# Patient Record
Sex: MALE | Race: BLACK OR AFRICAN AMERICAN | NOT HISPANIC OR LATINO | Employment: OTHER | URBAN - METROPOLITAN AREA
[De-identification: names, ages, dates, MRNs, and addresses within clinical notes are randomized per-mention and may not be internally consistent; named-entity substitution may affect disease eponyms.]

---

## 2017-01-28 ENCOUNTER — LAB VISIT (OUTPATIENT)
Dept: LAB | Facility: HOSPITAL | Age: 67
End: 2017-01-28
Attending: PEDIATRICS
Payer: COMMERCIAL

## 2017-01-28 DIAGNOSIS — E78.5 HYPERLIPIDEMIA: ICD-10-CM

## 2017-01-28 DIAGNOSIS — I10 ESSENTIAL HYPERTENSION: ICD-10-CM

## 2017-01-28 LAB
ALT SERPL W/O P-5'-P-CCNC: 23 U/L
ANION GAP SERPL CALC-SCNC: 8 MMOL/L
AST SERPL-CCNC: 27 U/L
BUN SERPL-MCNC: 18 MG/DL
CALCIUM SERPL-MCNC: 9.1 MG/DL
CHLORIDE SERPL-SCNC: 110 MMOL/L
CHOLEST/HDLC SERPL: 3.9 {RATIO}
CO2 SERPL-SCNC: 24 MMOL/L
CREAT SERPL-MCNC: 1.5 MG/DL
EST. GFR  (AFRICAN AMERICAN): 56.1 ML/MIN/1.73 M^2
EST. GFR  (NON AFRICAN AMERICAN): 48.5 ML/MIN/1.73 M^2
GLUCOSE SERPL-MCNC: 110 MG/DL
HDL/CHOLESTEROL RATIO: 25.6 %
HDLC SERPL-MCNC: 129 MG/DL
HDLC SERPL-MCNC: 33 MG/DL
LDLC SERPL CALC-MCNC: 81.2 MG/DL
NONHDLC SERPL-MCNC: 96 MG/DL
POTASSIUM SERPL-SCNC: 4.6 MMOL/L
SODIUM SERPL-SCNC: 142 MMOL/L
TRIGL SERPL-MCNC: 74 MG/DL

## 2017-01-28 PROCEDURE — 84460 ALANINE AMINO (ALT) (SGPT): CPT

## 2017-01-28 PROCEDURE — 80061 LIPID PANEL: CPT

## 2017-01-28 PROCEDURE — 80048 BASIC METABOLIC PNL TOTAL CA: CPT

## 2017-01-28 PROCEDURE — 84450 TRANSFERASE (AST) (SGOT): CPT

## 2017-01-28 PROCEDURE — 36415 COLL VENOUS BLD VENIPUNCTURE: CPT | Mod: PO

## 2017-02-06 RX ORDER — ROSUVASTATIN CALCIUM 40 MG/1
40 TABLET, FILM COATED ORAL DAILY
Qty: 30 TABLET | Refills: 6 | Status: SHIPPED | OUTPATIENT
Start: 2017-02-06 | End: 2017-02-09

## 2017-02-09 ENCOUNTER — OFFICE VISIT (OUTPATIENT)
Dept: INTERNAL MEDICINE | Facility: CLINIC | Age: 67
End: 2017-02-09
Payer: COMMERCIAL

## 2017-02-09 VITALS
HEART RATE: 61 BPM | HEIGHT: 68 IN | SYSTOLIC BLOOD PRESSURE: 135 MMHG | OXYGEN SATURATION: 97 % | TEMPERATURE: 98 F | WEIGHT: 248.88 LBS | BODY MASS INDEX: 37.72 KG/M2 | DIASTOLIC BLOOD PRESSURE: 82 MMHG

## 2017-02-09 DIAGNOSIS — M1A.0710 IDIOPATHIC CHRONIC GOUT OF RIGHT FOOT WITHOUT TOPHUS: ICD-10-CM

## 2017-02-09 DIAGNOSIS — Z00.00 WELL ADULT EXAM: Primary | ICD-10-CM

## 2017-02-09 DIAGNOSIS — I10 ESSENTIAL HYPERTENSION: ICD-10-CM

## 2017-02-09 DIAGNOSIS — M54.40 CHRONIC MIDLINE LOW BACK PAIN WITH SCIATICA, SCIATICA LATERALITY UNSPECIFIED: ICD-10-CM

## 2017-02-09 DIAGNOSIS — E66.9 NON MORBID OBESITY, UNSPECIFIED OBESITY TYPE: ICD-10-CM

## 2017-02-09 DIAGNOSIS — G89.29 CHRONIC MIDLINE LOW BACK PAIN WITH SCIATICA, SCIATICA LATERALITY UNSPECIFIED: ICD-10-CM

## 2017-02-09 DIAGNOSIS — Z95.1 S/P CABG (CORONARY ARTERY BYPASS GRAFT): ICD-10-CM

## 2017-02-09 DIAGNOSIS — C61 PROSTATE CANCER: ICD-10-CM

## 2017-02-09 DIAGNOSIS — N18.30 CHRONIC KIDNEY DISEASE, STAGE III (MODERATE): ICD-10-CM

## 2017-02-09 DIAGNOSIS — E78.5 HYPERLIPIDEMIA, UNSPECIFIED HYPERLIPIDEMIA TYPE: ICD-10-CM

## 2017-02-09 DIAGNOSIS — I25.810 CORONARY ARTERY DISEASE INVOLVING CORONARY BYPASS GRAFT OF NATIVE HEART WITHOUT ANGINA PECTORIS: ICD-10-CM

## 2017-02-09 PROCEDURE — 99999 PR PBB SHADOW E&M-EST. PATIENT-LVL III: CPT | Mod: PBBFAC,,, | Performed by: PEDIATRICS

## 2017-02-09 PROCEDURE — 3079F DIAST BP 80-89 MM HG: CPT | Mod: S$GLB,,, | Performed by: PEDIATRICS

## 2017-02-09 PROCEDURE — 99396 PREV VISIT EST AGE 40-64: CPT | Mod: S$GLB,,, | Performed by: PEDIATRICS

## 2017-02-09 PROCEDURE — 3075F SYST BP GE 130 - 139MM HG: CPT | Mod: S$GLB,,, | Performed by: PEDIATRICS

## 2017-02-09 RX ORDER — ROSUVASTATIN CALCIUM 40 MG/1
40 TABLET, COATED ORAL NIGHTLY
Qty: 30 TABLET | Refills: 11 | Status: SHIPPED | OUTPATIENT
Start: 2017-02-09 | End: 2017-05-04 | Stop reason: SDUPTHER

## 2017-02-09 NOTE — PROGRESS NOTES
Subjective:       Patient ID: Damon Yoo is a 66 y.o. male.    Chief Complaint: No chief complaint on file.    HPI  Review of Systems    Objective:      Physical Exam    Assessment:       1. Well adult exam    2. Essential hypertension    3. Hyperlipidemia, unspecified hyperlipidemia type    4. Coronary artery disease involving coronary bypass graft of native heart without angina pectoris    5. Prostate cancer    6. Idiopathic chronic gout of right foot without tophus    7. S/P CABG (coronary artery bypass graft)    8. Chronic kidney disease, stage III (moderate)    9. Non morbid obesity, unspecified obesity type        Plan:       Well adult exam    Essential hypertension  -     Hypertension Digital Medicine (HDMP)  Enrollment Order  -     Assign [facility] Onboarding Questionnaire Series  -     Basic metabolic panel; Future; Expected date: 2/9/17    Hyperlipidemia, unspecified hyperlipidemia type  -     ALT (SGPT); Future; Expected date: 2/9/17  -     AST (SGOT); Future; Expected date: 2/9/17  -     Lipid panel; Future; Expected date: 2/9/17    Coronary artery disease involving coronary bypass graft of native heart without angina pectoris    Prostate cancer    Idiopathic chronic gout of right foot without tophus    S/P CABG (coronary artery bypass graft)    Chronic kidney disease, stage III (moderate)    Non morbid obesity, unspecified obesity type

## 2017-02-09 NOTE — PROGRESS NOTES
Subjective:        Patient ID: Damon Yoo is a 66 y.o. male.     Chief Complaint: Annual     HPI Comments: Not following D&E as well except to watch gout, weight is up. Subspecialty notes reviewed with patient     HTN: B/P good, no HTNive symptoms.   LIPIDS:somewhat following D&E, tolerating and compliant with med(s).  CAD: no CP, SOB, DIAZ, saw Dr Faye  Gout: quiet, but is seeing rheum  Prostate cancer: seeing urology  Chronic back pain is good.   CKD: cre is stable  LABS REVIEWED AND DISCUSSED WITH PATIENT     Review of Systems   Constitutional: Negative for fever and unexpected weight change.   HENT: Negative for congestion and rhinorrhea.   Eyes: Negative for discharge and redness.   Respiratory: Negative for cough and wheezing.   Cardiovascular: Negative for chest pain, palpitations and leg swelling.   Gastrointestinal: Negative for vomiting, diarrhea and constipation.   Endocrine: Negative for cold intolerance, heat intolerance, polydipsia, polyphagia and polyuria.   Genitourinary: Negative for decreased urine volume and difficulty urinating.   Musculoskeletal: Positive for back pain and arthralgias. Negative for joint swelling.   Skin: Negative for rash and wound.   Neurological: Negative for syncope and headaches.   Psychiatric/Behavioral: Negative for behavioral problems and sleep disturbance.       Objective:      Physical Exam   Constitutional: He is oriented to person, place, and time. He appears well-developed and well-nourished. No distress.    Neck: Trachea normal and normal range of motion. Neck supple. No JVD present. No thyromegaly present.   Cardiovascular: Normal rate, regular rhythm, S1 normal, S2 normal, normal heart sounds and normal pulses. Exam reveals no gallop and no friction rub.   No murmur heard.  Pulmonary/Chest: Effort normal and breath sounds normal. He has no wheezes. He has no rales.   Abdominal: Soft. Normal appearance and bowel sounds are normal. He exhibits no mass. There  is no hepatosplenomegaly. There is no tenderness. There is no rebound and no guarding.   Musculoskeletal: Normal range of motion. He exhibits no edema.   Lymphadenopathy:   He has no cervical adenopathy.   Neurological: He is alert and oriented to person, place, and time. He has normal strength. Coordination and gait normal.   Skin: Skin is warm and intact. No rash noted.   Psychiatric: He has a normal mood and affect. His speech is normal and behavior is normal. Judgment and thought content normal.       Assessment:     Well exam in a patient with:  1. Hyperlipidemia    2. Essential hypertension    3.      Obesity  4.      CKD stage 3   5.      Prostate cancer  6.     Gout  7.     Chronic back pain  8.     CAD s/p CABG   Plan:    D&E, weight loss, mediterranean diet. Meds reviewed, keep subspecialty care. F/U yearly with labs as he is seeing Dr Faye. PCV 23 at 66 yo, next month.

## 2017-02-09 NOTE — MR AVS SNAPSHOT
[facility]  [address] [place] [address]  Phone: [phone]  Fax: [phone]                  Damon Yoo   2017 7:20 AM   Office Visit    Description:  Male : 1950   Provider:  ELLEN Mason Jr., MD   Department:  [facility]           Reason for Visit     Annual Exam           Diagnoses this Visit        Comments    Well adult exam    -  Primary     Essential hypertension         Hyperlipidemia, unspecified hyperlipidemia type         Coronary artery disease involving coronary bypass graft of native heart without angina pectoris         Prostate cancer         Idiopathic chronic gout of right foot without tophus         S/P CABG (coronary artery bypass graft)         Chronic kidney disease, stage III (moderate)         Non morbid obesity, unspecified obesity type         Chronic midline low back pain with sciatica, sciatica laterality unspecified                To Do List           Future Appointments        Provider Department Dept Phone    2017 11:40 AM [facility] LABORATORY [facility] [phone]    3/3/2017 9:00 AM FIELDS, VISUAL-ONE [facility] - Ophthalmology [phone]    3/3/2017 9:30 AM Devin Wayne MD [facility] Ophthalmology [phone]    3/6/2017 7:30 AM Alethea Beth PA-C [facility] - Rheumatology [phone]    3/13/2017 8:30 AM INTERNAL MEDICINE NURSE, [facility] Internal Medicine [phone]      Goals (5 Years of Data)     None      Follow-Up and Disposition     Return in about 1 year (around 2018).    Follow-up and Disposition History       These Medications        Disp Refills Start End    rosuvastatin (CRESTOR) 40 MG Tab 30 tablet 11 2017    Take 1 tablet (40 mg total) by mouth every evening. Generic is fine. - Oral    Pharmacy: [facility]/pharmacy #[id number] - [place] Ph #: [phone]         [facility]     [facility] Nurse [facility] Line -   Assistance  Registered nurses in the [facility] Center provide clinical advisement, health education, appointment booking, and other advisory services.  Call for this free service at [phone].             Medications           Message regarding Medications     Verify the changes and/or additions to your medication regime listed below are the same as discussed with your clinician today.  If any of these changes or additions are incorrect, please notify your healthcare provider.        START taking these NEW medications        Refills    rosuvastatin (CRESTOR) 40 MG Tab 11    Sig: Take 1 tablet (40 mg total) by mouth every evening. Generic is fine.    Class: Normal    Route: Oral      STOP taking these medications     promethazine-dextromethorphan (PROMETHAZINE-DM) 6.25-15 mg/5 mL Syrp Take 5 mLs by mouth nightly as needed. May cause drowsiness    albuterol 90 mcg/actuation inhaler Inhale 1-2 puffs into the lungs every 6 (six) hours as needed for Wheezing (cough).    clonazePAM (KLONOPIN) 1 MG tablet Take 1 tablet (1 mg total) by mouth every evening.    hydrocodone-acetaminophen (NORCO)  mg per tablet Take 1 tablet by mouth. q 4-6 hrs prn For pain    ondansetron (ZOFRAN) 4 MG tablet Take 1 tablet (4 mg total) by mouth every 8 (eight) hours as needed for Nausea.           Verify that the below list of medications is an accurate representation of the medications you are currently taking.  If none reported, the list may be blank. If incorrect, please contact your healthcare provider. Carry this list with you in case of emergency.           Current Medications     allopurinol (ZYLOPRIM) 300 MG tablet 1 & 1/2 TABLET BY MOUTH EVERY DAY    amlodipine (NORVASC) 2.5 MG tablet TAKE ONE TABLET BY MOUTH EVERY DAY    aspirin 81 MG chewable tablet Take 1 tablet by mouth Daily.    cetirizine (ZYRTEC) 10 MG tablet Take 1 tablet (10 mg total) by mouth once daily.    colchicine (COLCRYS) 0.6 mg tablet Take with first  "symptom of gout, repeat after 1 hour and 6 hours after: then twice a day x 2 days.    fenofibric acid (FIBRICOR) 135 mg CpDR TAKE ONE TABLET BY MOUTH EVERY DAY    fish oil-fat acid comb.8-hb137 (OMEGA 3-6-9) 1,200 mg Cap Take by mouth.    latanoprost 0.005 % ophthalmic solution ONE DROP IN EACH EYE EVERY EVENING    lisinopril (PRINIVIL,ZESTRIL) 40 MG tablet Take 1 tablet (40 mg total) by mouth once daily.    metoprolol succinate (TOPROL-XL) 100 MG 24 hr tablet Take 1 tablet (100 mg total) by mouth once daily.    rosuvastatin (CRESTOR) 40 MG Tab Take 1 tablet (40 mg total) by mouth every evening. Generic is fine.           Clinical Reference Information           Your Vitals Were     BP Pulse Temp Height    135/82 (BP Location: Left arm, Patient Position: Sitting, BP Method: Manual) 61 97.9 °F (36.6 °C) (Tympanic) 5' 8" (1.727 m)    Weight SpO2 BMI    112.9 kg (248 lb 14.4 oz) 97% 37.85 kg/m2      Blood Pressure          Most Recent Value    BP  135/82      Allergies as of 2/9/2017     No Known Drug Allergies      Immunizations Administered on Date of Encounter - 2/9/2017     None      Orders Placed During Today's Visit      Normal Orders This Visit    [facility]     Hypertension Digital Medicine (HDMP)  Enrollment Order     Future Labs/Procedures Expected by Expires    ALT (SGPT)  2/9/2017 4/10/2018    AST (SGOT)  2/9/2017 4/10/2018    Basic metabolic panel  2/9/2017 4/10/2018    Lipid panel  2/9/2017 2/9/2018      Hypertension Digital Medicine Program Information              As discussed, you could benefit from enrolling in the Hypertension Digital Medicine Program. The goal of the program is to help you effectively manage your high blood pressure through an appropriate balance of medication and lifestyle changes, all from the comfort of your own home. Effectively managed blood pressure reduces your risk of having a heart attack or a stroke in the future, so you can enjoy a long " "and healthy life. We want to make blood pressure control your goal.        What is the Hypertension Digital Medicine Program?  Finding the right balance in managing high blood pressure is different for every person, and we will tailor our treatment approach based on your individual needs using the most current evidence-based guidelines.  As a participant, you will be able to send home blood pressure readings, on your schedule, directly into your medical record at [facility]. I, along with a team of pharmacists, will monitor this data, help you adjust your medication(s) and/or make lifestyle recommendations to better manage your hypertension.       What are the requirements of the program?   Participating in the program is as easy as 1 - 2 - 3.   1. Smartphone - You must have your own smartphone to participate (either an iPhone or an Android phone such as 5to1, code-laboration, HTC, LG, [employer], or Sedicii).    2. [facility] account - [facility] offers a great way to connect through the online patient portal, [facility], which is free and provides you access to your [facility] medical record.  3. Digital blood pressure cuff - Using this blood pressure cuff that hooks up to your smartphone, you will be able to send in your home blood pressure readings to the Hypertension Digital Medicine Team. We have made arrangements to offer blood pressure cuffs at a discount for our programs participants.           What can I do to get started?   Complete the Hypertension Digital Medicine Patient Consent questionnaire already available in your [facility] account. To access and complete this questionnaire, either  - Use the [facility] website on a computer and select My Medical Record, then Questionnaires.  - Use the Toura uriah on your smartphone and select "Questionnaires".    Once you have given consent, additional onboarding questionnaires will be assigned to you to complete prior to starting the program. You must return to the " ""Questionnaires" page of your [facility] account to start the additional questionnaires.     How do I purchase a digital blood pressure cuff and obtain a discount?  [facility] has negotiated a discounted price from industry leading healthcare technology companies. Patients using an Apple iPhone can purchase an codetagealth Ease Blood Pressure cuff for $33 (originally $39.99). While supplies last, Android users can purchase the [email or web address] Blood Pressure Monitor for $45 (originally $129.95).  Purchase locations will be sent once all onboarding questionnaires have been completed (see above).    If you have any questions regarding this program or would like more information, please visit our Hypertension Digital Medicine website ([email or web address]) or call Digital Medicine Patient Support at [phone].          Language Assistance Services     ATTENTION: Language assistance services are available, free of charge. Please call [phone].      ATENCIÓN: Si habla español, tiene a stallworth disposición servicios gratuitos de asistencia lingüística. Llame al [phone].     CHÚ Ý: N?u b?n nói Ti?ng Vi?t, có các d?ch v? h? tr? ngôn ng? mi?n phí dành cho b?n. G?i s? [phone].         [facility] complies with applicable Federal civil rights laws and does not discriminate on the basis of race, color, national origin, age, disability, or sex.        "

## 2017-02-10 ENCOUNTER — TELEPHONE (OUTPATIENT)
Dept: RHEUMATOLOGY | Facility: CLINIC | Age: 67
End: 2017-02-10

## 2017-02-10 DIAGNOSIS — Z51.81 MEDICATION MONITORING ENCOUNTER: ICD-10-CM

## 2017-02-10 DIAGNOSIS — M1A.0710 IDIOPATHIC CHRONIC GOUT OF RIGHT FOOT WITHOUT TOPHUS: Primary | ICD-10-CM

## 2017-02-10 NOTE — TELEPHONE ENCOUNTER
----- Message from Azra Desai sent at 2/10/2017  9:54 AM CST -----  Contact: pt  Pt would like to reschedule his lab work from  at the [facility] to 3/3 at the [facility] but the orders  on .  Please extend and reschedule as requested and call pt to confirm at [phone]

## 2017-03-03 ENCOUNTER — OFFICE VISIT (OUTPATIENT)
Dept: OPHTHALMOLOGY | Facility: CLINIC | Age: 67
End: 2017-03-03
Payer: MEDICARE

## 2017-03-03 ENCOUNTER — LAB VISIT (OUTPATIENT)
Dept: LAB | Facility: HOSPITAL | Age: 67
End: 2017-03-03
Attending: INTERNAL MEDICINE
Payer: MEDICARE

## 2017-03-03 DIAGNOSIS — H40.1132 PRIMARY OPEN ANGLE GLAUCOMA OF BOTH EYES, MODERATE STAGE: Primary | ICD-10-CM

## 2017-03-03 DIAGNOSIS — Z51.81 MEDICATION MONITORING ENCOUNTER: ICD-10-CM

## 2017-03-03 DIAGNOSIS — H25.13 NUCLEAR SCLEROSIS OF BOTH EYES: ICD-10-CM

## 2017-03-03 DIAGNOSIS — M1A.0710 IDIOPATHIC CHRONIC GOUT OF RIGHT FOOT WITHOUT TOPHUS: ICD-10-CM

## 2017-03-03 LAB
ANION GAP SERPL CALC-SCNC: 6 MMOL/L
BUN SERPL-MCNC: 20 MG/DL
CALCIUM SERPL-MCNC: 9 MG/DL
CHLORIDE SERPL-SCNC: 111 MMOL/L
CO2 SERPL-SCNC: 26 MMOL/L
CREAT SERPL-MCNC: 1.5 MG/DL
EST. GFR  (AFRICAN AMERICAN): 56 ML/MIN/1.73 M^2
EST. GFR  (NON AFRICAN AMERICAN): 49 ML/MIN/1.73 M^2
GLUCOSE SERPL-MCNC: 107 MG/DL
POTASSIUM SERPL-SCNC: 4.2 MMOL/L
SODIUM SERPL-SCNC: 143 MMOL/L
URATE SERPL-MCNC: 5.5 MG/DL

## 2017-03-03 PROCEDURE — 92250 FUNDUS PHOTOGRAPHY W/I&R: CPT | Mod: PBBFAC,PO | Performed by: OPHTHALMOLOGY

## 2017-03-03 PROCEDURE — 84550 ASSAY OF BLOOD/URIC ACID: CPT | Mod: PO

## 2017-03-03 PROCEDURE — 92083 EXTENDED VISUAL FIELD XM: CPT | Mod: PBBFAC,PO | Performed by: OPHTHALMOLOGY

## 2017-03-03 PROCEDURE — 92014 COMPRE OPH EXAM EST PT 1/>: CPT | Mod: S$PBB,,, | Performed by: OPHTHALMOLOGY

## 2017-03-03 PROCEDURE — 36415 COLL VENOUS BLD VENIPUNCTURE: CPT | Mod: PO

## 2017-03-03 PROCEDURE — 99999 PR PBB SHADOW E&M-EST. PATIENT-LVL II: CPT | Mod: PBBFAC,,, | Performed by: OPHTHALMOLOGY

## 2017-03-03 PROCEDURE — 80048 BASIC METABOLIC PNL TOTAL CA: CPT | Mod: PO

## 2017-03-03 PROCEDURE — 99212 OFFICE O/P EST SF 10 MIN: CPT | Mod: PBBFAC,PO | Performed by: OPHTHALMOLOGY

## 2017-03-03 NOTE — PROGRESS NOTES
SUBJECTIVE:   Damon Yoo is a 66 y.o. male   Corrected distance visual acuity was 20/30 in the right eye and 20/25 in the left eye.   Chief Complaint   Patient presents with    Glaucoma     here for 3 month DFE HVF SDP        HPI:  HPI     Glaucoma    Additional comments: here for 3 month DFE HVF SDP       Last edited by Arielle Heard MA on 3/3/2017 10:03 AM. (History)        Assessment /Plan :  1. Primary open angle glaucoma of both eyes, moderate stage Doing well, IOP within acceptable range relative to target IOP and no evidence of progression. Continue current treatment. Reviewed importance of continued compliance with treatment and follow up.     2. Nuclear sclerosis of both eyes monitor for now     Return to clinic in 3-4 months  or as needed.  With IOP Check and GOCT

## 2017-03-03 NOTE — MR AVS SNAPSHOT
[facility] Ophthalmology  [address] [place] [address]  Phone: [phone]  Fax: [phone]                  Damon Yoo   3/3/2017 9:30 AM   Office Visit    Description:  Male : 1950   Provider:  Devin Wayne MD   [facility]           Reason for Visit     Glaucoma           Diagnoses this Visit        Comments    Primary open angle glaucoma of both eyes, moderate stage    -  Primary     Nuclear sclerosis of both eyes                To Do List           Future Appointments        Provider Department Dept Phone    3/10/2017 9:30 AM Alethea Beth PA-C [facility]- Rheumatology [phone]    3/14/2017 8:00 AM INTERNAL MEDICINE NURSE, [facility] Internal Medicine [phone]    [facility] [phone]    2017 10:00 AM Ronald Avelar IV, MD [facility] Urology [phone]    2018 7:30 AM LABORATORY, [facility] [phone]      Goals (5 Years of Data)     None      Follow-Up and Disposition     Return in about 3 months (around 6/3/2017).      [facility]     [facility] Nurse Care Line - 24/7 Assistance  Registered nurses in the [facility] provide clinical advisement, health education, appointment booking, and other advisory services.  Call for this free service at [phone].             Medications           Message regarding Medications     Verify the changes and/or additions to your medication regime listed below are the same as discussed with your clinician today.  If any of these changes or additions are incorrect, please notify your healthcare provider.             Verify that the below list of medications is an accurate representation of the medications you are currently taking.  If none reported, the list may be blank. If incorrect, please contact your healthcare provider. Carry this list with you in case of emergency.            Current Medications     allopurinol (ZYLOPRIM) 300 MG tablet 1 & 1/2 TABLET BY MOUTH EVERY DAY    amlodipine (NORVASC) 2.5 MG tablet TAKE ONE TABLET BY MOUTH EVERY DAY    aspirin 81 MG chewable tablet Take 1 tablet by mouth Daily.    cetirizine (ZYRTEC) 10 MG tablet Take 1 tablet (10 mg total) by mouth once daily.    colchicine (COLCRYS) 0.6 mg tablet Take with first symptom of gout, repeat after 1 hour and 6 hours after: then twice a day x 2 days.    fenofibric acid (FIBRICOR) 135 mg CpDR TAKE ONE TABLET BY MOUTH EVERY DAY    fish oil-fat acid comb.8-hb137 (OMEGA 3-6-9) 1,200 mg Cap Take by mouth.    latanoprost 0.005 % ophthalmic solution ONE DROP IN EACH EYE EVERY EVENING    lisinopril (PRINIVIL,ZESTRIL) 40 MG tablet Take 1 tablet (40 mg total) by mouth once daily.    metoprolol succinate (TOPROL-XL) 100 MG 24 hr tablet Take 1 tablet (100 mg total) by mouth once daily.    rosuvastatin (CRESTOR) 40 MG Tab Take 1 tablet (40 mg total) by mouth every evening. Generic is fine.           Clinical Reference Information           Allergies as of 3/3/2017     No Known Drug Allergies      Immunizations Administered on Date of Encounter - 3/3/2017     None      Orders Placed During Today's Visit      Normal Orders This Visit    Color Fundus Photography - OU - Both Eyes     Lu Visual Field - OU - Extended - Both Eyes       Language Assistance Services     ATTENTION: Language assistance services are available, free of charge. Please call [phone].      ATENCIÓN: Si orestes butt, tiene a stallworth disposición servicios gratuitos de asistencia lingüística. Llame al [phone].     [facility] Ý: N?u b?n nói Ti?ng Vi?t, có các d?ch v? h? tr? ngôn ng? mi?n phí dành cho b?n. G?i s? [phone].         [facility] complies with applicable Federal civil rights laws and does not discriminate on the basis of race, color, national origin, age, disability, or sex.

## 2017-03-04 ENCOUNTER — OFFICE VISIT (OUTPATIENT)
Dept: URGENT CARE | Facility: CLINIC | Age: 67
End: 2017-03-04
Payer: COMMERCIAL

## 2017-03-04 VITALS
HEIGHT: 66 IN | BODY MASS INDEX: 39.97 KG/M2 | DIASTOLIC BLOOD PRESSURE: 69 MMHG | SYSTOLIC BLOOD PRESSURE: 124 MMHG | WEIGHT: 248.69 LBS | TEMPERATURE: 98 F

## 2017-03-04 DIAGNOSIS — M25.561 ACUTE PAIN OF RIGHT KNEE: Primary | ICD-10-CM

## 2017-03-04 PROCEDURE — 99213 OFFICE O/P EST LOW 20 MIN: CPT | Mod: S$GLB,,, | Performed by: INTERNAL MEDICINE

## 2017-03-04 PROCEDURE — 3074F SYST BP LT 130 MM HG: CPT | Mod: S$GLB,,, | Performed by: INTERNAL MEDICINE

## 2017-03-04 PROCEDURE — 3078F DIAST BP <80 MM HG: CPT | Mod: S$GLB,,, | Performed by: INTERNAL MEDICINE

## 2017-03-04 PROCEDURE — 99999 PR PBB SHADOW E&M-EST. PATIENT-LVL III: CPT | Mod: PBBFAC,,, | Performed by: INTERNAL MEDICINE

## 2017-03-04 PROCEDURE — 1160F RVW MEDS BY RX/DR IN RCRD: CPT | Mod: S$GLB,,, | Performed by: INTERNAL MEDICINE

## 2017-03-04 RX ORDER — MELOXICAM 15 MG/1
15 TABLET ORAL DAILY
Qty: 30 TABLET | Refills: 0 | Status: SHIPPED | OUTPATIENT
Start: 2017-03-04 | End: 2018-01-12

## 2017-03-04 NOTE — PROGRESS NOTES
"Subjective:       Patient ID: Damon Yoo is a 66 y.o. male.    Chief Complaint: Leg Pain (rt calf)    HPI  patient is a 66-year-old male presenting today with right knee discomfort.  He states it started yesterday.  He states he was at his eye doctor's office setting the waiting room and when he stood up to go back he had pain in the back and right side of his knee.  He relates no trauma or injury.  He states is not painful when he is just sitting when he gets up and starts to walk it starts to hurt.  After he is on his feet for little while it improves.  He is taking Tylenol for but nothing else.    Review of Systems    Objective:   /69  Temp 97.8 °F (36.6 °C)  Ht 5' 6" (1.676 m)  Wt 112.8 kg (248 lb 10.9 oz)  BMI 40.14 kg/m2     Physical Exam   Constitutional: He appears well-developed and well-nourished.   HENT:   Head: Normocephalic and atraumatic.   Eyes: Pupils are equal, round, and reactive to light.   Neck: Neck supple. No thyromegaly present.   Cardiovascular: Normal rate, regular rhythm and normal heart sounds.  Exam reveals no gallop and no friction rub.    No murmur heard.  Pulmonary/Chest: Breath sounds normal. He has no wheezes. He has no rales.   Abdominal: Soft. Bowel sounds are normal. He exhibits no distension. There is no tenderness.   Musculoskeletal:   Examination right knee reveals mild crepitus.  Negative anterior-posterior drawer.  Mild tenderness in the right lateral knee.  It is not joint line tenderness.   Vitals reviewed.          Assessment:       1. Acute pain of right knee        Plan:   No problem-specific Assessment & Plan notes found for this encounter.    Damon was seen today for leg pain.    Diagnoses and all orders for this visit:    Acute pain of right knee  Comments:  Benign strain of the knee.  Meloxicam 15mg once daily.  Ice the knee and rest over the weekend.  Orders:  -     meloxicam (MOBIC) 15 MG tablet; Take 1 tablet (15 mg total) by mouth once " daily.        Return if symptoms worsen or fail to improve.

## 2017-03-08 ENCOUNTER — TELEPHONE (OUTPATIENT)
Dept: RHEUMATOLOGY | Facility: CLINIC | Age: 67
End: 2017-03-08

## 2017-03-08 NOTE — TELEPHONE ENCOUNTER
Left a message for call back to reschedule his 03/10/2017 appointment with Alethea Beth PA-C to 03/17/2017.

## 2017-03-10 ENCOUNTER — TELEPHONE (OUTPATIENT)
Dept: RHEUMATOLOGY | Facility: CLINIC | Age: 67
End: 2017-03-10

## 2017-03-14 ENCOUNTER — CLINICAL SUPPORT (OUTPATIENT)
Dept: INTERNAL MEDICINE | Facility: CLINIC | Age: 67
End: 2017-03-14
Payer: MEDICARE

## 2017-03-14 PROCEDURE — 90732 PPSV23 VACC 2 YRS+ SUBQ/IM: CPT | Mod: PBBFAC,PO

## 2017-03-14 NOTE — PROGRESS NOTES
PCV 23 immunization given. Patient tolerated well. Advised to wait 15min in lobby to check reaction. He verbalized understanding.

## 2017-03-17 ENCOUNTER — LAB VISIT (OUTPATIENT)
Dept: LAB | Facility: HOSPITAL | Age: 67
End: 2017-03-17
Attending: INTERNAL MEDICINE
Payer: MEDICARE

## 2017-03-17 ENCOUNTER — OFFICE VISIT (OUTPATIENT)
Dept: RHEUMATOLOGY | Facility: CLINIC | Age: 67
End: 2017-03-17
Payer: MEDICARE

## 2017-03-17 VITALS
HEIGHT: 66 IN | DIASTOLIC BLOOD PRESSURE: 82 MMHG | HEART RATE: 60 BPM | BODY MASS INDEX: 40.39 KG/M2 | SYSTOLIC BLOOD PRESSURE: 162 MMHG | WEIGHT: 251.31 LBS

## 2017-03-17 DIAGNOSIS — M1A.3710 CHRONIC GOUT OF RIGHT FOOT DUE TO RENAL IMPAIRMENT WITHOUT TOPHUS: Primary | ICD-10-CM

## 2017-03-17 DIAGNOSIS — M1A.0710 IDIOPATHIC CHRONIC GOUT OF RIGHT FOOT WITHOUT TOPHUS: ICD-10-CM

## 2017-03-17 DIAGNOSIS — Z51.81 MEDICATION MONITORING ENCOUNTER: ICD-10-CM

## 2017-03-17 DIAGNOSIS — E66.01 OBESITY, CLASS III, BMI 40-49.9 (MORBID OBESITY): ICD-10-CM

## 2017-03-17 DIAGNOSIS — M17.0 PRIMARY OSTEOARTHRITIS OF BOTH KNEES: ICD-10-CM

## 2017-03-17 DIAGNOSIS — N18.30 CHRONIC KIDNEY DISEASE, STAGE III (MODERATE): ICD-10-CM

## 2017-03-17 LAB
ANION GAP SERPL CALC-SCNC: 10 MMOL/L
BUN SERPL-MCNC: 20 MG/DL
CALCIUM SERPL-MCNC: 8.9 MG/DL
CHLORIDE SERPL-SCNC: 111 MMOL/L
CO2 SERPL-SCNC: 24 MMOL/L
CREAT SERPL-MCNC: 1.4 MG/DL
EST. GFR  (AFRICAN AMERICAN): >60 ML/MIN/1.73 M^2
EST. GFR  (NON AFRICAN AMERICAN): 52.4 ML/MIN/1.73 M^2
GLUCOSE SERPL-MCNC: 109 MG/DL
POTASSIUM SERPL-SCNC: 4.8 MMOL/L
SODIUM SERPL-SCNC: 145 MMOL/L
URATE SERPL-MCNC: 5.6 MG/DL

## 2017-03-17 PROCEDURE — 99213 OFFICE O/P EST LOW 20 MIN: CPT | Mod: PBBFAC,PO | Performed by: PHYSICIAN ASSISTANT

## 2017-03-17 PROCEDURE — 99999 PR PBB SHADOW E&M-EST. PATIENT-LVL III: CPT | Mod: PBBFAC,,, | Performed by: PHYSICIAN ASSISTANT

## 2017-03-17 PROCEDURE — 36415 COLL VENOUS BLD VENIPUNCTURE: CPT | Mod: PO

## 2017-03-17 PROCEDURE — 84550 ASSAY OF BLOOD/URIC ACID: CPT | Mod: PO

## 2017-03-17 PROCEDURE — 99214 OFFICE O/P EST MOD 30 MIN: CPT | Mod: S$PBB,,, | Performed by: PHYSICIAN ASSISTANT

## 2017-03-17 PROCEDURE — 80048 BASIC METABOLIC PNL TOTAL CA: CPT | Mod: PO

## 2017-03-17 NOTE — MR AVS SNAPSHOT
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    -  Primary     Chronic kidney disease, stage III (moderate)         Primary osteoarthritis of both knees         Medication monitoring encounter         Obesity, Class III, BMI 40-49.9 (morbid obesity)                To Do List           Future Appointments        Provider Department Dept Phone    2017 10:40 AM LABORATORY, [address] [facility] [phone]    2017 10:00 AM MD LEIGHANN Kaba IVWilliam  Urology [phone]    2017 8:30 AM Devin Wayne MD [facility] Ophthalmology [phone]    2018 7:30 AM LABORATORY, [place] [facility] [phone]    2018 7:00 AM ELLEN Mason Jr., MD [facility] Internal Medicine [phone]      Goals (5 Years of Data)     None      Follow-Up and Disposition     Follow-up and Disposition History      [facility]     [facility] -  Assistance  Registered nurses in the [facility] provide clinical advisement, health education, appointment booking, and other advisory services.  Call for this free service at [phone].             Medications           Message regarding Medications     Verify the changes and/or additions to your medication regime listed below are the same as discussed with your clinician today.  If any of these changes or additions are incorrect, please notify your healthcare provider.             Verify that the below list of medications is an accurate representation of the medications you are  "currently taking.  If none reported, the list may be blank. If incorrect, please contact your healthcare provider. Carry this list with you in case of emergency.           Current Medications     allopurinol (ZYLOPRIM) 300 MG tablet 1 & 1/2 TABLET BY MOUTH EVERY DAY    amlodipine (NORVASC) 2.5 MG tablet TAKE ONE TABLET BY MOUTH EVERY DAY    aspirin 81 MG chewable tablet Take 1 tablet by mouth Daily.    cetirizine (ZYRTEC) 10 MG tablet Take 1 tablet (10 mg total) by mouth once daily.    colchicine (COLCRYS) 0.6 mg tablet Take with first symptom of gout, repeat after 1 hour and 6 hours after: then twice a day x 2 days.    fenofibric acid (FIBRICOR) 135 mg CpDR TAKE ONE TABLET BY MOUTH EVERY DAY    fish oil-fat acid comb.8-hb137 (OMEGA 3-6-9) 1,200 mg Cap Take by mouth.    latanoprost 0.005 % ophthalmic solution ONE DROP IN EACH EYE EVERY EVENING    lisinopril (PRINIVIL,ZESTRIL) 40 MG tablet Take 1 tablet (40 mg total) by mouth once daily.    meloxicam (MOBIC) 15 MG tablet Take 1 tablet (15 mg total) by mouth once daily.    rosuvastatin (CRESTOR) 40 MG Tab Take 1 tablet (40 mg total) by mouth every evening. Generic is fine.    metoprolol succinate (TOPROL-XL) 100 MG 24 hr tablet Take 1 tablet (100 mg total) by mouth once daily.           Clinical Reference Information           Your Vitals Were     BP Pulse Height Weight BMI    162/82 60 5' 6" (1.676 m) 114 kg (251 lb 5.2 oz) 40.56 kg/m2      Blood Pressure          Most Recent Value    BP  (!)  162/82      Allergies as of 3/17/2017     No Known Drug Allergies      Immunizations Administered on Date of Encounter - 3/17/2017     None      Language Assistance Services     ATTENTION: Language assistance services are available, free of charge. Please call [phone].      ATENCIÓN: Si orestes butt, tiene a stallworth disposición servicios gratuitos de asistencia lingüística. Llame al [phone].     CHÚ Ý: N?u b?n nói Ti?ng Vi?t, có các d?ch v? h? tr? ngôn ng? mi?n phí " dành cho b?n. G?i s? [phone].         [facility] complies with applicable Federal civil rights laws and does not discriminate on the basis of race, color, national origin, age, disability, or sex.

## 2017-03-17 NOTE — PROGRESS NOTES
Subjective:       Patient ID: Damon Yoo is a 67 y.o. male.    Chief Complaint: Gout and Pain      HPI Comments: Damon is back today for rheum follow up. He has chronic gout on allopurinol 450 mg daily and colcrys prn. He denies any gout attacks since last visit. No redness, swelling or warmth to any joints. OA in both knees. Last month had some right knee pain with weight bearing. Went to urgent care. Given 10 days of mobic. He took 3 days and his knee felt better. He stopped.   Feb 2015 We aspirated the right non-inflammatory fluid and no crystals, x-rays showed tricompartmental djd both knees left worse than right. We did euflexxa to both knees march 2015. This helped some but not enough to repeat. Intermittent knee pain. Today left knee pain reported 5/10.  He has mild CKD so avoiding daily nsaids.                           He reports no joint swelling. Pertinent negatives include no dysuria, fatigue, fever, trouble swallowing or myalgias.     His past medical history is significant for osteoarthritis.   Osteoarthritis   Associated symptoms include arthralgias. Pertinent negatives include no abdominal pain, chest pain, chills, congestion, coughing, fatigue, fever, joint swelling, myalgias, nausea, neck pain, numbness, rash, vomiting or weakness.   Knee Pain   Associated symptoms include arthralgias. Pertinent negatives include no abdominal pain, chest pain, chills, congestion, coughing, fatigue, fever, joint swelling, myalgias, nausea, neck pain, numbness, rash, vomiting or weakness.       Review of Systems   Constitutional: Negative.  Negative for chills, fatigue and fever.   HENT: Negative.  Negative for congestion, mouth sores and trouble swallowing.    Eyes: Negative.  Negative for photophobia, redness and visual disturbance.   Respiratory: Negative.  Negative for cough, shortness of breath and wheezing.    Cardiovascular: Negative.  Negative for chest pain and leg swelling.   Gastrointestinal:  "Negative.  Negative for abdominal distention, abdominal pain, diarrhea, nausea and vomiting.   Genitourinary: Negative.  Negative for dysuria, flank pain, frequency and urgency.   Musculoskeletal: Positive for arthralgias. Negative for back pain, gait problem, joint swelling, myalgias and neck pain.        Both knees     Skin: Negative.  Negative for rash.   Neurological: Negative.  Negative for dizziness, weakness and numbness.         Objective:   BP (!) 162/82  Pulse 60  Ht 5' 6" (1.676 m)  Wt 114 kg (251 lb 5.2 oz)  BMI 40.56 kg/m2     Physical Exam   Constitutional: He is oriented to person, place, and time and well-developed, well-nourished, and in no distress. No distress.   HENT:   Head: Normocephalic and atraumatic.   Right Ear: External ear normal.   Left Ear: External ear normal.   Mouth/Throat: No oropharyngeal exudate.   Eyes: Conjunctivae and EOM are normal. Pupils are equal, round, and reactive to light. No scleral icterus.   Neck: Neck supple. No thyromegaly present.   Cardiovascular: Normal rate, regular rhythm and normal heart sounds.    No murmur heard.  Pulmonary/Chest: Effort normal and breath sounds normal. No respiratory distress.   Abdominal: Soft. Bowel sounds are normal.       Right Side Rheumatological Exam     Examination finds the shoulder, elbow, wrist, knee, 1st PIP, 1st MCP, 2nd PIP, 2nd MCP, 3rd PIP, 3rd MCP, 4th PIP, 4th MCP, 5th PIP and 5th MCP normal.    Knee Exam     Range of Motion   The patient has normal right knee ROM.  Extension: normal   Flexion: normal   Patellofemoral Crepitus: positive  Effusion: positive  Warmth: negative    Left Side Rheumatological Exam     Examination finds the shoulder, elbow, wrist, knee, 1st PIP, 1st MCP, 2nd PIP, 2nd MCP, 3rd PIP, 3rd MCP, 4th PIP, 4th MCP, 5th PIP and 5th MCP normal.    Knee Exam     Range of Motion   The patient has normal left knee ROM.  Extension: normal   Flexion: normal     Patellofemoral Crepitus: positive  Effusion: " negative  Warmth: negative      Lymphadenopathy:     He has no cervical adenopathy.   Neurological: He is alert and oriented to person, place, and time. No cranial nerve deficit. He exhibits normal muscle tone. Gait normal. Coordination normal.   Skin: Skin is warm and dry.     Musculoskeletal: Normal range of motion. He exhibits no edema.           Recent Results (from the past 1008 hour(s))   Basic metabolic panel    Collection Time: 03/03/17  7:59 AM   Result Value Ref Range    Sodium 143 136 - 145 mmol/L    Potassium 4.2 3.5 - 5.1 mmol/L    Chloride 111 (H) 95 - 110 mmol/L    CO2 26 23 - 29 mmol/L    Glucose 107 70 - 110 mg/dL    BUN, Bld 20 8 - 23 mg/dL    Creatinine 1.5 (H) 0.5 - 1.4 mg/dL    Calcium 9.0 8.7 - 10.5 mg/dL    Anion Gap 6 (L) 8 - 16 mmol/L    eGFR if African American 56 (A) >60 mL/min/1.73 m^2    eGFR if non African American 49 (A) >60 mL/min/1.73 m^2   Uric acid    Collection Time: 03/03/17  7:59 AM   Result Value Ref Range    Uric Acid 5.5 3.4 - 7.0 mg/dL   Basic metabolic panel    Collection Time: 03/17/17  9:13 AM   Result Value Ref Range    Sodium 145 136 - 145 mmol/L    Potassium 4.8 3.5 - 5.1 mmol/L    Chloride 111 (H) 95 - 110 mmol/L    CO2 24 23 - 29 mmol/L    Glucose 109 70 - 110 mg/dL    BUN, Bld 20 8 - 23 mg/dL    Creatinine 1.4 0.5 - 1.4 mg/dL    Calcium 8.9 8.7 - 10.5 mg/dL    Anion Gap 10 8 - 16 mmol/L    eGFR if African American >60.0 >60 mL/min/1.73 m^2    eGFR if non African American 52.4 (A) >60 mL/min/1.73 m^2   Uric acid    Collection Time: 03/17/17  9:13 AM   Result Value Ref Range    Uric Acid 5.6 3.4 - 7.0 mg/dL            Assessment:       1. 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 Chronic kidney disease, stage III (moderate)    3. Primary osteoarthritis of both knees    4. Medication monitoring encounter    5. Obesity, Class III, BMI 40-49.9 (morbid obesity)          1. Gout stable on allopurinol 450 mg daily-  uric acid 5.6 at goal   2.  OA both knees slightly better post euflexxa- pain remitted with sparing use of nsaids  3. Med monitoring        Plan:         keep allopurinol at 450 mg daily,     Use colcrys only prn for acute attacks,     Add tumeric 500 mg bid for knee osteoarthritis      rtc 6 mon with labs-- cmp, and uric acid,     call with any questions, changes or concerns

## 2017-04-06 ENCOUNTER — PATIENT MESSAGE (OUTPATIENT)
Dept: RESEARCH | Facility: HOSPITAL | Age: 67
End: 2017-04-06

## 2017-04-21 ENCOUNTER — CLINICAL SUPPORT (OUTPATIENT)
Dept: CARDIOLOGY | Facility: CLINIC | Age: 67
End: 2017-04-21
Payer: MEDICARE

## 2017-04-21 ENCOUNTER — OFFICE VISIT (OUTPATIENT)
Dept: CARDIOLOGY | Facility: CLINIC | Age: 67
End: 2017-04-21
Payer: COMMERCIAL

## 2017-04-21 VITALS
HEIGHT: 66 IN | BODY MASS INDEX: 39.86 KG/M2 | HEART RATE: 62 BPM | SYSTOLIC BLOOD PRESSURE: 130 MMHG | WEIGHT: 248 LBS | DIASTOLIC BLOOD PRESSURE: 74 MMHG

## 2017-04-21 DIAGNOSIS — N18.30 CHRONIC KIDNEY DISEASE, STAGE III (MODERATE): ICD-10-CM

## 2017-04-21 DIAGNOSIS — E78.5 HYPERLIPIDEMIA, UNSPECIFIED HYPERLIPIDEMIA TYPE: ICD-10-CM

## 2017-04-21 DIAGNOSIS — R94.31 ABNORMAL EKG: ICD-10-CM

## 2017-04-21 DIAGNOSIS — I10 ESSENTIAL HYPERTENSION: ICD-10-CM

## 2017-04-21 DIAGNOSIS — I25.10 CORONARY ARTERY DISEASE, ANGINA PRESENCE UNSPECIFIED, UNSPECIFIED VESSEL OR LESION TYPE, UNSPECIFIED WHETHER NATIVE OR TRANSPLANTED HEART: Primary | ICD-10-CM

## 2017-04-21 DIAGNOSIS — Z95.1 S/P CABG (CORONARY ARTERY BYPASS GRAFT): ICD-10-CM

## 2017-04-21 DIAGNOSIS — I25.10 CORONARY ARTERY DISEASE, ANGINA PRESENCE UNSPECIFIED, UNSPECIFIED VESSEL OR LESION TYPE, UNSPECIFIED WHETHER NATIVE OR TRANSPLANTED HEART: ICD-10-CM

## 2017-04-21 DIAGNOSIS — I42.2 HYPERTROPHIC CARDIOMYOPATHY: ICD-10-CM

## 2017-04-21 DIAGNOSIS — M1A.3710 CHRONIC GOUT OF RIGHT FOOT DUE TO RENAL IMPAIRMENT WITHOUT TOPHUS: ICD-10-CM

## 2017-04-21 DIAGNOSIS — E66.01 OBESITY, CLASS III, BMI 40-49.9 (MORBID OBESITY): ICD-10-CM

## 2017-04-21 PROCEDURE — 99999 PR PBB SHADOW E&M-EST. PATIENT-LVL III: CPT | Mod: PBBFAC,,, | Performed by: INTERNAL MEDICINE

## 2017-04-21 PROCEDURE — 93005 ELECTROCARDIOGRAM TRACING: CPT | Mod: PBBFAC,PO | Performed by: INTERNAL MEDICINE

## 2017-04-21 PROCEDURE — 3075F SYST BP GE 130 - 139MM HG: CPT | Mod: S$GLB,,, | Performed by: INTERNAL MEDICINE

## 2017-04-21 PROCEDURE — 93010 ELECTROCARDIOGRAM REPORT: CPT | Mod: S$PBB,,, | Performed by: INTERNAL MEDICINE

## 2017-04-21 PROCEDURE — 1160F RVW MEDS BY RX/DR IN RCRD: CPT | Mod: S$GLB,,, | Performed by: INTERNAL MEDICINE

## 2017-04-21 PROCEDURE — 3078F DIAST BP <80 MM HG: CPT | Mod: S$GLB,,, | Performed by: INTERNAL MEDICINE

## 2017-04-21 PROCEDURE — 99213 OFFICE O/P EST LOW 20 MIN: CPT | Mod: S$GLB,,, | Performed by: INTERNAL MEDICINE

## 2017-04-21 NOTE — MR AVS SNAPSHOT
[facility]  [address] [place] [address]  Phone: [phone]  Fax: [phone]                  Damon Yoo   2017 9:45 AM   Office Visit    Description:  Male : 1950   Provider:  Mihaela Faye MD   Department:  [facility]           Reason for Visit     Coronary Artery Disease     Hypertension     Hyperlipidemia           Diagnoses this Visit        Comments    Coronary artery disease, angina presence unspecified, unspecified vessel or lesion type, unspecified whether native or transplanted heart    -  Primary     Obesity, Class III, BMI 40-49.9 (morbid obesity)         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         Hypertrophic cardiomyopathy         S/P CABG (coronary artery bypass graft)         Essential hypertension         Abnormal EKG         Hyperlipidemia, unspecified hyperlipidemia type         Chronic kidney disease, stage III (moderate)                To Do List           Future Appointments        Provider Department Dept Phone    2017 10:40 AM LABORATORY, [address] [facility] [phone]    2017 10:00 AM Ronald Avelar IV, MD [facility] Urology [phone]    2017 8:30 AM Devin Wayne MD [facility] Ophthalmology [phone]    2018 7:30 AM LAB[facility]ATORY, [facility] [facility] [phone]    2018 7:00 AM ELLEN Mason Jr., MD [facility] Internal Medicine [phone]      Goals (5 Years of Data)     None      Follow-Up and Disposition     Return in about 1 year (around 2018).      [facility]     [facility] Care [facility] -  Assistance  Unless otherwise directed by your provider, please contact [facility], our nurse care line that is available for  assistance.     Registered nurses in the [facility] provide: appointment scheduling, clinical advisement, health education, and other advisory services.  Call:  "[phone] (toll free)               Medications           Message regarding Medications     Verify the changes and/or additions to your medication regime listed below are the same as discussed with your clinician today.  If any of these changes or additions are incorrect, please notify your healthcare provider.             Verify that the below list of medications is an accurate representation of the medications you are currently taking.  If none reported, the list may be blank. If incorrect, please contact your healthcare provider. Carry this list with you in case of emergency.           Current Medications     allopurinol (ZYLOPRIM) 300 MG tablet 1 & 1/2 TABLET BY MOUTH EVERY DAY    amlodipine (NORVASC) 2.5 MG tablet TAKE ONE TABLET BY MOUTH EVERY DAY    aspirin 81 MG chewable tablet Take 1 tablet by mouth Daily.    colchicine (COLCRYS) 0.6 mg tablet Take with first symptom of gout, repeat after 1 hour and 6 hours after: then twice a day x 2 days.    fenofibric acid (FIBRICOR) 135 mg CpDR TAKE ONE TABLET BY MOUTH EVERY DAY    fish oil-fat acid comb.8-hb137 (OMEGA 3-6-9) 1,200 mg Cap Take by mouth.    latanoprost 0.005 % ophthalmic solution ONE DROP IN EACH EYE EVERY EVENING    lisinopril (PRINIVIL,ZESTRIL) 40 MG tablet Take 1 tablet (40 mg total) by mouth once daily.    meloxicam (MOBIC) 15 MG tablet Take 1 tablet (15 mg total) by mouth once daily.    metoprolol succinate (TOPROL-XL) 100 MG 24 hr tablet Take 1 tablet (100 mg total) by mouth once daily.    rosuvastatin (CRESTOR) 40 MG Tab Take 1 tablet (40 mg total) by mouth every evening. Generic is fine.    cetirizine (ZYRTEC) 10 MG tablet Take 1 tablet (10 mg total) by mouth once daily.           Clinical Reference Information           Your Vitals Were     BP Pulse Height Weight BMI    130/74 62 5' 6" (1.676 m) 112.5 kg (248 lb 0.3 oz) 40.03 kg/m2      Blood Pressure          Most Recent Value    Right Arm BP - Sitting  130/74    Left Arm BP - Sitting  " 128/76    BP  130/74      Allergies as of 4/21/2017     No Known Drug Allergies      Immunizations Administered on Date of Encounter - 4/21/2017     None      Orders Placed During Today's Visit     Future Labs/Procedures Expected by Expires    SCHEDULED EKG 12-LEAD (to Muse)  As directed 4/18/2018      Smoking Cessation     If you would like to quit smoking:   You may be eligible for free services if you are a [place] resident and started smoking cigarettes before September 1, 1988.  Call the Smoking Cessation [employer] ([facility]) toll free at [phone] or [phone].   Call 1-800-QUIT-NOW if you do not meet the above criteria.   Contact us via email: [email or web address]   View our website for more information: [email or web address]        Language Assistance Services     ATTENTION: Language assistance services are available, free of charge. Please call [phone].      ATENCIÓN: Si habla español, tiene a stallworth disposición servicios gratuitos de asistencia lingüística. Llame al [phone].     CHÚ Ý: N?u b?n nói Ti?ng Vi?t, có các d?ch v? h? tr? ngôn ng? mi?n phí dành cho b?n. G?i s? [phone].         [facility] complies with applicable Federal civil rights laws and does not discriminate on the basis of race, color, national origin, age, disability, or sex.

## 2017-04-21 NOTE — PROGRESS NOTES
Subjective:   Patient ID:  Damon Yoo is a 67 y.o. male who presents for follow up of Coronary Artery Disease; Hypertension; and Hyperlipidemia      HPI  A 66 yo male with h/o cad s/p cabg htn cardiomyopathy ckd stage 3  hlp gout is here frof /u cad. He is doing well clinically has no chest pain or shortness of breath eh is walking a little for exercise. No real regular exercise he is sitting  On the sofa. Has not been compliant with diet.  Past Medical History:   Diagnosis Date    Abnormal EKG 10/25/2013    CAD (coronary artery disease)     Cancer     Prostate [id number] prostate    Glaucoma     Gout attack     Hyperlipidemia     Hypertension     Hypertrophic cardiomyopathy 10/25/2013    S/P CABG (coronary artery bypass graft) 10/25/2013       Past Surgical History:   Procedure Laterality Date    CARDIAC SURGERY      CABG x1 2003    CORONARY ARTERY BYPASS GRAFT  05/2003    x1    PROSTATE SURGERY      RALP 2013       Social History   Substance Use Topics    Smoking status: Former Smoker     Packs/day: 0.25     Years: 15.00     Quit date: 4/21/1988    Smokeless tobacco: Never Used    Alcohol use No       Family History   Problem Relation Age of Onset    Strabismus Neg Hx     Retinal detachment Neg Hx     Macular degeneration Neg Hx     Glaucoma Neg Hx     Blindness Neg Hx     Amblyopia Neg Hx        Current Outpatient Prescriptions   Medication Sig    allopurinol (ZYLOPRIM) 300 MG tablet 1 & 1/2 TABLET BY MOUTH EVERY DAY    amlodipine (NORVASC) 2.5 MG tablet TAKE ONE TABLET BY MOUTH EVERY DAY    aspirin 81 MG chewable tablet Take 1 tablet by mouth Daily.    colchicine (COLCRYS) 0.6 mg tablet Take with first symptom of gout, repeat after 1 hour and 6 hours after: then twice a day x 2 days.    fenofibric acid (FIBRICOR) 135 mg CpDR TAKE ONE TABLET BY MOUTH EVERY DAY    fish oil-fat acid comb.8-hb137 (OMEGA 3-6-9) 1,200 mg Cap Take by mouth.    latanoprost 0.005 % ophthalmic solution ONE  DROP IN EACH EYE EVERY EVENING    lisinopril (PRINIVIL,ZESTRIL) 40 MG tablet Take 1 tablet (40 mg total) by mouth once daily.    meloxicam (MOBIC) 15 MG tablet Take 1 tablet (15 mg total) by mouth once daily.    metoprolol succinate (TOPROL-XL) 100 MG 24 hr tablet Take 1 tablet (100 mg total) by mouth once daily.    rosuvastatin (CRESTOR) 40 MG Tab Take 1 tablet (40 mg total) by mouth every evening. Generic is fine.    cetirizine (ZYRTEC) 10 MG tablet Take 1 tablet (10 mg total) by mouth once daily.     No current facility-administered medications for this visit.      Current Outpatient Prescriptions on File Prior to Visit   Medication Sig    allopurinol (ZYLOPRIM) 300 MG tablet 1 & 1/2 TABLET BY MOUTH EVERY DAY    amlodipine (NORVASC) 2.5 MG tablet TAKE ONE TABLET BY MOUTH EVERY DAY    aspirin 81 MG chewable tablet Take 1 tablet by mouth Daily.    colchicine (COLCRYS) 0.6 mg tablet Take with first symptom of gout, repeat after 1 hour and 6 hours after: then twice a day x 2 days.    fenofibric acid (FIBRICOR) 135 mg CpDR TAKE ONE TABLET BY MOUTH EVERY DAY    fish oil-fat acid comb.8-hb137 (OMEGA 3-6-9) 1,200 mg Cap Take by mouth.    latanoprost 0.005 % ophthalmic solution ONE DROP IN EACH EYE EVERY EVENING    lisinopril (PRINIVIL,ZESTRIL) 40 MG tablet Take 1 tablet (40 mg total) by mouth once daily.    meloxicam (MOBIC) 15 MG tablet Take 1 tablet (15 mg total) by mouth once daily.    metoprolol succinate (TOPROL-XL) 100 MG 24 hr tablet Take 1 tablet (100 mg total) by mouth once daily.    rosuvastatin (CRESTOR) 40 MG Tab Take 1 tablet (40 mg total) by mouth every evening. Generic is fine.    cetirizine (ZYRTEC) 10 MG tablet Take 1 tablet (10 mg total) by mouth once daily.     No current facility-administered medications on file prior to visit.      Review of patient's allergies indicates:   Allergen Reactions    No known drug allergies        ROS    Constitution: Negative for weakness,  "malaise/fatigue and weight gain.    HENT: Negative for headaches and nosebleeds.    Eyes: Negative for blurred vision.    Cardiovascular: Negative for chest pain, leg swelling and palpitations. Has no angina  Respiratory: Negative for chest tightness and shortness of breath.    Hematologic/Lymphatic: Does not bruise/bleed easily.    Musculoskeletal: Negative for muscle weakness.has back pain and leg pain as well as knee pain    Gastrointestinal: Negative for abdominal pain and melena.    Genitourinary: Negative for dysuria and hematuria.    Neurological: Negative for dizziness, focal weakness, light-headedness and loss of balance.    Psychiatric/Behavioral: Negative for memory loss. The patient does not have insomnia.    Allergic/Immunologic: Negative for hives.  Objective:   Physical Exam  Vitals:    04/21/17 1054   BP: 130/74   Pulse: 62   Weight: 112.5 kg (248 lb 0.3 oz)   Height: 5' 6" (1.676 m)   Constitutional: He is oriented to person, place, and time. He appears well-developed and well-nourished. No distress.   HENT:   Head: Normocephalic and atraumatic.   Eyes: EOM are normal. Pupils are equal, round, and reactive to light. Right eye exhibits no discharge. Left eye exhibits no discharge.   Neck: Neck supple. No JVD present. No thyromegaly present.   Cardiovascular: Normal rate, regular rhythm, normal heart sounds and intact distal pulses. Exam reveals no gallop and no friction rub.   No murmur heard.  Pulmonary/Chest: Effort normal and breath sounds normal. No respiratory distress. He has no wheezes. He has no rales. He exhibits no tenderness.   Scar cabg well healed.   Abdominal: Soft. Bowel sounds are normal. He exhibits no distension. There is no tenderness.   Obese abdomen   Musculoskeletal: Normal range of motion. He exhibits no edema.   Neurological: He is alert and oriented to person, place, and time. No cranial nerve deficit.   Skin: Skin is warm and dry. No rash noted. He is not diaphoretic. No " erythema.   Psychiatric: He has a normal mood and affect. His behavior is normal.   Lab Results   Component Value Date    CHOL 129 01/28/2017    CHOL 147 11/14/2016    CHOL 129 05/02/2016     Lab Results   Component Value Date    HDL 33 (L) 01/28/2017    HDL 28 (L) 11/14/2016    HDL 30 (L) 05/02/2016     Lab Results   Component Value Date    LDLCALC 81.2 01/28/2017    LDLCALC 92.6 11/14/2016    LDLCALC 80.0 05/02/2016     Lab Results   Component Value Date    TRIG 74 01/28/2017    TRIG 132 11/14/2016    TRIG 95 05/02/2016     Lab Results   Component Value Date    CHOLHDL 25.6 01/28/2017    CHOLHDL 19.0 (L) 11/14/2016    CHOLHDL 23.3 05/02/2016       Chemistry        Component Value Date/Time     03/17/2017 0913    K 4.8 03/17/2017 0913     (H) 03/17/2017 0913    CO2 24 03/17/2017 0913    BUN 20 03/17/2017 0913    CREATININE 1.4 03/17/2017 0913     03/17/2017 0913        Component Value Date/Time    CALCIUM 8.9 03/17/2017 0913    ALKPHOS 64 11/14/2016 1030    AST 27 01/28/2017 0827    ALT 23 01/28/2017 0827    BILITOT 0.8 11/14/2016 1030          Lab Results   Component Value Date    TSH 1.367 08/16/2014     Lab Results   Component Value Date    INR 1.0 06/26/2010     Lab Results   Component Value Date    WBC 7.88 02/24/2016    HGB 12.9 (L) 02/24/2016    HCT 39.5 (L) 02/24/2016    MCV 92 02/24/2016     02/24/2016     BMP  Sodium   Date Value Ref Range Status   03/17/2017 145 136 - 145 mmol/L Final     Potassium   Date Value Ref Range Status   03/17/2017 4.8 3.5 - 5.1 mmol/L Final     Chloride   Date Value Ref Range Status   03/17/2017 111 (H) 95 - 110 mmol/L Final     CO2   Date Value Ref Range Status   03/17/2017 24 23 - 29 mmol/L Final     BUN, Bld   Date Value Ref Range Status   03/17/2017 20 8 - 23 mg/dL Final     Creatinine   Date Value Ref Range Status   03/17/2017 1.4 0.5 - 1.4 mg/dL Final     Calcium   Date Value Ref Range Status   03/17/2017 8.9 8.7 - 10.5 mg/dL Final     Anion Gap    Date Value Ref Range Status   03/17/2017 10 8 - 16 mmol/L Final     eGFR if    Date Value Ref Range Status   03/17/2017 >60.0 >60 mL/min/1.73 m^2 Final     eGFR if non    Date Value Ref Range Status   03/17/2017 52.4 (A) >60 mL/min/1.73 m^2 Final     Comment:     Calculation used to obtain the estimated glomerular filtration  rate (eGFR) is the CKD-EPI equation. Since race is unknown   in our information system, the eGFR values for   -American and Non--American patients are given   for each creatinine result.       CrCl cannot be calculated (Patient has no serum creatinine result on file.).    Assessment:     1. Coronary artery disease, angina presence unspecified, unspecified vessel or lesion type, unspecified whether native or transplanted heart    2. Obesity, Class III, BMI 40-49.9 (morbid obesity)    3. 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 Hypertrophic cardiomyopathy    5. S/P CABG (coronary artery bypass graft)    6. Essential hypertension    7. Abnormal EKG    8. Hyperlipidemia, unspecified hyperlipidemia type    9. Chronic kidney disease, stage III (moderate)      He was counseled about diet exercise weight loss and compliance.  ekg unchanged.  Plan:     Continue current therapy  Cardiac low salt diet.  Risk factor modification and excercise program.  F/u yearly

## 2017-05-01 ENCOUNTER — TELEPHONE (OUTPATIENT)
Dept: INTERNAL MEDICINE | Facility: CLINIC | Age: 67
End: 2017-05-01

## 2017-05-01 DIAGNOSIS — E78.5 HYPERLIPIDEMIA, UNSPECIFIED HYPERLIPIDEMIA TYPE: Primary | ICD-10-CM

## 2017-05-01 RX ORDER — FENOFIBRIC ACID 135 MG/1
CAPSULE, DELAYED RELEASE ORAL
Qty: 90 CAPSULE | Refills: 3 | Status: SHIPPED | OUTPATIENT
Start: 2017-05-01 | End: 2018-04-29 | Stop reason: SDUPTHER

## 2017-05-01 NOTE — TELEPHONE ENCOUNTER
Pt came into office with concerned about Rx fenofibric acid because he was prescribed it by NP Nic Acuña and he was not aware who she was so after researching she works in cardiology w/  and [facility] states she will send the script in under  so I notified the patient the matter has been taken care of. He verbalized understanding.

## 2017-05-01 NOTE — TELEPHONE ENCOUNTER
Patient called and stated pharmacy ([facility] , [place]) has been calling to get refill authorization for fenofibric.

## 2017-05-04 DIAGNOSIS — E78.5 HYPERLIPIDEMIA, UNSPECIFIED HYPERLIPIDEMIA TYPE: ICD-10-CM

## 2017-05-04 DIAGNOSIS — I25.810 CORONARY ARTERY DISEASE INVOLVING CORONARY BYPASS GRAFT OF NATIVE HEART WITHOUT ANGINA PECTORIS: ICD-10-CM

## 2017-05-04 RX ORDER — ROSUVASTATIN CALCIUM 40 MG/1
40 TABLET, COATED ORAL NIGHTLY
Qty: 90 TABLET | Refills: 3 | Status: SHIPPED | OUTPATIENT
Start: 2017-05-04 | End: 2018-07-27 | Stop reason: SDUPTHER

## 2017-05-23 ENCOUNTER — LAB VISIT (OUTPATIENT)
Dept: LAB | Facility: HOSPITAL | Age: 67
End: 2017-05-23
Attending: UROLOGY
Payer: MEDICARE

## 2017-05-23 DIAGNOSIS — C61 PROSTATE CANCER: ICD-10-CM

## 2017-05-23 LAB — COMPLEXED PSA SERPL-MCNC: <0.01 NG/ML

## 2017-05-23 PROCEDURE — 36415 COLL VENOUS BLD VENIPUNCTURE: CPT

## 2017-05-23 PROCEDURE — 84153 ASSAY OF PSA TOTAL: CPT

## 2017-05-31 ENCOUNTER — OFFICE VISIT (OUTPATIENT)
Dept: UROLOGY | Facility: CLINIC | Age: 67
End: 2017-05-31
Payer: MEDICARE

## 2017-05-31 VITALS
DIASTOLIC BLOOD PRESSURE: 88 MMHG | HEART RATE: 60 BPM | BODY MASS INDEX: 39.46 KG/M2 | WEIGHT: 244.5 LBS | SYSTOLIC BLOOD PRESSURE: 153 MMHG

## 2017-05-31 DIAGNOSIS — C61 PROSTATE CANCER: Primary | ICD-10-CM

## 2017-05-31 PROCEDURE — 99214 OFFICE O/P EST MOD 30 MIN: CPT | Mod: S$PBB,,, | Performed by: UROLOGY

## 2017-05-31 PROCEDURE — 99212 OFFICE O/P EST SF 10 MIN: CPT | Mod: PBBFAC | Performed by: UROLOGY

## 2017-05-31 PROCEDURE — 99999 PR PBB SHADOW E&M-EST. PATIENT-LVL II: CPT | Mod: PBBFAC,,, | Performed by: UROLOGY

## 2017-05-31 NOTE — PROGRESS NOTES
Chief Complaint: Prostate Cancer    HPI:   5/31/17: Still doing well, PSA undetectable.  Continent.  Not worried about ED.  Reviewed history in detail.  5/30/16: No problems.   11/2/15: Doing well.  4/20/15: No changes since last visit.  10/13/14: Again doing well, cialis not working  3/17/14: Doing well.    12/16/13: Doing well. Cialis effective.  No incontinence.  No constipation.  9/9/13: Pt had RALP 4/4/13.  [id number] prostate cancer.  No incontinence unless cough or sneeze after letting himself fill up.  Cialis controlling ED well. No complaints.  PSA undetectable.    Allergies:  No known drug allergies    Medications:  has a current medication list which includes the following prescription(s): allopurinol, amlodipine, aspirin, cetirizine, colchicine, fenofibric acid, fish oil-fat acid comb.8-hb137, latanoprost, lisinopril, meloxicam, rosuvastatin, and metoprolol succinate.    Review of Systems:  General: No fever, chills, fatigability, or weight loss.  Skin: No rashes, itching, or changes in color or texture of skin.  Chest: Denies DIAZ, cyanosis, wheezing, cough, and sputum production.  Abdomen: Appetite fine. No weight loss. Denies diarrhea, abdominal pain, hematemesis, or blood in stool.  Musculoskeletal: No joint stiffness or swelling. Denies back pain.  : As above.  All other review of systems negative.    PMH:   has a past medical history of Abnormal EKG (10/25/2013); CAD (coronary artery disease); Cancer; Glaucoma; Gout attack; Hyperlipidemia; Hypertension; Hypertrophic cardiomyopathy (10/25/2013); and S/P CABG (coronary artery bypass graft) (10/25/2013).    PSH:   has a past surgical history that includes ostate surgery; Cardiac surgery; and Coronary artery bypass graft (05/2003).    FamHx: family history is not on file.    SocHx:  reports that he quit smoking about 29 years ago. He has a 3.75 pack-year smoking history. He has never used smokeless tobacco. He reports that he does not drink alcohol. His  drug history is not on file.      Physical Exam:  Vitals:    05/31/17 1022   BP: (!) 153/88   Pulse: 60     General: A&Ox3, no apparent distress, no deformities  Neck: No masses, normal thyroid  Lungs: normal inspiration, no use of accessory muscles  Heart: normal pulse, no arrhythmias  Abdomen: Soft, NT, ND  Skin: The skin is warm and dry. No jaundice.  Ext: No c/c/e.  :   5/16: MONA normal    Labs/Studies:   PSA    10/15, 5/16, 5/17: undetectable    Impression/Plan:   1. PSA/RTC 12 mo

## 2017-06-12 ENCOUNTER — OFFICE VISIT (OUTPATIENT)
Dept: OPHTHALMOLOGY | Facility: CLINIC | Age: 67
End: 2017-06-12
Payer: MEDICARE

## 2017-06-12 DIAGNOSIS — H40.1132 PRIMARY OPEN ANGLE GLAUCOMA OF BOTH EYES, MODERATE STAGE: Primary | ICD-10-CM

## 2017-06-12 DIAGNOSIS — H25.13 NUCLEAR SCLEROSIS OF BOTH EYES: ICD-10-CM

## 2017-06-12 DIAGNOSIS — H52.7 REFRACTIVE ERROR: ICD-10-CM

## 2017-06-12 PROCEDURE — 92133 CPTRZD OPH DX IMG PST SGM ON: CPT | Mod: PBBFAC,PO | Performed by: OPHTHALMOLOGY

## 2017-06-12 PROCEDURE — 92015 DETERMINE REFRACTIVE STATE: CPT | Mod: ,,, | Performed by: OPHTHALMOLOGY

## 2017-06-12 PROCEDURE — 92012 INTRM OPH EXAM EST PATIENT: CPT | Mod: S$PBB,,, | Performed by: OPHTHALMOLOGY

## 2017-06-12 PROCEDURE — 99999 PR PBB SHADOW E&M-EST. PATIENT-LVL II: CPT | Mod: PBBFAC,,, | Performed by: OPHTHALMOLOGY

## 2017-06-12 PROCEDURE — 99212 OFFICE O/P EST SF 10 MIN: CPT | Mod: PBBFAC,PO | Performed by: OPHTHALMOLOGY

## 2017-06-12 RX ORDER — TIMOLOL MALEATE 5 MG/ML
1 SOLUTION/ DROPS OPHTHALMIC EVERY MORNING
Qty: 10 ML | Refills: 12 | Status: SHIPPED | OUTPATIENT
Start: 2017-06-12 | End: 2017-06-12 | Stop reason: SDUPTHER

## 2017-06-12 RX ORDER — TIMOLOL MALEATE 5 MG/ML
1 SOLUTION/ DROPS OPHTHALMIC EVERY MORNING
Qty: 10 ML | Refills: 12 | OUTPATIENT
Start: 2017-06-12 | End: 2017-06-12 | Stop reason: SDUPTHER

## 2017-06-12 RX ORDER — TIMOLOL MALEATE 5 MG/ML
1 SOLUTION/ DROPS OPHTHALMIC EVERY MORNING
Qty: 10 ML | Refills: 12 | Status: SHIPPED | OUTPATIENT
Start: 2017-06-12 | End: 2017-10-23 | Stop reason: SDUPTHER

## 2017-07-24 ENCOUNTER — OFFICE VISIT (OUTPATIENT)
Dept: OPHTHALMOLOGY | Facility: CLINIC | Age: 67
End: 2017-07-24
Payer: MEDICARE

## 2017-07-24 DIAGNOSIS — H40.1132 PRIMARY OPEN ANGLE GLAUCOMA OF BOTH EYES, MODERATE STAGE: Primary | ICD-10-CM

## 2017-07-24 DIAGNOSIS — H25.13 NUCLEAR SCLEROSIS OF BOTH EYES: ICD-10-CM

## 2017-07-24 PROCEDURE — 99212 OFFICE O/P EST SF 10 MIN: CPT | Mod: PBBFAC,PO | Performed by: OPHTHALMOLOGY

## 2017-07-24 PROCEDURE — 92012 INTRM OPH EXAM EST PATIENT: CPT | Mod: S$PBB,,, | Performed by: OPHTHALMOLOGY

## 2017-07-24 PROCEDURE — 99999 PR PBB SHADOW E&M-EST. PATIENT-LVL II: CPT | Mod: PBBFAC,,, | Performed by: OPHTHALMOLOGY

## 2017-07-24 NOTE — PROGRESS NOTES
SUBJECTIVE:   Damon Yoo is a 67 y.o. male   Uncorrected distance visual acuity was 20/60 -1 in the right eye and 20/60 in the left eye.   Chief Complaint   Patient presents with    Glaucoma     here for 6 week iop check timolol trial        HPI:  HPI     Glaucoma    Additional comments: here for 6 week iop check timolol trial           Comments   1. Mod COAG- No FHx (init 32/27 on 3/10 with C/D .65/.60) Goal <18, new   goal = 15 6/2017  SLT OD 12/11/14 (20-16)  SLT OS 2/25/15 (20-16)  2. Mild NSC  3. LLL Cyst Excision on 6/13/12    Latanoprost QHS OU  Timolol qam OU       Last edited by Arielle Heard MA on 7/24/2017  7:40 AM. (History)        Assessment /Plan :  1. Primary open angle glaucoma of both eyes, moderate stage Doing well, IOP within acceptable range relative to target IOP and no evidence of progression. Continue current treatment. Reviewed importance of continued compliance with treatment and follow up.     2. Nuclear sclerosis of both eyes - not visually significant. Observe.       Return to clinic in 3 months  or as needed.  With IOP Check

## 2017-08-16 ENCOUNTER — OFFICE VISIT (OUTPATIENT)
Dept: PODIATRY | Facility: CLINIC | Age: 67
End: 2017-08-16
Payer: MEDICARE

## 2017-08-16 ENCOUNTER — HOSPITAL ENCOUNTER (OUTPATIENT)
Dept: RADIOLOGY | Facility: HOSPITAL | Age: 67
Discharge: HOME | End: 2017-08-16
Attending: PODIATRIST
Payer: MEDICARE

## 2017-08-16 VITALS
DIASTOLIC BLOOD PRESSURE: 75 MMHG | HEART RATE: 63 BPM | WEIGHT: 241.19 LBS | BODY MASS INDEX: 38.76 KG/M2 | SYSTOLIC BLOOD PRESSURE: 130 MMHG | HEIGHT: 66 IN

## 2017-08-16 DIAGNOSIS — M79.671 RIGHT FOOT PAIN: ICD-10-CM

## 2017-08-16 DIAGNOSIS — M77.51 RETROCALCANEAL BURSITIS (BACK OF HEEL), RIGHT: ICD-10-CM

## 2017-08-16 DIAGNOSIS — M79.671 RIGHT FOOT PAIN: Primary | ICD-10-CM

## 2017-08-16 DIAGNOSIS — M76.61 ACHILLES TENDINITIS OF RIGHT LOWER EXTREMITY: Primary | ICD-10-CM

## 2017-08-16 PROCEDURE — 3075F SYST BP GE 130 - 139MM HG: CPT | Mod: ,,, | Performed by: PODIATRIST

## 2017-08-16 PROCEDURE — 73630 X-RAY EXAM OF FOOT: CPT | Mod: 26,RT,, | Performed by: RADIOLOGY

## 2017-08-16 PROCEDURE — 3078F DIAST BP <80 MM HG: CPT | Mod: ,,, | Performed by: PODIATRIST

## 2017-08-16 PROCEDURE — 73630 X-RAY EXAM OF FOOT: CPT | Mod: TC,PO,RT

## 2017-08-16 PROCEDURE — 99213 OFFICE O/P EST LOW 20 MIN: CPT | Mod: PBBFAC,25,PO | Performed by: PODIATRIST

## 2017-08-16 PROCEDURE — 99203 OFFICE O/P NEW LOW 30 MIN: CPT | Mod: S$PBB,,, | Performed by: PODIATRIST

## 2017-08-16 PROCEDURE — 99999 PR PBB SHADOW E&M-EST. PATIENT-LVL III: CPT | Mod: PBBFAC,,, | Performed by: PODIATRIST

## 2017-08-16 RX ORDER — METOPROLOL SUCCINATE 100 MG/1
TABLET, EXTENDED RELEASE ORAL
Refills: 3 | COMMUNITY
Start: 2017-07-26 | End: 2019-11-26 | Stop reason: SDUPTHER

## 2017-08-16 RX ORDER — METHYLPREDNISOLONE 4 MG/1
TABLET ORAL
Qty: 1 PACKAGE | Refills: 0 | Status: SHIPPED | OUTPATIENT
Start: 2017-08-16 | End: 2017-09-06

## 2017-08-16 NOTE — PROGRESS NOTES
Podiatry Initial Consultation    CHIEF COMPLAINT   Posterior heel pain, RIGHT foot    HPI  Damon Yoo is a 67 y.o. male w/ PMH of CAD, hx of prostate cancer, hx of Gout, who presents today complaining of painful achilles /  posterior aspect of the right heel.  Pt states he has had this problem for months.   Pt describes it as a dull, achy, sometimes sharp pain that is worse when he first gets out of bed in the morning then seems to   lessen as the day progresses and returns with more activity.  Pt denies any redness, bruising or acute injury.  He rates pain 7/10. Modifying Factors (Aggravating): weight bearing; direct pressure   Modifying Factors (Alleviating): no treatment     Patient denies other pedal complaints at this time.      PMH  Past Medical History:   Diagnosis Date    Abnormal EKG 10/25/2013    CAD (coronary artery disease)     Cancer     Prostate [id number] prostate    Glaucoma     Gout attack     Hyperlipidemia     Hypertension     Hypertrophic cardiomyopathy 10/25/2013    S/P CABG (coronary artery bypass graft) 10/25/2013       PROBLEM LIST  Patient Active Problem List    Diagnosis Date Noted    Refractive error 06/12/2017    Primary open angle glaucoma of both eyes, moderate stage 10/10/2016    Chronic kidney disease, stage III (moderate) 09/19/2016    Medication monitoring encounter 02/29/2016    Moderate stage chronic open angle glaucoma 02/24/2016    Nuclear sclerosis of both eyes 08/03/2015    Knee pain, bilateral 02/09/2015    Obesity, Class III, BMI 40-49.9 (morbid obesity) 02/03/2015    Osteoarthritis of both knees 01/19/2015    Hip pain 08/04/2014    Lower back pain 08/04/2014    Lumbar radicular pain 08/04/2014    Sciatica of right side 08/04/2014    Prostate cancer 01/13/2014    Gout 01/13/2014    Prim open angle glaucoma 11/19/2013    S/P CABG (coronary artery bypass graft) 10/25/2013    Hypertrophic cardiomyopathy 10/25/2013    Abnormal EKG 10/25/2013     Senile nuclear sclerosis - Both Eyes 07/29/2013    Hyperlipidemia     Hypertension     CAD (coronary artery disease)        MEDS  Current Outpatient Prescriptions on File Prior to Visit   Medication Sig Dispense Refill    allopurinol (ZYLOPRIM) 300 MG tablet 1 & 1/2 TABLET BY MOUTH EVERY  tablet 3    amlodipine (NORVASC) 2.5 MG tablet TAKE ONE TABLET BY MOUTH EVERY DAY 30 tablet 11    aspirin 81 MG chewable tablet Take 1 tablet by mouth Daily.      cetirizine (ZYRTEC) 10 MG tablet Take 1 tablet (10 mg total) by mouth once daily. 14 tablet 0    colchicine (COLCRYS) 0.6 mg tablet Take with first symptom of gout, repeat after 1 hour and 6 hours after: then twice a day x 2 days. 30 tablet 11    fenofibric acid (FIBRICOR) 135 mg CpDR TAKE ONE TABLET BY MOUTH EVERY DAY 90 capsule 3    fish oil-fat acid comb.8-hb137 (OMEGA 3-6-9) 1,200 mg Cap Take by mouth.      latanoprost 0.005 % ophthalmic solution ONE DROP IN EACH EYE EVERY EVENING 2.5 mL 12    lisinopril (PRINIVIL,ZESTRIL) 40 MG tablet Take 1 tablet (40 mg total) by mouth once daily. 30 tablet 6    meloxicam (MOBIC) 15 MG tablet Take 1 tablet (15 mg total) by mouth once daily. 30 tablet 0    rosuvastatin (CRESTOR) 40 MG Tab Take 1 tablet (40 mg total) by mouth every evening. Generic is fine. 90 tablet 3    timolol maleate 0.5% (TIMOPTIC) 0.5 % Drop Place 1 drop into both eyes every morning. 10 mL 12    [DISCONTINUED] metoprolol succinate (TOPROL-XL) 100 MG 24 hr tablet Take 1 tablet (100 mg total) by mouth once daily. 90 tablet 3     No current facility-administered medications on file prior to visit.        [facility]     Past Surgical History:   Procedure Laterality Date    CARDIAC SURGERY      CABG x1 2003    CORONARY ARTERY BYPASS GRAFT  05/2003    x1    PROSTATE SURGERY      RALP 2013        ALL  Review of patient's allergies indicates:   Allergen Reactions    No known drug allergies        SOC     Social History   Substance Use Topics     "Smoking status: Former Smoker     Packs/day: 0.25     Years: 15.00     Quit date: 4/21/1988    Smokeless tobacco: Never Used    Alcohol use No         Family HX    Family History   Problem Relation Age of Onset    Strabismus Neg Hx     Retinal detachment Neg Hx     Macular degeneration Neg Hx     Glaucoma Neg Hx     Blindness Neg Hx     Amblyopia Neg Hx             REVIEW OF SYSTEMS  General: Denies any fever or chills  Chest: Denies shortness of breath, wheezing, coughing, or sputum production  Heart: Denies chest pain, cold extremities, orthopenia, or reduced exercise tolerance  As noted above and per history of current illness above, otherwise negative in the remainder of the 14 systems.     PHYSICAL EXAM  Vitals:    08/16/17 1429   BP: 130/75   Pulse: 63   Weight: 109.4 kg (241 lb 2.9 oz)   Height: 5' 6" (1.676 m)   PainSc:   6   PainLoc: Foot       General: This patient is well-developed, well-nourished and appears stated age, well-oriented to person, place and time, and cooperative and pleasant on today's visit      LOWER EXTREMITY  Vascular exam:   · Dorsalis pedis and posterior tibial pulses palpable 2/4 bilaterally.   · Capillary refill time immediate to the toes.   · Feet are warm to the touch. Skin temperature warm to warm from proximally to distally   · There are no varicosities, telangiectasias noted to bilateral foot and ankle regions.   · There are no ecchymoses noted to bilateral foot and ankle regions.   · There is no gross lower extremity edema.    Dermatologic exam:   · Skin moist with healthy texture and turgor.  · There are no open ulcerations, lacerations, or fissures to bilateral foot and ankle regions. There are no signs of infection as there is no erythema, no proximal-extending lymphangiitis, no fluctuance, or crepitus noted on palpation to bilateral foot and ankle regions.   · There is no interdigital maceration.   · There are diffuse hyperkeratotic lesions noted to feet. Nails are " well-trimmed.    Neurologic exam:  · Epicritic sensation is intact as the patient is able to sense light touch to bilateral foot and ankle regions.   · Achilles and patellar deep tendon reflexes intact  · Babinski reflex absent    Musculoskeletal/Orthopedic exam:   · RIG?HT TTP posterior aspect heel at insertion of achilles and TTP of achilles tendon proper.  Mild prominence noted at insertion, neg defect palpated  · Muscle strength AT/EHL/EDL/PT: 5/5; Achilles/Gastroc/Soleus: 5/5; PB/PL: 5/5 Muscle tone is normal.  · Ankle joint ROM  B/L supple DF/PF, non-crepitus  · STJ ROM supple inv/ev, non crepitus b/l.  · On stance/Gait: able to stand, heel to toe gait. Able to weightbear, ambulate without assistance  · There is tenderness along course of inflammed achilles      IMAGING   Reviewed by me and I agree with radiologist findings, 3 views of foot/ankle, reveal: posterior calcaneal enthesophyte    plantar calcaneal enthesophyte     ASSESSMENT  Achilles tendinitis of right lower extremity    Retrocalcaneal bursitis (back of heel), right    Other orders  -     methylPREDNISolone (MEDROL DOSEPACK) 4 mg tablet; use as directed  Dispense: 1 Package; Refill: 0        PLAN    1. Patient was educated about clinical and imaging findings, and verbalizes understanding of above.  2. Treatment plan:   -heel lift x 2 applied to shoe; motion control tennis shoes  - rest and icex 3 times daily/20 minutes, eccentric exercises/at home rehab instructions  A prescription for Medrol dosepak was provided with instructions. Discontinue if stomach irritation  -Will order MRI or ultrasound for pathology of thickening of paratenon if no resolution of pain; physical therapy: cold therapy, ultrasound, stretching, extracorporal shockwave therapy  -if conservative treatment fails, will consider excision of thickened or inflamed paratenon along with release of any associated adhesions and/or retrocalcaneal spur removal if applicable  3. RTC  for  follow up/evaluation as scheduled, or sooner if any issues, questions or concerns.    Future Appointments  Date Time Provider Department Center   10/23/2017 7:45 AM Devin Wayne MD [facility]   11/17/2017 9:00 AM IB LABORATORY [facility]   11/17/2017 9:30 AM [facility]   2/6/2018 7:30 AM LABORATORY, [facility]   2/12/2018 7:00 AM ELLEN Mason Jr., MD [facility]   5/23/2018 10:10 AM IB LABORATORY [facility]   6/6/2018 8:00 AM Ronald Avelar IV, MD ON UROLOGY [place]         Report Electronically Signed By:  Kathia Hatfield DPM   Podiatric Medicine & Surgery  [facility]  8/16/2017

## 2017-09-07 DIAGNOSIS — H40.1132 PRIMARY OPEN ANGLE GLAUCOMA OF BOTH EYES, MODERATE STAGE: ICD-10-CM

## 2017-09-07 RX ORDER — LATANOPROST 50 UG/ML
SOLUTION/ DROPS OPHTHALMIC
Qty: 30 ML | Refills: 10 | Status: SHIPPED | OUTPATIENT
Start: 2017-09-07 | End: 2017-10-23 | Stop reason: SDUPTHER

## 2017-09-26 ENCOUNTER — TELEPHONE (OUTPATIENT)
Dept: INTERNAL MEDICINE | Facility: CLINIC | Age: 67
End: 2017-09-26

## 2017-09-26 NOTE — TELEPHONE ENCOUNTER
----- Message from Patricia Saini sent at 9/26/2017  9:31 AM CDT -----  Contact: pt's wife  She's calling to see if pt can get a ct scan order sent to [facility] in [place], [phone], please advise [phone]

## 2017-09-26 NOTE — TELEPHONE ENCOUNTER
Returned call to pt's wife. 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 [facility]. Ph; [phone]. She said to be sure to state that the order is requested by [employer] [id number] (the name of his union).

## 2017-09-26 NOTE — TELEPHONE ENCOUNTER
Returned call to pt's wife regarding request for CT Scan. No answer. Left message for return call.//rlt

## 2017-09-26 NOTE — TELEPHONE ENCOUNTER
----- Message from Franky Hanley sent at 9/26/2017  1:11 PM CDT -----  Contact: pt's spouse  She's calling in regards to a missed call, please advise, [phone] (cell)

## 2017-09-27 ENCOUNTER — TELEPHONE (OUTPATIENT)
Dept: INTERNAL MEDICINE | Facility: CLINIC | Age: 67
End: 2017-09-27

## 2017-09-27 NOTE — TELEPHONE ENCOUNTER
----- Message from Aisha Mullins sent at 9/27/2017  8:11 AM CDT -----  Contact: Nery Yoo/wife [phone]  Fax# [phone] for Dr Memo in [place], [place] and there phone# [phone]. Thank you

## 2017-09-27 NOTE — TELEPHONE ENCOUNTER
Sw pt wife Nery stating they had received letter recommending CT d/t pt job, wife denies pt experiencing acute symptoms. Advised her pt would have to see Dr. Mason or another provider for evaluation, and request for CT addressed at that appt and provider to eval if medically necessary. Pt wife voiced understanding, and to call back to sched appt.

## 2017-10-23 ENCOUNTER — OFFICE VISIT (OUTPATIENT)
Dept: OPHTHALMOLOGY | Facility: CLINIC | Age: 67
End: 2017-10-23
Payer: MEDICARE

## 2017-10-23 DIAGNOSIS — H25.13 NUCLEAR SCLEROSIS, BILATERAL: ICD-10-CM

## 2017-10-23 DIAGNOSIS — H40.1132 PRIMARY OPEN ANGLE GLAUCOMA OF BOTH EYES, MODERATE STAGE: Primary | ICD-10-CM

## 2017-10-23 PROCEDURE — 92012 INTRM OPH EXAM EST PATIENT: CPT | Mod: S$PBB,,, | Performed by: OPHTHALMOLOGY

## 2017-10-23 PROCEDURE — 99999 PR PBB SHADOW E&M-EST. PATIENT-LVL II: CPT | Mod: PBBFAC,,, | Performed by: OPHTHALMOLOGY

## 2017-10-23 PROCEDURE — 99212 OFFICE O/P EST SF 10 MIN: CPT | Mod: PBBFAC,PO | Performed by: OPHTHALMOLOGY

## 2017-10-23 RX ORDER — LATANOPROST 50 UG/ML
1 SOLUTION/ DROPS OPHTHALMIC NIGHTLY
Qty: 2.5 ML | Refills: 12 | Status: SHIPPED | OUTPATIENT
Start: 2017-10-23 | End: 2018-10-05 | Stop reason: SDUPTHER

## 2017-10-23 RX ORDER — TIMOLOL MALEATE 5 MG/ML
1 SOLUTION/ DROPS OPHTHALMIC EVERY MORNING
Qty: 10 ML | Refills: 12 | Status: SHIPPED | OUTPATIENT
Start: 2017-10-23 | End: 2018-07-21 | Stop reason: SDUPTHER

## 2017-10-23 NOTE — PROGRESS NOTES
SUBJECTIVE:   Damon Yoo is a 67 y.o. male   Corrected distance visual acuity was 20/25 in the right eye and 20/30 in the left eye.   Chief Complaint   Patient presents with    Glaucoma     here for 3 month iop check no changes        HPI:  HPI     Glaucoma    Additional comments: here for 3 month iop check no changes           Comments   1. Mod COAG- No FHx (init 32/27 on 3/10 with C/D .65/.60) Goal <18, new   goal = 15 6/2017  SLT OD 12/11/14 (20-16)  SLT OS 2/25/15 (20-16)  2. Mild NSC  3. LLL Cyst Excision on 6/13/12    Latanoprost QHS OU  Timolol qam OU       Last edited by Arielle Heard MA on 10/23/2017  7:52 AM. (History)        Assessment /Plan :  1. Primary open angle glaucoma of both eyes, moderate stage Doing well, IOP within acceptable range relative to target IOP and no evidence of progression. Continue current treatment. Reviewed importance of continued compliance with treatment and follow up.     2. Nuclear sclerosis, bilateral  - not visually significant. Observe.       Return to clinic in 3 months  or as needed.  With IOP Check and GOCT

## 2017-10-26 ENCOUNTER — TELEPHONE (OUTPATIENT)
Dept: RHEUMATOLOGY | Facility: CLINIC | Age: 67
End: 2017-10-26

## 2017-10-26 NOTE — TELEPHONE ENCOUNTER
Spoke with Mr. Yoo and rescheduled his 11/17/2017 appointment with Alethea Beth PA-C for 11/03/2017 at the [facility] location due to Alethea being out on that day. Reminder letter mailed.

## 2017-11-03 ENCOUNTER — OFFICE VISIT (OUTPATIENT)
Dept: RHEUMATOLOGY | Facility: CLINIC | Age: 67
End: 2017-11-03
Payer: MEDICARE

## 2017-11-03 ENCOUNTER — LAB VISIT (OUTPATIENT)
Dept: LAB | Facility: HOSPITAL | Age: 67
End: 2017-11-03
Attending: PEDIATRICS
Payer: MEDICARE

## 2017-11-03 VITALS
HEIGHT: 66 IN | HEART RATE: 58 BPM | SYSTOLIC BLOOD PRESSURE: 140 MMHG | DIASTOLIC BLOOD PRESSURE: 74 MMHG | WEIGHT: 245.38 LBS | BODY MASS INDEX: 39.43 KG/M2

## 2017-11-03 DIAGNOSIS — M1A.39X0 CHRONIC GOUT DUE TO RENAL IMPAIRMENT OF MULTIPLE SITES WITHOUT TOPHUS: Primary | ICD-10-CM

## 2017-11-03 DIAGNOSIS — Z51.81 MEDICATION MONITORING ENCOUNTER: ICD-10-CM

## 2017-11-03 DIAGNOSIS — M17.0 PRIMARY OSTEOARTHRITIS OF BOTH KNEES: ICD-10-CM

## 2017-11-03 DIAGNOSIS — M1A.0710 IDIOPATHIC CHRONIC GOUT OF RIGHT FOOT WITHOUT TOPHUS: ICD-10-CM

## 2017-11-03 DIAGNOSIS — N18.30 CHRONIC KIDNEY DISEASE, STAGE III (MODERATE): ICD-10-CM

## 2017-11-03 LAB
ANION GAP SERPL CALC-SCNC: 8 MMOL/L
BUN SERPL-MCNC: 23 MG/DL
CALCIUM SERPL-MCNC: 9.6 MG/DL
CHLORIDE SERPL-SCNC: 110 MMOL/L
CO2 SERPL-SCNC: 26 MMOL/L
CREAT SERPL-MCNC: 1.6 MG/DL
EST. GFR  (AFRICAN AMERICAN): 51.5 ML/MIN/1.73 M^2
EST. GFR  (NON AFRICAN AMERICAN): 44.5 ML/MIN/1.73 M^2
GLUCOSE SERPL-MCNC: 124 MG/DL
POTASSIUM SERPL-SCNC: 4.6 MMOL/L
SODIUM SERPL-SCNC: 144 MMOL/L
URATE SERPL-MCNC: 5 MG/DL

## 2017-11-03 PROCEDURE — 84550 ASSAY OF BLOOD/URIC ACID: CPT | Mod: PO

## 2017-11-03 PROCEDURE — 99213 OFFICE O/P EST LOW 20 MIN: CPT | Mod: PBBFAC,PO | Performed by: PHYSICIAN ASSISTANT

## 2017-11-03 PROCEDURE — 36415 COLL VENOUS BLD VENIPUNCTURE: CPT | Mod: PO

## 2017-11-03 PROCEDURE — 80048 BASIC METABOLIC PNL TOTAL CA: CPT | Mod: PO

## 2017-11-03 PROCEDURE — 99999 PR PBB SHADOW E&M-EST. PATIENT-LVL III: CPT | Mod: PBBFAC,,, | Performed by: PHYSICIAN ASSISTANT

## 2017-11-03 PROCEDURE — 99214 OFFICE O/P EST MOD 30 MIN: CPT | Mod: S$PBB,,, | Performed by: PHYSICIAN ASSISTANT

## 2017-11-03 NOTE — PROGRESS NOTES
Subjective:       Patient ID: Damon Yoo is a 67 y.o. male.    Chief Complaint: Gout and Pain      Damon is back today for rheum follow up. He has chronic gout on allopurinol 450 mg daily and colcrys prn. He denies any gout attacks since last visit. No redness, swelling or warmth to any joints. Not taking colcrys. Has OA in both knees left knee > Right. Pain from time to time. using mobic only prn for knee pain. Using tylenol also prn.  x-rays showed tricompartmental djd both knees left worse than right. We did euflexxa to both knees march 2015. This helped some but not enough to repeat.  Today left knee pain reported 7/10.  He has mild CKD so avoiding daily nsaids.               He reports no joint swelling. Pertinent negatives include no dysuria, fatigue, fever, trouble swallowing or myalgias.     His past medical history is significant for osteoarthritis.   Osteoarthritis   Associated symptoms include arthralgias. Pertinent negatives include no abdominal pain, chest pain, chills, congestion, coughing, fatigue, fever, joint swelling, myalgias, nausea, neck pain, numbness, rash, vomiting or weakness.   Knee Pain   Associated symptoms include arthralgias. Pertinent negatives include no abdominal pain, chest pain, chills, congestion, coughing, fatigue, fever, joint swelling, myalgias, nausea, neck pain, numbness, rash, vomiting or weakness.       Review of Systems   Constitutional: Negative.  Negative for chills, fatigue and fever.   HENT: Negative.  Negative for congestion, mouth sores and trouble swallowing.    Eyes: Negative.  Negative for photophobia, redness and visual disturbance.   Respiratory: Negative.  Negative for cough, shortness of breath and wheezing.    Cardiovascular: Negative.  Negative for chest pain and leg swelling.   Gastrointestinal: Negative.  Negative for abdominal distention, abdominal pain, diarrhea, nausea and vomiting.   Genitourinary: Negative.  Negative for dysuria, flank pain,  "frequency and urgency.   Musculoskeletal: Positive for arthralgias. Negative for back pain, gait problem, joint swelling, myalgias and neck pain.        Both knees     Skin: Negative.  Negative for rash.   Neurological: Negative.  Negative for dizziness, weakness and numbness.         Objective:   BP (!) 140/74   Pulse (!) 58   Ht 5' 6" (1.676 m)   Wt 111.3 kg (245 lb 6 oz)   BMI 39.60 kg/m²      Physical Exam   Constitutional: He is oriented to person, place, and time and well-developed, well-nourished, and in no distress. No distress.   HENT:   Head: Normocephalic and atraumatic.   Right Ear: External ear normal.   Left Ear: External ear normal.   Mouth/Throat: No oropharyngeal exudate.   Eyes: Conjunctivae and EOM are normal. Pupils are equal, round, and reactive to light. No scleral icterus.   Neck: Neck supple. No thyromegaly present.   Cardiovascular: Normal rate, regular rhythm and normal heart sounds.    No murmur heard.  Pulmonary/Chest: Effort normal and breath sounds normal. No respiratory distress.   Abdominal: Soft. Bowel sounds are normal.       Right Side Rheumatological Exam     Examination finds the shoulder, elbow, wrist, knee, 1st PIP, 1st MCP, 2nd PIP, 2nd MCP, 3rd PIP, 3rd MCP, 4th PIP, 4th MCP, 5th PIP and 5th MCP normal.    Knee Exam     Range of Motion   The patient has normal right knee ROM.  Extension: normal   Flexion: normal   Patellofemoral Crepitus: positive  Effusion: positive  Warmth: negative    Left Side Rheumatological Exam     Examination finds the shoulder, elbow, wrist, knee, 1st PIP, 1st MCP, 2nd PIP, 2nd MCP, 3rd PIP, 3rd MCP, 4th PIP, 4th MCP, 5th PIP and 5th MCP normal.    Knee Exam     Range of Motion   The patient has normal left knee ROM.  Extension: normal   Flexion: normal     Patellofemoral Crepitus: positive  Effusion: negative  Warmth: negative      Lymphadenopathy:     He has no cervical adenopathy.   Neurological: He is alert and oriented to person, place, and " time. No cranial nerve deficit. He exhibits normal muscle tone. Gait normal. Coordination normal.   Skin: Skin is warm and dry.     Musculoskeletal: Normal range of motion. He exhibits no edema.           Recent Results (from the past 1008 hour(s))   Basic metabolic panel    Collection Time: 11/03/17  9:28 AM   Result Value Ref Range    Sodium 144 136 - 145 mmol/L    Potassium 4.6 3.5 - 5.1 mmol/L    Chloride 110 95 - 110 mmol/L    CO2 26 23 - 29 mmol/L    Glucose 124 (H) 70 - 110 mg/dL    BUN, Bld 23 8 - 23 mg/dL    Creatinine 1.6 (H) 0.5 - 1.4 mg/dL    Calcium 9.6 8.7 - 10.5 mg/dL    Anion Gap 8 8 - 16 mmol/L    eGFR if African American 51.5 (A) >60 mL/min/1.73 m^2    eGFR if non African American 44.5 (A) >60 mL/min/1.73 m^2   Uric acid    Collection Time: 11/03/17  9:28 AM   Result Value Ref Range    Uric Acid 5.0 3.4 - 7.0 mg/dL            Assessment:       1. Chronic gout due to renal impairment of multiple sites without tophus    2. Chronic kidney disease, stage III (moderate)    3. Medication monitoring encounter    4. Primary osteoarthritis of both knees          1. Gout stable on allopurinol 450 mg daily-  uric acid 5.0 at goal     2. OA both knees slightly better post euflexxa- pain remitted with sparing use of nsaids- will need left TKA eventually but he is not ready now     3. Med monitoring        Plan:         keep allopurinol at 450 mg daily,     Use colcrys only prn for acute attacks,     Add tumeric 500 mg bid for knee osteoarthritis     Refer to ortho when he is ready for left knee tka    rtc 1 year with labs- cmp and uric acid    call with any questions, changes or concerns

## 2017-11-10 ENCOUNTER — INITIAL CONSULT (OUTPATIENT)
Dept: GASTROENTEROLOGY | Facility: CLINIC | Age: 67
End: 2017-11-10
Payer: MEDICARE

## 2017-11-10 VITALS
SYSTOLIC BLOOD PRESSURE: 140 MMHG | DIASTOLIC BLOOD PRESSURE: 80 MMHG | WEIGHT: 249.13 LBS | HEART RATE: 62 BPM | BODY MASS INDEX: 40.04 KG/M2 | HEIGHT: 66 IN

## 2017-11-10 DIAGNOSIS — I10 ESSENTIAL HYPERTENSION: ICD-10-CM

## 2017-11-10 DIAGNOSIS — I25.810 CORONARY ARTERY DISEASE INVOLVING CORONARY BYPASS GRAFT OF NATIVE HEART WITHOUT ANGINA PECTORIS: ICD-10-CM

## 2017-11-10 DIAGNOSIS — K63.5 POLYP OF COLON, UNSPECIFIED PART OF COLON, UNSPECIFIED TYPE: Primary | ICD-10-CM

## 2017-11-10 PROCEDURE — 99999 PR PBB SHADOW E&M-EST. PATIENT-LVL III: CPT | Mod: PBBFAC,,, | Performed by: NURSE PRACTITIONER

## 2017-11-10 PROCEDURE — 99213 OFFICE O/P EST LOW 20 MIN: CPT | Mod: PBBFAC,PO | Performed by: NURSE PRACTITIONER

## 2017-11-10 PROCEDURE — 99203 OFFICE O/P NEW LOW 30 MIN: CPT | Mod: S$PBB,,, | Performed by: NURSE PRACTITIONER

## 2017-11-10 NOTE — PROGRESS NOTES
Clinic Consult:  [facility] Consultation Note    Reason for Consult:  The primary encounter diagnosis was Polyp of colon, unspecified part of colon, unspecified type. Diagnoses of Coronary artery disease involving coronary bypass graft of native heart without angina pectoris and Essential hypertension were also pertinent to this visit.    PCP: TIEN Mason Jr       HPI:  This is a 67 y.o. male here to discuss need for colonoscopy  He states that he has overall been feeling well without any complaints.  Denies any abdominal pain.  No nausea or vomiting.  No change in bowel pattern.  No melena or hematochezia. No weight loss.  Last colonoscopy 2012 with polyps.  PMH includes HTN and CAD which is well controlled.      Review of Systems   Constitutional: Negative for chills, fever, malaise/fatigue and weight loss.   Respiratory: Negative for cough.    Cardiovascular: Negative for chest pain.   Gastrointestinal:        Per HPI   Musculoskeletal: Negative for myalgias.   Skin: Negative for itching and rash.   Neurological: Negative for headaches.   Psychiatric/Behavioral: The patient is not nervous/anxious.        Medical History:   Past Medical History:   Diagnosis Date    Abnormal EKG 10/25/2013    CAD (coronary artery disease)     Cancer     Prostate [id number] prostate    Glaucoma     Gout attack     Hyperlipidemia     Hypertension     Hypertrophic cardiomyopathy 10/25/2013    S/P CABG (coronary artery bypass graft) 10/25/2013       Surgical History:  Past Surgical History:   Procedure Laterality Date    CARDIAC SURGERY      CABG x1 2003    CORONARY ARTERY BYPASS GRAFT  05/2003    x1    PROSTATE SURGERY      RALP 2013       Family History:   Family History   Problem Relation Age of Onset    Strabismus Neg Hx     Retinal detachment Neg Hx     Macular degeneration Neg Hx     Glaucoma Neg Hx     Blindness Neg Hx     Amblyopia Neg Hx        Social History:   Social History   Substance Use  "Topics    Smoking status: Former Smoker     Packs/day: 0.25     Years: 15.00     Quit date: 4/21/1988    Smokeless tobacco: Never Used    Alcohol use No       Allergies: Reviewed    Home Medications:   Current Outpatient Prescriptions on File Prior to Visit   Medication Sig Dispense Refill    allopurinol (ZYLOPRIM) 300 MG tablet 1 & 1/2 TABLET BY MOUTH EVERY  tablet 3    amlodipine (NORVASC) 2.5 MG tablet TAKE ONE TABLET BY MOUTH EVERY DAY 30 tablet 11    aspirin 81 MG chewable tablet Take 1 tablet by mouth Daily.      cetirizine (ZYRTEC) 10 MG tablet Take 1 tablet (10 mg total) by mouth once daily. 14 tablet 0    colchicine (COLCRYS) 0.6 mg tablet Take with first symptom of gout, repeat after 1 hour and 6 hours after: then twice a day x 2 days. 30 tablet 11    fenofibric acid (FIBRICOR) 135 mg CpDR TAKE ONE TABLET BY MOUTH EVERY DAY 90 capsule 3    fish oil-fat acid [id number].[id number] (OMEGA 3-6-9) 1,200 mg Cap Take by mouth.      latanoprost 0.005 % ophthalmic solution Place 1 drop into both eyes every evening. 2.5 mL 12    lisinopril (PRINIVIL,ZESTRIL) 40 MG tablet Take 1 tablet (40 mg total) by mouth once daily. 30 tablet 6    meloxicam (MOBIC) 15 MG tablet Take 1 tablet (15 mg total) by mouth once daily. 30 tablet 0    metoprolol succinate (TOPROL-XL) 100 MG 24 hr tablet TAKE 1 TABLET (100 MG TOTAL) BY MOUTH ONCE DAILY.  3    rosuvastatin (CRESTOR) 40 MG Tab Take 1 tablet (40 mg total) by mouth every evening. Generic is fine. 90 tablet 3    timolol maleate 0.5% (TIMOPTIC) 0.5 % Drop Place 1 drop into both eyes every morning. 10 mL 12     No current facility-administered medications on file prior to visit.        Physical Exam:  Vital Signs:  BP (!) 140/80   Pulse 62   Ht 5' 6" (1.676 m)   Wt 113 kg (249 lb 1.9 oz)   BMI 40.21 kg/m²   Body mass index is 40.21 kg/m².  Physical Exam   Constitutional: He is oriented to person, place, and time. He appears well-developed and well-nourished. "   HENT:   Head: Normocephalic.   Eyes: No scleral icterus.   Neck: Normal range of motion.   Cardiovascular: Normal rate and regular rhythm.    Pulmonary/Chest: Effort normal and breath sounds normal.   Abdominal: Soft. Bowel sounds are normal. He exhibits no distension. There is no tenderness.   Musculoskeletal: Normal range of motion.   Neurological: He is alert and oriented to person, place, and time.   Skin: Skin is warm and dry.   Psychiatric: He has a normal mood and affect.   Vitals reviewed.      Labs: Pertinent labs reviewed.      Assessment:  1. Polyp of colon, unspecified part of colon, unspecified type    2. Coronary artery disease involving coronary bypass graft of native heart without angina pectoris    3. Essential hypertension         Recommendations:  - Plan for colonoscopy with Miralax prep  - Continue follow up with PCP and cardiology as planned   Polyp of colon, unspecified part of colon, unspecified type  -     Case request GI: COLONOSCOPY    Coronary artery disease involving coronary bypass graft of native heart without angina pectoris    Essential hypertension        Follow up to be determined by results of procedure.        Thank you so much for allowing me to participate in the care of SIOMARA Granados

## 2017-12-04 ENCOUNTER — HOSPITAL ENCOUNTER (OUTPATIENT)
Facility: HOSPITAL | Age: 67
Discharge: HOME | End: 2017-12-04
Attending: INTERNAL MEDICINE | Admitting: INTERNAL MEDICINE
Payer: MEDICARE

## 2017-12-04 ENCOUNTER — ANESTHESIA (OUTPATIENT)
Dept: ENDOSCOPY | Facility: HOSPITAL | Age: 67
End: 2017-12-04
Payer: MEDICARE

## 2017-12-04 ENCOUNTER — SURGERY (OUTPATIENT)
Age: 67
End: 2017-12-04

## 2017-12-04 ENCOUNTER — ANESTHESIA EVENT (OUTPATIENT)
Dept: ENDOSCOPY | Facility: HOSPITAL | Age: 67
End: 2017-12-04
Payer: MEDICARE

## 2017-12-04 VITALS
RESPIRATION RATE: 19 BRPM | WEIGHT: 241 LBS | DIASTOLIC BLOOD PRESSURE: 79 MMHG | HEIGHT: 66 IN | SYSTOLIC BLOOD PRESSURE: 123 MMHG | TEMPERATURE: 98 F | HEART RATE: 62 BPM | OXYGEN SATURATION: 100 % | RESPIRATION RATE: 17 BRPM | BODY MASS INDEX: 38.73 KG/M2

## 2017-12-04 DIAGNOSIS — Z12.11 COLON CANCER SCREENING: Primary | ICD-10-CM

## 2017-12-04 PROCEDURE — 88305 TISSUE EXAM BY PATHOLOGIST: CPT | Mod: 26,,, | Performed by: PATHOLOGY

## 2017-12-04 PROCEDURE — 25000003 PHARM REV CODE 250: Performed by: INTERNAL MEDICINE

## 2017-12-04 PROCEDURE — 88305 TISSUE EXAM BY PATHOLOGIST: CPT | Performed by: PATHOLOGY

## 2017-12-04 PROCEDURE — 27201089 HC SNARE, DISP (ANY): Performed by: INTERNAL MEDICINE

## 2017-12-04 PROCEDURE — 45385 COLONOSCOPY W/LESION REMOVAL: CPT | Mod: PT,,, | Performed by: INTERNAL MEDICINE

## 2017-12-04 PROCEDURE — 25000003 PHARM REV CODE 250: Performed by: NURSE ANESTHETIST, CERTIFIED REGISTERED

## 2017-12-04 PROCEDURE — 45385 COLONOSCOPY W/LESION REMOVAL: CPT | Performed by: INTERNAL MEDICINE

## 2017-12-04 PROCEDURE — 63600175 PHARM REV CODE 636 W HCPCS: Performed by: NURSE ANESTHETIST, CERTIFIED REGISTERED

## 2017-12-04 PROCEDURE — 37000009 HC ANESTHESIA EA ADD 15 MINS: Performed by: INTERNAL MEDICINE

## 2017-12-04 PROCEDURE — 37000008 HC ANESTHESIA 1ST 15 MINUTES: Performed by: INTERNAL MEDICINE

## 2017-12-04 RX ORDER — PROPOFOL 10 MG/ML
INJECTION, EMULSION INTRAVENOUS
Status: DISCONTINUED | OUTPATIENT
Start: 2017-12-04 | End: 2017-12-04

## 2017-12-04 RX ORDER — LIDOCAINE HCL/PF 100 MG/5ML
SYRINGE (ML) INTRAVENOUS
Status: DISCONTINUED | OUTPATIENT
Start: 2017-12-04 | End: 2017-12-04

## 2017-12-04 RX ORDER — SODIUM CHLORIDE, SODIUM LACTATE, POTASSIUM CHLORIDE, CALCIUM CHLORIDE 600; 310; 30; 20 MG/100ML; MG/100ML; MG/100ML; MG/100ML
INJECTION, SOLUTION INTRAVENOUS CONTINUOUS
Status: DISCONTINUED | OUTPATIENT
Start: 2017-12-04 | End: 2017-12-04 | Stop reason: HOSPADM

## 2017-12-04 RX ORDER — SODIUM CHLORIDE, SODIUM LACTATE, POTASSIUM CHLORIDE, CALCIUM CHLORIDE 600; 310; 30; 20 MG/100ML; MG/100ML; MG/100ML; MG/100ML
INJECTION, SOLUTION INTRAVENOUS CONTINUOUS PRN
Status: DISCONTINUED | OUTPATIENT
Start: 2017-12-04 | End: 2017-12-04

## 2017-12-04 RX ADMIN — SODIUM CHLORIDE, SODIUM LACTATE, POTASSIUM CHLORIDE, AND CALCIUM CHLORIDE: .6; .31; .03; .02 INJECTION, SOLUTION INTRAVENOUS at 09:12

## 2017-12-04 RX ADMIN — PROPOFOL 30 MG: 10 INJECTION, EMULSION INTRAVENOUS at 10:12

## 2017-12-04 RX ADMIN — SODIUM CHLORIDE, SODIUM LACTATE, POTASSIUM CHLORIDE, AND CALCIUM CHLORIDE: 600; 310; 30; 20 INJECTION, SOLUTION INTRAVENOUS at 09:12

## 2017-12-04 RX ADMIN — PROPOFOL 130 MG: 10 INJECTION, EMULSION INTRAVENOUS at 09:12

## 2017-12-04 RX ADMIN — LIDOCAINE HYDROCHLORIDE 100 MG: 20 INJECTION, SOLUTION INTRAVENOUS at 09:12

## 2017-12-04 NOTE — PLAN OF CARE
Dr Ledesma came to bedside and discussed findings. NO N/V,  no abdominal pain, no GI bleeding, and vitals stable.  Pt discharged from unit.

## 2017-12-04 NOTE — ANESTHESIA POSTPROCEDURE EVALUATION
"Anesthesia Post Evaluation    Patient: Damon Yoo    Procedure(s) Performed: Procedure(s) (LRB):  COLONOSCOPY (N/A)    Final Anesthesia Type: MAC  Patient location during evaluation: PACU  Patient participation: Yes- Able to Participate  Level of consciousness: awake and alert and oriented  Post-procedure vital signs: reviewed and stable  Pain management: adequate  Airway patency: patent  PONV status at discharge: No PONV  Anesthetic complications: no      Cardiovascular status: blood pressure returned to baseline  Respiratory status: unassisted, room air and spontaneous ventilation  Hydration status: euvolemic  Follow-up not needed.        Visit Vitals  BP (!) 96/59 (BP Location: Left arm, Patient Position: Lying)   Pulse (!) 59   Temp 36.9 °C (98.4 °F) (Oral)   Resp 16   Ht 5' 6" (1.676 m)   Wt 109.3 kg (241 lb)   SpO2 95%   BMI 38.90 kg/m²       Pain/Alina Score: Pain Assessment Performed: Yes (12/4/2017 10:25 AM)  Presence of Pain: denies (12/4/2017 10:25 AM)  Alina Score: 9 (12/4/2017 10:25 AM)      "

## 2017-12-04 NOTE — TRANSFER OF CARE
"Anesthesia Transfer of Care Note    Patient: Damon Yoo    Procedure(s) Performed: Procedure(s) (LRB):  COLONOSCOPY (N/A)    Patient location: PACU    Anesthesia Type: MAC    Transport from OR: Transported from OR on room air with adequate spontaneous ventilation    Post pain: adequate analgesia    Post assessment: no apparent anesthetic complications    Post vital signs: stable    Level of consciousness: sedated    Nausea/Vomiting: no nausea/vomiting    Complications: none    Transfer of care protocol was followed      Last vitals:   Visit Vitals  BP (!) 96/59 (BP Location: Left arm, Patient Position: Lying)   Pulse (!) 59   Temp 36.9 °C (98.4 °F) (Oral)   Resp 16   Ht 5' 6" (1.676 m)   Wt 109.3 kg (241 lb)   SpO2 95%   BMI 38.90 kg/m²     "

## 2017-12-04 NOTE — ANESTHESIA PREPROCEDURE EVALUATION
12/04/2017  Damon Yoo is a 67 y.o., male.    Anesthesia Evaluation    I have reviewed the Patient Summary Reports.    I have reviewed the Nursing Notes.   I have reviewed the Medications.     Review of Systems  Anesthesia Hx:  No problems with previous Anesthesia    Social:  Former Smoker, No Alcohol Use    Hematology/Oncology:  Hematology Normal      Oncology Comments: Prostate cancer with surgery over 5 years.    EENT/Dental:   chronic allergic rhinitis   Cardiovascular:   Hypertension, well controlled CAD   hyperlipidemia ECG has been reviewed. Hyperlipidemia  Hypertension  CAD (coronary artery disease)  S/P CABG (coronary artery bypass x2 vessel   Normal sinus rhythm  Possible Left atrial enlargement  Right bundle branch block  Left anterior fascicular block  LVH with repolarization abnormality  Abnormal ECG  Hypertrophic cardiomyopathy   Pulmonary:  Pulmonary Normal    Renal/:   Chronic Renal Disease    Hepatic/GI:   Bowel Prep. 0430 last drink of fluid.   Musculoskeletal:   Arthritis     Neurological:   Neuromuscular Disease,    Endocrine:  Endocrine Normal    Dermatological:  Skin Normal    Psych:  Psychiatric Normal           Physical Exam  General:  Well nourished, Obesity    Airway/Jaw/Neck:  Airway Findings: Mallampati: IV     Dental:  Dental Findings: Upper Dentures             Anesthesia Plan  Type of Anesthesia, risks & benefits discussed:  Anesthesia Type:  MAC  Patient's Preference:   Intra-op Monitoring Plan:   Intra-op Monitoring Plan Comments:   Post Op Pain Control Plan:   Post Op Pain Control Plan Comments:   Induction:   IV  Beta Blocker:  Patient is not currently on a Beta-Blocker (No further documentation required).       Informed Consent: Patient understands risks and agrees with Anesthesia plan.  Questions answered. Anesthesia consent signed with patient.  ASA Score: 2      Day of Surgery Review of History & Physical: I have interviewed and examined the patient. I have reviewed the patient's H&P dated: 12/04/17. There are no significant changes.  H&P update referred to the provider.         Ready For Surgery From Anesthesia Perspective.

## 2017-12-04 NOTE — ANESTHESIA RELEASE NOTE
"Anesthesia Release from PACU Note    Patient: Damon Yoo    Procedure(s) Performed: Procedure(s) (LRB):  COLONOSCOPY (N/A)    Anesthesia type: MAC    Post pain: Adequate analgesia    Post assessment: no apparent anesthetic complications, tolerated procedure well and no evidence of recall    Last Vitals:   Visit Vitals  BP (!) 96/59 (BP Location: Left arm, Patient Position: Lying)   Pulse (!) 59   Temp 36.9 °C (98.4 °F) (Oral)   Resp 16   Ht 5' 6" (1.676 m)   Wt 109.3 kg (241 lb)   SpO2 95%   BMI 38.90 kg/m²       Post vital signs: stable    Level of consciousness: awake and alert     Nausea/Vomiting: no nausea/no vomiting    Complications: none    Airway Patency: patent    Respiratory: unassisted, spontaneous ventilation, room air    Cardiovascular: stable    Hydration: euvolemic  "

## 2017-12-04 NOTE — DISCHARGE INSTRUCTIONS
Hemorrhoids    Hemorrhoids are swollen and inflamed veins inside the rectum and near the anus. The rectum is the last several inches of the colon. The anus is the passage between the rectum and the outside of the body.  Causes  The veins can become swollen due to increased pressure in them. This is most often caused by:  · Chronic constipation or diarrhea  · Straining when having a bowel movement  · Sitting too long on the toilet  · A low-fiber diet  · Pregnancy  Symptoms  · Bleeding from the rectum (this may be noticeable after bowel movements)  · Lump near the anus  · Itching around the anus  · Pain around the anus  There are different types of hemorrhoids. Depending on the type you have and the severity, you may be able to treat yourself at home. In some cases, a procedure may be the best treatment option. Your healthcare provider can tell you more about this, if needed.  Home care  General care  · To get relief from pain or itching, try:  ¨ Topical products. Your healthcare provider may prescribe or recommend creams, ointments, or pads that can be applied to the hemorrhoid. Use these exactly as directed.  ¨ Medicines. Your healthcare provider may recommend stool softeners, suppositories, or laxatives to help manage constipation. Use these exactly as directed.  ¨ Sitz baths. A sitz bath involves sitting in a few inches of warm bath water. Be careful not to make the water so hot that you burn yourself--test it before sitting in it. Soak for about 10 to 15 minutes a few times a day. This may help relieve pain.  Tips to help prevent hemorrhoids  · Eat more fiber. Fiber adds bulk to stool and absorbs water as it moves through your colon. This makes stool softer and easier to pass.  ¨ Increase the fiber in your diet with more fiber-rich foods. These include fresh fruit, vegetables, and whole grains.  ¨ Take a fiber supplement or bulking agent, if advised to by your provider. These include products such as psyllium  or methylcellulose.  · Drink plenty of water, if directed to by your provider. This can help keep stool soft.  · Be more active. Frequent exercise aids digestion and helps prevent constipation. It may also help make bowel movements more regular.  · Dont strain during bowel movements. This can make hemorrhoids more likely. Also, dont sit on the toilet for long periods of time.  Follow-up care  Follow up with your healthcare provider, or as advised. If a culture or imaging tests were done, you will be notified of the results when they are ready. This may take a few days or longer.  When to seek medical advice  Call your healthcare provider right away if any of these occur:  · Increased bleeding from the rectum  · Increased pain around the rectum or anus  · Weakness or dizziness  Call 911  Call 911 or return to the emergency department right away if any of these occur:  · Trouble breathing or swallowing  · Fainting or loss of consciousness  · Unusually fast heart rate  · Vomiting blood  · Large amounts of blood in stool  Date Last Reviewed: 6/22/2015 © 2000-2017 [employer]. [address] [place] [address]. All rights reserved. This information is not intended as a substitute for professional medical care. Always follow your healthcare professional's instructions.        Understanding Colon and Rectal Polyps    The colon (also called the large intestine) is a muscular tube that forms the last part of the digestive tract. It absorbs water and stores food waste. The colon is about 4 to 6 feet long. The rectum is the last 6 inches of the colon. The colon and rectum have a smooth lining composed of millions of cells. Changes in these cells can lead to growths in the colon that can become cancerous and should be removed. Multiple tests are available to screen for colon cancer, but the colonoscopy is the most recommended test. During colonoscopy, these polyps can be removed. How often you need this  test depends on many things including your condition, your family history, symptoms, and what the findings were at the previous colonoscopy.   When the colon lining changes  Changes that happen in the cells that line the colon or rectum can lead to growths called polyps. Over a period of years, polyps can turn cancerous. Removing polyps early may prevent cancer from ever forming.  Polyps  Polyps are fleshy clumps of tissue that form on the lining of the colon or rectum. Small polyps are usually benign (not cancerous). However, over time, cells in a polyp can change and become cancerous. Certain types of polyps known as adenomatous polyps are premalignant. The risk for invasive cancer increases with the size of the polyp and certain cell and gene features. This means that they can become cancerous if they're not removed. Hyperplastic polyps are benign. They can grow quite large and not turn cancerous.   Cancer  Almost all colorectal cancers start when polyp cells begin growing abnormally. As a cancerous tumor grows, it may involve more and more of the colon or rectum. In time, cancer can also grow beyond the colon or rectum and spread to nearby organs or to glands called lymph nodes. The cells can also travel to other parts of the body. This is known as metastasis. The earlier a cancerous tumor is removed, the better the chance of preventing its spread.    Date Last Reviewed: 8/1/2016  © 2272-7032 [employer]. [address] [place] [address], [place] [address]. All rights reserved. This information is not intended as a substitute for professional medical care. Always follow your healthcare professional's instructions.

## 2017-12-04 NOTE — H&P
Short Stay Endoscopy History and Physical    PCP - E Holland Mason Jr, MD    Procedure - Colonoscopy    H/O colon polyp(s), denies any acute issues. Needs surveillance colonoscopy.    ROS:  Constitutional: No fevers, chills, No weight loss  ENT: No allergies  CV: No chest pain  Pulm: No cough, No shortness of breath  Ophtho: No vision changes  GI: see HPI  Derm: No rash  Heme: No lymphadenopathy, No bruising  MSK: No arthritis  : No dysuria, No hematuria  Endo: No hot or cold intolerance  Neuro: No syncope, No seizure  Psych: No anxiety, No depression    Medical History:  has a past medical history of Abnormal EKG (10/25/2013); Arthritis; CAD (coronary artery disease); Cancer; Glaucoma; Gout attack; Hyperlipidemia; Hypertension; Hypertrophic cardiomyopathy (10/25/2013); and S/P CABG (coronary artery bypass graft) (10/25/2013).    Surgical History:  has a past surgical history that includes Prostate surgery; Cardiac surgery; and Coronary artery bypass graft (05/2003).    Family History: family history includes Hypertension in his father.. Otherwise no colon cancer, inflammatory bowel disease, or GI malignancies.    Social History:  reports that he quit smoking about 29 years ago. He has a 3.75 pack-year smoking history. He has never used smokeless tobacco. He reports that he does not drink alcohol.    Review of patient's allergies indicates:   Allergen Reactions    No known drug allergies        Medications:   Prescriptions Prior to Admission   Medication Sig Dispense Refill Last Dose    allopurinol (ZYLOPRIM) 300 MG tablet 1 & 1/2 TABLET BY MOUTH EVERY  tablet 3 12/3/2017 at Unknown time    amlodipine (NORVASC) 2.5 MG tablet TAKE ONE TABLET BY MOUTH EVERY DAY 30 tablet 11 12/3/2017 at Unknown time    aspirin 81 MG chewable tablet Take 1 tablet by mouth Daily.   12/3/2017 at Unknown time    colchicine (COLCRYS) 0.6 mg tablet Take with first symptom of gout, repeat after 1 hour and 6 hours after: then  twice a day x 2 days. 30 tablet 11 12/3/2017 at Unknown time    fenofibric acid (FIBRICOR) 135 mg CpDR TAKE ONE TABLET BY MOUTH EVERY DAY 90 capsule 3 12/3/2017 at Unknown time    fish oil-fat acid comb.[id number] (OMEGA 3-6-9) 1,200 mg Cap Take by mouth.   12/3/2017 at Unknown time    latanoprost 0.005 % ophthalmic solution Place 1 drop into both eyes every evening. 2.5 mL 12 12/3/2017 at Unknown time    lisinopril (PRINIVIL,ZESTRIL) 40 MG tablet Take 1 tablet (40 mg total) by mouth once daily. 30 tablet 6 12/3/2017 at Unknown time    metoprolol succinate (TOPROL-XL) 100 MG 24 hr tablet TAKE 1 TABLET (100 MG TOTAL) BY MOUTH ONCE DAILY.  3 12/3/2017 at Unknown time    rosuvastatin (CRESTOR) 40 MG Tab Take 1 tablet (40 mg total) by mouth every evening. Generic is fine. 90 tablet 3 12/3/2017 at Unknown time    timolol maleate 0.5% (TIMOPTIC) 0.5 % Drop Place 1 drop into both eyes every morning. 10 mL 12 12/3/2017 at Unknown time    cetirizine (ZYRTEC) 10 MG tablet Take 1 tablet (10 mg total) by mouth once daily. 14 tablet 0 More than a month at Unknown time    meloxicam (MOBIC) 15 MG tablet Take 1 tablet (15 mg total) by mouth once daily. 30 tablet 0 More than a month at Unknown time       Objective Findings:    Vital Signs:   Vitals:    12/04/17 0910   BP: (!) 146/86   Pulse: (!) 55   Resp: 20   Temp: 98.4 °F (36.9 °C)         Physical Exam:  General Appearance: Well appearing in no acute distress  Eyes:    No scleral icterus  ENT: Neck supple, Lips, mucosa, and tongue normal; teeth and gums normal  Lungs: CTA bilaterally in anterior and posterior fields, no wheezes, no crackles.  Heart:  Regular rate, S1, S2 normal, no murmurs heard.  Abdomen: Soft, non tender, non distended with normal bowel sounds. No hepatosplenomegaly, ascites, or mass.  Extremities: No clubbing, cyanosis or edema  Skin: No rash    Labs:  Lab Results   Component Value Date    WBC 7.88 02/24/2016    HGB 12.9 (L) 02/24/2016    HCT 39.5 (L)  02/24/2016     02/24/2016    CHOL 129 01/28/2017    TRIG 74 01/28/2017    HDL 33 (L) 01/28/2017    ALT 23 01/28/2017    AST 27 01/28/2017     11/03/2017    K 4.6 11/03/2017     11/03/2017    CREATININE 1.6 (H) 11/03/2017    BUN 23 11/03/2017    CO2 26 11/03/2017    TSH 1.367 08/16/2014    PSA <0.010 08/24/2013    INR 1.0 06/26/2010    GLUF 96 06/09/2012       I have explained the risks and benefits of endoscopy procedures to the patient including but not limited to bleeding, perforation, infection, and death.    Proceed with colonoscopy for h/o colon polyp(s).

## 2017-12-12 RX ORDER — METOPROLOL SUCCINATE 100 MG/1
100 TABLET, EXTENDED RELEASE ORAL DAILY
Qty: 90 TABLET | Refills: 2 | Status: SHIPPED | OUTPATIENT
Start: 2017-12-12 | End: 2018-10-17 | Stop reason: SDUPTHER

## 2017-12-14 ENCOUNTER — TELEPHONE (OUTPATIENT)
Dept: GASTROENTEROLOGY | Facility: CLINIC | Age: 67
End: 2017-12-14

## 2018-01-03 DIAGNOSIS — I10 ESSENTIAL HYPERTENSION: ICD-10-CM

## 2018-01-03 RX ORDER — LISINOPRIL 40 MG/1
40 TABLET ORAL DAILY
Qty: 90 TABLET | Refills: 1 | Status: SHIPPED | OUTPATIENT
Start: 2018-01-03 | End: 2018-08-30 | Stop reason: SDUPTHER

## 2018-01-03 NOTE — TELEPHONE ENCOUNTER
----- Message from Rocky Greene sent at 1/3/2018  8:25 AM CST -----  Contact: Wife [phone]  .1. What is the name of the medication you are requesting? lisinopril  2. What is the dose? 40mg  3. How do you take the medication? Orally, topically, etc? Orally  4. How often do you take this medication? Daily  5. Do you need a 30 day or 90 day supply? 90  6. How many refills are you requesting? 1  7. What is your preferred pharmacy and location of the pharmacy? .      [facility]/pharmacy #[id number] - [place], [place] - [id number] [facility]  [place] [address]  Phone: [phone] Fax: [phone]      8. Who can we contact with further questions? [phone]

## 2018-01-12 ENCOUNTER — OFFICE VISIT (OUTPATIENT)
Dept: INTERNAL MEDICINE | Facility: CLINIC | Age: 68
End: 2018-01-12
Payer: MEDICARE

## 2018-01-12 VITALS
BODY MASS INDEX: 39.85 KG/M2 | TEMPERATURE: 97 F | HEIGHT: 66 IN | WEIGHT: 247.94 LBS | DIASTOLIC BLOOD PRESSURE: 68 MMHG | SYSTOLIC BLOOD PRESSURE: 128 MMHG

## 2018-01-12 DIAGNOSIS — M25.512 ACUTE PAIN OF LEFT SHOULDER: ICD-10-CM

## 2018-01-12 DIAGNOSIS — M54.2 MUSCULOSKELETAL NECK PAIN: Primary | ICD-10-CM

## 2018-01-12 PROCEDURE — 99214 OFFICE O/P EST MOD 30 MIN: CPT | Mod: S$PBB,,, | Performed by: NURSE PRACTITIONER

## 2018-01-12 PROCEDURE — 99999 PR PBB SHADOW E&M-EST. PATIENT-LVL IV: CPT | Mod: PBBFAC,,, | Performed by: NURSE PRACTITIONER

## 2018-01-12 PROCEDURE — 99214 OFFICE O/P EST MOD 30 MIN: CPT | Mod: PBBFAC,PO | Performed by: NURSE PRACTITIONER

## 2018-01-12 RX ORDER — METHOCARBAMOL 500 MG/1
500 TABLET, FILM COATED ORAL 2 TIMES DAILY PRN
Qty: 20 TABLET | Refills: 0 | Status: SHIPPED | OUTPATIENT
Start: 2018-01-12 | End: 2018-01-13 | Stop reason: SDUPTHER

## 2018-01-12 RX ORDER — MELOXICAM 7.5 MG/1
7.5 TABLET ORAL DAILY
Qty: 10 TABLET | Refills: 0 | Status: SHIPPED | OUTPATIENT
Start: 2018-01-12 | End: 2018-01-13

## 2018-01-12 NOTE — PROGRESS NOTES
Subjective:       Patient ID: Damon Yoo is a 67 y.o. male.    Chief Complaint: No chief complaint on file.    Patient presents with left shoulder/neck pain that started on yesterday.  Reports waking up with the pain on yesterday morning.  Reports taking 2 Tylenols on yesterday but no relief.  Describes as aching/soreness.  No injury/trauma.  Rates 8/10.        Review of Systems   Constitutional: Negative for chills, fatigue and fever.   Respiratory: Negative for cough and shortness of breath.    Cardiovascular: Negative for chest pain.   Musculoskeletal: Positive for myalgias, neck pain and neck stiffness.   Skin: Negative for color change and wound.   Neurological: Negative for numbness.   Psychiatric/Behavioral: Negative for agitation and confusion.       Objective:      Physical Exam   Constitutional: He is oriented to person, place, and time. Vital signs are normal. He appears well-developed and well-nourished.   HENT:   Head: Normocephalic and atraumatic.   Neck: Normal range of motion. Muscular tenderness present.   Cardiovascular: Normal rate.    Pulmonary/Chest: Effort normal.   Musculoskeletal: Normal range of motion. He exhibits tenderness.        Left shoulder: He exhibits tenderness. He exhibits normal range of motion.        Arms:  Pain with rotating neck to the right.  Tenderness on palpation.  No pain with ROM of left shoulder.    Neurological: He is alert and oriented to person, place, and time.   Skin: Skin is warm.   Psychiatric: He has a normal mood and affect. His behavior is normal.       Assessment:       1. Musculoskeletal neck pain    2. Acute pain of left shoulder        Plan:         Musculoskeletal neck pain  -     methocarbamol (ROBAXIN) 500 MG Tab; Take 1 tablet (500 mg total) by mouth 2 (two) times daily as needed. Causes drowsiness  Dispense: 20 tablet; Refill: 0  -     meloxicam (MOBIC) 7.5 MG tablet; Take 1 tablet (7.5 mg total) by mouth once daily. Take with food  Dispense: 10  tablet; Refill: 0    Acute pain of left shoulder    Instructed to take medications as ordered.  Instructed to continue to use warm compresses to area over 20 min.  Instructed to follow up with no improvement of pain in 7-10 days or if symptoms worsen.  ER if needed.

## 2018-01-12 NOTE — PROGRESS NOTES
Patient, Damon Yoo (MRN #[id number]), presented with a recorded BMI of 40.01 kg/m^2 consistent with the definition of morbid obesity (ICD-10 E66.01). The patient's morbid obesity was monitored, evaluated, addressed and/or treated. This addendum to the medical record is made on 01/12/2018.

## 2018-01-12 NOTE — PATIENT INSTRUCTIONS
Neck Pain    There are several possible causes of neck pain when there is no injury:  · You can get a minor ligament sprain or muscle strain from a sudden minor neck movement. Sleeping with your neck in an awkward position can also cause this.  · Some people respond to emotional stress by tensing the muscles of their neck, shoulders, and upper back. Chronic spasm in these muscles can cause neck pain and sometimes headaches.  · Gradual wear and tear of the joints in the spine can cause degenerative arthritis. This can be a source of occasional or chronic neck pain.  · The spinal disks may bulge and put pressure on a nearby spinal nerve. This can happen as a natural result of aging or repeated small injuries to the neck. The spinal disks are the cushions between each spinal bone. This causes tingling, pain, or numbness that spreads from the neck to the shoulder, arm, or hand on one side.  Acute neck pain usually gets better in 1 to 2 weeks. Neck pain related to disk disease, arthritis in the spinal joints, or spinal stenosis can become chronic and last for months or years. Spinal stenosis is narrowing of the spinal canal.  X-rays are usually not ordered for the initial evaluation of neck pain. However, X-rays may be done if you had a forceful physical injury, such as a car accident or fall. If pain continues and doesnt respond to medical treatment, X-rays and other tests may be done at a later time.  Home care  · Rest and relax the muscles. Use a comfortable pillow that supports the head. It should also help keep the spine in a neutral position. The position of the head should not be tilted forward or backward. A rolled up towel may help for a custom fit.  · Some people find relief with heat. Heat can be applied with either a warm shower or bath or a moist towel heated in the microwave and massage. Others prefer cold packs. You can make an ice pack by filling a plastic bag that seals at the top with ice cubes or  crushed ice and then wrapping it with a thin towel. Try both and use the method that feels best for 15 to 20 minutes, several times a day.  · Whether using ice or heat, be careful that you do not injure your skin. Never put ice directly on the skin. Always wrap the ice in a towel or other type of cloth.This is very important, especially in people with poor skin sensations.   · Try to reduce your stress level. Emotional stress can lead to neck muscle tension and get in the way of or delay the healing process.  · You may use over-the-counter pain medicine to control pain, unless another medicine was prescribed. If you have chronic liver or kidney disease or ever had a stomach ulcer or GI bleeding, talk with your healthcare provider before using these medicines.  Follow-up care  Follow up with your healthcare provider if your symptoms do not show signs of improvement after one week. Physical therapy or further tests may be needed.  If X-rays, CT scans, or MRI scans were taken, you will be told of any new findings that may affect your care.  Call 911  Call 911 if you have:  · Sudden weakness or numbness in one or both arms  · Neck swelling, difficulty or painful swallowing  · Difficulty breathing  · Chest pain  When to seek medical advice  Call your healthcare provider right away if any of these occur:  · Pain becomes worse or spreads into one or both arm  · Increasing headache  · Fever of 100.4°F (38°C) or above lasting for 24 to 48 hours  Date Last Reviewed: 7/1/2016  © 2612-5110 [employer]. [address] [place] [address], [place] [address]. All rights reserved. This information is not intended as a substitute for professional medical care. Always follow your healthcare professional's instructions.

## 2018-01-13 ENCOUNTER — OFFICE VISIT (OUTPATIENT)
Dept: URGENT CARE | Facility: CLINIC | Age: 68
End: 2018-01-13
Payer: MEDICARE

## 2018-01-13 ENCOUNTER — HOSPITAL ENCOUNTER (OUTPATIENT)
Dept: RADIOLOGY | Facility: HOSPITAL | Age: 68
Discharge: HOME | End: 2018-01-13
Attending: FAMILY MEDICINE
Payer: MEDICARE

## 2018-01-13 VITALS
BODY MASS INDEX: 39.22 KG/M2 | TEMPERATURE: 96 F | SYSTOLIC BLOOD PRESSURE: 140 MMHG | HEART RATE: 82 BPM | HEIGHT: 66 IN | DIASTOLIC BLOOD PRESSURE: 70 MMHG | OXYGEN SATURATION: 97 % | WEIGHT: 244.06 LBS

## 2018-01-13 DIAGNOSIS — M1A.39X0 CHRONIC GOUT DUE TO RENAL IMPAIRMENT OF MULTIPLE SITES WITHOUT TOPHUS: ICD-10-CM

## 2018-01-13 DIAGNOSIS — N18.30 STAGE 3 CHRONIC KIDNEY DISEASE: ICD-10-CM

## 2018-01-13 DIAGNOSIS — R51.9 ACUTE INTRACTABLE HEADACHE, UNSPECIFIED HEADACHE TYPE: ICD-10-CM

## 2018-01-13 DIAGNOSIS — I10 HYPERTENSION, UNSPECIFIED TYPE: ICD-10-CM

## 2018-01-13 DIAGNOSIS — R51.9 ACUTE INTRACTABLE HEADACHE, UNSPECIFIED HEADACHE TYPE: Primary | ICD-10-CM

## 2018-01-13 DIAGNOSIS — M54.2 MUSCULOSKELETAL NECK PAIN: ICD-10-CM

## 2018-01-13 PROCEDURE — 72040 X-RAY EXAM NECK SPINE 2-3 VW: CPT | Mod: 26,,, | Performed by: RADIOLOGY

## 2018-01-13 PROCEDURE — 99214 OFFICE O/P EST MOD 30 MIN: CPT | Mod: PBBFAC,25,PO | Performed by: FAMILY MEDICINE

## 2018-01-13 PROCEDURE — 99999 PR PBB SHADOW E&M-EST. PATIENT-LVL IV: CPT | Mod: PBBFAC,,, | Performed by: FAMILY MEDICINE

## 2018-01-13 PROCEDURE — 99214 OFFICE O/P EST MOD 30 MIN: CPT | Mod: S$PBB,,, | Performed by: FAMILY MEDICINE

## 2018-01-13 PROCEDURE — 72040 X-RAY EXAM NECK SPINE 2-3 VW: CPT | Mod: TC,FY,PO

## 2018-01-13 RX ORDER — METHOCARBAMOL 500 MG/1
500 TABLET, FILM COATED ORAL 4 TIMES DAILY
Qty: 20 TABLET | Refills: 0
Start: 2018-01-13 | End: 2018-01-23

## 2018-01-13 RX ORDER — BUTALBITAL, ASPIRIN, CAFFEINE AND CODEINE PHOSPHATE 50; 325; 40; 30 MG/1; MG/1; MG/1; MG/1
1 CAPSULE ORAL EVERY 4 HOURS PRN
Qty: 30 CAPSULE | Refills: 0 | Status: SHIPPED | OUTPATIENT
Start: 2018-01-13 | End: 2018-01-13 | Stop reason: SDUPTHER

## 2018-01-13 RX ORDER — BUTALBITAL, ASPIRIN, CAFFEINE AND CODEINE PHOSPHATE 50; 325; 40; 30 MG/1; MG/1; MG/1; MG/1
1 CAPSULE ORAL EVERY 4 HOURS PRN
Qty: 30 CAPSULE | Refills: 0 | Status: SHIPPED | OUTPATIENT
Start: 2018-01-13 | End: 2018-01-23

## 2018-01-13 NOTE — PROGRESS NOTES
Subjective:      Patient ID: Damon Yoo is a 67 y.o. male.    Chief Complaint: Headache    Disclaimer:  This note is prepared using voice recognition software and as such is likely to have errors and has not been proof read. Please contact me for questions.     Damon Yoo is a 67 y.o. male who presents today for urgent visit. The patient's PCP is TIEN Mason Jr, MD. Pt was just seen yesterday at  for neck pain for 2 days. Now with headache for last 3 days. Over occiput and going down both shoulders. Given robaxin and mobic, took both last night. No better.  She reports that it is similar pain to yesterday but just thought it would go away already.  Blood pressure medicines are taken at night.  On repeat it is normalized.  Does have a history of gout arthritis and a history of prostate cancer no evidence of x-rays of neck.  No trauma.  Worse with lying down.  Has chronic kidney disease.  Was given the meloxicam however I don't think this is a good choice at this time due to the kidney issues in the past.  No numbness or tingling going down the arms.  No new rashes.      Headache    This is a new problem. The current episode started in the past 7 days. The problem occurs constantly. The problem has been gradually worsening. The pain is located in the occipital region. The pain radiates to the left neck and right neck. The pain quality is not similar to prior headaches. The quality of the pain is described as pulsating and throbbing. The pain is at a severity of 8/10. The pain is severe. Associated symptoms include insomnia and neck pain. Pertinent negatives include no abdominal pain, blurred vision, coughing, dizziness, drainage, ear pain, eye pain, fever, muscle aches, nausea, numbness, photophobia, sinus pressure, sore throat, visual change or weakness. Exacerbated by: laying down.  He has tried darkened room and NSAIDs (muscle relaxants. ) for the symptoms. The treatment provided no relief. His past  "medical history is significant for hypertension and obesity. There is no history of migraine headaches.       Lab Results   Component Value Date    WBC 7.88 02/24/2016    HGB 12.9 (L) 02/24/2016    HCT 39.5 (L) 02/24/2016     02/24/2016    CHOL 129 01/28/2017    TRIG 74 01/28/2017    HDL 33 (L) 01/28/2017    ALT 23 01/28/2017    AST 27 01/28/2017     11/03/2017    K 4.6 11/03/2017     11/03/2017    CREATININE 1.6 (H) 11/03/2017    BUN 23 11/03/2017    CO2 26 11/03/2017    TSH 1.367 08/16/2014    PSA <0.010 08/24/2013    INR 1.0 06/26/2010    GLUF 96 06/09/2012       Review of Systems   Constitutional: Negative for activity change, appetite change, chills, fatigue and fever.   HENT: Negative for congestion, ear pain, sinus pressure, sore throat and trouble swallowing.    Eyes: Negative for blurred vision, photophobia, pain and visual disturbance.   Respiratory: Negative for cough and shortness of breath.    Cardiovascular: Negative for chest pain and leg swelling.   Gastrointestinal: Negative for abdominal pain, constipation, diarrhea and nausea.   Genitourinary: Negative for difficulty urinating, dysuria and flank pain.   Musculoskeletal: Positive for myalgias, neck pain and neck stiffness. Negative for arthralgias and joint swelling.   Neurological: Positive for headaches. Negative for dizziness, weakness and numbness.   Psychiatric/Behavioral: Positive for sleep disturbance. The patient has insomnia. The patient is not nervous/anxious.      Objective:     Vitals:    01/13/18 0844 01/13/18 0901   BP: (!) 158/82 (!) 140/70   BP Location: Left arm    Patient Position: Sitting    Pulse: 82    Temp: 96 °F (35.6 °C)    TempSrc: Tympanic    SpO2: 97%    Weight: 110.7 kg (244 lb 0.8 oz)    Height: 5' 6" (1.676 m)      Physical Exam   Constitutional: He is oriented to person, place, and time. He appears well-developed and well-nourished.   HENT:   Head: Normocephalic and atraumatic.   Right Ear: Tympanic " membrane normal.   Left Ear: Tympanic membrane normal.   Mouth/Throat: Oropharynx is clear and moist.   Eyes: Conjunctivae and EOM are normal.   Neck: Neck supple. Muscular tenderness present. No spinous process tenderness present. Carotid bruit is not present. No neck rigidity. Decreased range of motion present. No edema and no erythema present. No thyroid mass and no thyromegaly present.   Cardiovascular: Normal rate and regular rhythm.    Pulmonary/Chest: Effort normal and breath sounds normal.   Musculoskeletal:        Cervical back: He exhibits tenderness, pain and spasm. He exhibits normal range of motion and no bony tenderness.   Neg spurling's test. Normal range of motion of shoulder and upper arms bilaterally.    Neurological: He is alert and oriented to person, place, and time. He has normal strength. He displays no atrophy. No cranial nerve deficit or sensory deficit.   Psychiatric: He has a normal mood and affect. His behavior is normal.   Nursing note and vitals reviewed.    Assessment:     1. Acute intractable headache, unspecified headache type    2. Hypertension, unspecified type    3. Stage 3 chronic kidney disease    4. Chronic gout due to renal impairment of multiple sites without tophus    5. Musculoskeletal neck pain      Plan:   Damon was seen today for headache.    Diagnoses and all orders for this visit:    Acute intractable headache, unspecified headache type  Comments:  not improved. xray today, increase robaxin to 500mg 4 times daily, add codeine fiornial. stop mobic. f/u with pcp in 3 days.   Orders:  -     X-Ray Cervical Spine AP And Lateral; Future    Hypertension, unspecified type-uncontrolled initially but better after repeat still we'll recommend continue medications and start meloxicam  Comments:  stop mobic.   Orders:  -     X-Ray Cervical Spine AP And Lateral; Future    Stage 3 chronic kidney disease-stopping for now due to higher blood pressures and chronic kidney disease.   Will do codeine Fiorinal for now.  Follow with PCP if not improved in 3 days.  Comments:  stop mobic  Orders:  -     X-Ray Cervical Spine AP And Lateral; Future    Chronic gout due to renal impairment of multiple sites without tophus-obtain x-rays.  -     X-Ray Cervical Spine AP And Lateral; Future    Musculoskeletal neck pain-not improved minimal dosing yesterday increase to 4 times daily.  Obtain x-rays today.  -     methocarbamol (ROBAXIN) 500 MG Tab; Take 1 tablet (500 mg total) by mouth 4 (four) times daily. Causes drowsiness    Other orders  -     Discontinue: codeine-butalbital-ASA-caffeine 21--40 mg Cap; Take 1 capsule by mouth every 4 (four) hours as needed.  -     Discontinue: codeine-butalbital-ASA-caffeine 66--40 mg Cap; Take 1 capsule by mouth every 4 (four) hours as needed.  -     codeine-butalbital-ASA-caffeine 29--40 mg Cap; Take 1 capsule by mouth every 4 (four) hours as needed.            Follow-up if symptoms worsen or fail to improve.

## 2018-01-15 ENCOUNTER — TELEPHONE (OUTPATIENT)
Dept: INTERNAL MEDICINE | Facility: CLINIC | Age: 68
End: 2018-01-15

## 2018-01-15 DIAGNOSIS — M50.00 CERVICAL DISC DISEASE WITH MYELOPATHY: Primary | ICD-10-CM

## 2018-01-15 NOTE — TELEPHONE ENCOUNTER
----- Message from Nora Gerard MD sent at 1/15/2018 12:55 PM CST -----  I saw this patient over the weekend in Urgent care. He had seen UC on Friday and Sat for same complaint. Looks like some significant cervical disc issues. I treated him with pain meds for the headaches and stopped the mobic. I think he would benefit from see interventionalist, but wanted you to refer him since you are the primary. I've released the results to him from the portal. Thanks. Jessica

## 2018-01-15 NOTE — PROGRESS NOTES
I saw this patient over the weekend in Urgent care. He had seen UC on Friday and Sat for same complaint. Looks like some significant cervical disc issues. I treated him with pain meds for the headaches and stopped the mobic. I think he would benefit from see interventionalist, but wanted you to refer him since you are the primary. I've released the results to him from the portal. Thanks. Jessica

## 2018-01-17 NOTE — TELEPHONE ENCOUNTER
Called radiologist to schedule apt. No one was there to schedule the apt. Left message verbalizing pt.'s name/MRI number/ and orders. Tech verbalized understanding and said that he would have someone to schedule apt. And give pt. A call to advise of apt. On 1/18/2018.

## 2018-01-17 NOTE — ADDENDUM NOTE
Addended by: TIEN PATIÑO on: 1/17/2018 02:14 PM     Modules accepted: Orders     [id number] Comprehensive

## 2018-01-22 ENCOUNTER — OFFICE VISIT (OUTPATIENT)
Dept: OPHTHALMOLOGY | Facility: CLINIC | Age: 68
End: 2018-01-22
Payer: MEDICARE

## 2018-01-22 DIAGNOSIS — H40.1132 PRIMARY OPEN ANGLE GLAUCOMA OF BOTH EYES, MODERATE STAGE: Primary | ICD-10-CM

## 2018-01-22 DIAGNOSIS — H25.13 NUCLEAR SENILE CATARACT OF BOTH EYES: ICD-10-CM

## 2018-01-22 PROCEDURE — 99212 OFFICE O/P EST SF 10 MIN: CPT | Mod: PBBFAC,PO,25 | Performed by: OPHTHALMOLOGY

## 2018-01-22 PROCEDURE — 99999 PR PBB SHADOW E&M-EST. PATIENT-LVL II: CPT | Mod: PBBFAC,,, | Performed by: OPHTHALMOLOGY

## 2018-01-22 PROCEDURE — 92012 INTRM OPH EXAM EST PATIENT: CPT | Mod: S$PBB,,, | Performed by: OPHTHALMOLOGY

## 2018-01-22 PROCEDURE — 92133 CPTRZD OPH DX IMG PST SGM ON: CPT | Mod: PBBFAC,PO | Performed by: OPHTHALMOLOGY

## 2018-01-22 NOTE — PROGRESS NOTES
SUBJECTIVE:   Damon Yoo is a 67 y.o. male   Corrected distance visual acuity was 20/25 +1 in the right eye and 20/30 -2 in the left eye.   Chief Complaint   Patient presents with    Glaucoma     3 mth iop ck and goct        HPI:  HPI     Glaucoma    Additional comments: 3 mth iop ck and goct           Comments   1. Mod COAG- No FHx (init 32/27 on 3/10 with C/D .65/.60) Goal <18, new   goal = 15 6/2017  SLT OD 12/11/14 (20-16)  SLT OS 2/25/15 (20-16)  2. Mild NSC  3. LLL Cyst Excision on 6/13/12    Latanoprost QHS OU  Timolol qam OU       Last edited by Keesha Brewster MA on 1/22/2018  7:52 AM. (History)        Assessment /Plan :  1. Primary open angle glaucoma of both eyes, moderate stage   Doing well, IOP within acceptable range relative to target IOP and no evidence of progression. Continue current treatment. Reviewed importance of continued compliance with treatment and follow up.     2. Nuclear senile cataract of both eyes      Return to clinic in 3 months  or as needed.  With IOP Check, 24-2 HVF, Dilation and SDP's

## 2018-01-29 ENCOUNTER — PATIENT OUTREACH (OUTPATIENT)
Dept: ADMINISTRATIVE | Facility: HOSPITAL | Age: 68
End: 2018-01-29

## 2018-02-06 ENCOUNTER — LAB VISIT (OUTPATIENT)
Dept: LAB | Facility: HOSPITAL | Age: 68
End: 2018-02-06
Attending: PEDIATRICS
Payer: MEDICARE

## 2018-02-06 DIAGNOSIS — I10 ESSENTIAL HYPERTENSION: ICD-10-CM

## 2018-02-06 DIAGNOSIS — E78.5 HYPERLIPIDEMIA, UNSPECIFIED HYPERLIPIDEMIA TYPE: ICD-10-CM

## 2018-02-06 LAB
ALT SERPL W/O P-5'-P-CCNC: 20 U/L
ANION GAP SERPL CALC-SCNC: 8 MMOL/L
AST SERPL-CCNC: 24 U/L
BUN SERPL-MCNC: 22 MG/DL
CALCIUM SERPL-MCNC: 9.5 MG/DL
CHLORIDE SERPL-SCNC: 109 MMOL/L
CHOLEST SERPL-MCNC: 143 MG/DL
CHOLEST/HDLC SERPL: 4.2 {RATIO}
CO2 SERPL-SCNC: 26 MMOL/L
CREAT SERPL-MCNC: 1.4 MG/DL
EST. GFR  (AFRICAN AMERICAN): >60 ML/MIN/1.73 M^2
EST. GFR  (NON AFRICAN AMERICAN): 52.4 ML/MIN/1.73 M^2
GLUCOSE SERPL-MCNC: 114 MG/DL
HDLC SERPL-MCNC: 34 MG/DL
HDLC SERPL: 23.8 %
LDLC SERPL CALC-MCNC: 86.8 MG/DL
NONHDLC SERPL-MCNC: 109 MG/DL
POTASSIUM SERPL-SCNC: 4.5 MMOL/L
SODIUM SERPL-SCNC: 143 MMOL/L
TRIGL SERPL-MCNC: 111 MG/DL

## 2018-02-06 PROCEDURE — 84460 ALANINE AMINO (ALT) (SGPT): CPT | Mod: PO

## 2018-02-06 PROCEDURE — 36415 COLL VENOUS BLD VENIPUNCTURE: CPT | Mod: PO

## 2018-02-06 PROCEDURE — 80048 BASIC METABOLIC PNL TOTAL CA: CPT | Mod: PO

## 2018-02-06 PROCEDURE — 84450 TRANSFERASE (AST) (SGOT): CPT | Mod: PO

## 2018-02-06 PROCEDURE — 80061 LIPID PANEL: CPT

## 2018-02-12 ENCOUNTER — PATIENT MESSAGE (OUTPATIENT)
Dept: RHEUMATOLOGY | Facility: CLINIC | Age: 68
End: 2018-02-12

## 2018-02-12 ENCOUNTER — OFFICE VISIT (OUTPATIENT)
Dept: INTERNAL MEDICINE | Facility: CLINIC | Age: 68
End: 2018-02-12
Payer: MEDICARE

## 2018-02-12 VITALS
TEMPERATURE: 98 F | DIASTOLIC BLOOD PRESSURE: 78 MMHG | SYSTOLIC BLOOD PRESSURE: 134 MMHG | BODY MASS INDEX: 39.72 KG/M2 | WEIGHT: 247.13 LBS | HEIGHT: 66 IN

## 2018-02-12 DIAGNOSIS — E66.01 OBESITY, CLASS III, BMI 40-49.9 (MORBID OBESITY): ICD-10-CM

## 2018-02-12 DIAGNOSIS — M17.0 PRIMARY OSTEOARTHRITIS OF BOTH KNEES: ICD-10-CM

## 2018-02-12 DIAGNOSIS — C61 PROSTATE CANCER: ICD-10-CM

## 2018-02-12 DIAGNOSIS — M54.40 CHRONIC MIDLINE LOW BACK PAIN WITH SCIATICA, SCIATICA LATERALITY UNSPECIFIED: ICD-10-CM

## 2018-02-12 DIAGNOSIS — I10 ESSENTIAL HYPERTENSION: ICD-10-CM

## 2018-02-12 DIAGNOSIS — G89.29 CHRONIC MIDLINE LOW BACK PAIN WITH SCIATICA, SCIATICA LATERALITY UNSPECIFIED: ICD-10-CM

## 2018-02-12 DIAGNOSIS — M1A.39X0 CHRONIC GOUT DUE TO RENAL IMPAIRMENT OF MULTIPLE SITES WITHOUT TOPHUS: ICD-10-CM

## 2018-02-12 DIAGNOSIS — N18.30 CHRONIC KIDNEY DISEASE, STAGE III (MODERATE): ICD-10-CM

## 2018-02-12 DIAGNOSIS — Z00.00 WELL ADULT EXAM: ICD-10-CM

## 2018-02-12 DIAGNOSIS — I42.2 HYPERTROPHIC CARDIOMYOPATHY: ICD-10-CM

## 2018-02-12 DIAGNOSIS — Z95.1 S/P CABG (CORONARY ARTERY BYPASS GRAFT): ICD-10-CM

## 2018-02-12 DIAGNOSIS — Z13.6 SCREENING FOR AAA (AORTIC ABDOMINAL ANEURYSM): Primary | ICD-10-CM

## 2018-02-12 DIAGNOSIS — E78.5 HYPERLIPIDEMIA, UNSPECIFIED HYPERLIPIDEMIA TYPE: ICD-10-CM

## 2018-02-12 DIAGNOSIS — I25.810 CORONARY ARTERY DISEASE INVOLVING CORONARY BYPASS GRAFT OF NATIVE HEART WITHOUT ANGINA PECTORIS: ICD-10-CM

## 2018-02-12 PROCEDURE — 99397 PER PM REEVAL EST PAT 65+ YR: CPT | Mod: S$PBB,,, | Performed by: PEDIATRICS

## 2018-02-12 PROCEDURE — 99999 PR PBB SHADOW E&M-EST. PATIENT-LVL III: CPT | Mod: PBBFAC,,, | Performed by: PEDIATRICS

## 2018-02-12 PROCEDURE — 99213 OFFICE O/P EST LOW 20 MIN: CPT | Mod: PBBFAC,PO | Performed by: PEDIATRICS

## 2018-02-12 NOTE — PROGRESS NOTES
Subjective:       Patient ID: Damon Yoo is a 67 y.o. male.    Chief Complaint: Follow-up    HTN: B/P good, no HTNive symptoms.   LIPIDS:somewhat following D&E, tolerating and compliant with med(s).  CAD: no CP, SOB, DIAZ, saw Dr Faye  Gout: quiet, but is seeing rheum  Prostate cancer: seeing urology  Chronic back pain is good, neck pain resolved. Knees are ok..  CKD: cre is stable  LABS REVIEWED AND DISCUSSED WITH PATIENT      Review of Systems   Constitutional: Negative for fever and unexpected weight change.   HENT: Negative for congestion and rhinorrhea.    Eyes: Negative for discharge and redness.   Respiratory: Negative for cough and wheezing.    Cardiovascular: Negative for chest pain, palpitations and leg swelling.   Gastrointestinal: Negative for constipation, diarrhea and vomiting.   Endocrine: Negative for cold intolerance, heat intolerance, polydipsia, polyphagia and polyuria.   Genitourinary: Positive for frequency (nocturia 6). Negative for decreased urine volume, difficulty urinating and hematuria.   Musculoskeletal: Positive for arthralgias, back pain and gait problem. Negative for joint swelling.        Stable knee and back pain.   Skin: Negative for rash and wound.   Neurological: Negative for syncope and headaches.   Psychiatric/Behavioral: Negative for behavioral problems, dysphoric mood, self-injury, sleep disturbance and suicidal ideas. The patient is not nervous/anxious.        Objective:      Physical Exam   Constitutional: He appears well-developed and well-nourished. No distress.   HENT:   Head: Normocephalic and atraumatic.   Right Ear: Tympanic membrane normal.   Left Ear: Tympanic membrane normal.   Mouth/Throat: Uvula is midline and mucous membranes are normal. Normal dentition.   Eyes: Conjunctivae, EOM and lids are normal. Pupils are equal, round, and reactive to light.   Neck: Trachea normal and normal range of motion. Neck supple. No JVD present. No thyromegaly present.    Cardiovascular: Normal rate, regular rhythm, S1 normal, S2 normal, normal heart sounds and normal pulses.  Exam reveals no gallop and no friction rub.    No murmur heard.  Pulmonary/Chest: Effort normal and breath sounds normal. He has no wheezes. He has no rales.   Abdominal: Soft. Normal appearance and bowel sounds are normal. He exhibits no mass. There is no hepatosplenomegaly. There is no tenderness. There is no rebound and no guarding.   Musculoskeletal: He exhibits no edema.   Mild arthritic changes.   Lymphadenopathy:     He has no cervical adenopathy.   Neurological: He is alert. He has normal strength. Coordination and gait normal.   Skin: Skin is warm and intact. No rash noted.   Psychiatric: He has a normal mood and affect. His speech is normal and behavior is normal.       Assessment:       1. Screening for AAA (aortic abdominal aneurysm)    2. Well adult exam    3. Essential hypertension    4. Hyperlipidemia, unspecified hyperlipidemia type    5. Chronic kidney disease, stage III (moderate)    6. Hypertrophic cardiomyopathy    7. S/P CABG (coronary artery bypass graft)    8. Coronary artery disease involving coronary bypass graft of native heart without angina pectoris    9. Prostate cancer    10. 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 Obesity, Class III, BMI 40-49.9 (morbid obesity)    12. Primary osteoarthritis of both knees    13. Chronic midline low back pain with sciatica, sciatica laterality unspecified        Plan:       Screening for AAA (aortic abdominal aneurysm)    Well adult exam    Essential hypertension    Hyperlipidemia, unspecified hyperlipidemia type  -     ALT (SGPT); Future; Expected date: 02/12/2018  -     AST (SGOT); Future; Expected date: 02/12/2018  -     Lipid panel; Future; Expected date: 02/12/2018    Chronic kidney disease, stage III (moderate)  -     Basic metabolic panel; Future; Expected date: 02/12/2018    Hypertrophic  cardiomyopathy    S/P CABG (coronary artery bypass graft)    Coronary artery disease involving coronary bypass graft of native heart without angina pectoris    Prostate cancer    Chronic gout due to renal impairment of multiple sites without tophus    Obesity, Class III, BMI 40-49.9 (morbid obesity)    Primary osteoarthritis of both knees    Chronic midline low back pain with sciatica, sciatica laterality unspecified    Other orders  -     Cancel: US Abdominal Aorta; Future; Expected date: 01/29/2018    The AAA US is not needed as he had CT ABD that visualized aorta 2013. D&E, weight loss is key. He is stable. Meds reviewed, keep subspecialty care.  issues UTD. F/U yearly with labs.

## 2018-03-12 RX ORDER — ALLOPURINOL 300 MG/1
TABLET ORAL
Qty: 135 TABLET | Refills: 1 | Status: SHIPPED | OUTPATIENT
Start: 2018-03-12 | End: 2018-10-04 | Stop reason: SDUPTHER

## 2018-04-24 DIAGNOSIS — I25.10 CORONARY ARTERY DISEASE, ANGINA PRESENCE UNSPECIFIED, UNSPECIFIED VESSEL OR LESION TYPE, UNSPECIFIED WHETHER NATIVE OR TRANSPLANTED HEART: Primary | ICD-10-CM

## 2018-04-25 ENCOUNTER — OFFICE VISIT (OUTPATIENT)
Dept: CARDIOLOGY | Facility: CLINIC | Age: 68
End: 2018-04-25
Payer: MEDICARE

## 2018-04-25 ENCOUNTER — OFFICE VISIT (OUTPATIENT)
Dept: OPHTHALMOLOGY | Facility: CLINIC | Age: 68
End: 2018-04-25
Payer: MEDICARE

## 2018-04-25 ENCOUNTER — CLINICAL SUPPORT (OUTPATIENT)
Dept: CARDIOLOGY | Facility: CLINIC | Age: 68
End: 2018-04-25
Payer: MEDICARE

## 2018-04-25 VITALS
HEIGHT: 66 IN | HEART RATE: 54 BPM | WEIGHT: 254 LBS | SYSTOLIC BLOOD PRESSURE: 140 MMHG | DIASTOLIC BLOOD PRESSURE: 70 MMHG | BODY MASS INDEX: 40.82 KG/M2

## 2018-04-25 DIAGNOSIS — I25.810 CORONARY ARTERY DISEASE INVOLVING CORONARY BYPASS GRAFT OF NATIVE HEART WITHOUT ANGINA PECTORIS: Primary | ICD-10-CM

## 2018-04-25 DIAGNOSIS — I25.10 CORONARY ARTERY DISEASE, ANGINA PRESENCE UNSPECIFIED, UNSPECIFIED VESSEL OR LESION TYPE, UNSPECIFIED WHETHER NATIVE OR TRANSPLANTED HEART: ICD-10-CM

## 2018-04-25 DIAGNOSIS — H25.13 NUCLEAR SENILE CATARACT OF BOTH EYES: ICD-10-CM

## 2018-04-25 DIAGNOSIS — Z95.1 S/P CABG (CORONARY ARTERY BYPASS GRAFT): ICD-10-CM

## 2018-04-25 DIAGNOSIS — I10 ESSENTIAL HYPERTENSION: ICD-10-CM

## 2018-04-25 DIAGNOSIS — H40.1132 PRIMARY OPEN ANGLE GLAUCOMA OF BOTH EYES, MODERATE STAGE: Primary | ICD-10-CM

## 2018-04-25 DIAGNOSIS — R94.31 ABNORMAL EKG: ICD-10-CM

## 2018-04-25 DIAGNOSIS — E66.01 OBESITY, CLASS III, BMI 40-49.9 (MORBID OBESITY): ICD-10-CM

## 2018-04-25 DIAGNOSIS — M1A.39X0 CHRONIC GOUT DUE TO RENAL IMPAIRMENT OF MULTIPLE SITES WITHOUT TOPHUS: ICD-10-CM

## 2018-04-25 DIAGNOSIS — E78.5 HYPERLIPIDEMIA, UNSPECIFIED HYPERLIPIDEMIA TYPE: ICD-10-CM

## 2018-04-25 DIAGNOSIS — I42.2 HYPERTROPHIC CARDIOMYOPATHY: ICD-10-CM

## 2018-04-25 DIAGNOSIS — N18.30 CHRONIC KIDNEY DISEASE, STAGE III (MODERATE): ICD-10-CM

## 2018-04-25 PROCEDURE — 99999 PR PBB SHADOW E&M-EST. PATIENT-LVL III: CPT | Mod: PBBFAC,,, | Performed by: INTERNAL MEDICINE

## 2018-04-25 PROCEDURE — 92083 EXTENDED VISUAL FIELD XM: CPT | Mod: PBBFAC,PO | Performed by: OPHTHALMOLOGY

## 2018-04-25 PROCEDURE — 92250 FUNDUS PHOTOGRAPHY W/I&R: CPT | Mod: PBBFAC,PO | Performed by: OPHTHALMOLOGY

## 2018-04-25 PROCEDURE — 93005 ELECTROCARDIOGRAM TRACING: CPT | Mod: PBBFAC,PO | Performed by: INTERNAL MEDICINE

## 2018-04-25 PROCEDURE — 99212 OFFICE O/P EST SF 10 MIN: CPT | Mod: PBBFAC,27,PO,25 | Performed by: OPHTHALMOLOGY

## 2018-04-25 PROCEDURE — 99214 OFFICE O/P EST MOD 30 MIN: CPT | Mod: S$PBB,,, | Performed by: INTERNAL MEDICINE

## 2018-04-25 PROCEDURE — 93010 ELECTROCARDIOGRAM REPORT: CPT | Mod: S$PBB,,, | Performed by: INTERNAL MEDICINE

## 2018-04-25 PROCEDURE — 92014 COMPRE OPH EXAM EST PT 1/>: CPT | Mod: S$PBB,,, | Performed by: OPHTHALMOLOGY

## 2018-04-25 PROCEDURE — 99213 OFFICE O/P EST LOW 20 MIN: CPT | Mod: PBBFAC,PO | Performed by: INTERNAL MEDICINE

## 2018-04-25 PROCEDURE — 99999 PR PBB SHADOW E&M-EST. PATIENT-LVL II: CPT | Mod: PBBFAC,,, | Performed by: OPHTHALMOLOGY

## 2018-04-25 NOTE — PROGRESS NOTES
Subjective:   Patient ID:  Damon Yoo is a 68 y.o. male who presents for follow up of Coronary Artery Disease (Patient presents to clinic today for 1 year follow up. ); Hypertension; and Hyperlipidemia      HPI  A 69 yo male with cad ckd s/p cabg is here for f/u he ahs not been exercising not compliant with diet. Not smoking no headache blurred vision tia claudication palpitation. He has no chf symptoms. He does not have daytime sleepiness still works part time delivering auto parts. Has no angina. His bp 140/70  He has not been complaint with salt intake. Takes meds regularily  Past Medical History:   Diagnosis Date    Abnormal EKG 10/25/2013    Arthritis     CAD (coronary artery disease)     Cancer     Prostate [id number] prostate    Glaucoma     Gout attack     Hyperlipidemia     Hypertension     Hypertrophic cardiomyopathy 10/25/2013    S/P CABG (coronary artery bypass graft) 10/25/2013       Past Surgical History:   Procedure Laterality Date    COLONOSCOPY N/A 12/4/2017    Procedure: COLONOSCOPY;  Surgeon: Dina Ledesma MD;  Location: [facility];  Service: Endoscopy;  Laterality: N/A;    CORONARY ARTERY BYPASS GRAFT  05/2003    x1    PROSTATE SURGERY      RALP 2013       Social History   Substance Use Topics    Smoking status: Former Smoker     Packs/day: 0.25     Years: 15.00     Quit date: 4/21/1988    Smokeless tobacco: Never Used    Alcohol use No       Family History   Problem Relation Age of Onset    Hypertension Father     Strabismus Neg Hx     Retinal detachment Neg Hx     Macular degeneration Neg Hx     Glaucoma Neg Hx     Blindness Neg Hx     Amblyopia Neg Hx        Current Outpatient Prescriptions   Medication Sig    allopurinol (ZYLOPRIM) 300 MG tablet TAKE 1 AND 1/2 TABLETS BY MOUTH EVERY DAY    amlodipine (NORVASC) 2.5 MG tablet TAKE ONE TABLET BY MOUTH EVERY DAY    aspirin 81 MG chewable tablet Take 1 tablet by mouth Daily.    fenofibric acid (FIBRICOR) 135 mg CpDR TAKE  ONE TABLET BY MOUTH EVERY DAY    fish oil-fat acid comb.8-hb137 (OMEGA 3-6-9) 1,200 mg Cap Take by mouth.    latanoprost 0.005 % ophthalmic solution Place 1 drop into both eyes every evening.    lisinopril (PRINIVIL,ZESTRIL) 40 MG tablet Take 1 tablet (40 mg total) by mouth once daily.    metoprolol succinate (TOPROL-XL) 100 MG 24 hr tablet TAKE 1 TABLET (100 MG TOTAL) BY MOUTH ONCE DAILY.    rosuvastatin (CRESTOR) 40 MG Tab Take 1 tablet (40 mg total) by mouth every evening. Generic is fine.    timolol maleate 0.5% (TIMOPTIC) 0.5 % Drop Place 1 drop into both eyes every morning.    colchicine (COLCRYS) 0.6 mg tablet Take with first symptom of gout, repeat after 1 hour and 6 hours after: then twice a day x 2 days.     No current facility-administered medications for this visit.      Current Outpatient Prescriptions on File Prior to Visit   Medication Sig    allopurinol (ZYLOPRIM) 300 MG tablet TAKE 1 AND 1/2 TABLETS BY MOUTH EVERY DAY    amlodipine (NORVASC) 2.5 MG tablet TAKE ONE TABLET BY MOUTH EVERY DAY    aspirin 81 MG chewable tablet Take 1 tablet by mouth Daily.    fenofibric acid (FIBRICOR) 135 mg CpDR TAKE ONE TABLET BY MOUTH EVERY DAY    fish oil-fat acid comb.8-hb137 (OMEGA 3-6-9) 1,200 mg Cap Take by mouth.    latanoprost 0.005 % ophthalmic solution Place 1 drop into both eyes every evening.    lisinopril (PRINIVIL,ZESTRIL) 40 MG tablet Take 1 tablet (40 mg total) by mouth once daily.    metoprolol succinate (TOPROL-XL) 100 MG 24 hr tablet TAKE 1 TABLET (100 MG TOTAL) BY MOUTH ONCE DAILY.    rosuvastatin (CRESTOR) 40 MG Tab Take 1 tablet (40 mg total) by mouth every evening. Generic is fine.    timolol maleate 0.5% (TIMOPTIC) 0.5 % Drop Place 1 drop into both eyes every morning.    colchicine (COLCRYS) 0.6 mg tablet Take with first symptom of gout, repeat after 1 hour and 6 hours after: then twice a day x 2 days.     No current facility-administered medications on file prior to visit.       Review of patient's allergies indicates:   Allergen Reactions    No known drug allergies        Review of Systems   Constitution: Negative for weakness and malaise/fatigue.   Eyes: Negative for blurred vision.   Cardiovascular: Negative for chest pain, claudication, cyanosis, dyspnea on exertion, irregular heartbeat, leg swelling, near-syncope, orthopnea, palpitations and paroxysmal nocturnal dyspnea.   Respiratory: Negative for cough, hemoptysis and shortness of breath.    Hematologic/Lymphatic: Negative for bleeding problem. Does not bruise/bleed easily.   Skin: Negative for dry skin and itching.   Musculoskeletal: Negative for falls, muscle weakness and myalgias.   Gastrointestinal: Negative for abdominal pain, diarrhea, heartburn, hematemesis, hematochezia and melena.   Genitourinary: Negative for flank pain and hematuria.   Neurological: Negative for dizziness, focal weakness, headaches, light-headedness, numbness, paresthesias and seizures.   Psychiatric/Behavioral: Negative for altered mental status and memory loss. The patient is not nervous/anxious.    Allergic/Immunologic: Negative for hives.       Objective:   Physical Exam   Constitutional: He is oriented to person, place, and time. He appears well-developed and well-nourished. No distress.   HENT:   Head: Normocephalic and atraumatic.   Eyes: EOM are normal. Pupils are equal, round, and reactive to light. Right eye exhibits no discharge. Left eye exhibits no discharge.   Neck: Neck supple. No JVD present. No thyromegaly present.   Cardiovascular: Normal rate, regular rhythm and intact distal pulses.  Exam reveals no gallop and no friction rub.    Murmur heard.   Harsh midsystolic murmur is present with a grade of 1/6  at the upper right sternal border radiating to the neck  Pulmonary/Chest: Effort normal and breath sounds normal. No respiratory distress. He has no wheezes. He has no rales. He exhibits no tenderness.   Scar cabg well healed.   "  Abdominal: Soft. Bowel sounds are normal. He exhibits no distension. There is no tenderness.   Obese    Musculoskeletal: Normal range of motion. He exhibits no edema.   Neurological: He is alert and oriented to person, place, and time. No cranial nerve deficit.   Skin: Skin is warm and dry. No rash noted. He is not diaphoretic. No erythema.   Psychiatric: He has a normal mood and affect. His behavior is normal.   Nursing note and vitals reviewed.    Vitals:    04/25/18 0812   BP: (!) 140/70   Pulse: (!) 54   Weight: 115.2 kg (254 lb)   Height: 5' 6" (1.676 m)     Lab Results   Component Value Date    CHOL 143 02/06/2018    CHOL 129 01/28/2017    CHOL 147 11/14/2016     Lab Results   Component Value Date    HDL 34 (L) 02/06/2018    HDL 33 (L) 01/28/2017    HDL 28 (L) 11/14/2016     Lab Results   Component Value Date    LDLCALC 86.8 02/06/2018    LDLCALC 81.2 01/28/2017    LDLCALC 92.6 11/14/2016     Lab Results   Component Value Date    TRIG 111 02/06/2018    TRIG 74 01/28/2017    TRIG 132 11/14/2016     Lab Results   Component Value Date    CHOLHDL 23.8 02/06/2018    CHOLHDL 25.6 01/28/2017    CHOLHDL 19.0 (L) 11/14/2016       Chemistry        Component Value Date/Time     02/06/2018 0829    K 4.5 02/06/2018 0829     02/06/2018 0829    CO2 26 02/06/2018 0829    BUN 22 02/06/2018 0829    CREATININE 1.4 02/06/2018 0829     (H) 02/06/2018 0829        Component Value Date/Time    CALCIUM 9.5 02/06/2018 0829    ALKPHOS 64 11/14/2016 1030    AST 24 02/06/2018 0829    ALT 20 02/06/2018 0829    BILITOT 0.8 11/14/2016 1030    ESTGFRAFRICA >60.0 02/06/2018 0829    EGFRNONAA 52.4 (A) 02/06/2018 0829          Lab Results   Component Value Date    TSH 1.367 08/16/2014     Lab Results   Component Value Date    INR 1.0 06/26/2010     Lab Results   Component Value Date    WBC 7.88 02/24/2016    HGB 12.9 (L) 02/24/2016    HCT 39.5 (L) 02/24/2016    MCV 92 02/24/2016     02/24/2016     BMP  Sodium   Date " Value Ref Range Status   02/06/2018 143 136 - 145 mmol/L Final     Potassium   Date Value Ref Range Status   02/06/2018 4.5 3.5 - 5.1 mmol/L Final     Chloride   Date Value Ref Range Status   02/06/2018 109 95 - 110 mmol/L Final     CO2   Date Value Ref Range Status   02/06/2018 26 23 - 29 mmol/L Final     BUN, Bld   Date Value Ref Range Status   02/06/2018 22 8 - 23 mg/dL Final     Creatinine   Date Value Ref Range Status   02/06/2018 1.4 0.5 - 1.4 mg/dL Final     Calcium   Date Value Ref Range Status   02/06/2018 9.5 8.7 - 10.5 mg/dL Final     Anion Gap   Date Value Ref Range Status   02/06/2018 8 8 - 16 mmol/L Final     eGFR if    Date Value Ref Range Status   02/06/2018 >60.0 >60 mL/min/1.73 m^2 Final     eGFR if non    Date Value Ref Range Status   02/06/2018 52.4 (A) >60 mL/min/1.73 m^2 Final     Comment:     Calculation used to obtain the estimated glomerular filtration  rate (eGFR) is the CKD-EPI equation.        CrCl cannot be calculated (Patient's most recent lab result is older than the maximum 7 days allowed.).    Assessment:     1. Coronary artery disease involving coronary bypass graft of native heart without angina pectoris    2. Hyperlipidemia, unspecified hyperlipidemia type    3. Essential hypertension    4. S/P CABG (coronary artery bypass graft)    5. Hypertrophic cardiomyopathy    6. Abnormal EKG    7. 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 Obesity, Class III, BMI 40-49.9 (morbid obesity)    9. Chronic kidney disease, stage III (moderate)      Asymptomatic clinically has no angina the issues is diet non compliance and lack of exercise. He was counseled.   Plan:   Continue current therapy  Cardiac low salt diet.  Risk factor modification and excercise program.  6 months with lipid cmp   F/u yearly earlier if any problem

## 2018-04-25 NOTE — PROGRESS NOTES
SUBJECTIVE:   Damon Yoo is a 68 y.o. male   Corrected distance visual acuity was 20/20 in the right eye and 20/20 in the left eye. Comments: Through [employer].   Chief Complaint   Patient presents with    Glaucoma        HPI:  HPI     Pt here for 4m HVF SDP chk. No pain or discomfort. VA stable. 100%   compliant with gtts.     1. Mod COAG- No FHx (init 32/27 on 3/10 with C/D .65/.60) Goal <18, new   goal = 15 6/2017  SLT OD 12/11/14 (20-16)  SLT OS 2/25/15 (20-16)  2. Mild NSC  3. LLL Cyst Excision on 6/13/12    Latanoprost QHS OU  Timolol qam OU    Last edited by Martinez Chatman, Patient Care Assistant on 4/25/2018 12:59   PM. (History)        Assessment /Plan :  1. Primary open angle glaucoma of both eyes, moderate stage Doing well, IOP within acceptable range relative to target IOP and no evidence of progression. Continue current treatment. Reviewed importance of continued compliance with treatment and follow up.       2. Nuclear senile cataract of both eyes Nuclear sclerotic cataract - not visually significant. Observe.             Return to clinic in 3-4 months  or as needed.  With IOP Check and GOCT

## 2018-04-29 DIAGNOSIS — E78.5 HYPERLIPIDEMIA, UNSPECIFIED HYPERLIPIDEMIA TYPE: ICD-10-CM

## 2018-04-29 RX ORDER — FENOFIBRIC ACID 135 MG/1
CAPSULE, DELAYED RELEASE ORAL
Qty: 90 CAPSULE | Refills: 2 | Status: SHIPPED | OUTPATIENT
Start: 2018-04-29 | End: 2019-04-26

## 2018-05-24 ENCOUNTER — LAB VISIT (OUTPATIENT)
Dept: LAB | Facility: HOSPITAL | Age: 68
End: 2018-05-24
Attending: UROLOGY
Payer: MEDICARE

## 2018-05-24 DIAGNOSIS — C61 PROSTATE CANCER: ICD-10-CM

## 2018-05-24 LAB — COMPLEXED PSA SERPL-MCNC: <0.01 NG/ML

## 2018-05-24 PROCEDURE — 36415 COLL VENOUS BLD VENIPUNCTURE: CPT | Mod: PO

## 2018-05-24 PROCEDURE — 84153 ASSAY OF PSA TOTAL: CPT

## 2018-06-06 ENCOUNTER — OFFICE VISIT (OUTPATIENT)
Dept: UROLOGY | Facility: CLINIC | Age: 68
End: 2018-06-06
Payer: MEDICARE

## 2018-06-06 VITALS
WEIGHT: 249.13 LBS | BODY MASS INDEX: 40.04 KG/M2 | HEART RATE: 58 BPM | DIASTOLIC BLOOD PRESSURE: 82 MMHG | HEIGHT: 66 IN | SYSTOLIC BLOOD PRESSURE: 160 MMHG

## 2018-06-06 DIAGNOSIS — C61 PROSTATE CANCER: Primary | ICD-10-CM

## 2018-06-06 DIAGNOSIS — N52.9 ERECTILE DYSFUNCTION, UNSPECIFIED ERECTILE DYSFUNCTION TYPE: ICD-10-CM

## 2018-06-06 LAB
BILIRUB SERPL-MCNC: NORMAL MG/DL
BLOOD URINE, POC: NORMAL
COLOR, POC UA: YELLOW
GLUCOSE UR QL STRIP: NORMAL
KETONES UR QL STRIP: NORMAL
LEUKOCYTE ESTERASE URINE, POC: NORMAL
NITRITE, POC UA: NORMAL
PH, POC UA: 5
PROTEIN, POC: NORMAL
SPECIFIC GRAVITY, POC UA: 1.01
UROBILINOGEN, POC UA: NORMAL

## 2018-06-06 PROCEDURE — 99999 PR PBB SHADOW E&M-EST. PATIENT-LVL II: CPT | Mod: PBBFAC,,, | Performed by: UROLOGY

## 2018-06-06 PROCEDURE — 99214 OFFICE O/P EST MOD 30 MIN: CPT | Mod: S$PBB,,, | Performed by: UROLOGY

## 2018-06-06 PROCEDURE — 99212 OFFICE O/P EST SF 10 MIN: CPT | Mod: PBBFAC | Performed by: UROLOGY

## 2018-06-06 PROCEDURE — 81002 URINALYSIS NONAUTO W/O SCOPE: CPT | Mod: PBBFAC | Performed by: UROLOGY

## 2018-06-06 NOTE — PROGRESS NOTES
Chief Complaint: Prostate Cancer    HPI:   6/6/18: No problems, low PSA, dry.  Stable.  Reviewed history in detail.  ED persists.  5/31/17: Still doing well, PSA undetectable.  Continent.  Not worried about ED.  Reviewed history in detail.  5/30/16: No problems.   11/2/15: Doing well.  4/20/15: No changes since last visit.  10/13/14: Again doing well, cialis not working  3/17/14: Doing well.    12/16/13: Doing well. Cialis effective.  No incontinence.  No constipation.  9/9/13: Pt had RALP 4/4/13.  [id number] prostate cancer.  No incontinence unless cough or sneeze after letting himself fill up.  Cialis controlling ED well. No complaints.  PSA undetectable.    Allergies:  No known drug allergies    Medications:  has a current medication list which includes the following prescription(s): allopurinol, amlodipine, aspirin, colchicine, fenofibric acid, fish oil-fat acid comb.8-hb137, latanoprost, lisinopril, metoprolol succinate, rosuvastatin, and timolol maleate 0.5%.    Review of Systems:  General: No fever, chills, fatigability, or weight loss.  Skin: No rashes, itching, or changes in color or texture of skin.  Chest: Denies DIAZ, cyanosis, wheezing, cough, and sputum production.  Abdomen: Appetite fine. No weight loss. Denies diarrhea, abdominal pain, hematemesis, or blood in stool.  Musculoskeletal: No joint stiffness or swelling. Denies back pain.  : As above.  All other review of systems negative.    PMH:   has a past medical history of Abnormal EKG (10/25/2013); Arthritis; CAD (coronary artery disease); Cancer; Glaucoma; Gout attack; Hyperlipidemia; Hypertension; Hypertrophic cardiomyopathy (10/25/2013); and S/P CABG (coronary artery bypass graft) (10/25/2013).    PSH:   has a past surgical history that includes Prostate surgery; Coronary artery bypass graft (05/2003); and Colonoscopy (N/A, 12/4/2017).    FamHx: family history includes Hypertension in his father.    SocHx:  reports that he quit smoking about 30  years ago. He has a 3.75 pack-year smoking history. He has never used smokeless tobacco. He reports that he does not drink alcohol. His drug history is not on file.      Physical Exam:  Vitals:    06/06/18 0745   BP: (!) 160/82   Pulse: (!) 58     General: A&Ox3, no apparent distress, no deformities  Neck: No masses, normal thyroid  Lungs: normal inspiration, no use of accessory muscles  Heart: normal pulse, no arrhythmias  Abdomen: Soft, NT, ND  Skin: The skin is warm and dry. No jaundice.  Ext: No c/c/e.  :   5/16: MONA normal    Labs/Studies:   PSA    10/15, 5/16, 5/17, 5/18: undetectable    Impression/Plan:   1. PSA/RTC 12 mo

## 2018-07-21 DIAGNOSIS — H40.1132 PRIMARY OPEN ANGLE GLAUCOMA OF BOTH EYES, MODERATE STAGE: ICD-10-CM

## 2018-07-21 RX ORDER — TIMOLOL MALEATE 5 MG/ML
SOLUTION/ DROPS OPHTHALMIC
Qty: 10 ML | Refills: 12 | Status: SHIPPED | OUTPATIENT
Start: 2018-07-21 | End: 2019-09-19 | Stop reason: SDUPTHER

## 2018-07-25 ENCOUNTER — LAB VISIT (OUTPATIENT)
Dept: LAB | Facility: HOSPITAL | Age: 68
End: 2018-07-25
Attending: INTERNAL MEDICINE
Payer: MEDICARE

## 2018-07-25 DIAGNOSIS — I25.810 CORONARY ARTERY DISEASE INVOLVING CORONARY BYPASS GRAFT OF NATIVE HEART WITHOUT ANGINA PECTORIS: ICD-10-CM

## 2018-07-25 LAB
ALBUMIN SERPL BCP-MCNC: 3.5 G/DL
ALP SERPL-CCNC: 63 U/L
ALT SERPL W/O P-5'-P-CCNC: 24 U/L
ANION GAP SERPL CALC-SCNC: 7 MMOL/L
AST SERPL-CCNC: 28 U/L
BILIRUB SERPL-MCNC: 0.5 MG/DL
BUN SERPL-MCNC: 22 MG/DL
CALCIUM SERPL-MCNC: 9.2 MG/DL
CHLORIDE SERPL-SCNC: 112 MMOL/L
CHOLEST SERPL-MCNC: 126 MG/DL
CHOLEST/HDLC SERPL: 4.1 {RATIO}
CO2 SERPL-SCNC: 25 MMOL/L
CREAT SERPL-MCNC: 1.5 MG/DL
EST. GFR  (AFRICAN AMERICAN): 55.3 ML/MIN/1.73 M^2
EST. GFR  (NON AFRICAN AMERICAN): 47.8 ML/MIN/1.73 M^2
GLUCOSE SERPL-MCNC: 114 MG/DL
HDLC SERPL-MCNC: 31 MG/DL
HDLC SERPL: 24.6 %
LDLC SERPL CALC-MCNC: 73.8 MG/DL
NONHDLC SERPL-MCNC: 95 MG/DL
POTASSIUM SERPL-SCNC: 4.5 MMOL/L
PROT SERPL-MCNC: 6.9 G/DL
SODIUM SERPL-SCNC: 144 MMOL/L
TRIGL SERPL-MCNC: 106 MG/DL

## 2018-07-25 PROCEDURE — 80053 COMPREHEN METABOLIC PANEL: CPT | Mod: PO

## 2018-07-25 PROCEDURE — 36415 COLL VENOUS BLD VENIPUNCTURE: CPT | Mod: PO

## 2018-07-25 PROCEDURE — 80061 LIPID PANEL: CPT

## 2018-07-26 ENCOUNTER — TELEPHONE (OUTPATIENT)
Dept: CARDIOLOGY | Facility: CLINIC | Age: 68
End: 2018-07-26

## 2018-07-26 DIAGNOSIS — E78.2 MIXED HYPERLIPIDEMIA: Primary | ICD-10-CM

## 2018-07-26 NOTE — TELEPHONE ENCOUNTER
Notes recorded by Mihaela Faye MD on 7/25/2018 at 1:03 PM CDT  Lipids ok  Returned phone call to wife and results received with 1 year follow up and repeat labs before visit  Continue current medications

## 2018-07-26 NOTE — TELEPHONE ENCOUNTER
----- Message from Tammie Lamb sent at 7/26/2018  8:12 AM CDT -----  Contact: Nery Gabriel is requesting the patient test results. Please adv/call [phone].//cw

## 2018-07-27 DIAGNOSIS — I25.810 CORONARY ARTERY DISEASE INVOLVING CORONARY BYPASS GRAFT OF NATIVE HEART WITHOUT ANGINA PECTORIS: ICD-10-CM

## 2018-07-27 DIAGNOSIS — E78.5 HYPERLIPIDEMIA, UNSPECIFIED HYPERLIPIDEMIA TYPE: ICD-10-CM

## 2018-07-27 RX ORDER — ROSUVASTATIN CALCIUM 40 MG/1
40 TABLET, COATED ORAL NIGHTLY
Qty: 90 TABLET | Refills: 3 | Status: SHIPPED | OUTPATIENT
Start: 2018-07-27 | End: 2019-09-19 | Stop reason: SDUPTHER

## 2018-07-30 ENCOUNTER — OFFICE VISIT (OUTPATIENT)
Dept: OPHTHALMOLOGY | Facility: CLINIC | Age: 68
End: 2018-07-30
Payer: MEDICARE

## 2018-07-30 DIAGNOSIS — H25.13 NUCLEAR SENILE CATARACT OF BOTH EYES: ICD-10-CM

## 2018-07-30 DIAGNOSIS — H40.1132 PRIMARY OPEN ANGLE GLAUCOMA OF BOTH EYES, MODERATE STAGE: Primary | ICD-10-CM

## 2018-07-30 PROCEDURE — 92133 CPTRZD OPH DX IMG PST SGM ON: CPT | Mod: PBBFAC,PO | Performed by: OPHTHALMOLOGY

## 2018-07-30 PROCEDURE — 99999 PR PBB SHADOW E&M-EST. PATIENT-LVL II: CPT | Mod: PBBFAC,,, | Performed by: OPHTHALMOLOGY

## 2018-07-30 PROCEDURE — 92012 INTRM OPH EXAM EST PATIENT: CPT | Mod: S$PBB,,, | Performed by: OPHTHALMOLOGY

## 2018-07-30 PROCEDURE — 99212 OFFICE O/P EST SF 10 MIN: CPT | Mod: PBBFAC,PO | Performed by: OPHTHALMOLOGY

## 2018-07-30 NOTE — PROGRESS NOTES
SUBJECTIVE:   Damon Yoo is a 68 y.o. male   Corrected distance visual acuity was 20/25 +1 in the right eye and 20/25 +2 in the left eye. Comments: Through [employer].   Chief Complaint   Patient presents with    Glaucoma     3m IOP GOCT chk        HPI:  HPI     Glaucoma    Additional comments: 3m IOP GOCT chk           Comments   Pt here for 3m IOP GOCT chk. No pain or discomfort. VA Stable. 100%   compliant with gtts.     1. Mod COAG- No FHx (init 32/27 on 3/10 with C/D .65/.60) Goal <18, new   goal = 15 6/2017  SLT OD 12/11/14 (20-16)  SLT OS 2/25/15 (20-16)  2. Mild NSC  3. LLL Cyst Excision on 6/13/12    Latanoprost QHS OU  Timolol qam OU       Last edited by Martinez Chatman, Patient Care Assistant on 7/30/2018  7:32   AM. (History)        Assessment /Plan :  1. Primary open angle glaucoma of both eyes, moderate stage   Doing well, IOP within acceptable range relative to target IOP and no evidence of progression. Continue current treatment. Reviewed importance of continued compliance with treatment and follow up.  Return to clinic in 3 months  or as needed for IOP check           2. Nuclear senile cataract of both eyes

## 2018-08-30 DIAGNOSIS — I10 ESSENTIAL HYPERTENSION: ICD-10-CM

## 2018-08-30 RX ORDER — LISINOPRIL 40 MG/1
40 TABLET ORAL DAILY
Qty: 90 TABLET | Refills: 1 | Status: SHIPPED | OUTPATIENT
Start: 2018-08-30 | End: 2019-02-23 | Stop reason: SDUPTHER

## 2018-10-04 RX ORDER — ALLOPURINOL 300 MG/1
TABLET ORAL
Qty: 135 TABLET | Refills: 1 | Status: SHIPPED | OUTPATIENT
Start: 2018-10-04 | End: 2019-04-13 | Stop reason: SDUPTHER

## 2018-10-05 DIAGNOSIS — H40.1132 PRIMARY OPEN ANGLE GLAUCOMA OF BOTH EYES, MODERATE STAGE: ICD-10-CM

## 2018-10-05 RX ORDER — LATANOPROST 50 UG/ML
SOLUTION/ DROPS OPHTHALMIC
Qty: 2.5 ML | Refills: 12 | Status: SHIPPED | OUTPATIENT
Start: 2018-10-05 | End: 2018-10-06 | Stop reason: SDUPTHER

## 2018-10-06 DIAGNOSIS — H40.1132 PRIMARY OPEN ANGLE GLAUCOMA OF BOTH EYES, MODERATE STAGE: ICD-10-CM

## 2018-10-08 RX ORDER — LATANOPROST 50 UG/ML
SOLUTION/ DROPS OPHTHALMIC
Qty: 2.5 ML | Refills: 12 | Status: SHIPPED | OUTPATIENT
Start: 2018-10-08 | End: 2019-08-16 | Stop reason: SDUPTHER

## 2018-10-17 RX ORDER — METOPROLOL SUCCINATE 100 MG/1
100 TABLET, EXTENDED RELEASE ORAL DAILY
Qty: 90 TABLET | Refills: 3 | Status: SHIPPED | OUTPATIENT
Start: 2018-10-17 | End: 2019-01-15

## 2018-11-05 ENCOUNTER — OFFICE VISIT (OUTPATIENT)
Dept: OPHTHALMOLOGY | Facility: CLINIC | Age: 68
End: 2018-11-05
Payer: MEDICARE

## 2018-11-05 DIAGNOSIS — H40.1132 PRIMARY OPEN ANGLE GLAUCOMA OF BOTH EYES, MODERATE STAGE: Primary | ICD-10-CM

## 2018-11-05 DIAGNOSIS — H25.13 NUCLEAR SENILE CATARACT OF BOTH EYES: ICD-10-CM

## 2018-11-05 PROCEDURE — 92012 INTRM OPH EXAM EST PATIENT: CPT | Mod: S$PBB,,, | Performed by: OPHTHALMOLOGY

## 2018-11-05 PROCEDURE — 99999 PR PBB SHADOW E&M-EST. PATIENT-LVL II: CPT | Mod: PBBFAC,,, | Performed by: OPHTHALMOLOGY

## 2018-11-05 PROCEDURE — 99212 OFFICE O/P EST SF 10 MIN: CPT | Mod: PBBFAC,PO | Performed by: OPHTHALMOLOGY

## 2018-11-05 NOTE — PROGRESS NOTES
SUBJECTIVE:   Damon Yoo is a 68 y.o. male   Uncorrected distance visual acuity was 20/40 -1 in the right eye and 20/40 -2 in the left eye.   Chief Complaint   Patient presents with    Glaucoma     here for 3 month iop check states no complaints doing well         HPI:  HPI     Glaucoma      Additional comments: here for 3 month iop check states no complaints   doing well               Comments     1. Mod COAG- No FHx (init 32/27 on 3/10 with C/D .65/.60) Goal <18, new   goal = 15 6/2017  SLT OD 12/11/14 (20-16)  SLT OS 2/25/15 (20-16)  2. Mild NSC  3. LLL Cyst Excision on 6/13/12    Latanoprost QHS OU  Timolol qam OU          Last edited by Arielle Heard MA on 11/5/2018  7:36 AM. (History)        Assessment /Plan :  1. Primary open angle glaucoma of both eyes, moderate stage Doing well, IOP within acceptable range relative to target IOP and no evidence of progression. Continue current treatment. Reviewed importance of continued compliance with treatment and follow up.     2. Nuclear senile cataract of both eyes Nuclear sclerotic cataract - not visually significant. Observe.           Return to clinic in 3 months  or as needed.  With Dilation, HVF 24-2 and SDP

## 2018-11-16 ENCOUNTER — LAB VISIT (OUTPATIENT)
Dept: LAB | Facility: HOSPITAL | Age: 68
End: 2018-11-16
Attending: PHYSICIAN ASSISTANT
Payer: MEDICARE

## 2018-11-16 ENCOUNTER — OFFICE VISIT (OUTPATIENT)
Dept: RHEUMATOLOGY | Facility: CLINIC | Age: 68
End: 2018-11-16
Payer: MEDICARE

## 2018-11-16 VITALS
BODY MASS INDEX: 40.57 KG/M2 | WEIGHT: 252.44 LBS | HEART RATE: 82 BPM | HEIGHT: 66 IN | SYSTOLIC BLOOD PRESSURE: 146 MMHG | DIASTOLIC BLOOD PRESSURE: 81 MMHG

## 2018-11-16 DIAGNOSIS — M1A.0710 IDIOPATHIC CHRONIC GOUT OF RIGHT FOOT WITHOUT TOPHUS: ICD-10-CM

## 2018-11-16 DIAGNOSIS — Z51.81 MEDICATION MONITORING ENCOUNTER: ICD-10-CM

## 2018-11-16 DIAGNOSIS — M17.12 PRIMARY OSTEOARTHRITIS OF LEFT KNEE: ICD-10-CM

## 2018-11-16 DIAGNOSIS — N18.30 CHRONIC KIDNEY DISEASE, STAGE III (MODERATE): ICD-10-CM

## 2018-11-16 DIAGNOSIS — M1A.39X0 CHRONIC GOUT DUE TO RENAL IMPAIRMENT OF MULTIPLE SITES WITHOUT TOPHUS: Primary | ICD-10-CM

## 2018-11-16 LAB
ANION GAP SERPL CALC-SCNC: 10 MMOL/L
BUN SERPL-MCNC: 22 MG/DL
CALCIUM SERPL-MCNC: 9.7 MG/DL
CHLORIDE SERPL-SCNC: 111 MMOL/L
CO2 SERPL-SCNC: 25 MMOL/L
CREAT SERPL-MCNC: 1.6 MG/DL
EST. GFR  (AFRICAN AMERICAN): 51.1 ML/MIN/1.73 M^2
EST. GFR  (NON AFRICAN AMERICAN): 44.2 ML/MIN/1.73 M^2
GLUCOSE SERPL-MCNC: 114 MG/DL
POTASSIUM SERPL-SCNC: 4.5 MMOL/L
SODIUM SERPL-SCNC: 146 MMOL/L
URATE SERPL-MCNC: 7.1 MG/DL

## 2018-11-16 PROCEDURE — 84550 ASSAY OF BLOOD/URIC ACID: CPT | Mod: PO

## 2018-11-16 PROCEDURE — 99999 PR PBB SHADOW E&M-EST. PATIENT-LVL IV: CPT | Mod: PBBFAC,,, | Performed by: PHYSICIAN ASSISTANT

## 2018-11-16 PROCEDURE — 99214 OFFICE O/P EST MOD 30 MIN: CPT | Mod: PBBFAC,PO | Performed by: PHYSICIAN ASSISTANT

## 2018-11-16 PROCEDURE — 80048 BASIC METABOLIC PNL TOTAL CA: CPT | Mod: PO

## 2018-11-16 PROCEDURE — 36415 COLL VENOUS BLD VENIPUNCTURE: CPT | Mod: PO

## 2018-11-16 PROCEDURE — 99213 OFFICE O/P EST LOW 20 MIN: CPT | Mod: S$PBB,,, | Performed by: PHYSICIAN ASSISTANT

## 2018-11-16 NOTE — PATIENT INSTRUCTIONS
otc oral pain relief  Tylenol arthritis strength 650 mg tablets, Take 2 tablets max dose is twice a day    Can try turmeric (curcumin longa root) 500 mg tablets,   Start 500 mg tab twice a day and can work up to max does of 1000 mg twice a day if needed    Try over the counter topical   Aspercreme with lidocaine  acu plus topical   [email or web address]

## 2018-11-16 NOTE — PROGRESS NOTES
Subjective:       Patient ID: Damon Yoo is a 68 y.o. male.    Chief Complaint: Gout and Osteoarthritis (left knee)      Damon is back today for rheum follow up.     He has chronic gout on allopurinol 450 mg daily and colcrys prn. He denies any gout attacks since last visit. No redness, swelling or warmth to any joints. Not taking colcrys.     Chronic OA in both knees left knee > Right. Pain from time to time. Off nsaids due to ckd. Using tylenol also prn.  x-rays showed tricompartmental djd both knees left worse than right. We did euflexxa to both knees march 2015. This helped some but not enough to repeat.  He does not want surgery. Today left knee pain reported 6/10.  He has mild CKD so avoiding daily nsaids.               He reports no joint swelling. Pertinent negatives include no dysuria, fatigue, fever, trouble swallowing or myalgias.     His past medical history is significant for osteoarthritis.   Osteoarthritis   Associated symptoms include arthralgias. Pertinent negatives include no abdominal pain, chest pain, chills, congestion, coughing, fatigue, fever, joint swelling, myalgias, nausea, neck pain, numbness, rash, vomiting or weakness.   Knee Pain   Associated symptoms include arthralgias. Pertinent negatives include no abdominal pain, chest pain, chills, congestion, coughing, fatigue, fever, joint swelling, myalgias, nausea, neck pain, numbness, rash, vomiting or weakness.       Review of Systems   Constitutional: Negative.  Negative for chills, fatigue and fever.   HENT: Negative.  Negative for congestion, mouth sores and trouble swallowing.    Eyes: Negative.  Negative for photophobia, redness and visual disturbance.   Respiratory: Negative.  Negative for cough, shortness of breath and wheezing.    Cardiovascular: Negative.  Negative for chest pain and leg swelling.   Gastrointestinal: Negative.  Negative for abdominal distention, abdominal pain, diarrhea, nausea and vomiting.   Genitourinary:  "Negative.  Negative for dysuria, flank pain, frequency and urgency.   Musculoskeletal: Positive for arthralgias. Negative for back pain, gait problem, joint swelling, myalgias and neck pain.        Both knees     Skin: Negative.  Negative for rash.   Neurological: Negative.  Negative for dizziness, weakness and numbness.         Objective:   BP (!) 146/81   Pulse 82   Ht 5' 6" (1.676 m)   Wt 114.5 kg (252 lb 6.8 oz)   BMI 40.74 kg/m²      Physical Exam   Constitutional: He is oriented to person, place, and time and well-developed, well-nourished, and in no distress. No distress.   HENT:   Head: Normocephalic and atraumatic.   Right Ear: External ear normal.   Left Ear: External ear normal.   Mouth/Throat: No oropharyngeal exudate.   Eyes: Conjunctivae and EOM are normal. Pupils are equal, round, and reactive to light. No scleral icterus.   Neck: Neck supple. No thyromegaly present.   Cardiovascular: Normal rate, regular rhythm and normal heart sounds.    No murmur heard.  Pulmonary/Chest: Effort normal and breath sounds normal. No respiratory distress.   Abdominal: Soft. Bowel sounds are normal.       Right Side Rheumatological Exam     Examination finds the shoulder, elbow, wrist, knee, 1st PIP, 1st MCP, 2nd PIP, 2nd MCP, 3rd PIP, 3rd MCP, 4th PIP, 4th MCP, 5th PIP and 5th MCP normal.    Knee Exam     Range of Motion   The patient has normal right knee ROM.  Extension: normal   Flexion: normal   Patellofemoral Crepitus: positive  Effusion: positive  Warmth: negative    Left Side Rheumatological Exam     Examination finds the shoulder, elbow, wrist, knee, 1st PIP, 1st MCP, 2nd PIP, 2nd MCP, 3rd PIP, 3rd MCP, 4th PIP, 4th MCP, 5th PIP and 5th MCP normal.    Knee Exam     Range of Motion   The patient has normal left knee ROM.  Extension: normal   Flexion: normal     Patellofemoral Crepitus: positive  Effusion: negative  Warmth: negative      Lymphadenopathy:     He has no cervical adenopathy.   Neurological: He " is alert and oriented to person, place, and time. No cranial nerve deficit. He exhibits normal muscle tone. Gait normal. Coordination normal.   Skin: Skin is warm and dry.     Musculoskeletal: Normal range of motion. He exhibits no edema.           Recent Results (from the past 1008 hour(s))   Basic metabolic panel    Collection Time: 11/16/18  9:23 AM   Result Value Ref Range    Sodium 146 (H) 136 - 145 mmol/L    Potassium 4.5 3.5 - 5.1 mmol/L    Chloride 111 (H) 95 - 110 mmol/L    CO2 25 23 - 29 mmol/L    Glucose 114 (H) 70 - 110 mg/dL    BUN, Bld 22 8 - 23 mg/dL    Creatinine 1.6 (H) 0.5 - 1.4 mg/dL    Calcium 9.7 8.7 - 10.5 mg/dL    Anion Gap 10 8 - 16 mmol/L    eGFR if African American 51.1 (A) >60 mL/min/1.73 m^2    eGFR if non  44.2 (A) >60 mL/min/1.73 m^2   Uric acid    Collection Time: 11/16/18  9:23 AM   Result Value Ref Range    Uric Acid 7.1 (H) 3.4 - 7.0 mg/dL            Assessment:       1. Chronic gout due to renal impairment of multiple sites without tophus    2. Chronic kidney disease, stage III (moderate)    3. Primary osteoarthritis of left knee          1. Gout stable on allopurinol 450 mg daily-  uric acid 5.0 at goal     2. OA both knees slightly better post euflexxa- pain remitted with sparing use of nsaids- will need left TKA eventually but he is not ready now     3. Med monitoring        Plan:         keep allopurinol at 450 mg daily,     Use colcrys only prn for acute attacks,     Patient counseled on weight loss  Recommended dietary changes--Mediterranean diet would be a good one to follow  Encouraged exercise    otc oral pain relief  Tylenol arthritis strength 650 mg tablets, Take 2 tablets max dose is twice a day  Can try turmeric (curcumin longa root) 500 mg tablets,   Start 500 mg tab twice a day and can work up to max does of 1000 mg twice a day if needed      Try over the counter topical   Aspercreme with lidocaine  acu plus topical ([email or web address]://[email or web address].[email or web address]/)    Pt  declining other visco supplement  Declining surgery   Will c/w symptomatic treatment       rtc 1 year with labs- cmp and uric acid    call with any questions, changes or concerns

## 2018-11-24 ENCOUNTER — OFFICE VISIT (OUTPATIENT)
Dept: URGENT CARE | Facility: CLINIC | Age: 68
End: 2018-11-24
Payer: MEDICARE

## 2018-11-24 VITALS
DIASTOLIC BLOOD PRESSURE: 80 MMHG | RESPIRATION RATE: 16 BRPM | HEIGHT: 66 IN | TEMPERATURE: 99 F | BODY MASS INDEX: 39.36 KG/M2 | OXYGEN SATURATION: 96 % | HEART RATE: 69 BPM | SYSTOLIC BLOOD PRESSURE: 146 MMHG | WEIGHT: 244.94 LBS

## 2018-11-24 DIAGNOSIS — K52.9 GASTROENTERITIS: Primary | ICD-10-CM

## 2018-11-24 PROCEDURE — 99213 OFFICE O/P EST LOW 20 MIN: CPT | Mod: S$PBB,,, | Performed by: INTERNAL MEDICINE

## 2018-11-24 PROCEDURE — 99214 OFFICE O/P EST MOD 30 MIN: CPT | Mod: PBBFAC,PO | Performed by: INTERNAL MEDICINE

## 2018-11-24 PROCEDURE — 99999 PR PBB SHADOW E&M-EST. PATIENT-LVL IV: CPT | Mod: PBBFAC,,, | Performed by: INTERNAL MEDICINE

## 2018-11-24 RX ORDER — PROMETHAZINE HYDROCHLORIDE 12.5 MG/1
12.5 TABLET ORAL 2 TIMES DAILY PRN
Qty: 10 TABLET | Refills: 0 | Status: SHIPPED | OUTPATIENT
Start: 2018-11-24 | End: 2019-01-23 | Stop reason: ALTCHOICE

## 2018-11-24 NOTE — PROGRESS NOTES
Subjective:       Patient ID: Damon Yoo is a 68 y.o. male.    Chief Complaint: Emesis and Diarrhea    Emesis    This is a new problem. The current episode started yesterday. The problem has been gradually improving. Associated symptoms include diarrhea. Pertinent negatives include no abdominal pain, arthralgias, chest pain, chills, coughing, dizziness, fever, headaches or myalgias.   Diarrhea    This is a new problem. The current episode started yesterday. The problem has been gradually improving. Associated symptoms include vomiting. Pertinent negatives include no abdominal pain, arthralgias, chills, coughing, fever, headaches or myalgias.     Past Medical History:   Diagnosis Date    Abnormal EKG 10/25/2013    Arthritis     CAD (coronary artery disease)     Cancer     Prostate [id number] prostate    Glaucoma     Gout attack     Hyperlipidemia     Hypertension     Hypertrophic cardiomyopathy 10/25/2013    S/P CABG (coronary artery bypass graft) 10/25/2013     Past Surgical History:   Procedure Laterality Date    COLONOSCOPY N/A 12/4/2017    Procedure: COLONOSCOPY;  Surgeon: Dina Ledesma MD;  Location: [facility];  Service: Endoscopy;  Laterality: N/A;    COLONOSCOPY N/A 12/4/2017    Performed by Dina Ledesma MD at [facility]    CORONARY ARTERY BYPASS GRAFT  05/2003    x1    PROSTATE SURGERY      RALP 2013     Family History   Problem Relation Age of Onset    Hypertension Father     Strabismus Neg Hx     Retinal detachment Neg Hx     Macular degeneration Neg Hx     Glaucoma Neg Hx     Blindness Neg Hx     Amblyopia Neg Hx      Social History     Socioeconomic History    Marital status:      Spouse name: Not on file    Number of children: Not on file    Years of education: Not on file    Highest education level: Not on file   Social Needs    Financial resource strain: Not on file    Food insecurity - worry: Not on file    Food insecurity - inability: Not on file    Transportation  needs - medical: Not on file    Transportation needs - non-medical: Not on file   Occupational History    Not on file   Tobacco Use    Smoking status: Former Smoker     Packs/day: 0.25     Years: 15.00     Pack years: 3.75     Last attempt to quit: 1988     Years since quittin.6    Smokeless tobacco: Never Used   Substance and Sexual Activity    Alcohol use: No    Drug use: Not on file    Sexual activity: Not on file   Other Topics Concern    Not on file   Social History Narrative    Not on file     Review of Systems   Constitutional: Negative for activity change, appetite change, chills, diaphoresis, fatigue, fever and unexpected weight change.   HENT: Negative for drooling, ear discharge, ear pain, facial swelling, hearing loss, mouth sores, nosebleeds, postnasal drip, rhinorrhea, sinus pressure, sneezing, sore throat, tinnitus, trouble swallowing and voice change.    Eyes: Negative for photophobia, redness and visual disturbance.   Respiratory: Negative for apnea, cough, choking, chest tightness, shortness of breath and wheezing.    Cardiovascular: Negative for chest pain, palpitations and leg swelling.   Gastrointestinal: Positive for diarrhea, nausea and vomiting. Negative for abdominal distention, abdominal pain, anal bleeding, blood in stool, constipation and rectal pain.   Endocrine: Negative for cold intolerance, heat intolerance, polydipsia, polyphagia and polyuria.   Genitourinary: Negative for difficulty urinating, dysuria, enuresis, flank pain, frequency, genital sores, hematuria and urgency.   Musculoskeletal: Negative for arthralgias, back pain, gait problem, joint swelling, myalgias, neck pain and neck stiffness.   Skin: Negative for color change, pallor, rash and wound.   Allergic/Immunologic: Negative for food allergies and immunocompromised state.   Neurological: Negative for dizziness, tremors, seizures, syncope, facial asymmetry, speech difficulty, weakness, light-headedness,  numbness and headaches.   Hematological: Negative for adenopathy. Does not bruise/bleed easily.   Psychiatric/Behavioral: Negative for agitation, behavioral problems, confusion, decreased concentration, dysphoric mood, hallucinations, self-injury, sleep disturbance and suicidal ideas. The patient is not nervous/anxious and is not hyperactive.        Objective:      Physical Exam   Constitutional: He is oriented to person, place, and time. He appears well-developed and well-nourished. No distress.   HENT:   Head: Normocephalic and atraumatic.   Mouth/Throat: No oropharyngeal exudate.   Eyes: Pupils are equal, round, and reactive to light. No scleral icterus.   Neck: Normal range of motion. Neck supple. No JVD present. Carotid bruit is not present. No tracheal deviation present. No thyromegaly present.   Cardiovascular: Normal rate, regular rhythm, normal heart sounds and intact distal pulses.   Pulmonary/Chest: Effort normal and breath sounds normal. No respiratory distress. He has no wheezes. He has no rales. He exhibits no tenderness.   Abdominal: Soft. Bowel sounds are normal. He exhibits no distension. There is no tenderness. There is no rebound and no guarding.   Musculoskeletal: Normal range of motion. He exhibits no edema or tenderness.   Lymphadenopathy:     He has no cervical adenopathy.   Neurological: He is alert and oriented to person, place, and time.   Skin: Skin is warm and dry. No rash noted. He is not diaphoretic. No erythema. No pallor.   Psychiatric: He has a normal mood and affect. His behavior is normal. Judgment and thought content normal.   Nursing note and vitals reviewed.      CMP  Sodium   Date Value Ref Range Status   11/16/2018 146 (H) 136 - 145 mmol/L Final     Potassium   Date Value Ref Range Status   11/16/2018 4.5 3.5 - 5.1 mmol/L Final     Chloride   Date Value Ref Range Status   11/16/2018 111 (H) 95 - 110 mmol/L Final     CO2   Date Value Ref Range Status   11/16/2018 25 23 - 29  mmol/L Final     Glucose   Date Value Ref Range Status   11/16/2018 114 (H) 70 - 110 mg/dL Final     BUN, Bld   Date Value Ref Range Status   11/16/2018 22 8 - 23 mg/dL Final     Creatinine   Date Value Ref Range Status   11/16/2018 1.6 (H) 0.5 - 1.4 mg/dL Final     Calcium   Date Value Ref Range Status   11/16/2018 9.7 8.7 - 10.5 mg/dL Final     Total Protein   Date Value Ref Range Status   07/25/2018 6.9 6.0 - 8.4 g/dL Final     Albumin   Date Value Ref Range Status   07/25/2018 3.5 3.5 - 5.2 g/dL Final     Total Bilirubin   Date Value Ref Range Status   07/25/2018 0.5 0.1 - 1.0 mg/dL Final     Comment:     For infants and newborns, interpretation of results should be based  on gestational age, weight and in agreement with clinical  observations.  Premature Infant recommended reference ranges:  Up to 24 hours.............<8.0 mg/dL  Up to 48 hours............<12.0 mg/dL  3-5 days..................<15.0 mg/dL  6-29 days.................<15.0 mg/dL       Alkaline Phosphatase   Date Value Ref Range Status   07/25/2018 63 55 - 135 U/L Final     AST   Date Value Ref Range Status   07/25/2018 28 10 - 40 U/L Final     ALT   Date Value Ref Range Status   07/25/2018 24 10 - 44 U/L Final     Anion Gap   Date Value Ref Range Status   11/16/2018 10 8 - 16 mmol/L Final     eGFR if    Date Value Ref Range Status   11/16/2018 51.1 (A) >60 mL/min/1.73 m^2 Final     eGFR if non    Date Value Ref Range Status   11/16/2018 44.2 (A) >60 mL/min/1.73 m^2 Final     Comment:     Calculation used to obtain the estimated glomerular filtration  rate (eGFR) is the CKD-EPI equation.        Lab Results   Component Value Date    WBC 7.88 02/24/2016    HGB 12.9 (L) 02/24/2016    HCT 39.5 (L) 02/24/2016    MCV 92 02/24/2016     02/24/2016     Lab Results   Component Value Date    CHOL 126 07/25/2018     Lab Results   Component Value Date    HDL 31 (L) 07/25/2018     Lab Results   Component Value Date     LDLCALC 73.8 07/25/2018     Lab Results   Component Value Date    TRIG 106 07/25/2018     Lab Results   Component Value Date    CHOLHDL 24.6 07/25/2018     Lab Results   Component Value Date    TSH 1.367 08/16/2014     No results found for: LABA1C, HGBA1C  Assessment:       1. Gastroenteritis        Plan:   Gastroenteritis  -     promethazine (PHENERGAN) 12.5 MG Tab; Take 1 tablet (12.5 mg total) by mouth 2 (two) times daily as needed.  Dispense: 10 tablet; Refill: 0        - imodium prn,            Fluids---------advance diet    call if persists.

## 2018-12-16 ENCOUNTER — OFFICE VISIT (OUTPATIENT)
Dept: URGENT CARE | Facility: CLINIC | Age: 68
End: 2018-12-16
Payer: MEDICARE

## 2018-12-16 VITALS
TEMPERATURE: 101 F | DIASTOLIC BLOOD PRESSURE: 72 MMHG | WEIGHT: 248.56 LBS | OXYGEN SATURATION: 96 % | SYSTOLIC BLOOD PRESSURE: 130 MMHG | BODY MASS INDEX: 39.94 KG/M2 | HEART RATE: 79 BPM | HEIGHT: 66 IN | RESPIRATION RATE: 16 BRPM

## 2018-12-16 DIAGNOSIS — J06.9 VIRAL URI: Primary | ICD-10-CM

## 2018-12-16 DIAGNOSIS — R52 GENERALIZED BODY ACHES: ICD-10-CM

## 2018-12-16 DIAGNOSIS — R06.2 WHEEZING: ICD-10-CM

## 2018-12-16 DIAGNOSIS — R50.9 FEVER AND CHILLS: ICD-10-CM

## 2018-12-16 PROBLEM — Z12.11 COLON CANCER SCREENING: Status: RESOLVED | Noted: 2017-12-04 | Resolved: 2018-12-16

## 2018-12-16 LAB
CTP QC/QA: YES
POC MOLECULAR INFLUENZA A AGN: NEGATIVE
POC MOLECULAR INFLUENZA B AGN: NEGATIVE

## 2018-12-16 PROCEDURE — 99999 PR PBB SHADOW E&M-EST. PATIENT-LVL IV: CPT | Mod: PBBFAC,,, | Performed by: REGISTERED NURSE

## 2018-12-16 PROCEDURE — 87502 INFLUENZA DNA AMP PROBE: CPT | Mod: PBBFAC,PO | Performed by: REGISTERED NURSE

## 2018-12-16 PROCEDURE — 96372 THER/PROPH/DIAG INJ SC/IM: CPT | Mod: PBBFAC,PO

## 2018-12-16 PROCEDURE — 99214 OFFICE O/P EST MOD 30 MIN: CPT | Mod: PBBFAC,PO | Performed by: REGISTERED NURSE

## 2018-12-16 PROCEDURE — 99213 OFFICE O/P EST LOW 20 MIN: CPT | Mod: 25,S$PBB,, | Performed by: REGISTERED NURSE

## 2018-12-16 RX ORDER — BETAMETHASONE SODIUM PHOSPHATE AND BETAMETHASONE ACETATE 3; 3 MG/ML; MG/ML
12 INJECTION, SUSPENSION INTRA-ARTICULAR; INTRALESIONAL; INTRAMUSCULAR; SOFT TISSUE
Status: COMPLETED | OUTPATIENT
Start: 2018-12-16 | End: 2018-12-16

## 2018-12-16 RX ORDER — FLUNISOLIDE 0.25 MG/ML
2 SOLUTION NASAL 2 TIMES DAILY
Qty: 25 ML | Refills: 0 | Status: SHIPPED | OUTPATIENT
Start: 2018-12-16 | End: 2022-03-15

## 2018-12-16 RX ORDER — PROMETHAZINE HYDROCHLORIDE AND DEXTROMETHORPHAN HYDROBROMIDE 6.25; 15 MG/5ML; MG/5ML
5 SYRUP ORAL
Qty: 100 ML | Refills: 0 | Status: SHIPPED | OUTPATIENT
Start: 2018-12-16 | End: 2019-02-13

## 2018-12-16 RX ADMIN — BETAMETHASONE ACETATE AND BETAMETHASONE SODIUM PHOSPHATE 12 MG: 3; 3 INJECTION, SUSPENSION INTRA-ARTICULAR; INTRALESIONAL; INTRAMUSCULAR; SOFT TISSUE at 10:12

## 2018-12-16 NOTE — PROGRESS NOTES
Subjective:       Patient ID: Damon Yoo is a 68 y.o. male.    Chief Complaint: Fever; Wheezing; Generalized Body Aches; Cough; and Nasal Congestion      HPI    Damon Yoo is here today with c/o acute onset of symptoms since yesterday.  Reports sweats, no chills, itchy eyes, sneezing, RN (clear drainage) and NC.  Reports wheezing at times, not sleeping well.  Taking Tylenol.  Did not receive flu shot.      Review of Systems   Constitutional: Positive for diaphoresis. Negative for chills.   HENT: Positive for congestion, postnasal drip, rhinorrhea and sneezing. Negative for ear pain, sinus pain and sore throat.    Eyes: Positive for discharge and itching. Negative for photophobia, pain, redness and visual disturbance.   Respiratory: Positive for cough and wheezing. Negative for chest tightness and shortness of breath.    Gastrointestinal: Negative for abdominal pain, nausea and vomiting.   Musculoskeletal: Positive for myalgias.   Allergic/Immunologic: Positive for environmental allergies.   Neurological: Negative.    Hematological: Negative for adenopathy.       Review of patient's allergies indicates:   Allergen Reactions    No known drug allergies        Patient Active Problem List   Diagnosis    Hyperlipidemia    Hypertension    CAD (coronary artery disease)    Senile nuclear sclerosis - Both Eyes    S/P CABG (coronary artery bypass graft)    Hypertrophic cardiomyopathy    Abnormal EKG    Primary open-angle glaucoma(365.11)    Prostate cancer    Chronic gout due to renal impairment of multiple sites without tophus    Hip pain    Lower back pain    Lumbar radicular pain    Sciatica of right side    Primary osteoarthritis of left knee    Obesity, Class III, BMI 40-49.9 (morbid obesity)    Knee pain, bilateral    Nuclear sclerosis of both eyes    Moderate stage chronic open angle glaucoma    Medication monitoring encounter    Chronic kidney disease, stage III (moderate)    Primary  "open angle glaucoma of both eyes, moderate stage    Refractive error    Nuclear sclerosis, bilateral    Colon cancer screening       Current Outpatient Medications on File Prior to Visit   Medication Sig Dispense Refill    allopurinol (ZYLOPRIM) 300 MG tablet TAKE 1 AND 1/2 TABLETS BY MOUTH EVERY  tablet 1    amlodipine (NORVASC) 2.5 MG tablet TAKE ONE TABLET BY MOUTH EVERY DAY 30 tablet 11    aspirin 81 MG chewable tablet Take 1 tablet by mouth Daily.      colchicine (COLCRYS) 0.6 mg tablet Take with first symptom of gout, repeat after 1 hour and 6 hours after: then twice a day x 2 days. 30 tablet 11    fenofibric acid (FIBRICOR) 135 mg CpDR TAKE ONE TABLET BY MOUTH EVERY DAY 90 capsule 2    fish oil-fat acid [id number].[id number] (OMEGA 3-6-9) 1,200 mg Cap Take by mouth.      latanoprost 0.005 % ophthalmic solution INSTILL 1 DROP INTO BOTH EYES EVERY EVENING 2.5 mL 12    lisinopril (PRINIVIL,ZESTRIL) 40 MG tablet TAKE 1 TABLET (40 MG TOTAL) BY MOUTH ONCE DAILY. 90 tablet 1    metoprolol succinate (TOPROL-XL) 100 MG 24 hr tablet TAKE 1 TABLET (100 MG TOTAL) BY MOUTH ONCE DAILY.  3    metoprolol succinate (TOPROL-XL) 100 MG 24 hr tablet TAKE 1 TABLET (100 MG TOTAL) BY MOUTH ONCE DAILY. 90 tablet 3    promethazine (PHENERGAN) 12.5 MG Tab Take 1 tablet (12.5 mg total) by mouth 2 (two) times daily as needed. 10 tablet 0    rosuvastatin (CRESTOR) 40 MG Tab TAKE 1 TABLET (40 MG TOTAL) BY MOUTH EVERY EVENING. GENERIC IS FINE. 90 tablet 3    timolol maleate 0.5% (TIMOPTIC) 0.5 % Drop INSTILL 1 DROP INTO EACH EYE EVERY MORNING 10 mL 12     No current facility-administered medications on file prior to visit.        Past medical, surgical, family and social histories have been reviewed today.        Objective:     Vitals:    12/16/18 0933   BP: 130/72   Pulse: 79   Resp: 16   Temp: (!) 101 °F (38.3 °C)   TempSrc: Tympanic   SpO2: 96%   Weight: 112.8 kg (248 lb 9.1 oz)   Height: 5' 6" (1.676 m)   PainSc:   7 "   PainLoc: Generalized         Physical Exam   Constitutional: He is oriented to person, place, and time. He appears well-developed and well-nourished.   HENT:   Head: Normocephalic and atraumatic.   Right Ear: Tympanic membrane normal.   Left Ear: Tympanic membrane normal.   Nose: Mucosal edema and rhinorrhea (boggy, clear RN) present. Right sinus exhibits no maxillary sinus tenderness and no frontal sinus tenderness. Left sinus exhibits no maxillary sinus tenderness and no frontal sinus tenderness.   Mouth/Throat: Mucous membranes are normal. No oropharyngeal exudate, posterior oropharyngeal edema or posterior oropharyngeal erythema.   Eyes: Pupils are equal, round, and reactive to light. Right eye exhibits discharge (both eyes with clear watery d/c, no eye redness or shiners noted). Left eye exhibits discharge.   Neck: Normal range of motion. Neck supple.   Cardiovascular: Normal rate, regular rhythm and normal heart sounds.   Pulmonary/Chest: Effort normal. He has wheezes in the right upper field and the left upper field. He has no rhonchi. He has no rales.   Lymphadenopathy:     He has no cervical adenopathy.   Neurological: He is alert and oriented to person, place, and time.   Vitals reviewed.      Component      Latest Ref Rng & Units 12/16/2018   POC Molecular Influenza A Ag      Negative, Not Reported Negative   POC Molecular Influenza B Ag      Negative, Not Reported Negative    Acceptable       Yes       Diagnosis       1. Viral URI    2. Fever and chills    3. Generalized body aches    4. Wheezing          Assessment/ Plan     Viral URI  · Acute onset of symptoms yesterday, likely viral but has underlying allergic rhintis.  · Injection today for symptomatic relief.  · I am avoiding oral anti-histamines due to pt h/o glaucoma.  · Medication discussed, take as directed.  -     betamethasone acetate-betamethasone sodium phosphate injection 12 mg  -     promethazine-dextromethorphan  (PROMETHAZINE-DM) 6.25-15 mg/5 mL Syrp; Take 5 mLs by mouth every 4 to 6 hours as needed.  Dispense: 100 mL; Refill: 0  -     flunisolide 25 mcg, 0.025%, (NASALIDE) 25 mcg (0.025 %) Spry; 2 sprays by Nasal route 2 (two) times daily.  Dispense: 25 mL; Refill: 0    Fever and chills  -     POCT Influenza A/B Molecular  -     betamethasone acetate-betamethasone sodium phosphate injection 12 mg    Generalized body aches  -     POCT Influenza A/B Molecular  -     betamethasone acetate-betamethasone sodium phosphate injection 12 mg    Wheezing  · Albuterol inhaler as ordered ---- states has one at home from previous order.        Follow-up in clinic with PCP if condition worsens or fails to improve.        ANGELITA Hoffman  [facility]

## 2019-01-22 ENCOUNTER — LAB VISIT (OUTPATIENT)
Dept: LAB | Facility: HOSPITAL | Age: 69
End: 2019-01-22
Attending: PEDIATRICS
Payer: MEDICARE

## 2019-01-22 DIAGNOSIS — N18.30 CHRONIC KIDNEY DISEASE, STAGE III (MODERATE): ICD-10-CM

## 2019-01-22 DIAGNOSIS — E78.5 HYPERLIPIDEMIA, UNSPECIFIED HYPERLIPIDEMIA TYPE: ICD-10-CM

## 2019-01-22 LAB
ALT SERPL W/O P-5'-P-CCNC: 20 U/L
ANION GAP SERPL CALC-SCNC: 7 MMOL/L
AST SERPL-CCNC: 24 U/L
BUN SERPL-MCNC: 27 MG/DL
CALCIUM SERPL-MCNC: 9.7 MG/DL
CHLORIDE SERPL-SCNC: 112 MMOL/L
CHOLEST SERPL-MCNC: 125 MG/DL
CHOLEST/HDLC SERPL: 3.8 {RATIO}
CO2 SERPL-SCNC: 27 MMOL/L
CREAT SERPL-MCNC: 1.6 MG/DL
EST. GFR  (AFRICAN AMERICAN): 51.1 ML/MIN/1.73 M^2
EST. GFR  (NON AFRICAN AMERICAN): 44.2 ML/MIN/1.73 M^2
GLUCOSE SERPL-MCNC: 109 MG/DL
HDLC SERPL-MCNC: 33 MG/DL
HDLC SERPL: 26.4 %
LDLC SERPL CALC-MCNC: 76.2 MG/DL
NONHDLC SERPL-MCNC: 92 MG/DL
POTASSIUM SERPL-SCNC: 4.9 MMOL/L
SODIUM SERPL-SCNC: 146 MMOL/L
TRIGL SERPL-MCNC: 79 MG/DL

## 2019-01-22 PROCEDURE — 80061 LIPID PANEL: CPT

## 2019-01-22 PROCEDURE — 80048 BASIC METABOLIC PNL TOTAL CA: CPT | Mod: PO,ER

## 2019-01-22 PROCEDURE — 84450 TRANSFERASE (AST) (SGOT): CPT | Mod: PO,ER

## 2019-01-22 PROCEDURE — 84460 ALANINE AMINO (ALT) (SGPT): CPT | Mod: PO,ER

## 2019-01-22 PROCEDURE — 36415 COLL VENOUS BLD VENIPUNCTURE: CPT | Mod: PO,ER

## 2019-01-23 ENCOUNTER — OFFICE VISIT (OUTPATIENT)
Dept: INTERNAL MEDICINE | Facility: CLINIC | Age: 69
End: 2019-01-23
Payer: MEDICARE

## 2019-01-23 VITALS
HEIGHT: 66 IN | OXYGEN SATURATION: 97 % | TEMPERATURE: 96 F | WEIGHT: 252.63 LBS | DIASTOLIC BLOOD PRESSURE: 68 MMHG | SYSTOLIC BLOOD PRESSURE: 136 MMHG | HEART RATE: 61 BPM | BODY MASS INDEX: 40.6 KG/M2

## 2019-01-23 DIAGNOSIS — Z00.00 WELL ADULT EXAM: Primary | ICD-10-CM

## 2019-01-23 DIAGNOSIS — I42.2 HYPERTROPHIC CARDIOMYOPATHY: ICD-10-CM

## 2019-01-23 DIAGNOSIS — C61 PROSTATE CANCER: ICD-10-CM

## 2019-01-23 DIAGNOSIS — N18.30 CHRONIC KIDNEY DISEASE, STAGE III (MODERATE): ICD-10-CM

## 2019-01-23 DIAGNOSIS — Z95.1 S/P CABG (CORONARY ARTERY BYPASS GRAFT): ICD-10-CM

## 2019-01-23 DIAGNOSIS — E78.5 HYPERLIPIDEMIA, UNSPECIFIED HYPERLIPIDEMIA TYPE: ICD-10-CM

## 2019-01-23 DIAGNOSIS — I10 ESSENTIAL HYPERTENSION: ICD-10-CM

## 2019-01-23 DIAGNOSIS — E66.01 OBESITY, CLASS III, BMI 40-49.9 (MORBID OBESITY): ICD-10-CM

## 2019-01-23 DIAGNOSIS — I25.810 CORONARY ARTERY DISEASE INVOLVING CORONARY BYPASS GRAFT OF NATIVE HEART WITHOUT ANGINA PECTORIS: ICD-10-CM

## 2019-01-23 DIAGNOSIS — B35.4 TINEA CORPORIS: ICD-10-CM

## 2019-01-23 DIAGNOSIS — M1A.39X0 CHRONIC GOUT DUE TO RENAL IMPAIRMENT OF MULTIPLE SITES WITHOUT TOPHUS: ICD-10-CM

## 2019-01-23 PROCEDURE — 99397 PR PREVENTIVE VISIT,EST,65 & OVER: ICD-10-PCS | Mod: S$PBB,,, | Performed by: PEDIATRICS

## 2019-01-23 PROCEDURE — 99213 OFFICE O/P EST LOW 20 MIN: CPT | Mod: PBBFAC,PN | Performed by: PEDIATRICS

## 2019-01-23 PROCEDURE — 99397 PER PM REEVAL EST PAT 65+ YR: CPT | Mod: S$PBB,,, | Performed by: PEDIATRICS

## 2019-01-23 PROCEDURE — G0009 ADMIN PNEUMOCOCCAL VACCINE: HCPCS | Mod: PBBFAC,PN

## 2019-01-23 PROCEDURE — 99999 PR PBB SHADOW E&M-EST. PATIENT-LVL III: ICD-10-PCS | Mod: PBBFAC,,, | Performed by: PEDIATRICS

## 2019-01-23 PROCEDURE — 99999 PR PBB SHADOW E&M-EST. PATIENT-LVL III: CPT | Mod: PBBFAC,,, | Performed by: PEDIATRICS

## 2019-01-23 RX ORDER — NYSTATIN AND TRIAMCINOLONE ACETONIDE 100000; 1 [USP'U]/G; MG/G
CREAM TOPICAL 2 TIMES DAILY
Qty: 60 G | Refills: 1 | Status: SHIPPED | OUTPATIENT
Start: 2019-01-23 | End: 2019-02-13

## 2019-01-23 NOTE — PROGRESS NOTES
Subjective:       Patient ID: Damon Yoo is a 68 y.o. male.     Chief Complaint: Follow-up     HTN: B/P good, no HTNive symptoms.   LIPIDS:somewhat following D&E, tolerating and compliant with med(s).  CAD/hypertrophic cardiomyopathy: no CP, SOB, DIAZ, saw Dr Faye  Gout: quiet, but is seeing rheum  Prostate cancer: seeing urology  Chronic back pain is good, neck pain resolved. Knees are ok..  CKD: cre is stable  LABS REVIEWED AND DISCUSSED WITH PATIENT        Review of Systems   Constitutional: Negative for fever and unexpected weight change.   HENT: Negative for congestion and rhinorrhea.    Eyes: Negative for discharge and redness.   Respiratory: Negative for cough and wheezing.    Cardiovascular: Negative for chest pain, palpitations and leg swelling.   Gastrointestinal: Negative for constipation, diarrhea and vomiting. Occasionally gets mild brief generalized abd pain. Sharp brief. Has daily BM  Endocrine: Negative for cold intolerance, heat intolerance, polydipsia, polyphagia and polyuria.   Genitourinary: Positive for frequency (nocturia ). Negative for decreased urine volume, difficulty urinating and hematuria.   Musculoskeletal: Positive for arthralgias, back pain and gait problem. Negative for joint swelling.        Stable knee and back pain.   Skin: Negative for rash and wound, except itching in groin.   Neurological: Negative for syncope and headaches.   Psychiatric/Behavioral: Negative for behavioral problems, dysphoric mood, self-injury, sleep disturbance and suicidal ideas. The patient is not nervous/anxious.        Objective:   Physical Exam   Constitutional: He appears well-developed and well-nourished. No distress.   Neck: Trachea normal and normal range of motion. Neck supple. No JVD present. No thyromegaly present.   Cardiovascular: Normal rate, regular rhythm, S1 normal, S2 normal, normal heart sounds and normal pulses.  Exam reveals no gallop and no friction rub.    No murmur  heard.  Pulmonary/Chest: Effort normal and breath sounds normal. He has no wheezes. He has no rales.   Abdominal: Soft. Normal appearance and bowel sounds are normal. He exhibits no mass. There is no hepatosplenomegaly. There is no tenderness. There is no rebound and no guarding.   Musculoskeletal: He exhibits no edema.   Mild arthritic changes.   Lymphadenopathy:     He has no cervical adenopathy.   Neurological: He is alert. He has normal strength. Coordination and gait normal.   Skin: Skin is warm and intact. No rash noted, except tinea in groin.   Psychiatric: He has a normal mood and affect. His speech is normal and behavior is normal.       Assessment:       1.  tinea in groin   2. Well adult exam    3. Essential hypertension    4. Hyperlipidemia, unspecified hyperlipidemia type    5. Chronic kidney disease, stage III (moderate)    6. Hypertrophic cardiomyopathy    7. S/P CABG (coronary artery bypass graft)    8. Coronary artery disease involving coronary bypass graft of native heart without angina pectoris    9. Prostate cancer    10. 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 Obesity, Class III, BMI 40-49.9 (morbid obesity)                Plan:    Await echocardiology. He declines flu vaccine, shingrix when available. PCV 13 now. I do not see a cause for his rare abd pain. Try fiber. He is stable. D&E, weight loss.Meds reviewed, keep subspecialty care.  issues UTD. F/U yearly with labs. He declines dig htn clinic.

## 2019-02-01 ENCOUNTER — APPOINTMENT (OUTPATIENT)
Dept: OPHTHALMOLOGY | Facility: CLINIC | Age: 69
End: 2019-02-01
Payer: MEDICARE

## 2019-02-01 ENCOUNTER — OFFICE VISIT (OUTPATIENT)
Dept: OPHTHALMOLOGY | Facility: CLINIC | Age: 69
End: 2019-02-01
Payer: MEDICARE

## 2019-02-01 DIAGNOSIS — H40.1132 PRIMARY OPEN ANGLE GLAUCOMA OF BOTH EYES, MODERATE STAGE: Primary | ICD-10-CM

## 2019-02-01 DIAGNOSIS — H25.13 NUCLEAR SCLEROSIS, BILATERAL: ICD-10-CM

## 2019-02-01 PROCEDURE — 99999 PR PBB SHADOW E&M-EST. PATIENT-LVL II: CPT | Mod: PBBFAC,,, | Performed by: OPHTHALMOLOGY

## 2019-02-01 PROCEDURE — 99999 PR PBB SHADOW E&M-EST. PATIENT-LVL II: ICD-10-PCS | Mod: PBBFAC,,, | Performed by: OPHTHALMOLOGY

## 2019-02-01 PROCEDURE — 92250 COLOR FUNDUS PHOTOGRAPHY - OU - BOTH EYES: ICD-10-PCS | Mod: 26,S$PBB,, | Performed by: OPHTHALMOLOGY

## 2019-02-01 PROCEDURE — 92014 COMPRE OPH EXAM EST PT 1/>: CPT | Mod: S$PBB,,, | Performed by: OPHTHALMOLOGY

## 2019-02-01 PROCEDURE — 92250 FUNDUS PHOTOGRAPHY W/I&R: CPT | Mod: PBBFAC,PN | Performed by: OPHTHALMOLOGY

## 2019-02-01 PROCEDURE — 92014 PR EYE EXAM, EST PATIENT,COMPREHESV: ICD-10-PCS | Mod: S$PBB,,, | Performed by: OPHTHALMOLOGY

## 2019-02-01 PROCEDURE — 92083 EXTENDED VISUAL FIELD XM: CPT | Mod: PBBFAC,PN | Performed by: OPHTHALMOLOGY

## 2019-02-01 PROCEDURE — 92083 HUMPHREY VISUAL FIELD - OU - BOTH EYES: ICD-10-PCS | Mod: 26,S$PBB,, | Performed by: OPHTHALMOLOGY

## 2019-02-01 PROCEDURE — 99212 OFFICE O/P EST SF 10 MIN: CPT | Mod: PBBFAC,PN,25 | Performed by: OPHTHALMOLOGY

## 2019-02-01 NOTE — PROGRESS NOTES
SUBJECTIVE:   Damon Yoo is a 68 y.o. male   Uncorrected distance visual acuity was 20/20 in the right eye and 20/20 -2 in the left eye.   Chief Complaint   Patient presents with    Glaucoma     3 Months Dilation, HVF 24-2, SDP        HPI:  HPI     Glaucoma      Additional comments: 3 Months Dilation, HVF 24-2, SDP              Comments     Patient States Vision Stable & No Discomfort 100% Drops Compliance     1. Mod COAG- No FHx (init 32/27 on 3/10 with C/D .65/.60) Goal <18, new   goal = 15 6/2017  SLT OD 12/11/14 (20-16)  SLT OS 2/25/15 (20-16)  2. Mild NSC  3. LLL Cyst Excision on 6/13/12    Latanoprost QHS OU  Timolol qam OU          Last edited by Serina Dumont on 2/1/2019 10:00 AM. (History)        Assessment /Plan :  1. Primary open angle glaucoma of both eyes, moderate stage Doing well, IOP within acceptable range relative to target IOP and no evidence of progression. Continue current treatment. Reviewed importance of continued compliance with treatment and follow up.     2. Nuclear sclerosis, bilateral monitor for now     Return to clinic in 3 months  or as needed.  With IOP Check and GOCT

## 2019-02-12 ENCOUNTER — TELEPHONE (OUTPATIENT)
Dept: INTERNAL MEDICINE | Facility: CLINIC | Age: 69
End: 2019-02-12

## 2019-02-12 NOTE — TELEPHONE ENCOUNTER
----- Message from Viktoria Mcgrath sent at 2/12/2019  1:56 PM CST -----  Contact: Patients wife, Nery  Type:  Patient Returning Call    Who Called:Morgan wife  Who Left Message for Patient:nurse  Does the patient know what this is regarding?: stomach issues  Would the patient rather a call back or a response via [employer]? call  Best Call Back Number:[phone]  Additional Information:

## 2019-02-12 NOTE — TELEPHONE ENCOUNTER
Returned call to pt's wife. She states that pt is having stomach issues, such as frequent bowel movements and stomach pains. I advised her that pt will need to come in to be seen. Appt scheduled 028/13/2019 @ 11:00am. Pt's wife verbalized understanding of appt date and time.

## 2019-02-12 NOTE — TELEPHONE ENCOUNTER
----- Message from Quynh Espino sent at 2/12/2019  7:06 AM CST -----  Pt needs to speak to the nurse regarding pain in his stomach/please call [phone]/ma

## 2019-02-12 NOTE — TELEPHONE ENCOUNTER
Returned call to pt regarding stomach issues. Pt did not answer. Left message advising pt to return call to clinic.

## 2019-02-13 ENCOUNTER — OFFICE VISIT (OUTPATIENT)
Dept: INTERNAL MEDICINE | Facility: CLINIC | Age: 69
End: 2019-02-13
Payer: MEDICARE

## 2019-02-13 ENCOUNTER — LAB VISIT (OUTPATIENT)
Dept: LAB | Facility: HOSPITAL | Age: 69
End: 2019-02-13
Attending: PEDIATRICS
Payer: MEDICARE

## 2019-02-13 VITALS
DIASTOLIC BLOOD PRESSURE: 76 MMHG | BODY MASS INDEX: 40 KG/M2 | OXYGEN SATURATION: 96 % | TEMPERATURE: 98 F | WEIGHT: 248.88 LBS | RESPIRATION RATE: 16 BRPM | SYSTOLIC BLOOD PRESSURE: 134 MMHG | HEIGHT: 66 IN | HEART RATE: 60 BPM

## 2019-02-13 DIAGNOSIS — R10.84 GENERALIZED ABDOMINAL PAIN: ICD-10-CM

## 2019-02-13 DIAGNOSIS — M50.00 CERVICAL DISC DISEASE WITH MYELOPATHY: ICD-10-CM

## 2019-02-13 DIAGNOSIS — R10.33 PERIUMBILICAL ABDOMINAL PAIN: Primary | ICD-10-CM

## 2019-02-13 DIAGNOSIS — R10.33 PERIUMBILICAL ABDOMINAL PAIN: ICD-10-CM

## 2019-02-13 LAB
CREAT SERPL-MCNC: 1.4 MG/DL
EST. GFR  (AFRICAN AMERICAN): >60 ML/MIN/1.73 M^2
EST. GFR  (NON AFRICAN AMERICAN): 52 ML/MIN/1.73 M^2
IGA SERPL-MCNC: 255 MG/DL

## 2019-02-13 PROCEDURE — 82565 ASSAY OF CREATININE: CPT

## 2019-02-13 PROCEDURE — 99214 PR OFFICE/OUTPT VISIT, EST, LEVL IV, 30-39 MIN: ICD-10-PCS | Mod: S$PBB,,, | Performed by: PEDIATRICS

## 2019-02-13 PROCEDURE — 99999 PR PBB SHADOW E&M-EST. PATIENT-LVL III: CPT | Mod: PBBFAC,,, | Performed by: PEDIATRICS

## 2019-02-13 PROCEDURE — 82784 ASSAY IGA/IGD/IGG/IGM EACH: CPT

## 2019-02-13 PROCEDURE — 36415 COLL VENOUS BLD VENIPUNCTURE: CPT

## 2019-02-13 PROCEDURE — 99214 OFFICE O/P EST MOD 30 MIN: CPT | Mod: S$PBB,,, | Performed by: PEDIATRICS

## 2019-02-13 PROCEDURE — 83516 IMMUNOASSAY NONANTIBODY: CPT

## 2019-02-13 PROCEDURE — 99213 OFFICE O/P EST LOW 20 MIN: CPT | Mod: PBBFAC,PN | Performed by: PEDIATRICS

## 2019-02-13 PROCEDURE — 99999 PR PBB SHADOW E&M-EST. PATIENT-LVL III: ICD-10-PCS | Mod: PBBFAC,,, | Performed by: PEDIATRICS

## 2019-02-13 NOTE — PROGRESS NOTES
Subjective:       Patient ID: Damon Yoo is a 68 y.o. male.    Chief Complaint: Abdominal Pain (2-3 wks)    At last visit he reported brief abd pain. Exam was benign. Move pain is periumbilical happen 3-4 times per week, cycling through the day in waves. He states one daily [place] scale 5-6 stool, without melena, blood, mucous. No N/V, weight loss, fever. Sometimes starts in AM after rising, sometimes after meals, sometimes driving. No fever. Colonoscopy 2017.      Review of Systems   Constitutional: Negative for fever and unexpected weight change.   HENT: Negative for congestion and rhinorrhea.    Eyes: Negative for discharge and redness.   Respiratory: Negative for cough and wheezing.    Gastrointestinal: Positive for abdominal pain. Negative for abdominal distention, anal bleeding, blood in stool, constipation, diarrhea, nausea, rectal pain and vomiting.   Endocrine: Negative for polydipsia, polyphagia and polyuria.   Genitourinary: Negative for decreased urine volume and difficulty urinating.   Musculoskeletal: Negative for arthralgias and joint swelling.   Skin: Negative for rash and wound.   Neurological: Negative for syncope and headaches.   Psychiatric/Behavioral: Negative for behavioral problems and sleep disturbance.       Objective:      Physical Exam   Constitutional: He is oriented to person, place, and time. He appears well-developed and well-nourished. No distress.   HENT:   Right Ear: External ear normal.   Left Ear: External ear normal.   Nose: Nose normal.   Mouth/Throat: Oropharynx is clear and moist. No oropharyngeal exudate.   Eyes: Conjunctivae and EOM are normal. Pupils are equal, round, and reactive to light.   Neck: Normal range of motion. No JVD present. No thyromegaly present.   Cardiovascular: Normal rate, regular rhythm and normal heart sounds.   No murmur heard.  Pulmonary/Chest: Effort normal and breath sounds normal. No respiratory distress. He has no wheezes. He has no rales.    Abdominal: Soft. He exhibits no distension and no mass. There is no tenderness. There is no rebound and no guarding. A hernia (minimal weakness for ventral hernia but nontender and no protrusion.) is present.   Musculoskeletal: He exhibits no edema.   Lymphadenopathy:     He has no cervical adenopathy.   Neurological: He is alert and oriented to person, place, and time. No cranial nerve deficit. Coordination normal.   Skin: Capillary refill takes less than 2 seconds. No rash noted.   Psychiatric: He has a normal mood and affect. His behavior is normal. Judgment and thought content normal.       Assessment:       1. Periumbilical abdominal pain    2. Generalized abdominal pain        Plan:       Periumbilical abdominal pain  -     Tissue transglutaminase, IgA; Future; Expected date: 02/13/2019  -     IgA; Future; Expected date: 02/13/2019  -     H. pylori antigen, stool; Future; Expected date: 02/13/2019  -     Stool Exam-Ova,Cysts,Parasites; Future; Expected date: 02/13/2019  -     Stool culture; Future; Expected date: 02/13/2019  -     Giardia / Cryptosporidum, EIA; Future; Expected date: 02/13/2019  -     ranitidine (ZANTAC) 150 MG tablet; Take 1 tablet (150 mg total) by mouth 2 (two) times daily.  Dispense: 60 tablet; Refill: 11    Generalized abdominal pain  -     CT Abdomen Pelvis W Wo Contrast; Future; Expected date: 02/13/2019    Symptoms are not classic for GERD. Try zantac but get studies. If no case found, send for EGD. F/U in 3 weeks.

## 2019-02-14 ENCOUNTER — HOSPITAL ENCOUNTER (OUTPATIENT)
Dept: RADIOLOGY | Facility: HOSPITAL | Age: 69
Discharge: HOME | End: 2019-02-14
Attending: PEDIATRICS
Payer: MEDICARE

## 2019-02-14 ENCOUNTER — TELEPHONE (OUTPATIENT)
Dept: INTERNAL MEDICINE | Facility: CLINIC | Age: 69
End: 2019-02-14

## 2019-02-14 DIAGNOSIS — R10.84 GENERALIZED ABDOMINAL PAIN: ICD-10-CM

## 2019-02-14 PROCEDURE — 74178 CT ABD&PLV WO CNTR FLWD CNTR: CPT | Mod: 26,,, | Performed by: RADIOLOGY

## 2019-02-14 PROCEDURE — 74178 CT ABDOMEN PELVIS W WO CONTRAST: ICD-10-PCS | Mod: 26,,, | Performed by: RADIOLOGY

## 2019-02-14 PROCEDURE — 74178 CT ABD&PLV WO CNTR FLWD CNTR: CPT | Mod: TC,PO

## 2019-02-14 PROCEDURE — 25500020 PHARM REV CODE 255: Mod: PO | Performed by: PEDIATRICS

## 2019-02-14 RX ADMIN — IOHEXOL 100 ML: 350 INJECTION, SOLUTION INTRAVENOUS at 09:02

## 2019-02-14 RX ADMIN — IOHEXOL 30 ML: 350 INJECTION, SOLUTION INTRAVENOUS at 09:02

## 2019-02-14 NOTE — TELEPHONE ENCOUNTER
ROXANNE Faria with CT in [facility]. He states pt has appendicitis and to inform Dr. Mason and wants to if pt should go to ER. I advised him that I will give Dr. Mason the message. He verbalized understanding. Can give Dr. Wiseman a call if needs to @ [phone] .

## 2019-02-14 NOTE — TELEPHONE ENCOUNTER
Called pt regarding CT results. Pt did not answer. Left message advising pt to return call to clinic.

## 2019-02-14 NOTE — TELEPHONE ENCOUNTER
Called pt regarding CT scan results. Pt did not answer. Left message advising pt to return call to clinic to schedule appt to see general surgery due to appendicitis findings on CT scan.

## 2019-02-15 ENCOUNTER — TELEPHONE (OUTPATIENT)
Dept: INTERNAL MEDICINE | Facility: CLINIC | Age: 69
End: 2019-02-15

## 2019-02-15 LAB — TTG IGA SER-ACNC: 10 UNITS

## 2019-02-15 NOTE — TELEPHONE ENCOUNTER
ROXANNE pt and advised him that Dr. Mason would like him to schedule an appt with general surgery due to appendicitis from CT scan. He verbalized understanding and states he is in the hospital with his wife right now and does not know when he will be able to schedule an appt but will return call to clinic once he knows a date he can come in to schedule. I advised him to give us a call as soon as he can. He verbalized understanding.

## 2019-02-20 ENCOUNTER — TELEPHONE (OUTPATIENT)
Dept: INTERNAL MEDICINE | Facility: CLINIC | Age: 69
End: 2019-02-20

## 2019-02-20 DIAGNOSIS — K36 CHRONIC APPENDICITIS: Primary | ICD-10-CM

## 2019-02-20 NOTE — TELEPHONE ENCOUNTER
(See CT abd result note 02/14/19). S/w pt's wife Юлия and sched pt for Gen Surg appt w/ Dr. Garsia for 02/26/19, wife confirmed appt date/time and provider/location.

## 2019-02-20 NOTE — TELEPHONE ENCOUNTER
----- Message from Nhi Sherwood sent at 2/20/2019  8:22 AM CST -----  Contact: pt  He's calling in regards to appointment with another provider     Pt request appointment be on 2/26 am         pls call pt back at [phone] (home) or [phone](wife)   Leave message if pt doesn't answer

## 2019-02-23 DIAGNOSIS — I10 ESSENTIAL HYPERTENSION: ICD-10-CM

## 2019-02-23 RX ORDER — LISINOPRIL 40 MG/1
40 TABLET ORAL DAILY
Qty: 90 TABLET | Refills: 3 | Status: SHIPPED | OUTPATIENT
Start: 2019-02-23 | End: 2020-07-16 | Stop reason: SDUPTHER

## 2019-02-26 ENCOUNTER — OFFICE VISIT (OUTPATIENT)
Dept: SURGERY | Facility: CLINIC | Age: 69
End: 2019-02-26
Payer: MEDICARE

## 2019-02-26 VITALS — WEIGHT: 252.19 LBS | HEIGHT: 66 IN | BODY MASS INDEX: 40.53 KG/M2

## 2019-02-26 DIAGNOSIS — K36 CHRONIC APPENDICITIS: Primary | ICD-10-CM

## 2019-02-26 PROCEDURE — 99212 OFFICE O/P EST SF 10 MIN: CPT | Mod: PBBFAC,PN | Performed by: SURGERY

## 2019-02-26 PROCEDURE — 99999 PR PBB SHADOW E&M-EST. PATIENT-LVL II: CPT | Mod: PBBFAC,,, | Performed by: SURGERY

## 2019-02-26 PROCEDURE — 99203 OFFICE O/P NEW LOW 30 MIN: CPT | Mod: S$PBB,,, | Performed by: SURGERY

## 2019-02-26 PROCEDURE — 99999 PR PBB SHADOW E&M-EST. PATIENT-LVL II: ICD-10-PCS | Mod: PBBFAC,,, | Performed by: SURGERY

## 2019-02-26 PROCEDURE — 99203 PR OFFICE/OUTPT VISIT, NEW, LEVL III, 30-44 MIN: ICD-10-PCS | Mod: S$PBB,,, | Performed by: SURGERY

## 2019-02-26 NOTE — LETTER
February 26, 2019      [place]  [id number] [facility]  [place] [address]           [facility]  [address] [facility] [address]  Phone: [phone]  Fax: [phone]          Patient: Damon Yoo   MR Number: [id number]   YOB: 1950   Date of Visit: 2/26/2019       Dear Dr. ELLEN Mason Jr.:    Thank you for referring Damon Yoo to me for evaluation. Attached you will find relevant portions of my assessment and plan of care.    If you have questions, please do not hesitate to call me. I look forward to following Damon Yoo along with you.    Sincerely,    Jas Garsia MD    Enclosure  CC:  No Recipients    If you would like to receive this communication electronically, please contact [email or web address] or [phone] to request more information on [employer] Link access.    For providers and/or their staff who would like to refer a patient to [facility], please contact us through our one-stop-shop provider referral line, [facility], at [phone].    If you feel you have received this communication in error or would no longer like to receive these types of communications, please e-mail [email or web address]

## 2019-02-26 NOTE — PROGRESS NOTES
Damon is 68-year-old  male patient who is being seen at the request of his primary care physician, Dr. sainz for the evaluation of abdominal pain. He recently underwent abdominal CT scan which showed some abnormal findings suggestive of an acute appendicitis.  The patient has been referred for surgical evaluation.  Patient states that he had felt abdominal pain several weeks ago and which led to the recent CT scan.  But now patient is asymptomatic and has no more abdominal pain that concerns him.  The physical exam is essentially benign with no evidence of any acute process.  The CT scan images were carefully reviewed which shows acute appendicitis, or at least recent evidence of an acute appendicitis.  There is no abscesses.  It is unlikely that the patient will require surgical intervention at this time.  My recommendation is patient to continue with antibiotic treatment that was initially placed and finishing it.  Patient is expected to follow up with me if the abdominal pain recurs.  Total time approximately half an hour was spent with this patient, and more than 50% of that was spent for treatment planning and counseling.    Jas Garsia

## 2019-04-16 RX ORDER — ALLOPURINOL 300 MG/1
TABLET ORAL
Qty: 135 TABLET | Refills: 1 | Status: SHIPPED | OUTPATIENT
Start: 2019-04-16 | End: 2020-04-17

## 2019-04-17 ENCOUNTER — LAB VISIT (OUTPATIENT)
Dept: LAB | Facility: HOSPITAL | Age: 69
End: 2019-04-17
Attending: INTERNAL MEDICINE
Payer: MEDICARE

## 2019-04-17 DIAGNOSIS — E78.2 MIXED HYPERLIPIDEMIA: ICD-10-CM

## 2019-04-17 LAB
ALBUMIN SERPL BCP-MCNC: 3.4 G/DL (ref 3.5–5.2)
ALP SERPL-CCNC: 79 U/L (ref 55–135)
ALT SERPL W/O P-5'-P-CCNC: 22 U/L (ref 10–44)
ANION GAP SERPL CALC-SCNC: 10 MMOL/L (ref 8–16)
AST SERPL-CCNC: 23 U/L (ref 10–40)
BILIRUB SERPL-MCNC: 0.6 MG/DL (ref 0.1–1)
BUN SERPL-MCNC: 15 MG/DL (ref 8–23)
CALCIUM SERPL-MCNC: 9 MG/DL (ref 8.7–10.5)
CHLORIDE SERPL-SCNC: 110 MMOL/L (ref 95–110)
CHOLEST SERPL-MCNC: 110 MG/DL (ref 120–199)
CHOLEST/HDLC SERPL: 3.9 {RATIO} (ref 2–5)
CO2 SERPL-SCNC: 23 MMOL/L (ref 23–29)
CREAT SERPL-MCNC: 1.1 MG/DL (ref 0.5–1.4)
EST. GFR  (AFRICAN AMERICAN): >60 ML/MIN/1.73 M^2
EST. GFR  (NON AFRICAN AMERICAN): >60 ML/MIN/1.73 M^2
GLUCOSE SERPL-MCNC: 109 MG/DL (ref 70–110)
HDLC SERPL-MCNC: 28 MG/DL (ref 40–75)
HDLC SERPL: 25.5 % (ref 20–50)
LDLC SERPL CALC-MCNC: 63.4 MG/DL (ref 63–159)
NONHDLC SERPL-MCNC: 82 MG/DL
POTASSIUM SERPL-SCNC: 4.1 MMOL/L (ref 3.5–5.1)
PROT SERPL-MCNC: 7.2 G/DL (ref 6–8.4)
SODIUM SERPL-SCNC: 143 MMOL/L (ref 136–145)
TRIGL SERPL-MCNC: 93 MG/DL (ref 30–150)

## 2019-04-17 PROCEDURE — 80053 COMPREHEN METABOLIC PANEL: CPT | Mod: PO

## 2019-04-17 PROCEDURE — 36415 COLL VENOUS BLD VENIPUNCTURE: CPT | Mod: PO

## 2019-04-17 PROCEDURE — 80061 LIPID PANEL: CPT

## 2019-04-23 DIAGNOSIS — I25.10 CORONARY ARTERY DISEASE, ANGINA PRESENCE UNSPECIFIED, UNSPECIFIED VESSEL OR LESION TYPE, UNSPECIFIED WHETHER NATIVE OR TRANSPLANTED HEART: Primary | ICD-10-CM

## 2019-04-26 ENCOUNTER — OFFICE VISIT (OUTPATIENT)
Dept: CARDIOLOGY | Facility: CLINIC | Age: 69
End: 2019-04-26
Payer: MEDICARE

## 2019-04-26 ENCOUNTER — CLINICAL SUPPORT (OUTPATIENT)
Dept: CARDIOLOGY | Facility: CLINIC | Age: 69
End: 2019-04-26
Payer: MEDICARE

## 2019-04-26 VITALS
HEIGHT: 66 IN | DIASTOLIC BLOOD PRESSURE: 80 MMHG | WEIGHT: 249 LBS | HEART RATE: 70 BPM | BODY MASS INDEX: 40.02 KG/M2 | SYSTOLIC BLOOD PRESSURE: 168 MMHG

## 2019-04-26 DIAGNOSIS — C61 PROSTATE CANCER: ICD-10-CM

## 2019-04-26 DIAGNOSIS — Z95.1 S/P CABG (CORONARY ARTERY BYPASS GRAFT): ICD-10-CM

## 2019-04-26 DIAGNOSIS — E78.5 HYPERLIPIDEMIA, UNSPECIFIED HYPERLIPIDEMIA TYPE: ICD-10-CM

## 2019-04-26 DIAGNOSIS — I25.10 CORONARY ARTERY DISEASE, ANGINA PRESENCE UNSPECIFIED, UNSPECIFIED VESSEL OR LESION TYPE, UNSPECIFIED WHETHER NATIVE OR TRANSPLANTED HEART: ICD-10-CM

## 2019-04-26 DIAGNOSIS — E66.01 OBESITY, CLASS III, BMI 40-49.9 (MORBID OBESITY): ICD-10-CM

## 2019-04-26 DIAGNOSIS — R94.31 ABNORMAL EKG: ICD-10-CM

## 2019-04-26 DIAGNOSIS — I10 ESSENTIAL HYPERTENSION: ICD-10-CM

## 2019-04-26 DIAGNOSIS — I42.2 HYPERTROPHIC CARDIOMYOPATHY: ICD-10-CM

## 2019-04-26 DIAGNOSIS — I25.810 CORONARY ARTERY DISEASE INVOLVING CORONARY BYPASS GRAFT OF NATIVE HEART WITHOUT ANGINA PECTORIS: Primary | ICD-10-CM

## 2019-04-26 DIAGNOSIS — M17.12 PRIMARY OSTEOARTHRITIS OF LEFT KNEE: ICD-10-CM

## 2019-04-26 DIAGNOSIS — N18.30 CHRONIC KIDNEY DISEASE, STAGE III (MODERATE): ICD-10-CM

## 2019-04-26 PROCEDURE — 99999 PR PBB SHADOW E&M-EST. PATIENT-LVL III: ICD-10-PCS | Mod: PBBFAC,,, | Performed by: INTERNAL MEDICINE

## 2019-04-26 PROCEDURE — 99214 PR OFFICE/OUTPT VISIT, EST, LEVL IV, 30-39 MIN: ICD-10-PCS | Mod: S$PBB,,, | Performed by: INTERNAL MEDICINE

## 2019-04-26 PROCEDURE — 93010 ELECTROCARDIOGRAM REPORT: CPT | Mod: S$PBB,,, | Performed by: INTERNAL MEDICINE

## 2019-04-26 PROCEDURE — 99213 OFFICE O/P EST LOW 20 MIN: CPT | Mod: PBBFAC,PN,25 | Performed by: INTERNAL MEDICINE

## 2019-04-26 PROCEDURE — 99999 PR PBB SHADOW E&M-EST. PATIENT-LVL III: CPT | Mod: PBBFAC,,, | Performed by: INTERNAL MEDICINE

## 2019-04-26 PROCEDURE — 93010 EKG 12-LEAD: ICD-10-PCS | Mod: S$PBB,,, | Performed by: INTERNAL MEDICINE

## 2019-04-26 PROCEDURE — 93005 ELECTROCARDIOGRAM TRACING: CPT | Mod: PBBFAC,PN | Performed by: INTERNAL MEDICINE

## 2019-04-26 PROCEDURE — 99214 OFFICE O/P EST MOD 30 MIN: CPT | Mod: S$PBB,,, | Performed by: INTERNAL MEDICINE

## 2019-04-26 RX ORDER — AMLODIPINE BESYLATE 5 MG/1
5 TABLET ORAL DAILY
Qty: 30 TABLET | Refills: 6 | Status: SHIPPED | OUTPATIENT
Start: 2019-04-26 | End: 2020-01-23

## 2019-04-26 NOTE — PROGRESS NOTES
Subjective:   Patient ID:  Damon Yoo is a 69 y.o. male who presents for follow up of Hypertension (annual f/u); Hyperlipidemia; and Coronary Artery Disease      HPI  A 69  yoi male with htn obesity cad s/p cabg hypertrophic cardiomyopathy hlp is here for f/u he is not exercising  Well he takes his meds at nite his bp is not well controlled. He is not compliant with salt. He has knee pain he is not exercising . He has no chest pain or shortness of breath he has  oa and does not want to have knee surgery.his insurance is not wanting to pay for fenofibrate will observe of it for now. Has no chf symptoms. Has sleep apnea clinically not interested in sleep study.   Past Medical History:   Diagnosis Date    Abnormal EKG 10/25/2013    Arthritis     CAD (coronary artery disease)     Cancer     Prostate [id number] prostate    Glaucoma     Gout attack     Hyperlipidemia     Hypertension     Hypertrophic cardiomyopathy 10/25/2013    S/P CABG (coronary artery bypass graft) 10/25/2013       Past Surgical History:   Procedure Laterality Date    COLONOSCOPY N/A 2017    Performed by Dina Ledesma MD at [facility] [place] [facility]    CORONARY ARTERY BYPASS GRAFT  05/2003    x1    PROSTATE SURGERY      RALP        Social History     Tobacco Use    Smoking status: Former Smoker     Packs/day: 0.25     Years: 15.00     Pack years: 3.75     Last attempt to quit: 1988     Years since quittin.0    Smokeless tobacco: Never Used   Substance Use Topics    Alcohol use: No    Drug use: No       Family History   Problem Relation Age of Onset    Hypertension Father     Strabismus Neg Hx     Retinal detachment Neg Hx     Macular degeneration Neg Hx     Glaucoma Neg Hx     Blindness Neg Hx     Amblyopia Neg Hx        Current Outpatient Medications   Medication Sig    allopurinol (ZYLOPRIM) 300 MG tablet TAKE 1 AND 1/2 TABLETS BY MOUTH EVERY DAY    amlodipine (NORVASC) 2.5 MG tablet TAKE ONE TABLET BY MOUTH EVERY DAY     aspirin 81 MG chewable tablet Take 1 tablet by mouth Daily.    colchicine (COLCRYS) 0.6 mg tablet Take with first symptom of gout, repeat after 1 hour and 6 hours after: then twice a day x 2 days.    fish oil-fat acid comb.8-hb137 (OMEGA 3-6-9) 1,200 mg Cap Take 1 capsule by mouth once daily.     flunisolide 25 mcg, 0.025%, (NASALIDE) 25 mcg (0.025 %) Spry USE 2 SPRAYS IN EACH NOSTRIL TWICE DAILY (Patient taking differently: 2 sprays by Nasal route as needed. )    latanoprost 0.005 % ophthalmic solution INSTILL 1 DROP INTO BOTH EYES EVERY EVENING    lisinopril (PRINIVIL,ZESTRIL) 40 MG tablet TAKE 1 TABLET (40 MG TOTAL) BY MOUTH ONCE DAILY.    metoprolol succinate (TOPROL-XL) 100 MG 24 hr tablet TAKE 1 TABLET (100 MG TOTAL) BY MOUTH ONCE DAILY.    ranitidine (ZANTAC) 150 MG tablet Take 1 tablet (150 mg total) by mouth 2 (two) times daily.    rosuvastatin (CRESTOR) 40 MG Tab TAKE 1 TABLET (40 MG TOTAL) BY MOUTH EVERY EVENING. GENERIC IS FINE.    timolol maleate 0.5% (TIMOPTIC) 0.5 % Drop INSTILL 1 DROP INTO EACH EYE EVERY MORNING    fenofibric acid (FIBRICOR) 135 mg CpDR TAKE ONE TABLET BY MOUTH EVERY DAY     No current facility-administered medications for this visit.      Current Outpatient Medications on File Prior to Visit   Medication Sig    allopurinol (ZYLOPRIM) 300 MG tablet TAKE 1 AND 1/2 TABLETS BY MOUTH EVERY DAY    amlodipine (NORVASC) 2.5 MG tablet TAKE ONE TABLET BY MOUTH EVERY DAY    aspirin 81 MG chewable tablet Take 1 tablet by mouth Daily.    colchicine (COLCRYS) 0.6 mg tablet Take with first symptom of gout, repeat after 1 hour and 6 hours after: then twice a day x 2 days.    fish oil-fat acid comb.8-hb137 (OMEGA 3-6-9) 1,200 mg Cap Take 1 capsule by mouth once daily.     flunisolide 25 mcg, 0.025%, (NASALIDE) 25 mcg (0.025 %) Spry USE 2 SPRAYS IN EACH NOSTRIL TWICE DAILY (Patient taking differently: 2 sprays by Nasal route as needed. )    latanoprost 0.005 % ophthalmic solution  INSTILL 1 DROP INTO BOTH EYES EVERY EVENING    lisinopril (PRINIVIL,ZESTRIL) 40 MG tablet TAKE 1 TABLET (40 MG TOTAL) BY MOUTH ONCE DAILY.    metoprolol succinate (TOPROL-XL) 100 MG 24 hr tablet TAKE 1 TABLET (100 MG TOTAL) BY MOUTH ONCE DAILY.    ranitidine (ZANTAC) 150 MG tablet Take 1 tablet (150 mg total) by mouth 2 (two) times daily.    rosuvastatin (CRESTOR) 40 MG Tab TAKE 1 TABLET (40 MG TOTAL) BY MOUTH EVERY EVENING. GENERIC IS FINE.    timolol maleate 0.5% (TIMOPTIC) 0.5 % Drop INSTILL 1 DROP INTO EACH EYE EVERY MORNING    fenofibric acid (FIBRICOR) 135 mg CpDR TAKE ONE TABLET BY MOUTH EVERY DAY     No current facility-administered medications on file prior to visit.      Review of patient's allergies indicates:  No Known Allergies  Review of Systems   Constitution: Positive for weight gain. Negative for malaise/fatigue.   Eyes: Negative for blurred vision.   Cardiovascular: Negative for chest pain, claudication, cyanosis, dyspnea on exertion, irregular heartbeat, leg swelling, near-syncope, orthopnea, palpitations and paroxysmal nocturnal dyspnea.   Respiratory: Positive for snoring. Negative for cough, hemoptysis and shortness of breath.    Hematologic/Lymphatic: Negative for bleeding problem. Does not bruise/bleed easily.   Skin: Negative for dry skin and itching.   Musculoskeletal: Positive for arthritis and joint pain. Negative for falls, muscle weakness and myalgias.   Gastrointestinal: Negative for abdominal pain, diarrhea, heartburn, hematemesis, hematochezia and melena.   Genitourinary: Negative for flank pain and hematuria.   Neurological: Negative for dizziness, focal weakness, headaches, light-headedness, numbness, paresthesias, seizures and weakness.   Psychiatric/Behavioral: Negative for altered mental status and memory loss. The patient is not nervous/anxious.    Allergic/Immunologic: Negative for hives.       Objective:   Physical Exam   Constitutional: He is oriented to person, place,  "and time. He appears well-developed and well-nourished. No distress.   HENT:   Head: Normocephalic and atraumatic.   Eyes: Pupils are equal, round, and reactive to light. EOM are normal. Right eye exhibits no discharge. Left eye exhibits no discharge.   Neck: Neck supple. No JVD present. No thyromegaly present.   Cardiovascular: Normal rate, regular rhythm and intact distal pulses. Exam reveals no gallop and no friction rub.   Murmur heard.   Harsh midsystolic murmur is present with a grade of 2/6 at the upper right sternal border radiating to the neck.  Pulmonary/Chest: Effort normal and breath sounds normal. No respiratory distress. He has no wheezes. He has no rales. He exhibits no tenderness.   Scar cabg well healed.   Abdominal: Soft. Bowel sounds are normal. He exhibits no distension. There is no tenderness.   Musculoskeletal: Normal range of motion. He exhibits no edema.   Neurological: He is alert and oriented to person, place, and time. No cranial nerve deficit.   Skin: Skin is warm and dry. No rash noted. He is not diaphoretic. No erythema.   Psychiatric: He has a normal mood and affect. His behavior is normal.   Nursing note and vitals reviewed.    Vitals:    04/26/19 1103   BP: (!) 168/80   BP Method: Large (Manual)   Pulse: 70   Weight: 112.9 kg (249 lb)   Height: 5' 6" (1.676 m)     Lab Results   Component Value Date    CHOL 110 (L) 04/17/2019    CHOL 125 01/22/2019    CHOL 126 07/25/2018     Lab Results   Component Value Date    HDL 28 (L) 04/17/2019    HDL 33 (L) 01/22/2019    HDL 31 (L) 07/25/2018     Lab Results   Component Value Date    LDLCALC 63.4 04/17/2019    LDLCALC 76.2 01/22/2019    LDLCALC 73.8 07/25/2018     Lab Results   Component Value Date    TRIG 93 04/17/2019    TRIG 79 01/22/2019    TRIG 106 07/25/2018     Lab Results   Component Value Date    CHOLHDL 25.5 04/17/2019    CHOLHDL 26.4 01/22/2019    CHOLHDL 24.6 07/25/2018       Chemistry        Component Value Date/Time     " 04/17/2019 0812    K 4.1 04/17/2019 0812     04/17/2019 0812    CO2 23 04/17/2019 0812    BUN 15 04/17/2019 0812    CREATININE 1.1 04/17/2019 0812     04/17/2019 0812        Component Value Date/Time    CALCIUM 9.0 04/17/2019 0812    ALKPHOS 79 04/17/2019 0812    AST 23 04/17/2019 0812    ALT 22 04/17/2019 0812    BILITOT 0.6 04/17/2019 0812    ESTGFRAFRICA >60.0 04/17/2019 0812    EGFRNONAA >60.0 04/17/2019 0812          Lab Results   Component Value Date    TSH 1.367 08/16/2014     Lab Results   Component Value Date    INR 1.0 06/26/2010     Lab Results   Component Value Date    WBC 7.88 02/24/2016    HGB 12.9 (L) 02/24/2016    HCT 39.5 (L) 02/24/2016    MCV 92 02/24/2016     02/24/2016     BMP  Sodium   Date Value Ref Range Status   04/17/2019 143 136 - 145 mmol/L Final     Potassium   Date Value Ref Range Status   04/17/2019 4.1 3.5 - 5.1 mmol/L Final     Chloride   Date Value Ref Range Status   04/17/2019 110 95 - 110 mmol/L Final     CO2   Date Value Ref Range Status   04/17/2019 23 23 - 29 mmol/L Final     BUN, Bld   Date Value Ref Range Status   04/17/2019 15 8 - 23 mg/dL Final     Creatinine   Date Value Ref Range Status   04/17/2019 1.1 0.5 - 1.4 mg/dL Final     Calcium   Date Value Ref Range Status   04/17/2019 9.0 8.7 - 10.5 mg/dL Final     Anion Gap   Date Value Ref Range Status   04/17/2019 10 8 - 16 mmol/L Final     eGFR if    Date Value Ref Range Status   04/17/2019 >60.0 >60 mL/min/1.73 m^2 Final     eGFR if non    Date Value Ref Range Status   04/17/2019 >60.0 >60 mL/min/1.73 m^2 Final     Comment:     Calculation used to obtain the estimated glomerular filtration  rate (eGFR) is the CKD-EPI equation.        CrCl cannot be calculated (Patient's most recent lab result is older than the maximum 7 days allowed.).    Assessment:     1. Coronary artery disease involving coronary bypass graft of native heart without angina pectoris    2. Hyperlipidemia,  unspecified hyperlipidemia type    3. Essential hypertension    4. S/P CABG (coronary artery bypass graft)    5. Hypertrophic cardiomyopathy    6. Abnormal EKG    7. Prostate cancer    8. Obesity, Class III, BMI 40-49.9 (morbid obesity)    9. Primary osteoarthritis of left knee    10. Chronic kidney disease, stage III (moderate)      Needs better compliance with diet exercise weight loss . Will benefit from increasing amlodipine 5 mg po daily.  I think he needs sleep eval and needs knee surgery he is not interested.   Plan:   Amlodipine 5 mg po daily   Continue current therapy  Cardiac low salt diet.  Risk factor modification and excercise program.  F/u in 1 month for bp

## 2019-05-02 ENCOUNTER — OFFICE VISIT (OUTPATIENT)
Dept: OPHTHALMOLOGY | Facility: CLINIC | Age: 69
End: 2019-05-02
Payer: MEDICARE

## 2019-05-02 DIAGNOSIS — H25.13 NUCLEAR SCLEROSIS, BILATERAL: ICD-10-CM

## 2019-05-02 DIAGNOSIS — H40.1132 PRIMARY OPEN ANGLE GLAUCOMA OF BOTH EYES, MODERATE STAGE: Primary | ICD-10-CM

## 2019-05-02 PROCEDURE — 99999 PR PBB SHADOW E&M-EST. PATIENT-LVL II: ICD-10-PCS | Mod: PBBFAC,,, | Performed by: OPHTHALMOLOGY

## 2019-05-02 PROCEDURE — 92012 INTRM OPH EXAM EST PATIENT: CPT | Mod: S$PBB,,, | Performed by: OPHTHALMOLOGY

## 2019-05-02 PROCEDURE — 92012 PR EYE EXAM, EST PATIENT,INTERMED: ICD-10-PCS | Mod: S$PBB,,, | Performed by: OPHTHALMOLOGY

## 2019-05-02 PROCEDURE — 99999 PR PBB SHADOW E&M-EST. PATIENT-LVL II: CPT | Mod: PBBFAC,,, | Performed by: OPHTHALMOLOGY

## 2019-05-02 PROCEDURE — 92133 POSTERIOR SEGMENT OCT OPTIC NERVE(OCULAR COHERENCE TOMOGRAPHY) - OU - BOTH EYES: ICD-10-PCS | Mod: 26,S$PBB,, | Performed by: OPHTHALMOLOGY

## 2019-05-02 PROCEDURE — 92133 CPTRZD OPH DX IMG PST SGM ON: CPT | Mod: PBBFAC,PN | Performed by: OPHTHALMOLOGY

## 2019-05-02 PROCEDURE — 99212 OFFICE O/P EST SF 10 MIN: CPT | Mod: PBBFAC,25,PN | Performed by: OPHTHALMOLOGY

## 2019-05-02 NOTE — PROGRESS NOTES
SUBJECTIVE:   Damon Yoo is a 69 y.o. male   Uncorrected distance visual acuity was 20/40 in the right eye and 20/40 in the left eye.   Chief Complaint   Patient presents with    Glaucoma     3 Months IOP CHECK & GOCT        HPI:  HPI     Glaucoma      Additional comments: 3 Months IOP CHECK & GOCT              Comments     Patient States Vision Stable & No Discomfort. 100% Drops Compliance     1. Mod COAG- No FHx (init 32/27 on 3/10 with C/D .65/.60) Goal <18, new   goal = 15 6/2017  SLT OD 12/11/14 (20-16)  SLT OS 2/25/15 (20-16)  2. Mild NSC  3. LLL Cyst Excision on 6/13/12    Latanoprost QHS OU  Timolol qam OU          Last edited by Serina Dumont on 5/2/2019  8:28 AM. (History)        Assessment /Plan :  1. Primary open angle glaucoma of both eyes, moderate stage Doing well, IOP within acceptable range relative to target IOP and no evidence of progression. Continue current treatment. Reviewed importance of continued compliance with treatment and follow up.     2. Nuclear sclerosis, bilateral -  Observe.       Return to clinic in 3 months  or as needed.  With IOP Check

## 2019-05-31 ENCOUNTER — OFFICE VISIT (OUTPATIENT)
Dept: CARDIOLOGY | Facility: CLINIC | Age: 69
End: 2019-05-31
Payer: MEDICARE

## 2019-05-31 VITALS
BODY MASS INDEX: 40.22 KG/M2 | SYSTOLIC BLOOD PRESSURE: 144 MMHG | HEIGHT: 66 IN | DIASTOLIC BLOOD PRESSURE: 76 MMHG | HEART RATE: 68 BPM | WEIGHT: 250.25 LBS

## 2019-05-31 DIAGNOSIS — E78.5 HYPERLIPIDEMIA, UNSPECIFIED HYPERLIPIDEMIA TYPE: ICD-10-CM

## 2019-05-31 DIAGNOSIS — I10 ESSENTIAL HYPERTENSION: ICD-10-CM

## 2019-05-31 DIAGNOSIS — N18.30 CHRONIC KIDNEY DISEASE, STAGE III (MODERATE): ICD-10-CM

## 2019-05-31 DIAGNOSIS — Z95.1 S/P CABG (CORONARY ARTERY BYPASS GRAFT): ICD-10-CM

## 2019-05-31 DIAGNOSIS — I42.2 HYPERTROPHIC CARDIOMYOPATHY: ICD-10-CM

## 2019-05-31 DIAGNOSIS — I25.810 CORONARY ARTERY DISEASE INVOLVING CORONARY BYPASS GRAFT OF NATIVE HEART WITHOUT ANGINA PECTORIS: Primary | ICD-10-CM

## 2019-05-31 PROCEDURE — 99999 PR PBB SHADOW E&M-EST. PATIENT-LVL III: ICD-10-PCS | Mod: PBBFAC,,, | Performed by: PHYSICIAN ASSISTANT

## 2019-05-31 PROCEDURE — 99999 PR PBB SHADOW E&M-EST. PATIENT-LVL III: CPT | Mod: PBBFAC,,, | Performed by: PHYSICIAN ASSISTANT

## 2019-05-31 PROCEDURE — 99214 OFFICE O/P EST MOD 30 MIN: CPT | Mod: S$PBB,,, | Performed by: PHYSICIAN ASSISTANT

## 2019-05-31 PROCEDURE — 99213 OFFICE O/P EST LOW 20 MIN: CPT | Mod: PBBFAC,PN | Performed by: PHYSICIAN ASSISTANT

## 2019-05-31 PROCEDURE — 99214 PR OFFICE/OUTPT VISIT, EST, LEVL IV, 30-39 MIN: ICD-10-PCS | Mod: S$PBB,,, | Performed by: PHYSICIAN ASSISTANT

## 2019-05-31 NOTE — PROGRESS NOTES
Subjective:    Patient ID:  Damon Yoo is a 69 y.o. male who presents for follow-up of Coronary Artery Disease; Hypertension; and Hyperlipidemia      HPI   Mr. Yoo is a 69 year old male patient whose current medical conditions include CAD s/p CABG, HCOM, HTN, and hyperlipidemia who presents today for follow-up. Patient previously seen by Dr. Fyae and started on amlodipine for better BP control. He returns today and states he is doing well. His main complaint today is left knee pain. Cardiac wise, seems stable. No chest pain, heaviness, or tightness. No lightheadedness, dizziness, near syncope, or syncope. No PND, orthopnea, or lower extremity edema. No SOB. BP improved. Patient reports compliance with his medications. Trying to be more mindful of his salt intake. Recent labs reviewed. Lipids at goal. CMP stable.       Review of Systems   Constitution: Negative for chills, decreased appetite, fever and malaise/fatigue.   HENT: Negative for congestion, hoarse voice and sore throat.    Eyes: Negative for blurred vision and discharge.   Cardiovascular: Negative for chest pain, claudication, cyanosis, dyspnea on exertion, irregular heartbeat, leg swelling, near-syncope, orthopnea, palpitations and paroxysmal nocturnal dyspnea.   Respiratory: Negative for cough, hemoptysis, shortness of breath, snoring, sputum production and wheezing.    Endocrine: Negative for cold intolerance and heat intolerance.   Hematologic/Lymphatic: Negative for bleeding problem. Does not bruise/bleed easily.   Skin: Negative for rash.   Musculoskeletal: Negative for arthritis, back pain, joint pain, joint swelling, muscle cramps, muscle weakness and myalgias.   Gastrointestinal: Negative for abdominal pain, constipation, diarrhea, heartburn, melena and nausea.   Genitourinary: Negative for hematuria.   Neurological: Negative for dizziness, focal weakness, headaches, light-headedness, loss of balance, numbness, paresthesias, seizures and  "weakness.   Psychiatric/Behavioral: Negative for memory loss. The patient does not have insomnia.    Allergic/Immunologic: Negative for hives.     BP (!) 144/76 (BP Location: Right arm, Patient Position: Sitting, BP Method: Large (Manual))   Pulse 68   Ht 5' 6" (1.676 m)   Wt 113.5 kg (250 lb 3.6 oz)   BMI 40.39 kg/m²     Objective:    Physical Exam   Constitutional: He is oriented to person, place, and time. He appears well-developed and well-nourished. No distress.   HENT:   Head: Normocephalic and atraumatic.   Eyes: Pupils are equal, round, and reactive to light. Right eye exhibits no discharge. Left eye exhibits no discharge.   Neck: Neck supple. No JVD present. No tracheal deviation present. No thyromegaly present.   Cardiovascular: Normal rate, regular rhythm, normal heart sounds and intact distal pulses. PMI is not displaced. Exam reveals no gallop, no S3, no S4 and no friction rub.   No murmur heard.  Pulmonary/Chest: Effort normal and breath sounds normal. No respiratory distress. He has no wheezes. He has no rales.   Abdominal: He exhibits no distension. There is no tenderness. There is no rebound.   Musculoskeletal: He exhibits no edema.   Neurological: He is alert and oriented to person, place, and time.   Skin: Skin is warm and dry. He is not diaphoretic. No erythema.   Psychiatric: He has a normal mood and affect. His behavior is normal.       Chemistry        Component Value Date/Time     04/17/2019 0812    K 4.1 04/17/2019 0812     04/17/2019 0812    CO2 23 04/17/2019 0812    BUN 15 04/17/2019 0812    CREATININE 1.1 04/17/2019 0812     04/17/2019 0812        Component Value Date/Time    CALCIUM 9.0 04/17/2019 0812    ALKPHOS 79 04/17/2019 0812    AST 23 04/17/2019 0812    ALT 22 04/17/2019 0812    BILITOT 0.6 04/17/2019 0812    ESTGFRAFRICA >60.0 04/17/2019 0812    EGFRNONAA >60.0 04/17/2019 0812      ,  Lab Results   Component Value Date    CHOL 110 (L) 04/17/2019    CHOL 125 " 01/22/2019    CHOL 126 07/25/2018     Lab Results   Component Value Date    HDL 28 (L) 04/17/2019    HDL 33 (L) 01/22/2019    HDL 31 (L) 07/25/2018     Lab Results   Component Value Date    LDLCALC 63.4 04/17/2019    LDLCALC 76.2 01/22/2019    LDLCALC 73.8 07/25/2018     Lab Results   Component Value Date    TRIG 93 04/17/2019    TRIG 79 01/22/2019    TRIG 106 07/25/2018     Lab Results   Component Value Date    CHOLHDL 25.5 04/17/2019    CHOLHDL 26.4 01/22/2019    CHOLHDL 24.6 07/25/2018       Assessment:       1. Coronary artery disease involving coronary bypass graft of native heart without angina pectoris    2. Hyperlipidemia, unspecified hyperlipidemia type    3. Hypertrophic cardiomyopathy    4. Essential hypertension    5. S/P CABG (coronary artery bypass graft)    6. Chronic kidney disease, stage III (moderate)      Doing clinically well. No angina/equivalent. BP improved, still not quite at goal. Encouraged salt compliance, increase in activity as tolerated. Needs 2D echo before 6 month follow-up, can do at [facility].   Plan:   -Continue current medical management and risk factor modification  -Cardiac, low salt diet  -Increase activity level, as knee permits  -RTC 6 months with lipid, cmp, 2D echo (can do at [facility])    Chart reviewed. Dr. Faye agrees with plan as outlined above.

## 2019-06-10 ENCOUNTER — OFFICE VISIT (OUTPATIENT)
Dept: UROLOGY | Facility: CLINIC | Age: 69
End: 2019-06-10
Payer: MEDICARE

## 2019-06-10 ENCOUNTER — LAB VISIT (OUTPATIENT)
Dept: LAB | Facility: HOSPITAL | Age: 69
End: 2019-06-10
Attending: UROLOGY
Payer: MEDICARE

## 2019-06-10 ENCOUNTER — TELEPHONE (OUTPATIENT)
Dept: UROLOGY | Facility: CLINIC | Age: 69
End: 2019-06-10

## 2019-06-10 VITALS — BODY MASS INDEX: 40.41 KG/M2 | WEIGHT: 251.44 LBS | HEIGHT: 66 IN

## 2019-06-10 DIAGNOSIS — C61 PROSTATE CANCER: Primary | ICD-10-CM

## 2019-06-10 DIAGNOSIS — C61 PROSTATE CANCER: ICD-10-CM

## 2019-06-10 LAB
BILIRUB SERPL-MCNC: NORMAL MG/DL
BLOOD URINE, POC: NORMAL
COLOR, POC UA: YELLOW
COMPLEXED PSA SERPL-MCNC: <0.01 NG/ML (ref 0–4)
GLUCOSE UR QL STRIP: NORMAL
KETONES UR QL STRIP: NORMAL
LEUKOCYTE ESTERASE URINE, POC: NORMAL
NITRITE, POC UA: NORMAL
PH, POC UA: 5
PROTEIN, POC: NORMAL
SPECIFIC GRAVITY, POC UA: 1.01
UROBILINOGEN, POC UA: NORMAL

## 2019-06-10 PROCEDURE — 99999 PR PBB SHADOW E&M-EST. PATIENT-LVL III: CPT | Mod: PBBFAC,,, | Performed by: UROLOGY

## 2019-06-10 PROCEDURE — 99999 PR PBB SHADOW E&M-EST. PATIENT-LVL III: ICD-10-PCS | Mod: PBBFAC,,, | Performed by: UROLOGY

## 2019-06-10 PROCEDURE — 99213 PR OFFICE/OUTPT VISIT, EST, LEVL III, 20-29 MIN: ICD-10-PCS | Mod: S$PBB,,, | Performed by: UROLOGY

## 2019-06-10 PROCEDURE — 99213 OFFICE O/P EST LOW 20 MIN: CPT | Mod: PBBFAC | Performed by: UROLOGY

## 2019-06-10 PROCEDURE — 99213 OFFICE O/P EST LOW 20 MIN: CPT | Mod: S$PBB,,, | Performed by: UROLOGY

## 2019-06-10 PROCEDURE — 81002 URINALYSIS NONAUTO W/O SCOPE: CPT | Mod: PBBFAC | Performed by: UROLOGY

## 2019-06-10 PROCEDURE — 36415 COLL VENOUS BLD VENIPUNCTURE: CPT

## 2019-06-10 PROCEDURE — 84153 ASSAY OF PSA TOTAL: CPT

## 2019-06-10 NOTE — PROGRESS NOTES
Chief Complaint: Prostate Cancer    HPI:   6/10/19: PSA not done yet had some trouble at the lab with the sticks.  No acute problems.  No LUTS.  6/6/18: No problems, low PSA, dry.  Stable.  Reviewed history in detail.  ED persists.  5/31/17: Still doing well, PSA undetectable.  Continent.  Not worried about ED.  Reviewed history in detail.  5/30/16: No problems.   11/2/15: Doing well.  4/20/15: No changes since last visit.  10/13/14: Again doing well, cialis not working  3/17/14: Doing well.    12/16/13: Doing well. Cialis effective.  No incontinence.  No constipation.  9/9/13: Pt had RALP 4/4/13.  [id number] prostate cancer.  No incontinence unless cough or sneeze after letting himself fill up.  Cialis controlling ED well. No complaints.  PSA undetectable.    Allergies:  Patient has no known allergies.    Medications:  has a current medication list which includes the following prescription(s): allopurinol, amlodipine, aspirin, fish oil-fat acid [id number], flunisolide 25 mcg (0.025%), latanoprost, lisinopril, metoprolol succinate, rosuvastatin, and timolol maleate 0.5%.    Review of Systems:  General: No fever, chills, fatigability, or weight loss.  Skin: No rashes, itching, or changes in color or texture of skin.  Chest: Denies DIAZ, cyanosis, wheezing, cough, and sputum production.  Abdomen: Appetite fine. No weight loss. Denies diarrhea, abdominal pain, hematemesis, or blood in stool.  Musculoskeletal: No joint stiffness or swelling. Denies back pain.  : As above.  All other review of systems negative.    PMH:   has a past medical history of Abnormal EKG (10/25/2013), Arthritis, CAD (coronary artery disease), Cancer, Glaucoma, Gout attack, Hyperlipidemia, Hypertension, Hypertrophic cardiomyopathy (10/25/2013), and S/P CABG (coronary artery bypass graft) (10/25/2013).    PSH:   has a past surgical history that includes Prostate surgery; Coronary artery bypass graft (05/2003); and Colonoscopy (N/A,  12/4/2017).    FamHx: family history includes Hypertension in his father.    SocHx:  reports that he quit smoking about 31 years ago. He has a 3.75 pack-year smoking history. He has never used smokeless tobacco. He reports that he does not drink alcohol or use drugs.      Physical Exam:  There were no vitals filed for this visit.  General: A&Ox3, no apparent distress, no deformities  Neck: No masses, normal thyroid  Lungs: normal inspiration, no use of accessory muscles  Heart: normal pulse, no arrhythmias  Abdomen: Soft, NT, ND  Skin: The skin is warm and dry. No jaundice.  Ext: No c/c/e.  :   5/16: MONA normal    Labs/Studies:   PSA    10/15, 5/16, 5/17, 5/18: undetectable    Impression/Plan:   1. PSA/RTC 12 mo with PCP, RTC as needed.

## 2019-06-10 NOTE — TELEPHONE ENCOUNTER
----- Message from Billy Lucas sent at 6/10/2019 12:32 PM CDT -----  Contact: pt wife  Type:  Test Results    Who Called: pt wife   Name of Test (Lab/Mammo/Etc): labs    Date of Test:  06/10/2019  Ordering Provider: marco   Where the test was performed: [facility]  Would the patient rather a call back or a response via [facility]? Call   Best Call Back Number: [phone]  Additional Information:

## 2019-06-24 ENCOUNTER — TELEPHONE (OUTPATIENT)
Dept: RHEUMATOLOGY | Facility: CLINIC | Age: 69
End: 2019-06-24

## 2019-06-25 ENCOUNTER — TELEPHONE (OUTPATIENT)
Dept: RHEUMATOLOGY | Facility: CLINIC | Age: 69
End: 2019-06-25

## 2019-06-25 ENCOUNTER — OFFICE VISIT (OUTPATIENT)
Dept: RHEUMATOLOGY | Facility: CLINIC | Age: 69
End: 2019-06-25
Payer: MEDICARE

## 2019-06-25 ENCOUNTER — PATIENT MESSAGE (OUTPATIENT)
Dept: RHEUMATOLOGY | Facility: CLINIC | Age: 69
End: 2019-06-25

## 2019-06-25 VITALS
HEART RATE: 60 BPM | SYSTOLIC BLOOD PRESSURE: 134 MMHG | DIASTOLIC BLOOD PRESSURE: 75 MMHG | BODY MASS INDEX: 41.28 KG/M2 | WEIGHT: 255.75 LBS

## 2019-06-25 DIAGNOSIS — M1A.39X0 CHRONIC GOUT DUE TO RENAL IMPAIRMENT OF MULTIPLE SITES WITHOUT TOPHUS: ICD-10-CM

## 2019-06-25 DIAGNOSIS — M17.12 PRIMARY OSTEOARTHRITIS OF LEFT KNEE: Primary | ICD-10-CM

## 2019-06-25 DIAGNOSIS — Z51.81 MEDICATION MONITORING ENCOUNTER: ICD-10-CM

## 2019-06-25 PROCEDURE — 99214 OFFICE O/P EST MOD 30 MIN: CPT | Mod: S$PBB,,, | Performed by: PHYSICIAN ASSISTANT

## 2019-06-25 PROCEDURE — 99999 PR PBB SHADOW E&M-EST. PATIENT-LVL III: ICD-10-PCS | Mod: PBBFAC,,, | Performed by: PHYSICIAN ASSISTANT

## 2019-06-25 PROCEDURE — 99999 PR PBB SHADOW E&M-EST. PATIENT-LVL III: CPT | Mod: PBBFAC,,, | Performed by: PHYSICIAN ASSISTANT

## 2019-06-25 PROCEDURE — 99214 PR OFFICE/OUTPT VISIT, EST, LEVL IV, 30-39 MIN: ICD-10-PCS | Mod: S$PBB,,, | Performed by: PHYSICIAN ASSISTANT

## 2019-06-25 PROCEDURE — 99213 OFFICE O/P EST LOW 20 MIN: CPT | Mod: PBBFAC | Performed by: PHYSICIAN ASSISTANT

## 2019-06-25 NOTE — TELEPHONE ENCOUNTER
----- Message from Anthony Sanchez LPN sent at 6/25/2019  2:55 PM CDT -----  Regarding: FW: possible knee replacement   Hey,     You can place the referrals now and my navigator can keep track of them.     Thanks so much!   ----- Message -----  From: Alethea Beth PA-C  Sent: 6/25/2019   8:39 AM  To: Anthony Sanchez LPN  Subject: possible knee replacement                        anthony    I have a few pts I want to see dr heck  Mr holman is one of them, left knee replacement candidate    Do I wait to placed referral once dr heck starts or do I need to do this now?    Barbie upton messaged me that either you or dali were keeping a list.    Just looking for guidance  Thanks  Alethea ROBIN  Rheumatology

## 2019-06-25 NOTE — PROGRESS NOTES
Subjective:       Patient ID: Damon Yoo is a 69 y.o. male.    Chief Complaint: Disease Management and Osteoarthritis (left knee)      Damon is back today for rheum visit.     Est dx of chronic gout on allopurinol 450 mg daily and colcrys prn. He denies any gout attacks since last visit. No redness, swelling or warmth to any joints. Not taking colcrys. Off nsaids due to ckd. Using tylenol also prn.  x-rays showed tricompartmental djd both knees left worse than right. We did euflexxa to both knees march 2015. This helped some but not enough to repeat. Left knee progressively getting worse. Would like to consult orthopedic surgeon.  Today left knee pain reported 6-7/10.       Osteoarthritis   Associated symptoms include arthralgias. Pertinent negatives include no abdominal pain, chest pain, chills, congestion, coughing, fatigue, fever, joint swelling, myalgias, nausea, neck pain, numbness, rash, vomiting or weakness.           He reports no joint swelling. Pertinent negatives include no dysuria, fatigue, fever, trouble swallowing or myalgias.     His past medical history is significant for osteoarthritis.   Knee Pain   Associated symptoms include arthralgias. Pertinent negatives include no abdominal pain, chest pain, chills, congestion, coughing, fatigue, fever, joint swelling, myalgias, nausea, neck pain, numbness, rash, vomiting or weakness.           He reports no joint swelling. Pertinent negatives include no dysuria, fatigue, fever, trouble swallowing or myalgias.     His past medical history is significant for osteoarthritis.       Review of Systems   Constitutional: Negative.  Negative for chills, fatigue and fever.   HENT: Negative.  Negative for congestion, mouth sores and trouble swallowing.    Eyes: Negative.  Negative for photophobia, redness and visual disturbance.   Respiratory: Negative.  Negative for cough, shortness of breath and wheezing.    Cardiovascular: Negative.  Negative for chest pain  and leg swelling.   Gastrointestinal: Negative.  Negative for abdominal distention, abdominal pain, diarrhea, nausea and vomiting.   Genitourinary: Negative.  Negative for dysuria, flank pain, frequency and urgency.   Musculoskeletal: Positive for arthralgias. Negative for back pain, gait problem, joint swelling, myalgias and neck pain.        Both knees     Skin: Negative.  Negative for rash.   Neurological: Negative.  Negative for dizziness, weakness and numbness.         Objective:   /75   Pulse 60   Wt 116 kg (255 lb 11.7 oz)   BMI 41.28 kg/m²      Physical Exam   Constitutional: He is oriented to person, place, and time and well-developed, well-nourished, and in no distress. No distress.   HENT:   Head: Normocephalic and atraumatic.   Right Ear: External ear normal.   Left Ear: External ear normal.   Mouth/Throat: No oropharyngeal exudate.   Eyes: Conjunctivae and EOM are normal. Pupils are equal, round, and reactive to light. No scleral icterus.   Neck: Neck supple. No thyromegaly present.   Cardiovascular: Normal rate, regular rhythm and normal heart sounds.    No murmur heard.  Pulmonary/Chest: Effort normal and breath sounds normal. No respiratory distress.   Abdominal: Soft. Bowel sounds are normal.       Right Side Rheumatological Exam     Examination finds the shoulder, elbow, wrist, knee, 1st PIP, 1st MCP, 2nd PIP, 2nd MCP, 3rd PIP, 3rd MCP, 4th PIP, 4th MCP, 5th PIP and 5th MCP normal.    Knee Exam     Range of Motion   The patient has normal right knee ROM.  Extension: normal   Flexion: normal   Patellofemoral Crepitus: positive  Effusion: positive  Warmth: negative    Left Side Rheumatological Exam     Examination finds the shoulder, elbow, wrist, knee, 1st PIP, 1st MCP, 2nd PIP, 2nd MCP, 3rd PIP, 3rd MCP, 4th PIP, 4th MCP, 5th PIP and 5th MCP normal.    Knee Exam     Range of Motion   The patient has normal left knee ROM.  Extension: normal   Flexion: normal     Patellofemoral Crepitus:  positive  Effusion: negative  Warmth: negative      Lymphadenopathy:     He has no cervical adenopathy.   Neurological: He is alert and oriented to person, place, and time. No cranial nerve deficit. He exhibits normal muscle tone. Gait normal. Coordination normal.   Skin: Skin is warm and dry.     Musculoskeletal: Normal range of motion. He exhibits no edema.           Recent Results (from the past 1008 hour(s))   Prostate Specific Antigen, Diagnostic    Collection Time: 06/10/19  7:54 AM   Result Value Ref Range    PSA DIAGNOSTIC <0.01 0.00 - 4.00 ng/mL   POCT URINE DIPSTICK WITHOUT MICROSCOPE    Collection Time: 06/10/19  8:17 AM   Result Value Ref Range    Color, UA yellow     Spec Grav UA 1.015     pH, UA 5     WBC, UA neg     Nitrite, UA neg     Protein trace     Glucose, UA neg     Ketones, UA neg     Urobilinogen, UA neg     Bilirubin neg     Blood, UA neg             Assessment:       1. Primary osteoarthritis of left knee    2. Chronic gout due to renal impairment of multiple sites without tophus    3. Medication monitoring encounter          1. Gout stable on allopurinol 450 mg daily-  uric acid 5.0 at goal     2. OA both knees- failed euflexxa; will need left TKA eventually    3. Med monitoring        Plan:         keep allopurinol at 450 mg daily,     Use colcrys only prn for acute attacks,     Refer to ortho for evaluation for possible Left tka     rtc 6 year with labs- cmp and uric acid    call with any questions, changes or concerns

## 2019-06-26 ENCOUNTER — TELEPHONE (OUTPATIENT)
Dept: ORTHOPEDICS | Facility: CLINIC | Age: 69
End: 2019-06-26

## 2019-07-09 ENCOUNTER — OFFICE VISIT (OUTPATIENT)
Dept: INTERNAL MEDICINE | Facility: CLINIC | Age: 69
End: 2019-07-09
Payer: MEDICARE

## 2019-07-09 VITALS
WEIGHT: 252.44 LBS | BODY MASS INDEX: 40.57 KG/M2 | TEMPERATURE: 97 F | SYSTOLIC BLOOD PRESSURE: 138 MMHG | HEART RATE: 60 BPM | DIASTOLIC BLOOD PRESSURE: 68 MMHG | OXYGEN SATURATION: 98 % | HEIGHT: 66 IN

## 2019-07-09 DIAGNOSIS — M54.50 ACUTE RIGHT-SIDED LOW BACK PAIN WITHOUT SCIATICA: Primary | ICD-10-CM

## 2019-07-09 PROCEDURE — 99214 PR OFFICE/OUTPT VISIT, EST, LEVL IV, 30-39 MIN: ICD-10-PCS | Mod: 25,S$PBB,, | Performed by: PHYSICIAN ASSISTANT

## 2019-07-09 PROCEDURE — 99214 OFFICE O/P EST MOD 30 MIN: CPT | Mod: 25,S$PBB,, | Performed by: PHYSICIAN ASSISTANT

## 2019-07-09 PROCEDURE — 99999 PR PBB SHADOW E&M-EST. PATIENT-LVL IV: ICD-10-PCS | Mod: PBBFAC,,, | Performed by: PHYSICIAN ASSISTANT

## 2019-07-09 PROCEDURE — 99999 PR PBB SHADOW E&M-EST. PATIENT-LVL IV: CPT | Mod: PBBFAC,,, | Performed by: PHYSICIAN ASSISTANT

## 2019-07-09 PROCEDURE — 99214 OFFICE O/P EST MOD 30 MIN: CPT | Mod: PBBFAC | Performed by: PHYSICIAN ASSISTANT

## 2019-07-09 PROCEDURE — 96372 THER/PROPH/DIAG INJ SC/IM: CPT | Mod: PBBFAC

## 2019-07-09 RX ORDER — KETOROLAC TROMETHAMINE 30 MG/ML
30 INJECTION, SOLUTION INTRAMUSCULAR; INTRAVENOUS ONCE
Status: COMPLETED | OUTPATIENT
Start: 2019-07-09 | End: 2019-07-09

## 2019-07-09 RX ADMIN — KETOROLAC TROMETHAMINE 30 MG: 30 INJECTION, SOLUTION INTRAMUSCULAR; INTRAVENOUS at 09:07

## 2019-07-09 NOTE — PROGRESS NOTES
Subjective:      Patient ID: Damon Yoo is a 69 y.o. male.    Chief Complaint: Back Pain    Low-back Pain   This is a new problem. The current episode started in the past 7 days. The problem occurs constantly. The problem has been gradually worsening. Pertinent negatives include no abdominal pain, anorexia, arthralgias, change in bowel habit, chest pain, chills, congestion, coughing, diaphoresis, fatigue, fever, headaches, joint swelling, myalgias, nausea, neck pain, numbness, rash, sore throat, swollen glands, urinary symptoms, vertigo, visual change, vomiting or weakness. The symptoms are aggravated by twisting. He has tried acetaminophen for the symptoms. The treatment provided no relief.       Review of Systems   Constitutional: Negative for activity change, appetite change, chills, diaphoresis, fatigue, fever and unexpected weight change.   HENT: Negative.  Negative for congestion, hearing loss, postnasal drip, rhinorrhea, sore throat, trouble swallowing and voice change.    Eyes: Negative.  Negative for visual disturbance.   Respiratory: Negative.  Negative for cough, choking, chest tightness and shortness of breath.    Cardiovascular: Negative for chest pain, palpitations and leg swelling.   Gastrointestinal: Negative for abdominal distention, abdominal pain, anorexia, blood in stool, change in bowel habit, constipation, diarrhea, nausea and vomiting.   Endocrine: Negative for cold intolerance, heat intolerance, polydipsia and polyuria.   Genitourinary: Negative.  Negative for difficulty urinating and frequency.   Musculoskeletal: Positive for back pain. Negative for arthralgias, gait problem, joint swelling, myalgias and neck pain.   Skin: Negative for color change, pallor, rash and wound.   Neurological: Negative for dizziness, vertigo, tremors, weakness, light-headedness, numbness and headaches.   Hematological: Negative for adenopathy.   Psychiatric/Behavioral: Negative for behavioral problems,  "confusion, self-injury, sleep disturbance and suicidal ideas. The patient is not nervous/anxious.      Objective:   /68   Pulse 60   Temp 97.4 °F (36.3 °C) (Tympanic)   Ht 5' 6" (1.676 m)   Wt 114.5 kg (252 lb 6.8 oz)   SpO2 98%   BMI 40.74 kg/m²     Physical Exam   Constitutional: He is oriented to person, place, and time. He appears well-developed and well-nourished. No distress.   Cardiovascular: Normal rate, regular rhythm, normal heart sounds and intact distal pulses. Exam reveals no friction rub.   No murmur heard.  Pulmonary/Chest: Effort normal and breath sounds normal. No stridor. No respiratory distress. He has no wheezes.   Musculoskeletal:        Lumbar back: He exhibits tenderness, pain and spasm. He exhibits normal range of motion, no bony tenderness, no swelling, no edema, no deformity, no laceration and normal pulse.        Back:    Neurological: He is alert and oriented to person, place, and time.   Skin: He is not diaphoretic.   Nursing note and vitals reviewed.      Assessment:     1. Acute right-sided low back pain without sciatica      Plan:   Acute right-sided low back pain without sciatica  -     ketorolac injection 30 mg    -alternate ice and heat  Educational handout on over-the-counter medications and at-home conservative care, pertinent to the patients diagnosis today, was handed to the patient and discussed in detail.  -tylenol arthritis  -if no improvement, will consider steroid taper +/- muscle relaxer    Follow up if symptoms worsen or fail to improve.      "

## 2019-07-09 NOTE — PATIENT INSTRUCTIONS
-tylenol arthritis as instructed on the box for the next 5-7 days  -alternate ice and heat throughout the day    Self-Care for Low Back Pain    Most people have low back pain now and then. In many cases, it isnt serious and self-care can help. Sometimes low back pain can be a sign of a bigger problem. Call your healthcare provider if your pain returns often or gets worse over time. For the long-term care of your back, get regular exercise, lose any excess weight and learn good posture.  Take a short rest  Lying down during the day may be beneficial for short periods of time if severe pain increases with sitting or standing. Long-term bed rest could be detrimental.  Reduce pain and swelling  Cold reduces swelling. Both cold and heat can reduce pain. Protect your skin by placing a towel between your body and the ice or heat source.  · For the first few days, apply an ice pack for 15 to 20 minutes .  · After the first few days, try heat for 15 minutes at a time to ease pain. Never sleep on a heating pad.  · Over-the-counter medicine can help control pain and swelling. Try aspirin or ibuprofen.  Exercise  Exercise can help your back heal. It also helps your back get stronger and more flexible, preventing any reinjury. Ask your healthcare provider about specific exercises for your back.  Use good posture to avoid reinjury  · When moving, bend at the hips and knees. Dont bend at the waist or twist around.  · When lifting, keep the object close to your body. Dont try to lift more than you can handle.  · When sitting, keep your lower back supported. Use a rolled-up towel as needed.  Seek immediate medical care if:  · Youre unable to stand or walk.  · You have a temperature over 100.4°F (38.0°C)  · You have frequent, painful, or bloody urination.  · You have severe abdominal pain.  · You have a sharp, stabbing pain.  · Your pain is constant.  · You have pain or numbness in your leg.  · You feel pain in a new area of  your back.  · You notice that the pain isnt decreasing after more than a week.   Date Last Reviewed: 9/29/2015  © 5342-0556 [employer]. [address] [place] [address]. All rights reserved. This information is not intended as a substitute for professional medical care. Always follow your healthcare professional's instructions.        Relieving Back Pain  Back pain is a common problem. You can strain back muscles by lifting too much weight or just by moving the wrong way. Back strain can be uncomfortable, even painful. And it can take weeks or months to improve. To help yourself feel better and prevent future back strains, try these tips.  Important Note: Do not give aspirin to children or teens without first discussing it with your healthcare provider.      ? Ice    Ice reduces muscle pain and swelling. It helps most during the first 24 to 48 hours after an injury.  · Wrap an ice pack or a bag of frozen peas in a thin towel. (Never place ice directly on your skin.)  · Place the ice where your back hurts the most.  · Dont ice for more than 20 minutes at a time.  · You can use ice several times a day.  ? Medicines  Over-the-counter pain relievers can include acetaminophen and anti-inflammatory medicines, which includes aspirin or ibuprofen. They can help ease discomfort. Some also reduce swelling.  · Tell your healthcare provider about any medicines you are already taking.  · Take medicines only as directed.  ? Heat  After the first 48 hours, heat can relax sore muscles and improve blood flow.  · Try a warm bath or shower. Or use a heating pad set on low. To prevent a burn, keep a cloth between you and the heating pad.  · Dont use a heating pad for more than 15 minutes at a time. Never sleep on a heating pad.  Date Last Reviewed: 9/1/2015  © 9415-5280 [employer]. [address] [place] [address]. All rights reserved. This information is not intended as a substitute  for professional medical care. Always follow your healthcare professional's instructions.        Back Pain (Acute or Chronic)    Back pain is one of the most common problems. The good news is that most people feel better in 1 to 2 weeks, and most of the rest in 1 to 2 months. Most people can remain active.  People experience and describe pain differently; not everyone is the same.  · The pain can be sharp, stabbing, shooting, aching, cramping or burning.  · Movement, standing, bending, lifting, sitting, or walking may worsen pain.  · It can be localized to one spot or area, or it can be more generalized.  · It can spread or radiate upwards, to the front, or go down your arms or legs (sciatica).  · It can cause muscle spasm.  Most of the time, mechanical problems with the muscles or spine cause the pain. Mechanical problems are usually caused by an injury to the muscles or ligaments. While illness can cause back pain, it is usually not caused by a serious illness. Mechanical problems include:   · Physical activity such as sports, exercise, work, or normal activity  · Overexertion, lifting, pushing, pulling incorrectly or too aggressively  · Sudden twisting, bending, or stretching from an accident, or accidental movement  · Poor posture  · Stretching or moving wrong, without noticing pain at the time  · Poor coordination, lack of regular exercise (check with your doctor about this)  · Spinal disc disease or arthritis  · Stress  Pain can also be related to pregnancy, or illness like appendicitis, bladder or kidney infections, pelvic infections, and many other things.  Acute back pain usually gets better in 1 to 2 weeks. Back pain related to disk disease, arthritis in the spinal joints or spinal stenosis (narrowing of the spinal canal) can become chronic and last for months or years.  Unless you had a physical injury (for example, a car accident or fall) X-rays are usually not needed for the initial evaluation of back  pain. If pain continues and does not respond to medical treatment, X-rays and other tests may be needed.  Home care  Try these home care recommendations:  · When in bed, try to find a position of comfort. A firm mattress is best. Try lying flat on your back with pillows under your knees. You can also try lying on your side with your knees bent up towards your chest and a pillow between your knees.  · At first, do not try to stretch out the sore spots. If there is a strain, it is not like the good soreness you get after exercising without an injury. In this case, stretching may make it worse.  · Avoid prolong sitting, long car rides, or travel. This puts more stress on the lower back than standing or walking.  · During the first 24 to 72 hours after an acute injury or flare up of chronic back pain, apply an ice pack to the painful area for 20 minutes and then remove it for 20 minutes. Do this over a period of 60 to 90 minutes or several times a day. This will reduce swelling and pain. Wrap the ice pack in a thin towel or plastic to protect your skin.  · You can start with ice, then switch to heat. Heat (hot shower, hot bath, or heating pad) reduces pain and works well for muscle spasms. Heat can be applied to the painful area for 20 minutes then remove it for 20 minutes. Do this over a period of 60 to 90 minutes or several times a day. Do not sleep on a heating pad. It can lead to skin burns or tissue damage.  · You can alternate ice and heat therapy. Talk with your doctor about the best treatment for your back pain.  · Therapeutic massage can help relax the back muscles without stretching them.  · Be aware of safe lifting methods and do not lift anything without stretching first.  Medicines  Talk to your doctor before using medicine, especially if you have other medical problems or are taking other medicines.  · You may use over-the-counter medicine as directed on the bottle to control pain, unless another pain  medicine was prescribed. If you have chronic conditions like diabetes, liver or kidney disease, stomach ulcers, or gastrointestinal bleeding, or are taking blood thinners, talk to your doctor before taking any medicine.  · Be careful if you are given a prescription medicines, narcotics, or medicine for muscle spasms. They can cause drowsiness, affect your coordination, reflexes, and judgement. Do not drive or operate heavy machinery.  Follow-up care  Follow up with your healthcare provider, or as advised.   A radiologist will review any X-rays that were taken. Your provide will notify you of any new findings that may affect your care.  Call 911  Call emergency services if any of the following occur:  · Trouble breathing  · Confusion  · Very drowsy or trouble awakening  · Fainting or loss of consciousness  · Rapid or very slow heart rate  · Loss of bowel or bladder control  When to seek medical advice  Call your healthcare provider right away if any of these occur:   · Pain becomes worse or spreads to your legs  · Weakness or numbness in one or both legs  · Numbness in the groin or genital area  Date Last Reviewed: 7/1/2016  © 5507-3816 [employer]. [address] [place] [address]. All rights reserved. This information is not intended as a substitute for professional medical care. Always follow your healthcare professional's instructions.        Possible Causes of Low Back or Leg Pain    The symptoms in your back or leg may be due to pressure on a nerve. This pressure may be caused by a damaged disk or by abnormal bone growth. Either way, you may feel pain, burning, tingling, or numbness. If you have pressure on a nerve that connects to the sciatic nerve, pain may shoot down your leg.    Pressure from the disk  Constant wear and tear can weaken a disk over time and cause back pain. The disk can then be damaged by a sudden movement or injury. If its soft center begins to bulge, the disk may  press on a nerve. Or the outside of the disk may tear, and the soft center may squeeze through and pinch a nerve.    Pressure from bone  As a disk wears out, the vertebrae right above and below the disk begin to touch. This can put pressure on a nerve. Often, abnormal bone (called bone spurs) grows where the vertebrae rub against each other. This can cause the foramen or the spinal canal to narrow (called stenosis) and press against a nerve.  Date Last Reviewed: 10/4/2015  © 7103-4584 [employer]. [address] [place] [address]. All rights reserved. This information is not intended as a substitute for professional medical care. Always follow your healthcare professional's instructions.        Relieving Tension in Your Back  Being relaxed helps keep your mind healthy and your back ready to move. Take short breaks often. Walk around. Stretch. Switch tasks. Also give the following a try.  Make time to relax. Start by setting aside 5 minutes daily.   Deep breathing    Deep breathing is a simple way to reduce stress. You can do it almost any time you need to relax.  · Inhale slowly through your nose. Let your lungs and stomach expand.  · Hold your breath for 2 to 3 seconds.  · Exhale slowly through your mouth until your lungs feel empty. Repeat 3 to 4 times.  Relieve tension  Muscle tension can create tender spots called trigger points. The tips below may help relieve muscle tension.  · Press the trigger point if you can reach it. If not, lie on a soft tennis ball, or ask a friend to press the spot. Use steady pressure for 10 to 15 seconds. Breathe deeply. Repeat a few times.  · Massage trigger points with ice for 2 to 5 minutes. Press lightly at first. Slowly increase firmness.  Date Last Reviewed: 10/18/2015  © 2693-2258 [employer]. [address] [place] [address]. All rights reserved. This information is not intended as a substitute for professional medical care.  Always follow your healthcare professional's instructions.        Back Safety: Basics of Good Posture  Good posture helps protect you from injury. It also increases your comfort. Aim for good posture throughout the day.    Check your posture  The human body works best when it is properly aligned. To improve your standing posture, follow these steps:  · Take a moment to close your eyes and feel your body. Then breathe deeply and relax your shoulders, hips, and knees.  · Now, from the very top of your head, lift up just a bit. Think of a line linking your ears, shoulders, hips, and ankles. Adjust your body to follow the line. You may need to relax your hips and tuck your buttocks under a bit.  · Next, take a look at yourself in a mirror. Is one ear, shoulder, or hip higher than the other? They should be level.  Check how you sit  When you sit properly, pressure on your back is reduced. Try these steps:  · Sit so that the curve of your lower back fits easily against the chair. Keep your gaze level.  · Support your feet. They should be flat on the floor or on a footrest. Your knees should be level with your hips.  · Adjust the chair height as needed. Sit so your forearms are level with the work surface.  Proper posture helps  When your back is aligned, its more likely to stay safe throughout the day.  · Standing in place. Rest one foot on a stool or low box to ease pressure on your lower back. Switch feet often. If you can, adjust the height of your work surface so your neck and shoulders arent under strain.  · Driving. Sit close enough to the steering wheel to keep your knees slightly bent. For comfort, your knees should be level with your hips or just a bit lower. Sit as straight as you can. The curve of your lower back should be fully supported.  · Walking. Stand tall and walk with your head up. Let your arms swing while you walk. This helps relax muscles. Wear shoes that fit and support your feet. If you will be  standing or walking for a long time, dont wear high heels.  · Sitting and sleeping. Choose your furniture with care. Make sure its not causing or increasing your back pain. Chairs should allow for comfortable, correct sitting posture. Use pillows for added support if needed. Your bed should support your backs natural curves without being too hard or too soft.  Date Last Reviewed: 10/18/2015  © 5982-2467 [employer]. [address] [place] [address], [place] [address]. All rights reserved. This information is not intended as a substitute for professional medical care. Always follow your healthcare professional's instructions.

## 2019-07-11 ENCOUNTER — TELEPHONE (OUTPATIENT)
Dept: INTERNAL MEDICINE | Facility: CLINIC | Age: 69
End: 2019-07-11

## 2019-07-11 DIAGNOSIS — M54.50 ACUTE BILATERAL LOW BACK PAIN WITHOUT SCIATICA: Primary | ICD-10-CM

## 2019-07-11 RX ORDER — PREDNISONE 20 MG/1
TABLET ORAL
Qty: 10 TABLET | Refills: 0 | Status: SHIPPED | OUTPATIENT
Start: 2019-07-11 | End: 2020-07-30

## 2019-07-11 NOTE — TELEPHONE ENCOUNTER
----- Message from Alba Scott sent at 7/11/2019  9:09 AM CDT -----  Contact: Self  Patient requesting a call back regarding back pain. He states that he was advised to call back if the pain wasn't better. Please call patient back at .[phone]//[phone]

## 2019-07-11 NOTE — TELEPHONE ENCOUNTER
Spoke with patient. States the injection for pain did not help. States he was informed to contact provider to have steroid pack sent to pharmacy if pain was not resolved from injection. Preferred pharmacy ([facility]).//ddw

## 2019-07-20 ENCOUNTER — OFFICE VISIT (OUTPATIENT)
Dept: URGENT CARE | Facility: CLINIC | Age: 69
End: 2019-07-20
Payer: MEDICARE

## 2019-07-20 VITALS
TEMPERATURE: 97 F | OXYGEN SATURATION: 97 % | BODY MASS INDEX: 38.49 KG/M2 | SYSTOLIC BLOOD PRESSURE: 152 MMHG | HEIGHT: 68 IN | HEART RATE: 66 BPM | WEIGHT: 254 LBS | DIASTOLIC BLOOD PRESSURE: 70 MMHG

## 2019-07-20 DIAGNOSIS — S76.111A STRAIN OF RIGHT QUADRICEPS, INITIAL ENCOUNTER: Primary | ICD-10-CM

## 2019-07-20 PROCEDURE — 99999 PR PBB SHADOW E&M-EST. PATIENT-LVL III: CPT | Mod: PBBFAC,,,

## 2019-07-20 PROCEDURE — 99999 PR PBB SHADOW E&M-EST. PATIENT-LVL III: ICD-10-PCS | Mod: PBBFAC,,,

## 2019-07-20 PROCEDURE — 99213 PR OFFICE/OUTPT VISIT, EST, LEVL III, 20-29 MIN: ICD-10-PCS | Mod: S$PBB,,, | Performed by: FAMILY MEDICINE

## 2019-07-20 PROCEDURE — 99213 OFFICE O/P EST LOW 20 MIN: CPT | Mod: PBBFAC

## 2019-07-20 PROCEDURE — 99213 OFFICE O/P EST LOW 20 MIN: CPT | Mod: S$PBB,,, | Performed by: FAMILY MEDICINE

## 2019-07-20 RX ORDER — DICLOFENAC SODIUM 10 MG/G
2 GEL TOPICAL DAILY
Qty: 100 G | Refills: 2 | Status: SHIPPED | OUTPATIENT
Start: 2019-07-20 | End: 2021-02-05

## 2019-07-20 RX ORDER — TIZANIDINE 4 MG/1
4 TABLET ORAL EVERY 8 HOURS
Qty: 30 TABLET | Refills: 0 | Status: SHIPPED | OUTPATIENT
Start: 2019-07-20 | End: 2020-12-07

## 2019-07-20 NOTE — PROGRESS NOTES
Subjective:       Patient ID: Damon Yoo is a 69 y.o. male.    Chief Complaint: Back Pain    Back Pain   This is a new problem. The current episode started 1 to 4 weeks ago. The problem occurs constantly. The problem has been gradually worsening since onset. Pain location: right leg. The quality of the pain is described as aching. The pain does not radiate. The pain is moderate. The symptoms are aggravated by bending. Pertinent negatives include no abdominal pain, chest pain, fever, numbness, tingling or weakness. He has tried analgesics for the symptoms. The treatment provided no relief.     Review of Systems   Constitutional: Negative for fever.   Respiratory: Negative for shortness of breath.    Cardiovascular: Negative for chest pain.   Gastrointestinal: Negative for abdominal pain.   Musculoskeletal: Positive for back pain.   Neurological: Negative for tingling, weakness and numbness.       Objective:      Physical Exam   Constitutional: He appears well-developed and well-nourished. No distress.   HENT:   Head: Normocephalic and atraumatic.   Musculoskeletal:   SLR neg.  Pt has ttp of right quad, limited ROM   Skin: Skin is warm and dry. No rash noted. He is not diaphoretic. No erythema.   Nursing note and vitals reviewed.      Assessment:       1. Strain of right quadriceps, initial encounter        Plan:     Problem List Items Addressed This Visit        Orthopedic    Strain of right quadriceps - Primary    Relevant Medications    diclofenac sodium (VOLTAREN) 1 % Gel    tiZANidine (ZANAFLEX) 4 MG tablet    Other Relevant Orders    Ambulatory Referral to Physical/Occupational Therapy

## 2019-08-12 ENCOUNTER — OFFICE VISIT (OUTPATIENT)
Dept: OPHTHALMOLOGY | Facility: CLINIC | Age: 69
End: 2019-08-12
Payer: MEDICARE

## 2019-08-12 DIAGNOSIS — H40.1132 PRIMARY OPEN ANGLE GLAUCOMA OF BOTH EYES, MODERATE STAGE: Primary | ICD-10-CM

## 2019-08-12 DIAGNOSIS — H25.13 NUCLEAR SCLEROSIS, BILATERAL: ICD-10-CM

## 2019-08-12 PROCEDURE — 99999 PR PBB SHADOW E&M-EST. PATIENT-LVL II: CPT | Mod: PBBFAC,,, | Performed by: OPHTHALMOLOGY

## 2019-08-12 PROCEDURE — 92012 PR EYE EXAM, EST PATIENT,INTERMED: ICD-10-PCS | Mod: S$PBB,,, | Performed by: OPHTHALMOLOGY

## 2019-08-12 PROCEDURE — 92012 INTRM OPH EXAM EST PATIENT: CPT | Mod: S$PBB,,, | Performed by: OPHTHALMOLOGY

## 2019-08-12 PROCEDURE — 99999 PR PBB SHADOW E&M-EST. PATIENT-LVL II: ICD-10-PCS | Mod: PBBFAC,,, | Performed by: OPHTHALMOLOGY

## 2019-08-12 PROCEDURE — 99212 OFFICE O/P EST SF 10 MIN: CPT | Mod: PBBFAC | Performed by: OPHTHALMOLOGY

## 2019-08-12 NOTE — PROGRESS NOTES
SUBJECTIVE:   Damon Yoo is a 69 y.o. male   Corrected distance visual acuity was 20/20 -1 in the right eye and 20/25 in the left eye.   Chief Complaint   Patient presents with    Glaucoma        HPI:  HPI     Here for 3 month iop check per pt no changes doing well      1. Mod COAG- No FHx (init 32/27 on 3/10 with C/D .65/.60) Goal <18, new   goal = 15 6/2017  SLT OD 12/11/14 (20-16)  SLT OS 2/25/15 (20-16)  2. Mild NSC  3. LLL Cyst Excision on 6/13/12    Latanoprost QHS OU  Timolol qam OU    Last edited by Arielle Heard MA on 8/12/2019  7:55 AM. (History)        Assessment /Plan :  1. Primary open angle glaucoma of both eyes, moderate stage Doing well, IOP within acceptable range relative to target IOP and no evidence of progression. Continue current treatment. Reviewed importance of continued compliance with treatment and follow up.     2. Nuclear sclerosis, bilateral - not visually significant. Observe.       Return to clinic in 3 months  or as needed.  With IOP Check and GOCT

## 2019-08-16 DIAGNOSIS — H40.1132 PRIMARY OPEN ANGLE GLAUCOMA OF BOTH EYES, MODERATE STAGE: ICD-10-CM

## 2019-08-16 RX ORDER — LATANOPROST 50 UG/ML
SOLUTION/ DROPS OPHTHALMIC
Qty: 2.5 ML | Refills: 0 | Status: SHIPPED | OUTPATIENT
Start: 2019-08-16 | End: 2019-09-19 | Stop reason: SDUPTHER

## 2019-08-20 ENCOUNTER — TELEPHONE (OUTPATIENT)
Dept: INTERNAL MEDICINE | Facility: CLINIC | Age: 69
End: 2019-08-20

## 2019-08-20 NOTE — TELEPHONE ENCOUNTER
----- Message from Viktoria Mcgrath sent at 8/20/2019 10:28 AM CDT -----  Contact: [facility]  They are faxing over a POC and needs it signed and faxed back asap, please call them back if needed at [phone]

## 2019-08-20 NOTE — TELEPHONE ENCOUNTER
Spoke with [facility] and notified them that we haven't received a fax yet for Dr. Karus to sign. They stated that we should receive a fax in 5 minutes.

## 2019-09-03 ENCOUNTER — TELEPHONE (OUTPATIENT)
Dept: ORTHOPEDICS | Facility: CLINIC | Age: 69
End: 2019-09-03

## 2019-09-03 DIAGNOSIS — M25.562 LEFT KNEE PAIN, UNSPECIFIED CHRONICITY: Primary | ICD-10-CM

## 2019-09-03 NOTE — TELEPHONE ENCOUNTER
Left v.m. For patient to arrive at 8:15 for x-rays for appointment with Dr De La Vega on 8/12/19 at 8:40.

## 2019-09-12 ENCOUNTER — HOSPITAL ENCOUNTER (OUTPATIENT)
Dept: RADIOLOGY | Facility: HOSPITAL | Age: 69
Discharge: HOME | End: 2019-09-12
Attending: ORTHOPAEDIC SURGERY
Payer: MEDICARE

## 2019-09-12 ENCOUNTER — OFFICE VISIT (OUTPATIENT)
Dept: ORTHOPEDICS | Facility: CLINIC | Age: 69
End: 2019-09-12
Payer: MEDICARE

## 2019-09-12 VITALS
DIASTOLIC BLOOD PRESSURE: 76 MMHG | HEIGHT: 67 IN | HEART RATE: 64 BPM | SYSTOLIC BLOOD PRESSURE: 157 MMHG | WEIGHT: 253 LBS | BODY MASS INDEX: 39.71 KG/M2

## 2019-09-12 DIAGNOSIS — M48.061 BILATERAL STENOSIS OF LATERAL RECESS OF LUMBAR SPINE: ICD-10-CM

## 2019-09-12 DIAGNOSIS — M54.16 LUMBAR RADICULAR PAIN: Primary | ICD-10-CM

## 2019-09-12 DIAGNOSIS — M48.061 SPINAL STENOSIS OF LUMBAR REGION WITH RADICULOPATHY: ICD-10-CM

## 2019-09-12 DIAGNOSIS — M25.562 LEFT KNEE PAIN, UNSPECIFIED CHRONICITY: ICD-10-CM

## 2019-09-12 DIAGNOSIS — M54.16 SPINAL STENOSIS OF LUMBAR REGION WITH RADICULOPATHY: ICD-10-CM

## 2019-09-12 DIAGNOSIS — M17.11 ARTHRITIS OF KNEE, RIGHT: ICD-10-CM

## 2019-09-12 DIAGNOSIS — M17.12 ARTHRITIS OF KNEE, LEFT: Primary | ICD-10-CM

## 2019-09-12 PROCEDURE — 99214 OFFICE O/P EST MOD 30 MIN: CPT | Mod: 25,S$PBB,, | Performed by: ORTHOPAEDIC SURGERY

## 2019-09-12 PROCEDURE — 73562 XR KNEE ORTHO LEFT WITH FLEXION: ICD-10-PCS | Mod: 26,59,RT, | Performed by: RADIOLOGY

## 2019-09-12 PROCEDURE — 73562 X-RAY EXAM OF KNEE 3: CPT | Mod: TC,RT

## 2019-09-12 PROCEDURE — 99214 OFFICE O/P EST MOD 30 MIN: CPT | Mod: PBBFAC,25 | Performed by: ORTHOPAEDIC SURGERY

## 2019-09-12 PROCEDURE — 99999 PR PBB SHADOW E&M-EST. PATIENT-LVL IV: CPT | Mod: PBBFAC,,,

## 2019-09-12 PROCEDURE — 73564 XR KNEE ORTHO LEFT WITH FLEXION: ICD-10-PCS | Mod: 26,LT,, | Performed by: RADIOLOGY

## 2019-09-12 PROCEDURE — 99999 PR PBB SHADOW E&M-EST. PATIENT-LVL IV: ICD-10-PCS | Mod: PBBFAC,,,

## 2019-09-12 PROCEDURE — 20610 DRAIN/INJ JOINT/BURSA W/O US: CPT | Mod: PBBFAC | Performed by: ORTHOPAEDIC SURGERY

## 2019-09-12 PROCEDURE — 73564 X-RAY EXAM KNEE 4 OR MORE: CPT | Mod: 26,LT,, | Performed by: RADIOLOGY

## 2019-09-12 PROCEDURE — 73562 X-RAY EXAM OF KNEE 3: CPT | Mod: 26,59,RT, | Performed by: RADIOLOGY

## 2019-09-12 PROCEDURE — 99214 PR OFFICE/OUTPT VISIT, EST, LEVL IV, 30-39 MIN: ICD-10-PCS | Mod: 25,S$PBB,, | Performed by: ORTHOPAEDIC SURGERY

## 2019-09-12 PROCEDURE — 20610 LARGE JOINT ASPIRATION/INJECTION: L KNEE: ICD-10-PCS | Mod: S$PBB,LT,, | Performed by: ORTHOPAEDIC SURGERY

## 2019-09-12 RX ORDER — METHYLPREDNISOLONE ACETATE 80 MG/ML
80 INJECTION, SUSPENSION INTRA-ARTICULAR; INTRALESIONAL; INTRAMUSCULAR; SOFT TISSUE
Status: DISCONTINUED | OUTPATIENT
Start: 2019-09-12 | End: 2019-09-12 | Stop reason: HOSPADM

## 2019-09-12 RX ADMIN — METHYLPREDNISOLONE ACETATE 80 MG: 80 INJECTION, SUSPENSION INTRALESIONAL; INTRAMUSCULAR; INTRASYNOVIAL; SOFT TISSUE at 08:09

## 2019-09-12 NOTE — PROCEDURES
Large Joint Aspiration/Injection: L knee  Date/Time: 9/12/2019 8:40 AM  Performed by: Omar De La Vega MD  Authorized by: Omar De La Vega MD     Consent Done?:  Yes (Verbal)  Indications:  Pain  Procedure site marked: Yes    Timeout: Prior to procedure the correct patient, procedure, and site was verified    Anesthesia  Local anesthesia used  Anesthetic: lidocaine 1% without epinephrine    Location:  Knee  Site:  L knee  Needle size:  21 G  Ultrasonic Guidance for needle placement: No  Approach:  Anterolateral  Medications:  80 mg methylPREDNISolone acetate 80 mg/mL  Patient tolerance:  Patient tolerated the procedure well with no immediate complications    Additional Comments: Procedure :  After verbal consent given and site marked and time out performed (confirmed by patient ) the area was prepped with alcohol. Using appropriate needle the  Knee joint was entered from the anterolateral aspect of joint line . Injection given of depomedrol /lidocaine   . Needle removed and dressing applied.   Tolerated well
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
## 2019-09-12 NOTE — PATIENT INSTRUCTIONS
To have nerve testing by neurologist  Continue with physical therapy  Received cortisone shot in left knee

## 2019-09-12 NOTE — LETTER
September 12, 2019      Alethea Beth PA-C  [address]  [place] [address]           '[facility]  [address] [place] [id number]  Phone: [phone]  Fax: [phone]          Patient: Damon Yoo   MR Number: [id number]   YOB: 1950   Date of Visit: 9/12/2019       Dear Alethea Beth:    Thank you for referring Damon Yoo to me for evaluation. Attached you will find relevant portions of my assessment and plan of care.    If you have questions, please do not hesitate to call me. I look forward to following Damon Yoo along with you.    Sincerely,    Omar De La Vega MD    Enclosure  CC:  No Recipients    If you would like to receive this communication electronically, please contact [email or web address] or [phone] to request more information on [employer] access.    For providers and/or their staff who would like to refer a patient to [facility], please contact us through our one-stop-shop provider referral line, [facility], at [phone].    If you feel you have received this communication in error or would no longer like to receive these types of communications, please e-mail [email or web address]

## 2019-09-19 DIAGNOSIS — I25.810 CORONARY ARTERY DISEASE INVOLVING CORONARY BYPASS GRAFT OF NATIVE HEART WITHOUT ANGINA PECTORIS: ICD-10-CM

## 2019-09-19 DIAGNOSIS — E78.5 HYPERLIPIDEMIA, UNSPECIFIED HYPERLIPIDEMIA TYPE: ICD-10-CM

## 2019-09-19 DIAGNOSIS — H40.1132 PRIMARY OPEN ANGLE GLAUCOMA OF BOTH EYES, MODERATE STAGE: ICD-10-CM

## 2019-09-19 RX ORDER — TIMOLOL MALEATE 5 MG/ML
SOLUTION/ DROPS OPHTHALMIC
Qty: 10 ML | Refills: 12 | Status: SHIPPED | OUTPATIENT
Start: 2019-09-19 | End: 2020-05-11 | Stop reason: SDUPTHER

## 2019-09-19 RX ORDER — ROSUVASTATIN CALCIUM 40 MG/1
40 TABLET, COATED ORAL NIGHTLY
Qty: 90 TABLET | Refills: 3 | Status: SHIPPED | OUTPATIENT
Start: 2019-09-19 | End: 2020-09-25 | Stop reason: SDUPTHER

## 2019-09-19 RX ORDER — LATANOPROST 50 UG/ML
SOLUTION/ DROPS OPHTHALMIC
Qty: 2.5 ML | Refills: 0 | Status: SHIPPED | OUTPATIENT
Start: 2019-09-19 | End: 2019-10-22 | Stop reason: SDUPTHER

## 2019-09-19 NOTE — TELEPHONE ENCOUNTER
----- Message from Aisha Mullins sent at 2019  3:42 PM CDT -----  Contact: Alejandra/[facility] in [place]  Type:  Pharmacy Calling to Clarify an RX    Name of Caller:Alejandra  Pharmacy Name:[facility]  Prescription Name:timolol 0.5% eye solution  What do they need to clarify?:refill  Best Call Back Number:[phone]  Additional Information: He needs to transfer his script from [facility] to [facility] in [place]. The script they had .     Thank you

## 2019-10-04 ENCOUNTER — OFFICE VISIT (OUTPATIENT)
Dept: ORTHOPEDICS | Facility: CLINIC | Age: 69
End: 2019-10-04
Payer: MEDICARE

## 2019-10-04 VITALS
HEART RATE: 63 BPM | SYSTOLIC BLOOD PRESSURE: 140 MMHG | BODY MASS INDEX: 39.71 KG/M2 | DIASTOLIC BLOOD PRESSURE: 82 MMHG | HEIGHT: 67 IN | WEIGHT: 253 LBS

## 2019-10-04 DIAGNOSIS — M17.12 ARTHRITIS OF KNEE, LEFT: Primary | ICD-10-CM

## 2019-10-04 DIAGNOSIS — M17.11 ARTHRITIS OF KNEE, RIGHT: ICD-10-CM

## 2019-10-04 DIAGNOSIS — M54.16 SPINAL STENOSIS OF LUMBAR REGION WITH RADICULOPATHY: ICD-10-CM

## 2019-10-04 DIAGNOSIS — M48.061 SPINAL STENOSIS OF LUMBAR REGION WITH RADICULOPATHY: ICD-10-CM

## 2019-10-04 PROCEDURE — 99999 PR PBB SHADOW E&M-EST. PATIENT-LVL III: ICD-10-PCS | Mod: PBBFAC,,, | Performed by: ORTHOPAEDIC SURGERY

## 2019-10-04 PROCEDURE — 99214 OFFICE O/P EST MOD 30 MIN: CPT | Mod: S$PBB,,, | Performed by: ORTHOPAEDIC SURGERY

## 2019-10-04 PROCEDURE — 99214 PR OFFICE/OUTPT VISIT, EST, LEVL IV, 30-39 MIN: ICD-10-PCS | Mod: S$PBB,,, | Performed by: ORTHOPAEDIC SURGERY

## 2019-10-04 PROCEDURE — 99999 PR PBB SHADOW E&M-EST. PATIENT-LVL III: CPT | Mod: PBBFAC,,, | Performed by: ORTHOPAEDIC SURGERY

## 2019-10-04 PROCEDURE — 99213 OFFICE O/P EST LOW 20 MIN: CPT | Mod: PBBFAC | Performed by: ORTHOPAEDIC SURGERY

## 2019-10-04 RX ORDER — MELOXICAM 15 MG/1
15 TABLET ORAL DAILY
Qty: 30 TABLET | Refills: 1 | Status: SHIPPED | OUTPATIENT
Start: 2019-10-04 | End: 2019-10-21 | Stop reason: ALTCHOICE
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
## 2019-10-04 NOTE — PATIENT INSTRUCTIONS
meloxicam 15mg once a day for arthritis pain  Do not take aleve,motrin or ibuprofen   May take tylenol if needed

## 2019-10-08 ENCOUNTER — PROCEDURE VISIT (OUTPATIENT)
Dept: NEUROLOGY | Facility: CLINIC | Age: 69
End: 2019-10-08
Payer: MEDICARE

## 2019-10-08 DIAGNOSIS — M48.061 BILATERAL STENOSIS OF LATERAL RECESS OF LUMBAR SPINE: ICD-10-CM

## 2019-10-08 DIAGNOSIS — M48.061 SPINAL STENOSIS OF LUMBAR REGION WITH RADICULOPATHY: ICD-10-CM

## 2019-10-08 DIAGNOSIS — M79.605 PAIN IN BOTH LOWER EXTREMITIES: ICD-10-CM

## 2019-10-08 DIAGNOSIS — M54.16 LUMBAR RADICULAR PAIN: ICD-10-CM

## 2019-10-08 DIAGNOSIS — M54.40 CHRONIC MIDLINE LOW BACK PAIN WITH SCIATICA, SCIATICA LATERALITY UNSPECIFIED: ICD-10-CM

## 2019-10-08 DIAGNOSIS — G89.29 CHRONIC MIDLINE LOW BACK PAIN WITH SCIATICA, SCIATICA LATERALITY UNSPECIFIED: ICD-10-CM

## 2019-10-08 DIAGNOSIS — M54.16 SPINAL STENOSIS OF LUMBAR REGION WITH RADICULOPATHY: ICD-10-CM

## 2019-10-08 DIAGNOSIS — M54.31 SCIATICA OF RIGHT SIDE: ICD-10-CM

## 2019-10-08 DIAGNOSIS — R20.0 BILATERAL LEG NUMBNESS: Primary | ICD-10-CM

## 2019-10-08 DIAGNOSIS — M79.604 PAIN IN BOTH LOWER EXTREMITIES: ICD-10-CM

## 2019-10-08 PROCEDURE — 95886 MUSC TEST DONE W/N TEST COMP: CPT | Mod: PBBFAC | Performed by: PSYCHIATRY & NEUROLOGY

## 2019-10-08 PROCEDURE — 95886 MUSC TEST DONE W/N TEST COMP: CPT | Mod: 26,S$PBB,, | Performed by: PSYCHIATRY & NEUROLOGY

## 2019-10-08 PROCEDURE — 95886 PR EMG COMPLETE, W/ NERVE CONDUCTION STUDIES, 5+ MUSCLES: ICD-10-PCS | Mod: 26,S$PBB,, | Performed by: PSYCHIATRY & NEUROLOGY

## 2019-10-08 PROCEDURE — 95911 PR NERVE CONDUCTION STUDY; 9-10 STUDIES: ICD-10-PCS | Mod: 26,S$PBB,, | Performed by: PSYCHIATRY & NEUROLOGY

## 2019-10-08 PROCEDURE — 95911 NRV CNDJ TEST 9-10 STUDIES: CPT | Mod: 26,S$PBB,, | Performed by: PSYCHIATRY & NEUROLOGY

## 2019-10-08 PROCEDURE — 95911 NRV CNDJ TEST 9-10 STUDIES: CPT | Mod: PBBFAC | Performed by: PSYCHIATRY & NEUROLOGY

## 2019-10-08 NOTE — PROCEDURES
[facility]   Vanessa Sesay  Department of Neurology  [address] [place] [address] [place] Dowell  [id number]  Phone [phone]     Fax  [phone]        Patient: Fredo Jose YOB: 1950  Patient ID: [id number] Age: 69 Years 6 Months  Sex: Male Ref. Provider: Omar De La Vega MD  Notes: BLE/Low back pain, numbness and tingling in BLE to the big toe.       SUMMARY        Nerve conduction studies were performed in the right lower and left lower extremities.  The right peroneal motor study recording the extensor digitorum brevis showed reduced amplitude, normal distal latency and normal conduction velocity.  No conduction block or focal slowing was present across the fibular neck. The right tibial motor study recording the abductor hallucis brevis showed a reduced amplitude, normal distal latency and normal conduction velocity.     The left peroneal motor study recording the extensor digitorum brevis showed reduced amplitude, normal distal latency and normal conduction velocity.  No conduction block or focal slowing was present across the fibular neck. The left tibial motor study recording the abductor hallucis brevis showed a reduced amplitude, normal distal latency and normal conduction velocity.     Right sural sensory response was absent.  Right superficial peroneal sensory response was absent. Left sural sensory response was absent. Left superficial peroneal sensory response was absent.     Needle EMG of the right and left lower extremities and lumbosacral paraspinal muscles was performed.      In the right lower extremity, no active denervation was present in any muscle. Motor unit potentials were large and polyphasic with reduced recruitment in the right soleus, medial gastrocnemius, tibialis anterior, flexor digitorum longus, tibialis posterior, biceps femoris muscles.     In the left lower extremity, no active denervation was present in any muscle. Motor unit potentials were large and  polyphasic with reduced recruitment in the left soleus, medial gastrocnemius, tibialis anterior, flexor digitorum longus, tibialis posterior, biceps femoris muscles.                     IMPRESSION       This is an abnormal and complex study.     There is electrophysiologic evidence consistent with a chronic bilateral L5-S1 radiculopathies with superimposed peripheral polyneuropathy.                     ---------------------------------               Paige Adorno M.D., F.A.A.N.      Diplomate, American Board of Psychiatry and Neurology  Diplomate, American Board of Clinical Neurophysiology   Fellow, American Academy of Neurology               Sensory NCS      Nerve / Sites Rec. Site Onset Peak NP Amp PP Amp Dist Yung d Lat.2     ms ms µV µV cm m/s ms   R RADIAL - Snuff box      Forearm Snuff box 1.77 2.40 21.7 13.3 10 56.5 2.40      Ref.   2.80  10.0      L SURAL - Lat Mall      Calf Lat Mall NR NR NR NR 14 NR NR      Ref.   4.60  3.0  40.0    R SURAL - Lat Mall      Calf Lat Mall NR NR NR NR 14 NR NR      Ref.   4.60  3.0  40.0    L SUP PERONEAL      Lat Leg Ankle NR NR NR NR 10 NR NR      Ref.   4.60  3.0  40.0    R SUP PERONEAL      Lat Leg Ankle NR NR NR NR 10 NR NR      Ref.   4.60  3.0  40.0        Motor NCS      Nerve / Sites Rec. Site Lat Amp Dist Yung     ms mV cm m/s   L COMM PERONEAL - EDB      Ankle EDB 3.33 1.3 8       Ref.  6.00 2.5        FibHead EDB 11.93 0.9 37 43.1      Ref.     40.0      Knee EDB 14.53 0.4 11 42.2   R COMM PERONEAL - EDB      Ankle EDB 3.13 1.2 8       Ref.  6.00 2.5        FibHead EDB 11.51 1.0 36 42.9      Ref.     40.0      Knee EDB 13.39 0.8 11 58.7   L TIBIAL (KNEE) - AH      Ankle AH 5.83 1.8 8       Ref.  6.00 4.0        Knee AH 18.18 1.4 37 30.0      Ref.     40.0   R TIBIAL (KNEE) - AH      Ankle AH 5.36 2.2 8       Ref.  6.00 4.0        Knee AH 14.53 1.3 38 41.5      Ref.     40.0                   EMG Summary Table     Spontaneous MUAP Recruitment    IA Fib PSW Fasc Other  Amp Dur. PPP Pattern   L. GASTROCN MED S1-2 N None None None - Incr Incr Incr Red   R. GASTROCN MED S1-2 N None None None - Incr Incr Incr Red   L. EXT LARSON LONG L5, S1 N None None None - Incr Incr Incr Red   R. EXT LARSON LONG L5, S1 N None None None - Incr Incr Incr Red   L. BIC FEM LONG L5, S1-2 N None None None - Incr Incr Incr Red   R. BIC FEM LONG L5, S1-2 N None None None - Incr Incr Incr Red   L. QUADRICEPS L2-3-4 N None None None - N N N N   R. QUADRICEPS L2-3-4 N None None None - N N N N   L. SOLEUS S1-2 N None None None - Incr Incr Incr Red   R. SOLEUS S1-2 N None None None - Incr Incr Incr Red   L. TIB ANTERIOR L4-5 N None None None - Incr Incr Incr Red   R. TIB ANTERIOR L4-5 N None None None - Incr Incr Incr Red   L. VAST LATERALIS L4 N None None None - N N N N   R. VAST LATERALIS L4 N None None None - N N N N   L. LUMB PSP (L) N None None None - N N N N   R. LUMB PSP (L) N None None None -       L. LUMB PSP (M) N None None None -       R. LUMB PSP (M) N None None None -       L. LUMB PSP (U) N None None None -       R. LUMB PSP (U) N None None None -

## 2019-10-21 ENCOUNTER — OFFICE VISIT (OUTPATIENT)
Dept: ORTHOPEDICS | Facility: CLINIC | Age: 69
End: 2019-10-21
Payer: MEDICARE

## 2019-10-21 VITALS
WEIGHT: 261 LBS | BODY MASS INDEX: 40.97 KG/M2 | HEART RATE: 76 BPM | SYSTOLIC BLOOD PRESSURE: 149 MMHG | DIASTOLIC BLOOD PRESSURE: 87 MMHG | HEIGHT: 67 IN

## 2019-10-21 DIAGNOSIS — M54.16 LUMBAR RADICULAR PAIN: Primary | ICD-10-CM

## 2019-10-21 DIAGNOSIS — M48.062 SPINAL STENOSIS, LUMBAR REGION, WITH NEUROGENIC CLAUDICATION: ICD-10-CM

## 2019-10-21 DIAGNOSIS — M17.12 ARTHRITIS OF KNEE, LEFT: Primary | ICD-10-CM

## 2019-10-21 DIAGNOSIS — M48.061 SPINAL STENOSIS OF LUMBAR REGION WITH RADICULOPATHY: ICD-10-CM

## 2019-10-21 DIAGNOSIS — M54.16 SPINAL STENOSIS OF LUMBAR REGION WITH RADICULOPATHY: ICD-10-CM

## 2019-10-21 DIAGNOSIS — M17.11 ARTHRITIS OF KNEE, RIGHT: ICD-10-CM

## 2019-10-21 DIAGNOSIS — H40.1132 PRIMARY OPEN ANGLE GLAUCOMA OF BOTH EYES, MODERATE STAGE: ICD-10-CM

## 2019-10-21 PROCEDURE — 99213 OFFICE O/P EST LOW 20 MIN: CPT | Mod: PBBFAC | Performed by: ORTHOPAEDIC SURGERY

## 2019-10-21 PROCEDURE — 99214 OFFICE O/P EST MOD 30 MIN: CPT | Mod: S$PBB,,, | Performed by: ORTHOPAEDIC SURGERY

## 2019-10-21 PROCEDURE — 99999 PR PBB SHADOW E&M-EST. PATIENT-LVL III: ICD-10-PCS | Mod: PBBFAC,,, | Performed by: ORTHOPAEDIC SURGERY

## 2019-10-21 PROCEDURE — 99999 PR PBB SHADOW E&M-EST. PATIENT-LVL III: CPT | Mod: PBBFAC,,, | Performed by: ORTHOPAEDIC SURGERY

## 2019-10-21 PROCEDURE — 99214 PR OFFICE/OUTPT VISIT, EST, LEVL IV, 30-39 MIN: ICD-10-PCS | Mod: S$PBB,,, | Performed by: ORTHOPAEDIC SURGERY

## 2019-10-21 RX ORDER — CELECOXIB 200 MG/1
200 CAPSULE ORAL DAILY
Qty: 30 CAPSULE | Refills: 1 | Status: SHIPPED | OUTPATIENT
Start: 2019-10-21 | End: 2019-10-31

## 2019-10-21 NOTE — PROGRESS NOTES
Subjective:     Patient ID: Damon Yoo is a 69 y.o. male.    Chief Complaint: Pain of the Left Knee      HPI:  69-year-old  diagnosed with bilateral knee end-stage arthrosis with the left being worst also he has multilevel spinal stenosis and foraminal stenosis from 2014 MRI.  He did received steroid injection by me in September 2019 with good relief and I placed him on meloxicam.  Now he states that wore off meloxicam is not helping.  Denies loss of bowel bladder control  Just had EMG and NCV and is here for the results some possible future care.  He is not ready to have his total knee arthroplasty as of today. States that he gets calf pain and the more he walked.    Reviewed and updated medication list, allergy list ,past medical and surgical history  Review of Systems   Constitution: Negative for decreased appetite.   HENT: Negative for tinnitus.    Eyes: Negative for double vision.   Cardiovascular: Negative for chest pain.   Respiratory: Negative for wheezing.    Hematologic/Lymphatic: Negative for bleeding problem.   Skin: Negative for dry skin.   Musculoskeletal: Positive for arthritis, back pain, joint pain, joint swelling and myalgias. Negative for gout, neck pain and stiffness.   Gastrointestinal: Negative for abdominal pain.   Genitourinary: Negative for bladder incontinence.   Neurological: Negative for numbness, paresthesias and sensory change.   Psychiatric/Behavioral: Negative for altered mental status.       Objective:   Body mass index is 40.88 kg/m².  Vitals:    10/21/19 1414   BP: (!) 149/87   Pulse: 76          General    Constitutional: He is oriented to person, place, and time. He appears well-developed.   HENT:   Head: Atraumatic.   Eyes: EOM are normal.   Cardiovascular: Normal rate.    Pulmonary/Chest: Effort normal.   Abdominal: Soft.   Neurological: He is alert and oriented to person, place, and time.   Psychiatric: Judgment normal.            Neck rotation slightly  limited but functional  Lumbar with pain from L3 down to L4-5 paraspinal.  No true obvious deformity.  Forward bending to mid tibia lateral bending to midthigh slight decrease rotation.  Bilateral upper extremity neurovascular intact full motion of the shoulder elbow and wrist with motor groups are 5/5.  Radial and ulnar pulses 2+.  Negative Tinel negative Phalen test  Bilateral lower extremities pelvis is levelled  Bilateral hips slight decrease in rotation otherwise no pain in the groin or greater trochanters.  Hip flexors abduct his adductors quads hamstrings ankle extensors and flexors inverters and everters were 5/5 in the sitting position.  Bilateral knees with varus deformity.  Medial joint pain and crepitus.  LCL, MCL, ACL AND PCL STABLE.  RANGE OF MOTION 5-120 DEGREES BILATERALLY.  She has some swelling on the right knee but moderate swelling on the left.  There is some pitting edema in both ANKLES BUT CALVES ARE SOFT.  BILATERAL ANKLES NEUROVASCULARLY INTACT EHL 5/5  Relevant imaging results reviewed and interpreted by me, discussed with the patient and / or family today   X-RAYS IN THE ELECTRONIC RECORDS BILATERAL KNEES WITH COMPLETE LOSS OF MEDIAL JOINT SPACE, OSTEOPHYTES, CYSTIC CHANGES CONSISTENT WITH END-STAGE ARTHROSIS BILATERAL KNEE  MRI FROM 2014 REVIEWED MULTILEVEL FORAMINAL AND CENTRAL STENOSIS FROM L3 DOWN TO L5 5 AND S1  Assessment:     Encounter Diagnoses   Name Primary?    Arthritis of knee, left Yes    Arthritis of knee, right     Spinal stenosis, lumbar region, with neurogenic claudication         Plan:   Arthritis of knee, left    Arthritis of knee, right    Spinal stenosis, lumbar region, with neurogenic claudication    Other orders  -     celecoxib (CELEBREX) 200 MG capsule; Take 1 capsule (200 mg total) by mouth once daily. Take with food  Dispense: 30 capsule; Refill: 1         .  MELOXICAM AND START CELEBREX 200 Q.D.  REFER TO PAIN MANAGEMENT FOR NAHUN  COPY OF HIS NCV-EMG AND MRI  FROM 2014 GIVEN TO THE PATIENT  TOLD HIM TOTAL KNEE ARTHROPLASTY WILL HELP KNEE PAIN HOWEVER IS NOT GOING TO GET RID OF HIS BURNING SENSATION AND NUMBNESS DOWN THE LEGS.  RTC IN 4 WEEKS      Disclaimer: This note was prepared using a voice recognition system and is likely to have sound alike errors within the text.

## 2019-10-22 DIAGNOSIS — H40.1132 PRIMARY OPEN ANGLE GLAUCOMA OF BOTH EYES, MODERATE STAGE: ICD-10-CM

## 2019-10-22 RX ORDER — LATANOPROST 50 UG/ML
SOLUTION/ DROPS OPHTHALMIC
Qty: 2.5 ML | Refills: 12 | Status: SHIPPED | OUTPATIENT
Start: 2019-10-22 | End: 2020-07-30 | Stop reason: SDUPTHER

## 2019-10-23 RX ORDER — LATANOPROST 50 UG/ML
SOLUTION/ DROPS OPHTHALMIC
Qty: 2.5 ML | Refills: 0 | Status: SHIPPED | OUTPATIENT
Start: 2019-10-23 | End: 2020-05-11 | Stop reason: SDUPTHER

## 2019-10-31 ENCOUNTER — OFFICE VISIT (OUTPATIENT)
Dept: PAIN MEDICINE | Facility: CLINIC | Age: 69
End: 2019-10-31
Payer: MEDICARE

## 2019-10-31 ENCOUNTER — TELEPHONE (OUTPATIENT)
Dept: PAIN MEDICINE | Facility: CLINIC | Age: 69
End: 2019-10-31

## 2019-10-31 VITALS
BODY MASS INDEX: 40.21 KG/M2 | HEART RATE: 64 BPM | WEIGHT: 256.19 LBS | HEIGHT: 67 IN | SYSTOLIC BLOOD PRESSURE: 133 MMHG | DIASTOLIC BLOOD PRESSURE: 75 MMHG

## 2019-10-31 DIAGNOSIS — M54.16 LUMBAR RADICULOPATHY: Primary | ICD-10-CM

## 2019-10-31 DIAGNOSIS — M51.36 DDD (DEGENERATIVE DISC DISEASE), LUMBAR: ICD-10-CM

## 2019-10-31 DIAGNOSIS — M48.061 SPINAL STENOSIS OF LUMBAR REGION, UNSPECIFIED WHETHER NEUROGENIC CLAUDICATION PRESENT: ICD-10-CM

## 2019-10-31 PROCEDURE — 99999 PR PBB SHADOW E&M-EST. PATIENT-LVL V: ICD-10-PCS | Mod: PBBFAC,,, | Performed by: PHYSICAL MEDICINE & REHABILITATION

## 2019-10-31 PROCEDURE — 99215 OFFICE O/P EST HI 40 MIN: CPT | Mod: PBBFAC | Performed by: PHYSICAL MEDICINE & REHABILITATION

## 2019-10-31 PROCEDURE — 99204 PR OFFICE/OUTPT VISIT, NEW, LEVL IV, 45-59 MIN: ICD-10-PCS | Mod: S$PBB,,, | Performed by: PHYSICAL MEDICINE & REHABILITATION

## 2019-10-31 PROCEDURE — 99999 PR PBB SHADOW E&M-EST. PATIENT-LVL V: CPT | Mod: PBBFAC,,, | Performed by: PHYSICAL MEDICINE & REHABILITATION

## 2019-10-31 PROCEDURE — 99204 OFFICE O/P NEW MOD 45 MIN: CPT | Mod: S$PBB,,, | Performed by: PHYSICAL MEDICINE & REHABILITATION

## 2019-10-31 NOTE — TELEPHONE ENCOUNTER
----- Message from Mihaela Faye MD sent at 10/31/2019  9:04 AM CDT -----  ok  ----- Message -----  From: Pedrito Thurston MA  Sent: 10/31/2019   8:45 AM CDT  To: Mihaela Faye MD    Good morning,     would like to perform a right l5-s1 lumbar tf emmy. Pt needs to hold aspirin 7 days prior . Please advise.

## 2019-10-31 NOTE — PATIENT INSTRUCTIONS
- Schedule for a right L5/S1 and S1 epidural steroid injection for diagnostic and therapeutic purposes.  - Continue current medications as prescribed.  - Continue home based exercises and discussed the importance of a healthy and active lifestyle  - Return for follow up 4 weeks post procedure.    Please do not hesitate to contact the clinic if you have any questions regarding your treatment plan.     Ermias Mario MD  Interventional Pain Medicine  [facility] [place] [facility]

## 2019-10-31 NOTE — LETTER
October 31, 2019      Omar De La Vega MD  [address] [place] [address] [address] [place] [address]           [facility]  [address] [facility]  [place] [address]  Phone: [phone]  Fax: [phone]          Patient: Damon Yoo   MR Number: [id number]   YOB: 1950   Date of Visit: 10/31/2019       Dear Dr. Omar De La Vega:    Thank you for referring Damon Yoo to me for evaluation. Attached you will find relevant portions of my assessment and plan of care.    If you have questions, please do not hesitate to call me. I look forward to following Damon Yoo along with you.    Sincerely,    Ermias Mario MD    Enclosure  CC:  No Recipients    If you would like to receive this communication electronically, please contact [email or web address] or [phone] to request more information on [employer] Link access.    For providers and/or their staff who would like to refer a patient to [facility], please contact us through our one-stop-shop provider referral line, [facility], at [phone].    If you feel you have received this communication in error or would no longer like to receive these types of communications, please e-mail [email or web address]

## 2019-10-31 NOTE — PROGRESS NOTES
New Patient Chronic Pain Note (Initial Visit)    Referring Physician: Omar De La Vega MD    PCP: TIEN Mason Jr, MD    Chief Complaint:   Chief Complaint   Patient presents with    Back Pain        SUBJECTIVE:    Damon Yoo is a 69 y.o. male who presents to the clinic for the evaluation of lower back pain. He was referred by orthopedics for presumed lumbar radiculopathy. The pain started several years ago without any injury or trauma and symptoms have been worsening.The pain is located in the lower lumbar area and radiates to the right lower extremity.  The pain is described as aching and numbing and is rated as 5/10. The pain is rated with a score of  3/10 on the BEST day and a score of 9/10 on the WORST day.  Symptoms interfere with daily activity. The pain is exacerbated by Walking.  The pain is mitigated by nothing. The patient reports spending 2-4 hours per day reclining. The patient reports 6-8 hours of uninterrupted sleep per night.    Of note, patient has a h/o prostate cancer s/p robotic prostatectomy, PSA has been undetectable.     Patient denies night fever/night sweats, urinary incontinence, bowel incontinence, significant weight loss, significant motor weakness and loss of sensations.    Pain Disability Index Review:   No flowsheet data found.    Non-Pharmacologic Treatments:  Physical Therapy/Home Exercise: yes  Ice/Heat:yes  TENS: no  Acupuncture: no  Massage: no  Chiropractic: no    Other: no      Pain Medications:    - Adjuvant Medications: Zanaflex ( Tizanidine) and Celebrex, Voltaren gel, Tylenol  - Anti-Coagulants: Aspirin     report:  Not applicable    Pain Procedures:   - previous lumbar emmy in 2006 with significant relief      Imaging & EMG/NCS:   Bilateral lower extremities EMG/NCS 10/8/19  There is electrophysiologic evidence consistent with a chronic bilateral L5-S1 radiculopathies with superimposed peripheral polyneuropathy.     MRI Lumbar Spine 9/22/14  Compared to prior  MRI December 2006.  Vertebral alignment remains normal.  Again noted is desiccation of all the lumbar intervertebral disks with marked loss of disk height at L3-4, L4-5 and L5-S1.  The conus ends at the level of L1.  There are discogenic changes in the endplates of L4-5 and L5-S1.  No compression fractures or acute osseous abnormalities are identified.  Schmorl's nodes present in the endplates of L4-5.  Axial images are acquired through the disk spaces from T12-L1 through L5-S1.  The T12 L1 disk space demonstrates mild facet hypertrophy and minimal disk bulge without significant canal or foraminal stenosis.  No significant abnormality L1-2.  At L2-3 there is facet and ligament flavum hypertrophy and diffuse posterior bulging of the disk which is creating mild to moderate canal and bilateral foraminal stenosis.  At L3-4 there is facet arthropathy and diffuse posterior disk herniation creating severe canal and bilateral foraminal stenosis.  At L4-5 facet and ligamentum flavum hypertrophy and diffuse posterior disk herniation creating severe canal and bilateral foraminal stenosis.  At L5-S1 a central disk herniation broad-based posterior bulging of the disk with severe canal and bilateral foraminal stenosis.    XR Lumbar Spine 8/4/14  There is anatomic spinal alignment.  Moderate to severe disk space narrowing with associated vacuum phenomenon, endplate spurring, and facet arthropathy at L4-5 and L5-S1.  Mild disk narrowing at L3-4.  Pedicles are intact.  No compression   fracture or subluxation.    Past Medical History:   Diagnosis Date    Abnormal EKG 10/25/2013    Arthritis     CAD (coronary artery disease)     Cancer     Prostate [id number] prostate    Glaucoma     Gout attack     Hyperlipidemia     Hypertension     Hypertrophic cardiomyopathy 10/25/2013    S/P CABG (coronary artery bypass graft) 10/25/2013     Past Surgical History:   Procedure Laterality Date    COLONOSCOPY N/A 12/4/2017    Procedure:  COLONOSCOPY;  Surgeon: Dina Ledesma MD;  Location: [facility];  Service: Endoscopy;  Laterality: N/A;    CORONARY ARTERY BYPASS GRAFT  05/2003    x1    PROSTATE SURGERY      RALP      Social History     Socioeconomic History    Marital status:      Spouse name: Not on file    Number of children: Not on file    Years of education: Not on file    Highest education level: Not on file   Occupational History    Not on file   Social Needs    Financial resource strain: Not on file    Food insecurity:     Worry: Not on file     Inability: Not on file    Transportation needs:     Medical: Not on file     Non-medical: Not on file   Tobacco Use    Smoking status: Former Smoker     Packs/day: 0.25     Years: 15.00     Pack years: 3.75     Last attempt to quit: 1988     Years since quittin.5    Smokeless tobacco: Never Used   Substance and Sexual Activity    Alcohol use: No    Drug use: No    Sexual activity: Not on file   Lifestyle    Physical activity:     Days per week: Not on file     Minutes per session: Not on file    Stress: Not on file   Relationships    Social connections:     Talks on phone: Not on file     Gets together: Not on file     Attends [employer] service: Not on file     Active member of club or organization: Not on file     Attends meetings of clubs or organizations: Not on file     Relationship status: Not on file   Other Topics Concern    Not on file   Social History Narrative    No pets or smokers in household.     Family History   Problem Relation Age of Onset    Hypertension Father     Strabismus Neg Hx     Retinal detachment Neg Hx     Macular degeneration Neg Hx     Glaucoma Neg Hx     Blindness Neg Hx     Amblyopia Neg Hx        Review of patient's allergies indicates:  No Known Allergies    Current Outpatient Medications   Medication Sig    allopurinol (ZYLOPRIM) 300 MG tablet TAKE 1 AND 1/2 TABLETS BY MOUTH EVERY DAY    amLODIPine (NORVASC) 5 MG tablet  Take 1 tablet (5 mg total) by mouth once daily.    aspirin 81 MG chewable tablet Take 1 tablet by mouth Daily.    fish oil-fat acid [id number].[id number] (OMEGA 3-6-9) 1,200 mg Cap Take 1 capsule by mouth once daily.     flunisolide 25 mcg, 0.025%, (NASALIDE) 25 mcg (0.025 %) Spry USE 2 SPRAYS IN EACH NOSTRIL TWICE DAILY (Patient taking differently: 2 sprays by Nasal route as needed. )    latanoprost 0.005 % ophthalmic solution INSTILL ONE DROP INTO BOTH EYES IN THE EVENING    latanoprost 0.005 % ophthalmic solution INSTILL ONE DROP INTO BOTH EYES IN THE EVENING    lisinopril (PRINIVIL,ZESTRIL) 40 MG tablet TAKE 1 TABLET (40 MG TOTAL) BY MOUTH ONCE DAILY.    metoprolol succinate (TOPROL-XL) 100 MG 24 hr tablet TAKE 1 TABLET (100 MG TOTAL) BY MOUTH ONCE DAILY.    predniSONE (DELTASONE) 20 MG tablet Take 2 tablets with food for 3 days; then take one tablet with food for 2 days; then take 1/2 tablet with food for 2 days.    rosuvastatin (CRESTOR) 40 MG Tab Take 1 tablet (40 mg total) by mouth every evening. Generic is fine.    timolol maleate 0.5% (TIMOPTIC) 0.5 % Drop INSTILL 1 DROP INTO EACH EYE EVERY MORNING    tiZANidine (ZANAFLEX) 4 MG tablet Take 1 tablet (4 mg total) by mouth every 8 (eight) hours.    diclofenac sodium (VOLTAREN) 1 % Gel Apply 2 g topically once daily.     No current facility-administered medications for this visit.        Review of Systems       GENERAL:  No weight loss, malaise or fevers.  HEENT:   No recent changes in vision or hearing  NECK:  Negative for lumps, no difficulty with swallowing.  RESPIRATORY:  Negative for cough, wheezing or shortness of breath, patient denies any recent URI.  CARDIOVASCULAR:  Negative for chest pain, leg swelling or palpitations.  GI:  Negative for abdominal discomfort, blood in stools or black stools or change in bowel habits.  MUSCULOSKELETAL:  See HPI.  SKIN:  Negative for lesions, rash, and itching.  PSYCH:  No mood disorder or recent psychosocial  "stressors.  Patients sleep is not disturbed secondary to pain.  HEMATOLOGY/LYMPHOLOGY:  Negative for prolonged bleeding, bruising easily or swollen nodes.  Patient is currently taking anti-coagulants - ASA  NEURO:   No history of headaches, syncope, paralysis, seizures or tremors.  All other reviewed and negative other than HPI.    OBJECTIVE:    /75   Pulse 64   Ht 5' 7" (1.702 m)   Wt 116.2 kg (256 lb 2.8 oz)   BMI 40.12 kg/m²         Physical Exam      GENERAL: Well appearing, in no acute distress, alert and oriented x3.  PSYCH:  Mood and affect appropriate.  SKIN: Skin color, texture, turgor normal, no rashes or lesions.  HEAD/FACE:  Normocephalic, atraumatic. Cranial nerves grossly intact.  NECK: No pain to palpation over the cervical paraspinous muscles. Spurling Negative. No pain with neck flexion, extension, or lateral flexion.   CV: RRR with palpation of the radial artery.  PULM: No evidence of respiratory difficulty, symmetric chest rise.  GI:  Soft and non-tender.  BACK: Straight leg raising in the sitting and supine positions is negative to radicular pain. No pain to palpation over the facet joints of the lumbar spine or spinous processes. Normal range of motion without pain reproduction.  EXTREMITIES: Peripheral joint ROM is full and pain free without obvious instability or laxity in all four extremities. No deformities, edema, or skin discoloration. Good capillary refill.  MUSCULOSKELETAL: Shoulder, hip, and knee provocative maneuvers are negative.  There is no pain with palpation over the sacroiliac joints bilaterally.  FABERs test is negative.  FADIRs test is negative.   Bilateral upper and lower extremity strength is normal and symmetric.  No atrophy or tone abnormalities are noted.  NEURO: Bilateral upper and lower extremity coordination and muscle stretch reflexes are physiologic and symmetric.  Plantar response are downgoing. No clonus.  No loss of sensation is noted.  GAIT: " normal.      LABS:  Lab Results   Component Value Date    WBC 7.88 02/24/2016    HGB 12.9 (L) 02/24/2016    HCT 39.5 (L) 02/24/2016    MCV 92 02/24/2016     02/24/2016       CMP  Sodium   Date Value Ref Range Status   04/17/2019 143 136 - 145 mmol/L Final     Potassium   Date Value Ref Range Status   04/17/2019 4.1 3.5 - 5.1 mmol/L Final     Chloride   Date Value Ref Range Status   04/17/2019 110 95 - 110 mmol/L Final     CO2   Date Value Ref Range Status   04/17/2019 23 23 - 29 mmol/L Final     Glucose   Date Value Ref Range Status   04/17/2019 109 70 - 110 mg/dL Final     BUN, Bld   Date Value Ref Range Status   04/17/2019 15 8 - 23 mg/dL Final     Creatinine   Date Value Ref Range Status   04/17/2019 1.1 0.5 - 1.4 mg/dL Final     Calcium   Date Value Ref Range Status   04/17/2019 9.0 8.7 - 10.5 mg/dL Final     Total Protein   Date Value Ref Range Status   04/17/2019 7.2 6.0 - 8.4 g/dL Final     Albumin   Date Value Ref Range Status   04/17/2019 3.4 (L) 3.5 - 5.2 g/dL Final     Total Bilirubin   Date Value Ref Range Status   04/17/2019 0.6 0.1 - 1.0 mg/dL Final     Comment:     For infants and newborns, interpretation of results should be based  on gestational age, weight and in agreement with clinical  observations.  Premature Infant recommended reference ranges:  Up to 24 hours.............<8.0 mg/dL  Up to 48 hours............<12.0 mg/dL  3-5 days..................<15.0 mg/dL  6-29 days.................<15.0 mg/dL       Alkaline Phosphatase   Date Value Ref Range Status   04/17/2019 79 55 - 135 U/L Final     AST   Date Value Ref Range Status   04/17/2019 23 10 - 40 U/L Final     ALT   Date Value Ref Range Status   04/17/2019 22 10 - 44 U/L Final     Anion Gap   Date Value Ref Range Status   04/17/2019 10 8 - 16 mmol/L Final     eGFR if    Date Value Ref Range Status   04/17/2019 >60.0 >60 mL/min/1.73 m^2 Final     eGFR if non    Date Value Ref Range Status   04/17/2019  >60.0 >60 mL/min/1.73 m^2 Final     Comment:     Calculation used to obtain the estimated glomerular filtration  rate (eGFR) is the CKD-EPI equation.          No results found for: LABA1C, HGBA1C          ASSESSMENT: 69 y.o. year old male with low back and radicular pain, consistent with     1. Lumbar radiculopathy  IR Epidural Transforaminal Inj 1st Vert Lumbar Uni    IR Epidural Transfor Inj Ea Add Lumb Uni    Case Request-RAD/Other Procedure Area: Right L5/S1+ S1 TF NAHUN with IV sedation   2. Spinal stenosis of lumbar region, unspecified whether neurogenic claudication present     3. DDD (degenerative disc disease), lumbar           PLAN:   - Interventions: Schedule for a right Transforaminal epidural steroid injection at L5/S1 and S1 to help with their pain and progress with a home exercise plan.. Explained the risks and benefits of the procedure in detail with the patient today in clinic along with alternative treatment options, and the patient elected to pursue the intervention at this time.      - Anticoagulation use: yes aspirin, need clearance from Dr. Faye for 7 days to hold    - Medications: I have stressed the importance of physical activity and a home exercise plan to help with pain and improve health. and Patient can continue with medications for now since they are providing benefits, using them appropriately, and without side effects.     - Therapy: continue home exercise program     - Labs: reviewed    - Imaging: Reviewed available imaging with patient and answered any questions they had regarding study.Consider MRI lumbar spine if lumbar tfesi is not beneficial    - Consults/Referrals: none at this time    - Records:  Reviewed/Obtain old records from outside physicians and imaging    - Follow up visit: return to clinic 4 weeks post procedure  -The patient's follow-up will be with our physician assistant, Rosemarie Cardoso PA-C.    - Counseled patient regarding the importance of activity modification  and physical therapy    - Patient Questions: Answered all of the patient's questions regarding diagnosis, therapy, and treatment        The above plan and management options were discussed at length with patient. Patient is in agreement with the above and verbalized understanding.    I discussed the goals of interventional chronic pain management with the patient on today's visit.  I explained the utility of injections for diagnostic and therapeutic purposes.  We discussed a multimodal approach to pain including treating the patient's given worst pain at any given time.  We will use a systematic approach to addressing pain.  We will also adopt a multimodal approach that includes injections, adjuvant medications, physical therapy, at times psychiatry.  There may be a limited role for opioid use intermittently in the treatment of pain, more particularly for acute pain although no one approach can be used as a sole treatment modality.    I emphasized the importance of regular exercise, core strengthening and stretching, diet and weight loss as a cornerstone of long-term pain management.      Ermias Mario MD  Interventional Pain Management  [facility] [place]

## 2019-10-31 NOTE — TELEPHONE ENCOUNTER
Tried to call pt to schedule procedure with  because we got clearance, but unable to reach pt and left vm to pt to cb

## 2019-10-31 NOTE — H&P (VIEW-ONLY)
New Patient Chronic Pain Note (Initial Visit)    Referring Physician: Omar De La Vega MD    PCP: TIEN Mason Jr, MD    Chief Complaint:   Chief Complaint   Patient presents with    Back Pain        SUBJECTIVE:    Damon Yoo is a 69 y.o. male who presents to the clinic for the evaluation of lower back pain. He was referred by orthopedics for presumed lumbar radiculopathy. The pain started several years ago without any injury or trauma and symptoms have been worsening.The pain is located in the lower lumbar area and radiates to the right lower extremity.  The pain is described as aching and numbing and is rated as 5/10. The pain is rated with a score of  3/10 on the BEST day and a score of 9/10 on the WORST day.  Symptoms interfere with daily activity. The pain is exacerbated by Walking.  The pain is mitigated by nothing. The patient reports spending 2-4 hours per day reclining. The patient reports 6-8 hours of uninterrupted sleep per night.    Of note, patient has a h/o prostate cancer s/p robotic prostatectomy, PSA has been undetectable.     Patient denies night fever/night sweats, urinary incontinence, bowel incontinence, significant weight loss, significant motor weakness and loss of sensations.    Pain Disability Index Review:   No flowsheet data found.    Non-Pharmacologic Treatments:  Physical Therapy/Home Exercise: yes  Ice/Heat:yes  TENS: no  Acupuncture: no  Massage: no  Chiropractic: no    Other: no      Pain Medications:    - Adjuvant Medications: Zanaflex ( Tizanidine) and Celebrex, Voltaren gel, Tylenol  - Anti-Coagulants: Aspirin     report:  Not applicable    Pain Procedures:   - previous lumbar emmy in 2006 with significant relief      Imaging & EMG/NCS:   Bilateral lower extremities EMG/NCS 10/8/19  There is electrophysiologic evidence consistent with a chronic bilateral L5-S1 radiculopathies with superimposed peripheral polyneuropathy.     MRI Lumbar Spine 9/22/14  Compared to prior  MRI December 2006.  Vertebral alignment remains normal.  Again noted is desiccation of all the lumbar intervertebral disks with marked loss of disk height at L3-4, L4-5 and L5-S1.  The conus ends at the level of L1.  There are discogenic changes in the endplates of L4-5 and L5-S1.  No compression fractures or acute osseous abnormalities are identified.  Schmorl's nodes present in the endplates of L4-5.  Axial images are acquired through the disk spaces from T12-L1 through L5-S1.  The T12 L1 disk space demonstrates mild facet hypertrophy and minimal disk bulge without significant canal or foraminal stenosis.  No significant abnormality L1-2.  At L2-3 there is facet and ligament flavum hypertrophy and diffuse posterior bulging of the disk which is creating mild to moderate canal and bilateral foraminal stenosis.  At L3-4 there is facet arthropathy and diffuse posterior disk herniation creating severe canal and bilateral foraminal stenosis.  At L4-5 facet and ligamentum flavum hypertrophy and diffuse posterior disk herniation creating severe canal and bilateral foraminal stenosis.  At L5-S1 a central disk herniation broad-based posterior bulging of the disk with severe canal and bilateral foraminal stenosis.    XR Lumbar Spine 8/4/14  There is anatomic spinal alignment.  Moderate to severe disk space narrowing with associated vacuum phenomenon, endplate spurring, and facet arthropathy at L4-5 and L5-S1.  Mild disk narrowing at L3-4.  Pedicles are intact.  No compression   fracture or subluxation.    Past Medical History:   Diagnosis Date    Abnormal EKG 10/25/2013    Arthritis     CAD (coronary artery disease)     Cancer     Prostate [id number] prostate    Glaucoma     Gout attack     Hyperlipidemia     Hypertension     Hypertrophic cardiomyopathy 10/25/2013    S/P CABG (coronary artery bypass graft) 10/25/2013     Past Surgical History:   Procedure Laterality Date    COLONOSCOPY N/A 12/4/2017    Procedure:  COLONOSCOPY;  Surgeon: Dina Ledesma MD;  Location: [facility];  Service: Endoscopy;  Laterality: N/A;    CORONARY ARTERY BYPASS GRAFT  05/2003    x1    PROSTATE SURGERY      RALP      Social History     Socioeconomic History    Marital status:      Spouse name: Not on file    Number of children: Not on file    Years of education: Not on file    Highest education level: Not on file   Occupational History    Not on file   Social Needs    Financial resource strain: Not on file    Food insecurity:     Worry: Not on file     Inability: Not on file    Transportation needs:     Medical: Not on file     Non-medical: Not on file   Tobacco Use    Smoking status: Former Smoker     Packs/day: 0.25     Years: 15.00     Pack years: 3.75     Last attempt to quit: 1988     Years since quittin.5    Smokeless tobacco: Never Used   Substance and Sexual Activity    Alcohol use: No    Drug use: No    Sexual activity: Not on file   Lifestyle    Physical activity:     Days per week: Not on file     Minutes per session: Not on file    Stress: Not on file   Relationships    Social connections:     Talks on phone: Not on file     Gets together: Not on file     Attends Muslim service: Not on file     Active member of club or organization: Not on file     Attends meetings of clubs or organizations: Not on file     Relationship status: Not on file   Other Topics Concern    Not on file   Social History Narrative    No pets or smokers in household.     Family History   Problem Relation Age of Onset    Hypertension Father     Strabismus Neg Hx     Retinal detachment Neg Hx     Macular degeneration Neg Hx     Glaucoma Neg Hx     Blindness Neg Hx     Amblyopia Neg Hx        Review of patient's allergies indicates:  No Known Allergies    Current Outpatient Medications   Medication Sig    allopurinol (ZYLOPRIM) 300 MG tablet TAKE 1 AND 1/2 TABLETS BY MOUTH EVERY DAY    amLODIPine (NORVASC) 5 MG tablet  Take 1 tablet (5 mg total) by mouth once daily.    aspirin 81 MG chewable tablet Take 1 tablet by mouth Daily.    fish oil-fat acid comb.8-hb137 (OMEGA 3-6-9) 1,200 mg Cap Take 1 capsule by mouth once daily.     flunisolide 25 mcg, 0.025%, (NASALIDE) 25 mcg (0.025 %) Spry USE 2 SPRAYS IN EACH NOSTRIL TWICE DAILY (Patient taking differently: 2 sprays by Nasal route as needed. )    latanoprost 0.005 % ophthalmic solution INSTILL ONE DROP INTO BOTH EYES IN THE EVENING    latanoprost 0.005 % ophthalmic solution INSTILL ONE DROP INTO BOTH EYES IN THE EVENING    lisinopril (PRINIVIL,ZESTRIL) 40 MG tablet TAKE 1 TABLET (40 MG TOTAL) BY MOUTH ONCE DAILY.    metoprolol succinate (TOPROL-XL) 100 MG 24 hr tablet TAKE 1 TABLET (100 MG TOTAL) BY MOUTH ONCE DAILY.    predniSONE (DELTASONE) 20 MG tablet Take 2 tablets with food for 3 days; then take one tablet with food for 2 days; then take 1/2 tablet with food for 2 days.    rosuvastatin (CRESTOR) 40 MG Tab Take 1 tablet (40 mg total) by mouth every evening. Generic is fine.    timolol maleate 0.5% (TIMOPTIC) 0.5 % Drop INSTILL 1 DROP INTO EACH EYE EVERY MORNING    tiZANidine (ZANAFLEX) 4 MG tablet Take 1 tablet (4 mg total) by mouth every 8 (eight) hours.    diclofenac sodium (VOLTAREN) 1 % Gel Apply 2 g topically once daily.     No current facility-administered medications for this visit.        Review of Systems       GENERAL:  No weight loss, malaise or fevers.  HEENT:   No recent changes in vision or hearing  NECK:  Negative for lumps, no difficulty with swallowing.  RESPIRATORY:  Negative for cough, wheezing or shortness of breath, patient denies any recent URI.  CARDIOVASCULAR:  Negative for chest pain, leg swelling or palpitations.  GI:  Negative for abdominal discomfort, blood in stools or black stools or change in bowel habits.  MUSCULOSKELETAL:  See HPI.  SKIN:  Negative for lesions, rash, and itching.  PSYCH:  No mood disorder or recent psychosocial  "stressors.  Patients sleep is not disturbed secondary to pain.  HEMATOLOGY/LYMPHOLOGY:  Negative for prolonged bleeding, bruising easily or swollen nodes.  Patient is currently taking anti-coagulants - ASA  NEURO:   No history of headaches, syncope, paralysis, seizures or tremors.  All other reviewed and negative other than HPI.    OBJECTIVE:    /75   Pulse 64   Ht 5' 7" (1.702 m)   Wt 116.2 kg (256 lb 2.8 oz)   BMI 40.12 kg/m²         Physical Exam      GENERAL: Well appearing, in no acute distress, alert and oriented x3.  PSYCH:  Mood and affect appropriate.  SKIN: Skin color, texture, turgor normal, no rashes or lesions.  HEAD/FACE:  Normocephalic, atraumatic. Cranial nerves grossly intact.  NECK: No pain to palpation over the cervical paraspinous muscles. Spurling Negative. No pain with neck flexion, extension, or lateral flexion.   CV: RRR with palpation of the radial artery.  PULM: No evidence of respiratory difficulty, symmetric chest rise.  GI:  Soft and non-tender.  BACK: Straight leg raising in the sitting and supine positions is negative to radicular pain. No pain to palpation over the facet joints of the lumbar spine or spinous processes. Normal range of motion without pain reproduction.  EXTREMITIES: Peripheral joint ROM is full and pain free without obvious instability or laxity in all four extremities. No deformities, edema, or skin discoloration. Good capillary refill.  MUSCULOSKELETAL: Shoulder, hip, and knee provocative maneuvers are negative.  There is no pain with palpation over the sacroiliac joints bilaterally.  FABERs test is negative.  FADIRs test is negative.   Bilateral upper and lower extremity strength is normal and symmetric.  No atrophy or tone abnormalities are noted.  NEURO: Bilateral upper and lower extremity coordination and muscle stretch reflexes are physiologic and symmetric.  Plantar response are downgoing. No clonus.  No loss of sensation is noted.  GAIT: " normal.      LABS:  Lab Results   Component Value Date    WBC 7.88 02/24/2016    HGB 12.9 (L) 02/24/2016    HCT 39.5 (L) 02/24/2016    MCV 92 02/24/2016     02/24/2016       CMP  Sodium   Date Value Ref Range Status   04/17/2019 143 136 - 145 mmol/L Final     Potassium   Date Value Ref Range Status   04/17/2019 4.1 3.5 - 5.1 mmol/L Final     Chloride   Date Value Ref Range Status   04/17/2019 110 95 - 110 mmol/L Final     CO2   Date Value Ref Range Status   04/17/2019 23 23 - 29 mmol/L Final     Glucose   Date Value Ref Range Status   04/17/2019 109 70 - 110 mg/dL Final     BUN, Bld   Date Value Ref Range Status   04/17/2019 15 8 - 23 mg/dL Final     Creatinine   Date Value Ref Range Status   04/17/2019 1.1 0.5 - 1.4 mg/dL Final     Calcium   Date Value Ref Range Status   04/17/2019 9.0 8.7 - 10.5 mg/dL Final     Total Protein   Date Value Ref Range Status   04/17/2019 7.2 6.0 - 8.4 g/dL Final     Albumin   Date Value Ref Range Status   04/17/2019 3.4 (L) 3.5 - 5.2 g/dL Final     Total Bilirubin   Date Value Ref Range Status   04/17/2019 0.6 0.1 - 1.0 mg/dL Final     Comment:     For infants and newborns, interpretation of results should be based  on gestational age, weight and in agreement with clinical  observations.  Premature Infant recommended reference ranges:  Up to 24 hours.............<8.0 mg/dL  Up to 48 hours............<12.0 mg/dL  3-5 days..................<15.0 mg/dL  6-29 days.................<15.0 mg/dL       Alkaline Phosphatase   Date Value Ref Range Status   04/17/2019 79 55 - 135 U/L Final     AST   Date Value Ref Range Status   04/17/2019 23 10 - 40 U/L Final     ALT   Date Value Ref Range Status   04/17/2019 22 10 - 44 U/L Final     Anion Gap   Date Value Ref Range Status   04/17/2019 10 8 - 16 mmol/L Final     eGFR if    Date Value Ref Range Status   04/17/2019 >60.0 >60 mL/min/1.73 m^2 Final     eGFR if non    Date Value Ref Range Status   04/17/2019  >60.0 >60 mL/min/1.73 m^2 Final     Comment:     Calculation used to obtain the estimated glomerular filtration  rate (eGFR) is the CKD-EPI equation.          No results found for: LABA1C, HGBA1C          ASSESSMENT: 69 y.o. year old male with low back and radicular pain, consistent with     1. Lumbar radiculopathy  IR Epidural Transforaminal Inj 1st Vert Lumbar Uni    IR Epidural Transfor Inj Ea Add Lumb Uni    Case Request-RAD/Other Procedure Area: Right L5/S1+ S1 TF NAHUN with IV sedation   2. Spinal stenosis of lumbar region, unspecified whether neurogenic claudication present     3. DDD (degenerative disc disease), lumbar           PLAN:   - Interventions: Schedule for a right Transforaminal epidural steroid injection at L5/S1 and S1 to help with their pain and progress with a home exercise plan.. Explained the risks and benefits of the procedure in detail with the patient today in clinic along with alternative treatment options, and the patient elected to pursue the intervention at this time.      - Anticoagulation use: yes aspirin, need clearance from Dr. Faye for 7 days to hold    - Medications: I have stressed the importance of physical activity and a home exercise plan to help with pain and improve health. and Patient can continue with medications for now since they are providing benefits, using them appropriately, and without side effects.     - Therapy: continue home exercise program     - Labs: reviewed    - Imaging: Reviewed available imaging with patient and answered any questions they had regarding study.Consider MRI lumbar spine if lumbar tfesi is not beneficial    - Consults/Referrals: none at this time    - Records:  Reviewed/Obtain old records from outside physicians and imaging    - Follow up visit: return to clinic 4 weeks post procedure  -The patient's follow-up will be with our physician assistant, Rosemarie Cardoso PA-C.    - Counseled patient regarding the importance of activity modification  and physical therapy    - Patient Questions: Answered all of the patient's questions regarding diagnosis, therapy, and treatment        The above plan and management options were discussed at length with patient. Patient is in agreement with the above and verbalized understanding.    I discussed the goals of interventional chronic pain management with the patient on today's visit.  I explained the utility of injections for diagnostic and therapeutic purposes.  We discussed a multimodal approach to pain including treating the patient's given worst pain at any given time.  We will use a systematic approach to addressing pain.  We will also adopt a multimodal approach that includes injections, adjuvant medications, physical therapy, at times psychiatry.  There may be a limited role for opioid use intermittently in the treatment of pain, more particularly for acute pain although no one approach can be used as a sole treatment modality.    I emphasized the importance of regular exercise, core strengthening and stretching, diet and weight loss as a cornerstone of long-term pain management.      Ermias Mario MD  Interventional Pain Management  [facility]

## 2019-11-01 ENCOUNTER — TELEPHONE (OUTPATIENT)
Dept: PAIN MEDICINE | Facility: CLINIC | Age: 69
End: 2019-11-01

## 2019-11-01 NOTE — LETTER
Pain Management Pre-Procedure Instructions  (also available in your [facility] account)    Patient Name:___Damon Yoo____MRN: [id number] you are scheduled to have the following procedure:__ Epidural Steroid Injection  _with______Ermias Mario MD on: _11/11/19______ at: [facility]    You will be contacted the day before your procedure to be given an arrival and procedure time                                                                                                            Day of Procedure   Ensure you have obtained arrival time from the Pain Management department  o We will call 48 hours in advance with your arrival time. Please check any voicemails you may have  o If you arrive past your scheduled procedure time, you may be asked to reschedule your procedure.   For your safety, ensure you have a  with you to remain present throughout your procedure   o If you arrive without a responsible adult to stay with you and drive you home, you may be asked to reschedule your procedure   Take all of your prescribed medications (exceptions noted below) with a small amount of water  o [] Nothing by mouth two (2) hours before your procedure.  It is ok to take your regular medications with a small sip of water.  o [x] Nothing by mouth after midnight the night before your procedure.  It is ok to take your regular medications with a small sip of water.     Wear loose, comfortable clothing    You may wear glasses, dentures, contact lenses and/or hearing aids. Please leave all valuable items at home.   Contact the Pain Management department at [phone] or via [employer] if you are:  o Running a fever above 100 degrees  o Feel ill, have any type of infection, or are taking antibiotics now or have in the past 2 weeks  o Have had any outpatient procedures in the past 2 weeks (colonoscopy, major dental work, etc.)  o If you are allergic to iodine, IVP dye or shellfish.      Contact Information:  [phone], ask to speak to the pain management department with any questions or concerns or send a message via [employer]            Patients taking anticoagulants:  Dr Ermias Mario MD would like for you to stop fish oil and Aspirin products 1 week prior to your procedure

## 2019-11-01 NOTE — TELEPHONE ENCOUNTER
----- Message from Kim Goyal sent at 11/1/2019 10:38 AM CDT -----  Contact: PATIENT  Type:  Patient Returning Call    Who Called:PATIENT  Who Left Message for Patient:NURSE  Does the patient know what this is regarding?:APPT FOR INJECTION  Would the patient rather a call back or a response via [facility]? CALL  Best Call Back Number:[phone]  Additional Information: PLEASE CALL BACK

## 2019-11-01 NOTE — TELEPHONE ENCOUNTER
----- Message from Benjie Hurley sent at 11/1/2019  8:14 AM CDT -----  Contact: self  Type:  Patient Returning Call    Who Called:pt  Who Left Message for Patient:n/a  Does the patient know what this is regarding?:appt  Would the patient rather a call back or a response via [facility]? Call back  Best Call Back Number:[phone] or [phone]  Additional Information: none    Thanks.  Benjie Hurley

## 2019-11-04 ENCOUNTER — TELEPHONE (OUTPATIENT)
Dept: PAIN MEDICINE | Facility: CLINIC | Age: 69
End: 2019-11-04

## 2019-11-04 NOTE — TELEPHONE ENCOUNTER
----- Message from Coretta Musa LPN sent at 11/4/2019  3:48 PM CST -----  Contact: patient   Spoke with pt. He states this was supposed to be sent to Dr. Mario. Requesting a call back. Thank you.   ----- Message -----  From: Debbie Piña  Sent: 11/4/2019   8:51 AM CST  To: Yolette Nelson Staff    Type:  Patient Returning Call    Who Called:Damon Yoo   Who Left Message for Patient:unsure   Does the patient know what this is regarding?:unsure   Would the patient rather a call back or a response via [facility]? Call  Best Call Back Number:[phone]  Additional Information: n/a

## 2019-11-04 NOTE — TELEPHONE ENCOUNTER
I called pt earlier today to schedule his procedure, but it was already scheduled 11/11 and apologized to pt for the confusion. All questions answered .Pt understood

## 2019-11-06 NOTE — PRE-PROCEDURE INSTRUCTIONS
Spoke with patient      regarding procedure scheduled on 11/11/19  Has patient been sick with fever or on antibiotics within the last 7 days? no  Has patient received a vaccination within the last 7 days? no  Has the patient stopped all medications as directed? Yes, patient stopped his fish oil and aspirin on 11/3/19 with clearance from Dr. Faye  Does patient have a pacemaker and or defibrillator? no  Does the patient have any chest pain or SOB? no  Does the patient have a ride to and from procedure and someone reliable to remain with patient? Yes, wife edgardo  Is the patient diabetic? no  Does the patient have sleep apnea? no Or use O2 at home? no  Is the patient receiving sedation? yes  Is the patient instructed to remain NPO beginning at midnight the night before their procedure? yes

## 2019-11-11 ENCOUNTER — HOSPITAL ENCOUNTER (OUTPATIENT)
Facility: HOSPITAL | Age: 69
Discharge: HOME | End: 2019-11-11
Attending: PHYSICAL MEDICINE & REHABILITATION | Admitting: PHYSICAL MEDICINE & REHABILITATION
Payer: MEDICARE

## 2019-11-11 VITALS
BODY MASS INDEX: 41.42 KG/M2 | OXYGEN SATURATION: 98 % | HEART RATE: 52 BPM | WEIGHT: 257.69 LBS | HEIGHT: 66 IN | DIASTOLIC BLOOD PRESSURE: 71 MMHG | RESPIRATION RATE: 16 BRPM | SYSTOLIC BLOOD PRESSURE: 151 MMHG | TEMPERATURE: 98 F

## 2019-11-11 DIAGNOSIS — M54.16 LUMBAR RADICULOPATHY: ICD-10-CM

## 2019-11-11 PROCEDURE — 64484 NJX AA&/STRD TFRM EPI L/S EA: CPT | Performed by: PHYSICAL MEDICINE & REHABILITATION

## 2019-11-11 PROCEDURE — 99152 MOD SED SAME PHYS/QHP 5/>YRS: CPT | Mod: ,,, | Performed by: PHYSICAL MEDICINE & REHABILITATION

## 2019-11-11 PROCEDURE — 64484 NJX AA&/STRD TFRM EPI L/S EA: CPT | Mod: RT,,, | Performed by: PHYSICAL MEDICINE & REHABILITATION

## 2019-11-11 PROCEDURE — 64483 NJX AA&/STRD TFRM EPI L/S 1: CPT | Performed by: PHYSICAL MEDICINE & REHABILITATION

## 2019-11-11 PROCEDURE — 99152 PR MOD CONSCIOUS SEDATION, SAME PHYS, 5+ YRS, FIRST 15 MIN: ICD-10-PCS | Mod: ,,, | Performed by: PHYSICAL MEDICINE & REHABILITATION

## 2019-11-11 PROCEDURE — 25000003 PHARM REV CODE 250: Performed by: PHYSICAL MEDICINE & REHABILITATION

## 2019-11-11 PROCEDURE — 63600175 PHARM REV CODE 636 W HCPCS: Performed by: PHYSICAL MEDICINE & REHABILITATION

## 2019-11-11 PROCEDURE — 64483 NJX AA&/STRD TFRM EPI L/S 1: CPT | Mod: RT,,, | Performed by: PHYSICAL MEDICINE & REHABILITATION

## 2019-11-11 PROCEDURE — 64484 PRA INJECT ANES/STEROID FORAMEN LUMBAR/SACRAL W IMG GUIDE ,EA ADD LEVEL: ICD-10-PCS | Mod: RT,,, | Performed by: PHYSICAL MEDICINE & REHABILITATION

## 2019-11-11 PROCEDURE — 64483 PR EPIDURAL INJ, ANES/STEROID, TRANSFORAMINAL, LUMB/SACR, SNGL LEVL: ICD-10-PCS | Mod: RT,,, | Performed by: PHYSICAL MEDICINE & REHABILITATION

## 2019-11-11 PROCEDURE — 25500020 PHARM REV CODE 255: Performed by: PHYSICAL MEDICINE & REHABILITATION

## 2019-11-11 RX ORDER — ONDANSETRON 2 MG/ML
4 INJECTION INTRAMUSCULAR; INTRAVENOUS ONCE AS NEEDED
Status: DISCONTINUED | OUTPATIENT
Start: 2019-11-11 | End: 2019-11-11 | Stop reason: HOSPADM

## 2019-11-11 RX ORDER — METHYLPREDNISOLONE ACETATE 40 MG/ML
INJECTION, SUSPENSION INTRA-ARTICULAR; INTRALESIONAL; INTRAMUSCULAR; SOFT TISSUE
Status: DISCONTINUED | OUTPATIENT
Start: 2019-11-11 | End: 2019-11-11 | Stop reason: HOSPADM

## 2019-11-11 RX ORDER — FENTANYL CITRATE 50 UG/ML
INJECTION, SOLUTION INTRAMUSCULAR; INTRAVENOUS
Status: DISCONTINUED | OUTPATIENT
Start: 2019-11-11 | End: 2019-11-11 | Stop reason: HOSPADM

## 2019-11-11 RX ORDER — BUPIVACAINE HYDROCHLORIDE 2.5 MG/ML
INJECTION, SOLUTION EPIDURAL; INFILTRATION; INTRACAUDAL
Status: DISCONTINUED | OUTPATIENT
Start: 2019-11-11 | End: 2019-11-11 | Stop reason: HOSPADM

## 2019-11-11 RX ORDER — MIDAZOLAM HYDROCHLORIDE 1 MG/ML
INJECTION, SOLUTION INTRAMUSCULAR; INTRAVENOUS
Status: DISCONTINUED | OUTPATIENT
Start: 2019-11-11 | End: 2019-11-11 | Stop reason: HOSPADM

## 2019-11-11 NOTE — PLAN OF CARE
Discharge instructions reviewed with pt. Pt and spouse states understanding and has no further questions. Pt AAOx4, stable.

## 2019-11-11 NOTE — DISCHARGE INSTRUCTIONS

## 2019-11-11 NOTE — DISCHARGE SUMMARY
Discharge Note  Short Stay      SUMMARY     Admit Date: 11/11/2019    Attending Physician: Ermias Mario MD        Discharge Physician: Ermias Mario MD        Discharge Date: 11/11/2019 8:40 AM    Procedure(s) (LRB):  Right L5/S1+ S1 TF NAHUN with IV sedation (Right)    Final Diagnosis: Lumbar radiculopathy [M54.16]    Disposition: Home or self care    Patient Instructions:   Current Discharge Medication List      CONTINUE these medications which have NOT CHANGED    Details   allopurinol (ZYLOPRIM) 300 MG tablet TAKE 1 AND 1/2 TABLETS BY MOUTH EVERY DAY  Qty: 135 tablet, Refills: 1      amLODIPine (NORVASC) 5 MG tablet Take 1 tablet (5 mg total) by mouth once daily.  Qty: 30 tablet, Refills: 6    Associated Diagnoses: Essential hypertension; Coronary artery disease involving coronary bypass graft of native heart without angina pectoris      !! latanoprost 0.005 % ophthalmic solution INSTILL ONE DROP INTO BOTH EYES IN THE EVENING  Qty: 2.5 mL, Refills: 12    Associated Diagnoses: Primary open angle glaucoma of both eyes, moderate stage      lisinopril (PRINIVIL,ZESTRIL) 40 MG tablet TAKE 1 TABLET (40 MG TOTAL) BY MOUTH ONCE DAILY.  Qty: 90 tablet, Refills: 3    Associated Diagnoses: Essential hypertension      metoprolol succinate (TOPROL-XL) 100 MG 24 hr tablet TAKE 1 TABLET (100 MG TOTAL) BY MOUTH ONCE DAILY.  Refills: 3      timolol maleate 0.5% (TIMOPTIC) 0.5 % Drop INSTILL 1 DROP INTO EACH EYE EVERY MORNING  Qty: 10 mL, Refills: 12    Associated Diagnoses: Primary open angle glaucoma of both eyes, moderate stage      tiZANidine (ZANAFLEX) 4 MG tablet Take 1 tablet (4 mg total) by mouth every 8 (eight) hours.  Qty: 30 tablet, Refills: 0    Associated Diagnoses: Strain of right quadriceps, initial encounter      aspirin 81 MG chewable tablet Take 1 tablet by mouth Daily.      diclofenac sodium (VOLTAREN) 1 % Gel Apply 2 g topically once daily.  Qty: 100 g, Refills: 2    Associated Diagnoses: Strain of right  quadriceps, initial encounter      fish oil-fat acid comb.8-hb137 (OMEGA 3-6-9) 1,200 mg Cap Take 1 capsule by mouth once daily.       flunisolide 25 mcg, 0.025%, (NASALIDE) 25 mcg (0.025 %) Spry USE 2 SPRAYS IN EACH NOSTRIL TWICE DAILY  Qty: 25 mL, Refills: 0    Associated Diagnoses: Viral URI      !! latanoprost 0.005 % ophthalmic solution INSTILL ONE DROP INTO BOTH EYES IN THE EVENING  Qty: 2.5 mL, Refills: 0    Associated Diagnoses: Primary open angle glaucoma of both eyes, moderate stage      predniSONE (DELTASONE) 20 MG tablet Take 2 tablets with food for 3 days; then take one tablet with food for 2 days; then take 1/2 tablet with food for 2 days.  Qty: 10 tablet, Refills: 0    Associated Diagnoses: Acute bilateral low back pain without sciatica      rosuvastatin (CRESTOR) 40 MG Tab Take 1 tablet (40 mg total) by mouth every evening. Generic is fine.  Qty: 90 tablet, Refills: 3    Associated Diagnoses: Hyperlipidemia, unspecified hyperlipidemia type; Coronary artery disease involving coronary bypass graft of native heart without angina pectoris       !! - Potential duplicate medications found. Please discuss with provider.              Discharge Diagnosis: Lumbar radiculopathy [M54.16]  Condition on Discharge: Stable with no complications to procedure   Diet on Discharge: Same as before.  Activity: as per instruction sheet.  Discharge to: Home with a responsible adult.  Follow up: 2-4 weeks       Please call the office if you experience any weakness or loss of sensation, fever > 101.5, pain uncontrolled with oral medications, persistent nausea/vomiting/or diarrhea, redness or drainage from the incisions, or any other worrisome concerns. If physician on call was not reached or could not communicate with our office for any reason please go to the nearest emergency department

## 2019-11-11 NOTE — OP NOTE
INFORMED CONSENT: The procedure, risks, benefits and options were discussed with patient. There are no contraindications to the procedure. The patient expressed understanding and agreed to proceed. The personnel performing the procedure was discussed.    11/11/2019    Surgeon: Ermias Mario MD  Assistants: None    Sedation:  Conscious sedation provided by M.D    The patient was monitored with continuous pulse oximetry, EKG, and intermittent blood pressure monitors.  The patient was hemodynamically stable throughout the entire process was responsive to voice, and breathing spontaneously.  Supplemental O2 was provided at 2L/min via nasal cannula.  Patient was comfortable for the duration of the procedure. (See nurse documentation and case log for sedation time)    There was a total of 1mg IV Midazolam and 25mcg Fentanyl titrated for the procedure      PROCEDURE:    1)  Right  L5 TRANSFORAMINAL EPIDURAL STEROID INJECTION  2)  Right  S1 TRANSFORAMINAL EPIDURAL STEROID INJECTION    Pre Procedure diagnosis:    Right  L5 and S1  Lumbar radiculopathy [M54.16]    Post-Procedure diagnosis:   same    Complications: None    Specimens: None      DESCRIPTION OF PROCEDURE: The patient was brought to the procedure room. IV access was obtained prior to the procedure. The patient was positioned prone on the fluoroscopy table. Continuous hemodynamic monitoring was initiated including blood pressure, EKG, and pulse oximetry. . The skin was prepped with chlorhexidine and draped in a sterile fashion. Skin anesthesia was achieved using a total of 10mL of lidocaine, 5mL over each respective injection site.     The  L5 and S1  transforaminal spaces were identified with fluoroscopy in the  AP, oblique, and lateral views.  A 22 gauge spinal quinke needle was then advanced into the area of the trans foraminal spaces at the above levels with confirmation of proper needle position using AP, oblique, and lateral fluoroscopic views. Once the  needle tip was in the area of the transforaminal space, and there was no blood, CSF or paraesthesias,  1 mL of Omnipaque 300mg/ml was injected on each side for a total of 2 mL.  Fluoroscopic imaging in the AP and lateral views revealed a clear outline of the spinal nerve with proximal spread of agent through the neural foramen into the epidural space. A total combination of 1 mL of Bupivicaine 0.25% and 40 mg depo medrol was injected into each level for a total of 4mL of injected medications with displacement of the contrast dye confirming that the medication went into the area of the transforaminal spaces at each level. A sterile dressing was applied.   Patient tolerated the procedure well.    Patient was taken back to the recovery room for further observation.     The patient was discharged to home in stable condition

## 2019-11-11 NOTE — INTERVAL H&P NOTE
The patient has been examined and the H&P has been reviewed:    I concur with the findings and no changes have occurred since H&P was written.    Anesthesia/Surgery risks, benefits and alternative options discussed and understood by patient/family.          Active Hospital Problems    Diagnosis  POA    Lumbar radiculopathy [M54.16]  Yes      Resolved Hospital Problems   No resolved problems to display.       Plan:  Proceed with right sided lumbar TFESI at L5/S1 and S1    Ermias Mario MD  Interventional Pain Medicine  [facility] [place]

## 2019-11-18 ENCOUNTER — OFFICE VISIT (OUTPATIENT)
Dept: OPHTHALMOLOGY | Facility: CLINIC | Age: 69
End: 2019-11-18
Payer: MEDICARE

## 2019-11-18 DIAGNOSIS — H40.1132 PRIMARY OPEN ANGLE GLAUCOMA OF BOTH EYES, MODERATE STAGE: Primary | ICD-10-CM

## 2019-11-18 DIAGNOSIS — H25.13 NUCLEAR SCLEROSIS, BILATERAL: ICD-10-CM

## 2019-11-18 PROCEDURE — 99999 PR PBB SHADOW E&M-EST. PATIENT-LVL II: ICD-10-PCS | Mod: PBBFAC,,, | Performed by: OPHTHALMOLOGY

## 2019-11-18 PROCEDURE — 92012 INTRM OPH EXAM EST PATIENT: CPT | Mod: S$PBB,,, | Performed by: OPHTHALMOLOGY

## 2019-11-18 PROCEDURE — 92012 PR EYE EXAM, EST PATIENT,INTERMED: ICD-10-PCS | Mod: S$PBB,,, | Performed by: OPHTHALMOLOGY

## 2019-11-18 PROCEDURE — 92133 CPTRZD OPH DX IMG PST SGM ON: CPT | Mod: PBBFAC | Performed by: OPHTHALMOLOGY

## 2019-11-18 PROCEDURE — 92133 POSTERIOR SEGMENT OCT OPTIC NERVE(OCULAR COHERENCE TOMOGRAPHY) - OU - BOTH EYES: ICD-10-PCS | Mod: 26,S$PBB,, | Performed by: OPHTHALMOLOGY

## 2019-11-18 PROCEDURE — 99212 OFFICE O/P EST SF 10 MIN: CPT | Mod: PBBFAC | Performed by: OPHTHALMOLOGY

## 2019-11-18 PROCEDURE — 99999 PR PBB SHADOW E&M-EST. PATIENT-LVL II: CPT | Mod: PBBFAC,,, | Performed by: OPHTHALMOLOGY

## 2019-11-18 NOTE — PROGRESS NOTES
SUBJECTIVE:   Damon Yoo is a 69 y.o. male   Corrected distance visual acuity was 20/25 +1 in the right eye and 20/25 in the left eye.   Chief Complaint   Patient presents with    Glaucoma     3 mth IOP check and GOCT         HPI:  HPI     Glaucoma      Additional comments: 3 mth IOP check and GOCT               Comments     3 month IOP check with GOCT  No pain or discomfort  VA stable OU  100% compliant with drops    1. Mod COAG- No FHx (init 32/27 on 3/10 with C/D .65/.60) Goal <18, new   goal = 15 6/2017  SLT OD 12/11/14 (20-16)  SLT OS 2/25/15 (20-16)  2. Mild NSC  3. LLL Cyst Excision on 6/13/12    Latanoprost QHS OU  Timolol qam OU          Last edited by aLtia Suarez on 11/18/2019  8:15 AM. (History)        Assessment /Plan :  1. Primary open angle glaucoma of both eyes, moderate stage Doing well, IOP within acceptable range relative to target IOP and no evidence of progression. Continue current treatment. Reviewed importance of continued compliance with treatment and follow up.     2. Nuclear sclerosis, bilateral monitor for now     Return to clinic in 3 months  or as needed.  With Dilation, HVF 24-2 and SDP

## 2019-11-26 DIAGNOSIS — I10 ESSENTIAL HYPERTENSION: Primary | ICD-10-CM

## 2019-11-26 NOTE — TELEPHONE ENCOUNTER
Fax refill request received from pt's pharmacy for Metoprolol XL rx, request sent to Dr. Mason for auth.    LV 02/13/19  NV 01/23/20

## 2019-11-27 RX ORDER — METOPROLOL SUCCINATE 100 MG/1
100 TABLET, EXTENDED RELEASE ORAL DAILY
Qty: 90 TABLET | Refills: 3 | Status: SHIPPED | OUTPATIENT
Start: 2019-11-27 | End: 2021-01-08 | Stop reason: SDUPTHER

## 2019-11-29 ENCOUNTER — LAB VISIT (OUTPATIENT)
Dept: LAB | Facility: HOSPITAL | Age: 69
End: 2019-11-29
Attending: PHYSICIAN ASSISTANT
Payer: MEDICARE

## 2019-11-29 DIAGNOSIS — I25.810 CORONARY ARTERY DISEASE INVOLVING CORONARY BYPASS GRAFT OF NATIVE HEART WITHOUT ANGINA PECTORIS: ICD-10-CM

## 2019-11-29 DIAGNOSIS — E78.5 HYPERLIPIDEMIA, UNSPECIFIED HYPERLIPIDEMIA TYPE: ICD-10-CM

## 2019-11-29 LAB
ALBUMIN SERPL BCP-MCNC: 3.6 G/DL (ref 3.5–5.2)
ALP SERPL-CCNC: 104 U/L (ref 55–135)
ALT SERPL W/O P-5'-P-CCNC: 30 U/L (ref 10–44)
ANION GAP SERPL CALC-SCNC: 9 MMOL/L (ref 8–16)
AST SERPL-CCNC: 27 U/L (ref 10–40)
BILIRUB SERPL-MCNC: 0.4 MG/DL (ref 0.1–1)
BUN SERPL-MCNC: 22 MG/DL (ref 8–23)
CALCIUM SERPL-MCNC: 9.4 MG/DL (ref 8.7–10.5)
CHLORIDE SERPL-SCNC: 111 MMOL/L (ref 95–110)
CHOLEST SERPL-MCNC: 130 MG/DL (ref 120–199)
CHOLEST/HDLC SERPL: 3.9 {RATIO} (ref 2–5)
CO2 SERPL-SCNC: 25 MMOL/L (ref 23–29)
CREAT SERPL-MCNC: 1.4 MG/DL (ref 0.5–1.4)
EST. GFR  (AFRICAN AMERICAN): 59.7 ML/MIN/1.73 M^2
EST. GFR  (NON AFRICAN AMERICAN): 51.6 ML/MIN/1.73 M^2
GLUCOSE SERPL-MCNC: 113 MG/DL (ref 70–110)
HDLC SERPL-MCNC: 33 MG/DL (ref 40–75)
HDLC SERPL: 25.4 % (ref 20–50)
LDLC SERPL CALC-MCNC: 80 MG/DL (ref 63–159)
NONHDLC SERPL-MCNC: 97 MG/DL
POTASSIUM SERPL-SCNC: 4.8 MMOL/L (ref 3.5–5.1)
PROT SERPL-MCNC: 7.1 G/DL (ref 6–8.4)
SODIUM SERPL-SCNC: 145 MMOL/L (ref 136–145)
TRIGL SERPL-MCNC: 85 MG/DL (ref 30–150)

## 2019-11-29 PROCEDURE — 80061 LIPID PANEL: CPT

## 2019-11-29 PROCEDURE — 80053 COMPREHEN METABOLIC PANEL: CPT | Mod: PO

## 2019-11-29 PROCEDURE — 36415 COLL VENOUS BLD VENIPUNCTURE: CPT | Mod: PO

## 2019-12-04 ENCOUNTER — TELEPHONE (OUTPATIENT)
Dept: CARDIOLOGY | Facility: CLINIC | Age: 69
End: 2019-12-04

## 2019-12-05 ENCOUNTER — OFFICE VISIT (OUTPATIENT)
Dept: ORTHOPEDICS | Facility: CLINIC | Age: 69
End: 2019-12-05
Payer: MEDICARE

## 2019-12-05 VITALS
WEIGHT: 257 LBS | DIASTOLIC BLOOD PRESSURE: 82 MMHG | HEART RATE: 67 BPM | SYSTOLIC BLOOD PRESSURE: 154 MMHG | HEIGHT: 66 IN | BODY MASS INDEX: 41.3 KG/M2

## 2019-12-05 DIAGNOSIS — M48.061 DEGENERATIVE LUMBAR SPINAL STENOSIS: Primary | ICD-10-CM

## 2019-12-05 DIAGNOSIS — M17.11 ARTHRITIS OF KNEE, RIGHT: ICD-10-CM

## 2019-12-05 DIAGNOSIS — M17.12 ARTHRITIS OF KNEE, LEFT: ICD-10-CM

## 2019-12-05 PROCEDURE — 99999 PR PBB SHADOW E&M-EST. PATIENT-LVL III: ICD-10-PCS | Mod: PBBFAC,,, | Performed by: ORTHOPAEDIC SURGERY

## 2019-12-05 PROCEDURE — 99214 PR OFFICE/OUTPT VISIT, EST, LEVL IV, 30-39 MIN: ICD-10-PCS | Mod: S$PBB,,, | Performed by: ORTHOPAEDIC SURGERY

## 2019-12-05 PROCEDURE — 99213 OFFICE O/P EST LOW 20 MIN: CPT | Mod: PBBFAC | Performed by: ORTHOPAEDIC SURGERY

## 2019-12-05 PROCEDURE — 1159F MED LIST DOCD IN RCRD: CPT | Mod: ,,, | Performed by: ORTHOPAEDIC SURGERY

## 2019-12-05 PROCEDURE — 1159F PR MEDICATION LIST DOCUMENTED IN MEDICAL RECORD: ICD-10-PCS | Mod: ,,, | Performed by: ORTHOPAEDIC SURGERY

## 2019-12-05 PROCEDURE — 1125F AMNT PAIN NOTED PAIN PRSNT: CPT | Mod: ,,, | Performed by: ORTHOPAEDIC SURGERY

## 2019-12-05 PROCEDURE — 99214 OFFICE O/P EST MOD 30 MIN: CPT | Mod: S$PBB,,, | Performed by: ORTHOPAEDIC SURGERY

## 2019-12-05 PROCEDURE — 99999 PR PBB SHADOW E&M-EST. PATIENT-LVL III: CPT | Mod: PBBFAC,,, | Performed by: ORTHOPAEDIC SURGERY

## 2019-12-05 PROCEDURE — 1125F PR PAIN SEVERITY QUANTIFIED, PAIN PRESENT: ICD-10-PCS | Mod: ,,, | Performed by: ORTHOPAEDIC SURGERY

## 2019-12-05 RX ORDER — CELECOXIB 200 MG/1
200 CAPSULE ORAL DAILY
Qty: 30 CAPSULE | Refills: 2 | Status: SHIPPED | OUTPATIENT
Start: 2019-12-05 | End: 2020-07-30 | Stop reason: SDUPTHER

## 2019-12-05 NOTE — PROGRESS NOTES
Subjective:     Patient ID: Damon Yoo is a 69 y.o. male.    Chief Complaint: Pain and Follow-up of the Right Knee and Pain and Follow-up of the Left Knee    HPI:  69-year-old diagnosed with bilateral knee arthrosis given injection of steroid in September placed on meloxicam which did not help then we changed him to Celebrex.  Celebrex seems to help.  Workup found out that he has old MRI from 2014 with neuroforaminal stenosis multilevel.  Workup with EMG-NCV recently ordered by me showed chronic L5-S1 radiculopathy bilaterally with peripheral poly neuropathy superimposed.  We sent him for NAHUN with pain management and that helped significantly that his pain is 2-3/10 down his legs and knees.  He still takes Celebrex daily with relief.  For the 1st time was able to work in the [employer] it without difficulty.  Not interested in having surgery at this point.  Past Medical History:   Diagnosis Date    Abnormal EKG 10/25/2013    Arthritis     CAD (coronary artery disease)     Cancer     Prostate [id number] prostate    Glaucoma     Gout attack     Hyperlipidemia     Hypertension     Hypertrophic cardiomyopathy 10/25/2013    S/P CABG (coronary artery bypass graft) 10/25/2013     Past Surgical History:   Procedure Laterality Date    COLONOSCOPY N/A 12/4/2017    Procedure: COLONOSCOPY;  Surgeon: Dina Ledesma MD;  [facility];  Service: Endoscopy;  Laterality: N/A;    CORONARY ARTERY BYPASS GRAFT  05/2003    x1    PROSTATE SURGERY      RALP 2013    TRANSFORAMINAL EPIDURAL INJECTION OF STEROID Right 11/11/2019    Procedure: Right L5/S1+ S1 TF NAHUN with IV sedation;  Surgeon: Ermias Mario MD;  Location: [facility];  Service: Pain Management;  Laterality: Right;     Family History   Problem Relation Age of Onset    Hypertension Father     Strabismus Neg Hx     Retinal detachment Neg Hx     Macular degeneration Neg Hx     Glaucoma Neg Hx     Blindness Neg Hx     Amblyopia Neg Hx      Social History      Socioeconomic History    Marital status:      Spouse name: Not on file    Number of children: Not on file    Years of education: Not on file    Highest education level: Not on file   Occupational History    Not on file   Social Needs    Financial resource strain: Not on file    Food insecurity:     Worry: Not on file     Inability: Not on file    Transportation needs:     Medical: Not on file     Non-medical: Not on file   Tobacco Use    Smoking status: Former Smoker     Packs/day: 0.25     Years: 15.00     Pack years: 3.75     Last attempt to quit: 1988     Years since quittin.6    Smokeless tobacco: Never Used   Substance and Sexual Activity    Alcohol use: No    Drug use: No    Sexual activity: Not on file   Lifestyle    Physical activity:     Days per week: Not on file     Minutes per session: Not on file    Stress: Not on file   Relationships    Social connections:     Talks on phone: Not on file     Gets together: Not on file     Attends Hindu service: Not on file     Active member of club or organization: Not on file     Attends meetings of clubs or organizations: Not on file     Relationship status: Not on file   Other Topics Concern    Not on file   Social History Narrative    No pets or smokers in household.     Medication List with Changes/Refills   New Medications    CELECOXIB (CELEBREX) 200 MG CAPSULE    Take 1 capsule (200 mg total) by mouth once daily. Take with food   Current Medications    ALLOPURINOL (ZYLOPRIM) 300 MG TABLET    TAKE 1 AND 1/2 TABLETS BY MOUTH EVERY DAY    AMLODIPINE (NORVASC) 5 MG TABLET    Take 1 tablet (5 mg total) by mouth once daily.    ASPIRIN 81 MG CHEWABLE TABLET    Take 1 tablet by mouth Daily.    DICLOFENAC SODIUM (VOLTAREN) 1 % GEL    Apply 2 g topically once daily.    FISH OIL-FAT ACID COMB.8- (OMEGA 3-6-9) 1,200 MG CAP    Take 1 capsule by mouth once daily.     FLUNISOLIDE 25 MCG, 0.025%, (NASALIDE) 25 MCG (0.025 %) SPRY     USE 2 SPRAYS IN EACH NOSTRIL TWICE DAILY    LATANOPROST 0.005 % OPHTHALMIC SOLUTION    INSTILL ONE DROP INTO BOTH EYES IN THE EVENING    LATANOPROST 0.005 % OPHTHALMIC SOLUTION    INSTILL ONE DROP INTO BOTH EYES IN THE EVENING    LISINOPRIL (PRINIVIL,ZESTRIL) 40 MG TABLET    TAKE 1 TABLET (40 MG TOTAL) BY MOUTH ONCE DAILY.    METOPROLOL SUCCINATE (TOPROL-XL) 100 MG 24 HR TABLET    Take 1 tablet (100 mg total) by mouth once daily.    PREDNISONE (DELTASONE) 20 MG TABLET    Take 2 tablets with food for 3 days; then take one tablet with food for 2 days; then take 1/2 tablet with food for 2 days.    ROSUVASTATIN (CRESTOR) 40 MG TAB    Take 1 tablet (40 mg total) by mouth every evening. Generic is fine.    TIMOLOL MALEATE 0.5% (TIMOPTIC) 0.5 % DROP    INSTILL 1 DROP INTO EACH EYE EVERY MORNING    TIZANIDINE (ZANAFLEX) 4 MG TABLET    Take 1 tablet (4 mg total) by mouth every 8 (eight) hours.     Review of patient's allergies indicates:  No Known Allergies  Review of Systems   Constitution: Negative for decreased appetite.   HENT: Negative for tinnitus.    Eyes: Negative for double vision.   Cardiovascular: Negative for chest pain.   Respiratory: Negative for wheezing.    Hematologic/Lymphatic: Negative for bleeding problem.   Skin: Negative for dry skin.   Musculoskeletal: Positive for arthritis, back pain and joint pain. Negative for gout, neck pain and stiffness.   Gastrointestinal: Negative for abdominal pain.   Genitourinary: Negative for bladder incontinence.   Neurological: Positive for paresthesias. Negative for numbness and sensory change.   Psychiatric/Behavioral: Negative for altered mental status.       Objective:   Body mass index is 41.48 kg/m².  Vitals:    12/05/19 1334   BP: (!) 154/82   Pulse: 67          General    Constitutional: He is oriented to person, place, and time. He appears well-developed.   HENT:   Head: Atraumatic.   Eyes: EOM are normal.   Cardiovascular: Normal rate.    Pulmonary/Chest:  Effort normal.   Abdominal: Soft.   Neurological: He is alert and oriented to person, place, and time.   Psychiatric: Judgment normal.            Cervical spine with good range of motion and slight crepitus but no pain  Lumbar with pain from L3 down to L5 paraspinal.  No true deformity seen.  Bilateral upper extremity neurovascularly intact strength is 5/5 throughout radial and ulnar pulses 2+ sensory intact to touch  Pelvis is level  Bilateral hip range of motion within normal limits.  Hip flexors, abduct his adductors quads and hamstrings were 5/5  Bilateral knees with varus deformity.  Crepitus with motion on the patella and medially.  Tenderness to palpation over the patella and medial joint.  Range of motion 5-125 degrees. Very mild swelling.  Calves are soft nontender  Ankle motion intact  DP 1+  Decrease in sensation in his foot on the lateral aspect  Skin is warm to touch  Patellar reflex 2+    EMG-NCV with chronic bilateral L5-S1 radiculopathy with superimposed peripheral polyneuropathy    Relevant imaging results reviewed and interpreted by me, discussed with the patient and / or family today   X-ray and electronic records bilateral knees with complete loss of medial joint space and osteophytic changes consistent with end-stage arthrosis  MRI 2014 multilevel foraminal and central stenosis from L3 down to L5 and S1  Assessment:     Encounter Diagnoses   Name Primary?    Degenerative lumbar spinal stenosis Yes    Arthritis of knee, left     Arthritis of knee, right         Plan:   Degenerative lumbar spinal stenosis    Arthritis of knee, left    Arthritis of knee, right    Other orders  -     celecoxib (CELEBREX) 200 MG capsule; Take 1 capsule (200 mg total) by mouth once daily. Take with food  Dispense: 30 capsule; Refill: 2         Patient Instructions   Continue with celebrex 200mg once a day  Return in 6 weeks  Will repeat steroid injection if needed    Patient is doing much better after he had NAHUN.   Will hold off on any surgical intervention      Disclaimer: This note was prepared using a voice recognition system and is likely to have sound alike errors within the text.

## 2019-12-05 NOTE — PATIENT INSTRUCTIONS
Continue with celebrex 200mg once a day  Return in 6 weeks  Will repeat steroid injection if needed

## 2019-12-06 ENCOUNTER — OFFICE VISIT (OUTPATIENT)
Dept: CARDIOLOGY | Facility: CLINIC | Age: 69
End: 2019-12-06
Payer: MEDICARE

## 2019-12-06 VITALS
HEART RATE: 52 BPM | OXYGEN SATURATION: 98 % | SYSTOLIC BLOOD PRESSURE: 134 MMHG | WEIGHT: 259.5 LBS | BODY MASS INDEX: 41.88 KG/M2 | DIASTOLIC BLOOD PRESSURE: 78 MMHG

## 2019-12-06 DIAGNOSIS — N18.30 CHRONIC KIDNEY DISEASE, STAGE III (MODERATE): ICD-10-CM

## 2019-12-06 DIAGNOSIS — I10 ESSENTIAL HYPERTENSION: ICD-10-CM

## 2019-12-06 DIAGNOSIS — I25.810 CORONARY ARTERY DISEASE INVOLVING CORONARY BYPASS GRAFT OF NATIVE HEART WITHOUT ANGINA PECTORIS: Primary | ICD-10-CM

## 2019-12-06 DIAGNOSIS — E66.01 OBESITY, CLASS III, BMI 40-49.9 (MORBID OBESITY): ICD-10-CM

## 2019-12-06 DIAGNOSIS — Z95.1 S/P CABG (CORONARY ARTERY BYPASS GRAFT): ICD-10-CM

## 2019-12-06 DIAGNOSIS — C61 PROSTATE CANCER: ICD-10-CM

## 2019-12-06 DIAGNOSIS — E78.5 HYPERLIPIDEMIA, UNSPECIFIED HYPERLIPIDEMIA TYPE: ICD-10-CM

## 2019-12-06 DIAGNOSIS — I42.2 HYPERTROPHIC CARDIOMYOPATHY: ICD-10-CM

## 2019-12-06 PROCEDURE — 1126F AMNT PAIN NOTED NONE PRSNT: CPT | Mod: ,,, | Performed by: PHYSICIAN ASSISTANT

## 2019-12-06 PROCEDURE — 99214 PR OFFICE/OUTPT VISIT, EST, LEVL IV, 30-39 MIN: ICD-10-PCS | Mod: S$PBB,,, | Performed by: PHYSICIAN ASSISTANT

## 2019-12-06 PROCEDURE — 1159F PR MEDICATION LIST DOCUMENTED IN MEDICAL RECORD: ICD-10-PCS | Mod: ,,, | Performed by: PHYSICIAN ASSISTANT

## 2019-12-06 PROCEDURE — 99999 PR PBB SHADOW E&M-EST. PATIENT-LVL IV: ICD-10-PCS | Mod: PBBFAC,,, | Performed by: PHYSICIAN ASSISTANT

## 2019-12-06 PROCEDURE — 1159F MED LIST DOCD IN RCRD: CPT | Mod: ,,, | Performed by: PHYSICIAN ASSISTANT

## 2019-12-06 PROCEDURE — 1126F PR PAIN SEVERITY QUANTIFIED, NO PAIN PRESENT: ICD-10-PCS | Mod: ,,, | Performed by: PHYSICIAN ASSISTANT

## 2019-12-06 PROCEDURE — 99999 PR PBB SHADOW E&M-EST. PATIENT-LVL IV: CPT | Mod: PBBFAC,,, | Performed by: PHYSICIAN ASSISTANT

## 2019-12-06 PROCEDURE — 99214 OFFICE O/P EST MOD 30 MIN: CPT | Mod: S$PBB,,, | Performed by: PHYSICIAN ASSISTANT

## 2019-12-06 PROCEDURE — 99214 OFFICE O/P EST MOD 30 MIN: CPT | Mod: PBBFAC | Performed by: PHYSICIAN ASSISTANT

## 2019-12-06 NOTE — PROGRESS NOTES
Subjective:    Patient ID:  Damon Yoo is a 69 y.o. male who presents for follow-up of HTN, hyperlipidemia, lab results      HPI  Mr. Yoo is a 69 year old male patient whose current medical conditions include CAD s/p CABG, HCOM, HTN, and hyperlipidemia who presents today for routine follow-up. He returns today and states he is doing ok. Reports back pain and left knee pain seem to be improving. Cardiac wise, seems to be stable. No kristen chest pain, heaviness, or tightness. No SOB/DIAZ. No lightheadedness, dizziness, near syncope, or syncope. Occasional left knee swelling in setting of arthritis. No PND, orthopnea, or excessive weight gain. Trying to be more active. Labs reviewed. Creatinine fluctuates, in stable range. Glucose slightly elevated, he will discuss further with PCP. Lipids ok, LDL slightly above goal.       Review of Systems   Constitution: Negative for chills, decreased appetite, fever and malaise/fatigue.   HENT: Negative for congestion, hoarse voice and sore throat.    Eyes: Negative for blurred vision and discharge.   Cardiovascular: Negative for chest pain, claudication, cyanosis, dyspnea on exertion, irregular heartbeat, leg swelling, near-syncope, orthopnea, palpitations and paroxysmal nocturnal dyspnea.   Respiratory: Negative for cough, hemoptysis, shortness of breath, snoring, sputum production and wheezing.    Endocrine: Negative for cold intolerance and heat intolerance.   Hematologic/Lymphatic: Negative for bleeding problem. Does not bruise/bleed easily.   Skin: Negative for rash.   Musculoskeletal: Positive for arthritis and back pain. Negative for joint pain, joint swelling, muscle cramps, muscle weakness and myalgias.   Gastrointestinal: Negative for abdominal pain, constipation, diarrhea, heartburn, melena and nausea.   Genitourinary: Negative for hematuria.   Neurological: Negative for dizziness, focal weakness, headaches, light-headedness, loss of balance, numbness,  paresthesias, seizures and weakness.   Psychiatric/Behavioral: Negative for memory loss. The patient does not have insomnia.    Allergic/Immunologic: Negative for hives.     /78   Pulse (!) 52   Wt 117.7 kg (259 lb 7.7 oz)   SpO2 98%   BMI 41.88 kg/m²     Objective:    Physical Exam   Constitutional: He is oriented to person, place, and time. He appears well-developed and well-nourished. No distress.   HENT:   Head: Normocephalic and atraumatic.   Eyes: Pupils are equal, round, and reactive to light. Right eye exhibits no discharge. Left eye exhibits no discharge.   Neck: Neck supple. No JVD present.   Cardiovascular: Regular rhythm, S1 normal, S2 normal and normal heart sounds. Bradycardia present.   No murmur heard.  Pulmonary/Chest: Effort normal and breath sounds normal. No respiratory distress. He has no wheezes. He has no rales.   Abdominal: Soft. He exhibits no distension.   Musculoskeletal: He exhibits no edema.   Neurological: He is alert and oriented to person, place, and time.   Skin: Skin is warm and dry. He is not diaphoretic. No erythema.   Psychiatric: He has a normal mood and affect. His behavior is normal. Thought content normal.   Nursing note and vitals reviewed.      Chemistry        Component Value Date/Time     11/29/2019 0832    K 4.8 11/29/2019 0832     (H) 11/29/2019 0832    CO2 25 11/29/2019 0832    BUN 22 11/29/2019 0832    CREATININE 1.4 11/29/2019 0832     (H) 11/29/2019 0832        Component Value Date/Time    CALCIUM 9.4 11/29/2019 0832    ALKPHOS 104 11/29/2019 0832    AST 27 11/29/2019 0832    ALT 30 11/29/2019 0832    BILITOT 0.4 11/29/2019 0832    ESTGFRAFRICA 59.7 (A) 11/29/2019 0832    EGFRNONAA 51.6 (A) 11/29/2019 0832        Lab Results   Component Value Date    CHOL 130 11/29/2019    CHOL 110 (L) 04/17/2019    CHOL 125 01/22/2019     Lab Results   Component Value Date    HDL 33 (L) 11/29/2019    HDL 28 (L) 04/17/2019    HDL 33 (L) 01/22/2019      Lab Results   Component Value Date    LDLCALC 80.0 11/29/2019    LDLCALC 63.4 04/17/2019    LDLCALC 76.2 01/22/2019     Lab Results   Component Value Date    TRIG 85 11/29/2019    TRIG 93 04/17/2019    TRIG 79 01/22/2019     Lab Results   Component Value Date    CHOLHDL 25.4 11/29/2019    CHOLHDL 25.5 04/17/2019    CHOLHDL 26.4 01/22/2019       Assessment:       1. Coronary artery disease involving coronary bypass graft of native heart without angina pectoris    2. Hyperlipidemia, unspecified hyperlipidemia type    3. Essential hypertension    4. Hypertrophic cardiomyopathy    5. S/P CABG (coronary artery bypass graft)    6. Chronic kidney disease, stage III (moderate)    7. Prostate cancer    8. Obesity, Class III, BMI 40-49.9 (morbid obesity)      Patient presents for f/u. Doing clinically well. No cardiac complaints. No kristen chest pain, tightness, or heaviness. BP stable. Discussed glucose slightly elevated on fasting labs, will need to f/u with PCP. Watch intake of high sugar/starchy foods. Continue current therapy.  Plan:   -Continue current medical management and risk factor modification  -Cardiac, low salt diet  -Increase activity level  -Follow-up with Dr. Faye in 6 months; repeat labs in 3 months-per Dr. Faye's last note      -RTC 6 months with Dr. Faye with 2D echo, lipid, cmp, EKG

## 2019-12-12 ENCOUNTER — OFFICE VISIT (OUTPATIENT)
Dept: PAIN MEDICINE | Facility: CLINIC | Age: 69
End: 2019-12-12
Payer: MEDICARE

## 2019-12-12 VITALS
HEIGHT: 66 IN | SYSTOLIC BLOOD PRESSURE: 149 MMHG | DIASTOLIC BLOOD PRESSURE: 80 MMHG | WEIGHT: 259.69 LBS | HEART RATE: 57 BPM | BODY MASS INDEX: 41.73 KG/M2

## 2019-12-12 DIAGNOSIS — M51.36 DDD (DEGENERATIVE DISC DISEASE), LUMBAR: ICD-10-CM

## 2019-12-12 DIAGNOSIS — M54.16 LUMBAR RADICULOPATHY: Primary | ICD-10-CM

## 2019-12-12 DIAGNOSIS — M48.061 SPINAL STENOSIS OF LUMBAR REGION, UNSPECIFIED WHETHER NEUROGENIC CLAUDICATION PRESENT: ICD-10-CM

## 2019-12-12 PROCEDURE — 99999 PR PBB SHADOW E&M-EST. PATIENT-LVL III: ICD-10-PCS | Mod: PBBFAC,,, | Performed by: PHYSICAL MEDICINE & REHABILITATION

## 2019-12-12 PROCEDURE — 99213 OFFICE O/P EST LOW 20 MIN: CPT | Mod: S$PBB,,, | Performed by: PHYSICAL MEDICINE & REHABILITATION

## 2019-12-12 PROCEDURE — 1125F PR PAIN SEVERITY QUANTIFIED, PAIN PRESENT: ICD-10-PCS | Mod: ,,, | Performed by: PHYSICAL MEDICINE & REHABILITATION

## 2019-12-12 PROCEDURE — 99999 PR PBB SHADOW E&M-EST. PATIENT-LVL III: CPT | Mod: PBBFAC,,, | Performed by: PHYSICAL MEDICINE & REHABILITATION

## 2019-12-12 PROCEDURE — 99213 OFFICE O/P EST LOW 20 MIN: CPT | Mod: PBBFAC | Performed by: PHYSICAL MEDICINE & REHABILITATION

## 2019-12-12 PROCEDURE — 1159F PR MEDICATION LIST DOCUMENTED IN MEDICAL RECORD: ICD-10-PCS | Mod: ,,, | Performed by: PHYSICAL MEDICINE & REHABILITATION

## 2019-12-12 PROCEDURE — 1159F MED LIST DOCD IN RCRD: CPT | Mod: ,,, | Performed by: PHYSICAL MEDICINE & REHABILITATION

## 2019-12-12 PROCEDURE — 1125F AMNT PAIN NOTED PAIN PRSNT: CPT | Mod: ,,, | Performed by: PHYSICAL MEDICINE & REHABILITATION

## 2019-12-12 PROCEDURE — 99213 PR OFFICE/OUTPT VISIT, EST, LEVL III, 20-29 MIN: ICD-10-PCS | Mod: S$PBB,,, | Performed by: PHYSICAL MEDICINE & REHABILITATION

## 2019-12-12 NOTE — PROGRESS NOTES
Established Patient Chronic Pain Note (Follow up visit)    Chief Complaint:   Chief Complaint   Patient presents with    Follow-up       SUBJECTIVE:    Damon Yoo is a 69 y.o. male who presents to the clinic for a follow-up appointment for lower back and right radicular pain. Patient underwent right-sided L5/S1 and S1 transforaminal epidural steroid injection on 11/11/2019.  He reports that he has received 75% relief from the injection.  Since the last visit, Damon Yoo states the pain has been improving. Current pain intensity is 2/10.  He is overall pleased with the results of the injection.    Patient denies night fever/night sweats, urinary incontinence, bowel incontinence, significant weight loss, significant motor weakness and loss of sensations.    Initial HPI 10/31/2019:   Damon Yoo is a 69 y.o. male who presents to the clinic for the evaluation of lower back pain. He was referred by orthopedics for presumed lumbar radiculopathy. The pain started several years ago without any injury or trauma and symptoms have been worsening.The pain is located in the lower lumbar area and radiates to the right lower extremity.  The pain is described as aching and numbing and is rated as 5/10. The pain is rated with a score of  3/10 on the BEST day and a score of 9/10 on the WORST day.  Symptoms interfere with daily activity. The pain is exacerbated by Walking.  The pain is mitigated by nothing. The patient reports spending 2-4 hours per day reclining. The patient reports 6-8 hours of uninterrupted sleep per night. Of note, patient has a h/o prostate cancer s/p robotic prostatectomy, PSA has been undetectable.    Non-Pharmacologic Treatments:  Physical Therapy/Home Exercise: yes  Ice/Heat:yes  TENS: no  Acupuncture: no  Massage: no  Chiropractic: no    Other: no     Pain Medications:  - Adjuvant Medications: Zanaflex ( Tizanidine) and Celebrex, Voltaren gel, Tylenol  - Anti-Coagulants: Aspirin    Opioid  Contract: not applicable     report:  Not applicable    Pain Procedures:   - previous lumbar emmy in 2006 with significant relief  -right L5/S1 and S1 transforaminal epidural steroid injection on 11/11/2019 - 75% relief      Imaging:   Bilateral lower extremities EMG/NCS 10/8/19  There is electrophysiologic evidence consistent with a chronic bilateral L5-S1 radiculopathies with superimposed peripheral polyneuropathy.      MRI Lumbar Spine 9/22/14  Compared to prior MRI December 2006.  Vertebral alignment remains normal.  Again noted is desiccation of all the lumbar intervertebral disks with marked loss of disk height at L3-4, L4-5 and L5-S1.  The conus ends at the level of L1.  There are discogenic changes in the endplates of L4-5 and L5-S1.  No compression fractures or acute osseous abnormalities are identified.  Schmorl's nodes present in the endplates of L4-5.  Axial images are acquired through the disk spaces from T12-L1 through L5-S1.  The T12 L1 disk space demonstrates mild facet hypertrophy and minimal disk bulge without significant canal or foraminal stenosis.  No significant abnormality L1-2.  At L2-3 there is facet and ligament flavum hypertrophy and diffuse posterior bulging of the disk which is creating mild to moderate canal and bilateral foraminal stenosis.  At L3-4 there is facet arthropathy and diffuse posterior disk herniation creating severe canal and bilateral foraminal stenosis.  At L4-5 facet and ligamentum flavum hypertrophy and diffuse posterior disk herniation creating severe canal and bilateral foraminal stenosis.  At L5-S1 a central disk herniation broad-based posterior bulging of the disk with severe canal and bilateral foraminal stenosis.     XR Lumbar Spine 8/4/14  There is anatomic spinal alignment.  Moderate to severe disk space narrowing with associated vacuum phenomenon, endplate spurring, and facet arthropathy at L4-5 and L5-S1.  Mild disk narrowing at L3-4.  Pedicles are intact.   No compression       PMHx,PSHx, Social history, and Family history:  I have reviewed the patient's medical, surgical, social, and family history in detail and updated the computerized patient record.      Review of patient's allergies indicates:  No Known Allergies    Current Outpatient Medications   Medication Sig    allopurinol (ZYLOPRIM) 300 MG tablet TAKE 1 AND 1/2 TABLETS BY MOUTH EVERY DAY    amLODIPine (NORVASC) 5 MG tablet Take 1 tablet (5 mg total) by mouth once daily.    aspirin 81 MG chewable tablet Take 1 tablet by mouth Daily.    celecoxib (CELEBREX) 200 MG capsule Take 1 capsule (200 mg total) by mouth once daily. Take with food    diclofenac sodium (VOLTAREN) 1 % Gel Apply 2 g topically once daily.    fish oil-fat acid [id number] (OMEGA 3-6-9) 1,200 mg Cap Take 1 capsule by mouth once daily.     flunisolide 25 mcg, 0.025%, (NASALIDE) 25 mcg (0.025 %) Spry USE 2 SPRAYS IN EACH NOSTRIL TWICE DAILY    latanoprost 0.005 % ophthalmic solution INSTILL ONE DROP INTO BOTH EYES IN THE EVENING    latanoprost 0.005 % ophthalmic solution INSTILL ONE DROP INTO BOTH EYES IN THE EVENING    lisinopril (PRINIVIL,ZESTRIL) 40 MG tablet TAKE 1 TABLET (40 MG TOTAL) BY MOUTH ONCE DAILY.    metoprolol succinate (TOPROL-XL) 100 MG 24 hr tablet Take 1 tablet (100 mg total) by mouth once daily.    predniSONE (DELTASONE) 20 MG tablet Take 2 tablets with food for 3 days; then take one tablet with food for 2 days; then take 1/2 tablet with food for 2 days.    rosuvastatin (CRESTOR) 40 MG Tab Take 1 tablet (40 mg total) by mouth every evening. Generic is fine.    timolol maleate 0.5% (TIMOPTIC) 0.5 % Drop INSTILL 1 DROP INTO EACH EYE EVERY MORNING    tiZANidine (ZANAFLEX) 4 MG tablet Take 1 tablet (4 mg total) by mouth every 8 (eight) hours.     No current facility-administered medications for this visit.          REVIEW OF SYSTEMS:  GENERAL:  No weight loss, malaise or fevers.  HEENT:   No recent changes in  "vision or hearing  NECK:  Negative for lumps, no difficulty with swallowing.  RESPIRATORY:  Negative for cough, wheezing or shortness of breath, patient denies any recent URI.  CARDIOVASCULAR:  Negative for chest pain, leg swelling or palpitations.  GI:  Negative for abdominal discomfort, blood in stools or black stools or change in bowel habits.  MUSCULOSKELETAL:  See HPI.  SKIN:  Negative for lesions, rash, and itching.  PSYCH:  No mood disorder or recent psychosocial stressors.  Patients sleep is not disturbed secondary to pain.  HEMATOLOGY/LYMPHOLOGY:  Negative for prolonged bleeding, bruising easily or swollen nodes.  Patient is currently taking anti-coagulants - ASA  NEURO:   No history of headaches, syncope, paralysis, seizures or tremors.  All other reviewed and negative other than HPI.    OBJECTIVE:    BP (!) 149/80   Pulse (!) 57   Ht 5' 6" (1.676 m)   Wt 117.8 kg (259 lb 11.2 oz)   BMI 41.92 kg/m²      GENERAL: Well appearing, in no acute distress, alert and oriented x3.  PSYCH:  Mood and affect appropriate.  SKIN: Skin color, texture, turgor normal, no rashes or lesions.  HEAD/FACE:  Normocephalic, atraumatic. Cranial nerves grossly intact.  NECK: No pain to palpation over the cervical paraspinous muscles. Spurling Negative. No pain with neck flexion, extension, or lateral flexion.   CV: RRR with palpation of the radial artery.  PULM: No evidence of respiratory difficulty, symmetric chest rise.  GI:  Soft and non-tender.  BACK: Straight leg raising in the sitting and supine positions is negative to radicular pain. No pain to palpation over the facet joints of the lumbar spine or spinous processes. Normal range of motion without pain reproduction.  EXTREMITIES: Peripheral joint ROM is full and pain free without obvious instability or laxity in all four extremities. No deformities, edema, or skin discoloration. Good capillary refill.  MUSCULOSKELETAL: Shoulder, hip, and knee provocative maneuvers are " negative.  There is no pain with palpation over the sacroiliac joints bilaterally.  FABERs test is negative.  FADIRs test is negative.   Bilateral upper and lower extremity strength is normal and symmetric.  No atrophy or tone abnormalities are noted.  NEURO: Bilateral upper and lower extremity coordination and muscle stretch reflexes are physiologic and symmetric.  Plantar response are downgoing. No clonus.  No loss of sensation is noted.  GAIT: normal.         LABS:  Lab Results   Component Value Date    WBC 7.88 02/24/2016    HGB 12.9 (L) 02/24/2016    HCT 39.5 (L) 02/24/2016    MCV 92 02/24/2016     02/24/2016       CMP  Sodium   Date Value Ref Range Status   11/29/2019 145 136 - 145 mmol/L Final     Potassium   Date Value Ref Range Status   11/29/2019 4.8 3.5 - 5.1 mmol/L Final     Chloride   Date Value Ref Range Status   11/29/2019 111 (H) 95 - 110 mmol/L Final     CO2   Date Value Ref Range Status   11/29/2019 25 23 - 29 mmol/L Final     Glucose   Date Value Ref Range Status   11/29/2019 113 (H) 70 - 110 mg/dL Final     BUN, Bld   Date Value Ref Range Status   11/29/2019 22 8 - 23 mg/dL Final     Creatinine   Date Value Ref Range Status   11/29/2019 1.4 0.5 - 1.4 mg/dL Final     Calcium   Date Value Ref Range Status   11/29/2019 9.4 8.7 - 10.5 mg/dL Final     Total Protein   Date Value Ref Range Status   11/29/2019 7.1 6.0 - 8.4 g/dL Final     Albumin   Date Value Ref Range Status   11/29/2019 3.6 3.5 - 5.2 g/dL Final     Total Bilirubin   Date Value Ref Range Status   11/29/2019 0.4 0.1 - 1.0 mg/dL Final     Comment:     For infants and newborns, interpretation of results should be based  on gestational age, weight and in agreement with clinical  observations.  Premature Infant recommended reference ranges:  Up to 24 hours.............<8.0 mg/dL  Up to 48 hours............<12.0 mg/dL  3-5 days..................<15.0 mg/dL  6-29 days.................<15.0 mg/dL       Alkaline Phosphatase   Date Value  Ref Range Status   11/29/2019 104 55 - 135 U/L Final     AST   Date Value Ref Range Status   11/29/2019 27 10 - 40 U/L Final     ALT   Date Value Ref Range Status   11/29/2019 30 10 - 44 U/L Final     Anion Gap   Date Value Ref Range Status   11/29/2019 9 8 - 16 mmol/L Final     eGFR if    Date Value Ref Range Status   11/29/2019 59.7 (A) >60 mL/min/1.73 m^2 Final     eGFR if non    Date Value Ref Range Status   11/29/2019 51.6 (A) >60 mL/min/1.73 m^2 Final     Comment:     Calculation used to obtain the estimated glomerular filtration  rate (eGFR) is the CKD-EPI equation.          No results found for: LABA1C, HGBA1C          ASSESSMENT: 69 y.o. year old male with low back and radicular pain, consistent with     1. Lumbar radiculopathy     2. Spinal stenosis of lumbar region, unspecified whether neurogenic claudication present     3. DDD (degenerative disc disease), lumbar           PLAN:   - Interventions: None at this time as the recent NAHUN provided significant relief.  - Anticoagulation: yes, aspirin  - Medications: I have stressed the importance of physical activity and a home exercise plan to help with pain and improve health. and Patient can continue with medications for now since they are providing benefits, using them appropriately, and without side effects.  - Therapy:  Continue home exercise regimen and provided more specific low back exercises  - Labs:  Reviewed  - Imaging:  Reviewed  - Consults/Referrals:  None at this time  - Records:  Reviewed/Obtain old records from outside physicians and imaging  - Follow up visit: return to clinic on an as-needed basis  - Counseled patient regarding the importance of activity modification and physical therapy  - This condition does not require this patient to take time off of work, and the primary goal of our Pain Management services is to improve the patient's functional capacity.  - Patient Questions: Answered all of the patient's  questions regarding diagnosis, therapy, and treatment    The above plan and management options were discussed at length with patient. Patient is in agreement with the above and verbalized understanding.      Ermias Mario MD  Interventional Pain Management  [facility]

## 2019-12-12 NOTE — PATIENT INSTRUCTIONS
- Maintain healthy and active lifestyle.  Please perform exercises and stretches as listed below.  - Continue current medications as prescribed.  - Continue home based exercises and discussed the importance of a healthy and active lifestyle  - Return for follow up on an as-needed basis.    Please do not hesitate to contact the clinic if you have any questions regarding your treatment plan.     Ermias Mario MD  Interventional Pain Medicine  [facility]        Exercises to Strengthen Your Lower Back  Strong lower back and abdominal muscles work together to support your spine. The exercises below will help strengthen the lower back. It is important that you begin exercising slowly and increase levels gradually.  Always begin any exercise program with stretching. If you feel pain while doing any of these exercises, stop and talk to your doctor about a more specific exercise program that better suits your condition.   Low back stretch  The point of stretching is to make you more flexible and increase your range of motion. Stretch only as much as you are able. Stretch slowly. Do not push your stretch to the limit. If at any point you feel pain while stretching, this is your (temporary) limit.  · Lie on your back with your knees bent and both feet on the ground.  · Slowly raise your left knee to your chest as you flatten your lower back against the floor. Hold for 5 seconds.  · Relax and repeat the exercise with your right knee.  · Do 10 of these exercises for each leg.  · Repeat hugging both knees to your chest at the same time.  Building lower back strength  Start your exercise routine with 10 to 30 minutes a day, 1 to 3 times a day.  Initial exercises  Lying on your back:  1. Ankle pumps: Move your foot up and down, towards your head, and then away. Repeat 10 times with each foot.  2. Heel slides: Slowly bend your knee, drawing the heel of your foot towards you. Then slide your heel/foot from you,  straightening your knee. Do not lift your foot off the floor (this is not a leg lift).  3. Abdominal contraction: Bend your knees and put your hands on your stomach. Tighten your stomach muscles. Hold for 5 seconds, then relax. Repeat 10 times.  4. Straight leg raise: Bend one leg at the knee and keep the other leg straight. Tighten your stomach muscles. Slowly lift your straight leg 6 to 12 inches off the floor and hold for up to 5 seconds. Repeat 10 times on each side.  Standin. Heel raises: Stand facing the wall. Slowly raise the heels of your feet up and down, while keeping your toes on the floor. If you have trouble balancing, you can touch the wall with your hands. Repeat 10 times.  More advanced exercises  When you feel comfortable enough, try these exercises.  1. Kneeling lumbar extension: Begin on your hands and knees. At the same time, raise and straighten your right arm and left leg until they are parallel to the ground. Hold for 2 seconds and come back slowly to a starting position. Repeat with left arm and right leg, alternating 10 times.  2. Prone lumbar extension: Lie face down, arms extended overhead, palms on the floor. At the same time, raise your right arm and left leg as high as comfortably possible. Hold for 10 seconds and slowly return to start. Repeat with left arm and right leg, alternating 10 times. Gradually build up to 20 times. (Advanced: Repeat this exercise raising both arms and both legs a few inches off the floor at the same time. Hold for 5 seconds and release.)  3. Pelvic tilt: Lie on the floor on your back with your knees bent at 90 degrees. Your feet should be flat on the floor. Inhale, exhale, then slowly contract your abdominal muscles bringing your navel toward your spine. Let your pelvis rock back until your lower back is flat on the floor. Hold for 10 seconds while breathing smoothly.  Date Last Reviewed: 2016  © 3381-3009 [employer] [address] [place] [address]  Road, [place] [address]. All rights reserved. This information is not intended as a substitute for professional medical care. Always follow your healthcare professional's instructions.      Back Exercise to Keep Fit for Low Back Pain  To help in your recovery and to prevent further back problems, keep your back, abdominal muscles and legs strong. Walk daily as soon as you can. Gradually add other physical activities such as swimming and biking, which can help improve lower back strength. Begin as soon as you can do them comfortably. Do not do any exercises that make your pain a lot worse. The following are some back exercises that can help relieve low back pain.     Pelvic tilt   Repeat 5-10 times, twice per day.  Lie flat on your back (or stand with your back to a wall), knees bent, feet flat on the floor, body relaxed. Tighten your abdominal and buttock muscles, and tilt your pelvis. The curve of the small of your back should flatten towards the floor (or wall). Hold 10 seconds and then relax.       Knee raise     Repeat 5-10 times, twice per day.    Lie flat on your back, knees bent. Bring one knee slowly to your chest. Hug your knee gently. Then lower your leg toward the floor, keeping your knee bent. Do not straighten your legs. Repeat exercise with your other leg.              Partial press-up     Lie face down on a soft, firm surface. Do not turn your head to either side. Rest your arms bent at the elbows alongside your body. Relax for a few minutes. Then raise your upper body enough to lean on your elbows. Relax your lower back and legs as much as possible. Hold this position for 30 seconds at first. Gradually work up to five minutes. Or try slow press-ups. Hold each for five seconds, and repeat five to six times.              Copyright © 2012 by [employer]   Chana BUENO, Hema COOK, Jonathan KLINE, Sheila B, Dick R, Dario B, Zeus K, Niko C, Radhika B, Chaim S, Gordon SCHMITZ,  Kerry PICKARD [employer] [facility] [employer]. Adult Acute and Subacute Low Back Pain. Updated November 2012.

## 2020-01-15 ENCOUNTER — LAB VISIT (OUTPATIENT)
Dept: LAB | Facility: HOSPITAL | Age: 70
End: 2020-01-15
Attending: PEDIATRICS
Payer: MEDICARE

## 2020-01-15 DIAGNOSIS — E78.5 HYPERLIPIDEMIA, UNSPECIFIED HYPERLIPIDEMIA TYPE: ICD-10-CM

## 2020-01-15 DIAGNOSIS — N18.30 CHRONIC KIDNEY DISEASE, STAGE III (MODERATE): ICD-10-CM

## 2020-01-15 PROCEDURE — 36415 COLL VENOUS BLD VENIPUNCTURE: CPT

## 2020-01-15 PROCEDURE — 80061 LIPID PANEL: CPT

## 2020-01-15 PROCEDURE — 84460 ALANINE AMINO (ALT) (SGPT): CPT

## 2020-01-15 PROCEDURE — 84450 TRANSFERASE (AST) (SGOT): CPT

## 2020-01-15 PROCEDURE — 80048 BASIC METABOLIC PNL TOTAL CA: CPT

## 2020-01-16 LAB
ALT SERPL W/O P-5'-P-CCNC: 31 U/L (ref 10–44)
ANION GAP SERPL CALC-SCNC: 11 MMOL/L (ref 8–16)
AST SERPL-CCNC: 32 U/L (ref 10–40)
BUN SERPL-MCNC: 15 MG/DL (ref 8–23)
CALCIUM SERPL-MCNC: 9.4 MG/DL (ref 8.7–10.5)
CHLORIDE SERPL-SCNC: 109 MMOL/L (ref 95–110)
CHOLEST SERPL-MCNC: 158 MG/DL (ref 120–199)
CHOLEST/HDLC SERPL: 4.9 {RATIO} (ref 2–5)
CO2 SERPL-SCNC: 23 MMOL/L (ref 23–29)
CREAT SERPL-MCNC: 1.4 MG/DL (ref 0.5–1.4)
EST. GFR  (AFRICAN AMERICAN): 59.7 ML/MIN/1.73 M^2
EST. GFR  (NON AFRICAN AMERICAN): 51.6 ML/MIN/1.73 M^2
GLUCOSE SERPL-MCNC: 80 MG/DL (ref 70–110)
HDLC SERPL-MCNC: 32 MG/DL (ref 40–75)
HDLC SERPL: 20.3 % (ref 20–50)
LDLC SERPL CALC-MCNC: 99.2 MG/DL (ref 63–159)
NONHDLC SERPL-MCNC: 126 MG/DL
POTASSIUM SERPL-SCNC: 4.6 MMOL/L (ref 3.5–5.1)
SODIUM SERPL-SCNC: 143 MMOL/L (ref 136–145)
TRIGL SERPL-MCNC: 134 MG/DL (ref 30–150)

## 2020-01-23 ENCOUNTER — LAB VISIT (OUTPATIENT)
Dept: LAB | Facility: HOSPITAL | Age: 70
End: 2020-01-23
Attending: PEDIATRICS
Payer: MEDICARE

## 2020-01-23 ENCOUNTER — OFFICE VISIT (OUTPATIENT)
Dept: ORTHOPEDICS | Facility: CLINIC | Age: 70
End: 2020-01-23
Payer: MEDICARE

## 2020-01-23 ENCOUNTER — OFFICE VISIT (OUTPATIENT)
Dept: INTERNAL MEDICINE | Facility: CLINIC | Age: 70
End: 2020-01-23
Payer: MEDICARE

## 2020-01-23 VITALS
OXYGEN SATURATION: 95 % | HEART RATE: 68 BPM | BODY MASS INDEX: 42.24 KG/M2 | HEIGHT: 66 IN | WEIGHT: 262.81 LBS | DIASTOLIC BLOOD PRESSURE: 72 MMHG | RESPIRATION RATE: 16 BRPM | SYSTOLIC BLOOD PRESSURE: 152 MMHG | TEMPERATURE: 98 F

## 2020-01-23 VITALS
BODY MASS INDEX: 42.11 KG/M2 | HEIGHT: 66 IN | WEIGHT: 262 LBS | HEART RATE: 71 BPM | SYSTOLIC BLOOD PRESSURE: 154 MMHG | DIASTOLIC BLOOD PRESSURE: 80 MMHG

## 2020-01-23 DIAGNOSIS — M25.562 CHRONIC PAIN OF BOTH KNEES: ICD-10-CM

## 2020-01-23 DIAGNOSIS — M17.12 ARTHRITIS OF KNEE, LEFT: Primary | ICD-10-CM

## 2020-01-23 DIAGNOSIS — M1A.39X0 CHRONIC GOUT DUE TO RENAL IMPAIRMENT OF MULTIPLE SITES WITHOUT TOPHUS: ICD-10-CM

## 2020-01-23 DIAGNOSIS — M48.061 DEGENERATIVE LUMBAR SPINAL STENOSIS: ICD-10-CM

## 2020-01-23 DIAGNOSIS — M54.16 SPINAL STENOSIS OF LUMBAR REGION WITH RADICULOPATHY: ICD-10-CM

## 2020-01-23 DIAGNOSIS — I42.2 HYPERTROPHIC CARDIOMYOPATHY: ICD-10-CM

## 2020-01-23 DIAGNOSIS — E78.5 HYPERLIPIDEMIA, UNSPECIFIED HYPERLIPIDEMIA TYPE: ICD-10-CM

## 2020-01-23 DIAGNOSIS — C61 PROSTATE CANCER: ICD-10-CM

## 2020-01-23 DIAGNOSIS — R73.09 ELEVATED GLUCOSE: ICD-10-CM

## 2020-01-23 DIAGNOSIS — I25.810 CORONARY ARTERY DISEASE INVOLVING CORONARY BYPASS GRAFT OF NATIVE HEART WITHOUT ANGINA PECTORIS: ICD-10-CM

## 2020-01-23 DIAGNOSIS — M48.061 SPINAL STENOSIS OF LUMBAR REGION WITH RADICULOPATHY: ICD-10-CM

## 2020-01-23 DIAGNOSIS — M17.11 ARTHRITIS OF KNEE, RIGHT: ICD-10-CM

## 2020-01-23 DIAGNOSIS — Z00.00 WELL ADULT EXAM: Primary | ICD-10-CM

## 2020-01-23 DIAGNOSIS — N18.30 CHRONIC KIDNEY DISEASE, STAGE III (MODERATE): ICD-10-CM

## 2020-01-23 DIAGNOSIS — I10 ESSENTIAL HYPERTENSION: ICD-10-CM

## 2020-01-23 DIAGNOSIS — Z95.1 S/P CABG (CORONARY ARTERY BYPASS GRAFT): ICD-10-CM

## 2020-01-23 DIAGNOSIS — G89.29 CHRONIC PAIN OF BOTH KNEES: ICD-10-CM

## 2020-01-23 DIAGNOSIS — E66.01 OBESITY, CLASS III, BMI 40-49.9 (MORBID OBESITY): ICD-10-CM

## 2020-01-23 DIAGNOSIS — M25.561 CHRONIC PAIN OF BOTH KNEES: ICD-10-CM

## 2020-01-23 PROCEDURE — 1125F PR PAIN SEVERITY QUANTIFIED, PAIN PRESENT: ICD-10-PCS | Mod: ,,, | Performed by: ORTHOPAEDIC SURGERY

## 2020-01-23 PROCEDURE — 99214 OFFICE O/P EST MOD 30 MIN: CPT | Mod: PBBFAC,27 | Performed by: ORTHOPAEDIC SURGERY

## 2020-01-23 PROCEDURE — 1126F PR PAIN SEVERITY QUANTIFIED, NO PAIN PRESENT: ICD-10-PCS | Mod: ,,, | Performed by: PEDIATRICS

## 2020-01-23 PROCEDURE — 99214 OFFICE O/P EST MOD 30 MIN: CPT | Mod: S$PBB,,, | Performed by: PEDIATRICS

## 2020-01-23 PROCEDURE — 1125F AMNT PAIN NOTED PAIN PRSNT: CPT | Mod: ,,, | Performed by: ORTHOPAEDIC SURGERY

## 2020-01-23 PROCEDURE — 99999 PR PBB SHADOW E&M-EST. PATIENT-LVL IV: CPT | Mod: PBBFAC,,, | Performed by: ORTHOPAEDIC SURGERY

## 2020-01-23 PROCEDURE — 99214 OFFICE O/P EST MOD 30 MIN: CPT | Mod: S$PBB,,, | Performed by: ORTHOPAEDIC SURGERY

## 2020-01-23 PROCEDURE — 1159F MED LIST DOCD IN RCRD: CPT | Mod: ,,, | Performed by: PEDIATRICS

## 2020-01-23 PROCEDURE — 99999 PR PBB SHADOW E&M-EST. PATIENT-LVL IV: ICD-10-PCS | Mod: PBBFAC,,, | Performed by: ORTHOPAEDIC SURGERY

## 2020-01-23 PROCEDURE — 99999 PR PBB SHADOW E&M-EST. PATIENT-LVL III: ICD-10-PCS | Mod: PBBFAC,,, | Performed by: PEDIATRICS

## 2020-01-23 PROCEDURE — 1159F PR MEDICATION LIST DOCUMENTED IN MEDICAL RECORD: ICD-10-PCS | Mod: ,,, | Performed by: PEDIATRICS

## 2020-01-23 PROCEDURE — 99999 PR PBB SHADOW E&M-EST. PATIENT-LVL III: CPT | Mod: PBBFAC,,, | Performed by: PEDIATRICS

## 2020-01-23 PROCEDURE — 99214 PR OFFICE/OUTPT VISIT, EST, LEVL IV, 30-39 MIN: ICD-10-PCS | Mod: S$PBB,,, | Performed by: ORTHOPAEDIC SURGERY

## 2020-01-23 PROCEDURE — 83036 HEMOGLOBIN GLYCOSYLATED A1C: CPT

## 2020-01-23 PROCEDURE — 36415 COLL VENOUS BLD VENIPUNCTURE: CPT

## 2020-01-23 PROCEDURE — 99214 PR OFFICE/OUTPT VISIT, EST, LEVL IV, 30-39 MIN: ICD-10-PCS | Mod: S$PBB,,, | Performed by: PEDIATRICS

## 2020-01-23 PROCEDURE — 1126F AMNT PAIN NOTED NONE PRSNT: CPT | Mod: ,,, | Performed by: PEDIATRICS

## 2020-01-23 PROCEDURE — 99213 OFFICE O/P EST LOW 20 MIN: CPT | Mod: PBBFAC | Performed by: PEDIATRICS

## 2020-01-23 PROCEDURE — 1159F MED LIST DOCD IN RCRD: CPT | Mod: ,,, | Performed by: ORTHOPAEDIC SURGERY

## 2020-01-23 PROCEDURE — 1159F PR MEDICATION LIST DOCUMENTED IN MEDICAL RECORD: ICD-10-PCS | Mod: ,,, | Performed by: ORTHOPAEDIC SURGERY

## 2020-01-23 RX ORDER — AMLODIPINE BESYLATE 10 MG/1
10 TABLET ORAL DAILY
Qty: 90 TABLET | Refills: 4 | Status: SHIPPED | OUTPATIENT
Start: 2020-01-23 | End: 2020-12-07

## 2020-01-23 NOTE — PATIENT INSTRUCTIONS
Continue with exercises and yd work is acceptable  Continue with Celebrex 200 mg once a day  If pain is not controlled in the future may have to repeat the cortisone shot  Return to clinic in 6 weeks  May call if ran out of the medication so we can renew it

## 2020-01-23 NOTE — PROGRESS NOTES
Subjective:     Patient ID: Damon Yoo is a 69 y.o. male.    Chief Complaint: Pain and Follow-up of the Left Knee and Pain and Follow-up of the Right Knee    HPI:  69-year-old diagnosed with bilateral knee arthrosis given injection of steroid in September placed on meloxicam which did not help then we changed him to Celebrex.  Celebrex seems to help.  Workup found out that he has old MRI from 2014 with neuroforaminal stenosis multilevel.  Workup with EMG-NCV recently ordered by me showed chronic L5-S1 radiculopathy bilaterally with peripheral poly neuropathy superimposed.  We sent him for NAHUN with pain management and that helped significantly that his pain is 2-3/10 down his legs and knees.  He still takes Celebrex daily with relief.  For the 1st time was able to work in the [employer] it without difficulty.  Not interested in having surgery at this point.    01/23/2020  Patient with bilateral knee severe arthrosis lumbosacral arthrosis.  The Celebrex seems to work really well for him.  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  He still states he is doing well at this point do not want a proceed with any surgical intervention.  His pain level on and off is 5/10.  Slight grinding and catching. Not using any assistive devices to ambulate  Past Medical History:   Diagnosis Date    Abnormal EKG 10/25/2013    Arthritis     CAD (coronary artery disease)     Cancer     Prostate [id number] prostate    Glaucoma     Gout attack     Hyperlipidemia     Hypertension     Hypertrophic cardiomyopathy 10/25/2013    S/P CABG (coronary artery bypass graft) 10/25/2013     Past Surgical History:   Procedure Laterality Date    COLONOSCOPY N/A 12/4/2017    Procedure: COLONOSCOPY;  Surgeon: Dina Ledesma MD;  Location: [facility];  Service: Endoscopy;  Laterality: N/A;    CORONARY ARTERY BYPASS GRAFT  05/2003    x1    PROSTATE SURGERY      RALP 2013    TRANSFORAMINAL EPIDURAL  INJECTION OF STEROID Right 2019    Procedure: Right L5/S1+ S1 TF NAHUN with IV sedation;  Surgeon: Ermias Mario MD;  Location: [facility];  Service: Pain Management;  Laterality: Right;     Family History   Problem Relation Age of Onset    Hypertension Father     Strabismus Neg Hx     Retinal detachment Neg Hx     Macular degeneration Neg Hx     Glaucoma Neg Hx     Blindness Neg Hx     Amblyopia Neg Hx      Social History     Socioeconomic History    Marital status:      Spouse name: Not on file    Number of children: Not on file    Years of education: Not on file    Highest education level: Not on file   Occupational History    Not on file   Social Needs    Financial resource strain: Not on file    Food insecurity:     Worry: Not on file     Inability: Not on file    Transportation needs:     Medical: Not on file     Non-medical: Not on file   Tobacco Use    Smoking status: Former Smoker     Packs/day: 0.25     Years: 15.00     Pack years: 3.75     Last attempt to quit: 1988     Years since quittin.7    Smokeless tobacco: Never Used   Substance and Sexual Activity    Alcohol use: No    Drug use: No    Sexual activity: Not on file   Lifestyle    Physical activity:     Days per week: Not on file     Minutes per session: Not on file    Stress: Not on file   Relationships    Social connections:     Talks on phone: Not on file     Gets together: Not on file     Attends Muslim service: Not on file     Active member of club or organization: Not on file     Attends meetings of clubs or organizations: Not on file     Relationship status: Not on file   Other Topics Concern    Not on file   Social History Narrative    No pets or smokers in household.     Medication List with Changes/Refills   Current Medications    ALLOPURINOL (ZYLOPRIM) 300 MG TABLET    TAKE 1 AND 1/2 TABLETS BY MOUTH EVERY DAY    AMLODIPINE (NORVASC) 10 MG TABLET    Take 1 tablet (10 mg total) by mouth  once daily.    ASPIRIN 81 MG CHEWABLE TABLET    Take 1 tablet by mouth Daily.    CELECOXIB (CELEBREX) 200 MG CAPSULE    Take 1 capsule (200 mg total) by mouth once daily. Take with food    DICLOFENAC SODIUM (VOLTAREN) 1 % GEL    Apply 2 g topically once daily.    FISH OIL-FAT ACID COMB.8- (OMEGA 3-6-9) 1,200 MG CAP    Take 1 capsule by mouth once daily.     FLUNISOLIDE 25 MCG, 0.025%, (NASALIDE) 25 MCG (0.025 %) SPRY    USE 2 SPRAYS IN EACH NOSTRIL TWICE DAILY    LATANOPROST 0.005 % OPHTHALMIC SOLUTION    INSTILL ONE DROP INTO BOTH EYES IN THE EVENING    LATANOPROST 0.005 % OPHTHALMIC SOLUTION    INSTILL ONE DROP INTO BOTH EYES IN THE EVENING    LISINOPRIL (PRINIVIL,ZESTRIL) 40 MG TABLET    TAKE 1 TABLET (40 MG TOTAL) BY MOUTH ONCE DAILY.    METOPROLOL SUCCINATE (TOPROL-XL) 100 MG 24 HR TABLET    Take 1 tablet (100 mg total) by mouth once daily.    PREDNISONE (DELTASONE) 20 MG TABLET    Take 2 tablets with food for 3 days; then take one tablet with food for 2 days; then take 1/2 tablet with food for 2 days.    ROSUVASTATIN (CRESTOR) 40 MG TAB    Take 1 tablet (40 mg total) by mouth every evening. Generic is fine.    TIMOLOL MALEATE 0.5% (TIMOPTIC) 0.5 % DROP    INSTILL 1 DROP INTO EACH EYE EVERY MORNING    TIZANIDINE (ZANAFLEX) 4 MG TABLET    Take 1 tablet (4 mg total) by mouth every 8 (eight) hours.     Review of patient's allergies indicates:  No Known Allergies  Review of Systems   Constitution: Negative for decreased appetite.   HENT: Negative for tinnitus.    Eyes: Negative for double vision.   Cardiovascular: Negative for chest pain.   Respiratory: Negative for wheezing.    Hematologic/Lymphatic: Negative for bleeding problem.   Skin: Negative for dry skin.   Musculoskeletal: Positive for arthritis, back pain and joint pain. Negative for gout, neck pain and stiffness.   Gastrointestinal: Negative for abdominal pain.   Genitourinary: Negative for bladder incontinence.   Neurological: Positive for  paresthesias. Negative for numbness and sensory change.   Psychiatric/Behavioral: Negative for altered mental status.       Objective:   Body mass index is 42.29 kg/m².  Vitals:    01/23/20 0851   BP: (!) 154/80   Pulse: 71          General    Constitutional: He is oriented to person, place, and time. He appears well-developed.   HENT:   Head: Atraumatic.   Eyes: EOM are normal.   Cardiovascular: Normal rate.    Pulmonary/Chest: Effort normal.   Abdominal: Soft.   Neurological: He is alert and oriented to person, place, and time.   Psychiatric: Judgment normal.            Cervical spine with good range of motion and slight crepitus but no pain  Lumbar with pain from L3 down to L5 paraspinal.  No true deformity seen.  Bilateral upper extremity neurovascularly intact strength is 5/5 throughout radial and ulnar pulses 2+ sensory intact to touch  Pelvis is level  Bilateral hip range of motion within normal limits.  Hip flexors, abduct his adductors quads and hamstrings were 5/5  Bilateral knees with varus deformity.  Crepitus with motion on the patella and medially.  Tenderness to palpation over the patella and medial joint.  Range of motion 5-125 degrees. Very mild swelling.  Calves are soft nontender  Negative pitting edema  Ankle motion intact  DP 1+  Decrease in sensation in his foot on the lateral aspect  Skin is warm to touch  Patellar reflex 2+    EMG-NCV with chronic bilateral L5-S1 radiculopathy with superimposed peripheral polyneuropathy    Relevant imaging results reviewed and interpreted by me, discussed with the patient and / or family today   X-ray and electronic records bilateral knees with complete loss of medial joint space and osteophytic changes consistent with end-stage arthrosis  MRI 2014 multilevel foraminal and central stenosis from L3 down to L5 and S1  Assessment:     Encounter Diagnoses   Name Primary?    Arthritis of knee, left Yes    Arthritis of knee, right     Degenerative lumbar spinal  stenosis         Plan:   Arthritis of knee, left    Arthritis of knee, right    Degenerative lumbar spinal stenosis         Patient Instructions   Continue with exercises and yd work is acceptable  Continue with Celebrex 200 mg once a day  If pain is not controlled in the future may have to repeat the cortisone shot  Return to clinic in 6 weeks  May call if ran out of the medication so we can renew it    Patient is doing much better after he had NAHUN.  Will hold off on any surgical intervention      Disclaimer: This note was prepared using a voice recognition system and is likely to have sound alike errors within the text.

## 2020-01-23 NOTE — PROGRESS NOTES
Subjective:       Patient ID: Damon Yoo is a 69 y.o. male.    Chief Complaint: Follow-up and Results    Here for yearly and f/u chronic medical conditions.    HTN: B/P good, no HTNive symptoms.   LIPIDS:not following D&E, tolerating and compliant with med(s).  CAD/hypertrophic cardiomyopathy: no CP, SOB, DIAZ, saw Dr Faye  Gout: quiet, but is seeing rheum  Prostate cancer: Turned over to q yearly PSA to me by urology  Chronic back pain(lumbar stenosis)/ Knees: sees PMR, periodic NAHUN. Chronic but manageable.  CKD: cre is stable, on NSAIDs due to arthritis  Elevated glucose on labs: no DM symptoms.  His appendicitis resolved with antibiotics, saw surgeon and did not require surgical intervention.  LABS REVIEWED AND DISCUSSED WITH PATIENT      Review of Systems   Constitutional: Negative for fever and unexpected weight change.   HENT: Negative for congestion and rhinorrhea.    Eyes: Negative for discharge and redness.   Respiratory: Negative for cough and wheezing.    Cardiovascular: Negative for chest pain, palpitations and leg swelling.   Gastrointestinal: Negative for abdominal pain, constipation, diarrhea and vomiting.        Resolved pain from antibiotic treated appendicitis last year.   Endocrine: Negative for polydipsia, polyphagia and polyuria.   Genitourinary: Negative for decreased urine volume and difficulty urinating.   Musculoskeletal: Positive for arthralgias and back pain. Negative for joint swelling.   Skin: Negative for rash and wound.   Neurological: Negative for syncope and headaches.   Psychiatric/Behavioral: Negative for behavioral problems and sleep disturbance.       Objective:      Physical Exam   Constitutional: He is oriented to person, place, and time. He appears well-developed and well-nourished. No distress.   Neck: No JVD present. No thyromegaly present.   Cardiovascular: Normal rate, regular rhythm and normal heart sounds.   No murmur heard.  Pulmonary/Chest: Effort normal and  breath sounds normal. No respiratory distress. He has no wheezes. He has no rales.   Abdominal: Soft. He exhibits no distension and no mass. There is no tenderness. There is no guarding.   Musculoskeletal: He exhibits no edema.   Lymphadenopathy:     He has no cervical adenopathy.   Neurological: He is alert and oriented to person, place, and time. No cranial nerve deficit. Coordination normal.   Skin: Capillary refill takes less than 2 seconds. No rash noted.   Psychiatric: He has a normal mood and affect. His behavior is normal. Judgment and thought content normal.       Assessment:       1. Well adult exam    2. Elevated glucose    3. Hyperlipidemia, unspecified hyperlipidemia type    4. Essential hypertension    5. Coronary artery disease involving coronary bypass graft of native heart without angina pectoris    6. S/P CABG (coronary artery bypass graft)    7. Prostate cancer    8. Chronic gout due to renal impairment of multiple sites without tophus    9. Obesity, Class III, BMI 40-49.9 (morbid obesity)    10. Chronic kidney disease, stage III (moderate)    11. Spinal stenosis of lumbar region with radiculopathy    12. Chronic pain of both knees    13. Hypertrophic cardiomyopathy        Plan:       Well adult exam    Elevated glucose  -     Hemoglobin A1c; Future; Expected date: 01/23/2020  -     Comprehensive metabolic panel; Future; Expected date: 01/23/2020  -     Hemoglobin A1c; Future; Expected date: 01/23/2020    Hyperlipidemia, unspecified hyperlipidemia type  -     Lipid panel; Future; Expected date: 01/23/2020  -     Comprehensive metabolic panel; Future; Expected date: 01/23/2020    Essential hypertension  -     Comprehensive metabolic panel; Future; Expected date: 01/23/2020  -     amLODIPine (NORVASC) 10 MG tablet; Take 1 tablet (10 mg total) by mouth once daily.  Dispense: 90 tablet; Refill: 4    Coronary artery disease involving coronary bypass graft of native heart without angina pectoris  -      amLODIPine (NORVASC) 10 MG tablet; Take 1 tablet (10 mg total) by mouth once daily.  Dispense: 90 tablet; Refill: 4    S/P CABG (coronary artery bypass graft)    Prostate cancer  -     Prostate Specific Antigen, Diagnostic; Future; Expected date: 01/23/2020    Chronic gout due to renal impairment of multiple sites without tophus    Obesity, Class III, BMI 40-49.9 (morbid obesity)    Chronic kidney disease, stage III (moderate)    Spinal stenosis of lumbar region with radiculopathy    Chronic pain of both knees    Hypertrophic cardiomyopathy    HM issues reviewed. Flu is declined, he is not thrilled but will consider shingrix at pharmacy. A1c today, D&E, weight loss, low carb diet. Maintain meds(increase amlodipine to 10 mg). His rneal status is stable on NSAIDs. B/P check at cardiology. and subspecialty care. F/U in 6 months with labs.

## 2020-01-24 LAB
ESTIMATED AVG GLUCOSE: 126 MG/DL (ref 68–131)
HBA1C MFR BLD HPLC: 6 % (ref 4–5.6)

## 2020-02-10 ENCOUNTER — TELEPHONE (OUTPATIENT)
Dept: INTERNAL MEDICINE | Facility: CLINIC | Age: 70
End: 2020-02-10

## 2020-02-10 NOTE — TELEPHONE ENCOUNTER
----- Message from Debbie Piña sent at 2/10/2020  9:25 AM CST -----  Type:  RX Refill Request    Who Called: [facility]   Refill or New Rx:refill  RX Name and Strength:metoprolol  How is the patient currently taking it? (ex. 1XDay):n/a  Is this a 30 day or 90 day RX:n/a  Preferred Pharmacy with phone number:  [facility] [id number] [address] [address] [address]  [place] [address]  Phone: [phone] Fax: [phone]      Local or Mail Order:local   Ordering Provider:dR PATIÑO  Would the patient rather a call back or a response via [facility]? CALL  Best Call Back Number:[phone]    Additional Information: NA

## 2020-02-10 NOTE — TELEPHONE ENCOUNTER
Advised pt rx already sent by Dr. Mason back in 11/2019, pt states [facility] denies they have rx. Advised pt would print and fax rx to confirmed [facility] and instruct them to fill rx and notify pt when rx ready. Pt voiced understanding and to CB PRN.

## 2020-02-18 ENCOUNTER — OFFICE VISIT (OUTPATIENT)
Dept: OPHTHALMOLOGY | Facility: CLINIC | Age: 70
End: 2020-02-18
Payer: MEDICARE

## 2020-02-18 DIAGNOSIS — H25.13 NUCLEAR SCLEROSIS, BILATERAL: ICD-10-CM

## 2020-02-18 DIAGNOSIS — H40.1132 PRIMARY OPEN ANGLE GLAUCOMA OF BOTH EYES, MODERATE STAGE: Primary | ICD-10-CM

## 2020-02-18 PROCEDURE — 99212 OFFICE O/P EST SF 10 MIN: CPT | Mod: PBBFAC,25 | Performed by: OPHTHALMOLOGY

## 2020-02-18 PROCEDURE — 92083 HUMPHREY VISUAL FIELD - OU - BOTH EYES: ICD-10-PCS | Mod: 26,S$PBB,, | Performed by: OPHTHALMOLOGY

## 2020-02-18 PROCEDURE — 99999 PR PBB SHADOW E&M-EST. PATIENT-LVL II: CPT | Mod: PBBFAC,,, | Performed by: OPHTHALMOLOGY

## 2020-02-18 PROCEDURE — 92014 PR EYE EXAM, EST PATIENT,COMPREHESV: ICD-10-PCS | Mod: S$PBB,,, | Performed by: OPHTHALMOLOGY

## 2020-02-18 PROCEDURE — 99999 PR PBB SHADOW E&M-EST. PATIENT-LVL II: ICD-10-PCS | Mod: PBBFAC,,, | Performed by: OPHTHALMOLOGY

## 2020-02-18 PROCEDURE — 92083 EXTENDED VISUAL FIELD XM: CPT | Mod: PBBFAC | Performed by: OPHTHALMOLOGY

## 2020-02-18 PROCEDURE — 92014 COMPRE OPH EXAM EST PT 1/>: CPT | Mod: S$PBB,,, | Performed by: OPHTHALMOLOGY

## 2020-02-18 NOTE — PROGRESS NOTES
SUBJECTIVE  Damon Yoo is 69 y.o. male  Corrected distance visual acuity was 20/25 +1 in the right eye and 20/20 in the left eye. Comments: Through phoroptor.   Chief Complaint   Patient presents with    Glaucoma          HPI     Pt here for 4m HVF chk. No pain or discomfort. VA stable. 100% compliant   with gtts.     1. Mod COAG- No FHx (init 32/27 on 3/10 with C/D .65/.60) Goal <18, new   goal = 15 6/2017  SLT OD 12/11/14 (20-16)  SLT OS 2/25/15 (20-16)  2. Mild NSC  3. LLL Cyst Excision on 6/13/12    Latanoprost QHS OU  Timolol qam OU    Last edited by Martinez Chatman, Patient Care Assistant on 2/18/2020  1:30   PM. (History)         Assessment /Plan :  1. Primary open angle glaucoma of both eyes, moderate stage IOP not within acceptable range relative to target IOP with risk of irreversible visual loss. Better IOP control is recommended. Discussed options, risks, and benefits of additional medication, SLT laser, and/or incisional glaucoma surgery. Reviewed importance of continued compliance with treatment and follow up.     Patient chooses defer and recheck IOP next visit     2. Nuclear sclerosis, bilateral monitor for now     Return to clinic in 3 months  or as needed.  With IOP Check and GOCT

## 2020-03-05 ENCOUNTER — PATIENT MESSAGE (OUTPATIENT)
Dept: ORTHOPEDICS | Facility: CLINIC | Age: 70
End: 2020-03-05

## 2020-03-05 ENCOUNTER — OFFICE VISIT (OUTPATIENT)
Dept: ORTHOPEDICS | Facility: CLINIC | Age: 70
End: 2020-03-05
Payer: MEDICARE

## 2020-03-05 VITALS
HEART RATE: 61 BPM | BODY MASS INDEX: 42.11 KG/M2 | DIASTOLIC BLOOD PRESSURE: 85 MMHG | WEIGHT: 262 LBS | SYSTOLIC BLOOD PRESSURE: 139 MMHG | HEIGHT: 66 IN

## 2020-03-05 DIAGNOSIS — M48.061 DEGENERATIVE LUMBAR SPINAL STENOSIS: ICD-10-CM

## 2020-03-05 DIAGNOSIS — M17.12 ARTHRITIS OF KNEE, LEFT: Primary | ICD-10-CM

## 2020-03-05 DIAGNOSIS — M17.11 ARTHRITIS OF KNEE, RIGHT: ICD-10-CM

## 2020-03-05 PROCEDURE — 99214 OFFICE O/P EST MOD 30 MIN: CPT | Mod: S$PBB,,, | Performed by: ORTHOPAEDIC SURGERY

## 2020-03-05 PROCEDURE — 99213 OFFICE O/P EST LOW 20 MIN: CPT | Mod: PBBFAC | Performed by: ORTHOPAEDIC SURGERY

## 2020-03-05 PROCEDURE — 99999 PR PBB SHADOW E&M-EST. PATIENT-LVL III: CPT | Mod: PBBFAC,,, | Performed by: ORTHOPAEDIC SURGERY

## 2020-03-05 PROCEDURE — 99999 PR PBB SHADOW E&M-EST. PATIENT-LVL III: ICD-10-PCS | Mod: PBBFAC,,, | Performed by: ORTHOPAEDIC SURGERY

## 2020-03-05 PROCEDURE — 99214 PR OFFICE/OUTPT VISIT, EST, LEVL IV, 30-39 MIN: ICD-10-PCS | Mod: S$PBB,,, | Performed by: ORTHOPAEDIC SURGERY

## 2020-03-05 NOTE — PATIENT INSTRUCTIONS
Continue with the Celebrex 200 mg once a day  Continue with independent exercise  The epidural steroid injection given a while back seems to still working  Return to clinic in 3 months sooner if pain is worst

## 2020-03-05 NOTE — PROGRESS NOTES
Subjective:     Patient ID: Damon Yoo is a 69 y.o. male.    Chief Complaint: Pain of the Left Knee and Pain of the Right Knee    HPI:  69-year-old diagnosed with bilateral knee arthrosis given injection of steroid in September placed on meloxicam which did not help then we changed him to Celebrex.  Celebrex seems to help.  Workup found out that he has old MRI from 2014 with neuroforaminal stenosis multilevel.  Workup with EMG-NCV recently ordered by me showed chronic L5-S1 radiculopathy bilaterally with peripheral poly neuropathy superimposed.  We sent him for NAHUN with pain management and that helped significantly that his pain is 2-3/10 down his legs and knees.  He still takes Celebrex daily with relief.  For the 1st time was able to work in the [employer] it without difficulty.  Not interested in having surgery at this point.    01/23/2020  Patient with bilateral knee severe arthrosis lumbosacral arthrosis.  The Celebrex seems to work really well for him.  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  He still states he is doing well at this point do not want a proceed with any surgical intervention.  His pain level on and off is 5/10.  Slight grinding and catching. Not using any assistive devices to ambulate    03/05/2020  Bilateral knee arthritis and lumbar radiculopathy with neuroforaminal stenosis multilevel.  States the epidural block still working and the Celebrex still helping.  She is remaining very active working in the [employer].  Does not think he needs any injections in the back or the knee at this point.  His pain level is 2/10 occasionally goes up and he takes his Celebrex.  He is ambulating independently without any assistive devices.  Denies loss of bowel bladder control  Past Medical History:   Diagnosis Date    Abnormal EKG 10/25/2013    Arthritis     CAD (coronary artery disease)     Cancer     Prostate [id number] prostate    Glaucoma     Gout attack      Hyperlipidemia     Hypertension     Hypertrophic cardiomyopathy 10/25/2013    S/P CABG (coronary artery bypass graft) 10/25/2013     Past Surgical History:   Procedure Laterality Date    COLONOSCOPY N/A 2017    Procedure: COLONOSCOPY;  Surgeon: Dina Ledesma MD;  [facility] END[facility];  Service: Endoscopy;  Laterality: N/A;    CORONARY ARTERY BYPASS GRAFT  05/2003    x1    PROSTATE SURGERY      RALP 2013    TRANSFORAMINAL EPIDURAL INJECTION OF STEROID Right 2019    Procedure: Right L5/S1+ S1 TF NAHUN with IV sedation;  Surgeon: Ermias Mario MD;  Location: [facility] PAIN MGT;  Service: Pain Management;  Laterality: Right;     Family History   Problem Relation Age of Onset    Hypertension Father     Strabismus Neg Hx     Retinal detachment Neg Hx     Macular degeneration Neg Hx     Glaucoma Neg Hx     Blindness Neg Hx     Amblyopia Neg Hx      Social History     Socioeconomic History    Marital status:      Spouse name: Not on file    Number of children: Not on file    Years of education: Not on file    Highest education level: Not on file   Occupational History    Not on file   Social Needs    Financial resource strain: Not on file    Food insecurity:     Worry: Not on file     Inability: Not on file    Transportation needs:     Medical: Not on file     Non-medical: Not on file   Tobacco Use    Smoking status: Former Smoker     Packs/day: 0.25     Years: 15.00     Pack years: 3.75     Last attempt to quit: 1988     Years since quittin.8    Smokeless tobacco: Never Used   Substance and Sexual Activity    Alcohol use: No    Drug use: No    Sexual activity: Not on file   Lifestyle    Physical activity:     Days per week: Not on file     Minutes per session: Not on file    Stress: Not on file   Relationships    Social connections:     Talks on phone: Not on file     Gets together: Not on file     Attends Sikhism service: Not on file     Active member of club or  organization: Not on file     Attends meetings of clubs or organizations: Not on file     Relationship status: Not on file   Other Topics Concern    Not on file   Social History Narrative    No pets or smokers in household.     Medication List with Changes/Refills   Current Medications    ALLOPURINOL (ZYLOPRIM) 300 MG TABLET    TAKE 1 AND 1/2 TABLETS BY MOUTH EVERY DAY    AMLODIPINE (NORVASC) 10 MG TABLET    Take 1 tablet (10 mg total) by mouth once daily.    ASPIRIN 81 MG CHEWABLE TABLET    Take 1 tablet by mouth Daily.    CELECOXIB (CELEBREX) 200 MG CAPSULE    Take 1 capsule (200 mg total) by mouth once daily. Take with food    DICLOFENAC SODIUM (VOLTAREN) 1 % GEL    Apply 2 g topically once daily.    FISH OIL-FAT ACID COMB.8- (OMEGA 3-6-9) 1,200 MG CAP    Take 1 capsule by mouth once daily.     FLUNISOLIDE 25 MCG, 0.025%, (NASALIDE) 25 MCG (0.025 %) SPRY    USE 2 SPRAYS IN EACH NOSTRIL TWICE DAILY    LATANOPROST 0.005 % OPHTHALMIC SOLUTION    INSTILL ONE DROP INTO BOTH EYES IN THE EVENING    LATANOPROST 0.005 % OPHTHALMIC SOLUTION    INSTILL ONE DROP INTO BOTH EYES IN THE EVENING    LISINOPRIL (PRINIVIL,ZESTRIL) 40 MG TABLET    TAKE 1 TABLET (40 MG TOTAL) BY MOUTH ONCE DAILY.    METOPROLOL SUCCINATE (TOPROL-XL) 100 MG 24 HR TABLET    Take 1 tablet (100 mg total) by mouth once daily.    PREDNISONE (DELTASONE) 20 MG TABLET    Take 2 tablets with food for 3 days; then take one tablet with food for 2 days; then take 1/2 tablet with food for 2 days.    ROSUVASTATIN (CRESTOR) 40 MG TAB    Take 1 tablet (40 mg total) by mouth every evening. Generic is fine.    TIMOLOL MALEATE 0.5% (TIMOPTIC) 0.5 % DROP    INSTILL 1 DROP INTO EACH EYE EVERY MORNING    TIZANIDINE (ZANAFLEX) 4 MG TABLET    Take 1 tablet (4 mg total) by mouth every 8 (eight) hours.     Review of patient's allergies indicates:  No Known Allergies  Review of Systems   Constitution: Negative for decreased appetite.   HENT: Negative for tinnitus.    Eyes:  Negative for double vision.   Cardiovascular: Negative for chest pain.   Respiratory: Negative for wheezing.    Hematologic/Lymphatic: Negative for bleeding problem.   Skin: Negative for dry skin.   Musculoskeletal: Positive for arthritis, back pain and joint pain. Negative for gout, neck pain and stiffness.   Gastrointestinal: Negative for abdominal pain.   Genitourinary: Negative for bladder incontinence.   Neurological: Positive for paresthesias. Negative for numbness and sensory change.   Psychiatric/Behavioral: Negative for altered mental status.       Objective:   Body mass index is 42.29 kg/m².  Vitals:    03/05/20 0815   BP: 139/85   Pulse: 61          General    Constitutional: He is oriented to person, place, and time. He appears well-developed.   HENT:   Head: Atraumatic.   Eyes: EOM are normal.   Cardiovascular: Normal rate.    Pulmonary/Chest: Effort normal.   Abdominal: Soft.   Neurological: He is alert and oriented to person, place, and time.   Psychiatric: Judgment normal.            Cervical spine with good range of motion and slight crepitus but no pain  Lumbar with pain from L3 down to L5 paraspinal.  No true deformity seen.  Bilateral upper extremity neurovascularly intact strength is 5/5 throughout radial and ulnar pulses 2+ sensory intact to touch  Pelvis is level  Bilateral hip range of motion within normal limits.  Hip flexors, abduct his adductors quads and hamstrings were 5/5  Bilateral knees with varus deformity.  Crepitus with motion on the patella and medially.  Tenderness to palpation over the patella and medial joint.  Range of motion 5-125 degrees. Very mild swelling.  Calves are soft nontender  Negative pitting edema  Ankle motion intact  DP 1+  Decrease in sensation in his foot on the lateral aspect  Skin is warm to touch  Patellar reflex 2+    EMG-NCV with chronic bilateral L5-S1 radiculopathy with superimposed peripheral polyneuropathy    Relevant imaging results reviewed and  interpreted by me, discussed with the patient and / or family today   X-ray and electronic records bilateral knees with complete loss of medial joint space and osteophytic changes consistent with end-stage arthrosis  MRI 2014 multilevel foraminal and central stenosis from L3 down to L5 and S1  Assessment:     Encounter Diagnoses   Name Primary?    Arthritis of knee, left Yes    Arthritis of knee, right     Degenerative lumbar spinal stenosis         Plan:   Arthritis of knee, left    Arthritis of knee, right    Degenerative lumbar spinal stenosis         Patient Instructions   Continue with the Celebrex 200 mg once a day  Continue with independent exercise  The epidural steroid injection given a while back seems to still working  Return to clinic in 3 months sooner if pain is worst    Patient is doing much better after he had NAHUN.  Will hold off on any surgical intervention      Disclaimer: This note was prepared using a voice recognition system and is likely to have sound alike errors within the text.

## 2020-04-17 RX ORDER — ALLOPURINOL 300 MG/1
TABLET ORAL
Qty: 135 TABLET | Refills: 1 | Status: SHIPPED | OUTPATIENT
Start: 2020-04-17 | End: 2020-12-22 | Stop reason: SDUPTHER

## 2020-05-11 ENCOUNTER — OFFICE VISIT (OUTPATIENT)
Dept: OPHTHALMOLOGY | Facility: CLINIC | Age: 70
End: 2020-05-11
Payer: MEDICARE

## 2020-05-11 DIAGNOSIS — H40.1132 PRIMARY OPEN ANGLE GLAUCOMA OF BOTH EYES, MODERATE STAGE: Primary | ICD-10-CM

## 2020-05-11 DIAGNOSIS — H25.13 NUCLEAR SENILE CATARACT OF BOTH EYES: ICD-10-CM

## 2020-05-11 PROCEDURE — 99999 PR PBB SHADOW E&M-EST. PATIENT-LVL II: ICD-10-PCS | Mod: PBBFAC,,, | Performed by: OPHTHALMOLOGY

## 2020-05-11 PROCEDURE — 92012 INTRM OPH EXAM EST PATIENT: CPT | Mod: S$PBB,,, | Performed by: OPHTHALMOLOGY

## 2020-05-11 PROCEDURE — 92133 POSTERIOR SEGMENT OCT OPTIC NERVE(OCULAR COHERENCE TOMOGRAPHY) - OU - BOTH EYES: ICD-10-PCS | Mod: 26,S$PBB,, | Performed by: OPHTHALMOLOGY

## 2020-05-11 PROCEDURE — 92133 CPTRZD OPH DX IMG PST SGM ON: CPT | Mod: PBBFAC | Performed by: OPHTHALMOLOGY

## 2020-05-11 PROCEDURE — 99999 PR PBB SHADOW E&M-EST. PATIENT-LVL II: CPT | Mod: PBBFAC,,, | Performed by: OPHTHALMOLOGY

## 2020-05-11 PROCEDURE — 99212 OFFICE O/P EST SF 10 MIN: CPT | Mod: PBBFAC,25 | Performed by: OPHTHALMOLOGY

## 2020-05-11 PROCEDURE — 92012 PR EYE EXAM, EST PATIENT,INTERMED: ICD-10-PCS | Mod: S$PBB,,, | Performed by: OPHTHALMOLOGY

## 2020-05-11 RX ORDER — LATANOPROST 50 UG/ML
1 SOLUTION/ DROPS OPHTHALMIC NIGHTLY
Qty: 2.5 ML | Refills: 12 | Status: SHIPPED | OUTPATIENT
Start: 2020-05-11 | End: 2021-02-25 | Stop reason: SDUPTHER

## 2020-05-11 RX ORDER — TIMOLOL MALEATE 5 MG/ML
1 SOLUTION/ DROPS OPHTHALMIC EVERY MORNING
Qty: 5 ML | Refills: 12 | Status: SHIPPED | OUTPATIENT
Start: 2020-05-11 | End: 2020-08-13 | Stop reason: SDUPTHER

## 2020-05-11 NOTE — PROGRESS NOTES
SUBJECTIVE  Damon Yoo is 70 y.o. male  Corrected distance visual acuity was 20/25 -1 in the right eye and 20/25 in the left eye.   Chief Complaint   Patient presents with    Glaucoma     3 mth IOP check and GOCT           HPI     Glaucoma      Additional comments: 3 mth IOP check and GOCT               Comments     1. Mod COAG- No FHx (init 32/27 on 3/10 with C/D .65/.60) Goal <18, new   goal = 15 6/2017  SLT OD 12/11/14 (20-16)  SLT OS 2/25/15 (20-16)  2. Mild NSC  3. LLL Cyst Excision on 6/13/12    Latanoprost QHS OU  Timolol qam OU          Last edited by Keesha Brewster MA on 5/11/2020  8:00 AM. (History)         Assessment /Plan :  1. Primary open angle glaucoma of both eyes, moderate stage Doing well, IOP within acceptable range relative to target IOP and no evidence of progression. Continue current treatment. Reviewed importance of continued compliance with treatment and follow up.     2. Nuclear senile cataract of both eyes monitor for now     Return to clinic in 3 months  or as needed.  With IOP Check

## 2020-07-13 ENCOUNTER — OFFICE VISIT (OUTPATIENT)
Dept: ORTHOPEDICS | Facility: CLINIC | Age: 70
End: 2020-07-13
Payer: MEDICARE

## 2020-07-13 VITALS
BODY MASS INDEX: 42.11 KG/M2 | DIASTOLIC BLOOD PRESSURE: 79 MMHG | HEIGHT: 66 IN | SYSTOLIC BLOOD PRESSURE: 158 MMHG | HEART RATE: 70 BPM | WEIGHT: 262 LBS

## 2020-07-13 DIAGNOSIS — M48.061 DEGENERATIVE LUMBAR SPINAL STENOSIS: ICD-10-CM

## 2020-07-13 DIAGNOSIS — M17.11 ARTHRITIS OF KNEE, RIGHT: ICD-10-CM

## 2020-07-13 DIAGNOSIS — M54.17 LUMBOSACRAL RADICULOPATHY: ICD-10-CM

## 2020-07-13 DIAGNOSIS — M17.12 ARTHRITIS OF KNEE, LEFT: Primary | ICD-10-CM

## 2020-07-13 PROCEDURE — 20610 LARGE JOINT ASPIRATION/INJECTION: L KNEE: ICD-10-PCS | Mod: S$PBB,LT,, | Performed by: ORTHOPAEDIC SURGERY

## 2020-07-13 PROCEDURE — 20610 DRAIN/INJ JOINT/BURSA W/O US: CPT | Mod: PBBFAC | Performed by: ORTHOPAEDIC SURGERY

## 2020-07-13 PROCEDURE — 99214 OFFICE O/P EST MOD 30 MIN: CPT | Mod: 25,S$PBB,, | Performed by: ORTHOPAEDIC SURGERY

## 2020-07-13 PROCEDURE — 99999 PR PBB SHADOW E&M-EST. PATIENT-LVL IV: CPT | Mod: PBBFAC,,, | Performed by: ORTHOPAEDIC SURGERY

## 2020-07-13 PROCEDURE — 99214 PR OFFICE/OUTPT VISIT, EST, LEVL IV, 30-39 MIN: ICD-10-PCS | Mod: 25,S$PBB,, | Performed by: ORTHOPAEDIC SURGERY

## 2020-07-13 PROCEDURE — 99999 PR PBB SHADOW E&M-EST. PATIENT-LVL IV: ICD-10-PCS | Mod: PBBFAC,,, | Performed by: ORTHOPAEDIC SURGERY

## 2020-07-13 PROCEDURE — 99214 OFFICE O/P EST MOD 30 MIN: CPT | Mod: PBBFAC | Performed by: ORTHOPAEDIC SURGERY

## 2020-07-13 RX ORDER — METHYLPREDNISOLONE ACETATE 80 MG/ML
80 INJECTION, SUSPENSION INTRA-ARTICULAR; INTRALESIONAL; INTRAMUSCULAR; SOFT TISSUE
Status: DISCONTINUED | OUTPATIENT
Start: 2020-07-13 | End: 2020-07-13 | Stop reason: HOSPADM

## 2020-07-13 RX ORDER — CELECOXIB 200 MG/1
200 CAPSULE ORAL DAILY
Qty: 90 CAPSULE | Refills: 2 | Status: SHIPPED | OUTPATIENT
Start: 2020-07-13 | End: 2021-08-05

## 2020-07-13 RX ADMIN — METHYLPREDNISOLONE ACETATE 80 MG: 80 INJECTION, SUSPENSION INTRALESIONAL; INTRAMUSCULAR; INTRASYNOVIAL; SOFT TISSUE at 08:07

## 2020-07-13 NOTE — PROGRESS NOTES
Subjective:     Patient ID: Damon Yoo is a 70 y.o. male.    Chief Complaint: Pain of the Right Knee and Pain of the Left Knee    HPI:  69-year-old diagnosed with bilateral knee arthrosis given injection of steroid in September placed on meloxicam which did not help then we changed him to Celebrex.  Celebrex seems to help.  Workup found out that he has old MRI from 2014 with neuroforaminal stenosis multilevel.  Workup with EMG-NCV recently ordered by me showed chronic L5-S1 radiculopathy bilaterally with peripheral poly neuropathy superimposed.  We sent him for NHAUN with pain management and that helped significantly that his pain is 2-3/10 down his legs and knees.  He still takes Celebrex daily with relief.  For the 1st time was able to work in the [employer] it without difficulty.  Not interested in having surgery at this point.    01/23/2020  Patient with bilateral knee severe arthrosis lumbosacral arthrosis.  The Celebrex seems to work really well for him.  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  He still states he is doing well at this point do not want a proceed with any surgical intervention.  His pain level on and off is 5/10.  Slight grinding and catching. Not using any assistive devices to ambulate    03/05/2020  Bilateral knee arthritis and lumbar radiculopathy with neuroforaminal stenosis multilevel.  States the epidural block still working and the Celebrex still helping.  She is remaining very active working in the [employer].  Does not think he needs any injections in the back or the knee at this point.  His pain level is 2/10 occasionally goes up and he takes his Celebrex.  He is ambulating independently without any assistive devices.  Denies loss of bowel bladder control    07/13/2020  Left knee is hurting quite a bit.  His back is not so much and the numbness and tingling in the legs a not bothering him.  He received last steroid injection in September  2019 and requesting a new 1.  Celebrex does help some but now the pain in the left knee is 8/10.  He wants to avoid surgical intervention.  He did receive E NAHUN in the back by pain management and he does not think he needs another 1.  He remains very active with exercise.  Denies any fever or chills or shortness of breath or difficulty chewing swallowing loss of bowel bladder control.  He is maintaining social distancing.  Past Medical History:   Diagnosis Date    Abnormal EKG 10/25/2013    Arthritis     CAD (coronary artery disease)     Cancer     Prostate [id number] prostate    Glaucoma     Gout attack     Hyperlipidemia     Hypertension     Hypertrophic cardiomyopathy 10/25/2013    S/P CABG (coronary artery bypass graft) 10/25/2013     Past Surgical History:   Procedure Laterality Date    COLONOSCOPY N/A 12/4/2017    Procedure: COLONOSCOPY;  Surgeon: Dina Ledesma MD;  Location: [facility];  Service: Endoscopy;  Laterality: N/A;    CORONARY ARTERY BYPASS GRAFT  05/2003    x1    PROSTATE SURGERY      RALP 2013    TRANSFORAMINAL EPIDURAL INJECTION OF STEROID Right 11/11/2019    Procedure: Right L5/S1+ S1 TF NAHUN with IV sedation;  Surgeon: Ermias Mario MD;  Location: [facility] PAIN MGT;  Service: Pain Management;  Laterality: Right;     Family History   Problem Relation Age of Onset    Hypertension Father     Strabismus Neg Hx     Retinal detachment Neg Hx     Macular degeneration Neg Hx     Glaucoma Neg Hx     Blindness Neg Hx     Amblyopia Neg Hx      Social History     Socioeconomic History    Marital status:      Spouse name: Not on file    Number of children: Not on file    Years of education: Not on file    Highest education level: Not on file   Occupational History    Not on file   Social Needs    Financial resource strain: Not on file    Food insecurity     Worry: Not on file     Inability: Not on file    Transportation needs     Medical: Not on file     Non-medical: Not on file    Tobacco Use    Smoking status: Former Smoker     Packs/day: 0.25     Years: 15.00     Pack years: 3.75     Quit date: 1988     Years since quittin.2    Smokeless tobacco: Never Used   Substance and Sexual Activity    Alcohol use: No    Drug use: No    Sexual activity: Not on file   Lifestyle    Physical activity     Days per week: Not on file     Minutes per session: Not on file    Stress: Not on file   Relationships    Social connections     Talks on phone: Not on file     Gets together: Not on file     Attends Taoism service: Not on file     Active member of club or organization: Not on file     Attends meetings of clubs or organizations: Not on file     Relationship status: Not on file   Other Topics Concern    Not on file   Social History Narrative    No pets or smokers in household.     Medication List with Changes/Refills   New Medications    CELECOXIB (CELEBREX) 200 MG CAPSULE    Take 1 capsule (200 mg total) by mouth once daily. Take with food   Current Medications    ALLOPURINOL (ZYLOPRIM) 300 MG TABLET    TAKE 1 1/2 TABLET BY MOUTH DAILY    AMLODIPINE (NORVASC) 10 MG TABLET    Take 1 tablet (10 mg total) by mouth once daily.    ASPIRIN 81 MG CHEWABLE TABLET    Take 1 tablet by mouth Daily.    CELECOXIB (CELEBREX) 200 MG CAPSULE    Take 1 capsule (200 mg total) by mouth once daily. Take with food    DICLOFENAC SODIUM (VOLTAREN) 1 % GEL    Apply 2 g topically once daily.    FISH OIL-FAT ACID COMB.8- (OMEGA 3-6-9) 1,200 MG CAP    Take 1 capsule by mouth once daily.     FLUNISOLIDE 25 MCG, 0.025%, (NASALIDE) 25 MCG (0.025 %) SPRY    USE 2 SPRAYS IN EACH NOSTRIL TWICE DAILY    LATANOPROST 0.005 % OPHTHALMIC SOLUTION    INSTILL ONE DROP INTO BOTH EYES IN THE EVENING    LATANOPROST 0.005 % OPHTHALMIC SOLUTION    Place 1 drop into both eyes every evening.    LISINOPRIL (PRINIVIL,ZESTRIL) 40 MG TABLET    TAKE 1 TABLET (40 MG TOTAL) BY MOUTH ONCE DAILY.    METOPROLOL SUCCINATE  (TOPROL-XL) 100 MG 24 HR TABLET    Take 1 tablet (100 mg total) by mouth once daily.    PREDNISONE (DELTASONE) 20 MG TABLET    Take 2 tablets with food for 3 days; then take one tablet with food for 2 days; then take 1/2 tablet with food for 2 days.    ROSUVASTATIN (CRESTOR) 40 MG TAB    Take 1 tablet (40 mg total) by mouth every evening. Generic is fine.    TIMOLOL MALEATE 0.5% (TIMOPTIC) 0.5 % DROP    Place 1 drop into both eyes every morning.    TIZANIDINE (ZANAFLEX) 4 MG TABLET    Take 1 tablet (4 mg total) by mouth every 8 (eight) hours.     Review of patient's allergies indicates:  No Known Allergies  Review of Systems   Constitution: Negative for decreased appetite.   HENT: Negative for tinnitus.    Eyes: Negative for double vision.   Cardiovascular: Negative for chest pain.   Respiratory: Negative for wheezing.    Hematologic/Lymphatic: Negative for bleeding problem.   Skin: Negative for dry skin.   Musculoskeletal: Positive for arthritis, back pain and joint pain. Negative for gout, neck pain and stiffness.   Gastrointestinal: Negative for abdominal pain.   Genitourinary: Negative for bladder incontinence.   Neurological: Positive for paresthesias. Negative for numbness and sensory change.   Psychiatric/Behavioral: Negative for altered mental status.       Objective:   Body mass index is 42.29 kg/m².  Vitals:    07/13/20 0726   BP: (!) 158/79   Pulse: 70          General    Constitutional: He is oriented to person, place, and time. He appears well-developed.   HENT:   Head: Atraumatic.   Eyes: EOM are normal.   Cardiovascular: Normal rate.    Pulmonary/Chest: Effort normal.   Abdominal: Soft.   Neurological: He is alert and oriented to person, place, and time.   Psychiatric: Judgment normal.            Cervical spine with good range of motion and slight crepitus but no pain  Lumbar with pain from L3 down to L5 paraspinal.  No true deformity seen.  Bilateral upper extremity neurovascularly intact strength is  5/5 throughout radial and ulnar pulses 2+ sensory intact to touch  Pelvis is level  Bilateral hip range of motion within normal limits.  Hip flexors, abduct his adductors quads and hamstrings were 5/5  Bilateral knees with varus deformity.  Crepitus with motion on the patella and medially.  Tenderness to palpation over the patella and medial joint.  Range of motion 5-125 degrees. Very mild swelling.  Left knee has severe pain on the medial side with moderate swelling.  Calves are soft nontender  Negative pitting edema  Ankle motion intact  DP 1+  Decrease in sensation in his foot on the lateral aspect  Skin is warm to touch  Patellar reflex 2+    EMG-NCV with chronic bilateral L5-S1 radiculopathy with superimposed peripheral polyneuropathy    Relevant imaging results reviewed and interpreted by me, discussed with the patient and / or family today   X-ray and electronic records bilateral knees with complete loss of medial joint space and osteophytic changes consistent with end-stage arthrosis  MRI 2014 multilevel foraminal and central stenosis from L3 down to L5 and S1  Assessment:     Encounter Diagnoses   Name Primary?    Arthritis of knee, left Yes    Arthritis of knee, right     Degenerative lumbar spinal stenosis     Lumbosacral radiculopathy         Plan:   Arthritis of knee, left  -     Large Joint Aspiration/Injection: L knee    Arthritis of knee, right    Degenerative lumbar spinal stenosis    Lumbosacral radiculopathy    Other orders  -     celecoxib (CELEBREX) 200 MG capsule; Take 1 capsule (200 mg total) by mouth once daily. Take with food  Dispense: 90 capsule; Refill: 2         Patient Instructions   Bilateral knee arthritis left worse than right  His back is doing okay in the numbness and tingling in the legs not bad  Plan  Left knee injection of 80 mg Depo-Medrol mixed with 5 cc 1% lidocaine 07/13/2020  Ice the needed next few days  Renewal of Celebrex 200 mg once a day as needed  Return to clinic  in for 3 months    Patient is doing much better after he had NAHUN.  Will hold off on any surgical intervention      Disclaimer: This note was prepared using a voice recognition system and is likely to have sound alike errors within the text.

## 2020-07-13 NOTE — PATIENT INSTRUCTIONS
Bilateral knee arthritis left worse than right  His back is doing okay in the numbness and tingling in the legs not bad  Plan  Left knee injection of 80 mg Depo-Medrol mixed with 5 cc 1% lidocaine 07/13/2020  Ice the needed next few days  Renewal of Celebrex 200 mg once a day as needed  Return to clinic in for 3 months

## 2020-07-13 NOTE — PROCEDURES
Large Joint Aspiration/Injection: L knee    Date/Time: 7/13/2020 8:00 AM  Performed by: Omar De La Vega MD  Authorized by: Omar De La Vega MD     Consent Done?:  Yes (Verbal)  Site marked: the procedure site was marked    Timeout: prior to procedure the correct patient, procedure, and site was verified      Local anesthesia used?: Yes    Local anesthetic:  Lidocaine 1% without epinephrine    Details:  Needle Size:  22 G  Ultrasonic Guidance for needle placement?: No    Approach:  Anterolateral  Location:  Knee  Site:  L knee  Medications:  80 mg methylPREDNISolone acetate 80 mg/mL  Patient tolerance:  Patient tolerated the procedure well with no immediate complications

## 2020-07-16 ENCOUNTER — PATIENT OUTREACH (OUTPATIENT)
Dept: ADMINISTRATIVE | Facility: HOSPITAL | Age: 70
End: 2020-07-16

## 2020-07-16 DIAGNOSIS — I10 ESSENTIAL HYPERTENSION: ICD-10-CM

## 2020-07-16 RX ORDER — LISINOPRIL 40 MG/1
40 TABLET ORAL DAILY
Qty: 90 TABLET | Refills: 3 | Status: SHIPPED | OUTPATIENT
Start: 2020-07-16 | End: 2022-02-28 | Stop reason: SDUPTHER

## 2020-07-16 NOTE — PROGRESS NOTES
Pre Visit Chart Audit Complete: Phoned pt regarding overdue HM, HDMP and scheduling AWV. Left message for pt to return call.     Health Maintenance: Updated  Immunizations: Reconciled  Care Everywhere: Abstracted  [facility] Search: Abstracted    Health Maintenance Due   Topic    Shingles Vaccine (2 of 3)      Care Team: Updated   Digital Medicine: HDMP teresa   OPCM: n/a    AWV: DUE

## 2020-07-17 DIAGNOSIS — Z71.89 COMPLEX CARE COORDINATION: ICD-10-CM

## 2020-07-23 ENCOUNTER — LAB VISIT (OUTPATIENT)
Dept: LAB | Facility: HOSPITAL | Age: 70
End: 2020-07-23
Attending: PEDIATRICS
Payer: MEDICARE

## 2020-07-23 DIAGNOSIS — R73.09 ELEVATED GLUCOSE: ICD-10-CM

## 2020-07-23 DIAGNOSIS — E78.5 HYPERLIPIDEMIA, UNSPECIFIED HYPERLIPIDEMIA TYPE: ICD-10-CM

## 2020-07-23 DIAGNOSIS — I10 ESSENTIAL HYPERTENSION: ICD-10-CM

## 2020-07-23 DIAGNOSIS — C61 PROSTATE CANCER: ICD-10-CM

## 2020-07-23 LAB
ALBUMIN SERPL BCP-MCNC: 3.9 G/DL (ref 3.5–5.2)
ALP SERPL-CCNC: 96 U/L (ref 55–135)
ALT SERPL W/O P-5'-P-CCNC: 28 U/L (ref 10–44)
ANION GAP SERPL CALC-SCNC: 8 MMOL/L (ref 8–16)
AST SERPL-CCNC: 30 U/L (ref 10–40)
BILIRUB SERPL-MCNC: 0.6 MG/DL (ref 0.1–1)
BUN SERPL-MCNC: 28 MG/DL (ref 8–23)
CALCIUM SERPL-MCNC: 9.8 MG/DL (ref 8.7–10.5)
CHLORIDE SERPL-SCNC: 110 MMOL/L (ref 95–110)
CHOLEST SERPL-MCNC: 127 MG/DL (ref 120–199)
CHOLEST/HDLC SERPL: 5.1 {RATIO} (ref 2–5)
CO2 SERPL-SCNC: 25 MMOL/L (ref 23–29)
COMPLEXED PSA SERPL-MCNC: <0.01 NG/ML (ref 0–4)
CREAT SERPL-MCNC: 1.7 MG/DL (ref 0.5–1.4)
EST. GFR  (AFRICAN AMERICAN): 46.9 ML/MIN/1.73 M^2
EST. GFR  (NON AFRICAN AMERICAN): 40.5 ML/MIN/1.73 M^2
GLUCOSE SERPL-MCNC: 104 MG/DL (ref 70–110)
HDLC SERPL-MCNC: 25 MG/DL (ref 40–75)
HDLC SERPL: 19.7 % (ref 20–50)
LDLC SERPL CALC-MCNC: 75.4 MG/DL (ref 63–159)
NONHDLC SERPL-MCNC: 102 MG/DL
POTASSIUM SERPL-SCNC: 4.9 MMOL/L (ref 3.5–5.1)
PROT SERPL-MCNC: 7.9 G/DL (ref 6–8.4)
SODIUM SERPL-SCNC: 143 MMOL/L (ref 136–145)
TRIGL SERPL-MCNC: 133 MG/DL (ref 30–150)

## 2020-07-23 PROCEDURE — 84153 ASSAY OF PSA TOTAL: CPT

## 2020-07-23 PROCEDURE — 80061 LIPID PANEL: CPT

## 2020-07-23 PROCEDURE — 36415 COLL VENOUS BLD VENIPUNCTURE: CPT

## 2020-07-23 PROCEDURE — 80053 COMPREHEN METABOLIC PANEL: CPT

## 2020-07-30 ENCOUNTER — LAB VISIT (OUTPATIENT)
Dept: LAB | Facility: HOSPITAL | Age: 70
End: 2020-07-30
Attending: NURSE PRACTITIONER
Payer: MEDICARE

## 2020-07-30 ENCOUNTER — OFFICE VISIT (OUTPATIENT)
Dept: INTERNAL MEDICINE | Facility: CLINIC | Age: 70
End: 2020-07-30
Payer: MEDICARE

## 2020-07-30 VITALS
WEIGHT: 256.81 LBS | HEIGHT: 66 IN | TEMPERATURE: 98 F | BODY MASS INDEX: 41.27 KG/M2 | SYSTOLIC BLOOD PRESSURE: 124 MMHG | DIASTOLIC BLOOD PRESSURE: 64 MMHG | HEART RATE: 62 BPM | OXYGEN SATURATION: 96 %

## 2020-07-30 DIAGNOSIS — M17.11 ARTHRITIS OF KNEE, RIGHT: ICD-10-CM

## 2020-07-30 DIAGNOSIS — M48.061 BILATERAL STENOSIS OF LATERAL RECESS OF LUMBAR SPINE: ICD-10-CM

## 2020-07-30 DIAGNOSIS — Z95.1 S/P CABG (CORONARY ARTERY BYPASS GRAFT): ICD-10-CM

## 2020-07-30 DIAGNOSIS — I25.810 CORONARY ARTERY DISEASE INVOLVING CORONARY BYPASS GRAFT OF NATIVE HEART WITHOUT ANGINA PECTORIS: ICD-10-CM

## 2020-07-30 DIAGNOSIS — M1A.39X0 CHRONIC GOUT DUE TO RENAL IMPAIRMENT OF MULTIPLE SITES WITHOUT TOPHUS: ICD-10-CM

## 2020-07-30 DIAGNOSIS — R73.03 PREDIABETES: ICD-10-CM

## 2020-07-30 DIAGNOSIS — E66.01 OBESITY, CLASS III, BMI 40-49.9 (MORBID OBESITY): ICD-10-CM

## 2020-07-30 DIAGNOSIS — I10 ESSENTIAL HYPERTENSION: Primary | ICD-10-CM

## 2020-07-30 DIAGNOSIS — H40.1192 MODERATE STAGE CHRONIC OPEN ANGLE GLAUCOMA, UNSPECIFIED LATERALITY: ICD-10-CM

## 2020-07-30 DIAGNOSIS — N18.30 CHRONIC KIDNEY DISEASE, STAGE III (MODERATE): ICD-10-CM

## 2020-07-30 DIAGNOSIS — E78.5 HYPERLIPIDEMIA, UNSPECIFIED HYPERLIPIDEMIA TYPE: ICD-10-CM

## 2020-07-30 DIAGNOSIS — M17.12 ARTHRITIS OF KNEE, LEFT: ICD-10-CM

## 2020-07-30 DIAGNOSIS — I42.2 HYPERTROPHIC CARDIOMYOPATHY: ICD-10-CM

## 2020-07-30 LAB
ESTIMATED AVG GLUCOSE: 131 MG/DL (ref 68–131)
HBA1C MFR BLD HPLC: 6.2 % (ref 4–5.6)

## 2020-07-30 PROCEDURE — 99214 OFFICE O/P EST MOD 30 MIN: CPT | Mod: S$PBB,,, | Performed by: NURSE PRACTITIONER

## 2020-07-30 PROCEDURE — 99214 PR OFFICE/OUTPT VISIT, EST, LEVL IV, 30-39 MIN: ICD-10-PCS | Mod: S$PBB,,, | Performed by: NURSE PRACTITIONER

## 2020-07-30 PROCEDURE — 99999 PR PBB SHADOW E&M-EST. PATIENT-LVL IV: ICD-10-PCS | Mod: PBBFAC,,, | Performed by: NURSE PRACTITIONER

## 2020-07-30 PROCEDURE — 83036 HEMOGLOBIN GLYCOSYLATED A1C: CPT

## 2020-07-30 PROCEDURE — 99999 PR PBB SHADOW E&M-EST. PATIENT-LVL IV: CPT | Mod: PBBFAC,,, | Performed by: NURSE PRACTITIONER

## 2020-07-30 PROCEDURE — 36415 COLL VENOUS BLD VENIPUNCTURE: CPT

## 2020-07-30 PROCEDURE — 99214 OFFICE O/P EST MOD 30 MIN: CPT | Mod: PBBFAC | Performed by: NURSE PRACTITIONER

## 2020-07-30 NOTE — PROGRESS NOTES
Subjective:       Patient ID: Damon Yoo is a 70 y.o. male.    Chief Complaint: 6 month follow up  HPI      HTN: B/P good, no HTNive symptoms. No BP checks at home. States she is following low NA diet  LIPIDS:not following D&E, tolerating and compliant with med(s).  Morbid obesity: lost a few pounds. Not exercising.  CAD/hypertrophic cardiomyopathy: no CP, SOB, DIAZ, overdue cards  Gout: quiet, but is seeing rheum  Prostate cancer: Turned over to q yearly PSA to me by urology  Chronic back pain(lumbar stenosis) sees PMR, periodic NAHUN. Chronic but manageable.  CKD: cre is up, on NSAIDs due to arthritis   Elevated glucose on labs: no DM symptoms. A1C was missed  Bilateral knee pain- seeing Dr. Ayers- had injections, now on celebrex daily, recent switch from mobic  His appendicitis resolved with antibiotics, saw surgeon and did not require surgical intervention.  LABS REVIEWED AND DISCUSSED WITH PATIENT         Review of Systems   Constitutional: Negative for activity change, appetite change, chills, diaphoresis, fatigue, fever and unexpected weight change.   HENT: Negative for congestion, ear pain, postnasal drip, rhinorrhea, sinus pressure, sinus pain, sneezing, sore throat, tinnitus, trouble swallowing and voice change.    Eyes: Negative for photophobia, pain and visual disturbance.   Respiratory: Negative for cough, chest tightness, shortness of breath and wheezing.    Cardiovascular: Negative for chest pain, palpitations and leg swelling.   Gastrointestinal: Negative for abdominal distention, abdominal pain, constipation, diarrhea, nausea and vomiting.   Genitourinary: Negative for decreased urine volume, difficulty urinating, dysuria, flank pain, frequency, hematuria and urgency.   Musculoskeletal: Negative for arthralgias, back pain, joint swelling, neck pain and neck stiffness.   Allergic/Immunologic: Negative for immunocompromised state.   Neurological: Negative for dizziness, tremors, seizures,  syncope, facial asymmetry, speech difficulty, weakness, light-headedness, numbness and headaches.   Hematological: Negative for adenopathy. Does not bruise/bleed easily.   Psychiatric/Behavioral: Negative for confusion and sleep disturbance.       Objective:      Physical Exam  HENT:      Head: Normocephalic and atraumatic.      Right Ear: Tympanic membrane normal.      Left Ear: Tympanic membrane normal.   Eyes:      Conjunctiva/sclera: Conjunctivae normal.   Neck:      Musculoskeletal: Normal range of motion and neck supple.   Cardiovascular:      Rate and Rhythm: Normal rate and regular rhythm.      Heart sounds: Normal heart sounds.   Pulmonary:      Effort: Pulmonary effort is normal.      Breath sounds: Normal breath sounds.   Abdominal:      General: Bowel sounds are normal.      Palpations: Abdomen is soft.   Musculoskeletal: Normal range of motion.   Skin:     General: Skin is warm and dry.   Neurological:      General: No focal deficit present.      Mental Status: He is alert and oriented to person, place, and time.   Psychiatric:         Mood and Affect: Mood normal.         Assessment:     Vitals:    07/30/20 0747   BP: 124/64   Pulse: 62   Temp: 97.9 °F (36.6 °C)         1. Essential hypertension    2. Hyperlipidemia, unspecified hyperlipidemia type    3. Coronary artery disease involving coronary bypass graft of native heart without angina pectoris    4. Hypertrophic cardiomyopathy    5. S/P CABG (coronary artery bypass graft)    6. Moderate stage chronic open angle glaucoma, unspecified laterality    7. Obesity, Class III, BMI 40-49.9 (morbid obesity)    8. Chronic kidney disease, stage III (moderate)    9. Prediabetes        Plan:   Essential hypertension    Hyperlipidemia, unspecified hyperlipidemia type  -     Lipid Panel; Future; Expected date: 01/30/2021    Coronary artery disease involving coronary bypass graft of native heart without angina pectoris    Hypertrophic cardiomyopathy    S/P CABG  (coronary artery bypass graft)    Moderate stage chronic open angle glaucoma, unspecified laterality    Obesity, Class III, BMI 40-49.9 (morbid obesity)    Chronic kidney disease, stage III (moderate)  -     Comprehensive metabolic panel; Future; Expected date: 01/30/2021    Prediabetes  -     Hemoglobin A1C; Future; Expected date: 07/30/2020  -     Hemoglobin A1C; Future; Expected date: 01/30/2021  -     Microalbumin/creatinine urine ratio; Future; Expected date: 01/30/2021        Discussed decreasing NSAIDs use, increasing water intake  A1C missed by lab- have it done today  Diet/weight loss/exercise discussed  Overdue for card appt-schedule  C/w other specialty care  Return in 6 months with labs  Declines shingles vaccine

## 2020-08-13 ENCOUNTER — OFFICE VISIT (OUTPATIENT)
Dept: INTERNAL MEDICINE | Facility: CLINIC | Age: 70
End: 2020-08-13
Payer: MEDICARE

## 2020-08-13 ENCOUNTER — OFFICE VISIT (OUTPATIENT)
Dept: OPHTHALMOLOGY | Facility: CLINIC | Age: 70
End: 2020-08-13
Payer: MEDICARE

## 2020-08-13 VITALS
HEART RATE: 68 BPM | BODY MASS INDEX: 41.52 KG/M2 | OXYGEN SATURATION: 97 % | TEMPERATURE: 97 F | WEIGHT: 258.38 LBS | DIASTOLIC BLOOD PRESSURE: 74 MMHG | HEIGHT: 66 IN | SYSTOLIC BLOOD PRESSURE: 130 MMHG

## 2020-08-13 DIAGNOSIS — H40.1192 MODERATE STAGE CHRONIC OPEN ANGLE GLAUCOMA, UNSPECIFIED LATERALITY: ICD-10-CM

## 2020-08-13 DIAGNOSIS — R73.03 PREDIABETES: ICD-10-CM

## 2020-08-13 DIAGNOSIS — H25.13 NUCLEAR SENILE CATARACT OF BOTH EYES: ICD-10-CM

## 2020-08-13 DIAGNOSIS — R73.03 PREDIABETES: Primary | ICD-10-CM

## 2020-08-13 DIAGNOSIS — G89.29 CHRONIC PAIN OF BOTH KNEES: ICD-10-CM

## 2020-08-13 DIAGNOSIS — M48.061 BILATERAL STENOSIS OF LATERAL RECESS OF LUMBAR SPINE: ICD-10-CM

## 2020-08-13 DIAGNOSIS — Z95.1 S/P CABG (CORONARY ARTERY BYPASS GRAFT): ICD-10-CM

## 2020-08-13 DIAGNOSIS — E66.01 OBESITY, CLASS III, BMI 40-49.9 (MORBID OBESITY): ICD-10-CM

## 2020-08-13 DIAGNOSIS — I42.2 HYPERTROPHIC CARDIOMYOPATHY: ICD-10-CM

## 2020-08-13 DIAGNOSIS — M25.562 CHRONIC PAIN OF BOTH KNEES: ICD-10-CM

## 2020-08-13 DIAGNOSIS — Z85.46 HISTORY OF PROSTATE CANCER: ICD-10-CM

## 2020-08-13 DIAGNOSIS — I12.9 HYPERTENSIVE CHRONIC KIDNEY DISEASE WITH STAGE 1 THROUGH STAGE 4 CHRONIC KIDNEY DISEASE, OR UNSPECIFIED CHRONIC KIDNEY DISEASE: ICD-10-CM

## 2020-08-13 DIAGNOSIS — M25.561 CHRONIC PAIN OF BOTH KNEES: ICD-10-CM

## 2020-08-13 DIAGNOSIS — I25.810 CORONARY ARTERY DISEASE INVOLVING CORONARY BYPASS GRAFT OF NATIVE HEART WITHOUT ANGINA PECTORIS: ICD-10-CM

## 2020-08-13 DIAGNOSIS — H40.1132 PRIMARY OPEN ANGLE GLAUCOMA OF BOTH EYES, MODERATE STAGE: Primary | ICD-10-CM

## 2020-08-13 DIAGNOSIS — I10 ESSENTIAL HYPERTENSION: ICD-10-CM

## 2020-08-13 DIAGNOSIS — Z00.00 ENCOUNTER FOR PREVENTIVE HEALTH EXAMINATION: Primary | ICD-10-CM

## 2020-08-13 DIAGNOSIS — E78.5 HYPERLIPIDEMIA, UNSPECIFIED HYPERLIPIDEMIA TYPE: ICD-10-CM

## 2020-08-13 DIAGNOSIS — N18.30 CHRONIC KIDNEY DISEASE, STAGE III (MODERATE): ICD-10-CM

## 2020-08-13 DIAGNOSIS — M1A.39X0 CHRONIC GOUT DUE TO RENAL IMPAIRMENT OF MULTIPLE SITES WITHOUT TOPHUS: ICD-10-CM

## 2020-08-13 PROBLEM — R20.0 BILATERAL LEG NUMBNESS: Status: RESOLVED | Noted: 2019-10-08 | Resolved: 2020-08-13

## 2020-08-13 PROBLEM — R73.09 ELEVATED GLUCOSE: Status: RESOLVED | Noted: 2020-01-23 | Resolved: 2020-08-13

## 2020-08-13 PROBLEM — M79.605 PAIN IN BOTH LOWER EXTREMITIES: Status: RESOLVED | Noted: 2019-10-08 | Resolved: 2020-08-13

## 2020-08-13 PROBLEM — M17.11 ARTHRITIS OF KNEE, RIGHT: Status: RESOLVED | Noted: 2019-09-12 | Resolved: 2020-08-13

## 2020-08-13 PROBLEM — M79.604 PAIN IN BOTH LOWER EXTREMITIES: Status: RESOLVED | Noted: 2019-10-08 | Resolved: 2020-08-13

## 2020-08-13 PROBLEM — S76.111A STRAIN OF RIGHT QUADRICEPS: Status: RESOLVED | Noted: 2019-07-20 | Resolved: 2020-08-13

## 2020-08-13 PROBLEM — M17.12 ARTHRITIS OF KNEE, LEFT: Status: RESOLVED | Noted: 2019-09-12 | Resolved: 2020-08-13

## 2020-08-13 PROCEDURE — 92012 PR EYE EXAM, EST PATIENT,INTERMED: ICD-10-PCS | Mod: S$PBB,,, | Performed by: OPHTHALMOLOGY

## 2020-08-13 PROCEDURE — G0439 PR MEDICARE ANNUAL WELLNESS SUBSEQUENT VISIT: ICD-10-PCS | Mod: S$GLB,,, | Performed by: NURSE PRACTITIONER

## 2020-08-13 PROCEDURE — 99215 OFFICE O/P EST HI 40 MIN: CPT | Mod: PBBFAC,27 | Performed by: NURSE PRACTITIONER

## 2020-08-13 PROCEDURE — 99999 PR PBB SHADOW E&M-EST. PATIENT-LVL III: ICD-10-PCS | Mod: PBBFAC,,, | Performed by: OPHTHALMOLOGY

## 2020-08-13 PROCEDURE — 99999 PR PBB SHADOW E&M-EST. PATIENT-LVL III: CPT | Mod: PBBFAC,,, | Performed by: OPHTHALMOLOGY

## 2020-08-13 PROCEDURE — 99999 PR PBB SHADOW E&M-EST. PATIENT-LVL V: ICD-10-PCS | Mod: PBBFAC,,, | Performed by: NURSE PRACTITIONER

## 2020-08-13 PROCEDURE — 92012 INTRM OPH EXAM EST PATIENT: CPT | Mod: S$PBB,,, | Performed by: OPHTHALMOLOGY

## 2020-08-13 PROCEDURE — 99213 OFFICE O/P EST LOW 20 MIN: CPT | Mod: PBBFAC | Performed by: OPHTHALMOLOGY

## 2020-08-13 PROCEDURE — 99999 PR PBB SHADOW E&M-EST. PATIENT-LVL V: CPT | Mod: PBBFAC,,, | Performed by: NURSE PRACTITIONER

## 2020-08-13 PROCEDURE — G0439 PPPS, SUBSEQ VISIT: HCPCS | Mod: S$GLB,,, | Performed by: NURSE PRACTITIONER

## 2020-08-13 RX ORDER — TIMOLOL MALEATE 5 MG/ML
1 SOLUTION/ DROPS OPHTHALMIC EVERY MORNING
Qty: 5 ML | Refills: 12 | Status: SHIPPED | OUTPATIENT
Start: 2020-08-13 | End: 2021-02-25 | Stop reason: SDUPTHER

## 2020-08-13 NOTE — PROGRESS NOTES
SUBJECTIVE  Damon Yoo is 70 y.o. male  Corrected distance visual acuity was 20/25 -1 in the right eye and 20/25  -1 in the left eye. Comments: Through phoropter.   Chief Complaint   Patient presents with    Glaucoma     3 mth IOP check           HPI     Glaucoma      Additional comments: 3 mth IOP check               Comments     1. Mod COAG- No FHx (init 32/27 on 3/10 with C/D .65/.60) Goal <18, new   goal = 15 6/2017  SLT OD 12/11/14 (20-16)  SLT OS 2/25/15 (20-16)  2. Mild NSC  3. LLL Cyst Excision on 6/13/12    Latanoprost QHS OU  Timolol qam OU          Last edited by Keesha Brewster MA on 8/13/2020  7:59 AM. (History)         Assessment /Plan :  1. Primary open angle glaucoma of both eyes, moderate stage Doing well, IOP within acceptable range relative to target IOP and no evidence of progression. Continue current treatment. Reviewed importance of continued compliance with treatment and follow up.     2. Nuclear senile cataract of both eyes Patient does reports mild visual decline from incipient cataractous changes, but not sufficient to affect activities of daily living. I recommend monitoring visual status and follow up when visual symptoms worsen.           Return to clinic in 3 months  or as needed.  With IOP Check and GOCT

## 2020-08-13 NOTE — PROGRESS NOTES
"  Damon Yoo presented for a  Medicare AWV and comprehensive Health Risk Assessment today. The following components were reviewed and updated:    · Medical history  · Family History  · Social history  · Allergies and Current Medications  · Health Risk Assessment  · Health Maintenance  · Care Team     ** See Completed Assessments for Annual Wellness Visit within the encounter summary.**       The following assessments were completed:  · Living Situation  · CAGE  · Depression Screening  · Timed Get Up and Go  · Whisper Test  · Cognitive Function Screening  · Nutrition Screening  · ADL Screening  · PAQ Screening    Vitals:    08/13/20 0847   BP: 130/74   BP Location: Left arm   Patient Position: Sitting   BP Method: Large (Manual)   Pulse: 68   Temp: 96.5 °F (35.8 °C)   TempSrc: Tympanic   SpO2: 97%   Weight: 117.2 kg (258 lb 6.1 oz)   Height: 5' 6" (1.676 m)     Body mass index is 41.7 kg/m².  Physical Exam  HENT:      Head: Normocephalic and atraumatic.      Right Ear: Tympanic membrane normal.      Left Ear: Tympanic membrane normal.   Eyes:      Conjunctiva/sclera: Conjunctivae normal.   Neck:      Musculoskeletal: Normal range of motion and neck supple.   Cardiovascular:      Rate and Rhythm: Normal rate and regular rhythm.      Heart sounds: Normal heart sounds.   Pulmonary:      Effort: Pulmonary effort is normal.      Breath sounds: Normal breath sounds.   Abdominal:      General: Bowel sounds are normal.      Palpations: Abdomen is soft.   Musculoskeletal: Normal range of motion.   Skin:     General: Skin is warm and dry.   Neurological:      Mental Status: He is alert and oriented to person, place, and time.           Diagnoses and health risks identified today and associated recommendations/orders:    1. Encounter for preventive health examination  Completed today    2. Essential hypertension  Stable. No checks at home. No symptoms. Continue regime. F/u with PCP    3. Coronary artery disease involving " coronary bypass graft of native heart without angina pectoris4. S/P CABG (coronary artery bypass graft)  Diet, lipid control, BP control, prediabetes control, weight loss discussed. F/u with PCP/cards    5. Hyperlipidemia, unspecified hyperlipidemia type  Diet, exercise , weight loss discussed. Continue statin  07/23/20 0926 HDL 25 Low Final result   07/23/20 0926 CHOL 127 -- Final result   07/23/20 0926 TRIG 133 -- Final result   07/23/20 0926 LDLCALC 75.4 -- Final result   07/23/20 0926 CHOLHDL 19.7 Low Final result   07/23/20 0926 NONHDLCHOL 102 -- Final result   07/23/20 0926 TOTALCHOLEST 5.1 High Final result         6. Hypertrophic cardiomyopathy  Stable. F/u with  Cards as scheduled.     7. Chronic kidney disease, stage III (moderate)  Watching GFR, BUN, CRE- pt was taking mobic, switched to celebrex- written for daily. Discussed with pt to take very sparingly and to use voltaren, ice, rest, brace, tylenol for knee pain as much as possible instead. Pt reports that the last few weeks he has only taken celebrex 1-2 x a week because the recent injection has helped with pain relief    8. History of prostate cancer  Followed PSA per PCP now    9. Prediabetes   Discussed disease process and DM prevention- arranged nutrition consult. Chooses not to be on meds at this time.    10. Obesity, Class III, BMI 40-49.9 (morbid obesity)  Discussed d/E/weight loss. Not controlled. F/u with PCP    11. Chronic gout due to renal impairment of multiple sites without tophus  No flares. On allopurinol    12. Chronic pain of both knees  Seeing ortho. Pain improved.     13. Bilateral stenosis of lateral recess of lumbar spine  Recent injections. No issues currently . Saw PMI    14. Moderate stage chronic open angle glaucoma, unspecified laterality  Seeing Dr. Wayne. [place].      Provided [place] with a 5-10 year written screening schedule and personal prevention plan. Recommendations were developed using the USPSTF age appropriate  recommendations. Education, counseling, and referrals were provided as needed. After Visit Summary printed and given to patient which includes a list of additional screenings\tests needed.    Follow up with PCP and specialists as scheduled    HRA follow up in 1 year    Brianna Mcneil NP

## 2020-08-13 NOTE — PATIENT INSTRUCTIONS
Counseling and Referral of Other Preventative  (Italic type indicates deductible and co-insurance are waived)    Patient Name: Damon Yoo  Today's Date: 8/13/2020    Health Maintenance       Date Due Completion Date    Shingles Vaccine (3 of 3) 07/30/2021 (Originally 4/14/2020) 2/18/2020    Influenza Vaccine (1) 09/01/2020 ---    Lipid Panel 07/23/2021 7/23/2020    High Dose Statin 08/13/2021 8/13/2020    Aspirin/Antiplatelet Therapy 08/13/2021 8/13/2020    Colorectal Cancer Screening 12/04/2022 12/4/2017    TETANUS VACCINE 01/14/2023 1/14/2013        No orders of the defined types were placed in this encounter.    The following information is provided to all patients.  This information is to help you find resources for any of the problems found today that may be affecting your health:                Living healthy guide: www.[facility].[place].[facility]      Understanding Diabetes: www.diabetes.org      Eating healthy: www.cdc.gov/healthyweight      [facility] home safety checklist: www.cdc.gov/steadi/patient.html      Agency on Aging: www.goea.[place].[facility]      Alcoholics anonymous (AA): www.aa.org      Physical Activity: www.herlinda.nih.gov/ff5dktr      Tobacco use: www.quitwithusla.org

## 2020-08-20 ENCOUNTER — TELEPHONE (OUTPATIENT)
Dept: CARDIOLOGY | Facility: CLINIC | Age: 70
End: 2020-08-20

## 2020-08-20 NOTE — TELEPHONE ENCOUNTER
Returned call to patient but no answer. Left message to call back. Needs to be scheduled for echo    ----- Message from Jase Cardenas sent at 8/20/2020 12:51 PM CDT -----  ..Type:  Patient Returning Call    Who Called: pt  Who Left Message for Patient:  Does the patient know what this is regarding?: apt   Would the patient rather a call back or a response via [facility]?  Call back   Best Call Back Number: [phone]  Additional Information: Pt is requesting a call from nurse to discuss apt

## 2020-08-20 NOTE — TELEPHONE ENCOUNTER
Left voicemail for patient regarding appointment for next week.Patient needs and echo to be scheduled.

## 2020-08-21 NOTE — TELEPHONE ENCOUNTER
----- Message from Artem Tarango sent at 8/21/2020 10:00 AM CDT -----  Contact: self  Type:  Patient Returning Call    Who Called:Pt  Who Left Message for Patient:nurse  Does the patient know what this is regarding?:no  Would the patient rather a call back or a response via [facility]? Call back  Best Call Back Number:[phone]  Additional Information: n/a       Thanks  Zs

## 2020-08-21 NOTE — TELEPHONE ENCOUNTER
----- Message from Artem Tarango sent at 8/21/2020 10:00 AM CDT -----  Contact: self  Type:  Patient Returning Call    Who Called:Pt  Who Left Message for Patient:nurse  Does the patient know what this is regarding?:no  Would the patient rather a call back or a response via [facility]? Call back  Best Call Back Number:[phone]  Additional Information: n/a       Thanks  Zs

## 2020-08-26 ENCOUNTER — OFFICE VISIT (OUTPATIENT)
Dept: CARDIOLOGY | Facility: CLINIC | Age: 70
End: 2020-08-26
Payer: MEDICARE

## 2020-08-26 ENCOUNTER — HOSPITAL ENCOUNTER (OUTPATIENT)
Dept: CARDIOLOGY | Facility: HOSPITAL | Age: 70
Discharge: HOME | End: 2020-08-26
Payer: MEDICARE

## 2020-08-26 VITALS
OXYGEN SATURATION: 97 % | DIASTOLIC BLOOD PRESSURE: 70 MMHG | BODY MASS INDEX: 41.16 KG/M2 | WEIGHT: 255 LBS | HEART RATE: 65 BPM | SYSTOLIC BLOOD PRESSURE: 123 MMHG

## 2020-08-26 DIAGNOSIS — I42.2 HYPERTROPHIC CARDIOMYOPATHY: ICD-10-CM

## 2020-08-26 DIAGNOSIS — M54.16 SPINAL STENOSIS OF LUMBAR REGION WITH RADICULOPATHY: ICD-10-CM

## 2020-08-26 DIAGNOSIS — M48.061 SPINAL STENOSIS OF LUMBAR REGION WITH RADICULOPATHY: ICD-10-CM

## 2020-08-26 DIAGNOSIS — R73.03 PREDIABETES: ICD-10-CM

## 2020-08-26 DIAGNOSIS — E78.5 HYPERLIPIDEMIA, UNSPECIFIED HYPERLIPIDEMIA TYPE: ICD-10-CM

## 2020-08-26 DIAGNOSIS — R94.31 ABNORMAL EKG: ICD-10-CM

## 2020-08-26 DIAGNOSIS — I25.810 CORONARY ARTERY DISEASE INVOLVING CORONARY BYPASS GRAFT OF NATIVE HEART WITHOUT ANGINA PECTORIS: Primary | ICD-10-CM

## 2020-08-26 DIAGNOSIS — I10 ESSENTIAL HYPERTENSION: ICD-10-CM

## 2020-08-26 DIAGNOSIS — Z95.1 S/P CABG (CORONARY ARTERY BYPASS GRAFT): ICD-10-CM

## 2020-08-26 DIAGNOSIS — I25.810 CORONARY ARTERY DISEASE INVOLVING CORONARY BYPASS GRAFT OF NATIVE HEART WITHOUT ANGINA PECTORIS: ICD-10-CM

## 2020-08-26 DIAGNOSIS — E66.01 OBESITY, CLASS III, BMI 40-49.9 (MORBID OBESITY): ICD-10-CM

## 2020-08-26 PROCEDURE — 93010 EKG 12-LEAD: ICD-10-PCS | Mod: ,,, | Performed by: INTERNAL MEDICINE

## 2020-08-26 PROCEDURE — 99999 PR PBB SHADOW E&M-EST. PATIENT-LVL III: CPT | Mod: PBBFAC,,, | Performed by: INTERNAL MEDICINE

## 2020-08-26 PROCEDURE — 99214 OFFICE O/P EST MOD 30 MIN: CPT | Mod: S$PBB,25,, | Performed by: INTERNAL MEDICINE

## 2020-08-26 PROCEDURE — 93010 ELECTROCARDIOGRAM REPORT: CPT | Mod: ,,, | Performed by: INTERNAL MEDICINE

## 2020-08-26 PROCEDURE — 99213 OFFICE O/P EST LOW 20 MIN: CPT | Mod: PBBFAC,25 | Performed by: INTERNAL MEDICINE

## 2020-08-26 PROCEDURE — 99999 PR PBB SHADOW E&M-EST. PATIENT-LVL III: ICD-10-PCS | Mod: PBBFAC,,, | Performed by: INTERNAL MEDICINE

## 2020-08-26 PROCEDURE — 93005 ELECTROCARDIOGRAM TRACING: CPT

## 2020-08-26 PROCEDURE — 99214 PR OFFICE/OUTPT VISIT, EST, LEVL IV, 30-39 MIN: ICD-10-PCS | Mod: S$PBB,25,, | Performed by: INTERNAL MEDICINE

## 2020-08-26 RX ORDER — ACETAMINOPHEN 500 MG
500 TABLET ORAL EVERY 6 HOURS PRN
COMMUNITY

## 2020-08-26 NOTE — PROGRESS NOTES
Subjective:   Patient ID:  Damon Yoo is a 70 y.o. male who presents for follow up of Follow-up (6 month with EKG)      HPI  A 71 yo male with cad s/p cabg htnc prediabetes hypetrophic cardiomyopathy is here for f/u has been non compliant gained weight no exercise needs better compliance has no angina chf tia claudication ekg unchanged .has gained weight .lipids on target a1c 6.2   Past Medical History:   Diagnosis Date    Abnormal EKG 10/25/2013    Arthritis     CAD (coronary artery disease)     Cancer     Prostate [id number] prostate    Glaucoma     Gout attack     Hyperlipidemia     Hypertension     Hypertrophic cardiomyopathy 10/25/2013    S/P CABG (coronary artery bypass graft) 10/25/2013       Past Surgical History:   Procedure Laterality Date    COLONOSCOPY N/A 2017    Procedure: COLONOSCOPY;  Surgeon: Dina Ledesma MD;  Location: [facility] ENDO;  Service: Endoscopy;  Laterality: N/A;    CORONARY ARTERY BYPASS GRAFT  05/2003    x1    PROSTATE SURGERY      RALP 2013    TRANSFORAMINAL EPIDURAL INJECTION OF STEROID Right 2019    Procedure: Right L5/S1+ S1 TF NAHUN with IV sedation;  Surgeon: Ermias Mario MD;  Location: [facility] PAIN MGT;  Service: Pain Management;  Laterality: Right;       Social History     Tobacco Use    Smoking status: Former Smoker     Packs/day: 0.25     Years: 15.00     Pack years: 3.75     Quit date: 1988     Years since quittin.3    Smokeless tobacco: Never Used   Substance Use Topics    Alcohol use: No     Frequency: Never     Drinks per session: Patient refused     Binge frequency: Never    Drug use: No       Family History   Problem Relation Age of Onset    Hypertension Father     Strabismus Neg Hx     Retinal detachment Neg Hx     Macular degeneration Neg Hx     Glaucoma Neg Hx     Blindness Neg Hx     Amblyopia Neg Hx        Current Outpatient Medications   Medication Sig    acetaminophen (TYLENOL) 500 MG tablet Take 500 mg by mouth every 6  (six) hours as needed for Pain.    allopurinoL (ZYLOPRIM) 300 MG tablet TAKE 1 1/2 TABLET BY MOUTH DAILY    amLODIPine (NORVASC) 10 MG tablet Take 1 tablet (10 mg total) by mouth once daily.    aspirin 81 MG chewable tablet Take 1 tablet by mouth Daily.    celecoxib (CELEBREX) 200 MG capsule Take 1 capsule (200 mg total) by mouth once daily. Take with food    fish oil-fat acid comb.8-hb137 (OMEGA 3-6-9) 1,200 mg Cap Take 1 capsule by mouth once daily.     latanoprost 0.005 % ophthalmic solution Place 1 drop into both eyes every evening.    lisinopriL (PRINIVIL,ZESTRIL) 40 MG tablet Take 1 tablet (40 mg total) by mouth once daily.    metoprolol succinate (TOPROL-XL) 100 MG 24 hr tablet Take 1 tablet (100 mg total) by mouth once daily.    rosuvastatin (CRESTOR) 40 MG Tab Take 1 tablet (40 mg total) by mouth every evening. Generic is fine.    timolol maleate 0.5% (TIMOPTIC) 0.5 % Drop Place 1 drop into both eyes every morning.    diclofenac sodium (VOLTAREN) 1 % Gel Apply 2 g topically once daily. (Patient not taking: Reported on 8/13/2020)    flunisolide 25 mcg, 0.025%, (NASALIDE) 25 mcg (0.025 %) Spry USE 2 SPRAYS IN EACH NOSTRIL TWICE DAILY (Patient not taking: Reported on 8/13/2020)    tiZANidine (ZANAFLEX) 4 MG tablet Take 1 tablet (4 mg total) by mouth every 8 (eight) hours. (Patient not taking: Reported on 8/13/2020)     No current facility-administered medications for this visit.      Current Outpatient Medications on File Prior to Visit   Medication Sig    acetaminophen (TYLENOL) 500 MG tablet Take 500 mg by mouth every 6 (six) hours as needed for Pain.    allopurinoL (ZYLOPRIM) 300 MG tablet TAKE 1 1/2 TABLET BY MOUTH DAILY    amLODIPine (NORVASC) 10 MG tablet Take 1 tablet (10 mg total) by mouth once daily.    aspirin 81 MG chewable tablet Take 1 tablet by mouth Daily.    celecoxib (CELEBREX) 200 MG capsule Take 1 capsule (200 mg total) by mouth once daily. Take with food    fish oil-fat  acid comb.8-hb137 (OMEGA 3-6-9) 1,200 mg Cap Take 1 capsule by mouth once daily.     latanoprost 0.005 % ophthalmic solution Place 1 drop into both eyes every evening.    lisinopriL (PRINIVIL,ZESTRIL) 40 MG tablet Take 1 tablet (40 mg total) by mouth once daily.    metoprolol succinate (TOPROL-XL) 100 MG 24 hr tablet Take 1 tablet (100 mg total) by mouth once daily.    rosuvastatin (CRESTOR) 40 MG Tab Take 1 tablet (40 mg total) by mouth every evening. Generic is fine.    timolol maleate 0.5% (TIMOPTIC) 0.5 % Drop Place 1 drop into both eyes every morning.    diclofenac sodium (VOLTAREN) 1 % Gel Apply 2 g topically once daily. (Patient not taking: Reported on 8/13/2020)    flunisolide 25 mcg, 0.025%, (NASALIDE) 25 mcg (0.025 %) Spry USE 2 SPRAYS IN EACH NOSTRIL TWICE DAILY (Patient not taking: Reported on 8/13/2020)    tiZANidine (ZANAFLEX) 4 MG tablet Take 1 tablet (4 mg total) by mouth every 8 (eight) hours. (Patient not taking: Reported on 8/13/2020)     No current facility-administered medications on file prior to visit.      Review of patient's allergies indicates:  No Known Allergies  Review of Systems   Constitution: Positive for weight gain. Negative for malaise/fatigue.   Eyes: Negative for blurred vision.   Cardiovascular: Negative for chest pain, claudication, cyanosis, dyspnea on exertion, irregular heartbeat, leg swelling, near-syncope, orthopnea, palpitations and paroxysmal nocturnal dyspnea.   Respiratory: Negative for cough, hemoptysis and shortness of breath.    Hematologic/Lymphatic: Negative for bleeding problem. Does not bruise/bleed easily.   Skin: Negative for dry skin and itching.   Musculoskeletal: Negative for falls, muscle weakness and myalgias.   Gastrointestinal: Negative for abdominal pain, diarrhea, heartburn, hematemesis, hematochezia and melena.   Genitourinary: Negative for flank pain and hematuria.   Neurological: Negative for dizziness, focal weakness, headaches,  light-headedness, numbness, paresthesias, seizures and weakness.   Psychiatric/Behavioral: Negative for altered mental status and memory loss. The patient is not nervous/anxious.    Allergic/Immunologic: Negative for hives.       Objective:   Physical Exam   Constitutional: He is oriented to person, place, and time. He appears well-developed and well-nourished. No distress.   HENT:   Head: Normocephalic and atraumatic.   Eyes: Pupils are equal, round, and reactive to light. EOM are normal. Right eye exhibits no discharge. Left eye exhibits no discharge.   Neck: Neck supple. No JVD present. No thyromegaly present.   Cardiovascular: Normal rate, regular rhythm and intact distal pulses. Exam reveals no gallop and no friction rub.   Murmur heard.   Harsh midsystolic murmur is present with a grade of 2/6 at the upper right sternal border radiating to the neck.  Pulses:       Carotid pulses are 2+ on the right side and 2+ on the left side.       Radial pulses are 2+ on the right side and 2+ on the left side.        Femoral pulses are 2+ on the right side and 2+ on the left side.       Popliteal pulses are 2+ on the right side and 2+ on the left side.        Dorsalis pedis pulses are 2+ on the right side and 2+ on the left side.        Posterior tibial pulses are 2+ on the right side and 2+ on the left side.   Pulmonary/Chest: Effort normal and breath sounds normal. No respiratory distress. He has no wheezes. He has no rales. He exhibits no tenderness.   Scar cabg well healed.    Abdominal: Soft. Bowel sounds are normal. He exhibits no distension. There is no abdominal tenderness.   Obese abdomen.   Musculoskeletal: Normal range of motion.         General: No edema.   Neurological: He is alert and oriented to person, place, and time. No cranial nerve deficit.   Skin: Skin is warm and dry. No rash noted. He is not diaphoretic. No erythema.   Psychiatric: He has a normal mood and affect. His behavior is normal.   Nursing  note and vitals reviewed.    Vitals:    08/26/20 1214 08/26/20 1215   BP: 125/74 123/70   BP Location: Left arm Right arm   Patient Position: Sitting Sitting   BP Method: Large (Manual) Large (Manual)   Pulse: 65    SpO2: 97%    Weight: 115.7 kg (255 lb)      Lab Results   Component Value Date    CHOL 127 07/23/2020    CHOL 158 01/15/2020    CHOL 130 11/29/2019     Lab Results   Component Value Date    HDL 25 (L) 07/23/2020    HDL 32 (L) 01/15/2020    HDL 33 (L) 11/29/2019     Lab Results   Component Value Date    LDLCALC 75.4 07/23/2020    LDLCALC 99.2 01/15/2020    LDLCALC 80.0 11/29/2019     Lab Results   Component Value Date    TRIG 133 07/23/2020    TRIG 134 01/15/2020    TRIG 85 11/29/2019     Lab Results   Component Value Date    CHOLHDL 19.7 (L) 07/23/2020    CHOLHDL 20.3 01/15/2020    CHOLHDL 25.4 11/29/2019       Chemistry        Component Value Date/Time     07/23/2020 0926    K 4.9 07/23/2020 0926     07/23/2020 0926    CO2 25 07/23/2020 0926    BUN 28 (H) 07/23/2020 0926    CREATININE 1.7 (H) 07/23/2020 0926     07/23/2020 0926        Component Value Date/Time    CALCIUM 9.8 07/23/2020 0926    ALKPHOS 96 07/23/2020 0926    AST 30 07/23/2020 0926    ALT 28 07/23/2020 0926    BILITOT 0.6 07/23/2020 0926    ESTGFRAFRICA 46.9 (A) 07/23/2020 0926    EGFRNONAA 40.5 (A) 07/23/2020 0926        Lab Results   Component Value Date    HGBA1C 6.2 (H) 07/30/2020       Lab Results   Component Value Date    TSH 1.367 08/16/2014     Lab Results   Component Value Date    INR 1.0 06/26/2010     Lab Results   Component Value Date    WBC 7.88 02/24/2016    HGB 12.9 (L) 02/24/2016    HCT 39.5 (L) 02/24/2016    MCV 92 02/24/2016     02/24/2016     BMP  Sodium   Date Value Ref Range Status   07/23/2020 143 136 - 145 mmol/L Final     Potassium   Date Value Ref Range Status   07/23/2020 4.9 3.5 - 5.1 mmol/L Final     Chloride   Date Value Ref Range Status   07/23/2020 110 95 - 110 mmol/L Final     CO2    Date Value Ref Range Status   07/23/2020 25 23 - 29 mmol/L Final     BUN, Bld   Date Value Ref Range Status   07/23/2020 28 (H) 8 - 23 mg/dL Final     Creatinine   Date Value Ref Range Status   07/23/2020 1.7 (H) 0.5 - 1.4 mg/dL Final     Calcium   Date Value Ref Range Status   07/23/2020 9.8 8.7 - 10.5 mg/dL Final     Anion Gap   Date Value Ref Range Status   07/23/2020 8 8 - 16 mmol/L Final     eGFR if    Date Value Ref Range Status   07/23/2020 46.9 (A) >60 mL/min/1.73 m^2 Final     eGFR if non    Date Value Ref Range Status   07/23/2020 40.5 (A) >60 mL/min/1.73 m^2 Final     Comment:     Calculation used to obtain the estimated glomerular filtration  rate (eGFR) is the CKD-EPI equation.        CrCl cannot be calculated (Patient's most recent lab result is older than the maximum 7 days allowed.).    Assessment:     1. Coronary artery disease involving coronary bypass graft of native heart without angina pectoris    2. Prediabetes    3. Spinal stenosis of lumbar region with radiculopathy    4. Abnormal EKG    5. Hypertrophic cardiomyopathy    6. S/P CABG (coronary artery bypass graft)    7. Obesity, Class III, BMI 40-49.9 (morbid obesity)    8. Hyperlipidemia, unspecified hyperlipidemia type    9. Essential hypertension      Stable clinically . He needs to lose weight in order to improve his prediabetes status cad and complication cardiovascular wise.  Surprisingly lipids on target   Cad no angina  counsled about risk factor modification diet exercise weight loss.   Plan:     Continue current therapy  Cardiac low salt diet.  Risk factor modification and excercise program./weight loss  F/u in 6 months with lipid cmp a1c

## 2020-09-14 ENCOUNTER — TELEPHONE (OUTPATIENT)
Dept: INTERNAL MEDICINE | Facility: CLINIC | Age: 70
End: 2020-09-14

## 2020-09-14 NOTE — TELEPHONE ENCOUNTER
----- Message from Cuca Lamb sent at 9/14/2020 10:47 AM CDT -----  Contact: pt  Type:  Sooner Apoointment Request    Caller is requesting a sooner appointment.  Caller declined first available appointment listed below.  Caller will not accept being placed on the waitlist and is requesting a message be sent to doctor.  Name of Caller: pr  When is the first available appointment? 09/24/2020  Symptoms: f/u  Would the patient rather a call back or a response via [facility]? Call back  Best Call Back Number: [phone]  Additional Information: n/a

## 2020-09-24 ENCOUNTER — OFFICE VISIT (OUTPATIENT)
Dept: INTERNAL MEDICINE | Facility: CLINIC | Age: 70
End: 2020-09-24
Payer: MEDICARE

## 2020-09-24 VITALS
TEMPERATURE: 99 F | SYSTOLIC BLOOD PRESSURE: 118 MMHG | BODY MASS INDEX: 40.85 KG/M2 | OXYGEN SATURATION: 99 % | RESPIRATION RATE: 16 BRPM | WEIGHT: 254.19 LBS | DIASTOLIC BLOOD PRESSURE: 82 MMHG | HEIGHT: 66 IN | HEART RATE: 70 BPM

## 2020-09-24 DIAGNOSIS — K21.9 GASTROESOPHAGEAL REFLUX DISEASE, ESOPHAGITIS PRESENCE NOT SPECIFIED: Primary | ICD-10-CM

## 2020-09-24 PROCEDURE — 99215 OFFICE O/P EST HI 40 MIN: CPT | Mod: PBBFAC | Performed by: PEDIATRICS

## 2020-09-24 PROCEDURE — 99999 PR PBB SHADOW E&M-EST. PATIENT-LVL V: CPT | Mod: PBBFAC,,, | Performed by: PEDIATRICS

## 2020-09-24 PROCEDURE — 99999 PR PBB SHADOW E&M-EST. PATIENT-LVL V: ICD-10-PCS | Mod: PBBFAC,,, | Performed by: PEDIATRICS

## 2020-09-24 PROCEDURE — 99213 OFFICE O/P EST LOW 20 MIN: CPT | Mod: S$PBB,,, | Performed by: PEDIATRICS

## 2020-09-24 PROCEDURE — 99213 PR OFFICE/OUTPT VISIT, EST, LEVL III, 20-29 MIN: ICD-10-PCS | Mod: S$PBB,,, | Performed by: PEDIATRICS

## 2020-09-24 RX ORDER — ESOMEPRAZOLE MAGNESIUM 20 MG/1
20 GRANULE, DELAYED RELEASE ORAL
Qty: 30 EACH | Refills: 11 | Status: SHIPPED | OUTPATIENT
Start: 2020-09-24 | End: 2020-09-29 | Stop reason: RX

## 2020-09-24 NOTE — PROGRESS NOTES
Subjective:       Patient ID: Damon Yoo is a 70 y.o. male.    Chief Complaint: Abdominal Pain    3 weeks of epigastric abd pain. Worse with red spicey food. Pain is burning and intermittent. Does not prevent activity. Peptobismal helped. No fever, cough, CP, SOB, N/V/D/C. No early satiety. No jaundice. No melena, BRBPR. No urinary symptoms.    Review of Systems   Constitutional: Negative for fever and unexpected weight change.   HENT: Negative for congestion and rhinorrhea.    Eyes: Negative for discharge and redness.   Respiratory: Negative for cough, shortness of breath and wheezing.    Cardiovascular: Negative for chest pain, palpitations and leg swelling.   Gastrointestinal: Positive for abdominal pain. Negative for abdominal distention, anal bleeding, blood in stool, constipation, diarrhea, nausea, rectal pain and vomiting.   Genitourinary: Negative for decreased urine volume and difficulty urinating.   Musculoskeletal: Negative for arthralgias and joint swelling.   Skin: Negative for rash and wound.   Neurological: Negative for syncope and headaches.   Psychiatric/Behavioral: Negative for behavioral problems and sleep disturbance.       Objective:      Physical Exam  Constitutional:       General: He is not in acute distress.     Appearance: He is well-developed. He is obese. He is not ill-appearing or toxic-appearing.   Neck:      Thyroid: No thyromegaly.      Vascular: No JVD.   Cardiovascular:      Rate and Rhythm: Normal rate and regular rhythm.      Heart sounds: Normal heart sounds. No murmur.   Pulmonary:      Effort: Pulmonary effort is normal. No respiratory distress.      Breath sounds: Normal breath sounds. No wheezing or rales.   Abdominal:      General: There is no distension.      Palpations: Abdomen is soft. There is no mass.      Tenderness: There is no abdominal tenderness. There is no right CVA tenderness, left CVA tenderness, guarding or rebound.      Hernia: No hernia is present.    Lymphadenopathy:      Cervical: No cervical adenopathy.   Skin:     Capillary Refill: Capillary refill takes less than 2 seconds.      Findings: No rash.   Neurological:      Mental Status: He is alert and oriented to person, place, and time.      Cranial Nerves: No cranial nerve deficit.      Coordination: Coordination normal.   Psychiatric:         Behavior: Behavior normal.         Thought Content: Thought content normal.         Judgment: Judgment normal.         Assessment:       1. Gastroesophageal reflux disease, esophagitis presence not specified        Plan:       Gastroesophageal reflux disease, esophagitis presence not specified  -     esomeprazole (NEXIUM) 20 mg GrPS; Take 20 mg by mouth before breakfast.  Dispense: 30 each; Refill: 11  -     H. pylori antigen, stool; Future; Expected date: 09/24/2020    GERD precautions discussed and HO give, Start PPI regular for 1 month and then prn. H pylori d/w patient, treat if positive. D&E, weight loss. Declines flu vaccine.

## 2020-09-24 NOTE — PATIENT INSTRUCTIONS
Tips to Control Acid Reflux    To control acid reflux, youll need to make some basic diet and lifestyle changes. The simple steps outlined below may be all youll need to ease discomfort.  Watch what you eat  · Avoid fatty foods and spicy foods.  · Eat fewer acidic foods, such as citrus and tomato-based foods. These can increase symptoms.  · Limit drinking alcohol, caffeine, and fizzy beverages. All increase acid reflux.  · Try limiting chocolate, peppermint, and spearmint. These can worsen acid reflux in some people.  Watch when you eat  · Avoid lying down for 3 hours after eating.  · Do not snack before going to bed.  Raise your head  Raising your head and upper body by 4 to 6 inches helps limit reflux when youre lying down. Put blocks under the head of your bed frame to raise it.  Other changes  · Lose weight, if you need to  · Dont exercise near bedtime  · Avoid tight-fitting clothes  · Limit aspirin and ibuprofen  · Stop smoking   Date Last Reviewed: 7/1/2016  © 0090-6345 [employer], [employer]. [address] [place] [address], [place] [address]. All rights reserved. This information is not intended as a substitute for professional medical care. Always follow your healthcare professional's instructions.

## 2020-09-25 ENCOUNTER — LAB VISIT (OUTPATIENT)
Dept: LAB | Facility: HOSPITAL | Age: 70
End: 2020-09-25
Attending: PEDIATRICS
Payer: MEDICARE

## 2020-09-25 DIAGNOSIS — E78.5 HYPERLIPIDEMIA, UNSPECIFIED HYPERLIPIDEMIA TYPE: ICD-10-CM

## 2020-09-25 DIAGNOSIS — I25.810 CORONARY ARTERY DISEASE INVOLVING CORONARY BYPASS GRAFT OF NATIVE HEART WITHOUT ANGINA PECTORIS: ICD-10-CM

## 2020-09-25 DIAGNOSIS — K21.9 GASTROESOPHAGEAL REFLUX DISEASE, ESOPHAGITIS PRESENCE NOT SPECIFIED: ICD-10-CM

## 2020-09-25 PROCEDURE — 87338 HPYLORI STOOL AG IA: CPT

## 2020-09-25 RX ORDER — ROSUVASTATIN CALCIUM 40 MG/1
40 TABLET, COATED ORAL NIGHTLY
Qty: 90 TABLET | Refills: 3 | Status: SHIPPED | OUTPATIENT
Start: 2020-09-25 | End: 2021-05-14 | Stop reason: SDUPTHER

## 2020-09-28 ENCOUNTER — TELEPHONE (OUTPATIENT)
Dept: INTERNAL MEDICINE | Facility: CLINIC | Age: 70
End: 2020-09-28

## 2020-09-28 NOTE — TELEPHONE ENCOUNTER
Called pt due to question that he asked earlier while in [facility]. Patient stated that he was seen on 9/24/2020 and one of the medications that was prescribed was not covered by his insurance. Ask pt if he could wait until patient that was scheduled for an appt could be roomed. Pt agreed. After rooming pt returned to [facility] to assist patient with the question that was asked. Pt was no longer in [facility].//caroline

## 2020-09-29 DIAGNOSIS — K21.9 GASTROESOPHAGEAL REFLUX DISEASE, ESOPHAGITIS PRESENCE NOT SPECIFIED: Primary | ICD-10-CM

## 2020-09-29 RX ORDER — PANTOPRAZOLE SODIUM 40 MG/1
40 TABLET, DELAYED RELEASE ORAL DAILY
Qty: 90 TABLET | Refills: 3 | Status: SHIPPED | OUTPATIENT
Start: 2020-09-29 | End: 2022-01-31 | Stop reason: SDUPTHER

## 2020-09-29 NOTE — TELEPHONE ENCOUNTER
S/w  Pharm and confirmed that pt's insurance denies coverage of PPI rx Esomeprazole d/t 'non-formulary', no alternative listed in [facility] system. Advised her would find listed alternative under pt's insurance and send to Dr. Mason for review.    BIN [id number]  N [id number]  [employer] [id number]  ID [id number]  Person Grp 01    S/w Clinic Pharmacist Kimberly, she confirmed under pt's ins test claim Protonix 40mg rx covered.

## 2020-09-29 NOTE — TELEPHONE ENCOUNTER
----- Message from Ria Peña sent at 9/29/2020  1:57 PM CDT -----  patient needs call back regarding status of stomach medication..[phone]

## 2020-09-30 LAB — H PYLORI AG STL QL IA: NOT DETECTED

## 2020-10-15 ENCOUNTER — PATIENT OUTREACH (OUTPATIENT)
Dept: ADMINISTRATIVE | Facility: OTHER | Age: 70
End: 2020-10-15

## 2020-10-15 ENCOUNTER — OFFICE VISIT (OUTPATIENT)
Dept: ORTHOPEDICS | Facility: CLINIC | Age: 70
End: 2020-10-15
Payer: MEDICARE

## 2020-10-15 VITALS
DIASTOLIC BLOOD PRESSURE: 73 MMHG | WEIGHT: 254 LBS | SYSTOLIC BLOOD PRESSURE: 137 MMHG | HEART RATE: 67 BPM | HEIGHT: 66 IN | BODY MASS INDEX: 40.82 KG/M2

## 2020-10-15 DIAGNOSIS — M21.161 ACQUIRED VARUS DEFORMITY KNEE, RIGHT: ICD-10-CM

## 2020-10-15 DIAGNOSIS — M17.12 ARTHRITIS OF KNEE, LEFT: Primary | ICD-10-CM

## 2020-10-15 DIAGNOSIS — M54.17 LUMBOSACRAL RADICULOPATHY: ICD-10-CM

## 2020-10-15 DIAGNOSIS — M21.162 ACQUIRED VARUS DEFORMITY KNEE, LEFT: ICD-10-CM

## 2020-10-15 DIAGNOSIS — M48.061 SPINAL STENOSIS OF LUMBAR REGION WITH RADICULOPATHY: ICD-10-CM

## 2020-10-15 DIAGNOSIS — M17.11 ARTHRITIS OF KNEE, RIGHT: ICD-10-CM

## 2020-10-15 DIAGNOSIS — M54.16 SPINAL STENOSIS OF LUMBAR REGION WITH RADICULOPATHY: ICD-10-CM

## 2020-10-15 PROCEDURE — 99214 OFFICE O/P EST MOD 30 MIN: CPT | Mod: S$PBB,,, | Performed by: ORTHOPAEDIC SURGERY

## 2020-10-15 PROCEDURE — 99214 PR OFFICE/OUTPT VISIT, EST, LEVL IV, 30-39 MIN: ICD-10-PCS | Mod: S$PBB,,, | Performed by: ORTHOPAEDIC SURGERY

## 2020-10-15 PROCEDURE — 99214 OFFICE O/P EST MOD 30 MIN: CPT | Mod: PBBFAC | Performed by: ORTHOPAEDIC SURGERY

## 2020-10-15 PROCEDURE — 99999 PR PBB SHADOW E&M-EST. PATIENT-LVL IV: CPT | Mod: PBBFAC,,, | Performed by: ORTHOPAEDIC SURGERY

## 2020-10-15 PROCEDURE — 99999 PR PBB SHADOW E&M-EST. PATIENT-LVL IV: ICD-10-PCS | Mod: PBBFAC,,, | Performed by: ORTHOPAEDIC SURGERY

## 2020-10-15 NOTE — PROGRESS NOTES
Subjective:     Patient ID: Damon Yoo is a 70 y.o. male.    Chief Complaint: Pain of the Left Knee and Pain of the Right Knee    HPI:  69-year-old diagnosed with bilateral knee arthrosis given injection of steroid in September placed on meloxicam which did not help then we changed him to Celebrex.  Celebrex seems to help.  Workup found out that he has old MRI from 2014 with neuroforaminal stenosis multilevel.  Workup with EMG-NCV recently ordered by me showed chronic L5-S1 radiculopathy bilaterally with peripheral poly neuropathy superimposed.  We sent him for NAHUN with pain management and that helped significantly that his pain is 2-3/10 down his legs and knees.  He still takes Celebrex daily with relief.  For the 1st time was able to work in the [employer] it without difficulty.  Not interested in having surgery at this point.    01/23/2020  Patient with bilateral knee severe arthrosis lumbosacral arthrosis.  The Celebrex seems to work really well for him.  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  He still states he is doing well at this point do not want a proceed with any surgical intervention.  His pain level on and off is 5/10.  Slight grinding and catching. Not using any assistive devices to ambulate    03/05/2020  Bilateral knee arthritis and lumbar radiculopathy with neuroforaminal stenosis multilevel.  States the epidural block still working and the Celebrex still helping.  She is remaining very active working in the [employer].  Does not think he needs any injections in the back or the knee at this point.  His pain level is 2/10 occasionally goes up and he takes his Celebrex.  He is ambulating independently without any assistive devices.  Denies loss of bowel bladder control    07/13/2020  Left knee is hurting quite a bit.  His back is not so much and the numbness and tingling in the legs a not bothering him.  He received last steroid injection in September  2019 and requesting a new 1.  Celebrex does help some but now the pain in the left knee is 8/10.  He wants to avoid surgical intervention.  He did receive E NAHUN in the back by pain management and he does not think he needs another 1.  He remains very active with exercise.  Denies any fever or chills or shortness of breath or difficulty chewing swallowing loss of bowel bladder control.  He is maintaining social distancing.    10/15/2020  Patient stated the injection given last visit in the knee seems to have helped significantly.  His pain level is 3/10.  The pain gets worse when he works in the [employer] his back will hurt as well as is knee.  Now he is taking Celebrex only as needed and managing.  He does not think he needs any injections today.  He is remaining active.  NAHUN given by pain management seems to have worked for him last time.  Denies any fever or chills or shortness of breath or difficulty with chewing or swallowing loss of bowel bladder control blurry vision double vision or loss of sense of smell or taste or chest pain or stomach issues or kidney issues  Past Medical History:   Diagnosis Date    Abnormal EKG 10/25/2013    Arthritis     CAD (coronary artery disease)     Cancer     Prostate [id number] prostate    Glaucoma     Gout attack     Hyperlipidemia     Hypertension     Hypertrophic cardiomyopathy 10/25/2013    S/P CABG (coronary artery bypass graft) 10/25/2013     Past Surgical History:   Procedure Laterality Date    COLONOSCOPY N/A 12/4/2017    Procedure: COLONOSCOPY;  Surgeon: Dina Ledesma MD;  Location: [facility];  Service: Endoscopy;  Laterality: N/A;    CORONARY ARTERY BYPASS GRAFT  05/2003    x1    PROSTATE SURGERY      RALP 2013    TRANSFORAMINAL EPIDURAL INJECTION OF STEROID Right 11/11/2019    Procedure: Right L5/S1+ S1 TF NAHUN with IV sedation;  Surgeon: Ermias Mario MD;  [facility];  Service: Pain Management;  Laterality: Right;     Family History   Problem  Relation Age of Onset    Hypertension Father     Strabismus Neg Hx     Retinal detachment Neg Hx     Macular degeneration Neg Hx     Glaucoma Neg Hx     Blindness Neg Hx     Amblyopia Neg Hx      Social History     Socioeconomic History    Marital status:      Spouse name: Not on file    Number of children: Not on file    Years of education: Not on file    Highest education level: Not on file   Occupational History    Not on file   Social Needs    Financial resource strain: Somewhat hard    Food insecurity     Worry: Sometimes true     Inability: Never true    Transportation needs     Medical: No     Non-medical: No   Tobacco Use    Smoking status: Former Smoker     Packs/day: 0.25     Years: 15.00     Pack years: 3.75     Quit date: 1988     Years since quittin.5    Smokeless tobacco: Never Used   Substance and Sexual Activity    Alcohol use: No     Frequency: Never     Drinks per session: Patient refused     Binge frequency: Never    Drug use: No    Sexual activity: Not on file   Lifestyle    Physical activity     Days per week: 0 days     Minutes per session: 0 min    Stress: Not at all   Relationships    Social connections     Talks on phone: Once a week     Gets together: Never     Attends Jainism service: Not on file     Active member of club or organization: No     Attends meetings of clubs or organizations: Never     Relationship status:    Other Topics Concern    Not on file   Social History Narrative    No pets or smokers in household.     Medication List with Changes/Refills   Current Medications    ACETAMINOPHEN (TYLENOL) 500 MG TABLET    Take 500 mg by mouth every 6 (six) hours as needed for Pain.    ALLOPURINOL (ZYLOPRIM) 300 MG TABLET    TAKE 1 1/2 TABLET BY MOUTH DAILY    AMLODIPINE (NORVASC) 10 MG TABLET    Take 1 tablet (10 mg total) by mouth once daily.    ASPIRIN 81 MG CHEWABLE TABLET    Take 1 tablet by mouth Daily.    CELECOXIB (CELEBREX) 200 MG  CAPSULE    Take 1 capsule (200 mg total) by mouth once daily. Take with food    DICLOFENAC SODIUM (VOLTAREN) 1 % GEL    Apply 2 g topically once daily.    FISH OIL-FAT ACID COMB.8- (OMEGA 3-6-9) 1,200 MG CAP    Take 1 capsule by mouth once daily.     FLUNISOLIDE 25 MCG, 0.025%, (NASALIDE) 25 MCG (0.025 %) SPRY    USE 2 SPRAYS IN EACH NOSTRIL TWICE DAILY    LATANOPROST 0.005 % OPHTHALMIC SOLUTION    Place 1 drop into both eyes every evening.    LISINOPRIL (PRINIVIL,ZESTRIL) 40 MG TABLET    Take 1 tablet (40 mg total) by mouth once daily.    METOPROLOL SUCCINATE (TOPROL-XL) 100 MG 24 HR TABLET    Take 1 tablet (100 mg total) by mouth once daily.    PANTOPRAZOLE (PROTONIX) 40 MG TABLET    Take 1 tablet (40 mg total) by mouth once daily.    ROSUVASTATIN (CRESTOR) 40 MG TAB    Take 1 tablet (40 mg total) by mouth every evening. Generic is fine.    TIMOLOL MALEATE 0.5% (TIMOPTIC) 0.5 % DROP    Place 1 drop into both eyes every morning.    TIZANIDINE (ZANAFLEX) 4 MG TABLET    Take 1 tablet (4 mg total) by mouth every 8 (eight) hours.     Review of patient's allergies indicates:  No Known Allergies  Review of Systems   Constitution: Negative for decreased appetite.   HENT: Negative for tinnitus.    Eyes: Negative for double vision.   Cardiovascular: Negative for chest pain.   Respiratory: Negative for wheezing.    Hematologic/Lymphatic: Negative for bleeding problem.   Skin: Negative for dry skin.   Musculoskeletal: Positive for arthritis, back pain and joint pain. Negative for gout, neck pain and stiffness.   Gastrointestinal: Negative for abdominal pain.   Genitourinary: Negative for bladder incontinence.   Neurological: Positive for paresthesias. Negative for numbness and sensory change.   Psychiatric/Behavioral: Negative for altered mental status.       Objective:   Body mass index is 41 kg/m².  Vitals:    10/15/20 0711   BP: 137/73   Pulse: 67          General    Constitutional: He is oriented to person, place,  and time. He appears well-developed.   HENT:   Head: Atraumatic.   Eyes: EOM are normal.   Cardiovascular: Normal rate.    Pulmonary/Chest: Effort normal.   Abdominal: Soft.   Neurological: He is alert and oriented to person, place, and time.   Psychiatric: Judgment normal.            Cervical spine with good range of motion and slight crepitus but no pain  Lumbar with pain from L3 down to L5 paraspinal.  No true deformity seen.  Bilateral upper extremity neurovascularly intact strength is 5/5 throughout radial and ulnar pulses 2+ sensory intact to touch  Pelvis is level  Bilateral hip range of motion within normal limits.  Hip flexors, abduct his adductors quads and hamstrings were 5/5  Bilateral knees with varus deformity.  Crepitus with motion on the patella and medially.  Tenderness to palpation over the patella and medial joint.  Range of motion 5-125 degrees. Very mild swelling.  Left knee has severe pain on the medial side with moderate swelling.  Calves are soft nontender  Negative pitting edema  Ankle motion intact  DP 1+  Decrease in sensation in his foot on the lateral aspect  Skin is warm to touch  Patellar reflex 2+    EMG-NCV with chronic bilateral L5-S1 radiculopathy with superimposed peripheral polyneuropathy    Relevant imaging results reviewed and interpreted by me, discussed with the patient and / or family today   X-ray and electronic records bilateral knees with complete loss of medial joint space and osteophytic changes consistent with end-stage arthrosis  MRI 2014 multilevel foraminal and central stenosis from L3 down to L5 and S1  Assessment:     Encounter Diagnoses   Name Primary?    Arthritis of knee, left Yes    Arthritis of knee, right     Lumbosacral radiculopathy     Spinal stenosis of lumbar region with radiculopathy     Acquired varus deformity knee, left     Acquired varus deformity knee, right         Plan:   Arthritis of knee, left    Arthritis of knee, right    Lumbosacral  radiculopathy    Spinal stenosis of lumbar region with radiculopathy    Acquired varus deformity knee, left    Acquired varus deformity knee, right         Patient Instructions   Doing okay with the arthritis of both knees and low back pain.  Takes occasionally Celebrex  Plan  Continue taking Celebrex only as needed  On a bad day also you may take Tylenol 325 mg 2 pills maximum 3 times a day  Call us if you need renewal on the Celebrex  Return in 3 months  Keep active    Patient is doing much better after he had NAHUN.  Will hold off on any surgical intervention      Disclaimer: This note was prepared using a voice recognition system and is likely to have sound alike errors within the text.

## 2020-10-15 NOTE — PROGRESS NOTES
Health Maintenance Due   Topic Date Due    Influenza Vaccine (1) 08/01/2020     Updates were requested from care everywhere.  Chart was reviewed for overdue Proactive [facility] Encounters ([facility]) topics (CRS, Breast Cancer Screening, Eye exam)  Health Maintenance has been updated.  LINKS immunization registry triggered.  Immunizations were reconciled.

## 2020-10-15 NOTE — PATIENT INSTRUCTIONS
Doing okay with the arthritis of both knees and low back pain.  Takes occasionally Celebrex  Plan  Continue taking Celebrex only as needed  On a bad day also you may take Tylenol 325 mg 2 pills maximum 3 times a day  Call us if you need renewal on the Celebrex  Return in 3 months  Keep active

## 2020-10-16 NOTE — PROGRESS NOTES
"Nutrition Assessment  Client name:  Damon Yoo  :  1950  Age:  70 y.o.  Gender:  male  Reason for consult: CKD, prediabetes, weight management, CAD  Client states:  States he was under the impression that today's visit would solely be on weight management and blood sugar control. Was not aware it might be on CKD as referral stated. Pt shows interest in a food program that would provide him meals he could eat daily. UBW was 240-245 lbs. Reports doctor kept telling him to lose weight but really noticed them telling him to lose weight with his painful knee. Reports weight gain since 2017 or  when he retired. Could not go for walks because of knee pain. Would walk a lot and climb a lot of stairs at work when he was working.     Anthropometrics  Height:  5' 6"    Weight:  256 lbs  BMI:  41.42 kg/m2      Clinical Signs/Symptoms  N/V/D:  None  Appetite:  good       Past Medical History:   Diagnosis Date    Abnormal EKG 10/25/2013    Arthritis     CAD (coronary artery disease)     Cancer     Prostate [id number] prostate    Glaucoma     Gout attack     Hyperlipidemia     Hypertension     Hypertrophic cardiomyopathy 10/25/2013    S/P CABG (coronary artery bypass graft) 10/25/2013       Past Surgical History:   Procedure Laterality Date    COLONOSCOPY N/A 2017    Procedure: COLONOSCOPY;  Surgeon: Dina Ledesma MD;  Location: [facility];  Service: Endoscopy;  Laterality: N/A;    CORONARY ARTERY BYPASS GRAFT  05/2003    x1    PROSTATE SURGERY      RALP 2013    TRANSFORAMINAL EPIDURAL INJECTION OF STEROID Right 2019    Procedure: Right L5/S1+ S1 TF NAHUN with IV sedation;  Surgeon: Ermias Mario MD;  Location: [facility];  Service: Pain Management;  Laterality: Right;       Medications    has a current medication list which includes the following prescription(s): acetaminophen, allopurinol, amlodipine, aspirin, celecoxib, diclofenac sodium, fish oil-fat acid comb.8-hb137, flunisolide 25 mcg " (0.025%), latanoprost, lisinopril, metoprolol succinate, pantoprazole, rosuvastatin, timolol maleate 0.5%, and tizanidine.    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    Social History     Tobacco Use    Smoking status: Former Smoker     Packs/day: 0.25     Years: 15.00     Pack years: 3.75     Quit date: 1988     Years since quittin.5    Smokeless tobacco: Never Used   Substance Use Topics    Alcohol use: No     Frequency: Never     Drinks per session: Patient refused     Binge frequency: Never        Lab Reports     Hemoglobin A1C 4.0 - 5.6 % 6.2High       *  Sodium 136 - 145 mmol/L 143    Potassium 3.5 - 5.1 mmol/L 4.9    Chloride 95 - 110 mmol/L 110    CO2 23 - 29 mmol/L 25    Glucose 70 - 110 mg/dL 104    BUN, Bld 8 - 23 mg/dL 28High     Creatinine 0.5 - 1.4 mg/dL 1.7High     Calcium 8.7 - 10.5 mg/dL 9.8    Total Protein 6.0 - 8.4 g/dL 7.9    Albumin 3.5 - 5.2 g/dL 3.9    Total Bilirubin 0.1 - 1.0 mg/dL 0.6    Comment: For infants and newborns, interpretation of results should be based   on gestational age, weight and in agreement with clinical   observations.   Premature Infant recommended reference ranges:   Up to 24 hours.............<8.0 mg/dL   Up to 48 hours............<12.0 mg/dL   3-5 days..................<15.0 mg/dL   6-29 days.................<15.0 mg/dL    Alkaline Phosphatase 55 - 135 U/L 96    AST 10 - 40 U/L 30    ALT 10 - 44 U/L 28    Anion Gap 8 - 16 mmol/L 8    eGFR if African American >60 mL/min/1.73 m^2 46.9Abnormal     eGFR if non African American >60 mL/min/1.73 m^2 40.5Abnormal      HDL 40 - 75  mg/dL 25Low         BP Readings from Last 1 Encounters:   10/15/20 137/73       Food History  Breakfast:  Sliced Carved Turkey with 1 slice of white bread with A banana   Lunch:  1 drumstick Chicken and 3-4 Sausage and 2-3 Shrimp Gumbo (1 cup total) with 1/3 cup of rice   Dinner: 1 cup Shrimp Stew with 1/2 cup rice     *Fluid intake:  Orange Pineapple Juice (12 toney oz), 12 oz Coke, 2- 17 oz bottles of water    Exercise History:  No usual exercise or activity routine at this time. Has pain in knee. When he was working in 2016 he was walking a lot.     Cultural/Spiritual/Personal Preferences:  None identified    Support System:  spouse, patient does most of cooking and grocery shopping.     State of Change:  Contemplation    Barriers to Change:  Pt is older in age and has lifelong established eating patterns. Pt states eating right during the holidays will likely be hard for him.     Diagnosis    Food and nutrition related knowledge deficit related to CKD, weight management as evidenced by no prior education by RD up until this point.  Altered nutrition related lab values related to ckd and prediabetes as evidenced by A1C of 6.2 and AA eGFR of 16.9.  Obesity related to lifelong dietary habits as evidenced by BMI of 41.42 kg/m2    Intervention  Nutrition Prescription:  Calories (22-25 kcal/kg IBW): 4925-0677 kcal  Protein: (1.0-1.2 g/kg IBW): 71-85 g  Fluid (Holiday Segar): 2928 ml or 98 oz    Goals:  1. Will begin to use a smaller 9 inch plate.   2. Cut back on sodium by decreasing the amount of salt in foods and limiting processed foods  3.  Follow the MyPlate method for 2 meals a day  4. Will walk for around 30 minutes, 3 days a week.    Nutrition Education  Educated patient on the physiology of diabetes and prediabetes. Discussed lab values used to diagnosis diabetes and prediabetes and health risks of prediabetes. Discussed carb control and timing of meals. Also notified patient of s/s of DM. Educated patient of 4  main tools to control prediabetes:  1. 30 minutes of exercise most days  2. Eating a balanced diet, high in fiber, lean protein, fruits, vegetables and whole grains and low in fat, sugar, and refined CHO.   3. Maintaining a healthy weight through diet and exercise (+ benefits of 7% weight loss).  4. Avoiding tobacco and tobacco products.    Educated patient on MyPlate way of eating. This encourages portion control of fruits + veggies (1/2 plate), grains (1/4 plate), and protein (1/4 plate). Discussed proper choices in each categories such as lean proteins, whole grains, and minimally processed fruits and vegetables. Touched on what a reasonable portion of fat per day is.     Educated patient on the different components of CKD management:  -Protein: educated on plant vs animal based protein and proper portions of each. Explained that protein portions should be limited to not overwork the kidney.   -Sodium: Explained dietary sodiums role in CKD and blood pressure, foods high/low in sodium, and tips to help reduce dietary intake of sodium when shopping at the grocery, eating out, and preparing meals.   -Heart Health: explained the difference between trans, saturtaed and unsaturated fats and their roles in heart health.       Patient asked probing questions and demonstrated understanding through goal setting.       Patient verbalized understanding of nutrition education and recommendations received.    Handouts Provided  [facility] Sodium Handout  [facility] Prediabetes Handout     Monitoring/Evaluation    Monitor the following:  Weight  BMI  Caloric intake  Labs:  A1C, GFR    Follow Up Plan: 3 months per patient request

## 2020-10-19 ENCOUNTER — NUTRITION (OUTPATIENT)
Dept: NUTRITION | Facility: CLINIC | Age: 70
End: 2020-10-19
Payer: MEDICARE

## 2020-10-19 ENCOUNTER — TELEPHONE (OUTPATIENT)
Dept: INTERNAL MEDICINE | Facility: CLINIC | Age: 70
End: 2020-10-19

## 2020-10-19 VITALS — HEIGHT: 66 IN | BODY MASS INDEX: 41.25 KG/M2 | WEIGHT: 256.63 LBS

## 2020-10-19 DIAGNOSIS — R73.03 PREDIABETES: ICD-10-CM

## 2020-10-19 DIAGNOSIS — Z71.3 DIETARY COUNSELING: Primary | ICD-10-CM

## 2020-10-19 DIAGNOSIS — I12.9 HYPERTENSIVE CHRONIC KIDNEY DISEASE WITH STAGE 1 THROUGH STAGE 4 CHRONIC KIDNEY DISEASE, OR UNSPECIFIED CHRONIC KIDNEY DISEASE: Primary | ICD-10-CM

## 2020-10-19 DIAGNOSIS — I12.9 HYPERTENSIVE CHRONIC KIDNEY DISEASE WITH STAGE 1 THROUGH STAGE 4 CHRONIC KIDNEY DISEASE, OR UNSPECIFIED CHRONIC KIDNEY DISEASE: ICD-10-CM

## 2020-10-19 PROCEDURE — 97802 MEDICAL NUTRITION INDIV IN: CPT | Mod: PBBFAC | Performed by: DIETITIAN, REGISTERED

## 2020-10-19 PROCEDURE — 99999 PR PBB SHADOW E&M-EST. PATIENT-LVL II: CPT | Mod: PBBFAC,,, | Performed by: DIETITIAN, REGISTERED

## 2020-10-19 PROCEDURE — 99999 PR PBB SHADOW E&M-EST. PATIENT-LVL II: ICD-10-PCS | Mod: PBBFAC,,, | Performed by: DIETITIAN, REGISTERED

## 2020-10-19 PROCEDURE — 99212 OFFICE O/P EST SF 10 MIN: CPT | Mod: PBBFAC | Performed by: DIETITIAN, REGISTERED

## 2020-11-20 ENCOUNTER — PATIENT OUTREACH (OUTPATIENT)
Dept: ADMINISTRATIVE | Facility: OTHER | Age: 70
End: 2020-11-20

## 2020-11-20 ENCOUNTER — OFFICE VISIT (OUTPATIENT)
Dept: OPHTHALMOLOGY | Facility: CLINIC | Age: 70
End: 2020-11-20
Payer: MEDICARE

## 2020-11-20 DIAGNOSIS — H25.13 NUCLEAR SENILE CATARACT OF BOTH EYES: ICD-10-CM

## 2020-11-20 DIAGNOSIS — H40.1132 PRIMARY OPEN ANGLE GLAUCOMA OF BOTH EYES, MODERATE STAGE: Primary | ICD-10-CM

## 2020-11-20 PROCEDURE — 92133 POSTERIOR SEGMENT OCT OPTIC NERVE(OCULAR COHERENCE TOMOGRAPHY) - OU - BOTH EYES: ICD-10-PCS | Mod: 26,S$PBB,, | Performed by: OPHTHALMOLOGY

## 2020-11-20 PROCEDURE — 92012 PR EYE EXAM, EST PATIENT,INTERMED: ICD-10-PCS | Mod: S$PBB,,, | Performed by: OPHTHALMOLOGY

## 2020-11-20 PROCEDURE — 99213 OFFICE O/P EST LOW 20 MIN: CPT | Mod: PBBFAC,25 | Performed by: OPHTHALMOLOGY

## 2020-11-20 PROCEDURE — 92133 CPTRZD OPH DX IMG PST SGM ON: CPT | Mod: PBBFAC | Performed by: OPHTHALMOLOGY

## 2020-11-20 PROCEDURE — 92012 INTRM OPH EXAM EST PATIENT: CPT | Mod: S$PBB,,, | Performed by: OPHTHALMOLOGY

## 2020-11-20 PROCEDURE — 99999 PR PBB SHADOW E&M-EST. PATIENT-LVL III: CPT | Mod: PBBFAC,,, | Performed by: OPHTHALMOLOGY

## 2020-11-20 PROCEDURE — 99999 PR PBB SHADOW E&M-EST. PATIENT-LVL III: ICD-10-PCS | Mod: PBBFAC,,, | Performed by: OPHTHALMOLOGY

## 2020-11-20 RX ORDER — CYCLOSPORINE 0.5 MG/ML
1 EMULSION OPHTHALMIC 2 TIMES DAILY
Qty: 60 VIAL | Refills: 6 | Status: CANCELLED
Start: 2020-11-20

## 2020-11-20 NOTE — PROGRESS NOTES
Health Maintenance Due   Topic Date Due    Influenza Vaccine (1) 08/01/2020     Updates were requested from care everywhere.  Chart was reviewed for overdue Proactive [facility] Encounters ([facility]) topics (CRS, Breast Cancer Screening, Eye exam)  Health Maintenance has been updated.  LINKS immunization registry triggered.  LINKS not responding.

## 2020-11-20 NOTE — PROGRESS NOTES
SUBJECTIVE  Damon Yoo is 70 y.o. male  Corrected distance visual acuity was 20/20 in the right eye and 20/25 in the left eye. Comments: Through phoroptor.   Chief Complaint   Patient presents with    Glaucoma          HPI     Pt here for 3m IOP GOCT chk. No pain or discomfort. VA stable. 100%   compliant with gtts.     1. Mod COAG- No FHx (init 32/27 on 3/10 with C/D .65/.60) Goal <18, new   goal = 15 6/2017  SLT OD 12/11/14 (20-16)  SLT OS 2/25/15 (20-16)  2. Mild NSC  3. LLL Cyst Excision on 6/13/12    Latanoprost QHS OU  Timolol qam OU    Last edited by Martinez Chatman, Patient Care Assistant on 11/20/2020  7:40   AM. (History)         Assessment /Plan :  1. Primary open angle glaucoma of both eyes, moderate stage Doing well, IOP within acceptable range relative to target IOP and no evidence of progression. Continue current treatment. Reviewed importance of continued compliance with treatment and follow up.     2. Nuclear senile cataract of both eyes monitor for now     Return to clinic in 3 months  or as needed.  With Dilation and HVF 24-2

## 2020-11-27 ENCOUNTER — HOSPITAL ENCOUNTER (INPATIENT)
Facility: HOSPITAL | Age: 70
LOS: 4 days | Discharge: HOME | DRG: 243 | End: 2020-12-01
Attending: EMERGENCY MEDICINE | Admitting: INTERNAL MEDICINE
Payer: MEDICARE

## 2020-11-27 ENCOUNTER — OFFICE VISIT (OUTPATIENT)
Dept: URGENT CARE | Facility: CLINIC | Age: 70
End: 2020-11-27
Payer: MEDICARE

## 2020-11-27 VITALS
BODY MASS INDEX: 39.37 KG/M2 | DIASTOLIC BLOOD PRESSURE: 57 MMHG | OXYGEN SATURATION: 96 % | HEART RATE: 40 BPM | TEMPERATURE: 99 F | SYSTOLIC BLOOD PRESSURE: 133 MMHG | WEIGHT: 245 LBS | HEIGHT: 66 IN

## 2020-11-27 DIAGNOSIS — I10 ESSENTIAL HYPERTENSION: ICD-10-CM

## 2020-11-27 DIAGNOSIS — L74.9 SWEATING ABNORMALITY: ICD-10-CM

## 2020-11-27 DIAGNOSIS — E87.8 ELECTROLYTE IMBALANCE: ICD-10-CM

## 2020-11-27 DIAGNOSIS — R00.1 BRADYCARDIA: Primary | ICD-10-CM

## 2020-11-27 DIAGNOSIS — N17.9 ACUTE RENAL FAILURE SUPERIMPOSED ON STAGE 3 CHRONIC KIDNEY DISEASE, UNSPECIFIED ACUTE RENAL FAILURE TYPE, UNSPECIFIED WHETHER STAGE 3A OR 3B CKD: ICD-10-CM

## 2020-11-27 DIAGNOSIS — R00.1 BRADYCARDIA: ICD-10-CM

## 2020-11-27 DIAGNOSIS — E78.5 HYPERLIPIDEMIA, UNSPECIFIED HYPERLIPIDEMIA TYPE: Chronic | ICD-10-CM

## 2020-11-27 DIAGNOSIS — I10 CHRONIC HYPERTENSION: ICD-10-CM

## 2020-11-27 DIAGNOSIS — E66.01 OBESITY, CLASS III, BMI 40-49.9 (MORBID OBESITY): Chronic | ICD-10-CM

## 2020-11-27 DIAGNOSIS — N18.30 ACUTE RENAL FAILURE SUPERIMPOSED ON STAGE 3 CHRONIC KIDNEY DISEASE, UNSPECIFIED ACUTE RENAL FAILURE TYPE, UNSPECIFIED WHETHER STAGE 3A OR 3B CKD: ICD-10-CM

## 2020-11-27 DIAGNOSIS — R79.89 ELEVATED TROPONIN: ICD-10-CM

## 2020-11-27 DIAGNOSIS — I44.2 COMPLETE HEART BLOCK: Primary | ICD-10-CM

## 2020-11-27 DIAGNOSIS — N18.30 STAGE 3 CHRONIC KIDNEY DISEASE, UNSPECIFIED WHETHER STAGE 3A OR 3B CKD: ICD-10-CM

## 2020-11-27 DIAGNOSIS — R06.02 SOB (SHORTNESS OF BREATH): ICD-10-CM

## 2020-11-27 DIAGNOSIS — I50.32 CHRONIC DIASTOLIC CHF (CONGESTIVE HEART FAILURE): Chronic | ICD-10-CM

## 2020-11-27 DIAGNOSIS — Z03.818 ENCOUNTER FOR OBSERVATION FOR SUSPECTED EXPOSURE TO OTHER BIOLOGICAL AGENTS RULED OUT: ICD-10-CM

## 2020-11-27 DIAGNOSIS — I25.10 CORONARY ARTERY DISEASE INVOLVING NATIVE CORONARY ARTERY OF NATIVE HEART WITHOUT ANGINA PECTORIS: ICD-10-CM

## 2020-11-27 DIAGNOSIS — I44.2 CHB (COMPLETE HEART BLOCK): ICD-10-CM

## 2020-11-27 PROBLEM — H40.9 GLAUCOMA: Status: ACTIVE | Noted: 2020-11-27

## 2020-11-27 PROBLEM — I50.33 ACUTE ON CHRONIC DIASTOLIC CHF (CONGESTIVE HEART FAILURE): Status: ACTIVE | Noted: 2020-11-27

## 2020-11-27 LAB
ALBUMIN SERPL BCP-MCNC: 3.6 G/DL (ref 3.5–5.2)
ALP SERPL-CCNC: 86 U/L (ref 55–135)
ALT SERPL W/O P-5'-P-CCNC: 21 U/L (ref 10–44)
ANION GAP SERPL CALC-SCNC: 13 MMOL/L (ref 8–16)
AORTIC ROOT ANNULUS: 4.06 CM
APTT BLDCRRT: 26.5 SEC (ref 21–32)
ASCENDING AORTA: 4.1 CM
AST SERPL-CCNC: 23 U/L (ref 10–40)
AV INDEX (PROSTH): 0.65
AV MEAN GRADIENT: 9 MMHG
AV PEAK GRADIENT: 15 MMHG
AV VALVE AREA: 1.78 CM2
AV VELOCITY RATIO: 0.63
BASOPHILS # BLD AUTO: 0.06 K/UL (ref 0–0.2)
BASOPHILS NFR BLD: 0.5 % (ref 0–1.9)
BILIRUB SERPL-MCNC: 0.6 MG/DL (ref 0.1–1)
BNP SERPL-MCNC: 1191 PG/ML (ref 0–99)
BSA FOR ECHO PROCEDURE: 2.3 M2
BUN SERPL-MCNC: 43 MG/DL (ref 8–23)
CALCIUM SERPL-MCNC: 8.6 MG/DL (ref 8.7–10.5)
CHLORIDE SERPL-SCNC: 114 MMOL/L (ref 95–110)
CO2 SERPL-SCNC: 17 MMOL/L (ref 23–29)
CREAT SERPL-MCNC: 2.5 MG/DL (ref 0.5–1.4)
CTP QC/QA: YES
CV ECHO LV RWT: 0.74 CM
DIFFERENTIAL METHOD: ABNORMAL
DOP CALC AO PEAK VEL: 1.94 M/S
DOP CALC AO VTI: 45.02 CM
DOP CALC LVOT AREA: 2.7 CM2
DOP CALC LVOT DIAMETER: 1.86 CM
DOP CALC LVOT PEAK VEL: 1.22 M/S
DOP CALC LVOT STROKE VOLUME: 80.06 CM3
DOP CALC RVOT PEAK VEL: 1.21 M/S
DOP CALC RVOT VTI: 29.08 CM
DOP CALCLVOT PEAK VEL VTI: 29.48 CM
E WAVE DECELERATION TIME: 235.35 MSEC
E/A RATIO: 1.16
ECHO LV POSTERIOR WALL: 1.56 CM (ref 0.6–1.1)
EOSINOPHIL # BLD AUTO: 0.2 K/UL (ref 0–0.5)
EOSINOPHIL NFR BLD: 1.9 % (ref 0–8)
ERYTHROCYTE [DISTWIDTH] IN BLOOD BY AUTOMATED COUNT: 13.8 % (ref 11.5–14.5)
EST. GFR  (AFRICAN AMERICAN): 29 ML/MIN/1.73 M^2
EST. GFR  (NON AFRICAN AMERICAN): 25 ML/MIN/1.73 M^2
FRACTIONAL SHORTENING: 39 % (ref 28–44)
GLUCOSE SERPL-MCNC: 105 MG/DL (ref 70–110)
HCT VFR BLD AUTO: 37.9 % (ref 40–54)
HCV AB SERPL QL IA: NEGATIVE
HGB BLD-MCNC: 12.1 G/DL (ref 14–18)
IMM GRANULOCYTES # BLD AUTO: 0.09 K/UL (ref 0–0.04)
IMM GRANULOCYTES NFR BLD AUTO: 0.7 % (ref 0–0.5)
INR PPP: 1 (ref 0.8–1.2)
INTERVENTRICULAR SEPTUM: 1.62 CM (ref 0.6–1.1)
IVRT: 72.66 MSEC
LA MAJOR: 3.58 CM
LA MINOR: 3.52 CM
LA WIDTH: 3.44 CM
LEFT ATRIUM SIZE: 3.36 CM
LEFT ATRIUM VOLUME INDEX: 15.8 ML/M2
LEFT ATRIUM VOLUME: 34.87 CM3
LEFT INTERNAL DIMENSION IN SYSTOLE: 2.57 CM (ref 2.1–4)
LEFT VENTRICLE DIASTOLIC VOLUME INDEX: 35.45 ML/M2
LEFT VENTRICLE DIASTOLIC VOLUME: 78.1 ML
LEFT VENTRICLE MASS INDEX: 124 G/M2
LEFT VENTRICLE SYSTOLIC VOLUME INDEX: 10.8 ML/M2
LEFT VENTRICLE SYSTOLIC VOLUME: 23.84 ML
LEFT VENTRICULAR INTERNAL DIMENSION IN DIASTOLE: 4.19 CM (ref 3.5–6)
LEFT VENTRICULAR MASS: 272.46 G
LYMPHOCYTES # BLD AUTO: 2.8 K/UL (ref 1–4.8)
LYMPHOCYTES NFR BLD: 22.7 % (ref 18–48)
MAGNESIUM SERPL-MCNC: 1.5 MG/DL (ref 1.6–2.6)
MCH RBC QN AUTO: 29.9 PG (ref 27–31)
MCHC RBC AUTO-ENTMCNC: 31.9 G/DL (ref 32–36)
MCV RBC AUTO: 94 FL (ref 82–98)
MONOCYTES # BLD AUTO: 0.9 K/UL (ref 0.3–1)
MONOCYTES NFR BLD: 7.6 % (ref 4–15)
MV PEAK A VEL: 0.75 M/S
MV PEAK E VEL: 0.87 M/S
MV STENOSIS PRESSURE HALF TIME: 68.25 MS
MV VALVE AREA P 1/2 METHOD: 3.22 CM2
NEUTROPHILS # BLD AUTO: 8.1 K/UL (ref 1.8–7.7)
NEUTROPHILS NFR BLD: 66.6 % (ref 38–73)
NRBC BLD-RTO: 0 /100 WBC
PISA TR MAX VEL: 4.4 M/S
PLATELET # BLD AUTO: 162 K/UL (ref 150–350)
PMV BLD AUTO: 13.7 FL (ref 9.2–12.9)
POTASSIUM SERPL-SCNC: 4.6 MMOL/L (ref 3.5–5.1)
PROT SERPL-MCNC: 7.1 G/DL (ref 6–8.4)
PROTHROMBIN TIME: 10.6 SEC (ref 9–12.5)
PV MEAN GRADIENT: 3.63 MMHG
PV PEAK GRADIENT: 5.83 MMHG
RA MAJOR: 3.67 CM
RA PRESSURE: 3 MMHG
RA WIDTH: 3.02 CM
RBC # BLD AUTO: 4.05 M/UL (ref 4.6–6.2)
SARS-COV-2 RDRP RESP QL NAA+PROBE: NEGATIVE
SINUS: 4.17 CM
SODIUM SERPL-SCNC: 144 MMOL/L (ref 136–145)
STJ: 3.6 CM
TR MAX PG: 77 MMHG
TRICUSPID ANNULAR PLANE SYSTOLIC EXCURSION: 1.94 CM
TROPONIN I SERPL DL<=0.01 NG/ML-MCNC: 0.07 NG/ML (ref 0–0.03)
TROPONIN I SERPL DL<=0.01 NG/ML-MCNC: 0.1 NG/ML (ref 0–0.03)
TROPONIN I SERPL DL<=0.01 NG/ML-MCNC: 0.1 NG/ML (ref 0–0.03)
TV REST PULMONARY ARTERY PRESSURE: 80 MMHG
WBC # BLD AUTO: 12.15 K/UL (ref 3.9–12.7)

## 2020-11-27 PROCEDURE — 63600175 PHARM REV CODE 636 W HCPCS: Performed by: INTERNAL MEDICINE

## 2020-11-27 PROCEDURE — C1894 INTRO/SHEATH, NON-LASER: HCPCS | Performed by: INTERNAL MEDICINE

## 2020-11-27 PROCEDURE — 33210 INSERT ELECTRD/PM CATH SNGL: CPT | Mod: ,,, | Performed by: INTERNAL MEDICINE

## 2020-11-27 PROCEDURE — 25000003 PHARM REV CODE 250: Performed by: NURSE PRACTITIONER

## 2020-11-27 PROCEDURE — 99152 PR MOD CONSCIOUS SEDATION, SAME PHYS, 5+ YRS, FIRST 15 MIN: ICD-10-PCS | Mod: ,,, | Performed by: INTERNAL MEDICINE

## 2020-11-27 PROCEDURE — 99291 CRITICAL CARE FIRST HOUR: CPT | Mod: 25

## 2020-11-27 PROCEDURE — 85025 COMPLETE CBC W/AUTO DIFF WBC: CPT

## 2020-11-27 PROCEDURE — 83880 ASSAY OF NATRIURETIC PEPTIDE: CPT

## 2020-11-27 PROCEDURE — 99152 MOD SED SAME PHYS/QHP 5/>YRS: CPT | Performed by: INTERNAL MEDICINE

## 2020-11-27 PROCEDURE — 63600175 PHARM REV CODE 636 W HCPCS: Performed by: NURSE PRACTITIONER

## 2020-11-27 PROCEDURE — 36415 COLL VENOUS BLD VENIPUNCTURE: CPT

## 2020-11-27 PROCEDURE — 83735 ASSAY OF MAGNESIUM: CPT

## 2020-11-27 PROCEDURE — 99152 MOD SED SAME PHYS/QHP 5/>YRS: CPT | Mod: ,,, | Performed by: INTERNAL MEDICINE

## 2020-11-27 PROCEDURE — 99215 OFFICE O/P EST HI 40 MIN: CPT | Mod: S$GLB,,, | Performed by: NURSE PRACTITIONER

## 2020-11-27 PROCEDURE — 99153 MOD SED SAME PHYS/QHP EA: CPT | Performed by: INTERNAL MEDICINE

## 2020-11-27 PROCEDURE — 99291 CRITICAL CARE FIRST HOUR: CPT | Mod: ,,, | Performed by: NURSE PRACTITIONER

## 2020-11-27 PROCEDURE — 33210 INSERT ELECTRD/PM CATH SNGL: CPT | Performed by: INTERNAL MEDICINE

## 2020-11-27 PROCEDURE — 33210 PR INSER HEART TEMP PACER ONE CHMBR: ICD-10-PCS | Mod: ,,, | Performed by: INTERNAL MEDICINE

## 2020-11-27 PROCEDURE — U0002 COVID-19 LAB TEST NON-CDC: HCPCS | Mod: QW,S$GLB,, | Performed by: NURSE PRACTITIONER

## 2020-11-27 PROCEDURE — 99223 PR INITIAL HOSPITAL CARE,LEVL III: ICD-10-PCS | Mod: 25,,, | Performed by: INTERNAL MEDICINE

## 2020-11-27 PROCEDURE — 27201423 OPTIME MED/SURG SUP & DEVICES STERILE SUPPLY: Performed by: INTERNAL MEDICINE

## 2020-11-27 PROCEDURE — U0002: ICD-10-PCS | Mod: QW,S$GLB,, | Performed by: NURSE PRACTITIONER

## 2020-11-27 PROCEDURE — 84484 ASSAY OF TROPONIN QUANT: CPT | Mod: 91

## 2020-11-27 PROCEDURE — 86803 HEPATITIS C AB TEST: CPT

## 2020-11-27 PROCEDURE — 80053 COMPREHEN METABOLIC PANEL: CPT

## 2020-11-27 PROCEDURE — 27200667 HC PACEMAKER, TEMPORARY MONITORING, PER SHIFT

## 2020-11-27 PROCEDURE — 99215 PR OFFICE/OUTPT VISIT, EST, LEVL V, 40-54 MIN: ICD-10-PCS | Mod: S$GLB,,, | Performed by: NURSE PRACTITIONER

## 2020-11-27 PROCEDURE — 85730 THROMBOPLASTIN TIME PARTIAL: CPT

## 2020-11-27 PROCEDURE — 20000000 HC ICU ROOM

## 2020-11-27 PROCEDURE — 85610 PROTHROMBIN TIME: CPT

## 2020-11-27 PROCEDURE — 99223 1ST HOSP IP/OBS HIGH 75: CPT | Mod: 25,,, | Performed by: INTERNAL MEDICINE

## 2020-11-27 PROCEDURE — 99291 PR CRITICAL CARE, E/M 30-74 MINUTES: ICD-10-PCS | Mod: ,,, | Performed by: NURSE PRACTITIONER

## 2020-11-27 RX ORDER — FENTANYL CITRATE 50 UG/ML
INJECTION, SOLUTION INTRAMUSCULAR; INTRAVENOUS
Status: DISCONTINUED | OUTPATIENT
Start: 2020-11-27 | End: 2020-11-27 | Stop reason: HOSPADM

## 2020-11-27 RX ORDER — DOCUSATE SODIUM 100 MG/1
100 CAPSULE, LIQUID FILLED ORAL EVERY OTHER DAY
Status: DISCONTINUED | OUTPATIENT
Start: 2020-11-28 | End: 2020-12-01 | Stop reason: HOSPADM

## 2020-11-27 RX ORDER — PANTOPRAZOLE SODIUM 40 MG/1
40 TABLET, DELAYED RELEASE ORAL DAILY
Status: DISCONTINUED | OUTPATIENT
Start: 2020-11-27 | End: 2020-12-01 | Stop reason: HOSPADM

## 2020-11-27 RX ORDER — OMEGA-3-ACID ETHYL ESTERS 1 G/1
1 CAPSULE, LIQUID FILLED ORAL 2 TIMES DAILY
Status: DISCONTINUED | OUTPATIENT
Start: 2020-11-28 | End: 2020-12-01 | Stop reason: HOSPADM

## 2020-11-27 RX ORDER — SODIUM CHLORIDE 0.9 % (FLUSH) 0.9 %
10 SYRINGE (ML) INJECTION
Status: DISCONTINUED | OUTPATIENT
Start: 2020-11-27 | End: 2020-12-01 | Stop reason: HOSPADM

## 2020-11-27 RX ORDER — TALC
6 POWDER (GRAM) TOPICAL NIGHTLY PRN
Status: DISCONTINUED | OUTPATIENT
Start: 2020-11-27 | End: 2020-12-01 | Stop reason: HOSPADM

## 2020-11-27 RX ORDER — HEPARIN SODIUM,PORCINE/D5W 25000/250
12 INTRAVENOUS SOLUTION INTRAVENOUS CONTINUOUS
Status: DISCONTINUED | OUTPATIENT
Start: 2020-11-27 | End: 2020-11-29

## 2020-11-27 RX ORDER — BISACODYL 10 MG
10 SUPPOSITORY, RECTAL RECTAL DAILY PRN
Status: DISCONTINUED | OUTPATIENT
Start: 2020-11-27 | End: 2020-12-01 | Stop reason: HOSPADM

## 2020-11-27 RX ORDER — MIDAZOLAM HYDROCHLORIDE 1 MG/ML
INJECTION, SOLUTION INTRAMUSCULAR; INTRAVENOUS
Status: DISCONTINUED | OUTPATIENT
Start: 2020-11-27 | End: 2020-11-27 | Stop reason: HOSPADM

## 2020-11-27 RX ORDER — FLUTICASONE PROPIONATE 50 MCG
2 SPRAY, SUSPENSION (ML) NASAL DAILY
Status: DISCONTINUED | OUTPATIENT
Start: 2020-11-28 | End: 2020-12-01 | Stop reason: HOSPADM

## 2020-11-27 RX ORDER — POLYETHYLENE GLYCOL 3350 17 G/17G
17 POWDER, FOR SOLUTION ORAL 2 TIMES DAILY PRN
Status: DISCONTINUED | OUTPATIENT
Start: 2020-11-27 | End: 2020-12-01 | Stop reason: HOSPADM

## 2020-11-27 RX ORDER — ACETAMINOPHEN 325 MG/1
650 TABLET ORAL EVERY 4 HOURS PRN
Status: DISCONTINUED | OUTPATIENT
Start: 2020-11-27 | End: 2020-12-01 | Stop reason: HOSPADM

## 2020-11-27 RX ORDER — ALLOPURINOL 300 MG/1
300 TABLET ORAL DAILY
Status: DISCONTINUED | OUTPATIENT
Start: 2020-11-28 | End: 2020-12-01 | Stop reason: HOSPADM

## 2020-11-27 RX ORDER — MUPIROCIN 20 MG/G
OINTMENT TOPICAL 2 TIMES DAILY
Status: DISCONTINUED | OUTPATIENT
Start: 2020-11-27 | End: 2020-12-01 | Stop reason: HOSPADM

## 2020-11-27 RX ORDER — SODIUM CHLORIDE 9 MG/ML
INJECTION, SOLUTION INTRAVENOUS CONTINUOUS
Status: DISCONTINUED | OUTPATIENT
Start: 2020-11-27 | End: 2020-11-28

## 2020-11-27 RX ORDER — ONDANSETRON 2 MG/ML
4 INJECTION INTRAMUSCULAR; INTRAVENOUS EVERY 4 HOURS PRN
Status: DISCONTINUED | OUTPATIENT
Start: 2020-11-27 | End: 2020-12-01 | Stop reason: HOSPADM

## 2020-11-27 RX ORDER — LANOLIN ALCOHOL/MO/W.PET/CERES
400 CREAM (GRAM) TOPICAL
Status: COMPLETED | OUTPATIENT
Start: 2020-11-27 | End: 2020-11-27

## 2020-11-27 RX ORDER — ROSUVASTATIN CALCIUM 10 MG/1
40 TABLET, COATED ORAL NIGHTLY
Status: DISCONTINUED | OUTPATIENT
Start: 2020-11-27 | End: 2020-12-01 | Stop reason: HOSPADM

## 2020-11-27 RX ORDER — HEPARIN SODIUM 5000 [USP'U]/ML
5000 INJECTION, SOLUTION INTRAVENOUS; SUBCUTANEOUS EVERY 8 HOURS
Status: DISCONTINUED | OUTPATIENT
Start: 2020-11-27 | End: 2020-11-27

## 2020-11-27 RX ADMIN — ROSUVASTATIN 40 MG: 10 TABLET, FILM COATED ORAL at 08:11

## 2020-11-27 RX ADMIN — SODIUM CHLORIDE 50 ML/HR: 0.9 INJECTION, SOLUTION INTRAVENOUS at 12:11

## 2020-11-27 RX ADMIN — PANTOPRAZOLE SODIUM 40 MG: 40 TABLET, DELAYED RELEASE ORAL at 01:11

## 2020-11-27 RX ADMIN — Medication 400 MG: at 05:11

## 2020-11-27 RX ADMIN — HEPARIN SODIUM AND DEXTROSE 12 UNITS/KG/HR: 10000; 5 INJECTION INTRAVENOUS at 05:11

## 2020-11-27 RX ADMIN — MUPIROCIN: 20 OINTMENT TOPICAL at 08:11

## 2020-11-27 RX ADMIN — Medication 400 MG: at 08:11

## 2020-11-27 NOTE — ASSESSMENT & PLAN NOTE
Daily weights  Slow IVFs  BNP elevated but CXR without pulm edema  Monitor volume status  · TTE: There is moderate left ventricular concentric hypertrophy.  · The left ventricle is normal in size with normal systolic function. The estimated ejection fraction is 60%.  · Normal left ventricular diastolic function.  · Normal right ventricular size with normal right ventricular systolic function.  · Mild aortic valve stenosis.  · Aortic valve area is 1.78 cm2; peak velocity is 1.94 m/s; mean gradient is 9 mmHg.  · Moderate mitral regurgitation.  · Moderate tricuspid regurgitation.  · There is pulmonary hypertension.  · Normal central venous pressure (3 mmHg).  The estimated PA systolic pressure is 80 mmHg.

## 2020-11-27 NOTE — ASSESSMENT & PLAN NOTE
Last creatine July 23 was 1.7  Likely worsened from hypoperfusion secondary to CHB/Bradycardia  BMP in AM  Cont gentle IVFs  Accurate I/Os  Hold home ACE-I and Celebrex

## 2020-11-27 NOTE — ASSESSMENT & PLAN NOTE
Cards following  Temporary pacing wires placed via IJ access and now paced with VSS  ICU cardiac and hemodynamic monitoring  Stopped home Toprol XL  Avoid BB and CCBs  If remains bradycardic post 48 hours will plan PPM  Monitor and replace electrolytes

## 2020-11-27 NOTE — ASSESSMENT & PLAN NOTE
Hx CKD stage 3-  Monitor  Trend BMP  Renal dose all medications  Add NS at 50ml/hour for gentle hydration - Monitor volume status closely due to Hx of Chronic diastolic heart failure  Hold Home ACEI

## 2020-11-27 NOTE — PROGRESS NOTES
"Subjective:       Patient ID: Damon Yoo is a 70 y.o. male.    Vitals:  height is 5' 6" (1.676 m) and weight is 111.1 kg (245 lb). His oral temperature is 98.5 °F (36.9 °C). His blood pressure is 133/57 (abnormal) and his pulse is 40 (abnormal). His oxygen saturation is 96%.     Chief Complaint: Excessive Sweating (x 2 days) and Shortness of Breath (x 2 days )    HPI    Constitution: Positive for sweating.   HENT: Negative.    Neck: negative.   Cardiovascular: Negative.    Eyes: Negative.    Respiratory: Positive for shortness of breath.    Gastrointestinal: Negative.    Genitourinary: Negative.    Musculoskeletal: Negative.    Skin: Negative.    Allergic/Immunologic: Negative.    Neurological: Negative.    Hematologic/Lymphatic: Negative.        Objective:      Physical Exam   Constitutional: He is oriented to person, place, and time. He appears well-developed. He is cooperative.  Non-toxic appearance. He does not appear ill. No distress.   HENT:   Head: Normocephalic and atraumatic.   Ears:   Right Ear: Hearing, tympanic membrane, external ear and ear canal normal.   Left Ear: Hearing, tympanic membrane, external ear and ear canal normal.   Nose: Nose normal. No mucosal edema, rhinorrhea or nasal deformity. No epistaxis. Right sinus exhibits no maxillary sinus tenderness and no frontal sinus tenderness. Left sinus exhibits no maxillary sinus tenderness and no frontal sinus tenderness.   Mouth/Throat: Uvula is midline, oropharynx is clear and moist and mucous membranes are normal. No trismus in the jaw. Normal dentition. No uvula swelling. No oropharyngeal exudate, posterior oropharyngeal edema or posterior oropharyngeal erythema.   Eyes: Conjunctivae and lids are normal. No scleral icterus.   Neck: Trachea normal, full passive range of motion without pain and phonation normal. Neck supple. No neck rigidity. No edema and no erythema present.   Cardiovascular: Regular rhythm, normal heart sounds and normal " pulses. Bradycardia present.   Pulmonary/Chest: Effort normal and breath sounds normal. No respiratory distress. He has no decreased breath sounds. He has no rhonchi.   Abdominal: Normal appearance.   Musculoskeletal: Normal range of motion.         General: No deformity.   Neurological: He is alert and oriented to person, place, and time. He exhibits normal muscle tone. Coordination normal.   Skin: Skin is warm, dry, intact, not diaphoretic and not pale. Psychiatric: His speech is normal and behavior is normal. Judgment and thought content normal.   Nursing note and vitals reviewed.        Assessment:       1. Bradycardia    2. SOB (shortness of breath)    3. Sweating abnormality    4. Encounter for observation for suspected exposure to other biological agents ruled out        Plan:         Bradycardia  -     EKG 12-lead; Future  -     Refer to Emergency Dept.    SOB (shortness of breath)  -     POCT COVID-19 Rapid Screening    Sweating abnormality  -     POCT COVID-19 Rapid Screening    Encounter for observation for suspected exposure to other biological agents ruled out  -     POCT COVID-19 Rapid Screening      Results for orders placed or performed in visit on 11/27/20   POCT COVID-19 Rapid Screening   Result Value Ref Range    POC Rapid COVID Negative Negative     Acceptable Yes          EKG Undetermined rhythm, RBBB, Left anterior fasicular block -Bifascicular block- Left ventricular hypertrophy with repolarization abnormality, Anterior infarct, age undetermined, Abnormal EKG  We recommend that you receive another evaluation at the ER immediately or contact your PCP to discuss your concerns or return here. You must understand that you've received an urgent care treatment only and that you may be released before all your medical problems are known or treated. You the patient will arrange for followup care as instructed.   Refuses EMS, reports will go by private vehicle, no distress noted.  Spoke  with Cleo MAK at [facility], report given, who was receptive

## 2020-11-27 NOTE — SUBJECTIVE & OBJECTIVE
Past Medical History:   Diagnosis Date    Abnormal EKG 10/25/2013    Acute on chronic diastolic CHF (congestive heart failure) 11/27/2020    Arthritis     CAD (coronary artery disease)     Cancer     Prostate [id number] prostate    Glaucoma     Gout attack     Hyperlipidemia     Hypertension     Hypertrophic cardiomyopathy 10/25/2013    S/P CABG (coronary artery bypass graft) 10/25/2013       Past Surgical History:   Procedure Laterality Date    COLONOSCOPY N/A 12/4/2017    Procedure: COLONOSCOPY;  Surgeon: Dina Ledesma MD;  Location: [facility] ENDO;  Service: Endoscopy;  Laterality: N/A;    CORONARY ARTERY BYPASS GRAFT  05/2003    x1    PROSTATE SURGERY      RALP 2013    TRANSFORAMINAL EPIDURAL INJECTION OF STEROID Right 11/11/2019    Procedure: Right L5/S1+ S1 TF NAHUN with IV sedation;  Surgeon: Ermias Mario MD;  Location: [facility] PAIN MGT;  Service: Pain Management;  Laterality: Right;       Review of patient's allergies indicates:  No Known Allergies    No current facility-administered medications on file prior to encounter.      Current Outpatient Medications on File Prior to Encounter   Medication Sig    acetaminophen (TYLENOL) 500 MG tablet Take 500 mg by mouth every 6 (six) hours as needed for Pain.    allopurinoL (ZYLOPRIM) 300 MG tablet TAKE 1 1/2 TABLET BY MOUTH DAILY    amLODIPine (NORVASC) 10 MG tablet Take 1 tablet (10 mg total) by mouth once daily.    aspirin 81 MG chewable tablet Take 1 tablet by mouth Daily.    celecoxib (CELEBREX) 200 MG capsule Take 1 capsule (200 mg total) by mouth once daily. Take with food    diclofenac sodium (VOLTAREN) 1 % Gel Apply 2 g topically once daily.    fish oil-fat acid comb.8-hb137 (OMEGA 3-6-9) 1,200 mg Cap Take 1 capsule by mouth once daily.     flunisolide 25 mcg, 0.025%, (NASALIDE) 25 mcg (0.025 %) Spry USE 2 SPRAYS IN EACH NOSTRIL TWICE DAILY    latanoprost 0.005 % ophthalmic solution Place 1 drop into both eyes every evening.    lisinopriL  "(PRINIVIL,ZESTRIL) 40 MG tablet Take 1 tablet (40 mg total) by mouth once daily.    metoprolol succinate (TOPROL-XL) 100 MG 24 hr tablet Take 1 tablet (100 mg total) by mouth once daily.    pantoprazole (PROTONIX) 40 MG tablet Take 1 tablet (40 mg total) by mouth once daily.    rosuvastatin (CRESTOR) 40 MG Tab Take 1 tablet (40 mg total) by mouth every evening. Generic is fine.    timolol maleate 0.5% (TIMOPTIC) 0.5 % Drop Place 1 drop into both eyes every morning.    tiZANidine (ZANAFLEX) 4 MG tablet Take 1 tablet (4 mg total) by mouth every 8 (eight) hours.     Family History     Problem Relation (Age of Onset)    Hypertension Father        Tobacco Use    Smoking status: Former Smoker     Packs/day: 0.25     Years: 15.00     Pack years: 3.75     Quit date: 1988     Years since quittin.6    Smokeless tobacco: Never Used   Substance and Sexual Activity    Alcohol use: No     Frequency: Never     Drinks per session: Patient refused     Binge frequency: Never    Drug use: No    Sexual activity: Not on file     Review of Systems   Constitutional: Positive for activity change, diaphoresis and fatigue. Negative for appetite change and fever.   HENT: Negative for ear discharge, ear pain and facial swelling.    Eyes: Negative for pain and redness.   Respiratory: Positive for shortness of breath. Negative for cough.    Cardiovascular: Positive for leg swelling. Negative for chest pain and palpitations.        Chronic BLE edema Left chronically more edematous then right  Patient reports Hx "bad left knee"   Gastrointestinal: Negative for abdominal distention, abdominal pain, blood in stool, constipation, diarrhea, nausea and vomiting.   Endocrine: Negative for polydipsia and polyphagia.   Genitourinary: Negative for difficulty urinating, dysuria, flank pain and hematuria.   Musculoskeletal: Positive for arthralgias and back pain. Negative for gait problem, neck pain and neck stiffness.   Skin: Negative " for color change.   Allergic/Immunologic: Negative for food allergies.   Neurological: Positive for weakness. Negative for dizziness, seizures, facial asymmetry, speech difficulty and light-headedness.   Hematological: Does not bruise/bleed easily.   Psychiatric/Behavioral: Negative for agitation, behavioral problems, confusion, hallucinations and suicidal ideas. The patient is not nervous/anxious.      Objective:     Vital Signs (Most Recent):  Temp: 97.7 °F (36.5 °C) (11/27/20 0950)  Pulse: (!) 44 (11/27/20 1100)  Resp: (!) 22 (11/27/20 1100)  BP: (!) 146/72 (11/27/20 1100)  SpO2: 96 % (11/27/20 1100) Vital Signs (24h Range):  Temp:  [97.7 °F (36.5 °C)-98.5 °F (36.9 °C)] 97.7 °F (36.5 °C)  Pulse:  [38-44] 44  Resp:  [18-22] 22  SpO2:  [96 %-99 %] 96 %  BP: (117-171)/(55-77) 146/72     Weight: 113.9 kg (251 lb)  Body mass index is 40.51 kg/m².    Physical Exam  Constitutional:       General: He is not in acute distress.  HENT:      Head: Normocephalic and atraumatic.      Mouth/Throat:      Mouth: Mucous membranes are moist.   Eyes:      General:         Right eye: No discharge.         Left eye: No discharge.      Extraocular Movements: Extraocular movements intact.      Conjunctiva/sclera: Conjunctivae normal.      Pupils: Pupils are equal, round, and reactive to light.   Neck:      Musculoskeletal: Normal range of motion and neck supple. No neck rigidity or muscular tenderness.   Cardiovascular:      Rate and Rhythm: Bradycardia present. Rhythm irregular.      Pulses: Normal pulses.      Heart sounds: Normal heart sounds. No murmur.   Pulmonary:      Effort: Pulmonary effort is normal.      Breath sounds: No wheezing or rhonchi.      Comments: BBS diminished  Abdominal:      General: Bowel sounds are normal. There is no distension.      Palpations: Abdomen is soft.      Tenderness: There is no abdominal tenderness. There is no right CVA tenderness or guarding.      Comments: Obese   Genitourinary:     Comments:  Not examined  Musculoskeletal:      Right lower leg: Edema present.      Left lower leg: Edema present.      Comments: LLE edema > RLE edema- Chronic   Skin:     General: Skin is warm and dry.      Capillary Refill: Capillary refill takes 2 to 3 seconds.   Neurological:      General: No focal deficit present.      Mental Status: He is alert and oriented to person, place, and time. Mental status is at baseline.      Cranial Nerves: No cranial nerve deficit.   Psychiatric:         Mood and Affect: Mood normal.         Behavior: Behavior normal.         Thought Content: Thought content normal.         Judgment: Judgment normal.           CRANIAL NERVES     CN III, IV, VI   Pupils are equal, round, and reactive to light.       Lab Results   Component Value Date    WBC 12.15 11/27/2020    HGB 12.1 (L) 11/27/2020    HCT 37.9 (L) 11/27/2020    MCV 94 11/27/2020     11/27/2020         CMP  Sodium   Date Value Ref Range Status   11/27/2020 144 136 - 145 mmol/L Final     Potassium   Date Value Ref Range Status   11/27/2020 4.6 3.5 - 5.1 mmol/L Final     Chloride   Date Value Ref Range Status   11/27/2020 114 (H) 95 - 110 mmol/L Final     CO2   Date Value Ref Range Status   11/27/2020 17 (L) 23 - 29 mmol/L Final     Glucose   Date Value Ref Range Status   11/27/2020 105 70 - 110 mg/dL Final     BUN   Date Value Ref Range Status   11/27/2020 43 (H) 8 - 23 mg/dL Final     Creatinine   Date Value Ref Range Status   11/27/2020 2.5 (H) 0.5 - 1.4 mg/dL Final     Calcium   Date Value Ref Range Status   11/27/2020 8.6 (L) 8.7 - 10.5 mg/dL Final     Total Protein   Date Value Ref Range Status   11/27/2020 7.1 6.0 - 8.4 g/dL Final     Albumin   Date Value Ref Range Status   11/27/2020 3.6 3.5 - 5.2 g/dL Final     Total Bilirubin   Date Value Ref Range Status   11/27/2020 0.6 0.1 - 1.0 mg/dL Final     Comment:     For infants and newborns, interpretation of results should be based  on gestational age, weight and in agreement with  clinical  observations.  Premature Infant recommended reference ranges:  Up to 24 hours.............<8.0 mg/dL  Up to 48 hours............<12.0 mg/dL  3-5 days..................<15.0 mg/dL  6-29 days.................<15.0 mg/dL       Alkaline Phosphatase   Date Value Ref Range Status   11/27/2020 86 55 - 135 U/L Final     AST   Date Value Ref Range Status   11/27/2020 23 10 - 40 U/L Final     ALT   Date Value Ref Range Status   11/27/2020 21 10 - 44 U/L Final     Anion Gap   Date Value Ref Range Status   11/27/2020 13 8 - 16 mmol/L Final     eGFR if    Date Value Ref Range Status   11/27/2020 29 (A) >60 mL/min/1.73 m^2 Final     eGFR if non    Date Value Ref Range Status   11/27/2020 25 (A) >60 mL/min/1.73 m^2 Final     Comment:     Calculation used to obtain the estimated glomerular filtration  rate (eGFR) is the CKD-EPI equation.

## 2020-11-27 NOTE — HPI
Damon Yoo is a 70 year old male who presented to [facility] from Urgent care due to heart rate in 30s with associated shortness of breath and diaphoresis since yesterday. He reports that suddenly yesterday he developed extreme shortness of breath and diaphoresis. He awoke today with continued issues with shortness of breath which provoked him to seek further care at urgent care today. His current medical conditions include CAD s/p CABG, HTN, HLP, Pre diabetes (last A1C 6.2), Hypertrophic cardiomyopathy, obese. EKG today revealed complete AV node dissociation with HR 39. Plan for Temporary pacing wire this AM to restore heart rate control. Labs reviewed, BNP 1191, K+ 4.6, Cr 2.5, Na 144, Troponin 0.097, H/H 12/37. Cardiology consulted to assist with medical management. Chart reviewed, patient seen and examined. ECHO pending. Patient remains in complete AV dissociation at time of exam with HR in low 30s. Plan for urgent temporary pacing wire this AM per Dr Kenyon. Consent obtained and patient/wife are in agreement today. Will hold BB and observe in ICU over the next 48 hours and plan for PPM implant on Monday if remains dependent on pacing wire at that time. Further recs to follow.

## 2020-11-27 NOTE — PLAN OF CARE
VSS even though back in [facility]. Transvenous Pacer not capturing, Dr Kenyon on his way to assess. Patient asymptomatic. AAOx4, VSS.

## 2020-11-27 NOTE — SUBJECTIVE & OBJECTIVE
Past Medical History:   Diagnosis Date    Abnormal EKG 10/25/2013    Arthritis     CAD (coronary artery disease)     Cancer     Prostate [id number] prostate    Glaucoma     Gout attack     Hyperlipidemia     Hypertension     Hypertrophic cardiomyopathy 10/25/2013    S/P CABG (coronary artery bypass graft) 10/25/2013       Past Surgical History:   Procedure Laterality Date    COLONOSCOPY N/A 12/4/2017    Procedure: COLONOSCOPY;  Surgeon: Dina Ledesma MD;  Location: [facility] ENDO;  Service: Endoscopy;  Laterality: N/A;    CORONARY ARTERY BYPASS GRAFT  05/2003    x1    PROSTATE SURGERY      RALP 2013    TRANSFORAMINAL EPIDURAL INJECTION OF STEROID Right 11/11/2019    Procedure: Right L5/S1+ S1 TF NAHUN with IV sedation;  Surgeon: Ermias Mario MD;  Location: [facility] PAIN MGT;  Service: Pain Management;  Laterality: Right;       Review of patient's allergies indicates:  No Known Allergies    No current facility-administered medications on file prior to encounter.      Current Outpatient Medications on File Prior to Encounter   Medication Sig    acetaminophen (TYLENOL) 500 MG tablet Take 500 mg by mouth every 6 (six) hours as needed for Pain.    allopurinoL (ZYLOPRIM) 300 MG tablet TAKE 1 1/2 TABLET BY MOUTH DAILY    amLODIPine (NORVASC) 10 MG tablet Take 1 tablet (10 mg total) by mouth once daily.    aspirin 81 MG chewable tablet Take 1 tablet by mouth Daily.    celecoxib (CELEBREX) 200 MG capsule Take 1 capsule (200 mg total) by mouth once daily. Take with food    diclofenac sodium (VOLTAREN) 1 % Gel Apply 2 g topically once daily.    fish oil-fat acid comb.8-hb137 (OMEGA 3-6-9) 1,200 mg Cap Take 1 capsule by mouth once daily.     flunisolide 25 mcg, 0.025%, (NASALIDE) 25 mcg (0.025 %) Spry USE 2 SPRAYS IN EACH NOSTRIL TWICE DAILY    latanoprost 0.005 % ophthalmic solution Place 1 drop into both eyes every evening.    lisinopriL (PRINIVIL,ZESTRIL) 40 MG tablet Take 1 tablet (40 mg total) by mouth once  daily.    metoprolol succinate (TOPROL-XL) 100 MG 24 hr tablet Take 1 tablet (100 mg total) by mouth once daily.    pantoprazole (PROTONIX) 40 MG tablet Take 1 tablet (40 mg total) by mouth once daily.    rosuvastatin (CRESTOR) 40 MG Tab Take 1 tablet (40 mg total) by mouth every evening. Generic is fine.    timolol maleate 0.5% (TIMOPTIC) 0.5 % Drop Place 1 drop into both eyes every morning.    tiZANidine (ZANAFLEX) 4 MG tablet Take 1 tablet (4 mg total) by mouth every 8 (eight) hours.     Family History     Problem Relation (Age of Onset)    Hypertension Father        Tobacco Use    Smoking status: Former Smoker     Packs/day: 0.25     Years: 15.00     Pack years: 3.75     Quit date: 1988     Years since quittin.6    Smokeless tobacco: Never Used   Substance and Sexual Activity    Alcohol use: No     Frequency: Never     Drinks per session: Patient refused     Binge frequency: Never    Drug use: No    Sexual activity: Not on file     Review of Systems   Constitution: Positive for diaphoresis and malaise/fatigue.   HENT: Negative for hearing loss and hoarse voice.    Eyes: Negative for blurred vision and visual disturbance.   Cardiovascular: Negative for chest pain, claudication, dyspnea on exertion, irregular heartbeat, leg swelling, near-syncope, orthopnea, palpitations, paroxysmal nocturnal dyspnea and syncope.   Respiratory: Positive for shortness of breath. Negative for cough, hemoptysis, sleep disturbances due to breathing, snoring and wheezing.    Endocrine: Negative for cold intolerance and heat intolerance.   Hematologic/Lymphatic: Does not bruise/bleed easily.   Skin: Negative for color change, dry skin and nail changes.   Musculoskeletal: Positive for arthritis and back pain. Negative for joint pain and myalgias.   Gastrointestinal: Negative for bloating, abdominal pain, constipation, nausea and vomiting.   Genitourinary: Negative for dysuria, flank pain, hematuria and hesitancy.    Neurological: Positive for weakness. Negative for headaches, light-headedness, loss of balance, numbness and paresthesias.   Psychiatric/Behavioral: Negative for altered mental status.   Allergic/Immunologic: Negative for environmental allergies.     Objective:     Vital Signs (Most Recent):  Temp: 97.7 °F (36.5 °C) (11/27/20 0950)  Pulse: (!) 44 (11/27/20 1100)  Resp: (!) 22 (11/27/20 1100)  BP: (!) 146/72 (11/27/20 1100)  SpO2: 96 % (11/27/20 1100) Vital Signs (24h Range):  Temp:  [97.7 °F (36.5 °C)-98.5 °F (36.9 °C)] 97.7 °F (36.5 °C)  Pulse:  [38-44] 44  Resp:  [18-22] 22  SpO2:  [96 %-99 %] 96 %  BP: (117-171)/(55-77) 146/72     Weight: 114.3 kg (251 lb 15.8 oz)  Body mass index is 40.67 kg/m².    SpO2: 96 %  O2 Device (Oxygen Therapy): room air    No intake or output data in the 24 hours ending 11/27/20 1122    Lines/Drains/Airways     Peripheral Intravenous Line                 Peripheral IV - Single Lumen 11/27/20 0959 20 G Right Hand less than 1 day         Peripheral IV - Single Lumen 11/27/20 1005 20 G Left Hand less than 1 day                Physical Exam   Constitutional: He is oriented to person, place, and time. He appears well-developed and well-nourished. No distress.   HENT:   Head: Normocephalic and atraumatic.   Eyes: Pupils are equal, round, and reactive to light.   Neck: Normal range of motion and full passive range of motion without pain. Neck supple. No JVD present.   Cardiovascular: S1 normal, S2 normal and intact distal pulses. An irregular rhythm present. Bradycardia present. PMI is not displaced. Exam reveals no distant heart sounds.   No murmur heard.  Pulses:       Radial pulses are 2+ on the right side and 2+ on the left side.        Dorsalis pedis pulses are 2+ on the right side and 2+ on the left side.   Complete AV dissociation, HR 30s  CHEST PAIN FREE   Pulmonary/Chest: Effort normal and breath sounds normal. No accessory muscle usage. No respiratory distress. He has no decreased  breath sounds. He has no wheezes. He has no rales.   Abdominal: Soft. Bowel sounds are normal. He exhibits no distension. There is no abdominal tenderness.   Obese abdomen   Musculoskeletal: Normal range of motion.         General: No edema.      Right ankle: He exhibits no swelling.      Left ankle: He exhibits no swelling.   Neurological: He is alert and oriented to person, place, and time.   Skin: Skin is warm and dry. He is not diaphoretic. No cyanosis. Nails show no clubbing.   Psychiatric: He has a normal mood and affect. His speech is normal and behavior is normal. Judgment and thought content normal. Cognition and memory are normal.   Nursing note and vitals reviewed.      Significant Labs:   CMP   Recent Labs   Lab 11/27/20  1012      K 4.6   *   CO2 17*      BUN 43*   CREATININE 2.5*   CALCIUM 8.6*   PROT 7.1   ALBUMIN 3.6   BILITOT 0.6   ALKPHOS 86   AST 23   ALT 21   ANIONGAP 13   ESTGFRAFRICA 29*   EGFRNONAA 25*   , CBC   Recent Labs   Lab 11/27/20  1012   WBC 12.15   HGB 12.1*   HCT 37.9*      , Troponin   Recent Labs   Lab 11/27/20  1012   TROPONINI 0.097*    and All pertinent lab results from the last 24 hours have been reviewed.

## 2020-11-27 NOTE — Clinical Note
Prepped: subxiphoid process. Prepped with: ChloraPrep. The site was clipped. The patient was draped.

## 2020-11-27 NOTE — INTERVAL H&P NOTE
The patient has been examined and the H&P has been reviewed:    I concur with the findings and no changes have occurred since H&P was written.    Anesthesia/Surgery risks, benefits and alternative options discussed and understood by patient/family.          Active Hospital Problems    Diagnosis  POA    *Complete heart block [I44.2]  Yes    Elevated troponin [R77.8]  Yes    DON (acute kidney injury) [N17.9]  Yes    Chronic diastolic CHF (congestive heart failure) [I50.32]  Yes    Glaucoma [H40.9]  Yes    Chronic kidney disease, stage III (moderate) [N18.30]  Yes    CAD (coronary artery disease) [I25.10]  Yes    Hyperlipidemia [E78.5]  Yes    Hypertension [I10]  Yes      Resolved Hospital Problems   No resolved problems to display.

## 2020-11-27 NOTE — H&P
[facility] Hospital Medicine  History & Physical    Patient Name: Damon Yoo  MRN: [id number]  Admission Date: 11/27/2020  Attending Physician: Young Hoffman MD   Primary Care Provider: TIEN Mason Jr, MD     Patient seen in ER- Wife at bedside    Patient information was obtained from patient and ER records.     Subjective:     Principal Problem:Complete heart block    Chief Complaint:   Chief Complaint   Patient presents with    Bradycardia     was at clinic for SOB and chills and was sent here for HR in the 40s         HPI: This is a 69 yo male who has a PMHx of CAD with CABG in 2013, HTN, HLD, prostate cancer, glaucoma, arthritis, gout, and hypertrophic cardiomyopathy. Patient reports he has been in his normal state of health until yesterday when he began with new onset of intermittent SOB and profuse diaphoresis.  Patient  was seen at the Urgent Care today where it was noted that his heart rate was in the 30s. Patient was sent to Emergency Department for further evaluation. Patient was again noted to have heart rate in 30s-40s and found to be in complete heart block. So far his blood pressure has remained stable. Patient was seen by Cardiology in ER and plans for urgent temporary pacing wire placement to be done this AM per Dr Kenyon.                 Per report- Patient had a rapid COVID screen done which was negative before being referred to the ED for further evaluation.     Patient is a Full Code    Past Medical History:   Diagnosis Date    Abnormal EKG 10/25/2013    Acute on chronic diastolic CHF (congestive heart failure) 11/27/2020    Arthritis     CAD (coronary artery disease)     Cancer     Prostate [id number] prostate    Glaucoma     Gout attack     Hyperlipidemia     Hypertension     Hypertrophic cardiomyopathy 10/25/2013    S/P CABG (coronary artery bypass graft) 10/25/2013       Past Surgical History:   Procedure Laterality Date    COLONOSCOPY N/A 12/4/2017     Procedure: COLONOSCOPY;  Surgeon: Dina Ledesma MD;  Location: [facility];  Service: Endoscopy;  Laterality: N/A;    CORONARY ARTERY BYPASS GRAFT  05/2003    x1    PROSTATE SURGERY      RALP 2013    TRANSFORAMINAL EPIDURAL INJECTION OF STEROID Right 11/11/2019    Procedure: Right L5/S1+ S1 TF NAHUN with IV sedation;  Surgeon: Ermias Mario MD;  Location: [facility];  Service: Pain Management;  Laterality: Right;       Review of patient's allergies indicates:  No Known Allergies    No current facility-administered medications on file prior to encounter.      Current Outpatient Medications on File Prior to Encounter   Medication Sig    acetaminophen (TYLENOL) 500 MG tablet Take 500 mg by mouth every 6 (six) hours as needed for Pain.    allopurinoL (ZYLOPRIM) 300 MG tablet TAKE 1 1/2 TABLET BY MOUTH DAILY    amLODIPine (NORVASC) 10 MG tablet Take 1 tablet (10 mg total) by mouth once daily.    aspirin 81 MG chewable tablet Take 1 tablet by mouth Daily.    celecoxib (CELEBREX) 200 MG capsule Take 1 capsule (200 mg total) by mouth once daily. Take with food    diclofenac sodium (VOLTAREN) 1 % Gel Apply 2 g topically once daily.    fish oil-fat acid comb.8-hb137 (OMEGA 3-6-9) 1,200 mg Cap Take 1 capsule by mouth once daily.     flunisolide 25 mcg, 0.025%, (NASALIDE) 25 mcg (0.025 %) Spry USE 2 SPRAYS IN EACH NOSTRIL TWICE DAILY    latanoprost 0.005 % ophthalmic solution Place 1 drop into both eyes every evening.    lisinopriL (PRINIVIL,ZESTRIL) 40 MG tablet Take 1 tablet (40 mg total) by mouth once daily.    metoprolol succinate (TOPROL-XL) 100 MG 24 hr tablet Take 1 tablet (100 mg total) by mouth once daily.    pantoprazole (PROTONIX) 40 MG tablet Take 1 tablet (40 mg total) by mouth once daily.    rosuvastatin (CRESTOR) 40 MG Tab Take 1 tablet (40 mg total) by mouth every evening. Generic is fine.    timolol maleate 0.5% (TIMOPTIC) 0.5 % Drop Place 1 drop into both eyes every morning.    tiZANidine  "(ZANAFLEX) 4 MG tablet Take 1 tablet (4 mg total) by mouth every 8 (eight) hours.     Family History     Problem Relation (Age of Onset)    Hypertension Father        Tobacco Use    Smoking status: Former Smoker     Packs/day: 0.25     Years: 15.00     Pack years: 3.75     Quit date: 1988     Years since quittin.6    Smokeless tobacco: Never Used   Substance and Sexual Activity    Alcohol use: No     Frequency: Never     Drinks per session: Patient refused     Binge frequency: Never    Drug use: No    Sexual activity: Not on file     Review of Systems   Constitutional: Positive for activity change, diaphoresis and fatigue. Negative for appetite change and fever.   HENT: Negative for ear discharge, ear pain and facial swelling.    Eyes: Negative for pain and redness.   Respiratory: Positive for shortness of breath. Negative for cough.    Cardiovascular: Positive for leg swelling. Negative for chest pain and palpitations.        Chronic BLE edema Left chronically more edematous then right  Patient reports Hx "bad left knee"   Gastrointestinal: Negative for abdominal distention, abdominal pain, blood in stool, constipation, diarrhea, nausea and vomiting.   Endocrine: Negative for polydipsia and polyphagia.   Genitourinary: Negative for difficulty urinating, dysuria, flank pain and hematuria.   Musculoskeletal: Positive for arthralgias and back pain. Negative for gait problem, neck pain and neck stiffness.   Skin: Negative for color change.   Allergic/Immunologic: Negative for food allergies.   Neurological: Positive for weakness. Negative for dizziness, seizures, facial asymmetry, speech difficulty and light-headedness.   Hematological: Does not bruise/bleed easily.   Psychiatric/Behavioral: Negative for agitation, behavioral problems, confusion, hallucinations and suicidal ideas. The patient is not nervous/anxious.      Objective:     Vital Signs (Most Recent):  Temp: 97.7 °F (36.5 °C) (20 " 0950)  Pulse: (!) 44 (11/27/20 1100)  Resp: (!) 22 (11/27/20 1100)  BP: (!) 146/72 (11/27/20 1100)  SpO2: 96 % (11/27/20 1100) Vital Signs (24h Range):  Temp:  [97.7 °F (36.5 °C)-98.5 °F (36.9 °C)] 97.7 °F (36.5 °C)  Pulse:  [38-44] 44  Resp:  [18-22] 22  SpO2:  [96 %-99 %] 96 %  BP: (117-171)/(55-77) 146/72     Weight: 113.9 kg (251 lb)  Body mass index is 40.51 kg/m².    Physical Exam  Constitutional:       General: He is not in acute distress.  HENT:      Head: Normocephalic and atraumatic.      Mouth/Throat:      Mouth: Mucous membranes are moist.   Eyes:      General:         Right eye: No discharge.         Left eye: No discharge.      Extraocular Movements: Extraocular movements intact.      Conjunctiva/sclera: Conjunctivae normal.      Pupils: Pupils are equal, round, and reactive to light.   Neck:      Musculoskeletal: Normal range of motion and neck supple. No neck rigidity or muscular tenderness.   Cardiovascular:      Rate and Rhythm: Bradycardia present. Rhythm irregular.      Pulses: Normal pulses.      Heart sounds: Normal heart sounds. No murmur.   Pulmonary:      Effort: Pulmonary effort is normal.      Breath sounds: No wheezing or rhonchi.      Comments: BBS diminished  Abdominal:      General: Bowel sounds are normal. There is no distension.      Palpations: Abdomen is soft.      Tenderness: There is no abdominal tenderness. There is no right CVA tenderness or guarding.      Comments: Obese   Genitourinary:     Comments: Not examined  Musculoskeletal:      Right lower leg: Edema present.      Left lower leg: Edema present.      Comments: LLE edema > RLE edema- Chronic   Skin:     General: Skin is warm and dry.      Capillary Refill: Capillary refill takes 2 to 3 seconds.   Neurological:      General: No focal deficit present.      Mental Status: He is alert and oriented to person, place, and time. Mental status is at baseline.      Cranial Nerves: No cranial nerve deficit.   Psychiatric:          Mood and Affect: Mood normal.         Behavior: Behavior normal.         Thought Content: Thought content normal.         Judgment: Judgment normal.           CRANIAL NERVES     CN III, IV, VI   Pupils are equal, round, and reactive to light.       Lab Results   Component Value Date    WBC 12.15 11/27/2020    HGB 12.1 (L) 11/27/2020    HCT 37.9 (L) 11/27/2020    MCV 94 11/27/2020     11/27/2020         CMP  Sodium   Date Value Ref Range Status   11/27/2020 144 136 - 145 mmol/L Final     Potassium   Date Value Ref Range Status   11/27/2020 4.6 3.5 - 5.1 mmol/L Final     Chloride   Date Value Ref Range Status   11/27/2020 114 (H) 95 - 110 mmol/L Final     CO2   Date Value Ref Range Status   11/27/2020 17 (L) 23 - 29 mmol/L Final     Glucose   Date Value Ref Range Status   11/27/2020 105 70 - 110 mg/dL Final     BUN   Date Value Ref Range Status   11/27/2020 43 (H) 8 - 23 mg/dL Final     Creatinine   Date Value Ref Range Status   11/27/2020 2.5 (H) 0.5 - 1.4 mg/dL Final     Calcium   Date Value Ref Range Status   11/27/2020 8.6 (L) 8.7 - 10.5 mg/dL Final     Total Protein   Date Value Ref Range Status   11/27/2020 7.1 6.0 - 8.4 g/dL Final     Albumin   Date Value Ref Range Status   11/27/2020 3.6 3.5 - 5.2 g/dL Final     Total Bilirubin   Date Value Ref Range Status   11/27/2020 0.6 0.1 - 1.0 mg/dL Final     Comment:     For infants and newborns, interpretation of results should be based  on gestational age, weight and in agreement with clinical  observations.  Premature Infant recommended reference ranges:  Up to 24 hours.............<8.0 mg/dL  Up to 48 hours............<12.0 mg/dL  3-5 days..................<15.0 mg/dL  6-29 days.................<15.0 mg/dL       Alkaline Phosphatase   Date Value Ref Range Status   11/27/2020 86 55 - 135 U/L Final     AST   Date Value Ref Range Status   11/27/2020 23 10 - 40 U/L Final     ALT   Date Value Ref Range Status   11/27/2020 21 10 - 44 U/L Final     Anion Gap   Date  Value Ref Range Status   11/27/2020 13 8 - 16 mmol/L Final     eGFR if    Date Value Ref Range Status   11/27/2020 29 (A) >60 mL/min/1.73 m^2 Final     eGFR if non    Date Value Ref Range Status   11/27/2020 25 (A) >60 mL/min/1.73 m^2 Final     Comment:     Calculation used to obtain the estimated glomerular filtration  rate (eGFR) is the CKD-EPI equation.            Assessment/Plan:     * Complete heart block  Telemetry  Patient was seen by Cardiology in ER and plans for urgent temporary pacing wire placement to be done this AM per Dr Kenyon.   Home bblockade and eye drops currently on hold         Glaucoma  Home eye drops on hold due to symptomatic bradycardia/ complete heart block      Chronic diastolic CHF (congestive heart failure)  Telemetry  Monitor- Currently no signs of acute volume overload  Orders as per Cardiology      DON (acute kidney injury)  Hx CKD stage 3-  Monitor  Trend BMP  Renal dose all medications  Add NS at 50ml/hour for gentle hydration - Monitor volume status closely due to Hx of Chronic diastolic heart failure  Hold Home ACEI      Elevated troponin  Telemetry  Trend levels  Orders as per Cardiology      Chronic kidney disease, stage III (moderate)  As above      CAD (coronary artery disease)  Hx CABG-  Telemetry  Orders as per Cardiology      Hypertension  Hold home antihypertensives for now  Monitor closely and resume when clinically appropriate      Hyperlipidemia  Continue home statin and fish oils        VTE Risk Mitigation (From admission, onward)         Ordered     Place SUNDEEP hose  Until discontinued      11/27/20 1156     IP VTE HIGH RISK PATIENT  Once      11/27/20 1156     Place sequential compression device  Until discontinued      11/27/20 1156              Critical care time spent on the evaluation and treatment of severe organ dysfunction, review of pertinent labs and imaging studies, discussions with consulting providers and discussions with  patient/family: 72 minutes.     Helen Caceres, SHAILESH  Department of Hospital Medicine   [facility]

## 2020-11-27 NOTE — HPI
Mr Yoo is a elderly 71 yo BM with a PMH of Chronic Diastolic heart failure, CAD, CABG, Prostate CA w/ surgery, Gout, chronic Arthritis, HLD, and HTN.  He presented to [facility] this AM about 0800 hr with complaint of SOB and diaphoresis since yesterday.  In clinic he was found to have HR 35-40 and was sent to [facility] arriving about 1000 hr.  In ED HR 38-44 w/ CHB and Cards consulted.  He is on home Toprol XL.  Also in ED creatine 2.5 (last was 1.7), Mg 1.5 and BNP 1191.  He was admitted to ICU and shortly later taken to Cath Lab for TV temporary pacing wire placement and pacing

## 2020-11-27 NOTE — CONSULTS
[facility]   Cardiology  Consult Note    Patient Name: Damon Yoo  MRN: [id number]  Admission Date: 11/27/2020  Hospital Length of Stay: 0 days  Code Status: No Order   Attending Provider: Young Hoffman MD   Consulting Provider: SIOMARA Trejo  Primary Care Physician: TIEN Mason Jr, MD  Principal Problem:Complete heart block    Patient information was obtained from patient, spouse/SO, past medical records and ER records.     Inpatient consult to Cardiology  Consult performed by: SIOMARA Page  Consult ordered by: Tejal Moreno MD        Subjective:     Chief Complaint:  Shortness of breath, diaphoresis, slow heart rate     HPI:   Damon Yoo is a 70 year old male who presented to [facility] from Urgent care due to heart rate in 30s with associated shortness of breath and diaphoresis since yesterday. He reports that suddenly yesterday he developed extreme shortness of breath and diaphoresis. He awoke today with continued issues with shortness of breath which provoked him to seek further care at urgent care today. His current medical conditions include CAD s/p CABG, HTN, HLP, Pre diabetes (last A1C 6.2), Hypertrophic cardiomyopathy, obese. EKG today revealed complete AV node dissociation with HR 39. Plan for Temporary pacing wire this AM to restore heart rate control. Labs reviewed, BNP 1191, K+ 4.6, Cr 2.5, Na 144, Troponin 0.097, H/H 12/37. Cardiology consulted to assist with medical management. Chart reviewed, patient seen and examined. ECHO pending. Patient remains in complete AV dissociation at time of exam with HR in low 30s. Plan for urgent temporary pacing wire this AM per Dr Kenyon. Consent obtained and patient/wife are in agreement today. Will hold BB and observe in ICU over the next 48 hours and plan for PPM implant on Monday if remains dependent on pacing wire at that time. Further recs to follow.     Past Medical History:   Diagnosis Date    Abnormal EKG  10/25/2013    Arthritis     CAD (coronary artery disease)     Cancer     Prostate [id number] prostate    Glaucoma     Gout attack     Hyperlipidemia     Hypertension     Hypertrophic cardiomyopathy 10/25/2013    S/P CABG (coronary artery bypass graft) 10/25/2013       Past Surgical History:   Procedure Laterality Date    COLONOSCOPY N/A 12/4/2017    Procedure: COLONOSCOPY;  Surgeon: Dina Ledesma MD;  Location: [facility] END[facility];  Service: Endoscopy;  Laterality: N/A;    CORONARY ARTERY BYPASS GRAFT  05/2003    x1    PROSTATE SURGERY      RALP 2013    TRANSFORAMINAL EPIDURAL INJECTION OF STEROID Right 11/11/2019    Procedure: Right L5/S1+ S1 TF NAHUN with IV sedation;  Surgeon: Ermias Mario MD;  Location: [facility];  Service: Pain Management;  Laterality: Right;       Review of patient's allergies indicates:  No Known Allergies    No current facility-administered medications on file prior to encounter.      Current Outpatient Medications on File Prior to Encounter   Medication Sig    acetaminophen (TYLENOL) 500 MG tablet Take 500 mg by mouth every 6 (six) hours as needed for Pain.    allopurinoL (ZYLOPRIM) 300 MG tablet TAKE 1 1/2 TABLET BY MOUTH DAILY    amLODIPine (NORVASC) 10 MG tablet Take 1 tablet (10 mg total) by mouth once daily.    aspirin 81 MG chewable tablet Take 1 tablet by mouth Daily.    celecoxib (CELEBREX) 200 MG capsule Take 1 capsule (200 mg total) by mouth once daily. Take with food    diclofenac sodium (VOLTAREN) 1 % Gel Apply 2 g topically once daily.    fish oil-fat acid comb.8-hb137 (OMEGA 3-6-9) 1,200 mg Cap Take 1 capsule by mouth once daily.     flunisolide 25 mcg, 0.025%, (NASALIDE) 25 mcg (0.025 %) Spry USE 2 SPRAYS IN EACH NOSTRIL TWICE DAILY    latanoprost 0.005 % ophthalmic solution Place 1 drop into both eyes every evening.    lisinopriL (PRINIVIL,ZESTRIL) 40 MG tablet Take 1 tablet (40 mg total) by mouth once daily.    metoprolol succinate (TOPROL-XL) 100 MG 24 hr  tablet Take 1 tablet (100 mg total) by mouth once daily.    pantoprazole (PROTONIX) 40 MG tablet Take 1 tablet (40 mg total) by mouth once daily.    rosuvastatin (CRESTOR) 40 MG Tab Take 1 tablet (40 mg total) by mouth every evening. Generic is fine.    timolol maleate 0.5% (TIMOPTIC) 0.5 % Drop Place 1 drop into both eyes every morning.    tiZANidine (ZANAFLEX) 4 MG tablet Take 1 tablet (4 mg total) by mouth every 8 (eight) hours.     Family History     Problem Relation (Age of Onset)    Hypertension Father        Tobacco Use    Smoking status: Former Smoker     Packs/day: 0.25     Years: 15.00     Pack years: 3.75     Quit date: 1988     Years since quittin.6    Smokeless tobacco: Never Used   Substance and Sexual Activity    Alcohol use: No     Frequency: Never     Drinks per session: Patient refused     Binge frequency: Never    Drug use: No    Sexual activity: Not on file     Review of Systems   Constitution: Positive for diaphoresis and malaise/fatigue.   HENT: Negative for hearing loss and hoarse voice.    Eyes: Negative for blurred vision and visual disturbance.   Cardiovascular: Negative for chest pain, claudication, dyspnea on exertion, irregular heartbeat, leg swelling, near-syncope, orthopnea, palpitations, paroxysmal nocturnal dyspnea and syncope.   Respiratory: Positive for shortness of breath. Negative for cough, hemoptysis, sleep disturbances due to breathing, snoring and wheezing.    Endocrine: Negative for cold intolerance and heat intolerance.   Hematologic/Lymphatic: Does not bruise/bleed easily.   Skin: Negative for color change, dry skin and nail changes.   Musculoskeletal: Positive for arthritis and back pain. Negative for joint pain and myalgias.   Gastrointestinal: Negative for bloating, abdominal pain, constipation, nausea and vomiting.   Genitourinary: Negative for dysuria, flank pain, hematuria and hesitancy.   Neurological: Positive for weakness. Negative for  headaches, light-headedness, loss of balance, numbness and paresthesias.   Psychiatric/Behavioral: Negative for altered mental status.   Allergic/Immunologic: Negative for environmental allergies.     Objective:     Vital Signs (Most Recent):  Temp: 97.7 °F (36.5 °C) (11/27/20 0950)  Pulse: (!) 44 (11/27/20 1100)  Resp: (!) 22 (11/27/20 1100)  BP: (!) 146/72 (11/27/20 1100)  SpO2: 96 % (11/27/20 1100) Vital Signs (24h Range):  Temp:  [97.7 °F (36.5 °C)-98.5 °F (36.9 °C)] 97.7 °F (36.5 °C)  Pulse:  [38-44] 44  Resp:  [18-22] 22  SpO2:  [96 %-99 %] 96 %  BP: (117-171)/(55-77) 146/72     Weight: 114.3 kg (251 lb 15.8 oz)  Body mass index is 40.67 kg/m².    SpO2: 96 %  O2 Device (Oxygen Therapy): room air    No intake or output data in the 24 hours ending 11/27/20 1122    Lines/Drains/Airways     Peripheral Intravenous Line                 Peripheral IV - Single Lumen 11/27/20 0959 20 G Right Hand less than 1 day         Peripheral IV - Single Lumen 11/27/20 1005 20 G Left Hand less than 1 day                Physical Exam   Constitutional: He is oriented to person, place, and time. He appears well-developed and well-nourished. No distress.   HENT:   Head: Normocephalic and atraumatic.   Eyes: Pupils are equal, round, and reactive to light.   Neck: Normal range of motion and full passive range of motion without pain. Neck supple. No JVD present.   Cardiovascular: S1 normal, S2 normal and intact distal pulses. An irregular rhythm present. Bradycardia present. PMI is not displaced. Exam reveals no distant heart sounds.   No murmur heard.  Pulses:       Radial pulses are 2+ on the right side and 2+ on the left side.        Dorsalis pedis pulses are 2+ on the right side and 2+ on the left side.   Complete AV dissociation, HR 30s  CHEST PAIN FREE   Pulmonary/Chest: Effort normal and breath sounds normal. No accessory muscle usage. No respiratory distress. He has no decreased breath sounds. He has no wheezes. He has no rales.    Abdominal: Soft. Bowel sounds are normal. He exhibits no distension. There is no abdominal tenderness.   Obese abdomen   Musculoskeletal: Normal range of motion.         General: No edema.      Right ankle: He exhibits no swelling.      Left ankle: He exhibits no swelling.   Neurological: He is alert and oriented to person, place, and time.   Skin: Skin is warm and dry. He is not diaphoretic. No cyanosis. Nails show no clubbing.   Psychiatric: He has a normal mood and affect. His speech is normal and behavior is normal. Judgment and thought content normal. Cognition and memory are normal.   Nursing note and vitals reviewed.      Significant Labs:   CMP   Recent Labs   Lab 11/27/20  1012      K 4.6   *   CO2 17*      BUN 43*   CREATININE 2.5*   CALCIUM 8.6*   PROT 7.1   ALBUMIN 3.6   BILITOT 0.6   ALKPHOS 86   AST 23   ALT 21   ANIONGAP 13   ESTGFRAFRICA 29*   EGFRNONAA 25*   , CBC   Recent Labs   Lab 11/27/20  1012   WBC 12.15   HGB 12.1*   HCT 37.9*      , Troponin   Recent Labs   Lab 11/27/20  1012   TROPONINI 0.097*    and All pertinent lab results from the last 24 hours have been reviewed.        Assessment and Plan:     * Complete heart block  Complete AV dissociation noted in ED  Keep NPO   Check ECHO  Hold BB for next 48 hours  Plan for Temporary pacing wire today per Dr Kenyon  Risks, benefits, and alternatives reviewed with patient. Patient agrees to proceed with procedure as planned. Consent obtained, all questions answered and appropriately witnessed.   May need PPM implant on Monday if remains complete dissociation, further recs to follow  Will need ICU admission post procedure  ICU Cardiac Monitoring      Acute on chronic diastolic CHF (congestive heart failure)  BNP 1191  Did not seem overloaded on exam today  Likely due to Complete Heart block  Hold BB for next 48 hours given complete heart block and need for temporary pacing wire  Hold ACei/ARB for now given DON  Dash  diet, 2 gm sodium restriction  1500 ml fluid restriction  Daily weights  Strict I/Os  Check ECHO  Further recs to follow.     DON (acute kidney injury)  Cr 2.5 on admit  Recommend gentle IVF's  Monitor closely with daily labs    Elevated troponin  Troponin 0.097  Trend serial troponin  Check ECHO  Chest pain free on exam  Will need to hold BB for next 48 hours given complete heart block  Start IV heparin gtt after Temporary pacing wire placed this AM  Further recs to follow        VTE Risk Mitigation (From admission, onward)    None          Thank you for your consult. I will follow-up with patient. Please contact us if you have any additional questions.    Cristela Celestin, SHAILESH-C  Cardiology   [facility]

## 2020-11-27 NOTE — Clinical Note
The ECG shows a paced rhythm. The patient has a temporary pacemaker. Pacemaker rate at 50 bpm. Pacemaker output at 5 mA. VVI

## 2020-11-27 NOTE — ASSESSMENT & PLAN NOTE
BNP 1191  Did not seem overloaded on exam today  Likely due to Complete Heart block  Hold BB for next 48 hours given complete heart block and need for temporary pacing wire  Hold ACei/ARB for now given DON  Dash diet, 2 gm sodium restriction  1500 ml fluid restriction  Daily weights  Strict I/Os  Check ECHO  Further recs to follow.

## 2020-11-27 NOTE — HOSPITAL COURSE
11/27 - Seen post temporary pacing wire placement and paced fully awake, alert and responsive in no distress and denies earlier symptoms of SOB and diaphoresis now Hungry w/ VSS.  11/28 - Upright in bed fully awake, alert and responsive with orientation in no distress on RA denies pain or SOB.  Still paced and HR drop to 27 with pause of TV pacer this AM.  VSS on pacing and hungry this AM.   11/29 - Up in bed side chair eating breakfast in no distress with VSS except mild to moderate HTN.  Still paced per TV and fariba to 40 with pause of pacing this AM.

## 2020-11-27 NOTE — PROGRESS NOTES
Pacer stopped capturing at 1537. Would not capture after MA increase. Dr Kenyon notified, will come to assess. Patient asymptomatic the whole time.

## 2020-11-27 NOTE — ASSESSMENT & PLAN NOTE
Troponin 0.097  Trend serial troponin  Check ECHO  Chest pain free on exam  Will need to hold BB for next 48 hours given complete heart block  Start IV heparin gtt after Temporary pacing wire placed this AM  Further recs to follow

## 2020-11-27 NOTE — Clinical Note
Generator Pocket made at the left  upper chest  with blunt dissection, plasma blade and sharp dissection.

## 2020-11-27 NOTE — ED NOTES
Pt c/o SOB and sweating - onset yesterday morning. Went to clinic today and was told to come to ER for hypotension. Pt takes metoprolol. Denies fever, n/v/d, CP, HA, weakness, dizziness, numbness, tingling or any other symptoms at this time.    Patient moved to ED room 8, patient assisted onto stretcher and changed into a gown. Patient placed on cardiac monitor, continuous pulse oximetry and automatic blood pressure cuff. Bed placed in low locked position, side rails up x 2, call light is within reach of patient or family, orientation to room and explanation of wait provided to family and patient, alarms set and turned on for monitor and pulse ox, awaiting MD evaluation and orders, will continue to monitor.    Patient identifies self as Damon Yoo    LOC: The patient is awake, alert and aware of environment with an appropriate affect, the patient is oriented x 3 and speaking appropriately.  APPEARANCE: Patient resting comfortably and in no acute distress, patient is clean and well groomed, patient's clothing is properly fastened.  SKIN: The skin is warm and dry, color consistent with ethnicity, patient has normal skin turgor and moist mucus membranes, skin intact, no breakdown or bruising noted.  MUSCULOSKELETAL: Patient moving all extremities well, no obvious swelling or deformities noted.  RESPIRATORY: Airway is open and patent, respirations are spontaneous, patient has a normal effort and rate, no accessory muscle use noted.  CARDIAC: Patient bradycardic, 2+ peripheral edema noted bilaterally, capillary refill < 3 seconds, 2+ pedal pulses.   ABDOMEN: Soft and non tender to palpation, no distention noted.  NEUROLOGIC: PERRL, eyes open spontaneously, behavior appropriate to situation, follows commands, facial expression symmetrical, bilateral hand grasp equal and even, purposeful motor response noted, normal sensation in all extremities when touched with a finger.

## 2020-11-27 NOTE — ED PROVIDER NOTES
SCRIBE #1 NOTE: I, Corinne Mack, am scribing for, and in the presence of, Tejal Moreno MD. I have scribed the entire note.      History      Chief Complaint   Patient presents with    Bradycardia     was at clinic for SOB and chills and was sent here for HR in the 40s        Review of patient's allergies indicates:  No Known Allergies     HPI   HPI    11/27/2020, 10:04 AM   History obtained from the patient      History of Present Illness: Damon Yoo is a 70 y.o. male patient with PMHx of CAD, HTN, CABG, and hypertrophic cardiomyopathy who presents to the Emergency Department for SOB and diaphoresis which onset gradually yesterday. Pt was seen at the Urgent Care today where it was noted that the pt's HR was in the 30s. Pt had a rapid COVID screen done which was negative before being referred to the ED for further evaluation. Symptoms are intermittent and moderate in severity. No mitigating or exacerbating factors reported. No associated sxs reported. Patient denies any fever, chills, CP, N/V/D, abd pain, back pain, neck pain, HA, dizziness, and all other sxs at this time. No prior Tx reported. No further complaints or concerns at this time.         Arrival mode: Personal vehicle    PCP: TIEN Mason Jr, MD       Past Medical History:  Past Medical History:   Diagnosis Date    Abnormal EKG 10/25/2013    Acute on chronic diastolic CHF (congestive heart failure) 11/27/2020    Arthritis     CAD (coronary artery disease)     Cancer     Prostate [id number] prostate    Glaucoma     Gout attack     Hyperlipidemia     Hypertension     Hypertrophic cardiomyopathy 10/25/2013    S/P CABG (coronary artery bypass graft) 10/25/2013       Past Surgical History:  Past Surgical History:   Procedure Laterality Date    COLONOSCOPY N/A 12/4/2017    Procedure: COLONOSCOPY;  Surgeon: Dina Ledesma MD;  Location: [facility];  Service: Endoscopy;  Laterality: N/A;    CORONARY ARTERY BYPASS GRAFT  05/2003    x1     PROSTATE SURGERY      RALP 2013    TRANSFORAMINAL EPIDURAL INJECTION OF STEROID Right 2019    Procedure: Right L5/S1+ S1 TF NAHUN with IV sedation;  Surgeon: Ermias Mario MD;  Location: [facility];  Service: Pain Management;  Laterality: Right;         Family History:  Family History   Problem Relation Age of Onset    Hypertension Father     Strabismus Neg Hx     Retinal detachment Neg Hx     Macular degeneration Neg Hx     Glaucoma Neg Hx     Blindness Neg Hx     Amblyopia Neg Hx        Social History:  Social History     Tobacco Use    Smoking status: Former Smoker     Packs/day: 0.25     Years: 15.00     Pack years: 3.75     Quit date: 1988     Years since quittin.6    Smokeless tobacco: Never Used   Substance and Sexual Activity    Alcohol use: No     Frequency: Never     Drinks per session: Patient refused     Binge frequency: Never    Drug use: No    Sexual activity: Unknown       ROS   Review of Systems   Constitutional: Positive for diaphoresis. Negative for chills and fever.   Respiratory: Positive for shortness of breath. Negative for cough.    Cardiovascular: Negative for chest pain and leg swelling.        (+) low HR   Gastrointestinal: Negative for abdominal pain, diarrhea, nausea and vomiting.   Musculoskeletal: Negative for back pain, neck pain and neck stiffness.   Skin: Negative for rash and wound.   Neurological: Negative for dizziness, light-headedness, numbness and headaches.   All other systems reviewed and are negative.    Physical Exam      Initial Vitals [20 0950]   BP Pulse Resp Temp SpO2   (!) 117/55 (!) 43 18 97.7 °F (36.5 °C) 98 %      MAP       --          Physical Exam  Nursing Notes and Vital Signs Reviewed.  Constitutional: Patient is in no acute distress. Well-developed and well-nourished.  Head: Atraumatic. Normocephalic.  Eyes: PERRL. EOM intact. Conjunctivae are not pale. No scleral icterus.  ENT: Mucous membranes are moist. Oropharynx is  clear and symmetric.    Neck: Supple. Full ROM. No lymphadenopathy.  Cardiovascular: Regular rate. Regular rhythm. No murmurs, rubs, or gallops. Distal pulses are 2+ and symmetric.  Pulmonary/Chest: No respiratory distress. Clear to auscultation bilaterally. No wheezing or rales.  Abdominal: Soft and non-distended.  There is no tenderness.  No rebound, guarding, or rigidity.   Musculoskeletal: Moves all extremities. No obvious deformities. No edema.   Skin: Warm and dry.  Neurological:  Alert, awake, and appropriate.  Normal speech.  No acute focal neurological deficits are appreciated.  Psychiatric: Normal affect. Good eye contact. Appropriate in content.    ED Course    Critical Care    Date/Time: 11/27/2020 11:13 AM  Performed by: Tejal Moreno MD  Authorized by: Tejal Moreno MD   Direct patient critical care time: 14 minutes  Additional history critical care time: 7 minutes  Ordering / reviewing critical care time: 6 minutes  Documentation critical care time: 12 minutes  Consulting other physicians critical care time: 6 minutes  Total critical care time (exclusive of procedural time) : 45 minutes  Critical care time was exclusive of separately billable procedures and treating other patients and teaching time.  Critical care was necessary to treat or prevent imminent or life-threatening deterioration of the following conditions: cardiac failure.  Critical care was time spent personally by me on the following activities: blood draw for specimens, development of treatment plan with patient or surrogate, discussions with consultants, interpretation of cardiac output measurements, evaluation of patient's response to treatment, examination of patient, obtaining history from patient or surrogate, ordering and performing treatments and interventions, ordering and review of laboratory studies, ordering and review of radiographic studies, pulse oximetry, re-evaluation of patient's condition and review of old  "charts.        ED Vital Signs:  Vitals:    11/27/20 0950 11/27/20 0959 11/27/20 1000 11/27/20 1007   BP: (!) 117/55  (!) 171/77 129/61   Pulse: (!) 43 (!) 39 (!) 40 (!) 39   Resp: 18  20 (!) 21   Temp: 97.7 °F (36.5 °C)      TempSrc: Oral      SpO2: 98%  99% 98%   Weight: 114.3 kg (251 lb 15.8 oz)      Height: 5' 6" (1.676 m)       11/27/20 1030 11/27/20 1100 11/27/20 1130 11/27/20 1208   BP: 132/62 (!) 146/72 (!) 159/71 (!) 130/98   Pulse: (!) 38 (!) 44 (!) 38 (!) 39   Resp: 20 (!) 22 20 (!) 25   Temp:    97.9 °F (36.6 °C)   TempSrc:    Temporal   SpO2: 97% 96% 97% 97%   Weight:  113.9 kg (251 lb)     Height:  5' 6" (1.676 m)         Abnormal Lab Results:  Labs Reviewed   CBC W/ AUTO DIFFERENTIAL - Abnormal; Notable for the following components:       Result Value    RBC 4.05 (*)     Hemoglobin 12.1 (*)     Hematocrit 37.9 (*)     MCHC 31.9 (*)     MPV 13.7 (*)     Immature Granulocytes 0.7 (*)     Gran # (ANC) 8.1 (*)     Immature Grans (Abs) 0.09 (*)     All other components within normal limits   COMPREHENSIVE METABOLIC PANEL - Abnormal; Notable for the following components:    Chloride 114 (*)     CO2 17 (*)     BUN 43 (*)     Creatinine 2.5 (*)     Calcium 8.6 (*)     eGFR if  29 (*)     eGFR if non  25 (*)     All other components within normal limits   TROPONIN I - Abnormal; Notable for the following components:    Troponin I 0.097 (*)     All other components within normal limits   B-TYPE NATRIURETIC PEPTIDE - Abnormal; Notable for the following components:    BNP 1,191 (*)     All other components within normal limits   PROTIME-INR   APTT        All Lab Results:  Results for orders placed or performed during the hospital encounter of 11/27/20   CBC auto differential   Result Value Ref Range    WBC 12.15 3.90 - 12.70 K/uL    RBC 4.05 (L) 4.60 - 6.20 M/uL    Hemoglobin 12.1 (L) 14.0 - 18.0 g/dL    Hematocrit 37.9 (L) 40.0 - 54.0 %    MCV 94 82 - 98 fL    MCH 29.9 27.0 - 31.0 " pg    MCHC 31.9 (L) 32.0 - 36.0 g/dL    RDW 13.8 11.5 - 14.5 %    Platelets 162 150 - 350 K/uL    MPV 13.7 (H) 9.2 - 12.9 fL    Immature Granulocytes 0.7 (H) 0.0 - 0.5 %    Gran # (ANC) 8.1 (H) 1.8 - 7.7 K/uL    Immature Grans (Abs) 0.09 (H) 0.00 - 0.04 K/uL    Lymph # 2.8 1.0 - 4.8 K/uL    Mono # 0.9 0.3 - 1.0 K/uL    Eos # 0.2 0.0 - 0.5 K/uL    Baso # 0.06 0.00 - 0.20 K/uL    nRBC 0 0 /100 WBC    Gran % 66.6 38.0 - 73.0 %    Lymph % 22.7 18.0 - 48.0 %    Mono % 7.6 4.0 - 15.0 %    Eosinophil % 1.9 0.0 - 8.0 %    Basophil % 0.5 0.0 - 1.9 %    Differential Method Automated    Comprehensive metabolic panel   Result Value Ref Range    Sodium 144 136 - 145 mmol/L    Potassium 4.6 3.5 - 5.1 mmol/L    Chloride 114 (H) 95 - 110 mmol/L    CO2 17 (L) 23 - 29 mmol/L    Glucose 105 70 - 110 mg/dL    BUN 43 (H) 8 - 23 mg/dL    Creatinine 2.5 (H) 0.5 - 1.4 mg/dL    Calcium 8.6 (L) 8.7 - 10.5 mg/dL    Total Protein 7.1 6.0 - 8.4 g/dL    Albumin 3.6 3.5 - 5.2 g/dL    Total Bilirubin 0.6 0.1 - 1.0 mg/dL    Alkaline Phosphatase 86 55 - 135 U/L    AST 23 10 - 40 U/L    ALT 21 10 - 44 U/L    Anion Gap 13 8 - 16 mmol/L    eGFR if African American 29 (A) >60 mL/min/1.73 m^2    eGFR if non African American 25 (A) >60 mL/min/1.73 m^2   Troponin I #1   Result Value Ref Range    Troponin I 0.097 (H) 0.000 - 0.026 ng/mL   BNP   Result Value Ref Range    BNP 1,191 (H) 0 - 99 pg/mL   Protime-INR   Result Value Ref Range    Prothrombin Time 10.6 9.0 - 12.5 sec    INR 1.0 0.8 - 1.2   APTT   Result Value Ref Range    aPTT 26.5 21.0 - 32.0 sec   Hepatitis C antibody   Result Value Ref Range    Hepatitis C Ab Negative Negative   Echo Color Flow Doppler? Yes; Bubble Contrast? No   Result Value Ref Range    BSA 2.3 m2    LA WIDTH 3.44 cm    LVIDd 4.19 3.5 - 6.0 cm    IVS 1.62 (A) 0.6 - 1.1 cm    Posterior Wall 1.56 (A) 0.6 - 1.1 cm    Ao root annulus 4.06 cm    LVIDs 2.57 2.1 - 4.0 cm    FS 39 28 - 44 %    LA volume 34.87 cm3    Sinus 4.17 cm    STJ  3.60 cm    Ascending aorta 4.10 cm    LV mass 272.46 g    LA size 3.36 cm    TAPSE 1.94 cm    Left Ventricle Relative Wall Thickness 0.74 cm    AV mean gradient 9 mmHg    AV valve area 1.78 cm2    AV Velocity Ratio 0.63     AV index (prosthetic) 0.65     MV valve area p 1/2 method 3.22 cm2    PV peak gradient 5.83 mmHg    E/A ratio 1.16     E wave decelartion time 235.35 msec    IVRT 72.66 msec    LVOT diameter 1.86 cm    LVOT area 2.7 cm2    LVOT peak yung 1.22 m/s    LVOT peak VTI 29.48 cm    Ao peak yung 1.94 m/s    Ao VTI 45.02 cm    RVOT peak yung 1.21 m/s    RVOT peak VTI 29.08 cm    LVOT stroke volume 80.06 cm3    AV peak gradient 15 mmHg    PV mean gradient 3.63 mmHg    MV Peak E Yung 0.87 m/s    TR Max Yung 4.40 m/s    MV stenosis pressure 1/2 time 68.25 ms    MV Peak A Yung 0.75 m/s    LV Systolic Volume 23.84 mL    LV Systolic Volume Index 10.8 mL/m2    LV Diastolic Volume 78.10 mL    LV Diastolic Volume Index 35.45 mL/m2    LA Volume Index 15.8 mL/m2    LV Mass Index 124 g/m2    RA Major Axis 3.67 cm    Left Atrium Minor Axis 3.52 cm    Left Atrium Major Axis 3.58 cm    Triscuspid Valve Regurgitation Peak Gradient 77 mmHg    RA Width 3.02 cm    Right Atrial Pressure (from IVC) 3 mmHg    TV rest pulmonary artery pressure 80 mmHg       Imaging Results:  Imaging Results          X-Ray Chest AP Portable (Final result)  Result time 11/27/20 10:26:05    Final result by Holland Pereyra MD (11/27/20 10:26:05)                 Impression:      No acute process seen.      Electronically signed by: Holland Pereyra MD  Date:    11/27/2020  Time:    10:26             Narrative:    EXAMINATION:  XR CHEST AP PORTABLE    CLINICAL HISTORY:  shortness of breath;    FINDINGS:  Single view of the chest.  Comparison 03/20/2013    Cardiac silhouette is enlarged but stable.  Sternotomy wires are noted.  Aorta demonstrates atherosclerotic disease.  Minimal atelectasis at the lung bases.  The lungs demonstrate no evidence of active  disease.  No evidence of pleural effusion or pneumothorax.  Bones demonstrate degenerative changes.                                 The EKG was ordered, reviewed, and independently interpreted by the ED provider.  Interpretation time: 0957  Rate: 39 BPM  Rhythm: Marked sinus bradycardia with AV dissociation and Idioventricular rhythm  Interpretation: RBBB. Left anterior fascicular block. Bifascicular block. LVH with repolarization abnormality. Anterolateral infarct. No STEMI.             The Emergency Provider reviewed the vital signs and test results, which are outlined above.    ED Discussion     10:16 AM: Dr. Moreno discussed the pt's case with Dr. Castellano (Cardiology) who recommends putting in a Cardiology consult. Cardiology will see the pt.    11:04 AM: Dr. Moreno discussed the pt's case with Dr. Castellano (Cardiology) who recommends admission to ICU for temporary pacer placement.    11:11 AM: Discussed case with SHAILESH Chambers ([facility]). Helen agrees with current care and management of pt and accepts admission.   Admitting Service: Hospital medicine   Admitting Physician: Dr. Hoffman  Admit to: Obs ICU    11:13 AM: Re-evaluated pt. I have discussed test results, shared treatment plan, and the need for admission with patient and family at bedside. Pt and family express understanding at this time and agree with all information. All questions answered. Pt and family have no further questions or concerns at this time. Pt is ready for admit.      ED Medication(s):  Medications   rosuvastatin tablet 40 mg (has no administration in time range)   pantoprazole EC tablet 40 mg (40 mg Oral Given 11/27/20 1304)   fluticasone propionate 50 mcg/actuation nasal spray 100 mcg (has no administration in time range)   omega-3 acid ethyl esters capsule 1 g (has no administration in time range)   allopurinol split tablet 450 mg (has no administration in time range)   sodium chloride 0.9% flush 10 mL (has no  administration in time range)   acetaminophen tablet 650 mg (has no administration in time range)   polyethylene glycol packet 17 g (has no administration in time range)   ondansetron injection 4 mg (has no administration in time range)   melatonin tablet 6 mg (has no administration in time range)   0.9%  NaCl infusion (50 mL/hr Intravenous New Bag 11/27/20 1200)          Medication List      ASK your doctor about these medications    acetaminophen 500 MG tablet  Commonly known as: TYLENOL     allopurinoL 300 MG tablet  Commonly known as: ZYLOPRIM  TAKE 1 1/2 TABLET BY MOUTH DAILY     amLODIPine 10 MG tablet  Commonly known as: NORVASC  Take 1 tablet (10 mg total) by mouth once daily.     aspirin 81 MG Chew     celecoxib 200 MG capsule  Commonly known as: CeleBREX  Take 1 capsule (200 mg total) by mouth once daily. Take with food     diclofenac sodium 1 % Gel  Commonly known as: VOLTAREN  Apply 2 g topically once daily.     flunisolide 25 mcg (0.025%) 25 mcg (0.025 %) Spry  Commonly known as: NASALIDE  USE 2 SPRAYS IN EACH NOSTRIL TWICE DAILY     latanoprost 0.005 % ophthalmic solution  Place 1 drop into both eyes every evening.     lisinopriL 40 MG tablet  Commonly known as: PRINIVIL,ZESTRIL  Take 1 tablet (40 mg total) by mouth once daily.     metoprolol succinate 100 MG 24 hr tablet  Commonly known as: TOPROL-XL  Take 1 tablet (100 mg total) by mouth once daily.     OMEGA 3-6-9 1,200 mg (400 [id number]) Cap  Generic drug: fish oil-fat acid [id number]     pantoprazole 40 MG tablet  Commonly known as: PROTONIX  Take 1 tablet (40 mg total) by mouth once daily.     rosuvastatin 40 MG Tab  Commonly known as: CRESTOR  Take 1 tablet (40 mg total) by mouth every evening. Generic is fine.     timolol maleate 0.5% 0.5 % Drop  Commonly known as: TIMOPTIC  Place 1 drop into both eyes every morning.     tiZANidine 4 MG tablet  Commonly known as: ZANAFLEX  Take 1 tablet (4 mg total) by mouth every 8 (eight) hours.                   Medical Decision Making           Medical Decision Making:   Clinical Tests:   Lab Tests: Ordered and Reviewed  Radiological Study: Ordered and Reviewed  Medical Tests: Ordered and Reviewed           Scribe Attestation:   Scribe #1: I performed the above scribed service and the documentation accurately describes the services I performed. I attest to the accuracy of the note 11/27/2020.    Attending:   Physician Attestation Statement for Scribe #1: I, Tejal Moreno MD, personally performed the services described in this documentation, as scribed by Corinne Mack, in my presence, and it is both accurate and complete.          Clinical Impression       ICD-10-CM ICD-9-CM   1. Complete heart block  I44.2 426.0   2. Bradycardia  R00.1 427.89   3. Chronic hypertension  I10 401.9   4. Elevated troponin  R77.8 790.6   5. CHB (complete heart block)  I44.2 426.0       Disposition:   Disposition: Placed in Observation  Condition: Serious           Tejal Moreno MD  11/27/20 0821

## 2020-11-27 NOTE — ASSESSMENT & PLAN NOTE
Complete AV dissociation noted in ED  Keep NPO   Check ECHO  Hold BB for next 48 hours  Plan for Temporary pacing wire today per Dr Kenyon  Risks, benefits, and alternatives reviewed with patient. Patient agrees to proceed with procedure as planned. Consent obtained, all questions answered and appropriately witnessed.   May need PPM implant on Monday if remains complete dissociation, further recs to follow  Will need ICU admission post procedure  ICU Cardiac Monitoring

## 2020-11-27 NOTE — ED NOTES
Patient placed in a gown and on continuous cardiac monitor, automatic blood pressure cuff and continuous pulse oximeter.

## 2020-11-27 NOTE — HPI
This is a 71 yo male who has a PMHx of CAD with CABG in 2013, HTN, HLD, prostate cancer, glaucoma, arthritis, gout, and hypertrophic cardiomyopathy. Patient reports he has been in his normal state of health until yesterday when he began with new onset of intermittent SOB and profuse diaphoresis.  Patient  was seen at the Urgent Care today where it was noted that his heart rate was in the 30s. Patient was sent to Emergency Department for further evaluation. Patient was again noted to have heart rate in 30s-40s and found to be in complete heart block. So far his blood pressure has remained stable. Patient was seen by Cardiology in ER and plans for urgent temporary pacing wire placement to be done this AM per Dr Kenyon.                 Per report- Patient had a rapid COVID screen done which was negative before being referred to the ED for further evaluation.

## 2020-11-27 NOTE — ASSESSMENT & PLAN NOTE
Telemetry  Patient was seen by Cardiology in ER and plans for urgent temporary pacing wire placement to be done this AM per Dr Kenyon.   Home bblockade and eye drops currently on hold

## 2020-11-27 NOTE — SUBJECTIVE & OBJECTIVE
Past Medical History:   Diagnosis Date    Abnormal EKG 10/25/2013    Acute on chronic diastolic CHF (congestive heart failure) 2020    Arthritis     CAD (coronary artery disease)     Cancer     Prostate [id number] prostate    Glaucoma     Gout attack     Hyperlipidemia     Hypertension     Hypertrophic cardiomyopathy 10/25/2013    S/P CABG (coronary artery bypass graft) 10/25/2013       Past Surgical History:   Procedure Laterality Date    COLONOSCOPY N/A 2017    Procedure: COLONOSCOPY;  Surgeon: Dina Ledesma MD;  Location: [facility] ENDO;  Service: Endoscopy;  Laterality: N/A;    CORONARY ARTERY BYPASS GRAFT  05/2003    x1    PROSTATE SURGERY      RALP 2013    TRANSFORAMINAL EPIDURAL INJECTION OF STEROID Right 2019    Procedure: Right L5/S1+ S1 TF NAHUN with IV sedation;  Surgeon: Ermias Mario MD;  Location: [facility] PAIN MGT;  Service: Pain Management;  Laterality: Right;       Review of patient's allergies indicates:  No Known Allergies    Family History     Problem Relation (Age of Onset)    Hypertension Father        Tobacco Use    Smoking status: Former Smoker     Packs/day: 0.25     Years: 15.00     Pack years: 3.75     Quit date: 1988     Years since quittin.6    Smokeless tobacco: Never Used   Substance and Sexual Activity    Alcohol use: No     Frequency: Never     Drinks per session: Patient refused     Binge frequency: Never    Drug use: No    Sexual activity: Not on file         Review of Systems   Constitutional: Positive for activity change and appetite change (hungry). Negative for diaphoresis and fatigue.   HENT: Negative for congestion.    Eyes: Negative for discharge.   Respiratory: Negative for cough, chest tightness, shortness of breath and wheezing.    Cardiovascular: Negative for chest pain, palpitations and leg swelling.   Gastrointestinal: Negative for abdominal distention, abdominal pain, blood in stool, nausea and vomiting.   Endocrine: Negative for  polydipsia and polyuria.   Genitourinary: Negative for difficulty urinating.   Musculoskeletal: Negative for arthralgias.   Skin: Negative for color change.   Allergic/Immunologic: Negative for immunocompromised state.   Neurological: Negative for dizziness, weakness and headaches.   Hematological: Does not bruise/bleed easily.   Psychiatric/Behavioral: Negative for agitation, behavioral problems and confusion.     Objective:     Vital Signs (Most Recent):  Temp: 97.9 °F (36.6 °C) (11/27/20 1208)  Pulse: (!) 42 (11/27/20 1400)  Resp: (!) 22 (11/27/20 1400)  BP: (!) 130/98 (11/27/20 1208)  SpO2: 96 % (11/27/20 1400) Vital Signs (24h Range):  Temp:  [97.7 °F (36.5 °C)-98.5 °F (36.9 °C)] 97.9 °F (36.6 °C)  Pulse:  [38-44] 42  Resp:  [18-25] 22  SpO2:  [96 %-99 %] 96 %  BP: (117-171)/(55-98) 130/98     Weight: 113.9 kg (251 lb)  Body mass index is 40.51 kg/m².    No intake or output data in the 24 hours ending 11/27/20 1532    Physical Exam  Vitals signs and nursing note reviewed.   Constitutional:       General: He is awake. He is not in acute distress.     Appearance: Normal appearance. He is well-developed. He is not ill-appearing or toxic-appearing.      Interventions: He is not intubated.  HENT:      Head: Normocephalic and atraumatic.      Mouth/Throat:      Mouth: Mucous membranes are moist.   Eyes:      General: Lids are normal.      Pupils: Pupils are equal, round, and reactive to light.   Neck:      Musculoskeletal: Normal range of motion.      Vascular: No carotid bruit.      Trachea: Trachea normal.     Cardiovascular:      Rate and Rhythm: Normal rate and regular rhythm.      Pulses:           Radial pulses are 2+ on the right side and 2+ on the left side.        Dorsalis pedis pulses are 1+ on the right side and 1+ on the left side.      Heart sounds: Normal heart sounds.   Pulmonary:      Effort: Pulmonary effort is normal. No tachypnea, accessory muscle usage or respiratory distress. He is not intubated.       Breath sounds: Normal breath sounds.   Chest:      Chest wall: No deformity or tenderness.   Abdominal:      General: Bowel sounds are decreased. There is no distension.      Palpations: Abdomen is soft.      Tenderness: There is no abdominal tenderness.   Genitourinary:     Penis: Normal.    Musculoskeletal: Normal range of motion.      Right lower leg: Edema (trace) present.      Left lower leg: Edema (trace) present.      Right foot: No deformity.      Left foot: No deformity.   Lymphadenopathy:      Cervical: No cervical adenopathy.   Skin:     General: Skin is warm and dry.      Capillary Refill: Capillary refill takes less than 2 seconds.      Findings: No rash.   Neurological:      Mental Status: He is alert and oriented to person, place, and time.   Psychiatric:         Attention and Perception: Attention normal.         Mood and Affect: Mood normal.         Speech: Speech normal.         Behavior: Behavior normal. Behavior is cooperative.         Thought Content: Thought content normal.         Cognition and Memory: Cognition normal.         Judgment: Judgment normal.         Lines/Drains/Airways     Peripheral Intravenous Line                 Peripheral IV - Single Lumen 11/27/20 0959 20 G Right Hand less than 1 day         Peripheral IV - Single Lumen 11/27/20 1005 20 G Left Hand less than 1 day                Significant Labs:    CBC/Anemia Profile:  Recent Labs   Lab 11/27/20  1012   WBC 12.15   HGB 12.1*   HCT 37.9*      MCV 94   RDW 13.8        Chemistries:  Recent Labs   Lab 11/27/20  1012      K 4.6   *   CO2 17*   BUN 43*   CREATININE 2.5*   CALCIUM 8.6*   ALBUMIN 3.6   PROT 7.1   BILITOT 0.6   ALKPHOS 86   ALT 21   AST 23   MG 1.5*       Troponin:   Recent Labs   Lab 11/27/20  1012   TROPONINI 0.097*     All pertinent labs within the past 24 hours have been reviewed.  BNP 1191    Significant Imaging:   CXR: I have reviewed all pertinent results/findings within the past 24  hours and my personal findings are:  No acute pulm process noted

## 2020-11-27 NOTE — H&P (VIEW-ONLY)
[facility] -   Cardiology  Consult Note    Patient Name: Damon Yoo  MRN: [id number]  Admission Date: 11/27/2020  Hospital Length of Stay: 0 days  Code Status: No Order   Attending Provider: Young Hoffman MD   Consulting Provider: SIOMARA Trejo  Primary Care Physician: TIEN Mason Jr, MD  Principal Problem:Complete heart block    Patient information was obtained from patient, spouse/SO, past medical records and ER records.     Inpatient consult to Cardiology  Consult performed by: SIOMARA Page  Consult ordered by: Tejal Moreno MD        Subjective:     Chief Complaint:  Shortness of breath, diaphoresis, slow heart rate     HPI:   Damon Yoo is a 70 year old male who presented to [facility] from Urgent care due to heart rate in 30s with associated shortness of breath and diaphoresis since yesterday. He reports that suddenly yesterday he developed extreme shortness of breath and diaphoresis. He awoke today with continued issues with shortness of breath which provoked him to seek further care at urgent care today. His current medical conditions include CAD s/p CABG, HTN, HLP, Pre diabetes (last A1C 6.2), Hypertrophic cardiomyopathy, obese. EKG today revealed complete AV node dissociation with HR 39. Plan for Temporary pacing wire this AM to restore heart rate control. Labs reviewed, BNP 1191, K+ 4.6, Cr 2.5, Na 144, Troponin 0.097, H/H 12/37. Cardiology consulted to assist with medical management. Chart reviewed, patient seen and examined. ECHO pending. Patient remains in complete AV dissociation at time of exam with HR in low 30s. Plan for urgent temporary pacing wire this AM per Dr Kenyon. Consent obtained and patient/wife are in agreement today. Will hold BB and observe in ICU over the next 48 hours and plan for PPM implant on Monday if remains dependent on pacing wire at that time. Further recs to follow.     Past Medical History:   Diagnosis Date    Abnormal EKG  10/25/2013    Arthritis     CAD (coronary artery disease)     Cancer     Prostate [id number] prostate    Glaucoma     Gout attack     Hyperlipidemia     Hypertension     Hypertrophic cardiomyopathy 10/25/2013    S/P CABG (coronary artery bypass graft) 10/25/2013       Past Surgical History:   Procedure Laterality Date    COLONOSCOPY N/A 12/4/2017    Procedure: COLONOSCOPY;  Surgeon: Dina Ledesma MD;  Location: [facility] ENDO;  Service: Endoscopy;  Laterality: N/A;    CORONARY ARTERY BYPASS GRAFT  05/2003    x1    PROSTATE SURGERY      RALP 2013    TRANSFORAMINAL EPIDURAL INJECTION OF STEROID Right 11/11/2019    Procedure: Right L5/S1+ S1 TF NAHUN with IV sedation;  Surgeon: Ermias Mario MD;  [facility]: [facility] PAIN MGT;  Service: Pain Management;  Laterality: Right;       Review of patient's allergies indicates:  No Known Allergies    No current facility-administered medications on file prior to encounter.      Current Outpatient Medications on File Prior to Encounter   Medication Sig    acetaminophen (TYLENOL) 500 MG tablet Take 500 mg by mouth every 6 (six) hours as needed for Pain.    allopurinoL (ZYLOPRIM) 300 MG tablet TAKE 1 1/2 TABLET BY MOUTH DAILY    amLODIPine (NORVASC) 10 MG tablet Take 1 tablet (10 mg total) by mouth once daily.    aspirin 81 MG chewable tablet Take 1 tablet by mouth Daily.    celecoxib (CELEBREX) 200 MG capsule Take 1 capsule (200 mg total) by mouth once daily. Take with food    diclofenac sodium (VOLTAREN) 1 % Gel Apply 2 g topically once daily.    fish oil-fat acid [id number].[id number] (OMEGA 3-6-9) 1,200 mg Cap Take 1 capsule by mouth once daily.     flunisolide 25 mcg, 0.025%, (NASALIDE) 25 mcg (0.025 %) Spry USE 2 SPRAYS IN EACH NOSTRIL TWICE DAILY    latanoprost 0.005 % ophthalmic solution Place 1 drop into both eyes every evening.    lisinopriL (PRINIVIL,ZESTRIL) 40 MG tablet Take 1 tablet (40 mg total) by mouth once daily.    metoprolol succinate (TOPROL-XL) 100 MG 24 hr  tablet Take 1 tablet (100 mg total) by mouth once daily.    pantoprazole (PROTONIX) 40 MG tablet Take 1 tablet (40 mg total) by mouth once daily.    rosuvastatin (CRESTOR) 40 MG Tab Take 1 tablet (40 mg total) by mouth every evening. Generic is fine.    timolol maleate 0.5% (TIMOPTIC) 0.5 % Drop Place 1 drop into both eyes every morning.    tiZANidine (ZANAFLEX) 4 MG tablet Take 1 tablet (4 mg total) by mouth every 8 (eight) hours.     Family History     Problem Relation (Age of Onset)    Hypertension Father        Tobacco Use    Smoking status: Former Smoker     Packs/day: 0.25     Years: 15.00     Pack years: 3.75     Quit date: 1988     Years since quittin.6    Smokeless tobacco: Never Used   Substance and Sexual Activity    Alcohol use: No     Frequency: Never     Drinks per session: Patient refused     Binge frequency: Never    Drug use: No    Sexual activity: Not on file     Review of Systems   Constitution: Positive for diaphoresis and malaise/fatigue.   HENT: Negative for hearing loss and hoarse voice.    Eyes: Negative for blurred vision and visual disturbance.   Cardiovascular: Negative for chest pain, claudication, dyspnea on exertion, irregular heartbeat, leg swelling, near-syncope, orthopnea, palpitations, paroxysmal nocturnal dyspnea and syncope.   Respiratory: Positive for shortness of breath. Negative for cough, hemoptysis, sleep disturbances due to breathing, snoring and wheezing.    Endocrine: Negative for cold intolerance and heat intolerance.   Hematologic/Lymphatic: Does not bruise/bleed easily.   Skin: Negative for color change, dry skin and nail changes.   Musculoskeletal: Positive for arthritis and back pain. Negative for joint pain and myalgias.   Gastrointestinal: Negative for bloating, abdominal pain, constipation, nausea and vomiting.   Genitourinary: Negative for dysuria, flank pain, hematuria and hesitancy.   Neurological: Positive for weakness. Negative for  headaches, light-headedness, loss of balance, numbness and paresthesias.   Psychiatric/Behavioral: Negative for altered mental status.   Allergic/Immunologic: Negative for environmental allergies.     Objective:     Vital Signs (Most Recent):  Temp: 97.7 °F (36.5 °C) (11/27/20 0950)  Pulse: (!) 44 (11/27/20 1100)  Resp: (!) 22 (11/27/20 1100)  BP: (!) 146/72 (11/27/20 1100)  SpO2: 96 % (11/27/20 1100) Vital Signs (24h Range):  Temp:  [97.7 °F (36.5 °C)-98.5 °F (36.9 °C)] 97.7 °F (36.5 °C)  Pulse:  [38-44] 44  Resp:  [18-22] 22  SpO2:  [96 %-99 %] 96 %  BP: (117-171)/(55-77) 146/72     Weight: 114.3 kg (251 lb 15.8 oz)  Body mass index is 40.67 kg/m².    SpO2: 96 %  O2 Device (Oxygen Therapy): room air    No intake or output data in the 24 hours ending 11/27/20 1122    Lines/Drains/Airways     Peripheral Intravenous Line                 Peripheral IV - Single Lumen 11/27/20 0959 20 G Right Hand less than 1 day         Peripheral IV - Single Lumen 11/27/20 1005 20 G Left Hand less than 1 day                Physical Exam   Constitutional: He is oriented to person, place, and time. He appears well-developed and well-nourished. No distress.   HENT:   Head: Normocephalic and atraumatic.   Eyes: Pupils are equal, round, and reactive to light.   Neck: Normal range of motion and full passive range of motion without pain. Neck supple. No JVD present.   Cardiovascular: S1 normal, S2 normal and intact distal pulses. An irregular rhythm present. Bradycardia present. PMI is not displaced. Exam reveals no distant heart sounds.   No murmur heard.  Pulses:       Radial pulses are 2+ on the right side and 2+ on the left side.        Dorsalis pedis pulses are 2+ on the right side and 2+ on the left side.   Complete AV dissociation, HR 30s  CHEST PAIN FREE   Pulmonary/Chest: Effort normal and breath sounds normal. No accessory muscle usage. No respiratory distress. He has no decreased breath sounds. He has no wheezes. He has no rales.    Abdominal: Soft. Bowel sounds are normal. He exhibits no distension. There is no abdominal tenderness.   Obese abdomen   Musculoskeletal: Normal range of motion.         General: No edema.      Right ankle: He exhibits no swelling.      Left ankle: He exhibits no swelling.   Neurological: He is alert and oriented to person, place, and time.   Skin: Skin is warm and dry. He is not diaphoretic. No cyanosis. Nails show no clubbing.   Psychiatric: He has a normal mood and affect. His speech is normal and behavior is normal. Judgment and thought content normal. Cognition and memory are normal.   Nursing note and vitals reviewed.      Significant Labs:   CMP   Recent Labs   Lab 11/27/20  1012      K 4.6   *   CO2 17*      BUN 43*   CREATININE 2.5*   CALCIUM 8.6*   PROT 7.1   ALBUMIN 3.6   BILITOT 0.6   ALKPHOS 86   AST 23   ALT 21   ANIONGAP 13   ESTGFRAFRICA 29*   EGFRNONAA 25*   , CBC   Recent Labs   Lab 11/27/20  1012   WBC 12.15   HGB 12.1*   HCT 37.9*      , Troponin   Recent Labs   Lab 11/27/20  1012   TROPONINI 0.097*    and All pertinent lab results from the last 24 hours have been reviewed.        Assessment and Plan:     * Complete heart block  Complete AV dissociation noted in ED  Keep NPO   Check ECHO  Hold BB for next 48 hours  Plan for Temporary pacing wire today per Dr Kenyon  Risks, benefits, and alternatives reviewed with patient. Patient agrees to proceed with procedure as planned. Consent obtained, all questions answered and appropriately witnessed.   May need PPM implant on Monday if remains complete dissociation, further recs to follow  Will need ICU admission post procedure  ICU Cardiac Monitoring      Acute on chronic diastolic CHF (congestive heart failure)  BNP 1191  Did not seem overloaded on exam today  Likely due to Complete Heart block  Hold BB for next 48 hours given complete heart block and need for temporary pacing wire  Hold ACei/ARB for now given DON  Dash  diet, 2 gm sodium restriction  1500 ml fluid restriction  Daily weights  Strict I/Os  Check ECHO  Further recs to follow.     DON (acute kidney injury)  Cr 2.5 on admit  Recommend gentle IVF's  Monitor closely with daily labs    Elevated troponin  Troponin 0.097  Trend serial troponin  Check ECHO  Chest pain free on exam  Will need to hold BB for next 48 hours given complete heart block  Start IV heparin gtt after Temporary pacing wire placed this AM  Further recs to follow        VTE Risk Mitigation (From admission, onward)    None          Thank you for your consult. I will follow-up with patient. Please contact us if you have any additional questions.    Cristela Celestin, SHAILESH-C  Cardiology   [facility]

## 2020-11-27 NOTE — CONSULTS
[facility]  Critical Care Medicine  Consult Note    Patient Name: Damon Yoo  MRN: [id number]  Admission Date: 11/27/2020  Hospital Length of Stay: 0 days  Code Status: Full Code  Attending Physician: Young Hoffman MD   Primary Care Provider: TIEN Mason Jr, MD   Principal Problem: Complete heart block      Subjective:     HPI:  Mr Yoo is a elderly 69 yo BM with a PMH of Chronic Diastolic heart failure, CAD, CABG, Prostate CA w/ surgery, Gout, chronic Arthritis, HLD, and HTN.  He presented to [facility] this AM about 0800 hr with complaint of SOB and diaphoresis since yesterday.  In clinic he was found to have HR 35-40 and was sent to [facility] arriving about 1000 hr.  In ED HR 38-44 w/ CHB and Cards consulted.  He is on home Toprol XL.  Also in ED creatine 2.5 (last was 1.7), Mg 1.5 and BNP 1191.  He was admitted to ICU and shortly later taken to Cath Lab for TV temporary pacing wire placement and pacing    Hospital/ICU Course:  11/27 - Seen post temporary pacing wire placement and paced fully awake, alert and responsive in no distress and denies earlier symptoms of SOB and diaphoresis now Hungry w/ VSS.    Past Medical History:   Diagnosis Date    Abnormal EKG 10/25/2013    Acute on chronic diastolic CHF (congestive heart failure) 11/27/2020    Arthritis     CAD (coronary artery disease)     Cancer     Prostate [id number] prostate    Glaucoma     Gout attack     Hyperlipidemia     Hypertension     Hypertrophic cardiomyopathy 10/25/2013    S/P CABG (coronary artery bypass graft) 10/25/2013       Past Surgical History:   Procedure Laterality Date    COLONOSCOPY N/A 12/4/2017    Procedure: COLONOSCOPY;  Surgeon: Dina Ledesma MD;  Location: [facility] ENDO;  Service: Endoscopy;  Laterality: N/A;    CORONARY ARTERY BYPASS GRAFT  05/2003    x1    PROSTATE SURGERY      RALP 2013    TRANSFORAMINAL EPIDURAL INJECTION OF STEROID Right 11/11/2019    Procedure: Right  L5/S1+ S1 TF NAHUN with IV sedation;  Surgeon: Ermias Mario MD;  Location: [facility] PAIN T;  Service: Pain Management;  Laterality: Right;       Review of patient's allergies indicates:  No Known Allergies    Family History     Problem Relation (Age of Onset)    Hypertension Father        Tobacco Use    Smoking status: Former Smoker     Packs/day: 0.25     Years: 15.00     Pack years: 3.75     Quit date: 1988     Years since quittin.6    Smokeless tobacco: Never Used   Substance and Sexual Activity    Alcohol use: No     Frequency: Never     Drinks per session: Patient refused     Binge frequency: Never    Drug use: No    Sexual activity: Not on file         Review of Systems   Constitutional: Positive for activity change and appetite change (hungry). Negative for diaphoresis and fatigue.   HENT: Negative for congestion.    Eyes: Negative for discharge.   Respiratory: Negative for cough, chest tightness, shortness of breath and wheezing.    Cardiovascular: Negative for chest pain, palpitations and leg swelling.   Gastrointestinal: Negative for abdominal distention, abdominal pain, blood in stool, nausea and vomiting.   Endocrine: Negative for polydipsia and polyuria.   Genitourinary: Negative for difficulty urinating.   Musculoskeletal: Negative for arthralgias.   Skin: Negative for color change.   Allergic/Immunologic: Negative for immunocompromised state.   Neurological: Negative for dizziness, weakness and headaches.   Hematological: Does not bruise/bleed easily.   Psychiatric/Behavioral: Negative for agitation, behavioral problems and confusion.     Objective:     Vital Signs (Most Recent):  Temp: 97.9 °F (36.6 °C) (20 1208)  Pulse: (!) 42 (20 1400)  Resp: (!) 22 (20 1400)  BP: (!) 130/98 (20 1208)  SpO2: 96 % (20 1400) Vital Signs (24h Range):  Temp:  [97.7 °F (36.5 °C)-98.5 °F (36.9 °C)] 97.9 °F (36.6 °C)  Pulse:  [38-44] 42  Resp:  [18-25] 22  SpO2:  [96 %-99 %]  96 %  BP: (117-171)/(55-98) 130/98     Weight: 113.9 kg (251 lb)  Body mass index is 40.51 kg/m².    No intake or output data in the 24 hours ending 11/27/20 1532    Physical Exam  Vitals signs and nursing note reviewed.   Constitutional:       General: He is awake. He is not in acute distress.     Appearance: Normal appearance. He is well-developed. He is not ill-appearing or toxic-appearing.      Interventions: He is not intubated.  HENT:      Head: Normocephalic and atraumatic.      Mouth/Throat:      Mouth: Mucous membranes are moist.   Eyes:      General: Lids are normal.      Pupils: Pupils are equal, round, and reactive to light.   Neck:      Musculoskeletal: Normal range of motion.      Vascular: No carotid bruit.      Trachea: Trachea normal.     Cardiovascular:      Rate and Rhythm: Normal rate and regular rhythm.      Pulses:           Radial pulses are 2+ on the right side and 2+ on the left side.        Dorsalis pedis pulses are 1+ on the right side and 1+ on the left side.      Heart sounds: Normal heart sounds.   Pulmonary:      Effort: Pulmonary effort is normal. No tachypnea, accessory muscle usage or respiratory distress. He is not intubated.      Breath sounds: Normal breath sounds.   Chest:      Chest wall: No deformity or tenderness.   Abdominal:      General: Bowel sounds are decreased. There is no distension.      Palpations: Abdomen is soft.      Tenderness: There is no abdominal tenderness.   Genitourinary:     Penis: Normal.    Musculoskeletal: Normal range of motion.      Right lower leg: Edema (trace) present.      Left lower leg: Edema (trace) present.      Right foot: No deformity.      Left foot: No deformity.   Lymphadenopathy:      Cervical: No cervical adenopathy.   Skin:     General: Skin is warm and dry.      Capillary Refill: Capillary refill takes less than 2 seconds.      Findings: No rash.   Neurological:      Mental Status: He is alert and oriented to person, place, and time.    Psychiatric:         Attention and Perception: Attention normal.         Mood and Affect: Mood normal.         Speech: Speech normal.         Behavior: Behavior normal. Behavior is cooperative.         Thought Content: Thought content normal.         Cognition and Memory: Cognition normal.         Judgment: Judgment normal.         Lines/Drains/Airways     Peripheral Intravenous Line                 Peripheral IV - Single Lumen 11/27/20 0959 20 G Right Hand less than 1 day         Peripheral IV - Single Lumen 11/27/20 1005 20 G Left Hand less than 1 day                Significant Labs:    CBC/Anemia Profile:  Recent Labs   Lab 11/27/20  1012   WBC 12.15   HGB 12.1*   HCT 37.9*      MCV 94   RDW 13.8        Chemistries:  Recent Labs   Lab 11/27/20  1012      K 4.6   *   CO2 17*   BUN 43*   CREATININE 2.5*   CALCIUM 8.6*   ALBUMIN 3.6   PROT 7.1   BILITOT 0.6   ALKPHOS 86   ALT 21   AST 23   MG 1.5*       Troponin:   Recent Labs   Lab 11/27/20  1012   TROPONINI 0.097*     All pertinent labs within the past 24 hours have been reviewed.  BNP 1191    Significant Imaging:   CXR: I have reviewed all pertinent results/findings within the past 24 hours and my personal findings are:  No acute pulm process noted      Assessment/Plan:     Cardiac/Vascular  * Complete heart block w/ symptomatic bradycardia  Cards following  Temporary pacing wires placed via IJ access and now paced with VSS  ICU cardiac and hemodynamic monitoring  Stopped home Toprol XL  Avoid BB and CCBs  If remains bradycardic post 48 hours will plan PPM  Monitor and replace electrolytes    Chronic diastolic CHF (congestive heart failure)  Daily weights  Slow IVFs  BNP elevated but CXR without pulm edema  Monitor volume status  · TTE: There is moderate left ventricular concentric hypertrophy.  · The left ventricle is normal in size with normal systolic function. The estimated ejection fraction is 60%.  · Normal left ventricular diastolic  function.  · Normal right ventricular size with normal right ventricular systolic function.  · Mild aortic valve stenosis.  · Aortic valve area is 1.78 cm2; peak velocity is 1.94 m/s; mean gradient is 9 mmHg.  · Moderate mitral regurgitation.  · Moderate tricuspid regurgitation.  · There is pulmonary hypertension.  · Normal central venous pressure (3 mmHg).  The estimated PA systolic pressure is 80 mmHg.    Coronary artery disease involving native coronary artery of native heart without angina pectoris w/ hx CABG  Follows with Dr. Faye as outpatient  Cont statin and ASA  Holding b-blocker due to CHB  ICU cardiac monitoring  Cards following  Trend troponin    Renal/  Electrolyte imbalance  Replace Mg+  Repeat labs in AM    Acute renal failure superimposed on stage 3 chronic kidney disease  Last creatine July 23 was 1.7  Likely worsened from hypoperfusion secondary to CHB/Bradycardia  BMP in AM  Cont gentle IVFs  Accurate I/Os  Hold home ACE-I and Celebrex    Endocrine  Obesity, Class III, BMI 40-49.9 (morbid obesity)  Encouraged weight loss         Preventive Measures and Monitoring:   Stress Ulcer: PPI  Nutrition: Cardiac Diet  Glucose control: stable  Bowel prophylaxis: Colace and PRN Dulcolax  DVT prophylaxis: SQ Hep/SCDs  Hx CAD on B-Blocker: none due to symptomatic bradycardia  Head of Bed/Reposition: Elevate HOB and turn Q1-2 hours   Early Mobility: bed rest  Code Status: Full  Pneumonia Vaccine: UTD  Flu Vaccine: refused    Counseling/Consultation:I have discussed the care of this patient in detail with the bedside nursing staff and Dr. Kenyon and Dr. Hoffman    Critical Care Time: 50 minutes  Critical secondary to Patient has a condition that poses threat to life and bodily function: Acute Renal Failure and and CHB with temporary TV pacing     Critical care was time spent personally by me on the following activities: development of treatment plan with patient or surrogate and bedside caregivers,  discussions with consultants, evaluation of patient's response to treatment, examination of patient, ordering and performing treatments and interventions, ordering and review of laboratory studies, ordering and review of radiographic studies, pulse oximetry, re-evaluation of patient's condition. This critical care time did not overlap with that of any other provider or involve time for any procedures.    Thank you for your consult. I will follow-up with patient. Please contact us if you have any additional questions.     Nik Perdue NP  Critical Care Medicine  [facility]

## 2020-11-27 NOTE — Clinical Note
Temporary Pacemaker placed on 11/27 turned off and removed. Site condition is clean dry and intact with no signs of hematoma.

## 2020-11-27 NOTE — PATIENT INSTRUCTIONS
Bradycardia    When your heart rate is slow, less than 60 beats per minute, it is called bradycardia. Bradycardia can be normal, caused by medicines, or a sign of a disease. The slow heart rate may not be constant; it can come and go. It is a concern when it is very low, or you have symptoms.  Signs and symptoms  The following are signs and symptoms of bradycardia:  · Heart rate less than 60 per minute  · Dizziness or feeling lightheaded  · Weakness  · Trouble breathing  · Fainting  · Sleepiness  · More trouble exercising than usual because of fatigue  · Confusion or trouble concentrating  Causes  There are many causes of bradycardia. Some can be related to your heart, but some may be related to other factors.  Non-heart-related causes:  · Advanced age  · Side effect of certain medicines (such as beta-blockers, calcium channel blockers, digitalis, antiarrhythmic medicines like amiodarone, clonidine, lithium)  · Medical conditions such as hypoglycemia (low blood sugar), hypothyroidism (low thyroid), electrolyte disorder,  hypothermia, sleep apnea  · Athletes, especially long-distance runners, may have a slow heart rate. This can be normal.  · Sleep apnea  · Brain injury such as stroke or bleeding inside the brain  Heart-related causes:  · Coronary artery disease (angina or prior heart attack, also known as acute myocardial infarction, or AMI)  · Heart valve disease  · Heart muscle disease (cardiomyopathy)  · Congestive heart failure  · Sick sinus syndrome, which is when your heart's natural pacemaker is no longer working properly  · Diseases that infiltrate the heart such as sarcoid  · Heart infections  Sometimes the cause for the arrhythmia cannot be found.  Bradycardia that causes symptoms is sometimes reversible, and can be treated with medicines. When more severe bradycardia persists, a pacemaker is generally recommended. When the bradycardia does not cause symptoms, your doctor may decide to evaluate it in his  or her office.  Home care  The following will help you care for yourself at home:  · Resume your usual activities when you are feeling back to normal.  · If you develop any of the symptoms below during exertion, then you should not exert yourself until evaluated further by your doctor.  · Work with your doctor on any needed lifestyle changes, such as changing your diet, stopping smoking if you are a smoker, and a planned exercise program.  Follow-up care  Follow up with your doctor, or as advised.  Call 911  Call 911 if any of the following occur:  · Chest pain  · Trouble breathing  · Slow heart rate with dizziness or lightheadedness  · Fainting or loss of consciousness  · Chest, shoulder, arm, neck, or back pain  · Slow heart rate (under 50 beats per minute) if associated with symptoms  When to seek medical advice  Get prompt medical attention if any of the following occur:  · Occasional weakness, dizziness, or lightheadedness  Date Last Reviewed: 4/25/2016  © 3919-6243 [employer]. [address] [place] [address]. All rights reserved. This information is not intended as a substitute for professional medical care. Always follow your healthcare professional's instructions.        Shortness of Breath (Dyspnea)  Shortness of breath is the feeling that you can't catch your breath or get enough air. It is also known as dyspnea.  Dyspnea can be caused by many different conditions. They include:  · Acute asthma attack.  · Worsening of chronic lung diseases such as chronic bronchitis and emphysema.  · Heart failure. This is when weak heart muscle allows extra fluid to collect in the lungs.  · Panic attacks or anxiety. Fear can cause rapid breathing (hyperventilation).  · Pneumonia, or an infection in the lung tissue.  · Exposure to toxic substances, fumes, smoke, or certain medicines.  · Blood clot in the lung (pulmonary embolism). This is often from a piece of blood clot in a deep vein of the leg  (deep vein thrombosis) that breaks off and travels to the lungs.  · Heart attack or heart-related chest pain (angina).  · Anemia.  · Collapsed lung (pneumothorax).  · Dehydration.  · Pregnancy.  Based on your visit today, the exact cause of your shortness of breath is not certain. Your tests dont show any of the serious causes of dyspnea. You may need other tests to find out if you have a serious problem. Its important to watch for any new symptoms or symptoms that get worse. Follow up with your healthcare provider as directed.  Home care  Follow these tips to take care of yourself at home:  · When your symptoms are better, go back to your usual activities.  · If you smoke, you should stop. Join a quit-smoking program or ask your healthcare provider for help.  · Eat a healthy diet and get plenty of sleep.  · Get regular exercise. Talk with your healthcare provider before starting to exercise, especially if you have other medical problems.  · Cut down on the amount of caffeine and stimulants you consume.  Follow-up care  Follow up with your healthcare provider, or as advised.  If tests were done, you will be told if your treatment needs to be changed. You can call as directed for the results.  (Note: If an X-ray was taken, a specialist will review it. You will be notified of any new findings that may affect your care.)  Call 911 or get immediate medical care  Shortness of breath may be a sign of a serious medical problem. For example, it may be a problem with your heart or lungs. Call 911 if you have worsening shortness of breath or trouble breathing, especially with any of the symptoms below:  · You are confused or its difficult to wake you.  · You faint or lose consciousness.  · You have a fast heartbeat, or your heartbeat is irregular.  · You are coughing up blood.  · You have pain in your chest, arm, shoulder, neck, or upper back.  · You break out in a sweat.  When to seek medical advice  Call your healthcare  provider right away if any of these occur:  · Slight shortness of breath or wheezing  · Redness, pain or swelling in your leg, arm, or other body area  · Swelling in both legs or ankles  · Fast weight gain  · Dizziness or weakness  · Fever of 100.4ºF (38ºC) or higher, or as directed by your healthcare provider  Date Last Reviewed: 9/13/2015  © 7248-3040 [employer]. [address] [place] [address], [place] [address]. All rights reserved. This information is not intended as a substitute for professional medical care. Always follow your healthcare professional's instructions.

## 2020-11-27 NOTE — ASSESSMENT & PLAN NOTE
Follows with Dr. Faye as outpatient  Cont statin and ASA  Holding b-blocker due to CHB  ICU cardiac monitoring  Cards following  Trend troponin

## 2020-11-28 LAB
ALBUMIN SERPL BCP-MCNC: 3.3 G/DL (ref 3.5–5.2)
ALP SERPL-CCNC: 75 U/L (ref 55–135)
ALT SERPL W/O P-5'-P-CCNC: 18 U/L (ref 10–44)
ANION GAP SERPL CALC-SCNC: 11 MMOL/L (ref 8–16)
APTT BLDCRRT: 49.3 SEC (ref 21–32)
APTT BLDCRRT: 54.1 SEC (ref 21–32)
AST SERPL-CCNC: 21 U/L (ref 10–40)
BASOPHILS # BLD AUTO: 0.06 K/UL (ref 0–0.2)
BASOPHILS NFR BLD: 0.6 % (ref 0–1.9)
BILIRUB SERPL-MCNC: 0.6 MG/DL (ref 0.1–1)
BUN SERPL-MCNC: 28 MG/DL (ref 8–23)
CALCIUM SERPL-MCNC: 9 MG/DL (ref 8.7–10.5)
CHLORIDE SERPL-SCNC: 112 MMOL/L (ref 95–110)
CO2 SERPL-SCNC: 20 MMOL/L (ref 23–29)
CREAT SERPL-MCNC: 1.6 MG/DL (ref 0.5–1.4)
DIFFERENTIAL METHOD: ABNORMAL
EOSINOPHIL # BLD AUTO: 0.4 K/UL (ref 0–0.5)
EOSINOPHIL NFR BLD: 4.3 % (ref 0–8)
ERYTHROCYTE [DISTWIDTH] IN BLOOD BY AUTOMATED COUNT: 13.9 % (ref 11.5–14.5)
EST. GFR  (AFRICAN AMERICAN): 50 ML/MIN/1.73 M^2
EST. GFR  (NON AFRICAN AMERICAN): 44 ML/MIN/1.73 M^2
GLUCOSE SERPL-MCNC: 100 MG/DL (ref 70–110)
HCT VFR BLD AUTO: 37.9 % (ref 40–54)
HGB BLD-MCNC: 12.3 G/DL (ref 14–18)
IMM GRANULOCYTES # BLD AUTO: 0.03 K/UL (ref 0–0.04)
IMM GRANULOCYTES NFR BLD AUTO: 0.3 % (ref 0–0.5)
LYMPHOCYTES # BLD AUTO: 2.3 K/UL (ref 1–4.8)
LYMPHOCYTES NFR BLD: 23.2 % (ref 18–48)
MAGNESIUM SERPL-MCNC: 1.5 MG/DL (ref 1.6–2.6)
MCH RBC QN AUTO: 30.6 PG (ref 27–31)
MCHC RBC AUTO-ENTMCNC: 32.5 G/DL (ref 32–36)
MCV RBC AUTO: 94 FL (ref 82–98)
MONOCYTES # BLD AUTO: 0.7 K/UL (ref 0.3–1)
MONOCYTES NFR BLD: 7.4 % (ref 4–15)
NEUTROPHILS # BLD AUTO: 6.4 K/UL (ref 1.8–7.7)
NEUTROPHILS NFR BLD: 64.2 % (ref 38–73)
NRBC BLD-RTO: 0 /100 WBC
PLATELET # BLD AUTO: 147 K/UL (ref 150–350)
PMV BLD AUTO: 13.7 FL (ref 9.2–12.9)
POTASSIUM SERPL-SCNC: 4.4 MMOL/L (ref 3.5–5.1)
PROT SERPL-MCNC: 6.9 G/DL (ref 6–8.4)
RBC # BLD AUTO: 4.02 M/UL (ref 4.6–6.2)
SODIUM SERPL-SCNC: 143 MMOL/L (ref 136–145)
TROPONIN I SERPL DL<=0.01 NG/ML-MCNC: 0.09 NG/ML (ref 0–0.03)
WBC # BLD AUTO: 9.9 K/UL (ref 3.9–12.7)

## 2020-11-28 PROCEDURE — 99291 CRITICAL CARE FIRST HOUR: CPT | Mod: ,,, | Performed by: NURSE PRACTITIONER

## 2020-11-28 PROCEDURE — 20000000 HC ICU ROOM

## 2020-11-28 PROCEDURE — 63600175 PHARM REV CODE 636 W HCPCS: Performed by: NURSE PRACTITIONER

## 2020-11-28 PROCEDURE — 83735 ASSAY OF MAGNESIUM: CPT

## 2020-11-28 PROCEDURE — 99233 PR SUBSEQUENT HOSPITAL CARE,LEVL III: ICD-10-PCS | Mod: ,,, | Performed by: INTERNAL MEDICINE

## 2020-11-28 PROCEDURE — 27200667 HC PACEMAKER, TEMPORARY MONITORING, PER SHIFT

## 2020-11-28 PROCEDURE — 36415 COLL VENOUS BLD VENIPUNCTURE: CPT

## 2020-11-28 PROCEDURE — 99291 PR CRITICAL CARE, E/M 30-74 MINUTES: ICD-10-PCS | Mod: ,,, | Performed by: NURSE PRACTITIONER

## 2020-11-28 PROCEDURE — 85730 THROMBOPLASTIN TIME PARTIAL: CPT | Mod: 91

## 2020-11-28 PROCEDURE — 25000003 PHARM REV CODE 250: Performed by: NURSE PRACTITIONER

## 2020-11-28 PROCEDURE — 85025 COMPLETE CBC W/AUTO DIFF WBC: CPT

## 2020-11-28 PROCEDURE — 84484 ASSAY OF TROPONIN QUANT: CPT

## 2020-11-28 PROCEDURE — 99233 SBSQ HOSP IP/OBS HIGH 50: CPT | Mod: ,,, | Performed by: INTERNAL MEDICINE

## 2020-11-28 PROCEDURE — 25000003 PHARM REV CODE 250: Performed by: INTERNAL MEDICINE

## 2020-11-28 PROCEDURE — 85730 THROMBOPLASTIN TIME PARTIAL: CPT

## 2020-11-28 PROCEDURE — 80053 COMPREHEN METABOLIC PANEL: CPT

## 2020-11-28 RX ORDER — MAGNESIUM SULFATE HEPTAHYDRATE 40 MG/ML
2 INJECTION, SOLUTION INTRAVENOUS ONCE
Status: COMPLETED | OUTPATIENT
Start: 2020-11-28 | End: 2020-11-28

## 2020-11-28 RX ORDER — AMLODIPINE BESYLATE 10 MG/1
10 TABLET ORAL DAILY
Status: DISCONTINUED | OUTPATIENT
Start: 2020-11-28 | End: 2020-12-01 | Stop reason: HOSPADM

## 2020-11-28 RX ADMIN — MAGNESIUM SULFATE IN WATER 2 G: 40 INJECTION, SOLUTION INTRAVENOUS at 08:11

## 2020-11-28 RX ADMIN — OMEGA-3-ACID ETHYL ESTERS 1 G: 1 CAPSULE, LIQUID FILLED ORAL at 08:11

## 2020-11-28 RX ADMIN — DOCUSATE SODIUM 100 MG: 100 CAPSULE, LIQUID FILLED ORAL at 08:11

## 2020-11-28 RX ADMIN — ALLOPURINOL 300 MG: 100 TABLET ORAL at 08:11

## 2020-11-28 RX ADMIN — MUPIROCIN: 20 OINTMENT TOPICAL at 08:11

## 2020-11-28 RX ADMIN — PANTOPRAZOLE SODIUM 40 MG: 40 TABLET, DELAYED RELEASE ORAL at 08:11

## 2020-11-28 RX ADMIN — HEPARIN SODIUM AND DEXTROSE 12 UNITS/KG/HR: 10000; 5 INJECTION INTRAVENOUS at 11:11

## 2020-11-28 RX ADMIN — AMLODIPINE BESYLATE 10 MG: 10 TABLET ORAL at 12:11

## 2020-11-28 RX ADMIN — ROSUVASTATIN 40 MG: 10 TABLET, FILM COATED ORAL at 08:11

## 2020-11-28 NOTE — ASSESSMENT & PLAN NOTE
Daily weights  Stop IVFs  BNP elevated but CXR without pulm edema  Monitor volume status  · TTE: There is moderate left ventricular concentric hypertrophy.  · The left ventricle is normal in size with normal systolic function. The estimated ejection fraction is 60%.  · Normal left ventricular diastolic function.  · Normal right ventricular size with normal right ventricular systolic function.  · Mild aortic valve stenosis.  · Aortic valve area is 1.78 cm2; peak velocity is 1.94 m/s; mean gradient is 9 mmHg.  · Moderate mitral regurgitation.  · Moderate tricuspid regurgitation.  · There is pulmonary hypertension.  · Normal central venous pressure (3 mmHg).  The estimated PA systolic pressure is 80 mmHg.

## 2020-11-28 NOTE — PROGRESS NOTES
[facility] -   Cardiology  Progress Note    Patient Name: Damon Yoo  MRN: [id number]  Admission Date: 11/27/2020  Hospital Length of Stay: 1 days  Code Status: Full Code   Attending Physician: Young Hoffman MD   Primary Care Physician: TIEN Mason Jr, MD  Expected Discharge Date:   Principal Problem:CHB (complete heart block)    Subjective:     Hospital Course:   11/28/2020 Patient seen and examined. Still pacemaker dependent.  Stable and continue diuresis . Plan permanent pacemaker Monday.    Interval History: stable and continue temporary pacemaker    Review of Systems   Constitution: Negative for chills, diaphoresis, night sweats, weight gain and weight loss.   HENT: Negative for congestion, hoarse voice, sore throat and stridor.    Eyes: Negative for double vision and pain.   Cardiovascular: Negative for chest pain, claudication, cyanosis, dyspnea on exertion, irregular heartbeat, leg swelling, near-syncope, orthopnea, palpitations, paroxysmal nocturnal dyspnea and syncope.   Respiratory: Negative for cough, hemoptysis, shortness of breath, sleep disturbances due to breathing, snoring, sputum production and wheezing.    Endocrine: Negative for cold intolerance, heat intolerance and polydipsia.   Hematologic/Lymphatic: Negative for bleeding problem. Does not bruise/bleed easily.   Skin: Negative for color change, dry skin and rash.   Musculoskeletal: Negative for joint swelling and muscle cramps.   Gastrointestinal: Negative for bloating, abdominal pain, constipation, diarrhea, dysphagia, melena, nausea and vomiting.   Genitourinary: Negative for flank pain and urgency.   Neurological: Negative for dizziness, focal weakness, headaches, light-headedness, loss of balance, seizures and weakness.   Psychiatric/Behavioral: Negative for altered mental status and memory loss. The patient is not nervous/anxious.      Objective:     Vital Signs (Most Recent):  Temp: 97.7 °F (36.5 °C) (11/28/20  0730)  Pulse: 69 (11/28/20 0945)  Resp: 18 (11/28/20 0945)  BP: (!) 155/75 (11/28/20 0900)  SpO2: 97 % (11/28/20 0945) Vital Signs (24h Range):  Temp:  [97.7 °F (36.5 °C)-98.4 °F (36.9 °C)] 97.7 °F (36.5 °C)  Pulse:  [35-72] 69  Resp:  [11-43] 18  SpO2:  [95 %-98 %] 97 %  BP: (119-166)/(69-98) 155/75     Weight: 112.7 kg (248 lb 7.3 oz)  Body mass index is 40.1 kg/m².     SpO2: 97 %  O2 Device (Oxygen Therapy): room air      Intake/Output Summary (Last 24 hours) at 11/28/2020 1044  Last data filed at 11/28/2020 1000  Gross per 24 hour   Intake 947.66 ml   Output 2210 ml   Net -1262.34 ml       Lines/Drains/Airways     Peripheral Intravenous Line                 Peripheral IV - Single Lumen 11/27/20 0959 20 G Right Hand 1 day         Peripheral IV - Single Lumen 11/27/20 1005 20 G Left Hand 1 day                Physical Exam   Constitutional: He is oriented to person, place, and time.   Eyes: Pupils are equal, round, and reactive to light.   Neck: Normal range of motion. No tracheal deviation present.   Cardiovascular: Normal rate, regular rhythm, normal heart sounds and intact distal pulses. Exam reveals no gallop and no friction rub.   No murmur heard.  Pulses:       Carotid pulses are 2+ on the right side and 2+ on the left side.       Radial pulses are 2+ on the right side and 2+ on the left side.        Femoral pulses are 2+ on the right side and 2+ on the left side.       Popliteal pulses are 2+ on the right side and 2+ on the left side.        Dorsalis pedis pulses are 2+ on the right side and 2+ on the left side.        Posterior tibial pulses are 2+ on the right side and 2+ on the left side.   Pulmonary/Chest: Effort normal and breath sounds normal. No stridor. No respiratory distress. He has no wheezes. He has no rales. He exhibits no tenderness.   Abdominal: He exhibits no distension. There is no abdominal tenderness. There is no rebound.   Musculoskeletal:         General: No tenderness or edema.    Neurological: He is alert and oriented to person, place, and time.   Skin: Skin is warm and dry.       Significant Labs:   CMP   Recent Labs   Lab 11/27/20  1012 11/28/20  0506    143   K 4.6 4.4   * 112*   CO2 17* 20*    100   BUN 43* 28*   CREATININE 2.5* 1.6*   CALCIUM 8.6* 9.0   PROT 7.1 6.9   ALBUMIN 3.6 3.3*   BILITOT 0.6 0.6   ALKPHOS 86 75   AST 23 21   ALT 21 18   ANIONGAP 13 11   ESTGFRAFRICA 29* 50*   EGFRNONAA 25* 44*       Significant Imaging: Echocardiogram:   Transthoracic echo (TTE) complete (Cupid Only):   Results for orders placed or performed during the hospital encounter of 11/27/20   Echo Color Flow Doppler? Yes; Bubble Contrast? No   Result Value Ref Range    BSA 2.3 m2    LA WIDTH 3.44 cm    LVIDd 4.19 3.5 - 6.0 cm    IVS 1.62 (A) 0.6 - 1.1 cm    Posterior Wall 1.56 (A) 0.6 - 1.1 cm    Ao root annulus 4.06 cm    LVIDs 2.57 2.1 - 4.0 cm    FS 39 28 - 44 %    LA volume 34.87 cm3    Sinus 4.17 cm    STJ 3.60 cm    Ascending aorta 4.10 cm    LV mass 272.46 g    LA size 3.36 cm    TAPSE 1.94 cm    Left Ventricle Relative Wall Thickness 0.74 cm    AV mean gradient 9 mmHg    AV valve area 1.78 cm2    AV Velocity Ratio 0.63     AV index (prosthetic) 0.65     MV valve area p 1/2 method 3.22 cm2    PV peak gradient 5.83 mmHg    E/A ratio 1.16     E wave decelartion time 235.35 msec    IVRT 72.66 msec    LVOT diameter 1.86 cm    LVOT area 2.7 cm2    LVOT peak yung 1.22 m/s    LVOT peak VTI 29.48 cm    Ao peak yung 1.94 m/s    Ao VTI 45.02 cm    RVOT peak yung 1.21 m/s    RVOT peak VTI 29.08 cm    LVOT stroke volume 80.06 cm3    AV peak gradient 15 mmHg    PV mean gradient 3.63 mmHg    MV Peak E Yung 0.87 m/s    TR Max Yung 4.40 m/s    MV stenosis pressure 1/2 time 68.25 ms    MV Peak A Yung 0.75 m/s    LV Systolic Volume 23.84 mL    LV Systolic Volume Index 10.8 mL/m2    LV Diastolic Volume 78.10 mL    LV Diastolic Volume Index 35.45 mL/m2    LA Volume Index 15.8 mL/m2    LV Mass Index 124  g/m2    RA Major Axis 3.67 cm    Left Atrium Minor Axis 3.52 cm    Left Atrium Major Axis 3.58 cm    Triscuspid Valve Regurgitation Peak Gradient 77 mmHg    RA Width 3.02 cm    Right Atrial Pressure (from IVC) 3 mmHg    TV rest pulmonary artery pressure 80 mmHg    Narrative    · There is moderate left ventricular concentric hypertrophy.  · The left ventricle is normal in size with normal systolic function. The   estimated ejection fraction is 60%.  · Normal left ventricular diastolic function.  · Normal right ventricular size with normal right ventricular systolic   function.  · Mild aortic valve stenosis.  · Aortic valve area is 1.78 cm2; peak velocity is 1.94 m/s; mean gradient   is 9 mmHg.  · Moderate mitral regurgitation.  · Moderate tricuspid regurgitation.  · There is pulmonary hypertension.  · Normal central venous pressure (3 mmHg).  · The estimated PA systolic pressure is 80 mmHg.     Suggest re echo when patient is in NSR.        Assessment and Plan:     Brief HPI: stable with temporary pacemaker wire, plan permanent pacemaker Monday    * CHB (complete heart block)  Complete AV dissociation noted in ED  Keep NPO   Check ECHO  Hold BB for next 48 hours  Plan for Temporary pacing wire today per Dr Kenyon  Risks, benefits, and alternatives reviewed with patient. Patient agrees to proceed with procedure as planned. Consent obtained, all questions answered and appropriately witnessed.   May need PPM implant on Monday if remains complete dissociation, further recs to follow  Will need ICU admission post procedure  ICU Cardiac Monitoring      Chronic diastolic CHF (congestive heart failure)  BNP 1191  Did not seem overloaded on exam today  Likely due to Complete Heart block  Hold BB for next 48 hours given complete heart block and need for temporary pacing wire  Hold ACei/ARB for now given DON  Dash diet, 2 gm sodium restriction  1500 ml fluid restriction  Daily weights  Strict I/Os  Check ECHO  Further recs to  follow.     Acute renal failure superimposed on stage 3 chronic kidney disease  Cr 2.5 on admit  Recommend gentle IVF's  Monitor closely with daily labs    Elevated troponin  Troponin 0.097  Trend serial troponin  Check ECHO  Chest pain free on exam  Will need to hold BB for next 48 hours given complete heart block  Start IV heparin gtt after Temporary pacing wire placed this AM  Further recs to follow        VTE Risk Mitigation (From admission, onward)         Ordered     heparin 25,000 units in dextrose 5% 250 mL (100 units/mL) infusion LOW INTENSITY nomogram - [facility]  Continuous     Question:  Heparin Infusion Adjustment (DO NOT MODIFY ANSWER)  Answer:  \[facility]\epic\Images\Pharmacy\HeparinInfusions\heparin LOW INTENSITY nomogram for [facility] [id number]    11/27/20 1607     heparin 25,000 units in dextrose 5% (100 units/ml) IV bolus from bag - ADDITIONAL PRN BOLUS - 60 units/kg (max bolus 4000 units)  As needed (PRN)     Question:  Heparin Infusion Adjustment (DO NOT MODIFY ANSWER)  Answer:  [email or web address]\epic\Images\Pharmacy\HeparinInfusions\heparin LOW INTENSITY nomogram for [facility] [id number]    11/27/20 1607     heparin 25,000 units in dextrose 5% (100 units/ml) IV bolus from bag - ADDITIONAL PRN BOLUS - 30 units/kg (max bolus 4000 units)  As needed (PRN)     Question:  Heparin Infusion Adjustment (DO NOT MODIFY ANSWER)  Answer:  \\[email or web address]\epic\Images\Pharmacy\HeparinInfusions\heparin LOW INTENSITY nomogram for [facility] [id number]    11/27/20 1607     Place SUNDEEP hose  Until discontinued      11/27/20 1156     IP VTE HIGH RISK PATIENT  Once      11/27/20 1156     Place sequential compression device  Until discontinued      11/27/20 1156                Omar Castellano MD  Cardiology  [facility] -

## 2020-11-28 NOTE — SUBJECTIVE & OBJECTIVE
Interval History: stable and continue temporary pacemaker    Review of Systems   Constitution: Negative for chills, diaphoresis, night sweats, weight gain and weight loss.   HENT: Negative for congestion, hoarse voice, sore throat and stridor.    Eyes: Negative for double vision and pain.   Cardiovascular: Negative for chest pain, claudication, cyanosis, dyspnea on exertion, irregular heartbeat, leg swelling, near-syncope, orthopnea, palpitations, paroxysmal nocturnal dyspnea and syncope.   Respiratory: Negative for cough, hemoptysis, shortness of breath, sleep disturbances due to breathing, snoring, sputum production and wheezing.    Endocrine: Negative for cold intolerance, heat intolerance and polydipsia.   Hematologic/Lymphatic: Negative for bleeding problem. Does not bruise/bleed easily.   Skin: Negative for color change, dry skin and rash.   Musculoskeletal: Negative for joint swelling and muscle cramps.   Gastrointestinal: Negative for bloating, abdominal pain, constipation, diarrhea, dysphagia, melena, nausea and vomiting.   Genitourinary: Negative for flank pain and urgency.   Neurological: Negative for dizziness, focal weakness, headaches, light-headedness, loss of balance, seizures and weakness.   Psychiatric/Behavioral: Negative for altered mental status and memory loss. The patient is not nervous/anxious.      Objective:     Vital Signs (Most Recent):  Temp: 97.7 °F (36.5 °C) (11/28/20 0730)  Pulse: 69 (11/28/20 0945)  Resp: 18 (11/28/20 0945)  BP: (!) 155/75 (11/28/20 0900)  SpO2: 97 % (11/28/20 0945) Vital Signs (24h Range):  Temp:  [97.7 °F (36.5 °C)-98.4 °F (36.9 °C)] 97.7 °F (36.5 °C)  Pulse:  [35-72] 69  Resp:  [11-43] 18  SpO2:  [95 %-98 %] 97 %  BP: (119-166)/(69-98) 155/75     Weight: 112.7 kg (248 lb 7.3 oz)  Body mass index is 40.1 kg/m².     SpO2: 97 %  O2 Device (Oxygen Therapy): room air      Intake/Output Summary (Last 24 hours) at 11/28/2020 1044  Last data filed at 11/28/2020  1000  Gross per 24 hour   Intake 947.66 ml   Output 2210 ml   Net -1262.34 ml       Lines/Drains/Airways     Peripheral Intravenous Line                 Peripheral IV - Single Lumen 11/27/20 0959 20 G Right Hand 1 day         Peripheral IV - Single Lumen 11/27/20 1005 20 G Left Hand 1 day                Physical Exam   Constitutional: He is oriented to person, place, and time.   Eyes: Pupils are equal, round, and reactive to light.   Neck: Normal range of motion. No tracheal deviation present.   Cardiovascular: Normal rate, regular rhythm, normal heart sounds and intact distal pulses. Exam reveals no gallop and no friction rub.   No murmur heard.  Pulses:       Carotid pulses are 2+ on the right side and 2+ on the left side.       Radial pulses are 2+ on the right side and 2+ on the left side.        Femoral pulses are 2+ on the right side and 2+ on the left side.       Popliteal pulses are 2+ on the right side and 2+ on the left side.        Dorsalis pedis pulses are 2+ on the right side and 2+ on the left side.        Posterior tibial pulses are 2+ on the right side and 2+ on the left side.   Pulmonary/Chest: Effort normal and breath sounds normal. No stridor. No respiratory distress. He has no wheezes. He has no rales. He exhibits no tenderness.   Abdominal: He exhibits no distension. There is no abdominal tenderness. There is no rebound.   Musculoskeletal:         General: No tenderness or edema.   Neurological: He is alert and oriented to person, place, and time.   Skin: Skin is warm and dry.       Significant Labs:   CMP   Recent Labs   Lab 11/27/20  1012 11/28/20  0506    143   K 4.6 4.4   * 112*   CO2 17* 20*    100   BUN 43* 28*   CREATININE 2.5* 1.6*   CALCIUM 8.6* 9.0   PROT 7.1 6.9   ALBUMIN 3.6 3.3*   BILITOT 0.6 0.6   ALKPHOS 86 75   AST 23 21   ALT 21 18   ANIONGAP 13 11   ESTGFRAFRICA 29* 50*   EGFRNONAA 25* 44*       Significant Imaging: Echocardiogram:   Transthoracic echo (TTE)  complete (Cupid Only):   Results for orders placed or performed during the hospital encounter of 11/27/20   Echo Color Flow Doppler? Yes; Bubble Contrast? No   Result Value Ref Range    BSA 2.3 m2    LA WIDTH 3.44 cm    LVIDd 4.19 3.5 - 6.0 cm    IVS 1.62 (A) 0.6 - 1.1 cm    Posterior Wall 1.56 (A) 0.6 - 1.1 cm    Ao root annulus 4.06 cm    LVIDs 2.57 2.1 - 4.0 cm    FS 39 28 - 44 %    LA volume 34.87 cm3    Sinus 4.17 cm    STJ 3.60 cm    Ascending aorta 4.10 cm    LV mass 272.46 g    LA size 3.36 cm    TAPSE 1.94 cm    Left Ventricle Relative Wall Thickness 0.74 cm    AV mean gradient 9 mmHg    AV valve area 1.78 cm2    AV Velocity Ratio 0.63     AV index (prosthetic) 0.65     MV valve area p 1/2 method 3.22 cm2    PV peak gradient 5.83 mmHg    E/A ratio 1.16     E wave decelartion time 235.35 msec    IVRT 72.66 msec    LVOT diameter 1.86 cm    LVOT area 2.7 cm2    LVOT peak yung 1.22 m/s    LVOT peak VTI 29.48 cm    Ao peak yung 1.94 m/s    Ao VTI 45.02 cm    RVOT peak yung 1.21 m/s    RVOT peak VTI 29.08 cm    LVOT stroke volume 80.06 cm3    AV peak gradient 15 mmHg    PV mean gradient 3.63 mmHg    MV Peak E Yung 0.87 m/s    TR Max Yung 4.40 m/s    MV stenosis pressure 1/2 time 68.25 ms    MV Peak A Yung 0.75 m/s    LV Systolic Volume 23.84 mL    LV Systolic Volume Index 10.8 mL/m2    LV Diastolic Volume 78.10 mL    LV Diastolic Volume Index 35.45 mL/m2    LA Volume Index 15.8 mL/m2    LV Mass Index 124 g/m2    RA Major Axis 3.67 cm    Left Atrium Minor Axis 3.52 cm    Left Atrium Major Axis 3.58 cm    Triscuspid Valve Regurgitation Peak Gradient 77 mmHg    RA Width 3.02 cm    Right Atrial Pressure (from IVC) 3 mmHg    TV rest pulmonary artery pressure 80 mmHg    Narrative    · There is moderate left ventricular concentric hypertrophy.  · The left ventricle is normal in size with normal systolic function. The   estimated ejection fraction is 60%.  · Normal left ventricular diastolic function.  · Normal right  ventricular size with normal right ventricular systolic   function.  · Mild aortic valve stenosis.  · Aortic valve area is 1.78 cm2; peak velocity is 1.94 m/s; mean gradient   is 9 mmHg.  · Moderate mitral regurgitation.  · Moderate tricuspid regurgitation.  · There is pulmonary hypertension.  · Normal central venous pressure (3 mmHg).  · The estimated PA systolic pressure is 80 mmHg.     Suggest re echo when patient is in NSR.

## 2020-11-28 NOTE — HOSPITAL COURSE
11/28/2020 Patient seen and examined. Still pacemaker dependent.  stable and continue diuresis . Plan permanent pacemaker Monday.    11/29/20 Patient seen and examined. Still pacemaker needed due to heart block.  Plan permanent pacemaker Monday.  Will d/c heparin today.    11/30/2020-Patient seen and examined in ICU, remains Pacer dependent today. Plan for PPM implant later today per Dr Kenyon. Further recs to follow.     12/1/2020- Patient is post DC PPM implant. Tolerated procedure well. Renal function improve with gentle hydration. CXR with no evidence of pneumothorax

## 2020-11-28 NOTE — PROGRESS NOTES
[facility] Hospital Medicine  Progress Note    Patient Name: Damon Yoo  MRN: [id number]  Patient Class: IP- Inpatient   Admission Date: 11/27/2020  Length of Stay: 1 days  Attending Physician: Young Hoffman MD  Primary Care Provider: TIEN Mason Jr, MD        Subjective:     Principal Problem:CHB (complete heart block)        HPI:  This is a 69 yo male who has a PMHx of CAD with CABG in 2013, HTN, HLD, prostate cancer, glaucoma, arthritis, gout, and hypertrophic cardiomyopathy. Patient reports he has been in his normal state of health until yesterday when he began with new onset of intermittent SOB and profuse diaphoresis.  Patient  was seen at the Urgent Care today where it was noted that his heart rate was in the 30s. Patient was sent to Emergency Department for further evaluation. Patient was again noted to have heart rate in 30s-40s and found to be in complete heart block. So far his blood pressure has remained stable. Patient was seen by Cardiology in ER and plans for urgent temporary pacing wire placement to be done this AM per Dr Kenyon.                 Per report- Patient had a rapid COVID screen done which was negative before being referred to the ED for further evaluation.     Overview/Hospital Course:  No notes on file    Interval History:  Hemodynamically stable with continuous pacing.  No acute problems or complaints.    Review of Systems   Constitutional: Negative.  Negative for chills, fatigue and fever.   HENT: Negative.  Negative for congestion, dental problem, rhinorrhea and sore throat.    Eyes: Negative.  Negative for visual disturbance.   Respiratory: Negative.  Negative for cough, shortness of breath and wheezing.    Cardiovascular: Negative.  Negative for chest pain, palpitations and leg swelling.   Gastrointestinal: Negative for abdominal pain, diarrhea, nausea and vomiting.   Endocrine: Negative.  Negative for cold intolerance and heat intolerance.    Genitourinary: Negative.  Negative for dysuria and hematuria.   Musculoskeletal: Negative.  Negative for arthralgias, gait problem and myalgias.   Skin: Negative.  Negative for rash and wound.   Allergic/Immunologic: Negative.    Neurological: Negative.  Negative for dizziness, syncope, weakness, numbness and headaches.   Hematological: Negative.  Negative for adenopathy.   Psychiatric/Behavioral: Negative.  Negative for confusion and dysphoric mood.   All other systems reviewed and are negative.    Objective:     Vital Signs (Most Recent):  Temp: 97.7 °F (36.5 °C) (11/28/20 0730)  Pulse: 69 (11/28/20 0945)  Resp: 18 (11/28/20 0945)  BP: (!) 155/75 (11/28/20 0900)  SpO2: 97 % (11/28/20 0945) Vital Signs (24h Range):  Temp:  [97.7 °F (36.5 °C)-98.4 °F (36.9 °C)] 97.7 °F (36.5 °C)  Pulse:  [35-72] 69  Resp:  [11-43] 18  SpO2:  [95 %-98 %] 97 %  BP: (119-166)/(69-98) 155/75     Weight: 112.7 kg (248 lb 7.3 oz)  Body mass index is 40.1 kg/m².    Intake/Output Summary (Last 24 hours) at 11/28/2020 1139  Last data filed at 11/28/2020 1100  Gross per 24 hour   Intake 957.76 ml   Output 2410 ml   Net -1452.24 ml      Physical Exam  Constitutional:       General: He is not in acute distress.     Appearance: He is well-developed. He is not diaphoretic.   HENT:      Head: Normocephalic and atraumatic.   Eyes:      Conjunctiva/sclera: Conjunctivae normal.      Pupils: Pupils are equal, round, and reactive to light.   Neck:      Thyroid: No thyromegaly.      Vascular: No JVD.   Cardiovascular:      Rate and Rhythm: Normal rate and regular rhythm.      Heart sounds: Normal heart sounds. No murmur. No friction rub. No gallop.       Comments: Right IJV access site for pacer.  Pulmonary:      Effort: Pulmonary effort is normal. No respiratory distress.      Breath sounds: Normal breath sounds. No wheezing or rales.   Abdominal:      General: Bowel sounds are normal. There is no distension.      Palpations: Abdomen is soft.       Tenderness: There is no abdominal tenderness. There is no guarding or rebound.   Musculoskeletal: Normal range of motion.         General: No tenderness.   Lymphadenopathy:      Cervical: No cervical adenopathy.   Skin:     General: Skin is warm and dry.      Findings: No erythema or rash.   Neurological:      Mental Status: He is alert and oriented to person, place, and time.      Cranial Nerves: No cranial nerve deficit.      Deep Tendon Reflexes: Reflexes are normal and symmetric. Reflexes normal.   Psychiatric:         Behavior: Behavior normal.         Thought Content: Thought content normal.         Judgment: Judgment normal.         Significant Labs: All pertinent labs within the past 24 hours have been reviewed.    Significant Imaging: I have reviewed all pertinent imaging results/findings within the past 24 hours.      Assessment/Plan:      * CHB (complete heart block)  Telemetry.  Patient was seen by Cardiology in ER.  Temporary pacing wire placement on 27 November by Dr Kenyon.  Beta blockers on hold.  Anticipate will flaquita PPM placed on Monday 30 November.    Glaucoma  Home eye drops on hold due to symptomatic bradycardia/ complete heart block      Chronic diastolic CHF (congestive heart failure)  Telemetry  Monitor- Currently no signs of acute volume overload  Orders as per Cardiology      Acute renal failure superimposed on stage 3 chronic kidney disease  Hx CKD stage 3-  Monitor  Trend BMP  Renal dose all medications  Add NS at 50ml/hour for gentle hydration - Monitor volume status closely due to Hx of Chronic diastolic heart failure  Hold Home ACEI      Elevated troponin  Telemetry  Trend levels  Orders as per Cardiology      Chronic kidney disease, stage III (moderate)  As above      Coronary artery disease involving native coronary artery of native heart without angina pectoris w/ hx CABG  Hx CABG-  Telemetry  Orders as per Cardiology      Hypertension  Hold home antihypertensives for now  Monitor  closely and resume when clinically appropriate      Hyperlipidemia  Continue home statin and fish oils        VTE Risk Mitigation (From admission, onward)         Ordered     heparin 25,000 units in dextrose 5% 250 mL (100 units/mL) infusion LOW INTENSITY nomogram - [facility]  Continuous     Question:  Heparin Infusion Adjustment (DO NOT MODIFY ANSWER)  Answer:  \\[email or web address]\epic\Images\Pharmacy\HeparinInfusions\heparin LOW INTENSITY nomogram for [facility] [id number].pdf    11/27/20 1607     heparin 25,000 units in dextrose 5% (100 units/ml) IV bolus from bag - ADDITIONAL PRN BOLUS - 60 units/kg (max bolus 4000 units)  As needed (PRN)     Question:  Heparin Infusion Adjustment (DO NOT MODIFY ANSWER)  Answer:  \\[email or web address]\epic\Images\Pharmacy\HeparinInfusions\heparin LOW INTENSITY nomogram for [facility] [id number].pdf    11/27/20 1607     heparin 25,000 units in dextrose 5% (100 units/ml) IV bolus from bag - ADDITIONAL PRN BOLUS - 30 units/kg (max bolus 4000 units)  As needed (PRN)     Question:  Heparin Infusion Adjustment (DO NOT MODIFY ANSWER)  Answer:  \[email or web address]\epic\Images\Pharmacy\HeparinInfusions\heparin LOW INTENSITY nomogram for [facility] [id number].pdf    11/27/20 1607     Place SUNDEEP hose  Until discontinued      11/27/20 1156     IP VTE HIGH RISK PATIENT  Once      11/27/20 1156     Place sequential compression device  Until discontinued      11/27/20 1156                Discharge Planning   SP:      Code Status: Full Code   Is the patient medically ready for discharge?:     Reason for patient still in hospital (select all that apply): Patient unstable, Treatment and Consult recommendations               Critical care time spent on the evaluation and treatment of severe organ dysfunction, review of pertinent labs and imaging studies, discussions with consulting providers and discussions with patient/family: 30 minutes.      Young Hoffman MD  Department of Hospital Medicine   [facility]

## 2020-11-28 NOTE — PLAN OF CARE
POC discussed w/patient, verbalized understanding. 100% v-paced on monitor, no issues w/pacer. Heparin gtt, PTT therapeutic. VSS. Voids per urinal; 1150mL UOP this shift. Patient turns independently in bed. Fall precautions in place, bed alarm on.

## 2020-11-28 NOTE — ASSESSMENT & PLAN NOTE
Follows with Dr. Faye as outpatient  Cont statin and ASA  Holding b-blocker due to CHB  ICU cardiac monitoring  Cards following  Troponin peak at 0.09  On Heparin infusion

## 2020-11-28 NOTE — PLAN OF CARE
Patient VSS throughout shift, transvenous pacer intact throughout shift, capturing and sensing. When pacer wires disconnected, complete heart block is present. Heparing gtt continued, therapeutic, daily PTTs to be done. Plan for pacemaker placement Monday. Patient denies any chest pain/discomfort or SOB. Wife and patient update don POC, both verbalize understanding. Bed low, call light within reach, patient readjusted independently with little assistance. Will monitor.     Problem: Adult Inpatient Plan of Care  Goal: Plan of Care Review  Outcome: Ongoing, Progressing  Goal: Patient-Specific Goal (Individualization)  Outcome: Ongoing, Progressing  Goal: Absence of Hospital-Acquired Illness or Injury  Outcome: Ongoing, Progressing  Goal: Optimal Comfort and Wellbeing  Outcome: Ongoing, Progressing  Goal: Readiness for Transition of Care  Outcome: Ongoing, Progressing  Goal: Rounds/Family Conference  Outcome: Ongoing, Progressing

## 2020-11-28 NOTE — ASSESSMENT & PLAN NOTE
Last creatine July 23 was 1.7  Likely worsened from hypoperfusion secondary to CHB/Bradycardia  BMP daily  Creatine down to 1.6 this AM  Accurate I/Os  Hold home ACE-I and Celebrex

## 2020-11-28 NOTE — PROGRESS NOTES
[facility]  Critical Care Medicine  Progress Note    Patient Name: Damon Yoo  MRN: [id number]  Admission Date: 11/27/2020  Hospital Length of Stay: 1 days  Code Status: Full Code  Attending Provider: Young Hoffman MD  Primary Care Provider: TIEN Mason Jr, MD   Principal Problem: CHB (complete heart block)    Subjective:     HPI:  Mr Yoo is a elderly 69 yo BM with a PMH of Chronic Diastolic heart failure, CAD, CABG, Prostate CA w/ surgery, Gout, chronic Arthritis, HLD, and HTN.  He presented to [facility] this AM about 0800 hr with complaint of SOB and diaphoresis since yesterday.  In clinic he was found to have HR 35-40 and was sent to [facility] arriving about 1000 hr.  In ED HR 38-44 w/ CHB and Cards consulted.  He is on home Toprol XL.  Also in ED creatine 2.5 (last was 1.7), Mg 1.5 and BNP 1191.  He was admitted to ICU and shortly later taken to Cath Lab for TV temporary pacing wire placement and pacing    Hospital/ICU Course:  11/27 - Seen post temporary pacing wire placement and paced fully awake, alert and responsive in no distress and denies earlier symptoms of SOB and diaphoresis now Hungry w/ VSS.  11/28 - Upright in bed fully awake, alert and responsive with orientation in no distress on RA denies pain or SOB.  Still paced and HR drop to 27 with pause of TV pacer this AM.  VSS on pacing and hungry this AM.     Review of Systems   Constitutional: Negative for chills, fever and malaise/fatigue.   HENT: Negative for congestion.    Eyes: Negative for blurred vision.   Respiratory: Negative for cough, sputum production and shortness of breath.    Cardiovascular: Negative for chest pain and leg swelling.   Gastrointestinal: Negative for abdominal pain, nausea and vomiting.   Genitourinary: Negative for dysuria.   Musculoskeletal: Negative for myalgias.   Skin: Negative for rash.   Neurological: Negative for dizziness, weakness and headaches.    Endo/Heme/Allergies: Does not bruise/bleed easily.   Psychiatric/Behavioral: The patient is not nervous/anxious.          Objective:     Vital Signs (Most Recent):  Temp: 98 °F (36.7 °C) (11/28/20 0305)  Pulse: 70 (11/28/20 0500)  Resp: 18 (11/28/20 0500)  BP: (!) 160/75 (11/28/20 0500)  SpO2: 97 % (11/28/20 0500) Vital Signs (24h Range):  Temp:  [97.7 °F (36.5 °C)-98.5 °F (36.9 °C)] 98 °F (36.7 °C)  Pulse:  [35-70] 70  Resp:  [16-29] 18  SpO2:  [95 %-99 %] 97 %  BP: (117-171)/(55-98) 160/75     Weight: 112.7 kg (248 lb 7.3 oz)  Body mass index is 40.1 kg/m².      Intake/Output Summary (Last 24 hours) at 11/28/2020 0714  Last data filed at 11/28/2020 0600  Gross per 24 hour   Intake 737.26 ml   Output 1650 ml   Net -912.74 ml       Physical Exam  Vitals signs and nursing note reviewed.   Constitutional:       General: He is awake. He is not in acute distress.     Appearance: Normal appearance. He is well-developed. He is not ill-appearing or toxic-appearing.      Interventions: He is not intubated.  HENT:      Head: Normocephalic and atraumatic.      Mouth/Throat:      Mouth: Mucous membranes are moist.   Eyes:      General: Lids are normal.      Pupils: Pupils are equal, round, and reactive to light.   Neck:      Musculoskeletal: Normal range of motion.      Vascular: No carotid bruit.      Trachea: Trachea normal.     Cardiovascular:      Rate and Rhythm: Normal rate and regular rhythm.      Pulses:           Radial pulses are 2+ on the right side and 2+ on the left side.        Dorsalis pedis pulses are 1+ on the right side and 1+ on the left side.      Heart sounds: Normal heart sounds.   Pulmonary:      Effort: Pulmonary effort is normal. No tachypnea, accessory muscle usage or respiratory distress. He is not intubated.      Breath sounds: Normal breath sounds.   Chest:      Chest wall: No deformity or tenderness.   Abdominal:      General: Bowel sounds are normal. There is no distension.      Palpations:  Abdomen is soft.      Tenderness: There is no abdominal tenderness.   Genitourinary:     Penis: Normal.    Musculoskeletal: Normal range of motion.      Right lower leg: Edema (trace) present.      Left lower leg: Edema (trace) present.      Right foot: No deformity.      Left foot: No deformity.   Lymphadenopathy:      Cervical: No cervical adenopathy.   Skin:     General: Skin is warm and dry.      Capillary Refill: Capillary refill takes less than 2 seconds.      Findings: No rash.   Neurological:      Mental Status: He is alert and oriented to person, place, and time.   Psychiatric:         Attention and Perception: Attention normal.         Mood and Affect: Mood normal.         Speech: Speech normal.         Behavior: Behavior normal. Behavior is cooperative.         Thought Content: Thought content normal.         Cognition and Memory: Cognition normal.         Judgment: Judgment normal.         Lines/Drains/Airways     Peripheral Intravenous Line                 Peripheral IV - Single Lumen 11/27/20 0959 20 G Right Hand less than 1 day         Peripheral IV - Single Lumen 11/27/20 1005 20 G Left Hand less than 1 day                Significant Labs:    CBC/Anemia Profile:  Recent Labs   Lab 11/27/20  1012 11/28/20  0506   WBC 12.15 9.90   HGB 12.1* 12.3*   HCT 37.9* 37.9*    147*   MCV 94 94   RDW 13.8 13.9        Chemistries:  Recent Labs   Lab 11/27/20  1012 11/28/20  0506    143   K 4.6 4.4   * 112*   CO2 17* 20*   BUN 43* 28*   CREATININE 2.5* 1.6*   CALCIUM 8.6* 9.0   ALBUMIN 3.6 3.3*   PROT 7.1 6.9   BILITOT 0.6 0.6   ALKPHOS 86 75   ALT 21 18   AST 23 21   MG 1.5* 1.5*       Coagulation:   Recent Labs   Lab 11/27/20  1012  11/28/20  0506   INR 1.0  --   --    APTT 26.5   < > 54.1*    < > = values in this interval not displayed.     Troponin:   Recent Labs   Lab 11/27/20  1526 11/27/20  1655 11/28/20  0506   TROPONINI 0.075* 0.095* 0.093*     All pertinent labs within the past 24 hours  have been reviewed.      Assessment/Plan:     Cardiac/Vascular  * CHB (complete heart block)  Cards following  Temporary pacing wires placed via IJ access 11/27 and now paced with VSS  Still in CHB with pause of pacer this AM  ICU cardiac and hemodynamic monitoring  Stopped home Toprol XL on admit  Avoid BB and CCBs  If remains bradycardic post another 24 hours will plan PPM  Monitor and replace electrolytes    Chronic diastolic CHF (congestive heart failure)  Daily weights  Stop IVFs  BNP elevated but CXR without pulm edema  Monitor volume status  · TTE: There is moderate left ventricular concentric hypertrophy.  · The left ventricle is normal in size with normal systolic function. The estimated ejection fraction is 60%.  · Normal left ventricular diastolic function.  · Normal right ventricular size with normal right ventricular systolic function.  · Mild aortic valve stenosis.  · Aortic valve area is 1.78 cm2; peak velocity is 1.94 m/s; mean gradient is 9 mmHg.  · Moderate mitral regurgitation.  · Moderate tricuspid regurgitation.  · There is pulmonary hypertension.  · Normal central venous pressure (3 mmHg).  The estimated PA systolic pressure is 80 mmHg.    Coronary artery disease involving native coronary artery of native heart without angina pectoris w/ hx CABG  Follows with Dr. Faye as outpatient  Cont statin and ASA  Holding b-blocker due to CHB  ICU cardiac monitoring  Cards following  Troponin peak at 0.09  On Heparin infusion    Renal/  Electrolyte imbalance  Replace Mg+ again today  Daily labs Cardiac monitoring    Acute renal failure superimposed on stage 3 chronic kidney disease  Last creatine July 23 was 1.7  Likely worsened from hypoperfusion secondary to CHB/Bradycardia  BMP daily  Creatine down to 1.6 this AM  Accurate I/Os  Hold home ACE-I and Celebrex    Endocrine  Obesity, Class III, BMI 40-49.9 (morbid obesity)  Encouraged weight loss        Preventive Measures and Monitoring:   Stress  Ulcer: PPI  Nutrition: Cardiac Diet  Glucose control: stable  Bowel prophylaxis: Colace and PRN Dulcolax  DVT prophylaxis: Heparin infusion  Hx CAD on B-Blocker: none due to symptomatic bradycardia  Head of Bed/Reposition: Elevate HOB and turn Q1-2 hours   Early Mobility: bed rest  Code Status: Full  Pneumonia Vaccine: UTD  Flu Vaccine: refused     Counseling/Consultation:I have discussed the care of this patient in detail with the bedside nursing staff and Dr. Kenyon and Dr. Hoffman     Critical Care Time: 43 minutes  Critical secondary to Patient has a condition that poses threat to life and bodily function: Acute Renal Failure and and CHB with temporary TV pacing     Critical care was time spent personally by me on the following activities: development of treatment plan with patient or surrogate and bedside caregivers, discussions with consultants, evaluation of patient's response to treatment, examination of patient, ordering and performing treatments and interventions, ordering and review of laboratory studies, ordering and review of radiographic studies, pulse oximetry, re-evaluation of patient's condition. This critical care time did not overlap with that of any other provider or involve time for any procedures.     Nik Perdue NP  Critical Care Medicine  [facility]

## 2020-11-28 NOTE — SUBJECTIVE & OBJECTIVE
Interval History:  Hemodynamically stable with continuous pacing.  No acute problems or complaints.    Review of Systems   Constitutional: Negative.  Negative for chills, fatigue and fever.   HENT: Negative.  Negative for congestion, dental problem, rhinorrhea and sore throat.    Eyes: Negative.  Negative for visual disturbance.   Respiratory: Negative.  Negative for cough, shortness of breath and wheezing.    Cardiovascular: Negative.  Negative for chest pain, palpitations and leg swelling.   Gastrointestinal: Negative for abdominal pain, diarrhea, nausea and vomiting.   Endocrine: Negative.  Negative for cold intolerance and heat intolerance.   Genitourinary: Negative.  Negative for dysuria and hematuria.   Musculoskeletal: Negative.  Negative for arthralgias, gait problem and myalgias.   Skin: Negative.  Negative for rash and wound.   Allergic/Immunologic: Negative.    Neurological: Negative.  Negative for dizziness, syncope, weakness, numbness and headaches.   Hematological: Negative.  Negative for adenopathy.   Psychiatric/Behavioral: Negative.  Negative for confusion and dysphoric mood.   All other systems reviewed and are negative.    Objective:     Vital Signs (Most Recent):  Temp: 97.7 °F (36.5 °C) (11/28/20 0730)  Pulse: 69 (11/28/20 0945)  Resp: 18 (11/28/20 0945)  BP: (!) 155/75 (11/28/20 0900)  SpO2: 97 % (11/28/20 0945) Vital Signs (24h Range):  Temp:  [97.7 °F (36.5 °C)-98.4 °F (36.9 °C)] 97.7 °F (36.5 °C)  Pulse:  [35-72] 69  Resp:  [11-43] 18  SpO2:  [95 %-98 %] 97 %  BP: (119-166)/(69-98) 155/75     Weight: 112.7 kg (248 lb 7.3 oz)  Body mass index is 40.1 kg/m².    Intake/Output Summary (Last 24 hours) at 11/28/2020 1139  Last data filed at 11/28/2020 1100  Gross per 24 hour   Intake 957.76 ml   Output 2410 ml   Net -1452.24 ml      Physical Exam  Constitutional:       General: He is not in acute distress.     Appearance: He is well-developed. He is not diaphoretic.   HENT:      Head:  Normocephalic and atraumatic.   Eyes:      Conjunctiva/sclera: Conjunctivae normal.      Pupils: Pupils are equal, round, and reactive to light.   Neck:      Thyroid: No thyromegaly.      Vascular: No JVD.   Cardiovascular:      Rate and Rhythm: Normal rate and regular rhythm.      Heart sounds: Normal heart sounds. No murmur. No friction rub. No gallop.       Comments: Right IJV access site for pacer.  Pulmonary:      Effort: Pulmonary effort is normal. No respiratory distress.      Breath sounds: Normal breath sounds. No wheezing or rales.   Abdominal:      General: Bowel sounds are normal. There is no distension.      Palpations: Abdomen is soft.      Tenderness: There is no abdominal tenderness. There is no guarding or rebound.   Musculoskeletal: Normal range of motion.         General: No tenderness.   Lymphadenopathy:      Cervical: No cervical adenopathy.   Skin:     General: Skin is warm and dry.      Findings: No erythema or rash.   Neurological:      Mental Status: He is alert and oriented to person, place, and time.      Cranial Nerves: No cranial nerve deficit.      Deep Tendon Reflexes: Reflexes are normal and symmetric. Reflexes normal.   Psychiatric:         Behavior: Behavior normal.         Thought Content: Thought content normal.         Judgment: Judgment normal.         Significant Labs: All pertinent labs within the past 24 hours have been reviewed.    Significant Imaging: I have reviewed all pertinent imaging results/findings within the past 24 hours.

## 2020-11-28 NOTE — PLAN OF CARE
SWer met with patient at bedside to complete discharge assessment.  Patient lives at home with his spouse.  Patient reported that he is in charge of his own health. Patient's transportation for D/C will be his spouse. No D/C needs identified at this time. Needs depends on progress. Patient is independent with ADLs.  Patient have access to [facility] My chart.  SWer provided a transitional care folder, information on advanced directives, information on pharmacy bedside delivery, and discharge planning begins on admission with contact information for any needs/questions.   Patient's PCP: TIEN Mason Jr, MD  Patient's Preferred Pharmacy:   [id number] [place] [id number] [facility] [address] [place] [address] [facility] [address]  [facility] [address]  Phone: [phone] Fax: [phone]     11/28/20 1402   Discharge Assessment   Assessment Type Discharge Planning Assessment   Confirmed/corrected address and phone number on facesheet? Yes  (Pateint's Physical Address: [address] [place] [id number])   Assessment information obtained from? Patient   Communicated expected length of stay with patient/caregiver yes   Prior to hospitilization cognitive status: Alert/Oriented   Prior to hospitalization functional status: Independent   Current cognitive status: Alert/Oriented   Current Functional Status: Independent   Lives With alone   Able to Return to Prior Arrangements yes   Is patient able to care for self after discharge? Yes   Who are your caregiver(s) and their phone number(s)? Tamiko Yoo [phone]   Patient's perception of discharge disposition home or selfcare   Readmission Within the Last 30 Days no previous admission in last 30 days   Patient currently being followed by outpatient case management? No   Patient currently receives any other outside agency services? No   Equipment Currently Used at Home none   Do you have any problems affording any of your prescribed  medications? No   Is the patient taking medications as prescribed? yes   Does the patient have transportation home? Yes   Transportation Anticipated family or friend will provide   Does the patient receive services at the [facility]? No   Discharge Plan A Home   DME Needed Upon Discharge  none   Patient/Family in Agreement with Plan yes     Nora Vallejo LMSW 11/28/2020 2:13 PM

## 2020-11-28 NOTE — SUBJECTIVE & OBJECTIVE
Review of Systems   Constitutional: Negative for chills, fever and malaise/fatigue.   HENT: Negative for congestion.    Eyes: Negative for blurred vision.   Respiratory: Negative for cough, sputum production and shortness of breath.    Cardiovascular: Negative for chest pain and leg swelling.   Gastrointestinal: Negative for abdominal pain, nausea and vomiting.   Genitourinary: Negative for dysuria.   Musculoskeletal: Negative for myalgias.   Skin: Negative for rash.   Neurological: Negative for dizziness, weakness and headaches.   Endo/Heme/Allergies: Does not bruise/bleed easily.   Psychiatric/Behavioral: The patient is not nervous/anxious.          Objective:     Vital Signs (Most Recent):  Temp: 98 °F (36.7 °C) (11/28/20 0305)  Pulse: 70 (11/28/20 0500)  Resp: 18 (11/28/20 0500)  BP: (!) 160/75 (11/28/20 0500)  SpO2: 97 % (11/28/20 0500) Vital Signs (24h Range):  Temp:  [97.7 °F (36.5 °C)-98.5 °F (36.9 °C)] 98 °F (36.7 °C)  Pulse:  [35-70] 70  Resp:  [16-29] 18  SpO2:  [95 %-99 %] 97 %  BP: (117-171)/(55-98) 160/75     Weight: 112.7 kg (248 lb 7.3 oz)  Body mass index is 40.1 kg/m².      Intake/Output Summary (Last 24 hours) at 11/28/2020 0714  Last data filed at 11/28/2020 0600  Gross per 24 hour   Intake 737.26 ml   Output 1650 ml   Net -912.74 ml       Physical Exam  Vitals signs and nursing note reviewed.   Constitutional:       General: He is awake. He is not in acute distress.     Appearance: Normal appearance. He is well-developed. He is not ill-appearing or toxic-appearing.      Interventions: He is not intubated.  HENT:      Head: Normocephalic and atraumatic.      Mouth/Throat:      Mouth: Mucous membranes are moist.   Eyes:      General: Lids are normal.      Pupils: Pupils are equal, round, and reactive to light.   Neck:      Musculoskeletal: Normal range of motion.      Vascular: No carotid bruit.      Trachea: Trachea normal.     Cardiovascular:      Rate and Rhythm: Normal rate and regular rhythm.       Pulses:           Radial pulses are 2+ on the right side and 2+ on the left side.        Dorsalis pedis pulses are 1+ on the right side and 1+ on the left side.      Heart sounds: Normal heart sounds.   Pulmonary:      Effort: Pulmonary effort is normal. No tachypnea, accessory muscle usage or respiratory distress. He is not intubated.      Breath sounds: Normal breath sounds.   Chest:      Chest wall: No deformity or tenderness.   Abdominal:      General: Bowel sounds are normal. There is no distension.      Palpations: Abdomen is soft.      Tenderness: There is no abdominal tenderness.   Genitourinary:     Penis: Normal.    Musculoskeletal: Normal range of motion.      Right lower leg: Edema (trace) present.      Left lower leg: Edema (trace) present.      Right foot: No deformity.      Left foot: No deformity.   Lymphadenopathy:      Cervical: No cervical adenopathy.   Skin:     General: Skin is warm and dry.      Capillary Refill: Capillary refill takes less than 2 seconds.      Findings: No rash.   Neurological:      Mental Status: He is alert and oriented to person, place, and time.   Psychiatric:         Attention and Perception: Attention normal.         Mood and Affect: Mood normal.         Speech: Speech normal.         Behavior: Behavior normal. Behavior is cooperative.         Thought Content: Thought content normal.         Cognition and Memory: Cognition normal.         Judgment: Judgment normal.         Lines/Drains/Airways     Peripheral Intravenous Line                 Peripheral IV - Single Lumen 11/27/20 0959 20 G Right Hand less than 1 day         Peripheral IV - Single Lumen 11/27/20 1005 20 G Left Hand less than 1 day                Significant Labs:    CBC/Anemia Profile:  Recent Labs   Lab 11/27/20  1012 11/28/20  0506   WBC 12.15 9.90   HGB 12.1* 12.3*   HCT 37.9* 37.9*    147*   MCV 94 94   RDW 13.8 13.9        Chemistries:  Recent Labs   Lab 11/27/20  1012 11/28/20  0506   NA  144 143   K 4.6 4.4   * 112*   CO2 17* 20*   BUN 43* 28*   CREATININE 2.5* 1.6*   CALCIUM 8.6* 9.0   ALBUMIN 3.6 3.3*   PROT 7.1 6.9   BILITOT 0.6 0.6   ALKPHOS 86 75   ALT 21 18   AST 23 21   MG 1.5* 1.5*       Coagulation:   Recent Labs   Lab 11/27/20  1012  11/28/20  0506   INR 1.0  --   --    APTT 26.5   < > 54.1*    < > = values in this interval not displayed.     Troponin:   Recent Labs   Lab 11/27/20  1526 11/27/20  1655 11/28/20  0506   TROPONINI 0.075* 0.095* 0.093*     All pertinent labs within the past 24 hours have been reviewed.

## 2020-11-28 NOTE — ASSESSMENT & PLAN NOTE
Telemetry.  Patient was seen by Cardiology in ER.  Temporary pacing wire placement on 27 November by Dr Kenyon.  Beta blockers on hold.  Anticipate will flaquita PPM placed on Monday 30 November.

## 2020-11-28 NOTE — ASSESSMENT & PLAN NOTE
Cards following  Temporary pacing wires placed via IJ access 11/27 and now paced with VSS  Still in CHB with pause of pacer this AM  ICU cardiac and hemodynamic monitoring  Stopped home Toprol XL on admit  Avoid BB and CCBs  If remains bradycardic post another 24 hours will plan PPM  Monitor and replace electrolytes

## 2020-11-29 LAB
ANION GAP SERPL CALC-SCNC: 12 MMOL/L (ref 8–16)
APTT BLDCRRT: 50.4 SEC (ref 21–32)
BASOPHILS # BLD AUTO: 0.05 K/UL (ref 0–0.2)
BASOPHILS NFR BLD: 0.5 % (ref 0–1.9)
BUN SERPL-MCNC: 22 MG/DL (ref 8–23)
CALCIUM SERPL-MCNC: 9.1 MG/DL (ref 8.7–10.5)
CHLORIDE SERPL-SCNC: 110 MMOL/L (ref 95–110)
CO2 SERPL-SCNC: 20 MMOL/L (ref 23–29)
CREAT SERPL-MCNC: 1.4 MG/DL (ref 0.5–1.4)
DIFFERENTIAL METHOD: ABNORMAL
EOSINOPHIL # BLD AUTO: 0.3 K/UL (ref 0–0.5)
EOSINOPHIL NFR BLD: 3.2 % (ref 0–8)
ERYTHROCYTE [DISTWIDTH] IN BLOOD BY AUTOMATED COUNT: 13.8 % (ref 11.5–14.5)
EST. GFR  (AFRICAN AMERICAN): 59 ML/MIN/1.73 M^2
EST. GFR  (NON AFRICAN AMERICAN): 51 ML/MIN/1.73 M^2
GLUCOSE SERPL-MCNC: 112 MG/DL (ref 70–110)
HCT VFR BLD AUTO: 41 % (ref 40–54)
HGB BLD-MCNC: 13.3 G/DL (ref 14–18)
IMM GRANULOCYTES # BLD AUTO: 0.03 K/UL (ref 0–0.04)
IMM GRANULOCYTES NFR BLD AUTO: 0.3 % (ref 0–0.5)
LYMPHOCYTES # BLD AUTO: 1.9 K/UL (ref 1–4.8)
LYMPHOCYTES NFR BLD: 19.1 % (ref 18–48)
MAGNESIUM SERPL-MCNC: 1.8 MG/DL (ref 1.6–2.6)
MCH RBC QN AUTO: 30.3 PG (ref 27–31)
MCHC RBC AUTO-ENTMCNC: 32.4 G/DL (ref 32–36)
MCV RBC AUTO: 93 FL (ref 82–98)
MONOCYTES # BLD AUTO: 0.9 K/UL (ref 0.3–1)
MONOCYTES NFR BLD: 8.9 % (ref 4–15)
NEUTROPHILS # BLD AUTO: 6.9 K/UL (ref 1.8–7.7)
NEUTROPHILS NFR BLD: 68 % (ref 38–73)
NRBC BLD-RTO: 0 /100 WBC
PLATELET # BLD AUTO: 146 K/UL (ref 150–350)
PMV BLD AUTO: 13.8 FL (ref 9.2–12.9)
POTASSIUM SERPL-SCNC: 4.4 MMOL/L (ref 3.5–5.1)
RBC # BLD AUTO: 4.39 M/UL (ref 4.6–6.2)
SODIUM SERPL-SCNC: 142 MMOL/L (ref 136–145)
WBC # BLD AUTO: 10.1 K/UL (ref 3.9–12.7)

## 2020-11-29 PROCEDURE — 85025 COMPLETE CBC W/AUTO DIFF WBC: CPT

## 2020-11-29 PROCEDURE — 99233 SBSQ HOSP IP/OBS HIGH 50: CPT | Mod: ,,, | Performed by: INTERNAL MEDICINE

## 2020-11-29 PROCEDURE — 80048 BASIC METABOLIC PNL TOTAL CA: CPT

## 2020-11-29 PROCEDURE — 25000003 PHARM REV CODE 250: Performed by: NURSE PRACTITIONER

## 2020-11-29 PROCEDURE — 25000242 PHARM REV CODE 250 ALT 637 W/ HCPCS: Performed by: NURSE PRACTITIONER

## 2020-11-29 PROCEDURE — 85730 THROMBOPLASTIN TIME PARTIAL: CPT

## 2020-11-29 PROCEDURE — 20000000 HC ICU ROOM

## 2020-11-29 PROCEDURE — 83735 ASSAY OF MAGNESIUM: CPT

## 2020-11-29 PROCEDURE — 36415 COLL VENOUS BLD VENIPUNCTURE: CPT

## 2020-11-29 PROCEDURE — 99291 CRITICAL CARE FIRST HOUR: CPT | Mod: ,,, | Performed by: NURSE PRACTITIONER

## 2020-11-29 PROCEDURE — 27200667 HC PACEMAKER, TEMPORARY MONITORING, PER SHIFT

## 2020-11-29 PROCEDURE — 99233 PR SUBSEQUENT HOSPITAL CARE,LEVL III: ICD-10-PCS | Mod: ,,, | Performed by: INTERNAL MEDICINE

## 2020-11-29 PROCEDURE — 99291 PR CRITICAL CARE, E/M 30-74 MINUTES: ICD-10-PCS | Mod: ,,, | Performed by: NURSE PRACTITIONER

## 2020-11-29 PROCEDURE — 63600175 PHARM REV CODE 636 W HCPCS: Performed by: NURSE PRACTITIONER

## 2020-11-29 PROCEDURE — 25000003 PHARM REV CODE 250: Performed by: INTERNAL MEDICINE

## 2020-11-29 RX ORDER — ACETAMINOPHEN 325 MG/1
650 TABLET ORAL EVERY 4 HOURS PRN
Status: DISCONTINUED | OUTPATIENT
Start: 2020-11-29 | End: 2020-11-29 | Stop reason: SDUPTHER

## 2020-11-29 RX ORDER — ONDANSETRON 8 MG/1
8 TABLET, ORALLY DISINTEGRATING ORAL EVERY 8 HOURS PRN
Status: DISCONTINUED | OUTPATIENT
Start: 2020-11-29 | End: 2020-11-30

## 2020-11-29 RX ORDER — ASPIRIN 81 MG/1
81 TABLET ORAL DAILY
Status: DISCONTINUED | OUTPATIENT
Start: 2020-11-29 | End: 2020-12-01 | Stop reason: HOSPADM

## 2020-11-29 RX ORDER — MAGNESIUM SULFATE HEPTAHYDRATE 40 MG/ML
2 INJECTION, SOLUTION INTRAVENOUS ONCE
Status: COMPLETED | OUTPATIENT
Start: 2020-11-29 | End: 2020-11-29

## 2020-11-29 RX ORDER — HYDRALAZINE HYDROCHLORIDE 50 MG/1
50 TABLET, FILM COATED ORAL EVERY 8 HOURS
Status: DISCONTINUED | OUTPATIENT
Start: 2020-11-29 | End: 2020-12-01 | Stop reason: HOSPADM

## 2020-11-29 RX ORDER — SODIUM CHLORIDE 450 MG/100ML
INJECTION, SOLUTION INTRAVENOUS CONTINUOUS
Status: DISCONTINUED | OUTPATIENT
Start: 2020-11-29 | End: 2020-11-29

## 2020-11-29 RX ADMIN — AMLODIPINE BESYLATE 10 MG: 10 TABLET ORAL at 09:11

## 2020-11-29 RX ADMIN — ALLOPURINOL 300 MG: 100 TABLET ORAL at 09:11

## 2020-11-29 RX ADMIN — PANTOPRAZOLE SODIUM 40 MG: 40 TABLET, DELAYED RELEASE ORAL at 09:11

## 2020-11-29 RX ADMIN — HYDRALAZINE HYDROCHLORIDE 50 MG: 50 TABLET, FILM COATED ORAL at 09:11

## 2020-11-29 RX ADMIN — MUPIROCIN: 20 OINTMENT TOPICAL at 09:11

## 2020-11-29 RX ADMIN — FLUTICASONE PROPIONATE 100 MCG: 50 SPRAY, METERED NASAL at 09:11

## 2020-11-29 RX ADMIN — ROSUVASTATIN 40 MG: 10 TABLET, FILM COATED ORAL at 09:11

## 2020-11-29 RX ADMIN — OMEGA-3-ACID ETHYL ESTERS 1 G: 1 CAPSULE, LIQUID FILLED ORAL at 09:11

## 2020-11-29 RX ADMIN — ASPIRIN 81 MG: 81 TABLET, COATED ORAL at 09:11

## 2020-11-29 RX ADMIN — MAGNESIUM SULFATE IN WATER 2 G: 40 INJECTION, SOLUTION INTRAVENOUS at 09:11

## 2020-11-29 RX ADMIN — HYDRALAZINE HYDROCHLORIDE 50 MG: 50 TABLET, FILM COATED ORAL at 01:11

## 2020-11-29 NOTE — PROGRESS NOTES
[facility] -   Cardiology  Progress Note    Patient Name: Damon Yoo  MRN: [id number]  Admission Date: 11/27/2020  Hospital Length of Stay: 2 days  Code Status: Full Code   Attending Physician: Young Hoffman MD   Primary Care Physician: TIEN Mason Jr, MD  Expected Discharge Date:   Principal Problem:CHB (complete heart block)    Subjective:     Hospital Course:   11/28/2020 Patient seen and examined. Still pacemaker dependent.  stable and continue diuresis . Plan permanent pacemaker Monday.    11/29/20 Patient seen and examined. Still pacemaker needed due to heart block.  Plan permanent pacemaker Monday.  Will d/c heparin today.    Interval History: stable and improved, less shortness of breath.    Review of Systems   Constitution: Negative for chills, diaphoresis, night sweats, weight gain and weight loss.   HENT: Negative for congestion, hoarse voice, sore throat and stridor.    Eyes: Negative for double vision and pain.   Cardiovascular: Negative for chest pain, claudication, cyanosis, dyspnea on exertion, irregular heartbeat, leg swelling, near-syncope, orthopnea, palpitations, paroxysmal nocturnal dyspnea and syncope.   Respiratory: Negative for cough, hemoptysis, shortness of breath, sleep disturbances due to breathing, snoring, sputum production and wheezing.    Endocrine: Negative for cold intolerance, heat intolerance and polydipsia.   Hematologic/Lymphatic: Negative for bleeding problem. Does not bruise/bleed easily.   Skin: Negative for color change, dry skin and rash.   Musculoskeletal: Negative for joint swelling and muscle cramps.   Gastrointestinal: Negative for bloating, abdominal pain, constipation, diarrhea, dysphagia, melena, nausea and vomiting.   Genitourinary: Negative for flank pain and urgency.   Neurological: Negative for dizziness, focal weakness, headaches, light-headedness, loss of balance, seizures and weakness.   Psychiatric/Behavioral: Negative for altered  mental status and memory loss. The patient is not nervous/anxious.      Objective:     Vital Signs (Most Recent):  Temp: 98.5 °F (36.9 °C) (11/29/20 0715)  Pulse: 70 (11/29/20 1000)  Resp: (!) 31 (11/29/20 1000)  BP: (!) 155/93 (11/29/20 1000)  SpO2: 98 % (11/29/20 1000) Vital Signs (24h Range):  Temp:  [97.8 °F (36.6 °C)-98.5 °F (36.9 °C)] 98.5 °F (36.9 °C)  Pulse:  [69-74] 70  Resp:  [12-43] 31  SpO2:  [94 %-98 %] 98 %  BP: (107-181)/() 155/93     Weight: 112.7 kg (248 lb 7.3 oz)  Body mass index is 40.1 kg/m².     SpO2: 98 %  O2 Device (Oxygen Therapy): room air      Intake/Output Summary (Last 24 hours) at 11/29/2020 1154  Last data filed at 11/29/2020 1000  Gross per 24 hour   Intake 982.3 ml   Output 1185 ml   Net -202.7 ml       Lines/Drains/Airways     Peripheral Intravenous Line                 Peripheral IV - Single Lumen 11/27/20 0959 20 G Right Hand 2 days         Peripheral IV - Single Lumen 11/27/20 1005 20 G Left Hand 2 days                Physical Exam   Constitutional: He is oriented to person, place, and time.   Eyes: Pupils are equal, round, and reactive to light.   Neck: Normal range of motion. No tracheal deviation present.   Cardiovascular: Normal rate, regular rhythm, normal heart sounds and intact distal pulses. Exam reveals no gallop and no friction rub.   No murmur heard.  Pulses:       Carotid pulses are 2+ on the right side and 2+ on the left side.       Radial pulses are 2+ on the right side and 2+ on the left side.        Femoral pulses are 2+ on the right side and 2+ on the left side.       Popliteal pulses are 2+ on the right side and 2+ on the left side.        Dorsalis pedis pulses are 2+ on the right side and 2+ on the left side.        Posterior tibial pulses are 2+ on the right side and 2+ on the left side.   Pulmonary/Chest: Effort normal and breath sounds normal. No stridor. No respiratory distress. He has no wheezes. He has no rales. He exhibits no tenderness.    Abdominal: He exhibits no distension. There is no abdominal tenderness. There is no rebound.   Musculoskeletal:         General: No tenderness or edema.   Neurological: He is alert and oriented to person, place, and time.   Skin: Skin is warm and dry.       Significant Labs:   [facility]   Recent Labs   Lab 11/28/20  0506 11/29/20  0417    142   K 4.4 4.4   * 110   CO2 20* 20*    112*   BUN 28* 22   CREATININE 1.6* 1.4   CALCIUM 9.0 9.1   PROT 6.9  --    ALBUMIN 3.3*  --    BILITOT 0.6  --    ALKPHOS 75  --    AST 21  --    ALT 18  --    ANIONGAP 11 12   ESTGFRAFRICA 50* 59*   EGFRNONAA 44* 51*       Significant Imaging: Echocardiogram:   Transthoracic echo (TTE) complete (Cupid Only):   Results for orders placed or performed during the hospital encounter of 11/27/20   Echo Color Flow Doppler? Yes; Bubble Contrast? No   Result Value Ref Range    BSA 2.3 m2    LA WIDTH 3.44 cm    LVIDd 4.19 3.5 - 6.0 cm    IVS 1.62 (A) 0.6 - 1.1 cm    Posterior Wall 1.56 (A) 0.6 - 1.1 cm    Ao root annulus 4.06 cm    LVIDs 2.57 2.1 - 4.0 cm    FS 39 28 - 44 %    LA volume 34.87 cm3    Sinus 4.17 cm    STJ 3.60 cm    Ascending aorta 4.10 cm    LV mass 272.46 g    LA size 3.36 cm    TAPSE 1.94 cm    Left Ventricle Relative Wall Thickness 0.74 cm    AV mean gradient 9 mmHg    AV valve area 1.78 cm2    AV Velocity Ratio 0.63     AV index (prosthetic) 0.65     MV valve area p 1/2 method 3.22 cm2    PV peak gradient 5.83 mmHg    E/A ratio 1.16     E wave decelartion time 235.35 msec    IVRT 72.66 msec    LVOT diameter 1.86 cm    LVOT area 2.7 cm2    LVOT peak yung 1.22 m/s    LVOT peak VTI 29.48 cm    Ao peak yung 1.94 m/s    Ao VTI 45.02 cm    RVOT peak yung 1.21 m/s    RVOT peak VTI 29.08 cm    LVOT stroke volume 80.06 cm3    AV peak gradient 15 mmHg    PV mean gradient 3.63 mmHg    MV Peak E Yung 0.87 m/s    TR Max Yung 4.40 m/s    MV stenosis pressure 1/2 time 68.25 ms    MV Peak A Yung 0.75 m/s    LV Systolic Volume 23.84 mL     LV Systolic Volume Index 10.8 mL/m2    LV Diastolic Volume 78.10 mL    LV Diastolic Volume Index 35.45 mL/m2    LA Volume Index 15.8 mL/m2    LV Mass Index 124 g/m2    RA Major Axis 3.67 cm    Left Atrium Minor Axis 3.52 cm    Left Atrium Major Axis 3.58 cm    Triscuspid Valve Regurgitation Peak Gradient 77 mmHg    RA Width 3.02 cm    Right Atrial Pressure (from IVC) 3 mmHg    TV rest pulmonary artery pressure 80 mmHg    Narrative    · There is moderate left ventricular concentric hypertrophy.  · The left ventricle is normal in size with normal systolic function. The   estimated ejection fraction is 60%.  · Normal left ventricular diastolic function.  · Normal right ventricular size with normal right ventricular systolic   function.  · Mild aortic valve stenosis.  · Aortic valve area is 1.78 cm2; peak velocity is 1.94 m/s; mean gradient   is 9 mmHg.  · Moderate mitral regurgitation.  · Moderate tricuspid regurgitation.  · There is pulmonary hypertension.  · Normal central venous pressure (3 mmHg).  · The estimated PA systolic pressure is 80 mmHg.     Suggest re echo when patient is in NSR.        Assessment and Plan:     Brief HPI: stable, will need permanent pacemaker Monday.    * CHB (complete heart block)  Complete AV dissociation noted in ED  Keep NPO   Check ECHO  Hold BB for next 48 hours  Plan for Temporary pacing wire today per Dr Kenyon  Risks, benefits, and alternatives reviewed with patient. Patient agrees to proceed with procedure as planned. Consent obtained, all questions answered and appropriately witnessed.   May need PPM implant on Monday if remains complete dissociation, further recs to follow  Will need ICU admission post procedure  ICU Cardiac Monitoring      Chronic diastolic CHF (congestive heart failure)  BNP 1191  Did not seem overloaded on exam today  Likely due to Complete Heart block  Hold BB for next 48 hours given complete heart block and need for temporary pacing wire  Hold  ACei/ARB for now given DON  Dash diet, 2 gm sodium restriction  1500 ml fluid restriction  Daily weights  Strict I/Os  Check ECHO  Further recs to follow.     11/29/20 stable and doing well, lung fields clear today    Stage 3 chronic kidney disease  Cr 2.5 on admit  Recommend gentle IVF's  Monitor closely with daily labs    Elevated troponin  Troponin 0.097  Trend serial troponin  Check ECHO  Chest pain free on exam  Will need to hold BB for next 48 hours given complete heart block  Start IV heparin gtt after Temporary pacing wire placed this AM  Further recs to follow  D/C heparin today  Consider nuclear stress as outpatient        VTE Risk Mitigation (From admission, onward)         Ordered     heparin 25,000 units in dextrose 5% 250 mL (100 units/mL) infusion LOW INTENSITY nomogram - [facility]  Continuous     Question:  Heparin Infusion Adjustment (DO NOT MODIFY ANSWER)  Answer:  \[email or web address]\epic\Images\Pharmacy\HeparinInfusions\heparin LOW INTENSITY nomogram for [facility] [id number]    11/27/20 1607     heparin 25,000 units in dextrose 5% (100 units/ml) IV bolus from bag - ADDITIONAL PRN BOLUS - 60 units/kg (max bolus 4000 units)  As needed (PRN)     Question:  Heparin Infusion Adjustment (DO NOT MODIFY ANSWER)  Answer:  \[email or web address]\epic\Images\Pharmacy\HeparinInfusions\heparin LOW INTENSITY nomogram for [facility] [id number]    11/27/20 1607     heparin 25,000 units in dextrose 5% (100 units/ml) IV bolus from bag - ADDITIONAL PRN BOLUS - 30 units/kg (max bolus 4000 units)  As needed (PRN)     Question:  Heparin Infusion Adjustment (DO NOT MODIFY ANSWER)  Answer:  \[email or web address]\[email or web address]\Images\Pharmacy\HeparinInfusions\heparin LOW INTENSITY nomogram for [facility] [id number]    11/27/20 1607     Place SUNDEEP hose  Until discontinued      11/27/20 1156     IP VTE HIGH RISK PATIENT  Once      11/27/20 1156     Place sequential compression device  Until discontinued      11/27/20 1156                Omar Castellano MD  Cardiology  [facility] -

## 2020-11-29 NOTE — ASSESSMENT & PLAN NOTE
Troponin 0.097  Trend serial troponin  Check ECHO  Chest pain free on exam  Will need to hold BB for next 48 hours given complete heart block  Start IV heparin gtt after Temporary pacing wire placed this AM  Further recs to follow  D/C heparin today  Consider nuclear stress as outpatient

## 2020-11-29 NOTE — PROGRESS NOTES
[facility]  Critical Care Medicine  Progress Note    Patient Name: Damon Yoo  MRN: [id number]  Admission Date: 11/27/2020  Hospital Length of Stay: 2 days  Code Status: Full Code  Attending Provider: Young Hoffman MD  Primary Care Provider: TIEN Mason Jr, MD   Principal Problem: CHB (complete heart block)    Subjective:     HPI:  Mr Yoo is a elderly 71 yo BM with a PMH of Chronic Diastolic heart failure, CAD, CABG, Prostate CA w/ surgery, Gout, chronic Arthritis, HLD, and HTN.  He presented to [facility] this AM about 0800 hr with complaint of SOB and diaphoresis since yesterday.  In clinic he was found to have HR 35-40 and was sent to [facility] arriving about 1000 hr.  In ED HR 38-44 w/ CHB and Cards consulted.  He is on home Toprol XL.  Also in ED creatine 2.5 (last was 1.7), Mg 1.5 and BNP 1191.  He was admitted to ICU and shortly later taken to Cath Lab for TV temporary pacing wire placement and pacing    Hospital/ICU Course:  11/27 - Seen post temporary pacing wire placement and paced fully awake, alert and responsive in no distress and denies earlier symptoms of SOB and diaphoresis now Hungry w/ VSS.  11/28 - Upright in bed fully awake, alert and responsive with orientation in no distress on RA denies pain or SOB.  Still paced and HR drop to 27 with pause of TV pacer this AM.  VSS on pacing and hungry this AM.   11/29 - Up in bed side chair eating breakfast in no distress with VSS except mild to moderate HTN.  Still paced per TV and fariba to 40 with pause of pacing this AM.      Review of Systems   Constitutional: Negative for chills, fever and malaise/fatigue.   HENT: Negative for congestion.    Eyes: Negative for blurred vision.   Respiratory: Negative for cough, sputum production and shortness of breath.    Cardiovascular: Negative for chest pain and leg swelling.   Gastrointestinal: Negative for abdominal pain, nausea and vomiting.   Genitourinary:  Negative for dysuria.   Musculoskeletal: Negative for myalgias.   Skin: Negative for rash.   Neurological: Negative for dizziness, weakness and headaches.   Endo/Heme/Allergies: Does not bruise/bleed easily.   Psychiatric/Behavioral: The patient is not nervous/anxious.          Objective:     Vital Signs (Most Recent):  Temp: 98 °F (36.7 °C) (11/29/20 0400)  Pulse: 70 (11/29/20 0600)  Resp: 12 (11/29/20 0600)  BP: (!) 154/86 (11/29/20 0600)  SpO2: 96 % (11/29/20 0600) Vital Signs (24h Range):  Temp:  [97.8 °F (36.6 °C)-98.2 °F (36.8 °C)] 98 °F (36.7 °C)  Pulse:  [69-70] 70  Resp:  [11-40] 12  SpO2:  [94 %-98 %] 96 %  BP: (107-181)/() 154/86     Weight: 112.7 kg (248 lb 7.3 oz)  Body mass index is 40.1 kg/m².      Intake/Output Summary (Last 24 hours) at 11/29/2020 0842  Last data filed at 11/29/2020 0600  Gross per 24 hour   Intake 802.2 ml   Output 1435 ml   Net -632.8 ml       Physical Exam  Vitals signs and nursing note reviewed.   Constitutional:       General: He is awake. He is not in acute distress.     Appearance: Normal appearance. He is well-developed. He is not ill-appearing, toxic-appearing or diaphoretic.      Interventions: He is not intubated.  HENT:      Head: Normocephalic and atraumatic.      Mouth/Throat:      Mouth: Mucous membranes are moist.   Eyes:      General: Lids are normal.      Pupils: Pupils are equal, round, and reactive to light.   Neck:      Musculoskeletal: Normal range of motion.      Vascular: No carotid bruit.      Trachea: Trachea normal.     Cardiovascular:      Rate and Rhythm: Normal rate and regular rhythm.      Pulses:           Radial pulses are 2+ on the right side and 2+ on the left side.        Dorsalis pedis pulses are 1+ on the right side and 1+ on the left side.      Heart sounds: Normal heart sounds.      Comments: Paced per TV pacing  Pulmonary:      Effort: Pulmonary effort is normal. No tachypnea, accessory muscle usage or respiratory distress. He is not  intubated.      Breath sounds: Normal breath sounds.   Chest:      Chest wall: No deformity or tenderness.   Abdominal:      General: Bowel sounds are normal. There is no distension.      Palpations: Abdomen is soft.      Tenderness: There is no abdominal tenderness.   Genitourinary:     Penis: Normal.    Musculoskeletal: Normal range of motion.      Right lower leg: Edema (trace) present.      Left lower leg: Edema (trace) present.      Right foot: No deformity.      Left foot: No deformity.   Lymphadenopathy:      Cervical: No cervical adenopathy.   Skin:     General: Skin is warm and dry.      Capillary Refill: Capillary refill takes less than 2 seconds.      Findings: No rash.   Neurological:      Mental Status: He is alert and oriented to person, place, and time.   Psychiatric:         Attention and Perception: Attention normal.         Mood and Affect: Mood normal.         Speech: Speech normal.         Behavior: Behavior normal. Behavior is cooperative.         Thought Content: Thought content normal.         Cognition and Memory: Cognition normal.         Judgment: Judgment normal.         Lines/Drains/Airways     Peripheral Intravenous Line                 Peripheral IV - Single Lumen 11/27/20 0959 20 G Right Hand 1 day         Peripheral IV - Single Lumen 11/27/20 1005 20 G Left Hand 1 day                Significant Labs:    CBC/Anemia Profile:  Recent Labs   Lab 11/27/20  1012 11/28/20  0506 11/29/20  0417   WBC 12.15 9.90 10.10   HGB 12.1* 12.3* 13.3*   HCT 37.9* 37.9* 41.0    147* 146*   MCV 94 94 93   RDW 13.8 13.9 13.8        Chemistries:  Recent Labs   Lab 11/27/20  1012 11/28/20  0506 11/29/20  0417    143 142   K 4.6 4.4 4.4   * 112* 110   CO2 17* 20* 20*   BUN 43* 28* 22   CREATININE 2.5* 1.6* 1.4   CALCIUM 8.6* 9.0 9.1   ALBUMIN 3.6 3.3*  --    PROT 7.1 6.9  --    BILITOT 0.6 0.6  --    ALKPHOS 86 75  --    ALT 21 18  --    AST 23 21  --    MG 1.5* 1.5* 1.8       Coagulation:    Recent Labs   Lab 11/27/20  1012  11/29/20  0417   INR 1.0  --   --    APTT 26.5   < > 50.4*    < > = values in this interval not displayed.     All pertinent labs within the past 24 hours have been reviewed.        ABG  No results for input(s): PH, PO2, PCO2, HCO3, BE in the last 168 hours.  Assessment/Plan:     Cardiac/Vascular  * CHB (complete heart block)  Cards following  Temporary pacing wires placed via IJ access 11/27 and now paced with VSS  Still in CHB with pause of pacer again this AM  ICU cardiac and hemodynamic monitoring  Stopped home Toprol XL on admit  Avoid BB and CCBs  Recommend PPM asap  Monitor and replace electrolytes    Hyperlipidemia  Cont home statin    Chronic diastolic CHF (congestive heart failure)  Daily weights  BNP elevated but CXR without pulm edema  Monitor volume status  · TTE: There is moderate left ventricular concentric hypertrophy.  · The left ventricle is normal in size with normal systolic function. The estimated ejection fraction is 60%.  · Normal left ventricular diastolic function.  · Normal right ventricular size with normal right ventricular systolic function.  · Mild aortic valve stenosis.  · Aortic valve area is 1.78 cm2; peak velocity is 1.94 m/s; mean gradient is 9 mmHg.  · Moderate mitral regurgitation.  · Moderate tricuspid regurgitation.  · There is pulmonary hypertension.  · Normal central venous pressure (3 mmHg).  The estimated PA systolic pressure is 80 mmHg.    Essential hypertension  On Norvasc  Add Hydralazine    Coronary artery disease involving native coronary artery of native heart without angina pectoris w/ hx CABG  Follows with Dr. Faye as outpatient  Cont statin   Holding ASA for PPM in AM  Holding b-blocker due to CHB  ICU cardiac monitoring  Cards following  Troponin peak at 0.09  On Heparin infusion    Renal/  Stage 3 chronic kidney disease  Last creatine July 23 was 1.7  DON resolved now at baseline  Likely worsened from hypoperfusion secondary  to CHB/Bradycardia on admit  BMP daily  Accurate I/Os  Hold home ACE-I and Celebrex    Electrolyte imbalance  Replace Mg+ again today  Daily labs and Cardiac monitoring    Endocrine  Obesity, Class III, BMI 40-49.9 (morbid obesity)  Encouraged weight loss      Preventive Measures and Monitoring:   Stress Ulcer: PPI  Nutrition: Cardiac Diet  Glucose control: stable  Bowel prophylaxis: Colace and PRN Dulcolax  DVT prophylaxis: Heparin infusion  Hx CAD on B-Blocker: none due to symptomatic bradycardia  Head of Bed/Reposition: Elevate HOB and turn Q1-2 hours   Early Mobility: bed rest  Code Status: Full  Pneumonia Vaccine: UTD  Flu Vaccine: refused     Counseling/Consultation:I have discussed the care of this patient in detail with the bedside nursing staff and Dr. Kenyon and Dr. Hoffman     Critical Care Time: 42 minutes  Critical secondary to Patient has a condition that poses threat to life and bodily function: Acute Renal Failure and and CHB with temporary TV pacing    Will sign off, please call or re-consult if needed.      Critical care was time spent personally by me on the following activities: development of treatment plan with patient or surrogate and bedside caregivers, discussions with consultants, evaluation of patient's response to treatment, examination of patient, ordering and performing treatments and interventions, ordering and review of laboratory studies, ordering and review of radiographic studies, pulse oximetry, re-evaluation of patient's condition. This critical care time did not overlap with that of any other provider or involve time for any procedures.     Nik Perdue NP  Critical Care Medicine  [facility]

## 2020-11-29 NOTE — ASSESSMENT & PLAN NOTE
BNP 1191  Did not seem overloaded on exam today  Likely due to Complete Heart block  Hold BB for next 48 hours given complete heart block and need for temporary pacing wire  Hold ACei/ARB for now given DON  Dash diet, 2 gm sodium restriction  1500 ml fluid restriction  Daily weights  Strict I/Os  Check ECHO  Further recs to follow.     11/29/20 stable and doing well, lung fields clear today

## 2020-11-29 NOTE — ASSESSMENT & PLAN NOTE
Follows with Dr. Faye as outpatient  Cont statin   Holding ASA for PPM in AM  Holding b-blocker due to CHB  ICU cardiac monitoring  Cards following  Troponin peak at 0.09  On Heparin infusion

## 2020-11-29 NOTE — PLAN OF CARE
Patient updated on POC, aware of possible PM placement tomorrow, verbalized understanding. 100% v-paced on monitor, no issues w/pacer. Heparin gtt, PTT remains therapeutic. VSS. Voids per urina. Turns independently in bed. Fall precautions in place, bed alarm on. Slept b/t care.

## 2020-11-29 NOTE — SUBJECTIVE & OBJECTIVE
Review of Systems   Constitutional: Negative for chills, fever and malaise/fatigue.   HENT: Negative for congestion.    Eyes: Negative for blurred vision.   Respiratory: Negative for cough, sputum production and shortness of breath.    Cardiovascular: Negative for chest pain and leg swelling.   Gastrointestinal: Negative for abdominal pain, nausea and vomiting.   Genitourinary: Negative for dysuria.   Musculoskeletal: Negative for myalgias.   Skin: Negative for rash.   Neurological: Negative for dizziness, weakness and headaches.   Endo/Heme/Allergies: Does not bruise/bleed easily.   Psychiatric/Behavioral: The patient is not nervous/anxious.          Objective:     Vital Signs (Most Recent):  Temp: 98 °F (36.7 °C) (11/29/20 0400)  Pulse: 70 (11/29/20 0600)  Resp: 12 (11/29/20 0600)  BP: (!) 154/86 (11/29/20 0600)  SpO2: 96 % (11/29/20 0600) Vital Signs (24h Range):  Temp:  [97.8 °F (36.6 °C)-98.2 °F (36.8 °C)] 98 °F (36.7 °C)  Pulse:  [69-70] 70  Resp:  [11-40] 12  SpO2:  [94 %-98 %] 96 %  BP: (107-181)/() 154/86     Weight: 112.7 kg (248 lb 7.3 oz)  Body mass index is 40.1 kg/m².      Intake/Output Summary (Last 24 hours) at 11/29/2020 0842  Last data filed at 11/29/2020 0600  Gross per 24 hour   Intake 802.2 ml   Output 1435 ml   Net -632.8 ml       Physical Exam  Vitals signs and nursing note reviewed.   Constitutional:       General: He is awake. He is not in acute distress.     Appearance: Normal appearance. He is well-developed. He is not ill-appearing, toxic-appearing or diaphoretic.      Interventions: He is not intubated.  HENT:      Head: Normocephalic and atraumatic.      Mouth/Throat:      Mouth: Mucous membranes are moist.   Eyes:      General: Lids are normal.      Pupils: Pupils are equal, round, and reactive to light.   Neck:      Musculoskeletal: Normal range of motion.      Vascular: No carotid bruit.      Trachea: Trachea normal.     Cardiovascular:      Rate and Rhythm: Normal rate and  regular rhythm.      Pulses:           Radial pulses are 2+ on the right side and 2+ on the left side.        Dorsalis pedis pulses are 1+ on the right side and 1+ on the left side.      Heart sounds: Normal heart sounds.      Comments: Paced per TV pacing  Pulmonary:      Effort: Pulmonary effort is normal. No tachypnea, accessory muscle usage or respiratory distress. He is not intubated.      Breath sounds: Normal breath sounds.   Chest:      Chest wall: No deformity or tenderness.   Abdominal:      General: Bowel sounds are normal. There is no distension.      Palpations: Abdomen is soft.      Tenderness: There is no abdominal tenderness.   Genitourinary:     Penis: Normal.    Musculoskeletal: Normal range of motion.      Right lower leg: Edema (trace) present.      Left lower leg: Edema (trace) present.      Right foot: No deformity.      Left foot: No deformity.   Lymphadenopathy:      Cervical: No cervical adenopathy.   Skin:     General: Skin is warm and dry.      Capillary Refill: Capillary refill takes less than 2 seconds.      Findings: No rash.   Neurological:      Mental Status: He is alert and oriented to person, place, and time.   Psychiatric:         Attention and Perception: Attention normal.         Mood and Affect: Mood normal.         Speech: Speech normal.         Behavior: Behavior normal. Behavior is cooperative.         Thought Content: Thought content normal.         Cognition and Memory: Cognition normal.         Judgment: Judgment normal.         Lines/Drains/Airways     Peripheral Intravenous Line                 Peripheral IV - Single Lumen 11/27/20 0959 20 G Right Hand 1 day         Peripheral IV - Single Lumen 11/27/20 1005 20 G Left Hand 1 day                Significant Labs:    CBC/Anemia Profile:  Recent Labs   Lab 11/27/20  1012 11/28/20  0506 11/29/20  0417   WBC 12.15 9.90 10.10   HGB 12.1* 12.3* 13.3*   HCT 37.9* 37.9* 41.0    147* 146*   MCV 94 94 93   RDW 13.8 13.9 13.8         Chemistries:  Recent Labs   Lab 11/27/20  1012 11/28/20  0506 11/29/20  0417    143 142   K 4.6 4.4 4.4   * 112* 110   CO2 17* 20* 20*   BUN 43* 28* 22   CREATININE 2.5* 1.6* 1.4   CALCIUM 8.6* 9.0 9.1   ALBUMIN 3.6 3.3*  --    PROT 7.1 6.9  --    BILITOT 0.6 0.6  --    ALKPHOS 86 75  --    ALT 21 18  --    AST 23 21  --    MG 1.5* 1.5* 1.8       Coagulation:   Recent Labs   Lab 11/27/20  1012  11/29/20  0417   INR 1.0  --   --    APTT 26.5   < > 50.4*    < > = values in this interval not displayed.     All pertinent labs within the past 24 hours have been reviewed.

## 2020-11-29 NOTE — SUBJECTIVE & OBJECTIVE
Interval History: stable and improved, less shortness of breath.    Review of Systems   Constitution: Negative for chills, diaphoresis, night sweats, weight gain and weight loss.   HENT: Negative for congestion, hoarse voice, sore throat and stridor.    Eyes: Negative for double vision and pain.   Cardiovascular: Negative for chest pain, claudication, cyanosis, dyspnea on exertion, irregular heartbeat, leg swelling, near-syncope, orthopnea, palpitations, paroxysmal nocturnal dyspnea and syncope.   Respiratory: Negative for cough, hemoptysis, shortness of breath, sleep disturbances due to breathing, snoring, sputum production and wheezing.    Endocrine: Negative for cold intolerance, heat intolerance and polydipsia.   Hematologic/Lymphatic: Negative for bleeding problem. Does not bruise/bleed easily.   Skin: Negative for color change, dry skin and rash.   Musculoskeletal: Negative for joint swelling and muscle cramps.   Gastrointestinal: Negative for bloating, abdominal pain, constipation, diarrhea, dysphagia, melena, nausea and vomiting.   Genitourinary: Negative for flank pain and urgency.   Neurological: Negative for dizziness, focal weakness, headaches, light-headedness, loss of balance, seizures and weakness.   Psychiatric/Behavioral: Negative for altered mental status and memory loss. The patient is not nervous/anxious.      Objective:     Vital Signs (Most Recent):  Temp: 98.5 °F (36.9 °C) (11/29/20 0715)  Pulse: 70 (11/29/20 1000)  Resp: (!) 31 (11/29/20 1000)  BP: (!) 155/93 (11/29/20 1000)  SpO2: 98 % (11/29/20 1000) Vital Signs (24h Range):  Temp:  [97.8 °F (36.6 °C)-98.5 °F (36.9 °C)] 98.5 °F (36.9 °C)  Pulse:  [69-74] 70  Resp:  [12-43] 31  SpO2:  [94 %-98 %] 98 %  BP: (107-181)/() 155/93     Weight: 112.7 kg (248 lb 7.3 oz)  Body mass index is 40.1 kg/m².     SpO2: 98 %  O2 Device (Oxygen Therapy): room air      Intake/Output Summary (Last 24 hours) at 11/29/2020 6121  Last data filed at  11/29/2020 1000  Gross per 24 hour   Intake 982.3 ml   Output 1185 ml   Net -202.7 ml       Lines/Drains/Airways     Peripheral Intravenous Line                 Peripheral IV - Single Lumen 11/27/20 0959 20 G Right Hand 2 days         Peripheral IV - Single Lumen 11/27/20 1005 20 G Left Hand 2 days                Physical Exam   Constitutional: He is oriented to person, place, and time.   Eyes: Pupils are equal, round, and reactive to light.   Neck: Normal range of motion. No tracheal deviation present.   Cardiovascular: Normal rate, regular rhythm, normal heart sounds and intact distal pulses. Exam reveals no gallop and no friction rub.   No murmur heard.  Pulses:       Carotid pulses are 2+ on the right side and 2+ on the left side.       Radial pulses are 2+ on the right side and 2+ on the left side.        Femoral pulses are 2+ on the right side and 2+ on the left side.       Popliteal pulses are 2+ on the right side and 2+ on the left side.        Dorsalis pedis pulses are 2+ on the right side and 2+ on the left side.        Posterior tibial pulses are 2+ on the right side and 2+ on the left side.   Pulmonary/Chest: Effort normal and breath sounds normal. No stridor. No respiratory distress. He has no wheezes. He has no rales. He exhibits no tenderness.   Abdominal: He exhibits no distension. There is no abdominal tenderness. There is no rebound.   Musculoskeletal:         General: No tenderness or edema.   Neurological: He is alert and oriented to person, place, and time.   Skin: Skin is warm and dry.       Significant Labs:   CMP   Recent Labs   Lab 11/28/20  0506 11/29/20  0417    142   K 4.4 4.4   * 110   CO2 20* 20*    112*   BUN 28* 22   CREATININE 1.6* 1.4   CALCIUM 9.0 9.1   PROT 6.9  --    ALBUMIN 3.3*  --    BILITOT 0.6  --    ALKPHOS 75  --    AST 21  --    ALT 18  --    ANIONGAP 11 12   ESTGFRAFRICA 50* 59*   EGFRNONAA 44* 51*       Significant Imaging: Echocardiogram:    Transthoracic echo (TTE) complete (Cupid Only):   Results for orders placed or performed during the hospital encounter of 11/27/20   Echo Color Flow Doppler? Yes; Bubble Contrast? No   Result Value Ref Range    BSA 2.3 m2    LA WIDTH 3.44 cm    LVIDd 4.19 3.5 - 6.0 cm    IVS 1.62 (A) 0.6 - 1.1 cm    Posterior Wall 1.56 (A) 0.6 - 1.1 cm    Ao root annulus 4.06 cm    LVIDs 2.57 2.1 - 4.0 cm    FS 39 28 - 44 %    LA volume 34.87 cm3    Sinus 4.17 cm    STJ 3.60 cm    Ascending aorta 4.10 cm    LV mass 272.46 g    LA size 3.36 cm    TAPSE 1.94 cm    Left Ventricle Relative Wall Thickness 0.74 cm    AV mean gradient 9 mmHg    AV valve area 1.78 cm2    AV Velocity Ratio 0.63     AV index (prosthetic) 0.65     MV valve area p 1/2 method 3.22 cm2    PV peak gradient 5.83 mmHg    E/A ratio 1.16     E wave decelartion time 235.35 msec    IVRT 72.66 msec    LVOT diameter 1.86 cm    LVOT area 2.7 cm2    LVOT peak yung 1.22 m/s    LVOT peak VTI 29.48 cm    Ao peak yung 1.94 m/s    Ao VTI 45.02 cm    RVOT peak yung 1.21 m/s    RVOT peak VTI 29.08 cm    LVOT stroke volume 80.06 cm3    AV peak gradient 15 mmHg    PV mean gradient 3.63 mmHg    MV Peak E Yung 0.87 m/s    TR Max Yung 4.40 m/s    MV stenosis pressure 1/2 time 68.25 ms    MV Peak A Yung 0.75 m/s    LV Systolic Volume 23.84 mL    LV Systolic Volume Index 10.8 mL/m2    LV Diastolic Volume 78.10 mL    LV Diastolic Volume Index 35.45 mL/m2    LA Volume Index 15.8 mL/m2    LV Mass Index 124 g/m2    RA Major Axis 3.67 cm    Left Atrium Minor Axis 3.52 cm    Left Atrium Major Axis 3.58 cm    Triscuspid Valve Regurgitation Peak Gradient 77 mmHg    RA Width 3.02 cm    Right Atrial Pressure (from IVC) 3 mmHg    TV rest pulmonary artery pressure 80 mmHg    Narrative    · There is moderate left ventricular concentric hypertrophy.  · The left ventricle is normal in size with normal systolic function. The   estimated ejection fraction is 60%.  · Normal left ventricular diastolic  function.  · Normal right ventricular size with normal right ventricular systolic   function.  · Mild aortic valve stenosis.  · Aortic valve area is 1.78 cm2; peak velocity is 1.94 m/s; mean gradient   is 9 mmHg.  · Moderate mitral regurgitation.  · Moderate tricuspid regurgitation.  · There is pulmonary hypertension.  · Normal central venous pressure (3 mmHg).  · The estimated PA systolic pressure is 80 mmHg.     Suggest re echo when patient is in NSR.

## 2020-11-29 NOTE — ASSESSMENT & PLAN NOTE
Daily weights  BNP elevated but CXR without pulm edema  Monitor volume status  · TTE: There is moderate left ventricular concentric hypertrophy.  · The left ventricle is normal in size with normal systolic function. The estimated ejection fraction is 60%.  · Normal left ventricular diastolic function.  · Normal right ventricular size with normal right ventricular systolic function.  · Mild aortic valve stenosis.  · Aortic valve area is 1.78 cm2; peak velocity is 1.94 m/s; mean gradient is 9 mmHg.  · Moderate mitral regurgitation.  · Moderate tricuspid regurgitation.  · There is pulmonary hypertension.  · Normal central venous pressure (3 mmHg).  The estimated PA systolic pressure is 80 mmHg.

## 2020-11-29 NOTE — SUBJECTIVE & OBJECTIVE
Interval History:  Hemodynamically stable with continuous pacing.  No acute problems or complaints. PPM placement Monday 30 November.    Review of Systems   Constitutional: Negative.  Negative for chills, fatigue and fever.   HENT: Negative.  Negative for congestion, dental problem, rhinorrhea and sore throat.    Eyes: Negative.  Negative for visual disturbance.   Respiratory: Negative.  Negative for cough, shortness of breath and wheezing.    Cardiovascular: Negative.  Negative for chest pain, palpitations and leg swelling.   Gastrointestinal: Negative for abdominal pain, diarrhea, nausea and vomiting.   Endocrine: Negative.  Negative for cold intolerance and heat intolerance.   Genitourinary: Negative.  Negative for dysuria and hematuria.   Musculoskeletal: Negative.  Negative for arthralgias, gait problem and myalgias.   Skin: Negative.  Negative for rash and wound.   Allergic/Immunologic: Negative.    Neurological: Negative.  Negative for dizziness, syncope, weakness, numbness and headaches.   Hematological: Negative.  Negative for adenopathy.   Psychiatric/Behavioral: Negative.  Negative for confusion and dysphoric mood.   All other systems reviewed and are negative.    Objective:     Vital Signs (Most Recent):  Temp: 98.5 °F (36.9 °C) (11/29/20 0715)  Pulse: 74 (11/29/20 0915)  Resp: (!) 29 (11/29/20 0915)  BP: (!) 146/81 (11/29/20 0900)  SpO2: 97 % (11/29/20 0915) Vital Signs (24h Range):  Temp:  [97.8 °F (36.6 °C)-98.5 °F (36.9 °C)] 98.5 °F (36.9 °C)  Pulse:  [69-74] 74  Resp:  [12-43] 29  SpO2:  [94 %-98 %] 97 %  BP: (107-181)/() 146/81     Weight: 112.7 kg (248 lb 7.3 oz)  Body mass index is 40.1 kg/m².    Intake/Output Summary (Last 24 hours) at 11/29/2020 0930  Last data filed at 11/29/2020 0909  Gross per 24 hour   Intake 992.4 ml   Output 1635 ml   Net -642.6 ml      Physical Exam  Constitutional:       General: He is not in acute distress.     Appearance: He is well-developed. He is not  diaphoretic.   HENT:      Head: Normocephalic and atraumatic.   Eyes:      Conjunctiva/sclera: Conjunctivae normal.      Pupils: Pupils are equal, round, and reactive to light.   Neck:      Thyroid: No thyromegaly.      Vascular: No JVD.   Cardiovascular:      Rate and Rhythm: Normal rate and regular rhythm.      Heart sounds: Normal heart sounds. No murmur. No friction rub. No gallop.       Comments: Right IJV access site for pacer.  Pulmonary:      Effort: Pulmonary effort is normal. No respiratory distress.      Breath sounds: Normal breath sounds. No wheezing or rales.   Abdominal:      General: Bowel sounds are normal. There is no distension.      Palpations: Abdomen is soft.      Tenderness: There is no abdominal tenderness. There is no guarding or rebound.   Musculoskeletal: Normal range of motion.         General: No tenderness.   Lymphadenopathy:      Cervical: No cervical adenopathy.   Skin:     General: Skin is warm and dry.      Findings: No erythema or rash.   Neurological:      Mental Status: He is alert and oriented to person, place, and time.      Cranial Nerves: No cranial nerve deficit.      Deep Tendon Reflexes: Reflexes are normal and symmetric. Reflexes normal.   Psychiatric:         Behavior: Behavior normal.         Thought Content: Thought content normal.         Judgment: Judgment normal.         Significant Labs: All pertinent labs within the past 24 hours have been reviewed.    Significant Imaging: I have reviewed all pertinent imaging results/findings within the past 24 hours.

## 2020-11-29 NOTE — ASSESSMENT & PLAN NOTE
Last creatine July 23 was 1.7  DON resolved now at baseline  Likely worsened from hypoperfusion secondary to CHB/Bradycardia on admit  BMP daily  Accurate I/Os  Hold home ACE-I and Celebrex

## 2020-11-29 NOTE — PROGRESS NOTES
[facility] Hospital Medicine  Progress Note    Patient Name: Damon Yoo  MRN: [id number]  Patient Class: IP- Inpatient   Admission Date: 11/27/2020  Length of Stay: 2 days  Attending Physician: Young Hoffman MD  Primary Care Provider: TIEN Mason Jr, MD        Subjective:     Principal Problem:CHB (complete heart block)        HPI:  This is a 71 yo male who has a PMHx of CAD with CABG in 2013, HTN, HLD, prostate cancer, glaucoma, arthritis, gout, and hypertrophic cardiomyopathy. Patient reports he has been in his normal state of health until yesterday when he began with new onset of intermittent SOB and profuse diaphoresis.  Patient  was seen at the Urgent Care today where it was noted that his heart rate was in the 30s. Patient was sent to Emergency Department for further evaluation. Patient was again noted to have heart rate in 30s-40s and found to be in complete heart block. So far his blood pressure has remained stable. Patient was seen by Cardiology in ER and plans for urgent temporary pacing wire placement to be done this AM per Dr Kenyon.                 Per report- Patient had a rapid COVID screen done which was negative before being referred to the ED for further evaluation.     Overview/Hospital Course:  No notes on file    Interval History:  Hemodynamically stable with continuous pacing.  No acute problems or complaints. PPM placement Monday 30 November.    Review of Systems   Constitutional: Negative.  Negative for chills, fatigue and fever.   HENT: Negative.  Negative for congestion, dental problem, rhinorrhea and sore throat.    Eyes: Negative.  Negative for visual disturbance.   Respiratory: Negative.  Negative for cough, shortness of breath and wheezing.    Cardiovascular: Negative.  Negative for chest pain, palpitations and leg swelling.   Gastrointestinal: Negative for abdominal pain, diarrhea, nausea and vomiting.   Endocrine: Negative.  Negative for cold intolerance  and heat intolerance.   Genitourinary: Negative.  Negative for dysuria and hematuria.   Musculoskeletal: Negative.  Negative for arthralgias, gait problem and myalgias.   Skin: Negative.  Negative for rash and wound.   Allergic/Immunologic: Negative.    Neurological: Negative.  Negative for dizziness, syncope, weakness, numbness and headaches.   Hematological: Negative.  Negative for adenopathy.   Psychiatric/Behavioral: Negative.  Negative for confusion and dysphoric mood.   All other systems reviewed and are negative.    Objective:     Vital Signs (Most Recent):  Temp: 98.5 °F (36.9 °C) (11/29/20 0715)  Pulse: 74 (11/29/20 0915)  Resp: (!) 29 (11/29/20 0915)  BP: (!) 146/81 (11/29/20 0900)  SpO2: 97 % (11/29/20 0915) Vital Signs (24h Range):  Temp:  [97.8 °F (36.6 °C)-98.5 °F (36.9 °C)] 98.5 °F (36.9 °C)  Pulse:  [69-74] 74  Resp:  [12-43] 29  SpO2:  [94 %-98 %] 97 %  BP: (107-181)/() 146/81     Weight: 112.7 kg (248 lb 7.3 oz)  Body mass index is 40.1 kg/m².    Intake/Output Summary (Last 24 hours) at 11/29/2020 0930  Last data filed at 11/29/2020 0909  Gross per 24 hour   Intake 992.4 ml   Output 1635 ml   Net -642.6 ml      Physical Exam  Constitutional:       General: He is not in acute distress.     Appearance: He is well-developed. He is not diaphoretic.   HENT:      Head: Normocephalic and atraumatic.   Eyes:      Conjunctiva/sclera: Conjunctivae normal.      Pupils: Pupils are equal, round, and reactive to light.   Neck:      Thyroid: No thyromegaly.      Vascular: No JVD.   Cardiovascular:      Rate and Rhythm: Normal rate and regular rhythm.      Heart sounds: Normal heart sounds. No murmur. No friction rub. No gallop.       Comments: Right IJV access site for pacer.  Pulmonary:      Effort: Pulmonary effort is normal. No respiratory distress.      Breath sounds: Normal breath sounds. No wheezing or rales.   Abdominal:      General: Bowel sounds are normal. There is no distension.      Palpations:  Abdomen is soft.      Tenderness: There is no abdominal tenderness. There is no guarding or rebound.   Musculoskeletal: Normal range of motion.         General: No tenderness.   Lymphadenopathy:      Cervical: No cervical adenopathy.   Skin:     General: Skin is warm and dry.      Findings: No erythema or rash.   Neurological:      Mental Status: He is alert and oriented to person, place, and time.      Cranial Nerves: No cranial nerve deficit.      Deep Tendon Reflexes: Reflexes are normal and symmetric. Reflexes normal.   Psychiatric:         Behavior: Behavior normal.         Thought Content: Thought content normal.         Judgment: Judgment normal.         Significant Labs: All pertinent labs within the past 24 hours have been reviewed.    Significant Imaging: I have reviewed all pertinent imaging results/findings within the past 24 hours.      Assessment/Plan:      * CHB (complete heart block)  Telemetry.  Patient was seen by Cardiology in ER.  Temporary pacing wire placement on 27 November by Dr Kenyon.  Beta blockers on hold.  Anticipate will need PPM placed on Monday 30 November.    Glaucoma  Home eye drops on hold due to symptomatic bradycardia/ complete heart block      Chronic diastolic CHF (congestive heart failure)  Telemetry  Monitor- Currently no signs of acute volume overload  Orders as per Cardiology      Stage 3 chronic kidney disease  Hx CKD stage 3-  Monitor  Trend BMP  Renal dose all medications  Add NS at 50ml/hour for gentle hydration - Monitor volume status closely due to Hx of Chronic diastolic heart failure  Hold Home ACEI      Elevated troponin  Telemetry  Trend levels  Orders as per Cardiology      Chronic kidney disease, stage III (moderate)  As above      Coronary artery disease involving native coronary artery of native heart without angina pectoris w/ hx CABG  Hx CABG-  Telemetry  Orders as per Cardiology      Essential hypertension  Hold home antihypertensives for now  Monitor  closely and resume when clinically appropriate      Hyperlipidemia  Continue home statin and fish oils        VTE Risk Mitigation (From admission, onward)         Ordered     heparin 25,000 units in dextrose 5% 250 mL (100 units/mL) infusion LOW INTENSITY nomogram - [facility]  Continuous     Question:  Heparin Infusion Adjustment (DO NOT MODIFY ANSWER)  Answer:  \[email or web address]\epic\Images\Pharmacy\HeparinInfusions\heparin LOW INTENSITY nomogram for [facility] [id number]    11/27/20 1607     heparin 25,000 units in dextrose 5% (100 units/ml) IV bolus from bag - ADDITIONAL PRN BOLUS - 60 units/kg (max bolus 4000 units)  As needed (PRN)     Question:  Heparin Infusion Adjustment (DO NOT MODIFY ANSWER)  Answer:  \[email or web address]\epic\Images\Pharmacy\HeparinInfusions\heparin LOW INTENSITY nomogram for [facility] [id number]    11/27/20 1607     heparin 25,000 units in dextrose 5% (100 units/ml) IV bolus from bag - ADDITIONAL PRN BOLUS - 30 units/kg (max bolus 4000 units)  As needed (PRN)     Question:  Heparin Infusion Adjustment (DO NOT MODIFY ANSWER)  Answer:  \[email or web address]\epic\Images\Pharmacy\HeparinInfusions\heparin LOW INTENSITY nomogram for [facility] [id number]    11/27/20 1607     Place SUNDEEP hose  Until discontinued      11/27/20 1156     IP VTE HIGH RISK PATIENT  Once      11/27/20 1156     Place sequential compression device  Until discontinued      11/27/20 1156                Discharge Planning   SP:      Code Status: Full Code   Is the patient medically ready for discharge?:     Reason for patient still in hospital (select all that apply): Treatment  Discharge Plan A: Home            Critical care time spent on the evaluation and treatment of severe organ dysfunction, review of pertinent labs and imaging studies, discussions with consulting providers and discussions with patient/family: 30 minutes.      Young Hoffman MD  Department of Hospital Medicine   [facility]

## 2020-11-29 NOTE — ASSESSMENT & PLAN NOTE
Cards following  Temporary pacing wires placed via IJ access 11/27 and now paced with VSS  Still in CHB with pause of pacer again this AM  ICU cardiac and hemodynamic monitoring  Stopped home Toprol XL on admit  Avoid BB and CCBs  Recommend PPM asap  Monitor and replace electrolytes

## 2020-11-29 NOTE — ASSESSMENT & PLAN NOTE
Telemetry.  Patient was seen by Cardiology in ER.  Temporary pacing wire placement on 27 November by Dr Kenyon.  Beta blockers on hold.  Anticipate will need PPM placed on Monday 30 November.

## 2020-11-29 NOTE — PLAN OF CARE
Patient VSS throughout shift, transvenous pacer intact throughout shift, capturing and sensing. When pacer wires disconnected, complete heart block is present. Heparin gtt discontinued today per cardiology, therapeutic, daily aPTTs to be done. Plan for pacemaker placement tomorrow. Patient denies any chest pain/discomfort or SOB. Patient able to ambulate in room and get up to chair with standby assist, instructed to call before exiting the bed or chair. Wife and patient update don POC, both verbalize understanding. Bed low, call light within reach, patient readjusted independently with little assistance. Will monitor.        Problem: Adult Inpatient Plan of Care  Goal: Plan of Care Review  Outcome: Ongoing, Progressing  Goal: Patient-Specific Goal (Individualization)  Outcome: Ongoing, Progressing  Goal: Absence of Hospital-Acquired Illness or Injury  Outcome: Ongoing, Progressing  Goal: Optimal Comfort and Wellbeing  Outcome: Ongoing, Progressing  Goal: Readiness for Transition of Care  Outcome: Ongoing, Progressing  Goal: Rounds/Family Conference  Outcome: Ongoing, Progressing

## 2020-11-30 ENCOUNTER — ANESTHESIA (OUTPATIENT)
Dept: CARDIOLOGY | Facility: HOSPITAL | Age: 70
DRG: 243 | End: 2020-11-30
Payer: MEDICARE

## 2020-11-30 ENCOUNTER — ANESTHESIA EVENT (OUTPATIENT)
Dept: CARDIOLOGY | Facility: HOSPITAL | Age: 70
DRG: 243 | End: 2020-11-30
Payer: MEDICARE

## 2020-11-30 DIAGNOSIS — I44.2 COMPLETE HEART BLOCK: ICD-10-CM

## 2020-11-30 DIAGNOSIS — Z95.0 CARDIAC PACEMAKER IN SITU: Primary | ICD-10-CM

## 2020-11-30 DIAGNOSIS — R00.1 BRADYCARDIA: ICD-10-CM

## 2020-11-30 LAB
ANION GAP SERPL CALC-SCNC: 13 MMOL/L (ref 8–16)
ANION GAP SERPL CALC-SCNC: 14 MMOL/L (ref 8–16)
BASOPHILS # BLD AUTO: 0.05 K/UL (ref 0–0.2)
BASOPHILS NFR BLD: 0.5 % (ref 0–1.9)
BUN SERPL-MCNC: 25 MG/DL (ref 8–23)
BUN SERPL-MCNC: 31 MG/DL (ref 8–23)
CALCIUM SERPL-MCNC: 8.9 MG/DL (ref 8.7–10.5)
CALCIUM SERPL-MCNC: 9.1 MG/DL (ref 8.7–10.5)
CHLORIDE SERPL-SCNC: 105 MMOL/L (ref 95–110)
CHLORIDE SERPL-SCNC: 110 MMOL/L (ref 95–110)
CO2 SERPL-SCNC: 19 MMOL/L (ref 23–29)
CO2 SERPL-SCNC: 22 MMOL/L (ref 23–29)
CREAT SERPL-MCNC: 1.7 MG/DL (ref 0.5–1.4)
CREAT SERPL-MCNC: 2.1 MG/DL (ref 0.5–1.4)
DIFFERENTIAL METHOD: ABNORMAL
EOSINOPHIL # BLD AUTO: 0.2 K/UL (ref 0–0.5)
EOSINOPHIL NFR BLD: 2.1 % (ref 0–8)
ERYTHROCYTE [DISTWIDTH] IN BLOOD BY AUTOMATED COUNT: 13.8 % (ref 11.5–14.5)
EST. GFR  (AFRICAN AMERICAN): 36 ML/MIN/1.73 M^2
EST. GFR  (AFRICAN AMERICAN): 47 ML/MIN/1.73 M^2
EST. GFR  (NON AFRICAN AMERICAN): 31 ML/MIN/1.73 M^2
EST. GFR  (NON AFRICAN AMERICAN): 41 ML/MIN/1.73 M^2
GLUCOSE SERPL-MCNC: 104 MG/DL (ref 70–110)
GLUCOSE SERPL-MCNC: 123 MG/DL (ref 70–110)
HCT VFR BLD AUTO: 40.5 % (ref 40–54)
HGB BLD-MCNC: 13 G/DL (ref 14–18)
IMM GRANULOCYTES # BLD AUTO: 0.04 K/UL (ref 0–0.04)
IMM GRANULOCYTES NFR BLD AUTO: 0.4 % (ref 0–0.5)
LYMPHOCYTES # BLD AUTO: 2.3 K/UL (ref 1–4.8)
LYMPHOCYTES NFR BLD: 21 % (ref 18–48)
MAGNESIUM SERPL-MCNC: 2.1 MG/DL (ref 1.6–2.6)
MCH RBC QN AUTO: 30.1 PG (ref 27–31)
MCHC RBC AUTO-ENTMCNC: 32.1 G/DL (ref 32–36)
MCV RBC AUTO: 94 FL (ref 82–98)
MONOCYTES # BLD AUTO: 1.3 K/UL (ref 0.3–1)
MONOCYTES NFR BLD: 11.8 % (ref 4–15)
NEUTROPHILS # BLD AUTO: 7.1 K/UL (ref 1.8–7.7)
NEUTROPHILS NFR BLD: 64.2 % (ref 38–73)
NRBC BLD-RTO: 0 /100 WBC
PLATELET # BLD AUTO: 133 K/UL (ref 150–350)
PMV BLD AUTO: 13.7 FL (ref 9.2–12.9)
POTASSIUM SERPL-SCNC: 4.1 MMOL/L (ref 3.5–5.1)
POTASSIUM SERPL-SCNC: 4.4 MMOL/L (ref 3.5–5.1)
RBC # BLD AUTO: 4.32 M/UL (ref 4.6–6.2)
SARS-COV-2 RDRP RESP QL NAA+PROBE: NEGATIVE
SODIUM SERPL-SCNC: 140 MMOL/L (ref 136–145)
SODIUM SERPL-SCNC: 143 MMOL/L (ref 136–145)
WBC # BLD AUTO: 11.07 K/UL (ref 3.9–12.7)

## 2020-11-30 PROCEDURE — 83735 ASSAY OF MAGNESIUM: CPT

## 2020-11-30 PROCEDURE — 25000003 PHARM REV CODE 250: Performed by: INTERNAL MEDICINE

## 2020-11-30 PROCEDURE — C1785 PMKR, DUAL, RATE-RESP: HCPCS | Performed by: INTERNAL MEDICINE

## 2020-11-30 PROCEDURE — 99232 PR SUBSEQUENT HOSPITAL CARE,LEVL II: ICD-10-PCS | Mod: ,,, | Performed by: INTERNAL MEDICINE

## 2020-11-30 PROCEDURE — U0002 COVID-19 LAB TEST NON-CDC: HCPCS

## 2020-11-30 PROCEDURE — 63600175 PHARM REV CODE 636 W HCPCS: Performed by: INTERNAL MEDICINE

## 2020-11-30 PROCEDURE — 80048 BASIC METABOLIC PNL TOTAL CA: CPT | Mod: 91

## 2020-11-30 PROCEDURE — 25000003 PHARM REV CODE 250: Performed by: NURSE PRACTITIONER

## 2020-11-30 PROCEDURE — 25000003 PHARM REV CODE 250: Performed by: NURSE ANESTHETIST, CERTIFIED REGISTERED

## 2020-11-30 PROCEDURE — 21400001 HC TELEMETRY ROOM

## 2020-11-30 PROCEDURE — 37000008 HC ANESTHESIA 1ST 15 MINUTES: Performed by: INTERNAL MEDICINE

## 2020-11-30 PROCEDURE — C1894 INTRO/SHEATH, NON-LASER: HCPCS | Performed by: INTERNAL MEDICINE

## 2020-11-30 PROCEDURE — 99232 SBSQ HOSP IP/OBS MODERATE 35: CPT | Mod: ,,, | Performed by: INTERNAL MEDICINE

## 2020-11-30 PROCEDURE — 37000009 HC ANESTHESIA EA ADD 15 MINS: Performed by: INTERNAL MEDICINE

## 2020-11-30 PROCEDURE — 33208 INSRT HEART PM ATRIAL & VENT: CPT | Mod: KX | Performed by: INTERNAL MEDICINE

## 2020-11-30 PROCEDURE — 85025 COMPLETE CBC W/AUTO DIFF WBC: CPT

## 2020-11-30 PROCEDURE — 63600175 PHARM REV CODE 636 W HCPCS: Performed by: NURSE ANESTHETIST, CERTIFIED REGISTERED

## 2020-11-30 PROCEDURE — 27200667 HC PACEMAKER, TEMPORARY MONITORING, PER SHIFT

## 2020-11-30 PROCEDURE — C1898 LEAD, PMKR, OTHER THAN TRANS: HCPCS | Performed by: INTERNAL MEDICINE

## 2020-11-30 PROCEDURE — C1769 GUIDE WIRE: HCPCS | Performed by: INTERNAL MEDICINE

## 2020-11-30 PROCEDURE — 27201423 OPTIME MED/SURG SUP & DEVICES STERILE SUPPLY: Performed by: INTERNAL MEDICINE

## 2020-11-30 PROCEDURE — 33208 INSRT HEART PM ATRIAL & VENT: CPT | Mod: KX,,, | Performed by: INTERNAL MEDICINE

## 2020-11-30 PROCEDURE — 33208 PR INSER HART PACER XVENOUS ATR/VENTR: ICD-10-PCS | Mod: KX,,, | Performed by: INTERNAL MEDICINE

## 2020-11-30 DEVICE — IMPLANTABLE DEVICE
Type: IMPLANTABLE DEVICE | Site: HEART | Status: FUNCTIONAL
Brand: EDORA 8 DR-T

## 2020-11-30 DEVICE — IMPLANTABLE DEVICE
Type: IMPLANTABLE DEVICE | Site: HEART | Status: FUNCTIONAL
Brand: SOLIA

## 2020-11-30 DEVICE — LEAD SOLIA S 53 PROMRI: Type: IMPLANTABLE DEVICE | Site: HEART | Status: FUNCTIONAL

## 2020-11-30 RX ORDER — PROPOFOL 10 MG/ML
VIAL (ML) INTRAVENOUS
Status: DISCONTINUED | OUTPATIENT
Start: 2020-11-30 | End: 2020-11-30

## 2020-11-30 RX ORDER — SODIUM CHLORIDE 9 MG/ML
INJECTION, SOLUTION INTRAVENOUS CONTINUOUS
Status: DISCONTINUED | OUTPATIENT
Start: 2020-11-30 | End: 2020-12-01 | Stop reason: HOSPADM

## 2020-11-30 RX ORDER — LOPERAMIDE HYDROCHLORIDE 2 MG/1
2 CAPSULE ORAL 4 TIMES DAILY PRN
Status: DISCONTINUED | OUTPATIENT
Start: 2020-11-30 | End: 2020-12-01 | Stop reason: HOSPADM

## 2020-11-30 RX ORDER — SODIUM CHLORIDE, SODIUM LACTATE, POTASSIUM CHLORIDE, CALCIUM CHLORIDE 600; 310; 30; 20 MG/100ML; MG/100ML; MG/100ML; MG/100ML
INJECTION, SOLUTION INTRAVENOUS CONTINUOUS PRN
Status: DISCONTINUED | OUTPATIENT
Start: 2020-11-30 | End: 2020-11-30

## 2020-11-30 RX ORDER — CEPHALEXIN 500 MG/1
500 CAPSULE ORAL EVERY 12 HOURS
Status: DISCONTINUED | OUTPATIENT
Start: 2020-12-01 | End: 2020-12-01 | Stop reason: HOSPADM

## 2020-11-30 RX ORDER — LIDOCAINE HYDROCHLORIDE 10 MG/ML
INJECTION, SOLUTION EPIDURAL; INFILTRATION; INTRACAUDAL; PERINEURAL
Status: DISCONTINUED | OUTPATIENT
Start: 2020-11-30 | End: 2020-11-30

## 2020-11-30 RX ORDER — LIDOCAINE HYDROCHLORIDE 20 MG/ML
INJECTION, SOLUTION EPIDURAL; INFILTRATION; INTRACAUDAL; PERINEURAL
Status: DISCONTINUED | OUTPATIENT
Start: 2020-11-30 | End: 2020-11-30 | Stop reason: HOSPADM

## 2020-11-30 RX ORDER — MIDAZOLAM HYDROCHLORIDE 1 MG/ML
INJECTION, SOLUTION INTRAMUSCULAR; INTRAVENOUS
Status: DISCONTINUED | OUTPATIENT
Start: 2020-11-30 | End: 2020-11-30

## 2020-11-30 RX ORDER — VANCOMYCIN HYDROCHLORIDE 1 G/20ML
INJECTION, POWDER, LYOPHILIZED, FOR SOLUTION INTRAVENOUS
Status: DISCONTINUED | OUTPATIENT
Start: 2020-11-30 | End: 2020-11-30 | Stop reason: HOSPADM

## 2020-11-30 RX ORDER — CEFAZOLIN SODIUM 1 G/3ML
INJECTION, POWDER, FOR SOLUTION INTRAMUSCULAR; INTRAVENOUS
Status: DISCONTINUED | OUTPATIENT
Start: 2020-11-30 | End: 2020-11-30

## 2020-11-30 RX ADMIN — PROPOFOL 20 MG: 10 INJECTION, EMULSION INTRAVENOUS at 03:11

## 2020-11-30 RX ADMIN — ASPIRIN 81 MG: 81 TABLET, COATED ORAL at 08:11

## 2020-11-30 RX ADMIN — PROPOFOL 20 MG: 10 INJECTION, EMULSION INTRAVENOUS at 02:11

## 2020-11-30 RX ADMIN — MUPIROCIN: 20 OINTMENT TOPICAL at 08:11

## 2020-11-30 RX ADMIN — ALLOPURINOL 300 MG: 100 TABLET ORAL at 08:11

## 2020-11-30 RX ADMIN — HYDRALAZINE HYDROCHLORIDE 50 MG: 50 TABLET, FILM COATED ORAL at 06:11

## 2020-11-30 RX ADMIN — FLUTICASONE PROPIONATE 100 MCG: 50 SPRAY, METERED NASAL at 08:11

## 2020-11-30 RX ADMIN — GLYCOPYRROLATE 0.2 MG: 0.2 INJECTION, SOLUTION INTRAMUSCULAR; INTRAVITREAL at 01:11

## 2020-11-30 RX ADMIN — PROPOFOL 10 MG: 10 INJECTION, EMULSION INTRAVENOUS at 04:11

## 2020-11-30 RX ADMIN — CEFAZOLIN 2 G: 1 INJECTION, POWDER, FOR SOLUTION INTRAMUSCULAR; INTRAVENOUS at 02:11

## 2020-11-30 RX ADMIN — PROPOFOL 10 MG: 10 INJECTION, EMULSION INTRAVENOUS at 03:11

## 2020-11-30 RX ADMIN — PROPOFOL 30 MG: 10 INJECTION, EMULSION INTRAVENOUS at 02:11

## 2020-11-30 RX ADMIN — MIDAZOLAM HYDROCHLORIDE 2 MG: 1 INJECTION, SOLUTION INTRAMUSCULAR; INTRAVENOUS at 01:11

## 2020-11-30 RX ADMIN — OMEGA-3-ACID ETHYL ESTERS 1 G: 1 CAPSULE, LIQUID FILLED ORAL at 08:11

## 2020-11-30 RX ADMIN — PANTOPRAZOLE SODIUM 40 MG: 40 TABLET, DELAYED RELEASE ORAL at 08:11

## 2020-11-30 RX ADMIN — AMLODIPINE BESYLATE 10 MG: 10 TABLET ORAL at 08:11

## 2020-11-30 RX ADMIN — ROSUVASTATIN 40 MG: 10 TABLET, FILM COATED ORAL at 08:11

## 2020-11-30 RX ADMIN — HYDRALAZINE HYDROCHLORIDE 50 MG: 50 TABLET, FILM COATED ORAL at 09:11

## 2020-11-30 RX ADMIN — LOPERAMIDE HYDROCHLORIDE 2 MG: 2 CAPSULE ORAL at 07:11

## 2020-11-30 RX ADMIN — LIDOCAINE HYDROCHLORIDE 50 MG: 10 INJECTION, SOLUTION EPIDURAL; INFILTRATION; INTRACAUDAL; PERINEURAL at 02:11

## 2020-11-30 RX ADMIN — SODIUM CHLORIDE, SODIUM LACTATE, POTASSIUM CHLORIDE, AND CALCIUM CHLORIDE: 600; 310; 30; 20 INJECTION, SOLUTION INTRAVENOUS at 01:11

## 2020-11-30 NOTE — PROGRESS NOTES
[facility] Hospital Medicine  Progress Note    Patient Name: Damon Yoo  MRN: [id number]  Patient Class: IP- Inpatient   Admission Date: 11/27/2020  Length of Stay: 3 days  Attending Physician: Young Hoffman MD  Primary Care Provider: TIEN Mason Jr, MD        Subjective:     Principal Problem:CHB (complete heart block)        HPI:  This is a 69 yo male who has a PMHx of CAD with CABG in 2013, HTN, HLD, prostate cancer, glaucoma, arthritis, gout, and hypertrophic cardiomyopathy. Patient reports he has been in his normal state of health until yesterday when he began with new onset of intermittent SOB and profuse diaphoresis.  Patient  was seen at the Urgent Care today where it was noted that his heart rate was in the 30s. Patient was sent to Emergency Department for further evaluation. Patient was again noted to have heart rate in 30s-40s and found to be in complete heart block. So far his blood pressure has remained stable. Patient was seen by Cardiology in ER and plans for urgent temporary pacing wire placement to be done this AM per Dr Kenyon.                 Per report- Patient had a rapid COVID screen done which was negative before being referred to the ED for further evaluation.     Overview/Hospital Course:  No notes on file    Interval History:  Hemodynamically stable with continuous pacing overnight.  No acute problems or complaints.  Heart rate sinus bradycardia 53 bpm when pacer paused.  Bi-fascicular block.  Planning for PPM placement.    Review of Systems   Constitutional: Negative.  Negative for chills, fatigue and fever.   HENT: Negative.  Negative for congestion, dental problem, rhinorrhea and sore throat.    Eyes: Negative.  Negative for visual disturbance.   Respiratory: Negative.  Negative for cough, shortness of breath and wheezing.    Cardiovascular: Negative.  Negative for chest pain, palpitations and leg swelling.   Gastrointestinal: Negative for abdominal pain,  diarrhea, nausea and vomiting.   Endocrine: Negative.  Negative for cold intolerance and heat intolerance.   Genitourinary: Negative.  Negative for dysuria and hematuria.   Musculoskeletal: Negative.  Negative for arthralgias, gait problem and myalgias.   Skin: Negative.  Negative for rash and wound.   Allergic/Immunologic: Negative.    Neurological: Negative.  Negative for dizziness, syncope, weakness, numbness and headaches.   Hematological: Negative.  Negative for adenopathy.   Psychiatric/Behavioral: Negative.  Negative for confusion and dysphoric mood.   All other systems reviewed and are negative.    Objective:     Vital Signs (Most Recent):  Temp: 97.7 °F (36.5 °C) (11/30/20 0715)  Pulse: 69 (11/30/20 0915)  Resp: (!) 21 (11/30/20 0915)  BP: 136/62 (11/30/20 0701)  SpO2: 97 % (11/30/20 0915) Vital Signs (24h Range):  Temp:  [97.7 °F (36.5 °C)-99.5 °F (37.5 °C)] 97.7 °F (36.5 °C)  Pulse:  [67-73] 69  Resp:  [12-45] 21  SpO2:  [95 %-98 %] 97 %  BP: (110-171)/(57-83) 136/62     Weight: 110.8 kg (244 lb 4.3 oz)  Body mass index is 39.43 kg/m².    Intake/Output Summary (Last 24 hours) at 11/30/2020 1123  Last data filed at 11/30/2020 0900  Gross per 24 hour   Intake 860.2 ml   Output 600 ml   Net 260.2 ml      Physical Exam  Constitutional:       General: He is not in acute distress.     Appearance: He is well-developed. He is not diaphoretic.   HENT:      Head: Normocephalic and atraumatic.   Eyes:      Conjunctiva/sclera: Conjunctivae normal.      Pupils: Pupils are equal, round, and reactive to light.   Neck:      Thyroid: No thyromegaly.      Vascular: No JVD.   Cardiovascular:      Rate and Rhythm: Normal rate and regular rhythm.      Heart sounds: Normal heart sounds. No murmur. No friction rub. No gallop.       Comments: Right IJV access site for pacer.  Pulmonary:      Effort: Pulmonary effort is normal. No respiratory distress.      Breath sounds: Normal breath sounds. No wheezing or rales.   Abdominal:       General: Bowel sounds are normal. There is no distension.      Palpations: Abdomen is soft.      Tenderness: There is no abdominal tenderness. There is no guarding or rebound.   Musculoskeletal: Normal range of motion.         General: No tenderness.   Lymphadenopathy:      Cervical: No cervical adenopathy.   Skin:     General: Skin is warm and dry.      Findings: No erythema or rash.   Neurological:      Mental Status: He is alert and oriented to person, place, and time.      Cranial Nerves: No cranial nerve deficit.      Deep Tendon Reflexes: Reflexes are normal and symmetric. Reflexes normal.   Psychiatric:         Behavior: Behavior normal.         Thought Content: Thought content normal.         Judgment: Judgment normal.         Significant Labs: All pertinent labs within the past 24 hours have been reviewed.    Significant Imaging: I have reviewed all pertinent imaging results/findings within the past 24 hours.      Assessment/Plan:      * CHB (complete heart block)  Telemetry.  Patient was seen by Cardiology in ER.  Temporary pacing wire placement on 27 November by Dr Kenyon.  Beta blockers on hold.  Anticipate will need PPM placed on Monday 30 November.    Glaucoma  Home eye drops on hold due to symptomatic bradycardia/ complete heart block      Chronic diastolic CHF (congestive heart failure)  Telemetry  Monitor- Currently no signs of acute volume overload  Orders as per Cardiology      Stage 3 chronic kidney disease  Hx CKD stage 3-  Monitor  Trend BMP  Renal dose all medications  Add NS at 50ml/hour for gentle hydration - Monitor volume status closely due to Hx of Chronic diastolic heart failure  Hold Home ACEI      Elevated troponin  Telemetry  Trend levels  Orders as per Cardiology      Chronic kidney disease, stage III (moderate)  As above      Coronary artery disease involving native coronary artery of native heart without angina pectoris w/ hx CABG  Hx CABG-  Telemetry  Orders as per  Cardiology      Essential hypertension  Hold home antihypertensives for now  Monitor closely and resume when clinically appropriate      Hyperlipidemia  Continue home statin and fish oils        VTE Risk Mitigation (From admission, onward)         Ordered     Place SUNDEEP hose  Until discontinued      11/27/20 1156     IP VTE HIGH RISK PATIENT  Once      11/27/20 1156     Place sequential compression device  Until discontinued      11/27/20 1156                Discharge Planning   SP:      Code Status: Full Code   Is the patient medically ready for discharge?:     Reason for patient still in hospital (select all that apply): Treatment and Consult recommendations  Discharge Plan A: Home            Critical care time spent on the evaluation and treatment of severe organ dysfunction, review of pertinent labs and imaging studies, discussions with consulting providers and discussions with patient/family: 30 minutes.      Young Hoffman MD  Department of Hospital Medicine   [facility]

## 2020-11-30 NOTE — ANESTHESIA POSTPROCEDURE EVALUATION
Anesthesia Post Evaluation    Patient: Damon Yoo    Procedure(s) Performed: Procedure(s) (LRB):  INSERTION, CARDIAC PACEMAKER, DUAL CHAMBER (Left)    Final Anesthesia Type: MAC    Patient location during evaluation: Cath Lab  Patient participation: Yes- Able to Participate  Level of consciousness: awake and alert  Post-procedure vital signs: reviewed and stable  Pain management: adequate  Airway patency: patent    PONV status at discharge: No PONV  Anesthetic complications: no      Cardiovascular status: blood pressure returned to baseline  Respiratory status: unassisted and spontaneous ventilation  Hydration status: euvolemic  Follow-up not needed.          Vitals Value Taken Time   /56 11/30/20 1301   Temp 36.5 °C (97.7 °F) 11/30/20 1200   Pulse 54 11/30/20 1339   Resp 24 11/30/20 1338   SpO2 97 % 11/30/20 1339   Vitals shown include unvalidated device data.      No case tracking events are documented in the log.      Pain/Alina Score: Alina Score: 10 (11/30/2020  1:50 PM)

## 2020-11-30 NOTE — BRIEF OP NOTE
[facility]   Brief Operative Note  Cardiology    SUMMARY     Surgery Date: 11/30/2020     Surgeon(s) and Role:     * Elsy Kenyon MD - Primary    Assisting Surgeon: None    Pre-op Diagnosis:  Complete heart block [I44.2]    Post-op Diagnosis: Post-Op Diagnosis Codes:     * Complete heart block [I44.2]    Procedure Performed: PM DUAL IMPLANTATION    Procedure(s) (LRB):  INSERTION, CARDIAC PACEMAKER, DUAL CHAMBER (Left)    Technical Procedures Used: successful dual chamber implant    In complete heart block , Sinus rate of 100 and ventricular of less than 50 upon implant     Estimated Blood Loss: * No values recorded between 11/30/2020  2:20 PM and 11/30/2020  4:52 PM *         Specimens:   Specimen (12h ago, onward)    None

## 2020-11-30 NOTE — SUBJECTIVE & OBJECTIVE
Interval History:  Hemodynamically stable with continuous pacing overnight.  No acute problems or complaints.  Heart rate sinus bradycardia 53 bpm when pacer paused.  Bi-fascicular block.  Planning for PPM placement.    Review of Systems   Constitutional: Negative.  Negative for chills, fatigue and fever.   HENT: Negative.  Negative for congestion, dental problem, rhinorrhea and sore throat.    Eyes: Negative.  Negative for visual disturbance.   Respiratory: Negative.  Negative for cough, shortness of breath and wheezing.    Cardiovascular: Negative.  Negative for chest pain, palpitations and leg swelling.   Gastrointestinal: Negative for abdominal pain, diarrhea, nausea and vomiting.   Endocrine: Negative.  Negative for cold intolerance and heat intolerance.   Genitourinary: Negative.  Negative for dysuria and hematuria.   Musculoskeletal: Negative.  Negative for arthralgias, gait problem and myalgias.   Skin: Negative.  Negative for rash and wound.   Allergic/Immunologic: Negative.    Neurological: Negative.  Negative for dizziness, syncope, weakness, numbness and headaches.   Hematological: Negative.  Negative for adenopathy.   Psychiatric/Behavioral: Negative.  Negative for confusion and dysphoric mood.   All other systems reviewed and are negative.    Objective:     Vital Signs (Most Recent):  Temp: 97.7 °F (36.5 °C) (11/30/20 0715)  Pulse: 69 (11/30/20 0915)  Resp: (!) 21 (11/30/20 0915)  BP: 136/62 (11/30/20 0701)  SpO2: 97 % (11/30/20 0915) Vital Signs (24h Range):  Temp:  [97.7 °F (36.5 °C)-99.5 °F (37.5 °C)] 97.7 °F (36.5 °C)  Pulse:  [67-73] 69  Resp:  [12-45] 21  SpO2:  [95 %-98 %] 97 %  BP: (110-171)/(57-83) 136/62     Weight: 110.8 kg (244 lb 4.3 oz)  Body mass index is 39.43 kg/m².    Intake/Output Summary (Last 24 hours) at 11/30/2020 1123  Last data filed at 11/30/2020 0900  Gross per 24 hour   Intake 860.2 ml   Output 600 ml   Net 260.2 ml      Physical Exam  Constitutional:       General: He is  not in acute distress.     Appearance: He is well-developed. He is not diaphoretic.   HENT:      Head: Normocephalic and atraumatic.   Eyes:      Conjunctiva/sclera: Conjunctivae normal.      Pupils: Pupils are equal, round, and reactive to light.   Neck:      Thyroid: No thyromegaly.      Vascular: No JVD.   Cardiovascular:      Rate and Rhythm: Normal rate and regular rhythm.      Heart sounds: Normal heart sounds. No murmur. No friction rub. No gallop.       Comments: Right IJV access site for pacer.  Pulmonary:      Effort: Pulmonary effort is normal. No respiratory distress.      Breath sounds: Normal breath sounds. No wheezing or rales.   Abdominal:      General: Bowel sounds are normal. There is no distension.      Palpations: Abdomen is soft.      Tenderness: There is no abdominal tenderness. There is no guarding or rebound.   Musculoskeletal: Normal range of motion.         General: No tenderness.   Lymphadenopathy:      Cervical: No cervical adenopathy.   Skin:     General: Skin is warm and dry.      Findings: No erythema or rash.   Neurological:      Mental Status: He is alert and oriented to person, place, and time.      Cranial Nerves: No cranial nerve deficit.      Deep Tendon Reflexes: Reflexes are normal and symmetric. Reflexes normal.   Psychiatric:         Behavior: Behavior normal.         Thought Content: Thought content normal.         Judgment: Judgment normal.         Significant Labs: All pertinent labs within the past 24 hours have been reviewed.    Significant Imaging: I have reviewed all pertinent imaging results/findings within the past 24 hours.

## 2020-11-30 NOTE — ASSESSMENT & PLAN NOTE
Temporary pacing wires placed via IJ access 11/27 and now paced with VSS.  Still in CHB with pause of pacer 29 Nov.  ICU cardiac and hemodynamic monitoring.  Stopped home Toprol XL on admit.  Avoid BB and CCBs.  Monitor and replace electrolytes.  Evaluate for PPM placement Monday 30 Nov.

## 2020-11-30 NOTE — INTERVAL H&P NOTE
The patient has been examined and the H&P has been reviewed:    I concur with the findings and no changes have occurred since H&P was written.    Anesthesia/Surgery risks, benefits and alternative options discussed and understood by patient/family.  Still has AV dissociation/CHB with bradycardia this morning           Active Hospital Problems    Diagnosis  POA    *CHB (complete heart block) [I44.2]  Yes    Elevated troponin [R77.8]  Yes    Stage 3 chronic kidney disease [N18.30]  Yes    Chronic diastolic CHF (congestive heart failure) [I50.32]  Yes     Chronic    Glaucoma [H40.9]  Yes    Electrolyte imbalance [E87.8]  Yes    Chronic kidney disease, stage III (moderate) [N18.30]  Yes    Obesity, Class III, BMI 40-49.9 (morbid obesity) [E66.01]  Yes     Chronic    Coronary artery disease involving native coronary artery of native heart without angina pectoris w/ hx CABG [I25.10]  Yes    Hyperlipidemia [E78.5]  Yes     Chronic    Essential hypertension [I10]  Yes      Resolved Hospital Problems   No resolved problems to display.

## 2020-11-30 NOTE — ASSESSMENT & PLAN NOTE
BNP 1191  Did not seem overloaded on exam today  Likely due to Complete Heart block  Hold BB for next 48 hours given complete heart block and need for temporary pacing wire  Hold ACei/ARB for now given DON  Dash diet, 2 gm sodium restriction  1500 ml fluid restriction  Daily weights  Strict I/Os  Check ECHO  Further recs to follow.     11/29/20 stable and doing well, lung fields clear today    11/30  -compensated on exam today

## 2020-11-30 NOTE — SUBJECTIVE & OBJECTIVE
Interval History:  Hemodynamically stable with continuous pacing overnight.  No acute problems or complaints.  Heart rate sinus bradycardia 53 bpm when pacer paused.  Bi-fascicular block.  Planning for PPM placement.    Review of Systems   Constitutional: Negative.  Negative for chills, fatigue and fever.   HENT: Negative.  Negative for congestion, dental problem, rhinorrhea and sore throat.    Eyes: Negative.  Negative for visual disturbance.   Respiratory: Negative.  Negative for cough, shortness of breath and wheezing.    Cardiovascular: Negative.  Negative for chest pain, palpitations and leg swelling.   Gastrointestinal: Negative for abdominal pain, diarrhea, nausea and vomiting.   Endocrine: Negative.  Negative for cold intolerance and heat intolerance.   Genitourinary: Negative.  Negative for dysuria and hematuria.   Musculoskeletal: Negative.  Negative for arthralgias, gait problem and myalgias.   Skin: Negative.  Negative for rash and wound.   Allergic/Immunologic: Negative.    Neurological: Negative.  Negative for dizziness, syncope, weakness, numbness and headaches.   Hematological: Negative.  Negative for adenopathy.   Psychiatric/Behavioral: Negative.  Negative for confusion and dysphoric mood.   All other systems reviewed and are negative.    Objective:     Vital Signs (Most Recent):  Temp: 97.7 °F (36.5 °C) (11/30/20 0715)  Pulse: 69 (11/30/20 0915)  Resp: (!) 21 (11/30/20 0915)  BP: 136/62 (11/30/20 0701)  SpO2: 97 % (11/30/20 0915) Vital Signs (24h Range):  Temp:  [97.7 °F (36.5 °C)-99.5 °F (37.5 °C)] 97.7 °F (36.5 °C)  Pulse:  [67-73] 69  Resp:  [12-45] 21  SpO2:  [95 %-98 %] 97 %  BP: (110-171)/(57-83) 136/62     Weight: 110.8 kg (244 lb 4.3 oz)  Body mass index is 39.43 kg/m².    Intake/Output Summary (Last 24 hours) at 11/30/2020 1116  Last data filed at 11/30/2020 0900  Gross per 24 hour   Intake 860.2 ml   Output 600 ml   Net 260.2 ml      Physical Exam  Constitutional:       General: He is  not in acute distress.     Appearance: He is well-developed. He is not diaphoretic.   HENT:      Head: Normocephalic and atraumatic.   Eyes:      Conjunctiva/sclera: Conjunctivae normal.      Pupils: Pupils are equal, round, and reactive to light.   Neck:      Thyroid: No thyromegaly.      Vascular: No JVD.   Cardiovascular:      Rate and Rhythm: Normal rate and regular rhythm.      Heart sounds: Normal heart sounds. No murmur. No friction rub. No gallop.       Comments: Right IJV access site for pacer.  Pulmonary:      Effort: Pulmonary effort is normal. No respiratory distress.      Breath sounds: Normal breath sounds. No wheezing or rales.   Abdominal:      General: Bowel sounds are normal. There is no distension.      Palpations: Abdomen is soft.      Tenderness: There is no abdominal tenderness. There is no guarding or rebound.   Musculoskeletal: Normal range of motion.         General: No tenderness.   Lymphadenopathy:      Cervical: No cervical adenopathy.   Skin:     General: Skin is warm and dry.      Findings: No erythema or rash.   Neurological:      Mental Status: He is alert and oriented to person, place, and time.      Cranial Nerves: No cranial nerve deficit.      Deep Tendon Reflexes: Reflexes are normal and symmetric. Reflexes normal.   Psychiatric:         Behavior: Behavior normal.         Thought Content: Thought content normal.         Judgment: Judgment normal.         Significant Labs: All pertinent labs within the past 24 hours have been reviewed.    Significant Imaging: I have reviewed all pertinent imaging results/findings within the past 24 hours.

## 2020-11-30 NOTE — PROGRESS NOTES
[facility] -   Cardiology  Progress Note    Patient Name: Damon Yoo  MRN: [id number]  Admission Date: 11/27/2020  Hospital Length of Stay: 3 days  Code Status: Full Code   Attending Physician: Young Hoffman MD   Primary Care Physician: TIEN Mason Jr, MD  Expected Discharge Date:   Principal Problem:CHB (complete heart block)    Subjective:   HPI      Damon Yoo is a 70 year old male who presented to [facility] from Urgent care due to heart rate in 30s with associated shortness of breath and diaphoresis since yesterday. He reports that suddenly yesterday he developed extreme shortness of breath and diaphoresis. He awoke today with continued issues with shortness of breath which provoked him to seek further care at urgent care today. His current medical conditions include CAD s/p CABG, HTN, HLP, Pre diabetes (last A1C 6.2), Hypertrophic cardiomyopathy, obese. EKG today revealed complete AV node dissociation with HR 39. Plan for Temporary pacing wire this AM to restore heart rate control. Labs reviewed, BNP 1191, K+ 4.6, Cr 2.5, Na 144, Troponin 0.097, H/H 12/37. Cardiology consulted to assist with medical management. Chart reviewed, patient seen and examined. ECHO pending. Patient remains in complete AV dissociation at time of exam with HR in low 30s. Plan for urgent temporary pacing wire this AM per Dr Kenyon. Consent obtained and patient/wife are in agreement today. Will hold BB and observe in ICU over the next 48 hours and plan for PPM implant on Monday if remains dependent on pacing wire at that time. Further recs to follow.       Hospital Course:   11/28/2020 Patient seen and examined. Still pacemaker dependent.  stable and continue diuresis . Plan permanent pacemaker Monday.    11/29/20 Patient seen and examined. Still pacemaker needed due to heart block.  Plan permanent pacemaker Monday.  Will d/c heparin today.    11/30/2020-Patient seen and examined in ICU, remains Pacer  dependent today. Plan for PPM implant later today per Dr Kenyon. Further recs to follow.     Interval History: no acute issues noted o/n. Plan for PPM implant later today. Further recs to follow.     Review of Systems   Constitution: Positive for diaphoresis and malaise/fatigue.   HENT: Negative for hearing loss and hoarse voice.    Eyes: Negative for blurred vision and visual disturbance.   Cardiovascular: Negative for chest pain, claudication, dyspnea on exertion, irregular heartbeat, leg swelling, near-syncope, orthopnea, palpitations, paroxysmal nocturnal dyspnea and syncope.        Remains pacer dependent   Respiratory: Positive for shortness of breath. Negative for cough, hemoptysis, sleep disturbances due to breathing, snoring and wheezing.    Endocrine: Negative for cold intolerance and heat intolerance.   Hematologic/Lymphatic: Bruises/bleeds easily.   Skin: Negative for color change, dry skin and nail changes.   Musculoskeletal: Positive for arthritis and back pain. Negative for joint pain and myalgias.   Gastrointestinal: Negative for bloating, abdominal pain, constipation, nausea and vomiting.   Genitourinary: Negative for dysuria, flank pain, hematuria and hesitancy.   Neurological: Positive for weakness. Negative for headaches, light-headedness, loss of balance, numbness and paresthesias.   Psychiatric/Behavioral: Negative for altered mental status.   Allergic/Immunologic: Negative for environmental allergies.     Objective:     Vital Signs (Most Recent):  Temp: 97.7 °F (36.5 °C) (11/30/20 0715)  Pulse: 69 (11/30/20 0915)  Resp: (!) 21 (11/30/20 0915)  BP: 136/62 (11/30/20 0701)  SpO2: 97 % (11/30/20 0915) Vital Signs (24h Range):  Temp:  [97.7 °F (36.5 °C)-99.5 °F (37.5 °C)] 97.7 °F (36.5 °C)  Pulse:  [67-73] 69  Resp:  [12-63] 21  SpO2:  [95 %-98 %] 97 %  BP: (110-171)/(57-83) 136/62     Weight: 110.8 kg (244 lb 4.3 oz)  Body mass index is 39.43 kg/m².     SpO2: 97 %  O2 Device (Oxygen Therapy):  room air      Intake/Output Summary (Last 24 hours) at 11/30/2020 1035  Last data filed at 11/30/2020 0900  Gross per 24 hour   Intake 870.3 ml   Output 600 ml   Net 270.3 ml       Lines/Drains/Airways     Peripheral Intravenous Line                 Peripheral IV - Single Lumen 11/27/20 0959 20 G Right Hand 3 days         Peripheral IV - Single Lumen 11/27/20 1005 20 G Left Hand 3 days                Physical Exam   Constitutional: He is oriented to person, place, and time. He appears well-developed and well-nourished. No distress.   HENT:   Head: Normocephalic and atraumatic.   Eyes: Pupils are equal, round, and reactive to light.   Neck: Normal range of motion and full passive range of motion without pain. Neck supple. No JVD present.   Cardiovascular: S1 normal, S2 normal and intact distal pulses. An irregular rhythm present. Bradycardia present. PMI is not displaced. Exam reveals no distant heart sounds.   No murmur heard.  Pulses:       Radial pulses are 2+ on the right side and 2+ on the left side.        Dorsalis pedis pulses are 2+ on the right side and 2+ on the left side.   RT IJ TPM  Remains pacer dependent today   Pulmonary/Chest: Effort normal and breath sounds normal. No accessory muscle usage. No respiratory distress. He has no decreased breath sounds. He has no wheezes. He has no rales.   Abdominal: Soft. Bowel sounds are normal. He exhibits no distension. There is no abdominal tenderness.   Obese abdomen   Musculoskeletal: Normal range of motion.         General: No edema.      Right ankle: He exhibits no swelling.      Left ankle: He exhibits no swelling.   Neurological: He is alert and oriented to person, place, and time.   Skin: Skin is warm and dry. He is not diaphoretic. No cyanosis. Nails show no clubbing.   Psychiatric: He has a normal mood and affect. His speech is normal and behavior is normal. Judgment and thought content normal. Cognition and memory are normal.   Nursing note and vitals  reviewed.      Significant Labs:   BMP:   Recent Labs   Lab 11/29/20 0417 11/30/20  0432   * 104    140   K 4.4 4.1    105   CO2 20* 22*   BUN 22 25*   CREATININE 1.4 1.7*   CALCIUM 9.1 8.9   MG 1.8 2.1   , CBC   Recent Labs   Lab 11/29/20 0417 11/30/20  0432   WBC 10.10 11.07   HGB 13.3* 13.0*   HCT 41.0 40.5   * 133*   , Troponin No results for input(s): TROPONINI in the last 48 hours. and All pertinent lab results from the last 24 hours have been reviewed.    Significant Imaging: Echocardiogram:   Transthoracic echo (TTE) complete (Cupid Only):   Results for orders placed or performed during the hospital encounter of 11/27/20   Echo Color Flow Doppler? Yes; Bubble Contrast? No   Result Value Ref Range    BSA 2.3 m2    LA WIDTH 3.44 cm    LVIDd 4.19 3.5 - 6.0 cm    IVS 1.62 (A) 0.6 - 1.1 cm    Posterior Wall 1.56 (A) 0.6 - 1.1 cm    Ao root annulus 4.06 cm    LVIDs 2.57 2.1 - 4.0 cm    FS 39 28 - 44 %    LA volume 34.87 cm3    Sinus 4.17 cm    STJ 3.60 cm    Ascending aorta 4.10 cm    LV mass 272.46 g    LA size 3.36 cm    TAPSE 1.94 cm    Left Ventricle Relative Wall Thickness 0.74 cm    AV mean gradient 9 mmHg    AV valve area 1.78 cm2    AV Velocity Ratio 0.63     AV index (prosthetic) 0.65     MV valve area p 1/2 method 3.22 cm2    PV peak gradient 5.83 mmHg    E/A ratio 1.16     E wave decelartion time 235.35 msec    IVRT 72.66 msec    LVOT diameter 1.86 cm    LVOT area 2.7 cm2    LVOT peak yung 1.22 m/s    LVOT peak VTI 29.48 cm    Ao peak yung 1.94 m/s    Ao VTI 45.02 cm    RVOT peak yung 1.21 m/s    RVOT peak VTI 29.08 cm    LVOT stroke volume 80.06 cm3    AV peak gradient 15 mmHg    PV mean gradient 3.63 mmHg    MV Peak E Yung 0.87 m/s    TR Max Yung 4.40 m/s    MV stenosis pressure 1/2 time 68.25 ms    MV Peak A Yung 0.75 m/s    LV Systolic Volume 23.84 mL    LV Systolic Volume Index 10.8 mL/m2    LV Diastolic Volume 78.10 mL    LV Diastolic Volume Index 35.45 mL/m2    LA Volume  Index 15.8 mL/m2    LV Mass Index 124 g/m2    RA Major Axis 3.67 cm    Left Atrium Minor Axis 3.52 cm    Left Atrium Major Axis 3.58 cm    Triscuspid Valve Regurgitation Peak Gradient 77 mmHg    RA Width 3.02 cm    Right Atrial Pressure (from IVC) 3 mmHg    TV rest pulmonary artery pressure 80 mmHg    Narrative    · There is moderate left ventricular concentric hypertrophy.  · The left ventricle is normal in size with normal systolic function. The   estimated ejection fraction is 60%.  · Normal left ventricular diastolic function.  · Normal right ventricular size with normal right ventricular systolic   function.  · Mild aortic valve stenosis.  · Aortic valve area is 1.78 cm2; peak velocity is 1.94 m/s; mean gradient   is 9 mmHg.  · Moderate mitral regurgitation.  · Moderate tricuspid regurgitation.  · There is pulmonary hypertension.  · Normal central venous pressure (3 mmHg).  · The estimated PA systolic pressure is 80 mmHg.     Suggest re echo when patient is in NSR.       and X-Ray: CXR: X-Ray Chest 1 View (CXR): No results found for this visit on 11/27/20. and X-Ray Chest PA and Lateral (CXR): No results found for this visit on 11/27/20.    Assessment and Plan:       * CHB (complete heart block)  Complete AV dissociation noted in ED  Keep NPO   Check ECHO  Hold BB for next 48 hours  Plan for Temporary pacing wire today per Dr Kenyon  Risks, benefits, and alternatives reviewed with patient. Patient agrees to proceed with procedure as planned. Consent obtained, all questions answered and appropriately witnessed.   May need PPM implant on Monday if remains complete dissociation, further recs to follow  Will need ICU admission post procedure  ICU Cardiac Monitoring    11/30  -Plan for PPM implant today per Dr Kenyon  -Patient remains Pacer dependent today  -Risks, benefits and treatment alternatives reviewed with patient.   -Patient has agreed to proceed, consent obtained and appropriately witnessed    Chronic  diastolic CHF (congestive heart failure)  BNP 1191  Did not seem overloaded on exam today  Likely due to Complete Heart block  Hold BB for next 48 hours given complete heart block and need for temporary pacing wire  Hold ACei/ARB for now given DON  Dash diet, 2 gm sodium restriction  1500 ml fluid restriction  Daily weights  Strict I/Os  Check ECHO  Further recs to follow.     11/29/20 stable and doing well, lung fields clear today    11/30  -compensated on exam today    Stage 3 chronic kidney disease  Cr 2.5 on admit  Recommend gentle IVF's  Monitor closely with daily labs    Elevated troponin  Troponin 0.097  Trend serial troponin  Check ECHO  Chest pain free on exam  Will need to hold BB for next 48 hours given complete heart block  Start IV heparin gtt after Temporary pacing wire placed this AM  Further recs to follow  D/C heparin today  Consider nuclear stress as outpatient    Chronic kidney disease, stage III (moderate)  Monitor closely    Obesity, Class III, BMI 40-49.9 (morbid obesity)  Weight loss encouraged    Essential hypertension  On medical therapy  Continue Hydralazine, Norvasc    Hyperlipidemia  Continue statin        VTE Risk Mitigation (From admission, onward)         Ordered     Place SUNDEEP hose  Until discontinued      11/27/20 1156     IP VTE HIGH RISK PATIENT  Once      11/27/20 1156     Place sequential compression device  Until discontinued      11/27/20 1156                SIOMARA Trejo  Cardiology  [facility]

## 2020-11-30 NOTE — TRANSFER OF CARE
"Anesthesia Transfer of Care Note    Patient: Damon Yoo    Procedure(s) Performed: Procedure(s) (LRB):  INSERTION, CARDIAC PACEMAKER, DUAL CHAMBER (Left)    Patient location: Cath Lab    Anesthesia Type: MAC    Transport from OR: Transported from OR on room air with adequate spontaneous ventilation    Post pain: adequate analgesia    Post assessment: no apparent anesthetic complications    Post vital signs: stable    Level of consciousness: awake, alert and oriented    Nausea/Vomiting: no nausea/vomiting    Complications: none    Transfer of care protocol was followed      Last vitals:   Visit Vitals  BP (!) 112/56   Pulse (!) 53   Temp 36.5 °C (97.7 °F) (Oral)   Resp (!) 34   Ht 5' 6" (1.676 m)   Wt 110.8 kg (244 lb 4.3 oz)   SpO2 97%   BMI 39.43 kg/m²     "

## 2020-11-30 NOTE — ANESTHESIA PREPROCEDURE EVALUATION
11/30/2020  Damon Yoo is a 70 y.o., male.    Anesthesia Evaluation    I have reviewed the Patient Summary Reports.    I have reviewed the Nursing Notes. I have reviewed the NPO Status.   I have reviewed the Medications.     Review of Systems  Anesthesia Hx:  No problems with previous Anesthesia  Denies Family Hx of Anesthesia complications.   Denies Personal Hx of Anesthesia complications.   Social:  Former Smoker    Hematology/Oncology:  Hematology Normal   Oncology Normal     EENT/Dental:EENT/Dental Normal   Cardiovascular:   Exercise tolerance: poor Hypertension CAD  CABG/stent Dysrhythmias  CHF hyperlipidemia ECG has been reviewed. · There is moderate left ventricular concentric hypertrophy.  · The left ventricle is normal in size with normal systolic function. The estimated ejection fraction is 60%.  · Normal left ventricular diastolic function.  · Normal right ventricular size with normal right ventricular systolic function.  · Mild aortic valve stenosis.  · Aortic valve area is 1.78 cm2; peak velocity is 1.94 m/s; mean gradient is 9 mmHg.  · Moderate mitral regurgitation.  · Moderate tricuspid regurgitation.  · There is pulmonary hypertension.  · Normal central venous pressure (3 mmHg).  · The estimated PA systolic pressure is 80 mmHg.  11/27/2020    Temp pacer on   Pulmonary:  Pulmonary Normal    Renal/:   Chronic Renal Disease, CRI    Hepatic/GI:  Hepatic/GI Normal    Musculoskeletal:  Musculoskeletal Normal    Neurological:   Neuromuscular Disease,    Endocrine:  Endocrine Normal    Dermatological:  Skin Normal    Psych:  Psychiatric Normal           Physical Exam  General:  Morbid Obesity    Airway/Jaw/Neck:  Airway Findings: Mouth Opening: Normal Tongue: Normal  General Airway Assessment: Adult, Average  Mallampati: II  TM Distance: Normal, at least 6 cm      Dental:  Dental Findings:  Edentulous   Chest/Lungs:  Chest/Lungs Findings: Normal Respiratory Rate     Heart/Vascular:  Heart Findings: Rate: Bradycardia  Rhythm: Regular Rhythm        Mental Status:  Mental Status Findings:  Cooperative, Alert and Oriented         Anesthesia Plan  Type of Anesthesia, risks & benefits discussed:  Anesthesia Type:  MAC  Patient's Preference:   Intra-op Monitoring Plan: standard ASA monitors  Intra-op Monitoring Plan Comments:   Post Op Pain Control Plan:   Post Op Pain Control Plan Comments:   Induction:   IV  Beta Blocker:  Patient is not currently on a Beta-Blocker (No further documentation required).       Informed Consent: Patient understands risks and agrees with Anesthesia plan.  Questions answered. Anesthesia consent signed with patient.  ASA Score: 4     Day of Surgery Review of History & Physical: I have interviewed and examined the patient. I have reviewed the patient's H&P dated:  There are no significant changes.          Ready For Surgery From Anesthesia Perspective.

## 2020-11-30 NOTE — SUBJECTIVE & OBJECTIVE
Interval History: no acute issues noted o/n. Plan for PPM implant later today. Further recs to follow.     Review of Systems   Constitution: Positive for diaphoresis and malaise/fatigue.   HENT: Negative for hearing loss and hoarse voice.    Eyes: Negative for blurred vision and visual disturbance.   Cardiovascular: Negative for chest pain, claudication, dyspnea on exertion, irregular heartbeat, leg swelling, near-syncope, orthopnea, palpitations, paroxysmal nocturnal dyspnea and syncope.        Remains pacer dependent   Respiratory: Positive for shortness of breath. Negative for cough, hemoptysis, sleep disturbances due to breathing, snoring and wheezing.    Endocrine: Negative for cold intolerance and heat intolerance.   Hematologic/Lymphatic: Bruises/bleeds easily.   Skin: Negative for color change, dry skin and nail changes.   Musculoskeletal: Positive for arthritis and back pain. Negative for joint pain and myalgias.   Gastrointestinal: Negative for bloating, abdominal pain, constipation, nausea and vomiting.   Genitourinary: Negative for dysuria, flank pain, hematuria and hesitancy.   Neurological: Positive for weakness. Negative for headaches, light-headedness, loss of balance, numbness and paresthesias.   Psychiatric/Behavioral: Negative for altered mental status.   Allergic/Immunologic: Negative for environmental allergies.     Objective:     Vital Signs (Most Recent):  Temp: 97.7 °F (36.5 °C) (11/30/20 0715)  Pulse: 69 (11/30/20 0915)  Resp: (!) 21 (11/30/20 0915)  BP: 136/62 (11/30/20 0701)  SpO2: 97 % (11/30/20 0915) Vital Signs (24h Range):  Temp:  [97.7 °F (36.5 °C)-99.5 °F (37.5 °C)] 97.7 °F (36.5 °C)  Pulse:  [67-73] 69  Resp:  [12-63] 21  SpO2:  [95 %-98 %] 97 %  BP: (110-171)/(57-83) 136/62     Weight: 110.8 kg (244 lb 4.3 oz)  Body mass index is 39.43 kg/m².     SpO2: 97 %  O2 Device (Oxygen Therapy): room air      Intake/Output Summary (Last 24 hours) at 11/30/2020 1035  Last data filed at  11/30/2020 0900  Gross per 24 hour   Intake 870.3 ml   Output 600 ml   Net 270.3 ml       Lines/Drains/Airways     Peripheral Intravenous Line                 Peripheral IV - Single Lumen 11/27/20 0959 20 G Right Hand 3 days         Peripheral IV - Single Lumen 11/27/20 1005 20 G Left Hand 3 days                Physical Exam   Constitutional: He is oriented to person, place, and time. He appears well-developed and well-nourished. No distress.   HENT:   Head: Normocephalic and atraumatic.   Eyes: Pupils are equal, round, and reactive to light.   Neck: Normal range of motion and full passive range of motion without pain. Neck supple. No JVD present.   Cardiovascular: S1 normal, S2 normal and intact distal pulses. An irregular rhythm present. Bradycardia present. PMI is not displaced. Exam reveals no distant heart sounds.   No murmur heard.  Pulses:       Radial pulses are 2+ on the right side and 2+ on the left side.        Dorsalis pedis pulses are 2+ on the right side and 2+ on the left side.   RT IJ TPM  Remains pacer dependent today   Pulmonary/Chest: Effort normal and breath sounds normal. No accessory muscle usage. No respiratory distress. He has no decreased breath sounds. He has no wheezes. He has no rales.   Abdominal: Soft. Bowel sounds are normal. He exhibits no distension. There is no abdominal tenderness.   Obese abdomen   Musculoskeletal: Normal range of motion.         General: No edema.      Right ankle: He exhibits no swelling.      Left ankle: He exhibits no swelling.   Neurological: He is alert and oriented to person, place, and time.   Skin: Skin is warm and dry. He is not diaphoretic. No cyanosis. Nails show no clubbing.   Psychiatric: He has a normal mood and affect. His speech is normal and behavior is normal. Judgment and thought content normal. Cognition and memory are normal.   Nursing note and vitals reviewed.      Significant Labs:   BMP:   Recent Labs   Lab 11/29/20  5057 11/30/20  7291    * 104    140   K 4.4 4.1    105   CO2 20* 22*   BUN 22 25*   CREATININE 1.4 1.7*   CALCIUM 9.1 8.9   MG 1.8 2.1   , CBC   Recent Labs   Lab 11/29/20  0417 11/30/20  0432   WBC 10.10 11.07   HGB 13.3* 13.0*   HCT 41.0 40.5   * 133*   , Troponin No results for input(s): TROPONINI in the last 48 hours. and All pertinent lab results from the last 24 hours have been reviewed.    Significant Imaging: Echocardiogram:   Transthoracic echo (TTE) complete (Cupid Only):   Results for orders placed or performed during the hospital encounter of 11/27/20   Echo Color Flow Doppler? Yes; Bubble Contrast? No   Result Value Ref Range    BSA 2.3 m2    LA WIDTH 3.44 cm    LVIDd 4.19 3.5 - 6.0 cm    IVS 1.62 (A) 0.6 - 1.1 cm    Posterior Wall 1.56 (A) 0.6 - 1.1 cm    Ao root annulus 4.06 cm    LVIDs 2.57 2.1 - 4.0 cm    FS 39 28 - 44 %    LA volume 34.87 cm3    Sinus 4.17 cm    STJ 3.60 cm    Ascending aorta 4.10 cm    LV mass 272.46 g    LA size 3.36 cm    TAPSE 1.94 cm    Left Ventricle Relative Wall Thickness 0.74 cm    AV mean gradient 9 mmHg    AV valve area 1.78 cm2    AV Velocity Ratio 0.63     AV index (prosthetic) 0.65     MV valve area p 1/2 method 3.22 cm2    PV peak gradient 5.83 mmHg    E/A ratio 1.16     E wave decelartion time 235.35 msec    IVRT 72.66 msec    LVOT diameter 1.86 cm    LVOT area 2.7 cm2    LVOT peak yung 1.22 m/s    LVOT peak VTI 29.48 cm    Ao peak yung 1.94 m/s    Ao VTI 45.02 cm    RVOT peak yung 1.21 m/s    RVOT peak VTI 29.08 cm    LVOT stroke volume 80.06 cm3    AV peak gradient 15 mmHg    PV mean gradient 3.63 mmHg    MV Peak E Yung 0.87 m/s    TR Max Yung 4.40 m/s    MV stenosis pressure 1/2 time 68.25 ms    MV Peak A Yung 0.75 m/s    LV Systolic Volume 23.84 mL    LV Systolic Volume Index 10.8 mL/m2    LV Diastolic Volume 78.10 mL    LV Diastolic Volume Index 35.45 mL/m2    LA Volume Index 15.8 mL/m2    LV Mass Index 124 g/m2    RA Major Axis 3.67 cm    Left Atrium Minor Axis  3.52 cm    Left Atrium Major Axis 3.58 cm    Triscuspid Valve Regurgitation Peak Gradient 77 mmHg    RA Width 3.02 cm    Right Atrial Pressure (from IVC) 3 mmHg    TV rest pulmonary artery pressure 80 mmHg    Narrative    · There is moderate left ventricular concentric hypertrophy.  · The left ventricle is normal in size with normal systolic function. The   estimated ejection fraction is 60%.  · Normal left ventricular diastolic function.  · Normal right ventricular size with normal right ventricular systolic   function.  · Mild aortic valve stenosis.  · Aortic valve area is 1.78 cm2; peak velocity is 1.94 m/s; mean gradient   is 9 mmHg.  · Moderate mitral regurgitation.  · Moderate tricuspid regurgitation.  · There is pulmonary hypertension.  · Normal central venous pressure (3 mmHg).  · The estimated PA systolic pressure is 80 mmHg.     Suggest re echo when patient is in NSR.       and X-Ray: CXR: X-Ray Chest 1 View (CXR): No results found for this visit on 11/27/20. and X-Ray Chest PA and Lateral (CXR): No results found for this visit on 11/27/20.

## 2020-11-30 NOTE — ASSESSMENT & PLAN NOTE
Complete AV dissociation noted in ED  Keep NPO   Check ECHO  Hold BB for next 48 hours  Plan for Temporary pacing wire today per Dr Kenyon  Risks, benefits, and alternatives reviewed with patient. Patient agrees to proceed with procedure as planned. Consent obtained, all questions answered and appropriately witnessed.   May need PPM implant on Monday if remains complete dissociation, further recs to follow  Will need ICU admission post procedure  ICU Cardiac Monitoring    11/30  -Plan for PPM implant today per Dr Kenyon  -Patient remains Pacer dependent today  -Risks, benefits and treatment alternatives reviewed with patient.   -Patient has agreed to proceed, consent obtained and appropriately witnessed

## 2020-11-30 NOTE — PLAN OF CARE
NPO since midnight for PM placement today. Heparin off for surgery. 100% v-paced on monitor, no issues w/pacer. VSS. Voids per urinal. No BMs this shift. Turns independently in bed. Fall precautions in place, bed alarm on. Slept b/t care.

## 2020-12-01 ENCOUNTER — TELEPHONE (OUTPATIENT)
Dept: CARDIOLOGY | Facility: CLINIC | Age: 70
End: 2020-12-01

## 2020-12-01 VITALS
RESPIRATION RATE: 18 BRPM | SYSTOLIC BLOOD PRESSURE: 122 MMHG | TEMPERATURE: 97 F | DIASTOLIC BLOOD PRESSURE: 61 MMHG | HEIGHT: 66 IN | BODY MASS INDEX: 39.97 KG/M2 | HEART RATE: 91 BPM | WEIGHT: 248.69 LBS | OXYGEN SATURATION: 92 %

## 2020-12-01 LAB
ANION GAP SERPL CALC-SCNC: 11 MMOL/L (ref 8–16)
ANISOCYTOSIS BLD QL SMEAR: SLIGHT
BASOPHILS # BLD AUTO: 0.05 K/UL (ref 0–0.2)
BASOPHILS NFR BLD: 0.4 % (ref 0–1.9)
BUN SERPL-MCNC: 33 MG/DL (ref 8–23)
CALCIUM SERPL-MCNC: 8.6 MG/DL (ref 8.7–10.5)
CHLORIDE SERPL-SCNC: 112 MMOL/L (ref 95–110)
CO2 SERPL-SCNC: 19 MMOL/L (ref 23–29)
CREAT SERPL-MCNC: 1.9 MG/DL (ref 0.5–1.4)
DIFFERENTIAL METHOD: ABNORMAL
EOSINOPHIL # BLD AUTO: 0.1 K/UL (ref 0–0.5)
EOSINOPHIL NFR BLD: 0.4 % (ref 0–8)
ERYTHROCYTE [DISTWIDTH] IN BLOOD BY AUTOMATED COUNT: 14.4 % (ref 11.5–14.5)
EST. GFR  (AFRICAN AMERICAN): 41 ML/MIN/1.73 M^2
EST. GFR  (NON AFRICAN AMERICAN): 35 ML/MIN/1.73 M^2
GLUCOSE SERPL-MCNC: 113 MG/DL (ref 70–110)
HCT VFR BLD AUTO: 38.7 % (ref 40–54)
HGB BLD-MCNC: 12.4 G/DL (ref 14–18)
IMM GRANULOCYTES # BLD AUTO: 0.07 K/UL (ref 0–0.04)
IMM GRANULOCYTES NFR BLD AUTO: 0.5 % (ref 0–0.5)
LYMPHOCYTES # BLD AUTO: 1.4 K/UL (ref 1–4.8)
LYMPHOCYTES NFR BLD: 10.3 % (ref 18–48)
MAGNESIUM SERPL-MCNC: 2 MG/DL (ref 1.6–2.6)
MCH RBC QN AUTO: 30.3 PG (ref 27–31)
MCHC RBC AUTO-ENTMCNC: 32 G/DL (ref 32–36)
MCV RBC AUTO: 95 FL (ref 82–98)
MONOCYTES # BLD AUTO: 1.5 K/UL (ref 0.3–1)
MONOCYTES NFR BLD: 10.8 % (ref 4–15)
NEUTROPHILS # BLD AUTO: 10.6 K/UL (ref 1.8–7.7)
NEUTROPHILS NFR BLD: 77.6 % (ref 38–73)
NRBC BLD-RTO: 0 /100 WBC
OVALOCYTES BLD QL SMEAR: ABNORMAL
PLATELET # BLD AUTO: 110 K/UL (ref 150–350)
PMV BLD AUTO: 14 FL (ref 9.2–12.9)
POIKILOCYTOSIS BLD QL SMEAR: SLIGHT
POTASSIUM SERPL-SCNC: 4.6 MMOL/L (ref 3.5–5.1)
RBC # BLD AUTO: 4.09 M/UL (ref 4.6–6.2)
SODIUM SERPL-SCNC: 142 MMOL/L (ref 136–145)
STOMATOCYTES BLD QL SMEAR: PRESENT
WBC # BLD AUTO: 13.65 K/UL (ref 3.9–12.7)

## 2020-12-01 PROCEDURE — 83735 ASSAY OF MAGNESIUM: CPT

## 2020-12-01 PROCEDURE — 93010 EKG 12-LEAD: ICD-10-PCS | Mod: ,,, | Performed by: INTERNAL MEDICINE

## 2020-12-01 PROCEDURE — 25000003 PHARM REV CODE 250: Performed by: NURSE PRACTITIONER

## 2020-12-01 PROCEDURE — 80048 BASIC METABOLIC PNL TOTAL CA: CPT

## 2020-12-01 PROCEDURE — 85025 COMPLETE CBC W/AUTO DIFF WBC: CPT

## 2020-12-01 PROCEDURE — 25000003 PHARM REV CODE 250: Performed by: INTERNAL MEDICINE

## 2020-12-01 PROCEDURE — 36415 COLL VENOUS BLD VENIPUNCTURE: CPT

## 2020-12-01 PROCEDURE — 99232 PR SUBSEQUENT HOSPITAL CARE,LEVL II: ICD-10-PCS | Mod: 25,,, | Performed by: INTERNAL MEDICINE

## 2020-12-01 PROCEDURE — 93010 ELECTROCARDIOGRAM REPORT: CPT | Mod: ,,, | Performed by: INTERNAL MEDICINE

## 2020-12-01 PROCEDURE — 93005 ELECTROCARDIOGRAM TRACING: CPT

## 2020-12-01 PROCEDURE — 99232 SBSQ HOSP IP/OBS MODERATE 35: CPT | Mod: 25,,, | Performed by: INTERNAL MEDICINE

## 2020-12-01 RX ORDER — METOPROLOL SUCCINATE 50 MG/1
100 TABLET, EXTENDED RELEASE ORAL DAILY
Status: DISCONTINUED | OUTPATIENT
Start: 2020-12-01 | End: 2020-12-01 | Stop reason: HOSPADM

## 2020-12-01 RX ORDER — CEPHALEXIN 500 MG/1
500 CAPSULE ORAL EVERY 12 HOURS
Qty: 10 CAPSULE | Refills: 0 | Status: SHIPPED | OUTPATIENT
Start: 2020-12-01 | End: 2020-12-06

## 2020-12-01 RX ADMIN — FLUTICASONE PROPIONATE 100 MCG: 50 SPRAY, METERED NASAL at 08:12

## 2020-12-01 RX ADMIN — AMLODIPINE BESYLATE 10 MG: 10 TABLET ORAL at 08:12

## 2020-12-01 RX ADMIN — SODIUM CHLORIDE 75 ML/HR: 0.9 INJECTION, SOLUTION INTRAVENOUS at 02:12

## 2020-12-01 RX ADMIN — ASPIRIN 81 MG: 81 TABLET, COATED ORAL at 08:12

## 2020-12-01 RX ADMIN — OMEGA-3-ACID ETHYL ESTERS 1 G: 1 CAPSULE, LIQUID FILLED ORAL at 08:12

## 2020-12-01 RX ADMIN — CEPHALEXIN 500 MG: 500 CAPSULE ORAL at 08:12

## 2020-12-01 RX ADMIN — MUPIROCIN: 20 OINTMENT TOPICAL at 08:12

## 2020-12-01 RX ADMIN — PANTOPRAZOLE SODIUM 40 MG: 40 TABLET, DELAYED RELEASE ORAL at 08:12

## 2020-12-01 RX ADMIN — ALLOPURINOL 300 MG: 100 TABLET ORAL at 08:12

## 2020-12-01 NOTE — NURSING TRANSFER
Nursing Transfer Note      11/30/2020     Transfer To: telemetry unit room 203    Transfer via wheelchair    Transfer with cardiac monitoring    Transported by OBDULIO Arshad     Medicines sent: yes    Chart send with patient: Yes      Patient reassessed at: 11/30/2020 at 9:52 PM  (date, time)    Upon arrival to floor: patient oriented to room, call bell in reach and bed in lowest position. Assisted patient to bathroom, bedside report given to nurse Abi

## 2020-12-01 NOTE — NURSING
Pt stable, discharge instructions and when to return to Er given to patient. Educated patient of follow-up visits and medications. Tele and IV discontinued.

## 2020-12-01 NOTE — PLAN OF CARE
12/01/20 1550   Final Note   Assessment Type Final Discharge Note   Anticipated Discharge Disposition Home   Right Care Referral Info   Post Acute Recommendation No Care

## 2020-12-01 NOTE — NURSING
"Message sent to on-call cardiology provider, KATE Zayas: "Patient had a pacemaker placed today. Septic and Rapid Response alert received upon patient being transferred to telemetry unit at approximately 2200. Patient's vitals are improving; however, his respirations remain at 36 breaths per minute, his paced rate is at 115, and his temp is at 99.5. Patient's creatinine is elevated at 2.1, and the ICU nurse reports that his urine was a bit nubia. Patient reported a sudden increased level of weakness and shortness of breath shortly after transfer to telemetry unit; however, he now reports improvement of these symptoms.  Perhaps patient would benefit from some gentle IV fluids?"     Awaiting response at this time.     Update: Orders placed by cardiology for gentle IV fluids  "

## 2020-12-01 NOTE — DISCHARGE SUMMARY
[facility] Hospital Medicine  Discharge Summary      Patient Name: Damon Yoo  MRN: [id number]  Admission Date: 11/27/2020  Hospital Length of Stay: 4 days  Discharge Date and Time: 12/1/2020 11:44 AM  Attending Physician:  Young Hoffman MD  Discharging Provider: Young Hoffman MD  Primary Care Provider: TIEN Mason Jr, MD      HPI:   This is a 69 yo male who has a PMHx of CAD with CABG in 2013, HTN, HLD, prostate cancer, glaucoma, arthritis, gout, and hypertrophic cardiomyopathy. Patient reports he has been in his normal state of health until yesterday when he began with new onset of intermittent SOB and profuse diaphoresis.  Patient  was seen at the Urgent Care today where it was noted that his heart rate was in the 30s. Patient was sent to Emergency Department for further evaluation. Patient was again noted to have heart rate in 30s-40s and found to be in complete heart block. So far his blood pressure has remained stable. Patient was seen by Cardiology in ER and plans for urgent temporary pacing wire placement to be done this AM per Dr Kenyon.                 Per report- Patient had a rapid COVID screen done which was negative before being referred to the ED for further evaluation.     Procedure(s) (LRB):  INSERTION, CARDIAC PACEMAKER, DUAL CHAMBER (Left)      Hospital Course:   Admitted for evaluation and treatment of dizziness and weakness due to complete heart block.  Placed in the ICU and held any rate controlling medication.  Cardiology evaluated and urgently placed temporary pacing wires.  Remained hemodynamically stable.  Trials of pausing external pacing resulted in return of bradycardia.  Followed with permanent pacemaker placement on 30 November.  Intermittent tachycardia and diarrhea afterwards but remained hemodynamically stable.  Symptoms resolved.  Routine post device care.  Discharge plan to return home and follow up with Cardiology.     Consults:   Consults  (From admission, onward)        Status Ordering Provider     Inpatient consult to Cardiology  Once     Provider:  (Not yet assigned)    Completed PONCHO GAY          No new Assessment & Plan notes have been filed under this hospital service since the last note was generated.  Service: Hospital Medicine    Final Active Diagnoses:    Diagnosis Date Noted POA    PRINCIPAL PROBLEM:  CHB (complete heart block) [I44.2] 11/27/2020 Yes    Elevated troponin [R77.8] 11/27/2020 Yes    Stage 3 chronic kidney disease [N18.30] 11/27/2020 Yes    Chronic diastolic CHF (congestive heart failure) [I50.32] 11/27/2020 Yes     Chronic    Glaucoma [H40.9] 11/27/2020 Yes    Electrolyte imbalance [E87.8] 11/27/2020 Yes    Chronic kidney disease, stage III (moderate) [N18.30] 09/19/2016 Yes    Obesity, Class III, BMI 40-49.9 (morbid obesity) [E66.01] 02/03/2015 Yes     Chronic    Coronary artery disease involving native coronary artery of native heart without angina pectoris w/ hx CABG [I25.10]  Yes    Hyperlipidemia [E78.5]  Yes     Chronic    Essential hypertension [I10]  Yes      Problems Resolved During this Admission:       Discharged Condition: good    Disposition: Home or Self Care    Follow Up:  Follow-up Information     Sis Faye MD.    Specialties: Cardiology, INTERVENTIONAL CARDIOLOGY  Why: Cardiology office will coordinate follow ups.  Contact information:  [address]  [place] [address] [phone]                 Patient Instructions:      Diet Cardiac     Activity as tolerated       Significant Diagnostic Studies: Labs: All labs within the past 24 hours have been reviewed    Pending Diagnostic Studies:     None         Medications:  Reconciled Home Medications:      Medication List      START taking these medications    cephALEXin 500 MG capsule  Commonly known as: KEFLEX  Take 1 capsule (500 mg total) by mouth every 12 (twelve) hours. for 5 days        CONTINUE taking these medications     acetaminophen 500 MG tablet  Commonly known as: TYLENOL  Take 500 mg by mouth every 6 (six) hours as needed for Pain.     allopurinoL 300 MG tablet  Commonly known as: ZYLOPRIM  TAKE 1 1/2 TABLET BY MOUTH DAILY     amLODIPine 10 MG tablet  Commonly known as: NORVASC  Take 1 tablet (10 mg total) by mouth once daily.     aspirin 81 MG Chew  Take 1 tablet by mouth Daily.     celecoxib 200 MG capsule  Commonly known as: CeleBREX  Take 1 capsule (200 mg total) by mouth once daily. Take with food     diclofenac sodium 1 % Gel  Commonly known as: VOLTAREN  Apply 2 g topically once daily.     flunisolide 25 mcg (0.025%) 25 mcg (0.025 %) Spry  Commonly known as: NASALIDE  USE 2 SPRAYS IN EACH NOSTRIL TWICE DAILY     latanoprost 0.005 % ophthalmic solution  Place 1 drop into both eyes every evening.     lisinopriL 40 MG tablet  Commonly known as: PRINIVIL,ZESTRIL  Take 1 tablet (40 mg total) by mouth once daily.     metoprolol succinate 100 MG 24 hr tablet  Commonly known as: TOPROL-XL  Take 1 tablet (100 mg total) by mouth once daily.     OMEGA 3-6-9 1,200 mg (400 [id number]) Cap  Generic drug: fish oil-fat acid [id number]  Take 1 capsule by mouth once daily.     pantoprazole 40 MG tablet  Commonly known as: PROTONIX  Take 1 tablet (40 mg total) by mouth once daily.     rosuvastatin 40 MG Tab  Commonly known as: CRESTOR  Take 1 tablet (40 mg total) by mouth every evening. Generic is fine.     timolol maleate 0.5% 0.5 % Drop  Commonly known as: TIMOPTIC  Place 1 drop into both eyes every morning.     tiZANidine 4 MG tablet  Commonly known as: ZANAFLEX  Take 1 tablet (4 mg total) by mouth every 8 (eight) hours.            Indwelling Lines/Drains at time of discharge:   Lines/Drains/Airways     None                 Time spent on the discharge of patient: 30 minutes  Patient was seen and examined on the date of discharge and determined to be suitable for discharge.         Young Hoffman MD  Department of Hospital  Medicine  [facility]

## 2020-12-01 NOTE — PLAN OF CARE
Patient transferred from ICU to telemetry unit this shift. Patient placed on O2 at 2L via nasal cannula. Gentle IV fluids initiated. Heart rate improved throughout shift. Patient slept throughout morning. Plan of care reviewed. Patient's left arm remains in sling. Safety precautions in place. Will continue to monitor and treat.

## 2020-12-01 NOTE — HOSPITAL COURSE
Admitted for evaluation and treatment of dizziness and weakness due to complete heart block.  Placed in the ICU and held any rate controlling medication.  Cardiology evaluated and urgently placed temporary pacing wires.  Remained hemodynamically stable.  Trials of pausing external pacing resulted in return of bradycardia.  Followed with permanent pacemaker placement on 30 November.  Intermittent tachycardia and diarrhea afterwards but remained hemodynamically stable.  Symptoms resolved.  Routine post device care.  Discharge plan to return home and follow up with Cardiology.

## 2020-12-01 NOTE — ASSESSMENT & PLAN NOTE
Complete AV dissociation noted in ED  Keep NPO   Check ECHO  Hold BB for next 48 hours  Plan for Temporary pacing wire today per Dr Kenyon  Risks, benefits, and alternatives reviewed with patient. Patient agrees to proceed with procedure as planned. Consent obtained, all questions answered and appropriately witnessed.   May need PPM implant on Monday if remains complete dissociation, further recs to follow  Will need ICU admission post procedure  ICU Cardiac Monitoring    11/30  -Plan for PPM implant today per Dr Kenyon  -Patient remains Pacer dependent today  -Risks, benefits and treatment alternatives reviewed with patient.   -Patient has agreed to proceed, consent obtained and appropriately witnessed    12/1/2020  -patient is s/p DC PPM implant   -Resume BB  -Will follow up in device clinic. Will call to arrange   -Sling to left arm - wear for 48 hours, then only at night for 6 weeks.   -No lifting left elbow above shoulder height   -No lifting over 5 pounds   -No driving for 1 week and for 4 weeks if patient uses left arm to make turns   -Patient may shower in 48 hours. Do not let water beam on site directly and no scrubbing area

## 2020-12-01 NOTE — PROGRESS NOTES
[facility] -   Cardiology  Progress Note    Patient Name: Damon Yoo  MRN: [id number]  Admission Date: 11/27/2020  Hospital Length of Stay: 4 days  Code Status: Full Code   Attending Physician: Young Hoffman MD   Primary Care Physician: TIEN Mason Jr, MD  Expected Discharge Date: 12/1/2020  Principal Problem:CHB (complete heart block)    Subjective:   Brief HPI:  Damon Yoo is a 70 year old male who presented to [facility] from Urgent care due to heart rate in 30s with associated shortness of breath and diaphoresis since yesterday. He reports that suddenly yesterday he developed extreme shortness of breath and diaphoresis. He awoke today with continued issues with shortness of breath which provoked him to seek further care at urgent care today. His current medical conditions include CAD s/p CABG, HTN, HLP, Pre diabetes (last A1C 6.2), Hypertrophic cardiomyopathy, obese. EKG today revealed complete AV node dissociation with HR 39. Plan for Temporary pacing wire this AM to restore heart rate control. Labs reviewed, BNP 1191, K+ 4.6, Cr 2.5, Na 144, Troponin 0.097, H/H 12/37. Cardiology consulted to assist with medical management. Chart reviewed, patient seen and examined. ECHO pending. Patient remains in complete AV dissociation at time of exam with HR in low 30s. Plan for urgent temporary pacing wire this AM per Dr Kenyon. Consent obtained and patient/wife are in agreement today. Will hold BB and observe in ICU over the next 48 hours and plan for PPM implant on Monday if remains dependent on pacing wire at that time. Further recs to follow.     Hospital Course:   11/28/2020 Patient seen and examined. Still pacemaker dependent.  stable and continue diuresis . Plan permanent pacemaker Monday.    11/29/20 Patient seen and examined. Still pacemaker needed due to heart block.  Plan permanent pacemaker Monday.  Will d/c heparin today.    11/30/2020-Patient seen and examined in ICU, remains  Pacer dependent today. Plan for PPM implant later today per Dr Kenyon. Further recs to follow.     12/1/2020- Patient is post DC PPM implant. Tolerated procedure well. Renal function improve with gentle hydration. CXR with no evidence of pneumothorax         Review of Systems   Constitution: Negative for diaphoresis, malaise/fatigue, weight gain and weight loss.   HENT: Negative for congestion and nosebleeds.    Cardiovascular: Negative for chest pain, claudication, cyanosis, dyspnea on exertion, irregular heartbeat, leg swelling, near-syncope, orthopnea, palpitations, paroxysmal nocturnal dyspnea and syncope.   Respiratory: Negative for cough, hemoptysis, shortness of breath, sleep disturbances due to breathing, snoring, sputum production and wheezing.    Hematologic/Lymphatic: Negative for bleeding problem. Does not bruise/bleed easily.   Skin: Negative for rash.   Musculoskeletal: Negative for arthritis, back pain, falls, joint pain, muscle cramps and muscle weakness.   Gastrointestinal: Negative for abdominal pain, constipation, diarrhea, heartburn, hematemesis, hematochezia, melena, nausea and vomiting.   Genitourinary: Negative for dysuria, hematuria and nocturia.   Neurological: Negative for excessive daytime sleepiness, dizziness, headaches, light-headedness, loss of balance, numbness, vertigo and weakness.     Objective:     Vital Signs (Most Recent):  Temp: 97.2 °F (36.2 °C) (12/01/20 0729)  Pulse: 93 (12/01/20 0729)  Resp: 18 (12/01/20 0729)  BP: 122/61 (12/01/20 0729)  SpO2: (!) 92 % (12/01/20 0729) Vital Signs (24h Range):  Temp:  [97.2 °F (36.2 °C)-99.6 °F (37.6 °C)] 97.2 °F (36.2 °C)  Pulse:  [] 93  Resp:  [13-42] 18  SpO2:  [92 %-98 %] 92 %  BP: (111-177)/(53-73) 122/61     Weight: 112.8 kg (248 lb 10.9 oz)  Body mass index is 40.14 kg/m².     SpO2: (!) 92 %  O2 Device (Oxygen Therapy): room air      Intake/Output Summary (Last 24 hours) at 12/1/2020 0957  Last data filed at 12/1/2020  0600  Gross per 24 hour   Intake 600 ml   Output 750 ml   Net -150 ml       Lines/Drains/Airways     Peripheral Intravenous Line                 Peripheral IV - Single Lumen 11/27/20 0959 20 G Right Hand 3 days         Peripheral IV - Single Lumen 11/27/20 1005 20 G Left Hand 3 days                Physical Exam   Constitutional: He is oriented to person, place, and time. He appears well-developed and well-nourished.   Neck: Neck supple. No JVD present.   Cardiovascular: Normal rate, regular rhythm, normal heart sounds and normal pulses. Exam reveals no friction rub.   No murmur heard.  Pulmonary/Chest: Effort normal and breath sounds normal. No respiratory distress. He has no wheezes. He has no rales.   Left PPM pocket WNL    Abdominal: Soft. Bowel sounds are normal. He exhibits no distension.   Musculoskeletal:         General: No tenderness or edema.   Neurological: He is alert and oriented to person, place, and time.   Skin: Skin is warm and dry. No rash noted.   Psychiatric: He has a normal mood and affect. His behavior is normal.   Nursing note and vitals reviewed.      Significant Labs:   All pertinent lab results from the last 24 hours have been reviewed. and   Recent Lab Results       12/01/20  0613   12/01/20  0612   11/30/20  1930   11/30/20  1022        Anion Gap   11 14       Aniso Slight           Baso # 0.05           Basophil % 0.4           BUN   33 31       Calcium   8.6 9.1       Chloride   112 110       CO2   19 19       Creatinine   1.9 2.1       Differential Method Automated           eGFR if    41 36       eGFR if non    35  Comment:  Calculation used to obtain the estimated glomerular filtration  rate (eGFR) is the CKD-EPI equation.    31  Comment:  Calculation used to obtain the estimated glomerular filtration  rate (eGFR) is the CKD-EPI equation.          Eos # 0.1           Eosinophil % 0.4           Glucose   113 123       Gran # (ANC) 10.6           Gran %  77.6           Hematocrit 38.7           Hemoglobin 12.4           Immature Grans (Abs) 0.07  Comment:  Mild elevation in immature granulocytes is non specific and   can be seen in a variety of conditions including stress response,   acute inflammation, trauma and pregnancy. Correlation with other   laboratory and clinical findings is essential.             Immature Granulocytes 0.5           Lymph # 1.4           Lymph % 10.3           Magnesium   2.0         MCH 30.3           MCHC 32.0           MCV 95           Mono # 1.5           Mono % 10.8           MPV 14.0           nRBC 0           Ovalocytes Occasional           Platelets 110           Poik Slight           Potassium   4.6 4.4       RBC 4.09           RDW 14.4           SARS-CoV-2 RNA, Amplification, Qual       Negative  Comment:  This test utilizes isothermal nucleic acid amplification   technology to detect the SARS-CoV-2 RdRp nucleic acid segment.   The analytical sensitivity (limit of detection) is 125 genome   equivalents/mL.   A POSITIVE result implies infection with the SARS-CoV-2 virus;  the patient is presumed to be contagious.    A NEGATIVE result means that SARS-CoV-2 nucleic acids are not  present above the limit of detection. A NEGATIVE result should be   treated as presumptive. It does not rule out the possibility of   COVID-19 and should not be the sole basis for treatment decisions.   If COVID-19 is strongly suspected based on clinical and exposure   history, re-testing using an alternate molecular assay should be   considered.   This test is only for use under the Food and Drug   Administration s Emergency Use Authorization (EUA).   Commercial kits are provided by [employer].   Performance characteristics of the EUA have been independently  verified by [facility] Department of  Pathology and Laboratory Medicine.   _________________________________________________________________  The ID NOW COVID-19 Letter of  Authorization, along with the   authorized Fact Sheet for Healthcare Providers, the authorized Fact  Sheet for Patients, and authorized labeling are available on the FDA   website:  www.fda.gov/MedicalDevices/Safety/EmergencySituations/gmy538135.htm       Sodium   142 143       Stomatocytes Present           WBC 13.65                 Significant Imaging: Echocardiogram:   2D echo with color flow doppler: No results found for this or any previous visit. and Transthoracic echo (TTE) complete (Cupid Only):   Results for orders placed or performed during the hospital encounter of 11/27/20   Echo Color Flow Doppler? Yes; Bubble Contrast? No   Result Value Ref Range    BSA 2.3 m2    LA WIDTH 3.44 cm    LVIDd 4.19 3.5 - 6.0 cm    IVS 1.62 (A) 0.6 - 1.1 cm    Posterior Wall 1.56 (A) 0.6 - 1.1 cm    Ao root annulus 4.06 cm    LVIDs 2.57 2.1 - 4.0 cm    FS 39 28 - 44 %    LA volume 34.87 cm3    Sinus 4.17 cm    STJ 3.60 cm    Ascending aorta 4.10 cm    LV mass 272.46 g    LA size 3.36 cm    TAPSE 1.94 cm    Left Ventricle Relative Wall Thickness 0.74 cm    AV mean gradient 9 mmHg    AV valve area 1.78 cm2    AV Velocity Ratio 0.63     AV index (prosthetic) 0.65     MV valve area p 1/2 method 3.22 cm2    PV peak gradient 5.83 mmHg    E/A ratio 1.16     E wave decelartion time 235.35 msec    IVRT 72.66 msec    LVOT diameter 1.86 cm    LVOT area 2.7 cm2    LVOT peak yung 1.22 m/s    LVOT peak VTI 29.48 cm    Ao peak yung 1.94 m/s    Ao VTI 45.02 cm    RVOT peak yung 1.21 m/s    RVOT peak VTI 29.08 cm    LVOT stroke volume 80.06 cm3    AV peak gradient 15 mmHg    PV mean gradient 3.63 mmHg    MV Peak E Yung 0.87 m/s    TR Max Yung 4.40 m/s    MV stenosis pressure 1/2 time 68.25 ms    MV Peak A Yung 0.75 m/s    LV Systolic Volume 23.84 mL    LV Systolic Volume Index 10.8 mL/m2    LV Diastolic Volume 78.10 mL    LV Diastolic Volume Index 35.45 mL/m2    LA Volume Index 15.8 mL/m2    LV Mass Index 124 g/m2    RA Major Axis 3.67 cm    Left  Atrium Minor Axis 3.52 cm    Left Atrium Major Axis 3.58 cm    Triscuspid Valve Regurgitation Peak Gradient 77 mmHg    RA Width 3.02 cm    Right Atrial Pressure (from IVC) 3 mmHg    TV rest pulmonary artery pressure 80 mmHg    Narrative    · There is moderate left ventricular concentric hypertrophy.  · The left ventricle is normal in size with normal systolic function. The   estimated ejection fraction is 60%.  · Normal left ventricular diastolic function.  · Normal right ventricular size with normal right ventricular systolic   function.  · Mild aortic valve stenosis.  · Aortic valve area is 1.78 cm2; peak velocity is 1.94 m/s; mean gradient   is 9 mmHg.  · Moderate mitral regurgitation.  · Moderate tricuspid regurgitation.  · There is pulmonary hypertension.  · Normal central venous pressure (3 mmHg).  · The estimated PA systolic pressure is 80 mmHg.     Suggest re echo when patient is in NSR.        Assessment and Plan:     * CHB (complete heart block)  Complete AV dissociation noted in ED  Keep NPO   Check ECHO  Hold BB for next 48 hours  Plan for Temporary pacing wire today per Dr Kenyon  Risks, benefits, and alternatives reviewed with patient. Patient agrees to proceed with procedure as planned. Consent obtained, all questions answered and appropriately witnessed.   May need PPM implant on Monday if remains complete dissociation, further recs to follow  Will need ICU admission post procedure  ICU Cardiac Monitoring    11/30  -Plan for PPM implant today per Dr Kenyon  -Patient remains Pacer dependent today  -Risks, benefits and treatment alternatives reviewed with patient.   -Patient has agreed to proceed, consent obtained and appropriately witnessed    12/1/2020  -patient is s/p DC PPM implant   -Resume BB  -Will follow up in device clinic. Will call to arrange   -Sling to left arm - wear for 48 hours, then only at night for 6 weeks.   -No lifting left elbow above shoulder height   -No lifting over 5  pounds   -No driving for 1 week and for 4 weeks if patient uses left arm to make turns   -Patient may shower in 48 hours. Do not let water beam on site directly and no scrubbing area    Chronic diastolic CHF (congestive heart failure)  BNP 1191  Did not seem overloaded on exam today  Likely due to Complete Heart block  Hold BB for next 48 hours given complete heart block and need for temporary pacing wire  Hold ACei/ARB for now given DON  Dash diet, 2 gm sodium restriction  1500 ml fluid restriction  Daily weights  Strict I/Os  Check ECHO  Further recs to follow.     11/29/20 stable and doing well, lung fields clear today    11/30  -compensated on exam today    Stage 3 chronic kidney disease  Cr 2.5 on admit  Recommend gentle IVF's  Monitor closely with daily labs    Elevated troponin  Troponin 0.097  Trend serial troponin  Check ECHO  Chest pain free on exam  Will need to hold BB for next 48 hours given complete heart block  Start IV heparin gtt after Temporary pacing wire placed this AM  Further recs to follow  D/C heparin today  Consider nuclear stress as outpatient    Chronic kidney disease, stage III (moderate)  Monitor closely    Obesity, Class III, BMI 40-49.9 (morbid obesity)  Weight loss encouraged    Essential hypertension  On medical therapy  Continue Hydralazine, Norvasc    Hyperlipidemia  Continue statin        VTE Risk Mitigation (From admission, onward)         Ordered     Place SUNDEEP hose  Until discontinued      11/27/20 1156     IP VTE HIGH RISK PATIENT  Once      11/27/20 1156     Place sequential compression device  Until discontinued      11/27/20 1156              Chart reviewed. Patient examined by Dr. Kenyon and agrees with plan that has been outlined.     Nic Zayas, CAROLYNP  Cardiology  [facility] [place]

## 2020-12-01 NOTE — SUBJECTIVE & OBJECTIVE
Review of Systems   Constitution: Negative for diaphoresis, malaise/fatigue, weight gain and weight loss.   HENT: Negative for congestion and nosebleeds.    Cardiovascular: Negative for chest pain, claudication, cyanosis, dyspnea on exertion, irregular heartbeat, leg swelling, near-syncope, orthopnea, palpitations, paroxysmal nocturnal dyspnea and syncope.   Respiratory: Negative for cough, hemoptysis, shortness of breath, sleep disturbances due to breathing, snoring, sputum production and wheezing.    Hematologic/Lymphatic: Negative for bleeding problem. Does not bruise/bleed easily.   Skin: Negative for rash.   Musculoskeletal: Negative for arthritis, back pain, falls, joint pain, muscle cramps and muscle weakness.   Gastrointestinal: Negative for abdominal pain, constipation, diarrhea, heartburn, hematemesis, hematochezia, melena, nausea and vomiting.   Genitourinary: Negative for dysuria, hematuria and nocturia.   Neurological: Negative for excessive daytime sleepiness, dizziness, headaches, light-headedness, loss of balance, numbness, vertigo and weakness.     Objective:     Vital Signs (Most Recent):  Temp: 97.2 °F (36.2 °C) (12/01/20 0729)  Pulse: 93 (12/01/20 0729)  Resp: 18 (12/01/20 0729)  BP: 122/61 (12/01/20 0729)  SpO2: (!) 92 % (12/01/20 0729) Vital Signs (24h Range):  Temp:  [97.2 °F (36.2 °C)-99.6 °F (37.6 °C)] 97.2 °F (36.2 °C)  Pulse:  [] 93  Resp:  [13-42] 18  SpO2:  [92 %-98 %] 92 %  BP: (111-177)/(53-73) 122/61     Weight: 112.8 kg (248 lb 10.9 oz)  Body mass index is 40.14 kg/m².     SpO2: (!) 92 %  O2 Device (Oxygen Therapy): room air      Intake/Output Summary (Last 24 hours) at 12/1/2020 0957  Last data filed at 12/1/2020 0600  Gross per 24 hour   Intake 600 ml   Output 750 ml   Net -150 ml       Lines/Drains/Airways     Peripheral Intravenous Line                 Peripheral IV - Single Lumen 11/27/20 0959 20 G Right Hand 3 days         Peripheral IV - Single Lumen 11/27/20 1005 20  G Left Hand 3 days                Physical Exam   Constitutional: He is oriented to person, place, and time. He appears well-developed and well-nourished.   Neck: Neck supple. No JVD present.   Cardiovascular: Normal rate, regular rhythm, normal heart sounds and normal pulses. Exam reveals no friction rub.   No murmur heard.  Pulmonary/Chest: Effort normal and breath sounds normal. No respiratory distress. He has no wheezes. He has no rales.   Left PPM pocket WNL    Abdominal: Soft. Bowel sounds are normal. He exhibits no distension.   Musculoskeletal:         General: No tenderness or edema.   Neurological: He is alert and oriented to person, place, and time.   Skin: Skin is warm and dry. No rash noted.   Psychiatric: He has a normal mood and affect. His behavior is normal.   Nursing note and vitals reviewed.      Significant Labs:   All pertinent lab results from the last 24 hours have been reviewed. and   Recent Lab Results       12/01/20  0613   12/01/20  0612   11/30/20  1930   11/30/20  1022        Anion Gap   11 14       Aniso Slight           Baso # 0.05           Basophil % 0.4           BUN   33 31       Calcium   8.6 9.1       Chloride   112 110       CO2   19 19       Creatinine   1.9 2.1       Differential Method Automated           eGFR if    41 36       eGFR if non    35  Comment:  Calculation used to obtain the estimated glomerular filtration  rate (eGFR) is the CKD-EPI equation.    31  Comment:  Calculation used to obtain the estimated glomerular filtration  rate (eGFR) is the CKD-EPI equation.          Eos # 0.1           Eosinophil % 0.4           Glucose   113 123       Gran # (ANC) 10.6           Gran % 77.6           Hematocrit 38.7           Hemoglobin 12.4           Immature Grans (Abs) 0.07  Comment:  Mild elevation in immature granulocytes is non specific and   can be seen in a variety of conditions including stress response,   acute inflammation, trauma and  pregnancy. Correlation with other   laboratory and clinical findings is essential.             Immature Granulocytes 0.5           Lymph # 1.4           Lymph % 10.3           Magnesium   2.0         MCH 30.3           MCHC 32.0           MCV 95           Mono # 1.5           Mono % 10.8           MPV 14.0           nRBC 0           Ovalocytes Occasional           Platelets 110           Poik Slight           Potassium   4.6 4.4       RBC 4.09           RDW 14.4           SARS-CoV-2 RNA, Amplification, Qual       Negative  Comment:  This test utilizes isothermal nucleic acid amplification   technology to detect the SARS-CoV-2 RdRp nucleic acid segment.   The analytical sensitivity (limit of detection) is 125 genome   equivalents/mL.   A POSITIVE result implies infection with the SARS-CoV-2 virus;  the patient is presumed to be contagious.    A NEGATIVE result means that SARS-CoV-2 nucleic acids are not  present above the limit of detection. A NEGATIVE result should be   treated as presumptive. It does not rule out the possibility of   COVID-19 and should not be the sole basis for treatment decisions.   If COVID-19 is strongly suspected based on clinical and exposure   history, re-testing using an alternate molecular assay should be   considered.   This test is only for use under the Food and Drug   Administration s Emergency Use Authorization (EUA).   Commercial kits are provided by "[employer].   Performance characteristics of the EUA have been independently  verified by [facility] Department of  Pathology and Laboratory Medicine.   _________________________________________________________________  The ID NOW COVID-19 Letter of Authorization, along with the   authorized Fact Sheet for Healthcare Providers, the authorized Fact  Sheet for Patients, and authorized labeling are available on the FDA   website:  www.fda.gov/MedicalDevices/Safety/EmergencySituations/jeg996430.htm       Sodium   142 143        Stomatocytes Present           WBC 13.65                 Significant Imaging: Echocardiogram:   2D echo with color flow doppler: No results found for this or any previous visit. and Transthoracic echo (TTE) complete (Cupid Only):   Results for orders placed or performed during the hospital encounter of 11/27/20   Echo Color Flow Doppler? Yes; Bubble Contrast? No   Result Value Ref Range    BSA 2.3 m2    LA WIDTH 3.44 cm    LVIDd 4.19 3.5 - 6.0 cm    IVS 1.62 (A) 0.6 - 1.1 cm    Posterior Wall 1.56 (A) 0.6 - 1.1 cm    Ao root annulus 4.06 cm    LVIDs 2.57 2.1 - 4.0 cm    FS 39 28 - 44 %    LA volume 34.87 cm3    Sinus 4.17 cm    STJ 3.60 cm    Ascending aorta 4.10 cm    LV mass 272.46 g    LA size 3.36 cm    TAPSE 1.94 cm    Left Ventricle Relative Wall Thickness 0.74 cm    AV mean gradient 9 mmHg    AV valve area 1.78 cm2    AV Velocity Ratio 0.63     AV index (prosthetic) 0.65     MV valve area p 1/2 method 3.22 cm2    PV peak gradient 5.83 mmHg    E/A ratio 1.16     E wave decelartion time 235.35 msec    IVRT 72.66 msec    LVOT diameter 1.86 cm    LVOT area 2.7 cm2    LVOT peak yung 1.22 m/s    LVOT peak VTI 29.48 cm    Ao peak yung 1.94 m/s    Ao VTI 45.02 cm    RVOT peak yung 1.21 m/s    RVOT peak VTI 29.08 cm    LVOT stroke volume 80.06 cm3    AV peak gradient 15 mmHg    PV mean gradient 3.63 mmHg    MV Peak E Yung 0.87 m/s    TR Max Yung 4.40 m/s    MV stenosis pressure 1/2 time 68.25 ms    MV Peak A Yung 0.75 m/s    LV Systolic Volume 23.84 mL    LV Systolic Volume Index 10.8 mL/m2    LV Diastolic Volume 78.10 mL    LV Diastolic Volume Index 35.45 mL/m2    LA Volume Index 15.8 mL/m2    LV Mass Index 124 g/m2    RA Major Axis 3.67 cm    Left Atrium Minor Axis 3.52 cm    Left Atrium Major Axis 3.58 cm    Triscuspid Valve Regurgitation Peak Gradient 77 mmHg    RA Width 3.02 cm    Right Atrial Pressure (from IVC) 3 mmHg    TV rest pulmonary artery pressure 80 mmHg    Narrative    · There is moderate left  ventricular concentric hypertrophy.  · The left ventricle is normal in size with normal systolic function. The   estimated ejection fraction is 60%.  · Normal left ventricular diastolic function.  · Normal right ventricular size with normal right ventricular systolic   function.  · Mild aortic valve stenosis.  · Aortic valve area is 1.78 cm2; peak velocity is 1.94 m/s; mean gradient   is 9 mmHg.  · Moderate mitral regurgitation.  · Moderate tricuspid regurgitation.  · There is pulmonary hypertension.  · Normal central venous pressure (3 mmHg).  · The estimated PA systolic pressure is 80 mmHg.     Suggest re echo when patient is in NSR.

## 2020-12-01 NOTE — TELEPHONE ENCOUNTER
Spoke with patient to reinforce post implant restrictions   Sling to left arm - wear for 48 hours, then only at night for 6 weeks.   No lifting left elbow above shoulder height   No lifting over 5 pounds.   No driving for 1 week and for 4 weeks if patient uses left arm to make turns.   Patient may shower in 48 hours. Do not let water beam on site directly and no scrubbing area.  Repeated instructions several times.  Patient able to repeat instructions given.

## 2020-12-03 ENCOUNTER — PATIENT OUTREACH (OUTPATIENT)
Dept: ADMINISTRATIVE | Facility: CLINIC | Age: 70
End: 2020-12-03

## 2020-12-03 NOTE — TELEPHONE ENCOUNTER
C3 nurse attempted to contact patient. No answer. The following message was left for the patient to return the call:  Good afternoon  I am a nurse calling on behalf of [facility] from the Care Coordination Center.  This is a Transitional Care Call for Damon Yoo. When you have a moment please contact us at [phone] or [phone] Monday through Friday, between the hours of 8 am to 4 pm. We look forward to speaking with you. On behalf of [facility] have a nice day.    The patient has a scheduled appointment with Dr. Roa on 12/7/2020 @ 1522. Message sent to Physician staff.

## 2020-12-04 NOTE — PATIENT INSTRUCTIONS
Discharge Instructions for Pacemaker Implantation  You have had a procedure to insert a pacemaker. Once inside your body, this small electronic device helps keep your heart from beating too slowly. A pacemaker cant fix existing heart problems. But it can help you feel better and have more energy. As you recover, follow all of the instructions you are given, including those below.  Activity  · Dont drive until your doctor says its OK. Plan to have someone drive you home after the procedure.  · Follow the instructions you are given about limiting your activity.  · If you are fitted with an arm sling, keep your arm in the sling for as long as your doctor tells you to. Most often, the sling will be removed the following day though you may be instructed to sleep with it on for a period to prevent damage to the pacemaker while it's healing.  · Do not raise your arm on the incision side above shoulder level or stretch your arm behind your back for as long as directed by your doctor. This gives the leads a chance to secure themselves inside your heart.  · Ask your doctor when you can expect to return to work. Depending on the type of work you do, you may have restrictions until your cardiologist clears you for unrestricted activity.  · You can still exercise. Its good for your body and your heart. Talk with your doctor about an exercise plan and the types of exercise to minimize the risk of damaging your pacemaker.  Other precautions  · Follow your doctor's directions carefully for wound care. If there is a dressing, ask whether you should remove it or keep it on until your next visit. Never put any creams, lotions, or products like peroxide on an incision unless your doctor tells you to. Do not get the incision wet until your doctor says it's OK.  · Every day, take your temperature and check your incision for signs of infection (redness, swelling, drainage, or warmth). Do this for 7 days or as advised by your  doctor.  · Learn to take your own pulse. Keep a record of your results. Ask your doctor what pulse rate means you should call for medical attention.  · Before you receive any treatment, tell all healthcare providers (including your dentist) that you have a pacemaker.  · You will be given an ID card that contains information about your pacemaker. Always carry this card with you. You can show this card if your pacemaker sets off a metal detector. You should also show it to avoid screening with a hand-held security wand.  · Keep your cell phone away from your pacemaker. Dont carry the phone in your shirt pocket overlying the pacemaker, even when its turned off.  · Avoid strong magnets. Examples are those used in MRIs or in hand-held security wands. Some pacemakers are now safe to use with MRI scanners. Ask your doctor if you have such a pacemaker.  · Avoid strong electrical fields. Examples are those made by radio transmitting towers, ham radios, and heavy-duty electrical equipment.  · Avoid leaning over the open humphreys of a running car. A running engine creates an electrical field. Most household and yard appliances will not cause any problems. If you use any large power tools, such as an industrial , talk with your doctor.   Follow-up care  · See your cardiologist in the next 7 to 10 days. Call and make an appointment as soon as you get home.  · Make regular follow-up appointments with your doctor. He or she will check the pacemaker to make sure its working properly.  · Plan on having periodic check-ups with your healthcare provider to evaluate the battery life of your pacemaker. Depending on your device and how much your body uses the pacing functions of the pacemaker, you will need a new device generator implanted at some point, generally about every 5 to 7 years.  · Some pacemakers have a built-in antenna that can transmit information such as trouble alerts over the internet to your doctor. Ask your  doctor if your pacemaker is capable of remote monitoring.  When should I call my healthcare provider?  Call 911 if you have:  · Chest pain  · Severe trouble breathing     Call your healthcare provider right away if you have any of these:  · Dizziness  · Lack of energy  · Fainting spells  · Twitching chest muscles  · Rapid pulse or pounding heartbeat  · Shortness of breath  · Pain around your pacemaker  · Fever above 100.4°F (38°C) or other signs of infection (redness, swelling, drainage, or warmth at the incision site)  · Your incision is not healing or your incision separates or opens  · Hiccups that wont stop  · Redness, severe swelling, drainage, worsening pain, bleeding, or warmth at the incision site  · If your pacemaker generator feels loose or like it is wiggling in the pocket under the skin     © 5094-7560 [employer]. [address] [place] [address], [place] [address]. All rights reserved. This information is not intended as a substitute for professional medical care. Always follow your healthcare professional's instructions.

## 2020-12-07 ENCOUNTER — CLINICAL SUPPORT (OUTPATIENT)
Dept: CARDIOLOGY | Facility: HOSPITAL | Age: 70
End: 2020-12-07
Attending: INTERNAL MEDICINE
Payer: MEDICARE

## 2020-12-07 ENCOUNTER — OFFICE VISIT (OUTPATIENT)
Dept: CARDIOLOGY | Facility: CLINIC | Age: 70
End: 2020-12-07
Payer: MEDICARE

## 2020-12-07 VITALS
DIASTOLIC BLOOD PRESSURE: 76 MMHG | WEIGHT: 248.88 LBS | SYSTOLIC BLOOD PRESSURE: 98 MMHG | BODY MASS INDEX: 40.17 KG/M2 | OXYGEN SATURATION: 97 % | HEART RATE: 78 BPM

## 2020-12-07 DIAGNOSIS — R00.1 BRADYCARDIA: ICD-10-CM

## 2020-12-07 DIAGNOSIS — I44.2 COMPLETE HEART BLOCK: Primary | ICD-10-CM

## 2020-12-07 DIAGNOSIS — Z95.0 CARDIAC PACEMAKER IN SITU: ICD-10-CM

## 2020-12-07 DIAGNOSIS — Z95.0 PACEMAKER: ICD-10-CM

## 2020-12-07 DIAGNOSIS — S76.111A STRAIN OF RIGHT QUADRICEPS, INITIAL ENCOUNTER: ICD-10-CM

## 2020-12-07 DIAGNOSIS — I25.810 CORONARY ARTERY DISEASE INVOLVING CORONARY BYPASS GRAFT OF NATIVE HEART WITHOUT ANGINA PECTORIS: ICD-10-CM

## 2020-12-07 DIAGNOSIS — I44.2 COMPLETE HEART BLOCK: ICD-10-CM

## 2020-12-07 DIAGNOSIS — I10 ESSENTIAL HYPERTENSION: ICD-10-CM

## 2020-12-07 PROCEDURE — 93280 PM DEVICE PROGR EVAL DUAL: CPT | Mod: 26,,, | Performed by: INTERNAL MEDICINE

## 2020-12-07 PROCEDURE — 99999 PR PBB SHADOW E&M-EST. PATIENT-LVL II: ICD-10-PCS | Mod: PBBFAC,,,

## 2020-12-07 PROCEDURE — 99999 PR PBB SHADOW E&M-EST. PATIENT-LVL III: ICD-10-PCS | Mod: PBBFAC,,, | Performed by: INTERNAL MEDICINE

## 2020-12-07 PROCEDURE — 93280 PM DEVICE PROGR EVAL DUAL: CPT

## 2020-12-07 PROCEDURE — 99999 PR PBB SHADOW E&M-EST. PATIENT-LVL II: CPT | Mod: PBBFAC,,,

## 2020-12-07 PROCEDURE — 99024 PR POST-OP FOLLOW-UP VISIT: ICD-10-PCS | Mod: S$PBB,,, | Performed by: INTERNAL MEDICINE

## 2020-12-07 PROCEDURE — 99212 OFFICE O/P EST SF 10 MIN: CPT | Mod: PBBFAC,25,27

## 2020-12-07 PROCEDURE — 93280 CARDIAC DEVICE CHECK - IN CLINIC & HOSPITAL: ICD-10-PCS | Mod: 26,,, | Performed by: INTERNAL MEDICINE

## 2020-12-07 PROCEDURE — 99213 OFFICE O/P EST LOW 20 MIN: CPT | Mod: PBBFAC,25 | Performed by: INTERNAL MEDICINE

## 2020-12-07 PROCEDURE — 99999 PR PBB SHADOW E&M-EST. PATIENT-LVL III: CPT | Mod: PBBFAC,,, | Performed by: INTERNAL MEDICINE

## 2020-12-07 PROCEDURE — 99024 POSTOP FOLLOW-UP VISIT: CPT | Mod: S$PBB,,, | Performed by: INTERNAL MEDICINE

## 2020-12-07 RX ORDER — TIZANIDINE 4 MG/1
4 TABLET ORAL EVERY 8 HOURS PRN
Qty: 30 TABLET | Refills: 3 | Status: SHIPPED | OUTPATIENT
Start: 2020-12-07 | End: 2021-12-22 | Stop reason: SDUPTHER

## 2020-12-07 RX ORDER — AMLODIPINE BESYLATE 5 MG/1
5 TABLET ORAL DAILY
Qty: 90 TABLET | Refills: 3 | Status: SHIPPED | OUTPATIENT
Start: 2020-12-07 | End: 2021-02-05

## 2020-12-07 NOTE — PROGRESS NOTES
Subjective:   Patient ID:  Damon Yoo is a 70 y.o. male who presents for evaluation of No chief complaint on file.      71 yo male, pmhx below, CAD s/p CABG, HTN, HLP, Pre diabetes (last A1C 6.2), concentric remodeling on echo, comes in for follow up recent hospital admission for CHB, AV dissociation that required TVP placement while in the hospital , with persistence depsite holding toprol off for 48 hours, upon PPM implant he had sinus tachycardia 100 abpm with less than 50 vbpm   He comes in for follow up , PPM site look healing well, no infection, no hematoma, no bleeding, no syncope, feels great after the pacemaker states his breathing is better, no more dyspnea, no more fatigue denies any other complaints except for right upper back muscle spasm   On interrogation, he was 70% apaced with 99%  with good parameters       Past Medical History:   Diagnosis Date    Abnormal EKG 10/25/2013    Acute on chronic diastolic CHF (congestive heart failure) 11/27/2020    Arthritis     CAD (coronary artery disease)     Cancer     Prostate [id number] prostate    Glaucoma     Gout attack     Hyperlipidemia     Hypertension     Hypertrophic cardiomyopathy 10/25/2013    S/P CABG (coronary artery bypass graft) 10/25/2013       Past Surgical History:   Procedure Laterality Date    A-V CARDIAC PACEMAKER INSERTION Left 11/30/2020    Procedure: INSERTION, CARDIAC PACEMAKER, DUAL CHAMBER;  Surgeon: Elsy Kenyon MD;  Location: [facility] CATH LAB;  Service: Cardiology;  Laterality: Left;  biotronik    COLONOSCOPY N/A 12/4/2017    Procedure: COLONOSCOPY;  Surgeon: Dina Ledesma MD;  Location: [facility] ENDO;  Service: Endoscopy;  Laterality: N/A;    CORONARY ARTERY BYPASS GRAFT  05/2003    x1    PROSTATE SURGERY      RALP 2013    TRANSFORAMINAL EPIDURAL INJECTION OF STEROID Right 11/11/2019    Procedure: Right L5/S1+ S1 TF NAHUN with IV sedation;  Surgeon: Ermias Mario MD;  Location: [facility] PAIN MGT;  Service: Pain  Management;  Laterality: Right;       Social History     Tobacco Use    Smoking status: Former Smoker     Packs/day: 0.25     Years: 15.00     Pack years: 3.75     Quit date: 1988     Years since quittin.6    Smokeless tobacco: Never Used   Substance Use Topics    Alcohol use: No     Frequency: Never     Drinks per session: Patient refused     Binge frequency: Never    Drug use: No       Family History   Problem Relation Age of Onset    Hypertension Father     Strabismus Neg Hx     Retinal detachment Neg Hx     Macular degeneration Neg Hx     Glaucoma Neg Hx     Blindness Neg Hx     Amblyopia Neg Hx        Review of Systems   Constitution: Negative for fever and malaise/fatigue.   HENT: Negative for sore throat.    Eyes: Negative for blurred vision.   Cardiovascular: Negative for chest pain, claudication, cyanosis, dyspnea on exertion, irregular heartbeat, leg swelling, near-syncope, orthopnea, palpitations, paroxysmal nocturnal dyspnea and syncope.   Respiratory: Negative for cough, hemoptysis and shortness of breath.    Hematologic/Lymphatic: Negative for bleeding problem.   Skin: Negative for poor wound healing and rash.   Musculoskeletal: Negative for back pain and falls.   Gastrointestinal: Negative for abdominal pain.   Genitourinary: Negative for nocturia.   Neurological: Negative for dizziness, focal weakness, headaches, light-headedness and loss of balance.   Psychiatric/Behavioral: Negative for altered mental status and substance abuse.       Current Outpatient Medications on File Prior to Visit   Medication Sig    acetaminophen (TYLENOL) 500 MG tablet Take 500 mg by mouth every 6 (six) hours as needed for Pain.    allopurinoL (ZYLOPRIM) 300 MG tablet TAKE 1 1/2 TABLET BY MOUTH DAILY    aspirin 81 MG chewable tablet Take 1 tablet by mouth Daily.    celecoxib (CELEBREX) 200 MG capsule Take 1 capsule (200 mg total) by mouth once daily. Take with food    fish oil-fat acid  [id number] (OMEGA 3-6-9) 1,200 mg Cap Take 1 capsule by mouth once daily.     flunisolide 25 mcg, 0.025%, (NASALIDE) 25 mcg (0.025 %) Spry USE 2 SPRAYS IN EACH NOSTRIL TWICE DAILY    latanoprost 0.005 % ophthalmic solution Place 1 drop into both eyes every evening.    lisinopriL (PRINIVIL,ZESTRIL) 40 MG tablet Take 1 tablet (40 mg total) by mouth once daily.    metoprolol succinate (TOPROL-XL) 100 MG 24 hr tablet Take 1 tablet (100 mg total) by mouth once daily.    pantoprazole (PROTONIX) 40 MG tablet Take 1 tablet (40 mg total) by mouth once daily.    rosuvastatin (CRESTOR) 40 MG Tab Take 1 tablet (40 mg total) by mouth every evening. Generic is fine.    timolol maleate 0.5% (TIMOPTIC) 0.5 % Drop Place 1 drop into both eyes every morning.    [DISCONTINUED] amLODIPine (NORVASC) 10 MG tablet Take 1 tablet (10 mg total) by mouth once daily.    [DISCONTINUED] tiZANidine (ZANAFLEX) 4 MG tablet Take 1 tablet (4 mg total) by mouth every 8 (eight) hours.    [] cephALEXin (KEFLEX) 500 MG capsule Take 1 capsule (500 mg total) by mouth every 12 (twelve) hours. for 5 days    diclofenac sodium (VOLTAREN) 1 % Gel Apply 2 g topically once daily. (Patient not taking: Reported on 2020)     No current facility-administered medications on file prior to visit.        Objective:   Objective:  Wt Readings from Last 3 Encounters:   20 112.9 kg (248 lb 14.4 oz)   20 112.8 kg (248 lb 10.9 oz)   20 111.1 kg (245 lb)     Temp Readings from Last 3 Encounters:   20 97.2 °F (36.2 °C) (Oral)   20 98.5 °F (36.9 °C) (Oral)   20 98.5 °F (36.9 °C) (Oral)     BP Readings from Last 3 Encounters:   20 98/76   20 122/61   11/27/20 (!) 133/57     Pulse Readings from Last 3 Encounters:   20 78   20 91   20 (!) 40       Physical Exam   Constitutional: He is oriented to person, place, and time. He appears well-developed and well-nourished.   HENT:   Head:  Normocephalic and atraumatic.   Eyes: Conjunctivae are normal. No scleral icterus.   Neck: Normal range of motion. Neck supple.   Cardiovascular: Normal rate, regular rhythm, normal heart sounds and intact distal pulses.   No murmur heard.  Wound healing nice   Pulmonary/Chest: No respiratory distress. He has no wheezes. He has no rales. He exhibits no tenderness.   Abdominal: Soft. Bowel sounds are normal. He exhibits no distension. There is no guarding.   Musculoskeletal: Normal range of motion.   Neurological: He is alert and oriented to person, place, and time.   Skin: Skin is warm.   Psychiatric: He has a normal mood and affect.   Vitals reviewed.      Lab Results   Component Value Date    CHOL 127 07/23/2020    CHOL 158 01/15/2020    CHOL 130 11/29/2019     Lab Results   Component Value Date    HDL 25 (L) 07/23/2020    HDL 32 (L) 01/15/2020    HDL 33 (L) 11/29/2019     Lab Results   Component Value Date    LDLCALC 75.4 07/23/2020    LDLCALC 99.2 01/15/2020    LDLCALC 80.0 11/29/2019     Lab Results   Component Value Date    TRIG 133 07/23/2020    TRIG 134 01/15/2020    TRIG 85 11/29/2019     Lab Results   Component Value Date    CHOLHDL 19.7 (L) 07/23/2020    CHOLHDL 20.3 01/15/2020    CHOLHDL 25.4 11/29/2019       Chemistry        Component Value Date/Time     12/01/2020 0612    K 4.6 12/01/2020 0612     (H) 12/01/2020 0612    CO2 19 (L) 12/01/2020 0612    BUN 33 (H) 12/01/2020 0612    CREATININE 1.9 (H) 12/01/2020 0612     (H) 12/01/2020 0612        Component Value Date/Time    CALCIUM 8.6 (L) 12/01/2020 0612    ALKPHOS 75 11/28/2020 0506    AST 21 11/28/2020 0506    ALT 18 11/28/2020 0506    BILITOT 0.6 11/28/2020 0506    ESTGFRAFRICA 41 (A) 12/01/2020 0612    EGFRNONAA 35 (A) 12/01/2020 0612          Lab Results   Component Value Date    TSH 1.367 08/16/2014     Lab Results   Component Value Date    INR 1.0 11/27/2020    INR 1.0 06/26/2010     Lab Results   Component Value Date    WBC  13.65 (H) 12/01/2020    HGB 12.4 (L) 12/01/2020    HCT 38.7 (L) 12/01/2020    MCV 95 12/01/2020     (L) 12/01/2020     BNP  @LABRCNTIP(BNP,BNPTRIAGEBLO)@  Estimated Creatinine Clearance: 43.9 mL/min (A) (based on SCr of 1.9 mg/dL (H)).     Imaging:  ======  Results for orders placed during the hospital encounter of 11/27/20   Echo Color Flow Doppler? Yes; Bubble Contrast? No    Narrative · There is moderate left ventricular concentric hypertrophy.  · The left ventricle is normal in size with normal systolic function. The   estimated ejection fraction is 60%.  · Normal left ventricular diastolic function.  · Normal right ventricular size with normal right ventricular systolic   function.  · Mild aortic valve stenosis.  · Aortic valve area is 1.78 cm2; peak velocity is 1.94 m/s; mean gradient   is 9 mmHg.  · Moderate mitral regurgitation.  · Moderate tricuspid regurgitation.  · There is pulmonary hypertension.  · Normal central venous pressure (3 mmHg).  · The estimated PA systolic pressure is 80 mmHg.     Suggest re echo when patient is in NSR.        No results found for this or any previous visit.  No results found for this or any previous visit.  Results for orders placed in visit on 03/18/13   X-Ray Chest PA And Lateral    Narrative DATE OF EXAM: Mar 20 2013      [facility]   0190  -  CHEST 2 VIEWS FRONTAL   LAT:   \  [id number]     CLINICAL HISTORY:   \V72.84  PREOP EXAMINATION NOS     PROCEDURE COMMENT:   \     ICD 9 CODE(S):   (\)     CPT 4 CODE(S)/MODIFIER(S):   (\)     Findings: No prior radiographs for comparison.  Post CABG changes are   noted.  Heart size is normal.  Mild degenerative change in the thoracic   spine.  No acute consolidation seen in the lungs.  No pleural effusions.        Impression: No active disease.        Electronically signed by: EUGENE ALCARAZ MD  Date:     03/20/13  Time:    13:11            : GADIEL  Transcribe Date/Time: Mar 20 2013  1:11P  Dictated by : EUGENE ALCARAZ  MD  Report reviewed by:   Read On:   \  Images were reviewed, findings were verified and document was   electronically  SIGNED BY: EUGENE ALCARAZ MD On: Mar 20 2013  1:11P        No results found for this or any previous visit.  No procedure found.    Diagnostic Results:  ECG: Reviewed    The ASCVD Risk score (Marthaysabel TOLLIVER Jr., et al., 2013) failed to calculate for the following reasons:    The valid total cholesterol range is 130 to 320 mg/dL    Assessment and Plan:   Complete heart block  Comments:  s/p dual chamber pacemaker   Orders:  -     EKG 12-lead; Future    Essential hypertension  -     amLODIPine (NORVASC) 5 MG tablet; Take 1 tablet (5 mg total) by mouth once daily.  Dispense: 90 tablet; Refill: 3    Coronary artery disease involving coronary bypass graft of native heart without angina pectoris  -     amLODIPine (NORVASC) 5 MG tablet; Take 1 tablet (5 mg total) by mouth once daily.  Dispense: 90 tablet; Refill: 3    Pacemaker  -     EKG 12-lead; Future    right upper back muscle spasm  -     tiZANidine (ZANAFLEX) 4 MG tablet; Take 1 tablet (4 mg total) by mouth every 8 (eight) hours as needed.  Dispense: 30 tablet; Refill: 3    decreasing norvasc from 10 to 5 mg given borderline BP   cw other meds   Stable on aspirin   Follow up in 3months

## 2020-12-13 LAB
AV DELAY - LONGEST: 180 MSEC
AV DELAY - SHORTEST: 140 MSEC
IMPEDANCE RA LEAD (NATIVE): 643 OHMS
IMPEDANCE RA LEAD: 448 OHMS
OHS CV DC PP MS1: 0.4 MS
OHS CV DC PP MS2: 0.4 MS
OHS CV DC PP V1: 1.8 V
OHS CV DC PP V2: 1.2 V
P/R-WAVE RA LEAD (NATIVE): 14 MV
P/R-WAVE RA LEAD: 9.1 MV
THRESHOLD MS RA LEAD (NATIVE): 0.4 MS
THRESHOLD MS RA LEAD: 0.4 MS
THRESHOLD V RA LEAD (NATIVE): 0.8 V
THRESHOLD V RA LEAD: 0.8 V

## 2020-12-22 ENCOUNTER — PATIENT MESSAGE (OUTPATIENT)
Dept: RHEUMATOLOGY | Facility: CLINIC | Age: 70
End: 2020-12-22

## 2020-12-22 DIAGNOSIS — Z51.81 MEDICATION MONITORING ENCOUNTER: ICD-10-CM

## 2020-12-22 DIAGNOSIS — M1A.39X0 CHRONIC GOUT DUE TO RENAL IMPAIRMENT OF MULTIPLE SITES WITHOUT TOPHUS: Primary | ICD-10-CM

## 2020-12-22 RX ORDER — ALLOPURINOL 300 MG/1
450 TABLET ORAL DAILY
Qty: 135 TABLET | Refills: 0 | Status: SHIPPED | OUTPATIENT
Start: 2020-12-22 | End: 2021-02-19 | Stop reason: SDUPTHER

## 2020-12-22 NOTE — TELEPHONE ENCOUNTER
Patient aware he has not been since since 6/2019. No additional refills will be given without an appt.    Scheduled him for 2/19/2021 with Alethea in [facility]

## 2020-12-29 ENCOUNTER — TELEPHONE (OUTPATIENT)
Dept: RHEUMATOLOGY | Facility: CLINIC | Age: 70
End: 2020-12-29

## 2020-12-29 NOTE — TELEPHONE ENCOUNTER
Spoke with pt and he is going to keep his 2.19.21 appointment in [facility] with Alethea.

## 2020-12-29 NOTE — TELEPHONE ENCOUNTER
----- Message from Annette Schulz sent at 12/29/2020 10:48 AM CST -----  Contact: [phone]/SELF  Type:  Sooner Apoointment Request    Caller is requesting a sooner appointment.  Caller declined first available appointment listed below.  Caller will not accept being placed on the waitlist and is requesting a message be sent to doctor.  Name of Caller:Patient  When is the first available appointment?03-18-21  Symptoms:Medications and follow Up  Would the patient rather a call back or a response via [facility]? Call Back  Best Call Back Number:[phone]  Additional Information: Patient is calling regarding his medications and to make an appointment    Thanks

## 2021-01-08 DIAGNOSIS — I10 ESSENTIAL HYPERTENSION: ICD-10-CM

## 2021-01-11 RX ORDER — METOPROLOL SUCCINATE 100 MG/1
100 TABLET, EXTENDED RELEASE ORAL DAILY
Qty: 90 TABLET | Refills: 3 | Status: SHIPPED | OUTPATIENT
Start: 2021-01-11 | End: 2021-02-05 | Stop reason: SDUPTHER

## 2021-01-21 ENCOUNTER — OFFICE VISIT (OUTPATIENT)
Dept: ORTHOPEDICS | Facility: CLINIC | Age: 71
End: 2021-01-21
Payer: MEDICARE

## 2021-01-21 VITALS — WEIGHT: 250.25 LBS | BODY MASS INDEX: 40.22 KG/M2 | HEIGHT: 66 IN

## 2021-01-21 DIAGNOSIS — M54.17 LUMBOSACRAL RADICULOPATHY: ICD-10-CM

## 2021-01-21 DIAGNOSIS — M54.16 SPINAL STENOSIS OF LUMBAR REGION WITH RADICULOPATHY: ICD-10-CM

## 2021-01-21 DIAGNOSIS — M21.162 ACQUIRED VARUS DEFORMITY KNEE, LEFT: ICD-10-CM

## 2021-01-21 DIAGNOSIS — M21.161 ACQUIRED VARUS DEFORMITY KNEE, RIGHT: ICD-10-CM

## 2021-01-21 DIAGNOSIS — M48.061 SPINAL STENOSIS OF LUMBAR REGION WITH RADICULOPATHY: ICD-10-CM

## 2021-01-21 DIAGNOSIS — M17.11 ARTHRITIS OF KNEE, RIGHT: ICD-10-CM

## 2021-01-21 DIAGNOSIS — M17.12 ARTHRITIS OF KNEE, LEFT: Primary | ICD-10-CM

## 2021-01-21 PROCEDURE — 99999 PR PBB SHADOW E&M-EST. PATIENT-LVL IV: ICD-10-PCS | Mod: PBBFAC,,, | Performed by: ORTHOPAEDIC SURGERY

## 2021-01-21 PROCEDURE — 99214 OFFICE O/P EST MOD 30 MIN: CPT | Mod: PBBFAC | Performed by: ORTHOPAEDIC SURGERY

## 2021-01-21 PROCEDURE — 99214 PR OFFICE/OUTPT VISIT, EST, LEVL IV, 30-39 MIN: ICD-10-PCS | Mod: S$PBB,,, | Performed by: ORTHOPAEDIC SURGERY

## 2021-01-21 PROCEDURE — 99214 OFFICE O/P EST MOD 30 MIN: CPT | Mod: S$PBB,,, | Performed by: ORTHOPAEDIC SURGERY

## 2021-01-21 PROCEDURE — 99999 PR PBB SHADOW E&M-EST. PATIENT-LVL IV: CPT | Mod: PBBFAC,,, | Performed by: ORTHOPAEDIC SURGERY

## 2021-01-28 ENCOUNTER — LAB VISIT (OUTPATIENT)
Dept: LAB | Facility: HOSPITAL | Age: 71
End: 2021-01-28
Attending: NURSE PRACTITIONER
Payer: MEDICARE

## 2021-01-28 DIAGNOSIS — N18.30 CHRONIC KIDNEY DISEASE, STAGE III (MODERATE): ICD-10-CM

## 2021-01-28 DIAGNOSIS — R73.03 PREDIABETES: ICD-10-CM

## 2021-01-28 DIAGNOSIS — E78.5 HYPERLIPIDEMIA, UNSPECIFIED HYPERLIPIDEMIA TYPE: ICD-10-CM

## 2021-01-28 LAB
ALBUMIN SERPL BCP-MCNC: 3.4 G/DL (ref 3.5–5.2)
ALP SERPL-CCNC: 106 U/L (ref 55–135)
ALT SERPL W/O P-5'-P-CCNC: 23 U/L (ref 10–44)
ANION GAP SERPL CALC-SCNC: 8 MMOL/L (ref 8–16)
AST SERPL-CCNC: 26 U/L (ref 10–40)
BILIRUB SERPL-MCNC: 0.5 MG/DL (ref 0.1–1)
BUN SERPL-MCNC: 23 MG/DL (ref 8–23)
CALCIUM SERPL-MCNC: 9 MG/DL (ref 8.7–10.5)
CHLORIDE SERPL-SCNC: 111 MMOL/L (ref 95–110)
CHOLEST SERPL-MCNC: 119 MG/DL (ref 120–199)
CHOLEST/HDLC SERPL: 4 {RATIO} (ref 2–5)
CO2 SERPL-SCNC: 24 MMOL/L (ref 23–29)
CREAT SERPL-MCNC: 1.3 MG/DL (ref 0.5–1.4)
EST. GFR  (AFRICAN AMERICAN): >60 ML/MIN/1.73 M^2
EST. GFR  (NON AFRICAN AMERICAN): 56.1 ML/MIN/1.73 M^2
ESTIMATED AVG GLUCOSE: 126 MG/DL (ref 68–131)
GLUCOSE SERPL-MCNC: 109 MG/DL (ref 70–110)
HBA1C MFR BLD: 6 % (ref 4–5.6)
HDLC SERPL-MCNC: 30 MG/DL (ref 40–75)
HDLC SERPL: 25.2 % (ref 20–50)
LDLC SERPL CALC-MCNC: 69.4 MG/DL (ref 63–159)
NONHDLC SERPL-MCNC: 89 MG/DL
POTASSIUM SERPL-SCNC: 4.7 MMOL/L (ref 3.5–5.1)
PROT SERPL-MCNC: 7.3 G/DL (ref 6–8.4)
SODIUM SERPL-SCNC: 143 MMOL/L (ref 136–145)
TRIGL SERPL-MCNC: 98 MG/DL (ref 30–150)

## 2021-01-28 PROCEDURE — 80053 COMPREHEN METABOLIC PANEL: CPT

## 2021-01-28 PROCEDURE — 80061 LIPID PANEL: CPT

## 2021-01-28 PROCEDURE — 83036 HEMOGLOBIN GLYCOSYLATED A1C: CPT

## 2021-01-28 PROCEDURE — 36415 COLL VENOUS BLD VENIPUNCTURE: CPT

## 2021-02-05 ENCOUNTER — OFFICE VISIT (OUTPATIENT)
Dept: INTERNAL MEDICINE | Facility: CLINIC | Age: 71
End: 2021-02-05
Payer: MEDICARE

## 2021-02-05 VITALS
TEMPERATURE: 97 F | BODY MASS INDEX: 40.39 KG/M2 | HEART RATE: 68 BPM | OXYGEN SATURATION: 96 % | SYSTOLIC BLOOD PRESSURE: 140 MMHG | WEIGHT: 251.31 LBS | RESPIRATION RATE: 18 BRPM | DIASTOLIC BLOOD PRESSURE: 86 MMHG | HEIGHT: 66 IN

## 2021-02-05 DIAGNOSIS — R73.09 ELEVATED GLUCOSE: ICD-10-CM

## 2021-02-05 DIAGNOSIS — Z95.1 S/P CABG (CORONARY ARTERY BYPASS GRAFT): ICD-10-CM

## 2021-02-05 DIAGNOSIS — I50.32 CHRONIC DIASTOLIC CHF (CONGESTIVE HEART FAILURE): Chronic | ICD-10-CM

## 2021-02-05 DIAGNOSIS — I42.2 HYPERTROPHIC CARDIOMYOPATHY: ICD-10-CM

## 2021-02-05 DIAGNOSIS — I10 ESSENTIAL HYPERTENSION: ICD-10-CM

## 2021-02-05 DIAGNOSIS — I25.810 CORONARY ARTERY DISEASE INVOLVING CORONARY BYPASS GRAFT OF NATIVE HEART WITHOUT ANGINA PECTORIS: ICD-10-CM

## 2021-02-05 DIAGNOSIS — I25.10 CORONARY ARTERY DISEASE INVOLVING NATIVE CORONARY ARTERY OF NATIVE HEART WITHOUT ANGINA PECTORIS: ICD-10-CM

## 2021-02-05 DIAGNOSIS — N18.30 STAGE 3 CHRONIC KIDNEY DISEASE, UNSPECIFIED WHETHER STAGE 3A OR 3B CKD: ICD-10-CM

## 2021-02-05 DIAGNOSIS — M1A.39X0 CHRONIC GOUT DUE TO RENAL IMPAIRMENT OF MULTIPLE SITES WITHOUT TOPHUS: ICD-10-CM

## 2021-02-05 DIAGNOSIS — E78.5 HYPERLIPIDEMIA, UNSPECIFIED HYPERLIPIDEMIA TYPE: Primary | Chronic | ICD-10-CM

## 2021-02-05 DIAGNOSIS — Z85.46 HISTORY OF PROSTATE CANCER: ICD-10-CM

## 2021-02-05 DIAGNOSIS — E66.01 OBESITY, CLASS III, BMI 40-49.9 (MORBID OBESITY): Chronic | ICD-10-CM

## 2021-02-05 PROCEDURE — 99214 OFFICE O/P EST MOD 30 MIN: CPT | Mod: PBBFAC | Performed by: PEDIATRICS

## 2021-02-05 PROCEDURE — 99214 OFFICE O/P EST MOD 30 MIN: CPT | Mod: S$PBB,,, | Performed by: PEDIATRICS

## 2021-02-05 PROCEDURE — 99214 PR OFFICE/OUTPT VISIT, EST, LEVL IV, 30-39 MIN: ICD-10-PCS | Mod: S$PBB,,, | Performed by: PEDIATRICS

## 2021-02-05 PROCEDURE — 99999 PR PBB SHADOW E&M-EST. PATIENT-LVL IV: ICD-10-PCS | Mod: PBBFAC,,, | Performed by: PEDIATRICS

## 2021-02-05 PROCEDURE — 99999 PR PBB SHADOW E&M-EST. PATIENT-LVL IV: CPT | Mod: PBBFAC,,, | Performed by: PEDIATRICS

## 2021-02-05 RX ORDER — METOPROLOL SUCCINATE 100 MG/1
100 TABLET, EXTENDED RELEASE ORAL DAILY
Qty: 90 TABLET | Refills: 3 | Status: SHIPPED | OUTPATIENT
Start: 2021-02-05 | End: 2021-03-08

## 2021-02-05 RX ORDER — AMLODIPINE BESYLATE 10 MG/1
10 TABLET ORAL DAILY
Qty: 90 TABLET | Refills: 3 | Status: SHIPPED | OUTPATIENT
Start: 2021-02-05 | End: 2021-11-10 | Stop reason: SDUPTHER

## 2021-02-19 ENCOUNTER — OFFICE VISIT (OUTPATIENT)
Dept: RHEUMATOLOGY | Facility: CLINIC | Age: 71
End: 2021-02-19
Payer: MEDICARE

## 2021-02-19 ENCOUNTER — LAB VISIT (OUTPATIENT)
Dept: LAB | Facility: HOSPITAL | Age: 71
End: 2021-02-19
Attending: PEDIATRICS
Payer: MEDICARE

## 2021-02-19 VITALS
HEART RATE: 65 BPM | BODY MASS INDEX: 41.45 KG/M2 | WEIGHT: 257.94 LBS | HEIGHT: 66 IN | DIASTOLIC BLOOD PRESSURE: 74 MMHG | SYSTOLIC BLOOD PRESSURE: 147 MMHG

## 2021-02-19 DIAGNOSIS — Z51.81 MEDICATION MONITORING ENCOUNTER: ICD-10-CM

## 2021-02-19 DIAGNOSIS — M17.12 PRIMARY OSTEOARTHRITIS OF LEFT KNEE: ICD-10-CM

## 2021-02-19 DIAGNOSIS — M1A.39X0 CHRONIC GOUT DUE TO RENAL IMPAIRMENT OF MULTIPLE SITES WITHOUT TOPHUS: Primary | ICD-10-CM

## 2021-02-19 DIAGNOSIS — N18.31 STAGE 3A CHRONIC KIDNEY DISEASE: ICD-10-CM

## 2021-02-19 DIAGNOSIS — M1A.39X0 CHRONIC GOUT DUE TO RENAL IMPAIRMENT OF MULTIPLE SITES WITHOUT TOPHUS: ICD-10-CM

## 2021-02-19 LAB
ALBUMIN SERPL BCP-MCNC: 3.7 G/DL (ref 3.5–5.2)
ALP SERPL-CCNC: 95 U/L (ref 55–135)
ALT SERPL W/O P-5'-P-CCNC: 29 U/L (ref 10–44)
ANION GAP SERPL CALC-SCNC: 11 MMOL/L (ref 8–16)
AST SERPL-CCNC: 27 U/L (ref 10–40)
BILIRUB SERPL-MCNC: 0.6 MG/DL (ref 0.1–1)
BUN SERPL-MCNC: 20 MG/DL (ref 8–23)
CALCIUM SERPL-MCNC: 9.4 MG/DL (ref 8.7–10.5)
CHLORIDE SERPL-SCNC: 110 MMOL/L (ref 95–110)
CO2 SERPL-SCNC: 24 MMOL/L (ref 23–29)
CREAT SERPL-MCNC: 1.4 MG/DL (ref 0.5–1.4)
EST. GFR  (AFRICAN AMERICAN): 59.3 ML/MIN/1.73 M^2
EST. GFR  (NON AFRICAN AMERICAN): 51.3 ML/MIN/1.73 M^2
GLUCOSE SERPL-MCNC: 117 MG/DL (ref 70–110)
POTASSIUM SERPL-SCNC: 4.4 MMOL/L (ref 3.5–5.1)
PROT SERPL-MCNC: 7.7 G/DL (ref 6–8.4)
SODIUM SERPL-SCNC: 145 MMOL/L (ref 136–145)
URATE SERPL-MCNC: 5 MG/DL (ref 3.4–7)

## 2021-02-19 PROCEDURE — 99999 PR PBB SHADOW E&M-EST. PATIENT-LVL IV: ICD-10-PCS | Mod: PBBFAC,,, | Performed by: PHYSICIAN ASSISTANT

## 2021-02-19 PROCEDURE — 99214 OFFICE O/P EST MOD 30 MIN: CPT | Mod: PBBFAC,PO | Performed by: PHYSICIAN ASSISTANT

## 2021-02-19 PROCEDURE — 84550 ASSAY OF BLOOD/URIC ACID: CPT | Mod: PO

## 2021-02-19 PROCEDURE — 99214 PR OFFICE/OUTPT VISIT, EST, LEVL IV, 30-39 MIN: ICD-10-PCS | Mod: S$PBB,,, | Performed by: PHYSICIAN ASSISTANT

## 2021-02-19 PROCEDURE — 80053 COMPREHEN METABOLIC PANEL: CPT | Mod: PO

## 2021-02-19 PROCEDURE — 99999 PR PBB SHADOW E&M-EST. PATIENT-LVL IV: CPT | Mod: PBBFAC,,, | Performed by: PHYSICIAN ASSISTANT

## 2021-02-19 PROCEDURE — 36415 COLL VENOUS BLD VENIPUNCTURE: CPT | Mod: PO

## 2021-02-19 PROCEDURE — 99214 OFFICE O/P EST MOD 30 MIN: CPT | Mod: S$PBB,,, | Performed by: PHYSICIAN ASSISTANT

## 2021-02-19 RX ORDER — ALLOPURINOL 300 MG/1
450 TABLET ORAL DAILY
Qty: 135 TABLET | Refills: 3 | Status: SHIPPED | OUTPATIENT
Start: 2021-02-19 | End: 2023-08-16

## 2021-02-19 RX ORDER — COLCHICINE 0.6 MG/1
1 CAPSULE ORAL 2 TIMES DAILY PRN
Qty: 30 CAPSULE | Refills: 1 | OUTPATIENT
Start: 2021-02-19 | End: 2021-02-19 | Stop reason: SDUPTHER

## 2021-02-19 RX ORDER — ALLOPURINOL 300 MG/1
450 TABLET ORAL DAILY
Qty: 135 TABLET | Refills: 3 | OUTPATIENT
Start: 2021-02-19 | End: 2021-02-19 | Stop reason: SDUPTHER

## 2021-02-19 RX ORDER — COLCHICINE 0.6 MG/1
1 CAPSULE ORAL 2 TIMES DAILY PRN
Qty: 30 CAPSULE | Refills: 1 | Status: SHIPPED | OUTPATIENT
Start: 2021-02-19

## 2021-02-24 ENCOUNTER — PATIENT OUTREACH (OUTPATIENT)
Dept: ADMINISTRATIVE | Facility: OTHER | Age: 71
End: 2021-02-24

## 2021-02-25 ENCOUNTER — APPOINTMENT (OUTPATIENT)
Dept: OPHTHALMOLOGY | Facility: CLINIC | Age: 71
End: 2021-02-25
Payer: MEDICARE

## 2021-02-25 ENCOUNTER — OFFICE VISIT (OUTPATIENT)
Dept: OPHTHALMOLOGY | Facility: CLINIC | Age: 71
End: 2021-02-25
Payer: MEDICARE

## 2021-02-25 DIAGNOSIS — H40.1132 PRIMARY OPEN ANGLE GLAUCOMA OF BOTH EYES, MODERATE STAGE: Primary | ICD-10-CM

## 2021-02-25 DIAGNOSIS — H25.13 NUCLEAR SENILE CATARACT OF BOTH EYES: ICD-10-CM

## 2021-02-25 PROCEDURE — 99213 OFFICE O/P EST LOW 20 MIN: CPT | Mod: PBBFAC,25 | Performed by: OPHTHALMOLOGY

## 2021-02-25 PROCEDURE — 99999 PR PBB SHADOW E&M-EST. PATIENT-LVL III: ICD-10-PCS | Mod: PBBFAC,,, | Performed by: OPHTHALMOLOGY

## 2021-02-25 PROCEDURE — 92083 HUMPHREY VISUAL FIELD - OU - BOTH EYES: ICD-10-PCS | Mod: 26,S$PBB,, | Performed by: OPHTHALMOLOGY

## 2021-02-25 PROCEDURE — 99999 PR PBB SHADOW E&M-EST. PATIENT-LVL III: CPT | Mod: PBBFAC,,, | Performed by: OPHTHALMOLOGY

## 2021-02-25 PROCEDURE — 99214 PR OFFICE/OUTPT VISIT, EST, LEVL IV, 30-39 MIN: ICD-10-PCS | Mod: S$PBB,,, | Performed by: OPHTHALMOLOGY

## 2021-02-25 PROCEDURE — 99214 OFFICE O/P EST MOD 30 MIN: CPT | Mod: S$PBB,,, | Performed by: OPHTHALMOLOGY

## 2021-02-25 PROCEDURE — 92083 EXTENDED VISUAL FIELD XM: CPT | Mod: PBBFAC | Performed by: OPHTHALMOLOGY

## 2021-02-25 RX ORDER — TIMOLOL MALEATE 5 MG/ML
1 SOLUTION/ DROPS OPHTHALMIC EVERY MORNING
Qty: 10 ML | Refills: 4 | Status: SHIPPED | OUTPATIENT
Start: 2021-02-25 | End: 2023-09-22 | Stop reason: SDUPTHER

## 2021-02-25 RX ORDER — LATANOPROST 50 UG/ML
1 SOLUTION/ DROPS OPHTHALMIC NIGHTLY
Qty: 7.5 ML | Refills: 4 | Status: SHIPPED | OUTPATIENT
Start: 2021-02-25 | End: 2022-06-02 | Stop reason: SDUPTHER

## 2021-03-08 ENCOUNTER — CLINICAL SUPPORT (OUTPATIENT)
Dept: CARDIOLOGY | Facility: HOSPITAL | Age: 71
End: 2021-03-08
Attending: INTERNAL MEDICINE
Payer: MEDICARE

## 2021-03-08 ENCOUNTER — OFFICE VISIT (OUTPATIENT)
Dept: CARDIOLOGY | Facility: CLINIC | Age: 71
End: 2021-03-08
Payer: MEDICARE

## 2021-03-08 VITALS — DIASTOLIC BLOOD PRESSURE: 82 MMHG | HEART RATE: 60 BPM | SYSTOLIC BLOOD PRESSURE: 122 MMHG

## 2021-03-08 DIAGNOSIS — R00.1 BRADYCARDIA: ICD-10-CM

## 2021-03-08 DIAGNOSIS — M79.89 LEG SWELLING: ICD-10-CM

## 2021-03-08 DIAGNOSIS — Z95.0 CARDIAC PACEMAKER IN SITU: ICD-10-CM

## 2021-03-08 DIAGNOSIS — I47.29 NSVT (NONSUSTAINED VENTRICULAR TACHYCARDIA): Primary | ICD-10-CM

## 2021-03-08 DIAGNOSIS — I47.10 SVT (SUPRAVENTRICULAR TACHYCARDIA): ICD-10-CM

## 2021-03-08 DIAGNOSIS — I44.2 COMPLETE HEART BLOCK: ICD-10-CM

## 2021-03-08 PROCEDURE — 99215 PR OFFICE/OUTPT VISIT, EST, LEVL V, 40-54 MIN: ICD-10-PCS | Mod: S$PBB,,, | Performed by: INTERNAL MEDICINE

## 2021-03-08 PROCEDURE — 99212 OFFICE O/P EST SF 10 MIN: CPT | Mod: PBBFAC,25 | Performed by: INTERNAL MEDICINE

## 2021-03-08 PROCEDURE — 99999 PR PBB SHADOW E&M-EST. PATIENT-LVL II: ICD-10-PCS | Mod: PBBFAC,,, | Performed by: INTERNAL MEDICINE

## 2021-03-08 PROCEDURE — 93280 PM DEVICE PROGR EVAL DUAL: CPT

## 2021-03-08 PROCEDURE — 93280 CARDIAC DEVICE CHECK - IN CLINIC & HOSPITAL: ICD-10-PCS | Mod: 26,,, | Performed by: INTERNAL MEDICINE

## 2021-03-08 PROCEDURE — 99999 PR PBB SHADOW E&M-EST. PATIENT-LVL II: ICD-10-PCS | Mod: PBBFAC,,,

## 2021-03-08 PROCEDURE — 99215 OFFICE O/P EST HI 40 MIN: CPT | Mod: S$PBB,,, | Performed by: INTERNAL MEDICINE

## 2021-03-08 PROCEDURE — 99999 PR PBB SHADOW E&M-EST. PATIENT-LVL II: CPT | Mod: PBBFAC,,,

## 2021-03-08 PROCEDURE — 99212 OFFICE O/P EST SF 10 MIN: CPT | Mod: PBBFAC,25,27

## 2021-03-08 PROCEDURE — 99999 PR PBB SHADOW E&M-EST. PATIENT-LVL II: CPT | Mod: PBBFAC,,, | Performed by: INTERNAL MEDICINE

## 2021-03-08 PROCEDURE — 93280 PM DEVICE PROGR EVAL DUAL: CPT | Mod: 26,,, | Performed by: INTERNAL MEDICINE

## 2021-03-08 RX ORDER — METOPROLOL SUCCINATE 50 MG/1
150 TABLET, EXTENDED RELEASE ORAL DAILY
Qty: 90 TABLET | Refills: 11 | Status: SHIPPED | OUTPATIENT
Start: 2021-03-08 | End: 2022-02-28 | Stop reason: SDUPTHER

## 2021-03-08 RX ORDER — FUROSEMIDE 20 MG/1
20 TABLET ORAL DAILY PRN
Qty: 30 TABLET | Refills: 11 | Status: SHIPPED | OUTPATIENT
Start: 2021-03-08 | End: 2022-02-28 | Stop reason: SDUPTHER

## 2021-03-29 ENCOUNTER — CLINICAL SUPPORT (OUTPATIENT)
Dept: CARDIOLOGY | Facility: HOSPITAL | Age: 71
End: 2021-03-29
Payer: MEDICARE

## 2021-03-29 DIAGNOSIS — Z95.0 PRESENCE OF CARDIAC PACEMAKER: ICD-10-CM

## 2021-03-29 PROCEDURE — 93294 REM INTERROG EVL PM/LDLS PM: CPT | Mod: ,,, | Performed by: INTERNAL MEDICINE

## 2021-03-29 PROCEDURE — 93294 CARDIAC DEVICE CHECK - REMOTE: ICD-10-PCS | Mod: ,,, | Performed by: INTERNAL MEDICINE

## 2021-03-31 ENCOUNTER — EXTERNAL CHRONIC CARE MANAGEMENT (OUTPATIENT)
Dept: PRIMARY CARE CLINIC | Facility: CLINIC | Age: 71
End: 2021-03-31
Payer: MEDICARE

## 2021-03-31 PROCEDURE — 99490 PR CHRONIC CARE MGMT, 1ST 20 MIN: ICD-10-PCS | Mod: S$PBB,,, | Performed by: PEDIATRICS

## 2021-03-31 PROCEDURE — 99490 CHRNC CARE MGMT STAFF 1ST 20: CPT | Mod: PBBFAC | Performed by: PEDIATRICS

## 2021-03-31 PROCEDURE — 99490 CHRNC CARE MGMT STAFF 1ST 20: CPT | Mod: S$PBB,,, | Performed by: PEDIATRICS

## 2021-04-30 ENCOUNTER — EXTERNAL CHRONIC CARE MANAGEMENT (OUTPATIENT)
Dept: PRIMARY CARE CLINIC | Facility: CLINIC | Age: 71
End: 2021-04-30
Payer: MEDICARE

## 2021-04-30 PROCEDURE — 99490 PR CHRONIC CARE MGMT, 1ST 20 MIN: ICD-10-PCS | Mod: S$PBB,,, | Performed by: PEDIATRICS

## 2021-04-30 PROCEDURE — 99490 CHRNC CARE MGMT STAFF 1ST 20: CPT | Mod: PBBFAC | Performed by: PEDIATRICS

## 2021-04-30 PROCEDURE — 99490 CHRNC CARE MGMT STAFF 1ST 20: CPT | Mod: S$PBB,,, | Performed by: PEDIATRICS

## 2021-05-14 DIAGNOSIS — E78.5 HYPERLIPIDEMIA, UNSPECIFIED HYPERLIPIDEMIA TYPE: ICD-10-CM

## 2021-05-14 DIAGNOSIS — I25.810 CORONARY ARTERY DISEASE INVOLVING CORONARY BYPASS GRAFT OF NATIVE HEART WITHOUT ANGINA PECTORIS: ICD-10-CM

## 2021-05-14 RX ORDER — ROSUVASTATIN CALCIUM 40 MG/1
40 TABLET, COATED ORAL NIGHTLY
Qty: 90 TABLET | Refills: 3 | Status: SHIPPED | OUTPATIENT
Start: 2021-05-14 | End: 2022-02-28 | Stop reason: SDUPTHER

## 2021-05-27 ENCOUNTER — OFFICE VISIT (OUTPATIENT)
Dept: OPHTHALMOLOGY | Facility: CLINIC | Age: 71
End: 2021-05-27
Payer: MEDICARE

## 2021-05-27 DIAGNOSIS — H25.13 NUCLEAR SENILE CATARACT OF BOTH EYES: ICD-10-CM

## 2021-05-27 DIAGNOSIS — H40.1132 PRIMARY OPEN ANGLE GLAUCOMA OF BOTH EYES, MODERATE STAGE: Primary | ICD-10-CM

## 2021-05-27 PROCEDURE — 99214 OFFICE O/P EST MOD 30 MIN: CPT | Mod: S$PBB,,, | Performed by: OPHTHALMOLOGY

## 2021-05-27 PROCEDURE — 92133 CPTRZD OPH DX IMG PST SGM ON: CPT | Mod: PBBFAC | Performed by: OPHTHALMOLOGY

## 2021-05-27 PROCEDURE — 99213 OFFICE O/P EST LOW 20 MIN: CPT | Mod: PBBFAC | Performed by: OPHTHALMOLOGY

## 2021-05-27 PROCEDURE — 92133 POSTERIOR SEGMENT OCT OPTIC NERVE(OCULAR COHERENCE TOMOGRAPHY) - OU - BOTH EYES: ICD-10-PCS | Mod: 26,S$PBB,, | Performed by: OPHTHALMOLOGY

## 2021-05-27 PROCEDURE — 99999 PR PBB SHADOW E&M-EST. PATIENT-LVL III: ICD-10-PCS | Mod: PBBFAC,,, | Performed by: OPHTHALMOLOGY

## 2021-05-27 PROCEDURE — 99999 PR PBB SHADOW E&M-EST. PATIENT-LVL III: CPT | Mod: PBBFAC,,, | Performed by: OPHTHALMOLOGY

## 2021-05-27 PROCEDURE — 99214 PR OFFICE/OUTPT VISIT, EST, LEVL IV, 30-39 MIN: ICD-10-PCS | Mod: S$PBB,,, | Performed by: OPHTHALMOLOGY

## 2021-05-27 RX ORDER — AMLODIPINE BESYLATE 5 MG/1
TABLET ORAL
COMMUNITY
Start: 2021-03-05 | End: 2022-03-15 | Stop reason: ALTCHOICE

## 2021-05-31 ENCOUNTER — EXTERNAL CHRONIC CARE MANAGEMENT (OUTPATIENT)
Dept: PRIMARY CARE CLINIC | Facility: CLINIC | Age: 71
End: 2021-05-31
Payer: MEDICARE

## 2021-05-31 PROCEDURE — 99490 CHRNC CARE MGMT STAFF 1ST 20: CPT | Mod: S$PBB,,, | Performed by: PEDIATRICS

## 2021-05-31 PROCEDURE — 99490 PR CHRONIC CARE MGMT, 1ST 20 MIN: ICD-10-PCS | Mod: S$PBB,,, | Performed by: PEDIATRICS

## 2021-05-31 PROCEDURE — 99490 CHRNC CARE MGMT STAFF 1ST 20: CPT | Mod: PBBFAC | Performed by: PEDIATRICS

## 2021-06-27 ENCOUNTER — CLINICAL SUPPORT (OUTPATIENT)
Dept: CARDIOLOGY | Facility: HOSPITAL | Age: 71
End: 2021-06-27
Payer: MEDICARE

## 2021-06-27 DIAGNOSIS — Z95.0 PRESENCE OF CARDIAC PACEMAKER: ICD-10-CM

## 2021-06-27 PROCEDURE — 93294 REM INTERROG EVL PM/LDLS PM: CPT | Mod: ,,, | Performed by: INTERNAL MEDICINE

## 2021-06-27 PROCEDURE — 93294 CARDIAC DEVICE CHECK - REMOTE: ICD-10-PCS | Mod: ,,, | Performed by: INTERNAL MEDICINE

## 2021-06-29 ENCOUNTER — PES CALL (OUTPATIENT)
Dept: ADMINISTRATIVE | Facility: CLINIC | Age: 71
End: 2021-06-29

## 2021-07-22 ENCOUNTER — OFFICE VISIT (OUTPATIENT)
Dept: ORTHOPEDICS | Facility: CLINIC | Age: 71
End: 2021-07-22
Payer: MEDICARE

## 2021-07-22 VITALS
BODY MASS INDEX: 41.3 KG/M2 | HEART RATE: 61 BPM | HEIGHT: 66 IN | WEIGHT: 257 LBS | DIASTOLIC BLOOD PRESSURE: 73 MMHG | SYSTOLIC BLOOD PRESSURE: 137 MMHG

## 2021-07-22 DIAGNOSIS — M54.17 LUMBOSACRAL RADICULOPATHY: ICD-10-CM

## 2021-07-22 DIAGNOSIS — I87.2 EDEMA OF BOTH LOWER EXTREMITIES DUE TO PERIPHERAL VENOUS INSUFFICIENCY: ICD-10-CM

## 2021-07-22 DIAGNOSIS — M54.16 SPINAL STENOSIS OF LUMBAR REGION WITH RADICULOPATHY: ICD-10-CM

## 2021-07-22 DIAGNOSIS — M17.11 ARTHRITIS OF KNEE, RIGHT: ICD-10-CM

## 2021-07-22 DIAGNOSIS — M48.061 SPINAL STENOSIS OF LUMBAR REGION WITH RADICULOPATHY: ICD-10-CM

## 2021-07-22 DIAGNOSIS — M21.161 ACQUIRED VARUS DEFORMITY KNEE, RIGHT: ICD-10-CM

## 2021-07-22 DIAGNOSIS — M21.162 ACQUIRED VARUS DEFORMITY KNEE, LEFT: ICD-10-CM

## 2021-07-22 DIAGNOSIS — M17.12 ARTHRITIS OF KNEE, LEFT: Primary | ICD-10-CM

## 2021-07-22 PROCEDURE — 99213 OFFICE O/P EST LOW 20 MIN: CPT | Mod: S$PBB,,, | Performed by: ORTHOPAEDIC SURGERY

## 2021-07-22 PROCEDURE — 99999 PR PBB SHADOW E&M-EST. PATIENT-LVL IV: ICD-10-PCS | Mod: PBBFAC,,, | Performed by: ORTHOPAEDIC SURGERY

## 2021-07-22 PROCEDURE — 99214 OFFICE O/P EST MOD 30 MIN: CPT | Mod: PBBFAC | Performed by: ORTHOPAEDIC SURGERY

## 2021-07-22 PROCEDURE — 99999 PR PBB SHADOW E&M-EST. PATIENT-LVL IV: CPT | Mod: PBBFAC,,, | Performed by: ORTHOPAEDIC SURGERY

## 2021-07-22 PROCEDURE — 99213 PR OFFICE/OUTPT VISIT, EST, LEVL III, 20-29 MIN: ICD-10-PCS | Mod: S$PBB,,, | Performed by: ORTHOPAEDIC SURGERY

## 2021-07-22 RX ORDER — CELECOXIB 200 MG/1
200 CAPSULE ORAL DAILY
Qty: 90 CAPSULE | Refills: 2 | Status: SHIPPED | OUTPATIENT
Start: 2021-07-22 | End: 2021-08-05

## 2021-07-22 RX ORDER — FUROSEMIDE 20 MG/1
20 TABLET ORAL DAILY
Qty: 90 TABLET | Refills: 2 | Status: SHIPPED | OUTPATIENT
Start: 2021-07-22 | End: 2021-12-22 | Stop reason: SDUPTHER

## 2021-07-29 ENCOUNTER — LAB VISIT (OUTPATIENT)
Dept: LAB | Facility: HOSPITAL | Age: 71
End: 2021-07-29
Attending: PEDIATRICS
Payer: MEDICARE

## 2021-07-29 DIAGNOSIS — M1A.39X0 CHRONIC GOUT DUE TO RENAL IMPAIRMENT OF MULTIPLE SITES WITHOUT TOPHUS: ICD-10-CM

## 2021-07-29 DIAGNOSIS — R73.09 ELEVATED GLUCOSE: ICD-10-CM

## 2021-07-29 DIAGNOSIS — I10 ESSENTIAL HYPERTENSION: ICD-10-CM

## 2021-07-29 DIAGNOSIS — E78.5 HYPERLIPIDEMIA, UNSPECIFIED HYPERLIPIDEMIA TYPE: Chronic | ICD-10-CM

## 2021-07-29 DIAGNOSIS — Z85.46 HISTORY OF PROSTATE CANCER: ICD-10-CM

## 2021-07-29 LAB
ALBUMIN SERPL BCP-MCNC: 3.7 G/DL (ref 3.5–5.2)
ALP SERPL-CCNC: 100 U/L (ref 55–135)
ALT SERPL W/O P-5'-P-CCNC: 28 U/L (ref 10–44)
ANION GAP SERPL CALC-SCNC: 11 MMOL/L (ref 8–16)
AST SERPL-CCNC: 31 U/L (ref 10–40)
BILIRUB SERPL-MCNC: 0.8 MG/DL (ref 0.1–1)
BUN SERPL-MCNC: 33 MG/DL (ref 8–23)
CALCIUM SERPL-MCNC: 9.6 MG/DL (ref 8.7–10.5)
CHLORIDE SERPL-SCNC: 111 MMOL/L (ref 95–110)
CHOLEST SERPL-MCNC: 210 MG/DL (ref 120–199)
CHOLEST/HDLC SERPL: 7.8 {RATIO} (ref 2–5)
CO2 SERPL-SCNC: 22 MMOL/L (ref 23–29)
COMPLEXED PSA SERPL-MCNC: <0.01 NG/ML (ref 0–4)
CREAT SERPL-MCNC: 1.8 MG/DL (ref 0.5–1.4)
EST. GFR  (AFRICAN AMERICAN): 43.4 ML/MIN/1.73 M^2
EST. GFR  (NON AFRICAN AMERICAN): 37.6 ML/MIN/1.73 M^2
ESTIMATED AVG GLUCOSE: 131 MG/DL (ref 68–131)
GLUCOSE SERPL-MCNC: 110 MG/DL (ref 70–110)
HBA1C MFR BLD: 6.2 % (ref 4–5.6)
HDLC SERPL-MCNC: 27 MG/DL (ref 40–75)
HDLC SERPL: 12.9 % (ref 20–50)
LDLC SERPL CALC-MCNC: 153.2 MG/DL (ref 63–159)
NONHDLC SERPL-MCNC: 183 MG/DL
POTASSIUM SERPL-SCNC: 4.6 MMOL/L (ref 3.5–5.1)
PROT SERPL-MCNC: 7.6 G/DL (ref 6–8.4)
SODIUM SERPL-SCNC: 144 MMOL/L (ref 136–145)
TRIGL SERPL-MCNC: 149 MG/DL (ref 30–150)
URATE SERPL-MCNC: 6.5 MG/DL (ref 3.4–7)

## 2021-07-29 PROCEDURE — 84153 ASSAY OF PSA TOTAL: CPT | Performed by: PEDIATRICS

## 2021-07-29 PROCEDURE — 84550 ASSAY OF BLOOD/URIC ACID: CPT | Performed by: PEDIATRICS

## 2021-07-29 PROCEDURE — 80053 COMPREHEN METABOLIC PANEL: CPT | Performed by: PEDIATRICS

## 2021-07-29 PROCEDURE — 36415 COLL VENOUS BLD VENIPUNCTURE: CPT | Performed by: PEDIATRICS

## 2021-07-29 PROCEDURE — 83036 HEMOGLOBIN GLYCOSYLATED A1C: CPT | Performed by: PEDIATRICS

## 2021-07-29 PROCEDURE — 80061 LIPID PANEL: CPT | Performed by: PEDIATRICS

## 2021-08-05 ENCOUNTER — OFFICE VISIT (OUTPATIENT)
Dept: INTERNAL MEDICINE | Facility: CLINIC | Age: 71
End: 2021-08-05
Payer: MEDICARE

## 2021-08-05 VITALS
RESPIRATION RATE: 18 BRPM | DIASTOLIC BLOOD PRESSURE: 72 MMHG | OXYGEN SATURATION: 97 % | HEART RATE: 60 BPM | WEIGHT: 260.38 LBS | SYSTOLIC BLOOD PRESSURE: 120 MMHG | TEMPERATURE: 97 F | HEIGHT: 66 IN | BODY MASS INDEX: 41.85 KG/M2

## 2021-08-05 DIAGNOSIS — I25.10 CORONARY ARTERY DISEASE INVOLVING NATIVE CORONARY ARTERY OF NATIVE HEART WITHOUT ANGINA PECTORIS: ICD-10-CM

## 2021-08-05 DIAGNOSIS — M48.061 SPINAL STENOSIS OF LUMBAR REGION WITH RADICULOPATHY: ICD-10-CM

## 2021-08-05 DIAGNOSIS — Z95.1 S/P CABG (CORONARY ARTERY BYPASS GRAFT): ICD-10-CM

## 2021-08-05 DIAGNOSIS — I10 ESSENTIAL HYPERTENSION: Primary | ICD-10-CM

## 2021-08-05 DIAGNOSIS — E78.5 HYPERLIPIDEMIA, UNSPECIFIED HYPERLIPIDEMIA TYPE: Chronic | ICD-10-CM

## 2021-08-05 DIAGNOSIS — E66.01 OBESITY, CLASS III, BMI 40-49.9 (MORBID OBESITY): Chronic | ICD-10-CM

## 2021-08-05 DIAGNOSIS — M54.16 SPINAL STENOSIS OF LUMBAR REGION WITH RADICULOPATHY: ICD-10-CM

## 2021-08-05 DIAGNOSIS — M54.16 LUMBAR RADICULAR PAIN: ICD-10-CM

## 2021-08-05 DIAGNOSIS — N18.32 STAGE 3B CHRONIC KIDNEY DISEASE: ICD-10-CM

## 2021-08-05 DIAGNOSIS — M1A.39X0 CHRONIC GOUT DUE TO RENAL IMPAIRMENT OF MULTIPLE SITES WITHOUT TOPHUS: ICD-10-CM

## 2021-08-05 DIAGNOSIS — I42.2 HYPERTROPHIC CARDIOMYOPATHY: ICD-10-CM

## 2021-08-05 DIAGNOSIS — R73.03 PREDIABETES: ICD-10-CM

## 2021-08-05 DIAGNOSIS — I50.32 CHRONIC DIASTOLIC CHF (CONGESTIVE HEART FAILURE): Chronic | ICD-10-CM

## 2021-08-05 PROBLEM — Z12.11 COLON CANCER SCREENING: Status: RESOLVED | Noted: 2017-12-04 | Resolved: 2021-08-05

## 2021-08-05 PROCEDURE — 99999 PR PBB SHADOW E&M-EST. PATIENT-LVL V: ICD-10-PCS | Mod: PBBFAC,,, | Performed by: NURSE PRACTITIONER

## 2021-08-05 PROCEDURE — 99214 PR OFFICE/OUTPT VISIT, EST, LEVL IV, 30-39 MIN: ICD-10-PCS | Mod: S$PBB,,, | Performed by: NURSE PRACTITIONER

## 2021-08-05 PROCEDURE — 99215 OFFICE O/P EST HI 40 MIN: CPT | Mod: PBBFAC | Performed by: NURSE PRACTITIONER

## 2021-08-05 PROCEDURE — 99999 PR PBB SHADOW E&M-EST. PATIENT-LVL V: CPT | Mod: PBBFAC,,, | Performed by: NURSE PRACTITIONER

## 2021-08-05 PROCEDURE — 99214 OFFICE O/P EST MOD 30 MIN: CPT | Mod: S$PBB,,, | Performed by: NURSE PRACTITIONER

## 2021-08-05 RX ORDER — TURMERIC 400 MG
1 CAPSULE ORAL DAILY
Qty: 60 CAPSULE | Refills: 3 | Status: SHIPPED | OUTPATIENT
Start: 2021-08-05 | End: 2022-03-15

## 2021-08-05 RX ORDER — CELECOXIB 200 MG/1
200 CAPSULE ORAL DAILY PRN
Qty: 90 CAPSULE | Refills: 2
Start: 2021-08-05 | End: 2023-12-05 | Stop reason: SDUPTHER

## 2021-08-05 RX ORDER — TRAMADOL HYDROCHLORIDE 50 MG/1
50 TABLET ORAL EVERY 12 HOURS PRN
Qty: 14 TABLET | Refills: 0 | Status: SHIPPED | OUTPATIENT
Start: 2021-08-05

## 2021-08-15 PROBLEM — D69.6 THROMBOCYTOPENIA: Status: ACTIVE | Noted: 2021-08-15

## 2021-08-15 PROBLEM — I27.9 PULMONARY HEART DISEASE: Status: ACTIVE | Noted: 2021-08-15

## 2021-08-15 PROBLEM — H40.9 GLAUCOMA: Status: RESOLVED | Noted: 2020-11-27 | Resolved: 2021-08-15

## 2021-08-15 PROBLEM — I47.10 SVT (SUPRAVENTRICULAR TACHYCARDIA): Status: ACTIVE | Noted: 2021-08-15

## 2021-08-15 PROBLEM — I70.0 AORTIC ATHEROSCLEROSIS: Status: ACTIVE | Noted: 2021-08-15

## 2021-08-15 PROBLEM — I47.10 SVT (SUPRAVENTRICULAR TACHYCARDIA): Status: RESOLVED | Noted: 2021-08-15 | Resolved: 2021-08-15

## 2021-08-15 PROBLEM — I47.29 NON-SUSTAINED VENTRICULAR TACHYCARDIA: Status: ACTIVE | Noted: 2021-08-15

## 2021-08-19 ENCOUNTER — TELEPHONE (OUTPATIENT)
Dept: ADMINISTRATIVE | Facility: HOSPITAL | Age: 71
End: 2021-08-19

## 2021-08-26 ENCOUNTER — OFFICE VISIT (OUTPATIENT)
Dept: CARDIOLOGY | Facility: CLINIC | Age: 71
End: 2021-08-26
Payer: MEDICARE

## 2021-08-26 VITALS
DIASTOLIC BLOOD PRESSURE: 62 MMHG | WEIGHT: 253.75 LBS | BODY MASS INDEX: 40.96 KG/M2 | SYSTOLIC BLOOD PRESSURE: 100 MMHG | OXYGEN SATURATION: 98 % | HEART RATE: 60 BPM

## 2021-08-26 DIAGNOSIS — I44.2 COMPLETE HEART BLOCK: ICD-10-CM

## 2021-08-26 DIAGNOSIS — Z95.0 PRESENCE OF CARDIAC PACEMAKER: Primary | ICD-10-CM

## 2021-08-26 PROCEDURE — 99214 PR OFFICE/OUTPT VISIT, EST, LEVL IV, 30-39 MIN: ICD-10-PCS | Mod: S$PBB,,, | Performed by: INTERNAL MEDICINE

## 2021-08-26 PROCEDURE — 99999 PR PBB SHADOW E&M-EST. PATIENT-LVL IV: CPT | Mod: PBBFAC,,, | Performed by: INTERNAL MEDICINE

## 2021-08-26 PROCEDURE — 99999 PR PBB SHADOW E&M-EST. PATIENT-LVL IV: ICD-10-PCS | Mod: PBBFAC,,, | Performed by: INTERNAL MEDICINE

## 2021-08-26 PROCEDURE — 99214 OFFICE O/P EST MOD 30 MIN: CPT | Mod: S$PBB,,, | Performed by: INTERNAL MEDICINE

## 2021-08-26 PROCEDURE — 99214 OFFICE O/P EST MOD 30 MIN: CPT | Mod: PBBFAC | Performed by: INTERNAL MEDICINE

## 2021-09-02 NOTE — PROGRESS NOTES
Lo Kohli is a 41 year old  woman who presents today for her annual GYN exam. She is sexually active with her spouse. She declines STD screening and blood work. Patient is currently using progesterone-only oral contraceptive pills for birth control. In regards to her menstrual cycles, Lo Kohli reports amennorrhea. She has minimal if any  symptoms of dysmenorrhea. She currently does report stress and overflow incontinence. Pt is breastfeeding.     OB/GYN HISTORY:   OB History    Para Term  AB Living   6 5 5 0 1 5   SAB TAB Ectopic Molar Multiple Live Births   1 0 0 0 0 5    No LMP recorded (lmp unknown).                Pap smears have been been normal.      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   Tobacco Use   • Smoking status: Never Smoker   • Smokeless tobacco: Never Used   Substance and Sexual Activity   • Alcohol use: Yes     Comment: Socially    • Drug use: No   • Sexual activity: Yes     Partners: Male   Other Topics Concern   • Not on file   Social History Narrative   • Not on file     Social Determinants of Health     Financial Resource Strain:    • Social Determinants: Financial Resource Strain:    Food Insecurity:    • Social Determinants: Food Insecurity:    Transportation Needs:    • Lack of Transportation (Medical):    • Lack of Transportation (Non-Medical):    Physical Activity:    • Days of Exercise per Week:    •  Please see completed report in cardiology procedures.   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  CV:  Denies chest discomfort with exertion, SOB or palpitations.   RESPIRATORY: Denies cough, SOB, change in exercise tolerance.   GI:  Denies abdominal pain, nausea, change in bowel habits, melena.   : Denies frequency, dysuria.  MSK: Denies joint pain, muscle aches.   SKIN:  Denies concerns, changing moles.   NEURO:  Denies changes.  PSYCH: Denies changes.  HEME/LYMPH:  Denies abnormal bruising or bleeding, lumps or bumps.       OBJECTIVE:  MA notes reviewed and agree.  Reviewed allergies, medical, surgical, family, obstetrical, and social hx.   Blood pressure 112/64, weight 76.9 kg, currently breastfeeding.  GEN: NAD, Ax3, mood appropriate  NECK: No thyromegaly.  LUNGS: Clear to auscultation.  HEART: Regular rate and rhythm.  BREASTS: Without masses or nipple discharge bilaterally. BSE reviewed and encouraged.  ABDOMEN: Soft and nontender without masses or hepatomegaly.  EXTREMITIES: Nontender. No edema.  LYMPH: No palpable neck or groin lymph nodes.  PELVIC EXAM:  External genitalia: No lesions or masses noted and normal external genitalia.  VAGINA: Normal mucosa and no exudate present.  CERVIX: Normal, without  lesions or masses  and pap smear obtained.  UTERUS: mobile , non-tender, anteverted, Six week size.  ADNEXA: No masses  and no tenderness.      IMPRESSION:  > Normal gynecological exam  > Good results with progesterone-only oral contraceptive pills  > Stress incontinence/overflow incontinence   > MTHFR    PLAN:  > Pap/HPV smear Done  today.  We discussed the most recent PAP guidelines including HPV screening.  Reviewed repeating q5 years over age 30 with a few exceptions.  We also reviewed importance of annual exams.  > STD screening Not done.   > Education: SBE, safer sex practices. Also discussed early signs of ovarian cancer with patient. She is aware of when to call.  > One year prescription of  POP given. Reiterated its risks, benefits, and potential side effects and when to call.  > Refer to PT - pelvic floor  > Return to clinic 1 year or as needed  > Will notify patient of results by Gabriela

## 2021-09-24 ENCOUNTER — TELEPHONE (OUTPATIENT)
Dept: ADMINISTRATIVE | Facility: HOSPITAL | Age: 71
End: 2021-09-24

## 2021-09-25 ENCOUNTER — CLINICAL SUPPORT (OUTPATIENT)
Dept: CARDIOLOGY | Facility: HOSPITAL | Age: 71
End: 2021-09-25
Payer: MEDICARE

## 2021-09-25 DIAGNOSIS — Z95.0 PRESENCE OF CARDIAC PACEMAKER: ICD-10-CM

## 2021-09-25 PROCEDURE — 93294 CARDIAC DEVICE CHECK - REMOTE: ICD-10-PCS | Mod: ,,, | Performed by: INTERNAL MEDICINE

## 2021-09-25 PROCEDURE — 93294 REM INTERROG EVL PM/LDLS PM: CPT | Mod: ,,, | Performed by: INTERNAL MEDICINE

## 2021-09-30 ENCOUNTER — EXTERNAL CHRONIC CARE MANAGEMENT (OUTPATIENT)
Dept: PRIMARY CARE CLINIC | Facility: CLINIC | Age: 71
End: 2021-09-30
Payer: MEDICARE

## 2021-09-30 PROCEDURE — 99490 CHRNC CARE MGMT STAFF 1ST 20: CPT | Mod: PBBFAC | Performed by: PEDIATRICS

## 2021-09-30 PROCEDURE — 99490 CHRNC CARE MGMT STAFF 1ST 20: CPT | Mod: S$PBB,,, | Performed by: PEDIATRICS

## 2021-09-30 PROCEDURE — 99490 PR CHRONIC CARE MGMT, 1ST 20 MIN: ICD-10-PCS | Mod: S$PBB,,, | Performed by: PEDIATRICS

## 2021-10-31 ENCOUNTER — EXTERNAL CHRONIC CARE MANAGEMENT (OUTPATIENT)
Dept: PRIMARY CARE CLINIC | Facility: CLINIC | Age: 71
End: 2021-10-31
Payer: MEDICARE

## 2021-10-31 PROCEDURE — 99490 PR CHRONIC CARE MGMT, 1ST 20 MIN: ICD-10-PCS | Mod: S$PBB,,, | Performed by: PEDIATRICS

## 2021-10-31 PROCEDURE — 99490 CHRNC CARE MGMT STAFF 1ST 20: CPT | Mod: S$PBB,,, | Performed by: PEDIATRICS

## 2021-10-31 PROCEDURE — 99490 CHRNC CARE MGMT STAFF 1ST 20: CPT | Mod: PBBFAC | Performed by: PEDIATRICS

## 2021-11-08 ENCOUNTER — PATIENT OUTREACH (OUTPATIENT)
Dept: ADMINISTRATIVE | Facility: OTHER | Age: 71
End: 2021-11-08
Payer: MEDICARE

## 2021-11-09 ENCOUNTER — TELEPHONE (OUTPATIENT)
Dept: PAIN MEDICINE | Facility: CLINIC | Age: 71
End: 2021-11-09
Payer: MEDICARE

## 2021-11-10 ENCOUNTER — OFFICE VISIT (OUTPATIENT)
Dept: PAIN MEDICINE | Facility: CLINIC | Age: 71
End: 2021-11-10
Payer: MEDICARE

## 2021-11-10 VITALS
HEART RATE: 72 BPM | HEIGHT: 66 IN | BODY MASS INDEX: 40.85 KG/M2 | WEIGHT: 254.19 LBS | DIASTOLIC BLOOD PRESSURE: 81 MMHG | SYSTOLIC BLOOD PRESSURE: 120 MMHG

## 2021-11-10 DIAGNOSIS — M48.061 SPINAL STENOSIS OF LUMBAR REGION, UNSPECIFIED WHETHER NEUROGENIC CLAUDICATION PRESENT: ICD-10-CM

## 2021-11-10 DIAGNOSIS — M54.16 LUMBAR RADICULOPATHY: Primary | ICD-10-CM

## 2021-11-10 PROCEDURE — 99999 PR PBB SHADOW E&M-EST. PATIENT-LVL IV: CPT | Mod: PBBFAC,,, | Performed by: PHYSICAL MEDICINE & REHABILITATION

## 2021-11-10 PROCEDURE — 99214 OFFICE O/P EST MOD 30 MIN: CPT | Mod: S$PBB,,, | Performed by: PHYSICAL MEDICINE & REHABILITATION

## 2021-11-10 PROCEDURE — 99214 PR OFFICE/OUTPT VISIT, EST, LEVL IV, 30-39 MIN: ICD-10-PCS | Mod: S$PBB,,, | Performed by: PHYSICAL MEDICINE & REHABILITATION

## 2021-11-10 PROCEDURE — 99214 OFFICE O/P EST MOD 30 MIN: CPT | Mod: PBBFAC | Performed by: PHYSICAL MEDICINE & REHABILITATION

## 2021-11-10 PROCEDURE — 99999 PR PBB SHADOW E&M-EST. PATIENT-LVL IV: ICD-10-PCS | Mod: PBBFAC,,, | Performed by: PHYSICAL MEDICINE & REHABILITATION

## 2021-11-10 RX ORDER — HYDROCODONE BITARTRATE AND ACETAMINOPHEN 5; 325 MG/1; MG/1
1 TABLET ORAL EVERY 6 HOURS PRN
Qty: 28 TABLET | Refills: 0 | Status: SHIPPED | OUTPATIENT
Start: 2021-11-10 | End: 2021-11-17

## 2021-11-24 ENCOUNTER — HOSPITAL ENCOUNTER (OUTPATIENT)
Facility: HOSPITAL | Age: 71
Discharge: HOME | End: 2021-11-24
Attending: PHYSICAL MEDICINE & REHABILITATION | Admitting: PHYSICAL MEDICINE & REHABILITATION
Payer: MEDICARE

## 2021-11-24 VITALS
WEIGHT: 252.44 LBS | TEMPERATURE: 98 F | RESPIRATION RATE: 17 BRPM | BODY MASS INDEX: 40.57 KG/M2 | SYSTOLIC BLOOD PRESSURE: 130 MMHG | DIASTOLIC BLOOD PRESSURE: 85 MMHG | HEIGHT: 66 IN | OXYGEN SATURATION: 98 % | HEART RATE: 60 BPM

## 2021-11-24 DIAGNOSIS — M54.16 LUMBAR RADICULAR PAIN: Primary | ICD-10-CM

## 2021-11-24 DIAGNOSIS — M54.16 LUMBAR RADICULOPATHY: ICD-10-CM

## 2021-11-24 PROCEDURE — 64483 NJX AA&/STRD TFRM EPI L/S 1: CPT | Performed by: PHYSICAL MEDICINE & REHABILITATION

## 2021-11-24 PROCEDURE — 25000003 PHARM REV CODE 250: Performed by: PHYSICAL MEDICINE & REHABILITATION

## 2021-11-24 PROCEDURE — 64484 PRA INJECT ANES/STEROID FORAMEN LUMBAR/SACRAL W IMG GUIDE ,EA ADD LEVEL: ICD-10-PCS | Mod: RT,,, | Performed by: PHYSICAL MEDICINE & REHABILITATION

## 2021-11-24 PROCEDURE — 64483 NJX AA&/STRD TFRM EPI L/S 1: CPT | Mod: RT,,, | Performed by: PHYSICAL MEDICINE & REHABILITATION

## 2021-11-24 PROCEDURE — 64484 NJX AA&/STRD TFRM EPI L/S EA: CPT | Performed by: PHYSICAL MEDICINE & REHABILITATION

## 2021-11-24 PROCEDURE — 64483 PR EPIDURAL INJ, ANES/STEROID, TRANSFORAMINAL, LUMB/SACR, SNGL LEVL: ICD-10-PCS | Mod: RT,,, | Performed by: PHYSICAL MEDICINE & REHABILITATION

## 2021-11-24 PROCEDURE — 64484 NJX AA&/STRD TFRM EPI L/S EA: CPT | Mod: RT,,, | Performed by: PHYSICAL MEDICINE & REHABILITATION

## 2021-11-24 PROCEDURE — 25500020 PHARM REV CODE 255: Performed by: PHYSICAL MEDICINE & REHABILITATION

## 2021-11-24 PROCEDURE — 63600175 PHARM REV CODE 636 W HCPCS: Performed by: PHYSICAL MEDICINE & REHABILITATION

## 2021-11-24 RX ORDER — MIDAZOLAM HYDROCHLORIDE 1 MG/ML
INJECTION, SOLUTION INTRAMUSCULAR; INTRAVENOUS
Status: DISCONTINUED | OUTPATIENT
Start: 2021-11-24 | End: 2021-11-24 | Stop reason: HOSPADM

## 2021-11-24 RX ORDER — FENTANYL CITRATE 50 UG/ML
INJECTION, SOLUTION INTRAMUSCULAR; INTRAVENOUS
Status: DISCONTINUED | OUTPATIENT
Start: 2021-11-24 | End: 2021-11-24 | Stop reason: HOSPADM

## 2021-11-24 RX ORDER — BUPIVACAINE HYDROCHLORIDE 2.5 MG/ML
INJECTION, SOLUTION EPIDURAL; INFILTRATION; INTRACAUDAL
Status: DISCONTINUED | OUTPATIENT
Start: 2021-11-24 | End: 2021-11-24 | Stop reason: HOSPADM

## 2021-11-24 RX ORDER — METHYLPREDNISOLONE ACETATE 40 MG/ML
INJECTION, SUSPENSION INTRA-ARTICULAR; INTRALESIONAL; INTRAMUSCULAR; SOFT TISSUE
Status: DISCONTINUED | OUTPATIENT
Start: 2021-11-24 | End: 2021-11-24 | Stop reason: HOSPADM

## 2021-11-29 DIAGNOSIS — I25.810 CORONARY ARTERY DISEASE INVOLVING CORONARY BYPASS GRAFT OF NATIVE HEART WITHOUT ANGINA PECTORIS: ICD-10-CM

## 2021-11-29 DIAGNOSIS — I10 ESSENTIAL HYPERTENSION: ICD-10-CM

## 2021-11-29 RX ORDER — AMLODIPINE BESYLATE 10 MG/1
10 TABLET ORAL DAILY
Qty: 90 TABLET | Refills: 3 | Status: SHIPPED | OUTPATIENT
Start: 2021-11-29 | End: 2022-12-12 | Stop reason: SDUPTHER

## 2021-11-30 ENCOUNTER — EXTERNAL CHRONIC CARE MANAGEMENT (OUTPATIENT)
Dept: PRIMARY CARE CLINIC | Facility: CLINIC | Age: 71
End: 2021-11-30
Payer: MEDICARE

## 2021-11-30 PROCEDURE — 99490 PR CHRONIC CARE MGMT, 1ST 20 MIN: ICD-10-PCS | Mod: S$PBB,,, | Performed by: PEDIATRICS

## 2021-11-30 PROCEDURE — 99490 CHRNC CARE MGMT STAFF 1ST 20: CPT | Mod: PBBFAC | Performed by: PEDIATRICS

## 2021-11-30 PROCEDURE — 99490 CHRNC CARE MGMT STAFF 1ST 20: CPT | Mod: S$PBB,,, | Performed by: PEDIATRICS

## 2021-12-01 ENCOUNTER — OFFICE VISIT (OUTPATIENT)
Dept: DERMATOLOGY | Facility: CLINIC | Age: 71
End: 2021-12-01
Payer: MEDICARE

## 2021-12-01 DIAGNOSIS — L72.9 CYST OF SKIN: Primary | ICD-10-CM

## 2021-12-01 PROCEDURE — 99213 OFFICE O/P EST LOW 20 MIN: CPT | Mod: PBBFAC | Performed by: DERMATOLOGY

## 2021-12-01 PROCEDURE — 99999 PR PBB SHADOW E&M-EST. PATIENT-LVL III: CPT | Mod: PBBFAC,,, | Performed by: DERMATOLOGY

## 2021-12-01 PROCEDURE — 99999 PR PBB SHADOW E&M-EST. PATIENT-LVL III: ICD-10-PCS | Mod: PBBFAC,,, | Performed by: DERMATOLOGY

## 2021-12-01 PROCEDURE — 99202 PR OFFICE/OUTPT VISIT, NEW, LEVL II, 15-29 MIN: ICD-10-PCS | Mod: S$PBB,,, | Performed by: DERMATOLOGY

## 2021-12-01 PROCEDURE — 99202 OFFICE O/P NEW SF 15 MIN: CPT | Mod: S$PBB,,, | Performed by: DERMATOLOGY

## 2021-12-17 ENCOUNTER — TELEPHONE (OUTPATIENT)
Dept: RHEUMATOLOGY | Facility: CLINIC | Age: 71
End: 2021-12-17
Payer: MEDICARE

## 2021-12-21 ENCOUNTER — PATIENT OUTREACH (OUTPATIENT)
Dept: ADMINISTRATIVE | Facility: OTHER | Age: 71
End: 2021-12-21
Payer: MEDICARE

## 2021-12-22 ENCOUNTER — OFFICE VISIT (OUTPATIENT)
Dept: PAIN MEDICINE | Facility: CLINIC | Age: 71
End: 2021-12-22
Payer: MEDICARE

## 2021-12-22 VITALS — WEIGHT: 252.63 LBS | BODY MASS INDEX: 40.6 KG/M2 | HEIGHT: 66 IN

## 2021-12-22 DIAGNOSIS — M54.16 LUMBAR RADICULAR PAIN: ICD-10-CM

## 2021-12-22 DIAGNOSIS — M54.16 RIGHT LUMBAR RADICULOPATHY: Primary | ICD-10-CM

## 2021-12-22 DIAGNOSIS — M48.061 SPINAL STENOSIS OF LUMBAR REGION, UNSPECIFIED WHETHER NEUROGENIC CLAUDICATION PRESENT: ICD-10-CM

## 2021-12-22 DIAGNOSIS — M79.18 LUMBAR MUSCLE PAIN: ICD-10-CM

## 2021-12-22 PROCEDURE — 99999 PR PBB SHADOW E&M-EST. PATIENT-LVL IV: ICD-10-PCS | Mod: PBBFAC,,, | Performed by: PHYSICIAN ASSISTANT

## 2021-12-22 PROCEDURE — 99214 OFFICE O/P EST MOD 30 MIN: CPT | Mod: S$PBB,,, | Performed by: PHYSICIAN ASSISTANT

## 2021-12-22 PROCEDURE — 99214 OFFICE O/P EST MOD 30 MIN: CPT | Mod: PBBFAC | Performed by: PHYSICIAN ASSISTANT

## 2021-12-22 PROCEDURE — 99999 PR PBB SHADOW E&M-EST. PATIENT-LVL IV: CPT | Mod: PBBFAC,,, | Performed by: PHYSICIAN ASSISTANT

## 2021-12-22 PROCEDURE — 99214 PR OFFICE/OUTPT VISIT, EST, LEVL IV, 30-39 MIN: ICD-10-PCS | Mod: S$PBB,,, | Performed by: PHYSICIAN ASSISTANT

## 2021-12-22 RX ORDER — TIZANIDINE 4 MG/1
4 TABLET ORAL EVERY 8 HOURS PRN
Qty: 30 TABLET | Refills: 0 | Status: SHIPPED | OUTPATIENT
Start: 2021-12-22 | End: 2023-12-05

## 2021-12-22 RX ORDER — PREGABALIN 75 MG/1
CAPSULE ORAL
Qty: 120 CAPSULE | Refills: 0 | Status: SHIPPED | OUTPATIENT
Start: 2021-12-22 | End: 2022-02-10 | Stop reason: SDUPTHER

## 2021-12-24 ENCOUNTER — CLINICAL SUPPORT (OUTPATIENT)
Dept: CARDIOLOGY | Facility: HOSPITAL | Age: 71
End: 2021-12-24
Payer: MEDICARE

## 2021-12-24 DIAGNOSIS — Z95.0 PRESENCE OF CARDIAC PACEMAKER: ICD-10-CM

## 2021-12-24 PROCEDURE — 93294 REM INTERROG EVL PM/LDLS PM: CPT | Mod: ,,, | Performed by: INTERNAL MEDICINE

## 2021-12-24 PROCEDURE — 93296 REM INTERROG EVL PM/IDS: CPT | Performed by: INTERNAL MEDICINE

## 2021-12-24 PROCEDURE — 93294 CARDIAC DEVICE CHECK - REMOTE: ICD-10-PCS | Mod: ,,, | Performed by: INTERNAL MEDICINE

## 2021-12-30 ENCOUNTER — PROCEDURE VISIT (OUTPATIENT)
Dept: DERMATOLOGY | Facility: CLINIC | Age: 71
End: 2021-12-30
Payer: MEDICARE

## 2021-12-30 DIAGNOSIS — D48.5 NEOPLASM OF UNCERTAIN BEHAVIOR OF SKIN: Primary | ICD-10-CM

## 2021-12-30 PROCEDURE — 11403 EXC TR-EXT B9+MARG 2.1-3CM: CPT | Mod: PBBFAC,51 | Performed by: DERMATOLOGY

## 2021-12-30 PROCEDURE — 88304 TISSUE EXAM BY PATHOLOGIST: CPT | Mod: 26,,, | Performed by: PATHOLOGY

## 2021-12-30 PROCEDURE — 11403 PR EXC SKIN BENIG 2.1-3 CM TRUNK,ARM,LEG: ICD-10-PCS | Mod: S$PBB,51,, | Performed by: DERMATOLOGY

## 2021-12-30 PROCEDURE — 12032 INTMD RPR S/A/T/EXT 2.6-7.5: CPT | Mod: PBBFAC | Performed by: DERMATOLOGY

## 2021-12-30 PROCEDURE — 99499 NO LOS: ICD-10-PCS | Mod: S$PBB,,, | Performed by: DERMATOLOGY

## 2021-12-30 PROCEDURE — 99499 UNLISTED E&M SERVICE: CPT | Mod: S$PBB,,, | Performed by: DERMATOLOGY

## 2021-12-30 PROCEDURE — 11403 EXC TR-EXT B9+MARG 2.1-3CM: CPT | Mod: S$PBB,51,, | Performed by: DERMATOLOGY

## 2021-12-30 PROCEDURE — 88304 PR  SURG PATH,LEVEL III: ICD-10-PCS | Mod: 26,,, | Performed by: PATHOLOGY

## 2021-12-30 PROCEDURE — 88304 TISSUE EXAM BY PATHOLOGIST: CPT | Performed by: PATHOLOGY

## 2021-12-30 PROCEDURE — 12032 INTMD RPR S/A/T/EXT 2.6-7.5: CPT | Mod: S$PBB,,, | Performed by: DERMATOLOGY

## 2021-12-30 PROCEDURE — 12032 PR LAYR CLOS WND TRUNK,ARM,LEG 2.6-7.5 CM: ICD-10-PCS | Mod: S$PBB,,, | Performed by: DERMATOLOGY

## 2021-12-31 ENCOUNTER — EXTERNAL CHRONIC CARE MANAGEMENT (OUTPATIENT)
Dept: PRIMARY CARE CLINIC | Facility: CLINIC | Age: 71
End: 2021-12-31
Payer: MEDICARE

## 2021-12-31 PROCEDURE — 99490 CHRNC CARE MGMT STAFF 1ST 20: CPT | Mod: PBBFAC | Performed by: PEDIATRICS

## 2021-12-31 PROCEDURE — 99490 PR CHRONIC CARE MGMT, 1ST 20 MIN: ICD-10-PCS | Mod: S$PBB,,, | Performed by: PEDIATRICS

## 2021-12-31 PROCEDURE — 99490 CHRNC CARE MGMT STAFF 1ST 20: CPT | Mod: S$PBB,,, | Performed by: PEDIATRICS

## 2022-01-03 ENCOUNTER — OFFICE VISIT (OUTPATIENT)
Dept: OPHTHALMOLOGY | Facility: CLINIC | Age: 72
End: 2022-01-03
Payer: MEDICARE

## 2022-01-03 DIAGNOSIS — H40.1132 PRIMARY OPEN ANGLE GLAUCOMA OF BOTH EYES, MODERATE STAGE: Primary | ICD-10-CM

## 2022-01-03 DIAGNOSIS — H25.13 NUCLEAR SENILE CATARACT OF BOTH EYES: ICD-10-CM

## 2022-01-03 PROCEDURE — 99214 PR OFFICE/OUTPT VISIT, EST, LEVL IV, 30-39 MIN: ICD-10-PCS | Mod: S$PBB,,, | Performed by: OPHTHALMOLOGY

## 2022-01-03 PROCEDURE — 99999 PR PBB SHADOW E&M-EST. PATIENT-LVL III: ICD-10-PCS | Mod: PBBFAC,,, | Performed by: OPHTHALMOLOGY

## 2022-01-03 PROCEDURE — 99214 OFFICE O/P EST MOD 30 MIN: CPT | Mod: S$PBB,,, | Performed by: OPHTHALMOLOGY

## 2022-01-03 PROCEDURE — 99999 PR PBB SHADOW E&M-EST. PATIENT-LVL III: CPT | Mod: PBBFAC,,, | Performed by: OPHTHALMOLOGY

## 2022-01-03 PROCEDURE — 99213 OFFICE O/P EST LOW 20 MIN: CPT | Mod: PBBFAC | Performed by: OPHTHALMOLOGY

## 2022-01-03 NOTE — PROGRESS NOTES
SUBJECTIVE  Damon Yoo is 71 y.o. male  Corrected distance visual acuity was 20/30 +2 in the right eye and 20/25 in the left eye.   Chief Complaint   Patient presents with    Glaucoma     Pt here for 3m IOP chk. No pain or discomfort. VA stable. 100% compliant with gtts.           HPI     Glaucoma      Additional comments: Pt here for 3m IOP chk. No pain or discomfort. VA   stable. 100% compliant with gtts.               Comments     1. Mod COAG- No FHx (init 32/27 on 3/10 with C/D .65/.60) Goal <18, new   goal = 15 6/2017  SLT OD 12/11/14 (20-16)  SLT OS 2/25/15 (20-16)  2. Mild NSC  3. LLL Cyst Excision on 6/13/12    Latanoprost QHS OU  Timolol qam OU            Last edited by Martinez Chatman MA on 1/3/2022 10:28 AM. (History)         Assessment /Plan :  1. Primary open angle glaucoma of both eyes, moderate stage Doing well, IOP within acceptable range relative to target IOP and no evidence of progression. Continue current treatment. Reviewed importance of continued compliance with treatment and follow up.      Patient instructed to continue using the following glaucoma medication as follows:  Latanoprost one drop in each eye nightly and Timolol one drop both eyes daily    Return to clinic in 3 months  or as needed.  With GOCT, Dilation and HVF 24-2       2. Nuclear senile cataract of both eyes - monitor for now

## 2022-01-05 LAB
FINAL PATHOLOGIC DIAGNOSIS: NORMAL
GROSS: NORMAL
Lab: NORMAL
MICROSCOPIC EXAM: NORMAL

## 2022-01-05 NOTE — PRE-PROCEDURE INSTRUCTIONS
Attempted to PAT patient for procedure on 1.11. with Dr. Mario. No answer. M with return phone number. No return call at this time.

## 2022-01-07 NOTE — PRE-PROCEDURE INSTRUCTIONS
Spoke with patient regarding procedure scheduled on 1.11     Arrival time 0940     Has patient been sick with fever or on antibiotics within the last 7 days? No     Does the patient have any open wounds, sores or rashes? No     Does the patient have any recent fractures? no     Has patient received a vaccination within the last 7 days? No     Received the COVID vaccination? yes     Has the patient stopped all medications as directed? Na     Does patient have a pacemaker and or defibrillator? pacemaker     Does the patient have a ride to and from procedure and someone reliable to remain with patient? wife edgardo      Is the patient diabetic? yes     Does the patient have sleep apnea? Or use O2 at home? No and no      Is the patient receiving sedation? yes     Is the patient instructed to remain NPO beginning at midnight the night before their procedure? yes     Procedure location confirmed with patient? Yes     Covid- Denies signs/symptoms. Instructed to notify PAT/MD if any changes.

## 2022-01-11 ENCOUNTER — HOSPITAL ENCOUNTER (OUTPATIENT)
Facility: HOSPITAL | Age: 72
Discharge: HOME | End: 2022-01-11
Attending: PHYSICAL MEDICINE & REHABILITATION | Admitting: PHYSICAL MEDICINE & REHABILITATION
Payer: MEDICARE

## 2022-01-11 VITALS
TEMPERATURE: 98 F | OXYGEN SATURATION: 98 % | SYSTOLIC BLOOD PRESSURE: 149 MMHG | DIASTOLIC BLOOD PRESSURE: 67 MMHG | HEART RATE: 62 BPM | RESPIRATION RATE: 18 BRPM | HEIGHT: 66 IN | BODY MASS INDEX: 41.73 KG/M2 | WEIGHT: 259.69 LBS

## 2022-01-11 DIAGNOSIS — M54.16 LUMBAR RADICULOPATHY: ICD-10-CM

## 2022-01-11 DIAGNOSIS — M54.17 LUMBOSACRAL RADICULOPATHY: Primary | ICD-10-CM

## 2022-01-11 PROCEDURE — 25500020 PHARM REV CODE 255: Performed by: PHYSICAL MEDICINE & REHABILITATION

## 2022-01-11 PROCEDURE — 64483 PR EPIDURAL INJ, ANES/STEROID, TRANSFORAMINAL, LUMB/SACR, SNGL LEVL: ICD-10-PCS | Mod: RT,,, | Performed by: PHYSICAL MEDICINE & REHABILITATION

## 2022-01-11 PROCEDURE — 64484 NJX AA&/STRD TFRM EPI L/S EA: CPT | Performed by: PHYSICAL MEDICINE & REHABILITATION

## 2022-01-11 PROCEDURE — 63600175 PHARM REV CODE 636 W HCPCS: Performed by: PHYSICAL MEDICINE & REHABILITATION

## 2022-01-11 PROCEDURE — 64484 NJX AA&/STRD TFRM EPI L/S EA: CPT | Mod: RT,,, | Performed by: PHYSICAL MEDICINE & REHABILITATION

## 2022-01-11 PROCEDURE — 64483 NJX AA&/STRD TFRM EPI L/S 1: CPT | Mod: RT,,, | Performed by: PHYSICAL MEDICINE & REHABILITATION

## 2022-01-11 PROCEDURE — 25000003 PHARM REV CODE 250: Performed by: PHYSICAL MEDICINE & REHABILITATION

## 2022-01-11 PROCEDURE — 64484 PRA INJECT ANES/STEROID FORAMEN LUMBAR/SACRAL W IMG GUIDE ,EA ADD LEVEL: ICD-10-PCS | Mod: RT,,, | Performed by: PHYSICAL MEDICINE & REHABILITATION

## 2022-01-11 PROCEDURE — 64483 NJX AA&/STRD TFRM EPI L/S 1: CPT | Performed by: PHYSICAL MEDICINE & REHABILITATION

## 2022-01-11 RX ORDER — FENTANYL CITRATE 50 UG/ML
INJECTION, SOLUTION INTRAMUSCULAR; INTRAVENOUS
Status: DISCONTINUED | OUTPATIENT
Start: 2022-01-11 | End: 2022-01-11 | Stop reason: HOSPADM

## 2022-01-11 RX ORDER — MIDAZOLAM HYDROCHLORIDE 1 MG/ML
INJECTION, SOLUTION INTRAMUSCULAR; INTRAVENOUS
Status: DISCONTINUED | OUTPATIENT
Start: 2022-01-11 | End: 2022-01-11 | Stop reason: HOSPADM

## 2022-01-11 RX ORDER — METHYLPREDNISOLONE ACETATE 40 MG/ML
INJECTION, SUSPENSION INTRA-ARTICULAR; INTRALESIONAL; INTRAMUSCULAR; SOFT TISSUE
Status: DISCONTINUED | OUTPATIENT
Start: 2022-01-11 | End: 2022-01-11 | Stop reason: HOSPADM

## 2022-01-11 RX ORDER — BUPIVACAINE HYDROCHLORIDE 2.5 MG/ML
INJECTION, SOLUTION EPIDURAL; INFILTRATION; INTRACAUDAL
Status: DISCONTINUED | OUTPATIENT
Start: 2022-01-11 | End: 2022-01-11 | Stop reason: HOSPADM

## 2022-01-11 NOTE — H&P
HPI  Patient presenting for Procedure(s) (LRB):  Right L5/S1 + S1 TF NAHUN (Right)     Patient on Anti-coagulation Yes    No health changes since previous encounter    Past Medical History:   Diagnosis Date    Abnormal EKG 10/25/2013    Acute on chronic diastolic CHF (congestive heart failure) 11/27/2020    Arthritis     CAD (coronary artery disease)     Cancer     Prostate [id number] prostate    Glaucoma     Gout attack     Hyperlipidemia     Hypertension     Hypertrophic cardiomyopathy 10/25/2013    S/P CABG (coronary artery bypass graft) 10/25/2013     Past Surgical History:   Procedure Laterality Date    A-V CARDIAC PACEMAKER INSERTION Left 11/30/2020    Procedure: INSERTION, CARDIAC PACEMAKER, DUAL CHAMBER;  Surgeon: Elsy Kenyon MD;  Location: [facility] CATH LAB;  Service: Cardiology;  Laterality: Left;  biotronik    COLONOSCOPY N/A 12/4/2017    Procedure: COLONOSCOPY;  Surgeon: Dina Ledesma MD;  Location: [facility] ENDO;  Service: Endoscopy;  Laterality: N/A;    CORONARY ARTERY BYPASS GRAFT  05/2003    x1    PROSTATE SURGERY      RALP 2013    TRANSFORAMINAL EPIDURAL INJECTION OF STEROID Right 11/11/2019    Procedure: Right L5/S1+ S1 TF NAHUN with IV sedation;  Surgeon: Ermias Mario MD;  Location: [facility] PAIN MGT;  Service: Pain Management;  Laterality: Right;    TRANSFORAMINAL EPIDURAL INJECTION OF STEROID Right 11/24/2021    Procedure: right L5/S1 and S1 TF NAHUN;  Surgeon: Ermias Mario MD;  Location: [facility] PAIN MGT;  Service: Pain Management;  Laterality: Right;     Review of patient's allergies indicates:  No Known Allergies     No current facility-administered medications on file prior to encounter.     Current Outpatient Medications on File Prior to Encounter   Medication Sig Dispense Refill    allopurinoL (ZYLOPRIM) 300 MG tablet Take 1.5 tablets (450 mg total) by mouth once daily. 135 tablet 3    amLODIPine (NORVASC) 10 MG tablet Take 1 tablet (10 mg total) by mouth once daily. 90 tablet 3     aspirin 81 MG chewable tablet Take 1 tablet by mouth Daily.      celecoxib (CELEBREX) 200 MG capsule Take 1 capsule (200 mg total) by mouth daily as needed for Pain. Take with food 90 capsule 2    fish oil-fat acid [id number] 1,200 mg (400 [id number]) Cap Take 1 capsule by mouth once daily.       furosemide (LASIX) 20 MG tablet Take 1 tablet (20 mg total) by mouth daily as needed (swelling). 30 tablet 11    latanoprost 0.005 % ophthalmic solution Place 1 drop into both eyes every evening. 7.5 mL 4    lisinopriL (PRINIVIL,ZESTRIL) 40 MG tablet Take 1 tablet (40 mg total) by mouth once daily. 90 tablet 3    metoprolol succinate (TOPROL-XL) 50 MG 24 hr tablet Take 3 tablets (150 mg total) by mouth once daily. 90 tablet 11    MITIGARE 0.6 mg Cap Take 1 capsule (0.6 mg total) by mouth 2 (two) times daily as needed (acute gout). 30 capsule 1    rosuvastatin (CRESTOR) 40 MG Tab Take 1 tablet (40 mg total) by mouth every evening. Generic is fine. 90 tablet 3    timolol maleate 0.5% (TIMOPTIC) 0.5 % Drop Place 1 drop into both eyes every morning. 10 mL 4    tiZANidine (ZANAFLEX) 4 MG tablet Take 1 tablet (4 mg total) by mouth every 8 (eight) hours as needed (MUSCLE SPASMS). 30 tablet 0    turmeric 400 mg Cap Take 1 tablet by mouth once daily. 60 capsule 3    acetaminophen (TYLENOL) 500 MG tablet Take 500 mg by mouth every 6 (six) hours as needed for Pain.      amLODIPine (NORVASC) 5 MG tablet       flunisolide 25 mcg, 0.025%, (NASALIDE) 25 mcg (0.025 %) Spry USE 2 SPRAYS IN EACH NOSTRIL TWICE DAILY 25 mL 0    pantoprazole (PROTONIX) 40 MG tablet Take 1 tablet (40 mg total) by mouth once daily. (replaces nexium) 90 tablet 3    traMADoL (ULTRAM) 50 mg tablet Take 1 tablet (50 mg total) by mouth every 12 (twelve) hours as needed for Pain. 14 tablet 0        PMHx, PSHx, Allergies, Medications reviewed in epic    ROS negative except pain complaints in HPI    OBJECTIVE:    BP (!) 161/84 (BP Location:  "Right arm, Patient Position: Sitting)   Pulse 60   Temp 97.5 °F (36.4 °C) (Temporal)   Resp 15   Ht 5' 6" (1.676 m)   Wt 117.8 kg (259 lb 11.2 oz)   SpO2 100%   BMI 41.92 kg/m²     PHYSICAL EXAMINATION:    GENERAL: Well appearing, in no acute distress, alert and oriented x3.  PSYCH:  Mood and affect appropriate.  SKIN: Skin color, texture, turgor normal, no rashes or lesions which will impact the procedure.  CV: RRR with palpation of the radial artery.  PULM: No evidence of respiratory difficulty, symmetric chest rise. Clear to auscultation.  NEURO: Cranial nerves grossly intact.    Plan:    Proceed with procedure as planned Procedure(s) (LRB):  Right L5/S1 + S1 TF NAHUN (Right)    Ermias Mario MD  01/11/2022            "

## 2022-01-11 NOTE — OP NOTE
INFORMED CONSENT: The procedure, risks, benefits and options were discussed with patient. There are no contraindications to the procedure. The patient expressed understanding and agreed to proceed. The personnel performing the procedure was discussed.    01/11/2022    Surgeon: Ermias Mario MD    Assistants: None    Sedation: Conscious sedation provided by M.D    The patient was monitored with continuous pulse oximetry, EKG, and intermittent blood pressure monitors, immediately prior to administration of sedation.  The patient was hemodynamically stable throughout the entire process was responsive to voice, and breathing spontaneously.  Supplemental O2 was provided at 2L/min via nasal cannula.  Patient was comfortable for the duration of the procedure.     There was a total of 2mg IV Midazolam and 50mcg Fentanyl titrated for the procedure    Total sedation time was >10minutes and <20minutes        PROCEDURE:    1)  Right  L5-S1 TRANSFORAMINAL EPIDURAL STEROID INJECTION    2)  Right  S1 TRANSFORAMINAL EPIDURAL STEROID INJECTION    Pre Procedure diagnosis:    Right  L5 and S1  Right lumbar radiculopathy [M54.16]    Post-Procedure diagnosis:   same    Complications: None    Specimens: None      DESCRIPTION OF PROCEDURE: The patient was brought to the procedure room. IV access was obtained prior to the procedure. The patient was positioned prone on the fluoroscopy table. Continuous hemodynamic monitoring was initiated including blood pressure, EKG, and pulse oximetry. . The skin was prepped with chlorhexidine and draped in a sterile fashion. Skin anesthesia was achieved using a total of 10mL of lidocaine, 5mL over each respective injection site.     The  L5-S1 and S1  transforaminal spaces were identified with fluoroscopy in the  AP, oblique, and lateral views.  A 22 gauge spinal quinke needle was then advanced into the area of the trans foraminal spaces at the above levels with confirmation of proper needle  position using AP, oblique, and lateral fluoroscopic views. Once the needle tip was in the area of the transforaminal space, and there was no blood, CSF or paraesthesias,  1 mL of Omnipaque 300mg/ml was injected on each side for a total of 2 mL.  Fluoroscopic imaging in the AP and lateral views revealed a clear outline of the spinal nerve with proximal spread of agent through the neural foramen into the epidural space. A total combination of 1 mL of Bupivicaine 0.25% and 40 mg depo medrol was injected into each level for a total of 4mL of injected medications with displacement of the contrast dye confirming that the medication went into the area of the transforaminal spaces at each level. A sterile dressing was applied.   Patient tolerated the procedure well.    Patient was taken back to the recovery room for further observation.     The patient was discharged to home in stable condition

## 2022-01-11 NOTE — DISCHARGE SUMMARY
Discharge Note  Short Stay      SUMMARY     Admit Date: 1/11/2022    Attending Physician: Ermias Mario MD        Discharge Physician: Ermias Mario MD        Discharge Date: 1/11/2022 11:09 AM    Procedure(s) (LRB):  Right L5/S1 + S1 TF NAHUN (Right)    Final Diagnosis: Right lumbar radiculopathy [M54.16]    Disposition: Home or self care    Patient Instructions:   Current Discharge Medication List      CONTINUE these medications which have NOT CHANGED    Details   allopurinoL (ZYLOPRIM) 300 MG tablet Take 1.5 tablets (450 mg total) by mouth once daily.  Qty: 135 tablet, Refills: 3    Associated Diagnoses: Chronic gout due to renal impairment of multiple sites without tophus      !! amLODIPine (NORVASC) 10 MG tablet Take 1 tablet (10 mg total) by mouth once daily.  Qty: 90 tablet, Refills: 3    Comments: .  Associated Diagnoses: Essential hypertension; Coronary artery disease involving coronary bypass graft of native heart without angina pectoris      aspirin 81 MG chewable tablet Take 1 tablet by mouth Daily.      celecoxib (CELEBREX) 200 MG capsule Take 1 capsule (200 mg total) by mouth daily as needed for Pain. Take with food  Qty: 90 capsule, Refills: 2      fish oil-fat acid comb.8-hb137 1,200 mg (400 [id number]) Cap Take 1 capsule by mouth once daily.       furosemide (LASIX) 20 MG tablet Take 1 tablet (20 mg total) by mouth daily as needed (swelling).  Qty: 30 tablet, Refills: 11    Associated Diagnoses: Leg swelling      latanoprost 0.005 % ophthalmic solution Place 1 drop into both eyes every evening.  Qty: 7.5 mL, Refills: 4    Comments: Please dispense a 90 day supply. Thanks.  Associated Diagnoses: Primary open angle glaucoma of both eyes, moderate stage      lisinopriL (PRINIVIL,ZESTRIL) 40 MG tablet Take 1 tablet (40 mg total) by mouth once daily.  Qty: 90 tablet, Refills: 3    Comments: .  Associated Diagnoses: Essential hypertension      metoprolol succinate (TOPROL-XL) 50 MG 24 hr tablet  Take 3 tablets (150 mg total) by mouth once daily.  Qty: 90 tablet, Refills: 11    Comments: .  Associated Diagnoses: NSVT (nonsustained ventricular tachycardia); SVT (supraventricular tachycardia)      MITIGARE 0.6 mg Cap Take 1 capsule (0.6 mg total) by mouth 2 (two) times daily as needed (acute gout).  Qty: 30 capsule, Refills: 1    Associated Diagnoses: Chronic gout due to renal impairment of multiple sites without tophus      pregabalin (LYRICA) 75 MG capsule Take 1 capsule (75 mg total) by mouth every evening for 5 days, THEN 1 capsule (75 mg total) 2 (two) times daily.  Qty: 120 capsule, Refills: 0    Associated Diagnoses: Right lumbar radiculopathy      rosuvastatin (CRESTOR) 40 MG Tab Take 1 tablet (40 mg total) by mouth every evening. Generic is fine.  Qty: 90 tablet, Refills: 3    Associated Diagnoses: Hyperlipidemia, unspecified hyperlipidemia type; Coronary artery disease involving coronary bypass graft of native heart without angina pectoris      timolol maleate 0.5% (TIMOPTIC) 0.5 % Drop Place 1 drop into both eyes every morning.  Qty: 10 mL, Refills: 4    Comments: Please dispense a 90 day supply. Thanks.  Associated Diagnoses: Primary open angle glaucoma of both eyes, moderate stage      tiZANidine (ZANAFLEX) 4 MG tablet Take 1 tablet (4 mg total) by mouth every 8 (eight) hours as needed (MUSCLE SPASMS).  Qty: 30 tablet, Refills: 0    Associated Diagnoses: Lumbar muscle pain      turmeric 400 mg Cap Take 1 tablet by mouth once daily.  Qty: 60 capsule, Refills: 3      acetaminophen (TYLENOL) 500 MG tablet Take 500 mg by mouth every 6 (six) hours as needed for Pain.      !! amLODIPine (NORVASC) 5 MG tablet       flunisolide 25 mcg, 0.025%, (NASALIDE) 25 mcg (0.025 %) Spry USE 2 SPRAYS IN EACH NOSTRIL TWICE DAILY  Qty: 25 mL, Refills: 0    Associated Diagnoses: Viral URI      pantoprazole (PROTONIX) 40 MG tablet Take 1 tablet (40 mg total) by mouth once daily. (replaces nexium)  Qty: 90 tablet,  Refills: 3    Comments: This REPLACES Esomeprazole rx.  Associated Diagnoses: Gastroesophageal reflux disease, esophagitis presence not specified      traMADoL (ULTRAM) 50 mg tablet Take 1 tablet (50 mg total) by mouth every 12 (twelve) hours as needed for Pain.  Qty: 14 tablet, Refills: 0    Comments: Quantity prescribed more than 7 day supply? No       !! - Potential duplicate medications found. Please discuss with provider.              Discharge Diagnosis: Right lumbar radiculopathy [M54.16]  Condition on Discharge: Stable with no complications to procedure   Diet on Discharge: Same as before.  Activity: as per instruction sheet.  Discharge to: Home with a responsible adult.  Follow up: 2-4 weeks       Please call the office if you experience any weakness or loss of sensation, fever > 101.5, pain uncontrolled with oral medications, persistent nausea/vomiting/or diarrhea, redness or drainage from the incisions, or any other worrisome concerns. If physician on call was not reached or could not communicate with our office for any reason please go to the nearest emergency department

## 2022-01-11 NOTE — DISCHARGE INSTRUCTIONS

## 2022-01-13 ENCOUNTER — CLINICAL SUPPORT (OUTPATIENT)
Dept: DERMATOLOGY | Facility: CLINIC | Age: 72
End: 2022-01-13
Payer: MEDICARE

## 2022-01-13 DIAGNOSIS — Z48.02 VISIT FOR SUTURE REMOVAL: Primary | ICD-10-CM

## 2022-01-13 LAB — POCT GLUCOSE: 100 MG/DL (ref 70–110)

## 2022-01-21 ENCOUNTER — PES CALL (OUTPATIENT)
Dept: ADMINISTRATIVE | Facility: CLINIC | Age: 72
End: 2022-01-21
Payer: MEDICARE

## 2022-01-23 ENCOUNTER — PATIENT OUTREACH (OUTPATIENT)
Dept: ADMINISTRATIVE | Facility: OTHER | Age: 72
End: 2022-01-23
Payer: MEDICARE

## 2022-01-24 ENCOUNTER — OFFICE VISIT (OUTPATIENT)
Dept: ORTHOPEDICS | Facility: CLINIC | Age: 72
End: 2022-01-24
Payer: MEDICARE

## 2022-01-24 VITALS
WEIGHT: 259 LBS | HEART RATE: 80 BPM | HEIGHT: 66 IN | DIASTOLIC BLOOD PRESSURE: 80 MMHG | BODY MASS INDEX: 41.62 KG/M2 | SYSTOLIC BLOOD PRESSURE: 141 MMHG

## 2022-01-24 DIAGNOSIS — M17.11 ARTHRITIS OF KNEE, RIGHT: ICD-10-CM

## 2022-01-24 DIAGNOSIS — M48.061 SPINAL STENOSIS OF LUMBAR REGION WITH RADICULOPATHY: ICD-10-CM

## 2022-01-24 DIAGNOSIS — I87.2 EDEMA OF BOTH LOWER EXTREMITIES DUE TO PERIPHERAL VENOUS INSUFFICIENCY: ICD-10-CM

## 2022-01-24 DIAGNOSIS — M10.9 GOUT, UNSPECIFIED CAUSE, UNSPECIFIED CHRONICITY, UNSPECIFIED SITE: Primary | ICD-10-CM

## 2022-01-24 DIAGNOSIS — M17.12 ARTHRITIS OF KNEE, LEFT: ICD-10-CM

## 2022-01-24 DIAGNOSIS — M21.161 ACQUIRED VARUS DEFORMITY KNEE, RIGHT: ICD-10-CM

## 2022-01-24 DIAGNOSIS — M21.162 ACQUIRED VARUS DEFORMITY KNEE, LEFT: ICD-10-CM

## 2022-01-24 DIAGNOSIS — M54.16 SPINAL STENOSIS OF LUMBAR REGION WITH RADICULOPATHY: ICD-10-CM

## 2022-01-24 DIAGNOSIS — M54.17 LUMBOSACRAL RADICULOPATHY: ICD-10-CM

## 2022-01-24 PROCEDURE — 99999 PR PBB SHADOW E&M-EST. PATIENT-LVL IV: ICD-10-PCS | Mod: PBBFAC,,, | Performed by: ORTHOPAEDIC SURGERY

## 2022-01-24 PROCEDURE — 99213 OFFICE O/P EST LOW 20 MIN: CPT | Mod: S$PBB,,, | Performed by: ORTHOPAEDIC SURGERY

## 2022-01-24 PROCEDURE — 99214 OFFICE O/P EST MOD 30 MIN: CPT | Mod: PBBFAC | Performed by: ORTHOPAEDIC SURGERY

## 2022-01-24 PROCEDURE — 99999 PR PBB SHADOW E&M-EST. PATIENT-LVL IV: CPT | Mod: PBBFAC,,, | Performed by: ORTHOPAEDIC SURGERY

## 2022-01-24 PROCEDURE — 99213 PR OFFICE/OUTPT VISIT, EST, LEVL III, 20-29 MIN: ICD-10-PCS | Mod: S$PBB,,, | Performed by: ORTHOPAEDIC SURGERY

## 2022-01-24 RX ORDER — ALLOPURINOL 300 MG/1
300 TABLET ORAL DAILY
Qty: 90 TABLET | Refills: 4 | Status: SHIPPED | OUTPATIENT
Start: 2022-01-24 | End: 2022-02-10 | Stop reason: SDUPTHER

## 2022-01-24 NOTE — PROGRESS NOTES
Subjective:     Patient ID: Damon Yoo is a 71 y.o. male.    Chief Complaint: Pain of the Right Knee and Pain of the Left Knee    HPI:  69-year-old diagnosed with bilateral knee arthrosis given injection of steroid in September placed on meloxicam which did not help then we changed him to Celebrex.  Celebrex seems to help.  Workup found out that he has old MRI from 2014 with neuroforaminal stenosis multilevel.  Workup with EMG-NCV recently ordered by me showed chronic L5-S1 radiculopathy bilaterally with peripheral poly neuropathy superimposed.  We sent him for NAHUN with pain management and that helped significantly that his pain is 2-3/10 down his legs and knees.  He still takes Celebrex daily with relief.  For the 1st time was able to work in the [employer] it without difficulty.  Not interested in having surgery at this point.    01/23/2020  Patient with bilateral knee severe arthrosis lumbosacral arthrosis.  The Celebrex seems to work really well for him.  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  He still states he is doing well at this point do not want a proceed with any surgical intervention.  His pain level on and off is 5/10.  Slight grinding and catching. Not using any assistive devices to ambulate    03/05/2020  Bilateral knee arthritis and lumbar radiculopathy with neuroforaminal stenosis multilevel.  States the epidural block still working and the Celebrex still helping.  She is remaining very active working in the [employer].  Does not think he needs any injections in the back or the knee at this point.  His pain level is 2/10 occasionally goes up and he takes his Celebrex.  He is ambulating independently without any assistive devices.  Denies loss of bowel bladder control    07/13/2020  Left knee is hurting quite a bit.  His back is not so much and the numbness and tingling in the legs a not bothering him.  He received last steroid injection in September  2019 and requesting a new 1.  Celebrex does help some but now the pain in the left knee is 8/10.  He wants to avoid surgical intervention.  He did receive E NAHUN in the back by pain management and he does not think he needs another 1.  He remains very active with exercise.  Denies any fever or chills or shortness of breath or difficulty chewing swallowing loss of bowel bladder control.  He is maintaining social distancing.    10/15/2020  Patient stated the injection given last visit in the knee seems to have helped significantly.  His pain level is 3/10.  The pain gets worse when he works in the [employer] his back will hurt as well as is knee.  Now he is taking Celebrex only as needed and managing.  He does not think he needs any injections today.  He is remaining active.  NAHUN given by pain management seems to have worked for him last time.  Denies any fever or chills or shortness of breath or difficulty with chewing or swallowing loss of bowel bladder control blurry vision double vision or loss of sense of smell or taste or chest pain or stomach issues or kidney issues    21/21  Patient numbness in the legs improved his knee arthritis is not as bad pain level around 5/10 on occasions.  He does take Celebrex only on occasions .  Sometimes he takes Tylenol.  He is maintaining his activity level.  His low back not hurting as much as used to either.  Occasionally gets left hip tenderness over the greater trochanter but not on a daily basis.  He does not think that he needs any injections today and doing well.  Denies any fever or chills or shortness of breath or difficulty with chewing or swallowing loss of bowel bladder control blurry vision double vision or loss sense smell or taste    07/22/2021  Patient is complaining of tightness around his ankles.  Does have history of hypertension takes numerous medications.  Also has have history of both knees hurting however he is running out of Celebrex.  He would like dark renewal.   He takes it on occasions.  He has left knee is hurting him a little bit more than the right.  His back is not hurting him as much as he used to before.  He is ambulating without assistive devices.  His pain is roughly 7/10.  No fever no chills no shortness of breath or difficulty with chewing or swallowing loss of bowel bladder control blurry vision double vision loss sense smell or taste    01/24/2022  Bilateral knee severe arthritis bone-on-bone with severe varus deformity.  Also has swelling in his legs.  Also has evidence of spinal stenosis with radiculopathy.  We do give him the Celebrex and Lasix to take on as-needed basis.  He also has history of gout he takes allopurinol given to him by Alethea Finley who had left.  He is requesting future prescriptions so he does not have his gout act up on him.  As far as his knees are concerned he is doing okay by taking Celebrex occasionally and when his legs swell up he occasionally takes Lasix.  As far as the numbness down the leg he recently received this injection in his back which helped the numbness going down his right leg and he is very pleased.  He is ambulating without any assistive devices.  His pain level is 2/10 and he thinks he does not need any injections for his knees at this time.  No fever no chills no shortness of breath difficulty with chewing swallowing loss of bowel bladder control blurry vision double vision loss sense smell or taste    I did tell him he can discuss allopurinol with Dr. Mason  Past Medical History:   Diagnosis Date    Abnormal EKG 10/25/2013    Acute on chronic diastolic CHF (congestive heart failure) 11/27/2020    Arthritis     CAD (coronary artery disease)     Cancer     Prostate [id number] prostate    Glaucoma     Gout attack     Hyperlipidemia     Hypertension     Hypertrophic cardiomyopathy 10/25/2013    S/P CABG (coronary artery bypass graft) 10/25/2013     Past Surgical History:   Procedure Laterality Date    A-V  CARDIAC PACEMAKER INSERTION Left 2020    Procedure: INSERTION, CARDIAC PACEMAKER, DUAL CHAMBER;  Surgeon: Elsy Kenyon MD;  Location: [facility] CATH LAB;  Service: Cardiology;  Laterality: Left;  biotronik    COLONOSCOPY N/A 2017    Procedure: COLONOSCOPY;  Surgeon: Dina Ledesma MD;  Location: [facility] ENDO;  Service: Endoscopy;  Laterality: N/A;    CORONARY ARTERY BYPASS GRAFT  05/2003    x1    PROSTATE SURGERY      RALP 2013    TRANSFORAMINAL EPIDURAL INJECTION OF STEROID Right 2019    Procedure: Right L5/S1+ S1 TF NAHUN with IV sedation;  Surgeon: Ermias Mario MD;  Location: [facility] PAIN MGT;  Service: Pain Management;  Laterality: Right;    TRANSFORAMINAL EPIDURAL INJECTION OF STEROID Right 2021    Procedure: right L5/S1 and S1 TF NAHUN;  Surgeon: rEmias Mario MD;  Location: [facility] PAIN MGT;  Service: Pain Management;  Laterality: Right;    TRANSFORAMINAL EPIDURAL INJECTION OF STEROID Right 2022    Procedure: Right L5/S1 + S1 TF NAHUN;  Surgeon: Ermias Mario MD;  Location: [facility] PAIN MGT;  Service: Pain Management;  Laterality: Right;     Family History   Problem Relation Age of Onset    Hypertension Father     Strabismus Neg Hx     Retinal detachment Neg Hx     Macular degeneration Neg Hx     Glaucoma Neg Hx     Blindness Neg Hx     Amblyopia Neg Hx      Social History     Socioeconomic History    Marital status:    Tobacco Use    Smoking status: Former Smoker     Packs/day: 0.25     Years: 15.00     Pack years: 3.75     Quit date: 1988     Years since quittin.7    Smokeless tobacco: Never Used   Substance and Sexual Activity    Alcohol use: No    Drug use: No   Social History Narrative    No pets or smokers in household.     Medication List with Changes/Refills   New Medications    ALLOPURINOL (ZYLOPRIM) 300 MG TABLET    Take 1 tablet (300 mg total) by mouth once daily.   Current Medications    ACETAMINOPHEN (TYLENOL) 500 MG TABLET    Take 500 mg by  mouth every 6 (six) hours as needed for Pain.    ALLOPURINOL (ZYLOPRIM) 300 MG TABLET    Take 1.5 tablets (450 mg total) by mouth once daily.    AMLODIPINE (NORVASC) 10 MG TABLET    Take 1 tablet (10 mg total) by mouth once daily.    AMLODIPINE (NORVASC) 5 MG TABLET        ASPIRIN 81 MG CHEWABLE TABLET    Take 1 tablet by mouth Daily.    CELECOXIB (CELEBREX) 200 MG CAPSULE    Take 1 capsule (200 mg total) by mouth daily as needed for Pain. Take with food    FISH OIL-FAT ACID COMB.8- 1,200 MG (400 [id number]) CAP    Take 1 capsule by mouth once daily.     FLUNISOLIDE 25 MCG, 0.025%, (NASALIDE) 25 MCG (0.025 %) SPRY    USE 2 SPRAYS IN EACH NOSTRIL TWICE DAILY    FUROSEMIDE (LASIX) 20 MG TABLET    Take 1 tablet (20 mg total) by mouth daily as needed (swelling).    LATANOPROST 0.005 % OPHTHALMIC SOLUTION    Place 1 drop into both eyes every evening.    LISINOPRIL (PRINIVIL,ZESTRIL) 40 MG TABLET    Take 1 tablet (40 mg total) by mouth once daily.    METOPROLOL SUCCINATE (TOPROL-XL) 50 MG 24 HR TABLET    Take 3 tablets (150 mg total) by mouth once daily.    MITIGARE 0.6 MG CAP    Take 1 capsule (0.6 mg total) by mouth 2 (two) times daily as needed (acute gout).    PANTOPRAZOLE (PROTONIX) 40 MG TABLET    Take 1 tablet (40 mg total) by mouth once daily. (replaces nexium)    PREGABALIN (LYRICA) 75 MG CAPSULE    Take 1 capsule (75 mg total) by mouth every evening for 5 days, THEN 1 capsule (75 mg total) 2 (two) times daily.    ROSUVASTATIN (CRESTOR) 40 MG TAB    Take 1 tablet (40 mg total) by mouth every evening. Generic is fine.    TIMOLOL MALEATE 0.5% (TIMOPTIC) 0.5 % DROP    Place 1 drop into both eyes every morning.    TIZANIDINE (ZANAFLEX) 4 MG TABLET    Take 1 tablet (4 mg total) by mouth every 8 (eight) hours as needed (MUSCLE SPASMS).    TRAMADOL (ULTRAM) 50 MG TABLET    Take 1 tablet (50 mg total) by mouth every 12 (twelve) hours as needed for Pain.    TURMERIC 400 MG CAP    Take 1 tablet by mouth once  daily.     Review of patient's allergies indicates:  No Known Allergies  Review of Systems   Constitutional: Negative for decreased appetite.   HENT: Negative for tinnitus.    Eyes: Negative for double vision.   Cardiovascular: Negative for chest pain.   Respiratory: Negative for wheezing.    Hematologic/Lymphatic: Negative for bleeding problem.   Skin: Negative for dry skin.   Musculoskeletal: Positive for arthritis, back pain and joint pain. Negative for gout, neck pain and stiffness.   Gastrointestinal: Negative for abdominal pain.   Genitourinary: Negative for bladder incontinence.   Neurological: Positive for paresthesias. Negative for numbness and sensory change.   Psychiatric/Behavioral: Negative for altered mental status.       Objective:   Body mass index is 41.8 kg/m².  Vitals:    01/24/22 0729   BP: (!) 141/80   Pulse: 80          General    Constitutional: He is oriented to person, place, and time. He appears well-developed.   HENT:   Head: Atraumatic.   Eyes: EOM are normal.   Cardiovascular: Normal rate.    Pulmonary/Chest: Effort normal.   Abdominal: Soft.   Neurological: He is alert and oriented to person, place, and time.   Psychiatric: Judgment normal.            Cervical spine with good range of motion and slight crepitus but no pain  Lumbar with pain from L3 down to L5 paraspinal.  No true deformity seen.  Bilateral upper extremity neurovascularly intact strength is 5/5 throughout radial and ulnar pulses 2+ sensory intact to touch  Pelvis is level  Bilateral hip range of motion within normal limits.  Hip flexors, abduct his adductors quads and hamstrings were 5/5.  Slight discomfort over the greater troch on the left but other  Bilateral knees with varus deformity.  Crepitus with motion on the patella and medially.  Tenderness to palpation over the patella and medial joint.  Range of motion 5-125 degrees. Very mild swelling.  Left knee has severe pain on the medial side with mild  swelling.  Calves are soft nontender  Positive pitting edema up to mid tibia  Ankle motion intact  DP 1+  Decrease in sensation in his foot on the lateral aspect  Skin is warm to touch  Patellar reflex 2+    EMG-NCV with chronic bilateral L5-S1 radiculopathy with superimposed peripheral polyneuropathy    Relevant imaging results reviewed and interpreted by me, discussed with the patient and / or family today   X-ray and electronic records bilateral knees with complete loss of medial joint space and osteophytic changes consistent with end-stage arthrosis  MRI 2014 multilevel foraminal and central stenosis from L3 down to L5 and S1  Assessment:     Encounter Diagnoses   Name Primary?    Arthritis of knee, left     Arthritis of knee, right     Lumbosacral radiculopathy     Spinal stenosis of lumbar region with radiculopathy     Acquired varus deformity knee, left     Acquired varus deformity knee, right     Edema of both lower extremities due to peripheral venous insufficiency     Gout, unspecified cause, unspecified chronicity, unspecified site Yes        Plan:   Gout, unspecified cause, unspecified chronicity, unspecified site  -     allopurinoL (ZYLOPRIM) 300 MG tablet; Take 1 tablet (300 mg total) by mouth once daily.  Dispense: 90 tablet; Refill: 4    Arthritis of knee, left    Arthritis of knee, right    Lumbosacral radiculopathy    Spinal stenosis of lumbar region with radiculopathy    Acquired varus deformity knee, left    Acquired varus deformity knee, right    Edema of both lower extremities due to peripheral venous insufficiency         Patient Instructions     Gout and you run out of her allopurinol and the provider / Alethea brock is not here anymore and you are requesting renewal of the allopurinol which I provided  Both of her knees are severely arthritic aches and the Celebrex which you take occasionally is helping and you do not need any injections at this time  Your back pain with the numbness  going down the right leg is better after injection received recently by pain management in you back  Occasionally is still taking the Lasix which is the water pill when you legs are swelling up but you can still continue  I will see you back in 6 months    Patient is doing much better after he had NAHUN.  Will hold off on any surgical intervention and on injection of the knees      Disclaimer: This note was prepared using a voice recognition system and is likely to have sound alike errors within the text.

## 2022-01-24 NOTE — PATIENT INSTRUCTIONS
Gout and you run out of her allopurinol and the provider / Alethea brock is not here anymore and you are requesting renewal of the allopurinol which I provided  Both of her knees are severely arthritic aches and the Celebrex which you take occasionally is helping and you do not need any injections at this time  Your back pain with the numbness going down the right leg is better after injection received recently by pain management in you back  Occasionally is still taking the Lasix which is the water pill when you legs are swelling up but you can still continue  I will see you back in 6 months

## 2022-01-24 NOTE — PROGRESS NOTES
Health Maintenance Due   Topic Date Due    Shingles Vaccine (3 of 3) 04/14/2020    Influenza Vaccine (1) Never done    COVID-19 Vaccine (2 - Moderna 3-dose series) 12/14/2021     Updates were requested from care everywhere.  Chart was reviewed for overdue Proactive [facility] Encounters ([facility]EL[facility]) topics (CRS, Breast Cancer Screening, Eye exam)  Health Maintenance has been updated.  LINKS immunization registry triggered.  Immunizations were reconciled.

## 2022-01-31 ENCOUNTER — EXTERNAL CHRONIC CARE MANAGEMENT (OUTPATIENT)
Dept: PRIMARY CARE CLINIC | Facility: CLINIC | Age: 72
End: 2022-01-31
Payer: MEDICARE

## 2022-01-31 PROCEDURE — 99490 CHRNC CARE MGMT STAFF 1ST 20: CPT | Mod: S$PBB,,, | Performed by: PEDIATRICS

## 2022-01-31 PROCEDURE — 99490 CHRNC CARE MGMT STAFF 1ST 20: CPT | Mod: PBBFAC | Performed by: PEDIATRICS

## 2022-01-31 PROCEDURE — 99490 PR CHRONIC CARE MGMT, 1ST 20 MIN: ICD-10-PCS | Mod: S$PBB,,, | Performed by: PEDIATRICS

## 2022-01-31 RX ORDER — PANTOPRAZOLE SODIUM 40 MG/1
40 TABLET, DELAYED RELEASE ORAL DAILY
Qty: 90 TABLET | Refills: 3 | Status: SHIPPED | OUTPATIENT
Start: 2022-01-31 | End: 2023-02-06 | Stop reason: SDUPTHER

## 2022-01-31 NOTE — TELEPHONE ENCOUNTER
No new care gaps identified.  Powered by [employer]. Reference number: [id number].   1/31/2022 8:03:13 AM CST

## 2022-02-07 ENCOUNTER — LAB VISIT (OUTPATIENT)
Dept: LAB | Facility: HOSPITAL | Age: 72
End: 2022-02-07
Attending: PEDIATRICS
Payer: MEDICARE

## 2022-02-07 DIAGNOSIS — M1A.39X0 CHRONIC GOUT DUE TO RENAL IMPAIRMENT OF MULTIPLE SITES WITHOUT TOPHUS: ICD-10-CM

## 2022-02-07 DIAGNOSIS — R73.03 PREDIABETES: ICD-10-CM

## 2022-02-07 DIAGNOSIS — E78.5 HYPERLIPIDEMIA, UNSPECIFIED HYPERLIPIDEMIA TYPE: Chronic | ICD-10-CM

## 2022-02-07 LAB
ALBUMIN SERPL BCP-MCNC: 3.7 G/DL (ref 3.5–5.2)
ALP SERPL-CCNC: 88 U/L (ref 55–135)
ALT SERPL W/O P-5'-P-CCNC: 12 U/L (ref 10–44)
ANION GAP SERPL CALC-SCNC: 7 MMOL/L (ref 8–16)
AST SERPL-CCNC: 21 U/L (ref 10–40)
BILIRUB SERPL-MCNC: 0.8 MG/DL (ref 0.1–1)
BUN SERPL-MCNC: 23 MG/DL (ref 8–23)
CALCIUM SERPL-MCNC: 9.6 MG/DL (ref 8.7–10.5)
CHLORIDE SERPL-SCNC: 110 MMOL/L (ref 95–110)
CHOLEST SERPL-MCNC: 127 MG/DL (ref 120–199)
CHOLEST/HDLC SERPL: 4.1 {RATIO} (ref 2–5)
CO2 SERPL-SCNC: 24 MMOL/L (ref 23–29)
CREAT SERPL-MCNC: 1.6 MG/DL (ref 0.5–1.4)
EST. GFR  (AFRICAN AMERICAN): 50.1 ML/MIN/1.73 M^2
EST. GFR  (NON AFRICAN AMERICAN): 43.3 ML/MIN/1.73 M^2
ESTIMATED AVG GLUCOSE: 126 MG/DL (ref 68–131)
GLUCOSE SERPL-MCNC: 87 MG/DL (ref 70–110)
HBA1C MFR BLD: 6 % (ref 4–5.6)
HDLC SERPL-MCNC: 31 MG/DL (ref 40–75)
HDLC SERPL: 24.4 % (ref 20–50)
LDLC SERPL CALC-MCNC: 72.8 MG/DL (ref 63–159)
NONHDLC SERPL-MCNC: 96 MG/DL
POTASSIUM SERPL-SCNC: 4.4 MMOL/L (ref 3.5–5.1)
PROT SERPL-MCNC: 7 G/DL (ref 6–8.4)
SODIUM SERPL-SCNC: 141 MMOL/L (ref 136–145)
TRIGL SERPL-MCNC: 116 MG/DL (ref 30–150)
URATE SERPL-MCNC: 4.4 MG/DL (ref 3.4–7)

## 2022-02-07 PROCEDURE — 36415 COLL VENOUS BLD VENIPUNCTURE: CPT | Performed by: NURSE PRACTITIONER

## 2022-02-07 PROCEDURE — 80053 COMPREHEN METABOLIC PANEL: CPT | Performed by: NURSE PRACTITIONER

## 2022-02-07 PROCEDURE — 80061 LIPID PANEL: CPT | Performed by: NURSE PRACTITIONER

## 2022-02-07 PROCEDURE — 84550 ASSAY OF BLOOD/URIC ACID: CPT | Performed by: NURSE PRACTITIONER

## 2022-02-07 PROCEDURE — 83036 HEMOGLOBIN GLYCOSYLATED A1C: CPT | Performed by: NURSE PRACTITIONER

## 2022-02-09 NOTE — PROGRESS NOTES
Established Patient Chronic Pain Note (Follow up visit)    Chief Complaint:   Chief Complaint   Patient presents with    Leg Pain     Right    + RLE pain - mostly over shin into foot    SUBJECTIVE:    Interval History (2/10/2022): Damon Yoo presents today for follow-up visit.  he underwent right L5/S1 + S1 TF NAHUN on 1/11/22.  The patient reports that he is/was better following the procedure.  he reports 90% pain relief.  The changes lasted 4 weeks so far.  The changes have continued through this visit.  Patient reports pain as 3/10 today.  Last visit, he was started on Lyrica which she is taking once per day with some pain relief.  He also takes Zanaflex and Celebrex as needed.    Interval History (12/22/2021): Damon Yoo presents today for follow-up visit.  Patient was seen on 11/24/21. At that time he underwent right L5/S1 + S1 TF NAHUN.  The patient reports that he is/was better following the procedure.  he reports 100% pain relief.  The changes lasted 2 weeks.  The changes have NOT continued through this visit.  Patient reports pain as 5/10 today.    Interval HPI (11/10/2021):  Damon Yoo is a 71 y.o. male who presents to the clinic for a follow-up appointment for lower back and right radicular pain.  He was last seen on 12/12/2019.  Since the last visit, Damon Yoo states the pain has been starting to return is near its baseline. Current pain intensity is 8/10.  He locates the pain to the right lower extremity extending in an L5 and S1 distribution.  Patient denies night fever/night sweats, urinary incontinence, bowel incontinence, significant weight loss, significant motor weakness and loss of sensations.    Interval HPI 12/12/2019:  Damon Yoo is a 71 y.o. male who presents to the clinic for a follow-up appointment for lower back and right radicular pain. Patient underwent right-sided L5/S1 and S1 transforaminal epidural steroid injection on 11/11/2019.  He reports that he has  received 75% relief from the injection.  Since the last visit, Damon Yoo states the pain has been improving. Current pain intensity is 2/10.  He is overall pleased with the results of the injection.    Initial HPI 10/31/2019:   Damon Yoo is a 69 y.o. male who presents to the clinic for the evaluation of lower back pain. He was referred by orthopedics for presumed lumbar radiculopathy. The pain started several years ago without any injury or trauma and symptoms have been worsening.The pain is located in the lower lumbar area and radiates to the right lower extremity.  The pain is described as aching and numbing and is rated as 5/10. The pain is rated with a score of  3/10 on the BEST day and a score of 9/10 on the WORST day.  Symptoms interfere with daily activity. The pain is exacerbated by Walking.  The pain is mitigated by nothing. The patient reports spending 2-4 hours per day reclining. The patient reports 6-8 hours of uninterrupted sleep per night. Of note, patient has a h/o prostate cancer s/p robotic prostatectomy, PSA has been undetectable.    Non-Pharmacologic Treatments:  Physical Therapy/Home Exercise: yes  Ice/Heat:yes  TENS: no  Acupuncture: no  Massage: no  Chiropractic: no    Other: no     Pain Medications:  - Adjuvant Medications: Zanaflex ( Tizanidine) and Celebrex, Voltaren gel, Tylenol  - Anti-Coagulants: Aspirin      Pain Procedures:   - previous lumbar emmy in 2006 with significant relief  - right L5/S1 + S1 transforaminal epidural steroid injection on 11/11/2019 - 75% relief for 2 years  - right L5/S1 + S1 TF EMMY on 11/24/21 with 100% pain relief x 2 weeks  - right L5/S1 + S1 TF EMMY on 1/11/22 with 90% pain relief - after 2nd in series      Imaging (Reviewed on 2/10/2022):   Bilateral lower extremities EMG/NCS 10/8/19  There is electrophysiologic evidence consistent with a chronic bilateral L5-S1 radiculopathies with superimposed peripheral polyneuropathy.      MRI Lumbar Spine  9/22/14  Compared to prior MRI December 2006.  Vertebral alignment remains normal.  Again noted is desiccation of all the lumbar intervertebral disks with marked loss of disk height at L3-4, L4-5 and L5-S1.  The conus ends at the level of L1.  There are discogenic changes in the endplates of L4-5 and L5-S1.  No compression fractures or acute osseous abnormalities are identified.  Schmorl's nodes present in the endplates of L4-5.  Axial images are acquired through the disk spaces from T12-L1 through L5-S1.  The T12 L1 disk space demonstrates mild facet hypertrophy and minimal disk bulge without significant canal or foraminal stenosis.  No significant abnormality L1-2.  At L2-3 there is facet and ligament flavum hypertrophy and diffuse posterior bulging of the disk which is creating mild to moderate canal and bilateral foraminal stenosis.  At L3-4 there is facet arthropathy and diffuse posterior disk herniation creating severe canal and bilateral foraminal stenosis.  At L4-5 facet and ligamentum flavum hypertrophy and diffuse posterior disk herniation creating severe canal and bilateral foraminal stenosis.  At L5-S1 a central disk herniation broad-based posterior bulging of the disk with severe canal and bilateral foraminal stenosis.     XR Lumbar Spine 8/4/14  There is anatomic spinal alignment.  Moderate to severe disk space narrowing with associated vacuum phenomenon, endplate spurring, and facet arthropathy at L4-5 and L5-S1.  Mild disk narrowing at L3-4.  Pedicles are intact.  No compression           REVIEW OF SYSTEMS:  GENERAL:  No weight loss, malaise or fevers.  HEENT:   No recent changes in vision or hearing  NECK:  Negative for lumps, no difficulty with swallowing.  RESPIRATORY:  Negative for cough, wheezing or shortness of breath, patient denies any recent URI.  CARDIOVASCULAR:  Negative for chest pain, leg swelling or palpitations.  GI:  Negative for abdominal discomfort, blood in stools or black stools  "or change in bowel habits.  MUSCULOSKELETAL:  See HPI.  SKIN:  Negative for lesions, rash, and itching.  PSYCH:  No mood disorder or recent psychosocial stressors.  Patients sleep is not disturbed secondary to pain.  HEMATOLOGY/LYMPHOLOGY:  Negative for prolonged bleeding, bruising easily or swollen nodes.  Patient is currently taking anti-coagulants - ASA  NEURO:   No history of headaches, syncope, paralysis, seizures or tremors.  All other reviewed and negative other than HPI.      OBJECTIVE:    Physical Exam:  Vitals:    02/10/22 0812   BP: (!) 144/76   Pulse: 60   Resp: 17   Weight: 115.5 kg (254 lb 10.1 oz)   Height: 5' 6" (1.676 m)   PainSc:   3    Body mass index is 41.1 kg/m².   (reviewed on 2/10/2022)    GENERAL: Well appearing, in no acute distress, alert and oriented x3.  PSYCH:  Mood and affect appropriate.  SKIN: Skin color, texture, turgor normal, no rashes or lesions.  HEAD/FACE:  Normocephalic, atraumatic. Cranial nerves grossly intact.  PULM: No evidence of respiratory difficulty, symmetric chest rise.  BACK: SLR is positive to radicular pain on the right. No TTP over the facet joints of the lumbar spine or spinous processes.  ROM improved.   EXTREMITIES: Peripheral joint ROM is full and pain free without obvious instability or laxity in all four extremities. No deformities, edema, or skin discoloration. Good capillary refill.  MUSCULOSKELETAL:  Hip, and knee provocative maneuvers are negative.  There is no pain with palpation over the sacroiliac joints bilaterally.  FABERs test is negative.  FADIRs test is negative.   BUE & BLE strength is normal and symmetric.  No atrophy or tone abnormalities are noted.  NEURO: BUE & BLE coordination and muscle stretch reflexes are physiologic and symmetric.  Plantar response are downgoing. No clonus.  No loss of sensation is noted.  GAIT:  Slow, antalgic.           ASSESSMENT: 71 y.o. year old male with low back and radicular pain, consistent with     1. Right " lumbar radiculopathy     2. Lumbar radicular pain     3. Degenerative lumbar spinal stenosis           PLAN:   1. Interventional:   - S/p right L5/S1 + S1 TF NAHUN on 1/11/22 with 90% pain relief - after 2nd in series.  - S/p right L5/S1 + S1 TF NAHUN on 11/24/21 with 100% pain relief x 2 weeks.   - He previously had right L5/S1 + S1 transforaminal epidural steroid injection on 11/11/2019 - 75% relief for 2 years    2. Pharmacologic:   - Refill Lyrica 75mg QD x 90 day supply.   - Refill Zanaflex 4mg TID PRN (30 tablets).   - Continue Celebrex 200mg QD PRN pain   - Anticoagulation use: ASA - 2° prevention (h/o stroke).     3. Rehabilitative: Encouraged regular exercise. Continue exercises and activities as tolerated.     4. Diagnostic: None for now.    5. Follow up: PRN     - This condition does not require this patient to take time off of work, and the primary goal of our Pain Management services is to improve the patient's functional capacity.   - I discussed the risks, benefits, and alternatives to potential treatment options. All questions and concerns were fully addressed today in clinic.           Disclaimer:  This note was prepared using voice recognition system and is likely to have sound alike errors that may have been overlooked even after proof reading.  Please call me with any questions.

## 2022-02-10 ENCOUNTER — OFFICE VISIT (OUTPATIENT)
Dept: PAIN MEDICINE | Facility: CLINIC | Age: 72
End: 2022-02-10
Payer: MEDICARE

## 2022-02-10 ENCOUNTER — OFFICE VISIT (OUTPATIENT)
Dept: INTERNAL MEDICINE | Facility: CLINIC | Age: 72
End: 2022-02-10
Payer: MEDICARE

## 2022-02-10 VITALS
HEART RATE: 60 BPM | WEIGHT: 254.63 LBS | HEIGHT: 66 IN | BODY MASS INDEX: 40.92 KG/M2 | RESPIRATION RATE: 17 BRPM | DIASTOLIC BLOOD PRESSURE: 76 MMHG | SYSTOLIC BLOOD PRESSURE: 144 MMHG

## 2022-02-10 VITALS
DIASTOLIC BLOOD PRESSURE: 72 MMHG | BODY MASS INDEX: 41.1 KG/M2 | SYSTOLIC BLOOD PRESSURE: 128 MMHG | TEMPERATURE: 98 F | RESPIRATION RATE: 18 BRPM | WEIGHT: 254.63 LBS

## 2022-02-10 DIAGNOSIS — I27.9 PULMONARY HEART DISEASE: ICD-10-CM

## 2022-02-10 DIAGNOSIS — I25.10 CORONARY ARTERY DISEASE INVOLVING NATIVE CORONARY ARTERY OF NATIVE HEART WITHOUT ANGINA PECTORIS: ICD-10-CM

## 2022-02-10 DIAGNOSIS — D69.6 THROMBOCYTOPENIA: ICD-10-CM

## 2022-02-10 DIAGNOSIS — M54.16 SPINAL STENOSIS OF LUMBAR REGION WITH RADICULOPATHY: ICD-10-CM

## 2022-02-10 DIAGNOSIS — R73.03 PREDIABETES: ICD-10-CM

## 2022-02-10 DIAGNOSIS — N18.32 STAGE 3B CHRONIC KIDNEY DISEASE: ICD-10-CM

## 2022-02-10 DIAGNOSIS — E78.5 HYPERLIPIDEMIA, UNSPECIFIED HYPERLIPIDEMIA TYPE: Chronic | ICD-10-CM

## 2022-02-10 DIAGNOSIS — M54.16 RIGHT LUMBAR RADICULOPATHY: ICD-10-CM

## 2022-02-10 DIAGNOSIS — M48.061 DEGENERATIVE LUMBAR SPINAL STENOSIS: ICD-10-CM

## 2022-02-10 DIAGNOSIS — I47.10 SVT (SUPRAVENTRICULAR TACHYCARDIA): ICD-10-CM

## 2022-02-10 DIAGNOSIS — M48.061 SPINAL STENOSIS OF LUMBAR REGION WITH RADICULOPATHY: ICD-10-CM

## 2022-02-10 DIAGNOSIS — M54.16 LUMBAR RADICULAR PAIN: ICD-10-CM

## 2022-02-10 DIAGNOSIS — M1A.39X0 CHRONIC GOUT DUE TO RENAL IMPAIRMENT OF MULTIPLE SITES WITHOUT TOPHUS: ICD-10-CM

## 2022-02-10 DIAGNOSIS — I44.2 CHB (COMPLETE HEART BLOCK): ICD-10-CM

## 2022-02-10 DIAGNOSIS — I10 ESSENTIAL HYPERTENSION: Primary | ICD-10-CM

## 2022-02-10 DIAGNOSIS — M54.16 RIGHT LUMBAR RADICULOPATHY: Primary | ICD-10-CM

## 2022-02-10 DIAGNOSIS — I50.32 CHRONIC DIASTOLIC CHF (CONGESTIVE HEART FAILURE): Chronic | ICD-10-CM

## 2022-02-10 DIAGNOSIS — I70.0 AORTIC ATHEROSCLEROSIS: ICD-10-CM

## 2022-02-10 DIAGNOSIS — Z85.46 HISTORY OF PROSTATE CANCER: ICD-10-CM

## 2022-02-10 DIAGNOSIS — E66.01 OBESITY, CLASS III, BMI 40-49.9 (MORBID OBESITY): Chronic | ICD-10-CM

## 2022-02-10 DIAGNOSIS — Z95.1 S/P CABG (CORONARY ARTERY BYPASS GRAFT): ICD-10-CM

## 2022-02-10 DIAGNOSIS — I47.29 NON-SUSTAINED VENTRICULAR TACHYCARDIA: ICD-10-CM

## 2022-02-10 PROCEDURE — 99999 PR PBB SHADOW E&M-EST. PATIENT-LVL IV: CPT | Mod: PBBFAC,,, | Performed by: PEDIATRICS

## 2022-02-10 PROCEDURE — 99214 OFFICE O/P EST MOD 30 MIN: CPT | Mod: PBBFAC | Performed by: PHYSICIAN ASSISTANT

## 2022-02-10 PROCEDURE — 99214 PR OFFICE/OUTPT VISIT, EST, LEVL IV, 30-39 MIN: ICD-10-PCS | Mod: S$PBB,,, | Performed by: PHYSICIAN ASSISTANT

## 2022-02-10 PROCEDURE — 99999 PR PBB SHADOW E&M-EST. PATIENT-LVL IV: CPT | Mod: PBBFAC,,, | Performed by: PHYSICIAN ASSISTANT

## 2022-02-10 PROCEDURE — 99214 OFFICE O/P EST MOD 30 MIN: CPT | Mod: PBBFAC,27 | Performed by: PEDIATRICS

## 2022-02-10 PROCEDURE — 99999 PR PBB SHADOW E&M-EST. PATIENT-LVL IV: ICD-10-PCS | Mod: PBBFAC,,, | Performed by: PEDIATRICS

## 2022-02-10 PROCEDURE — 99214 PR OFFICE/OUTPT VISIT, EST, LEVL IV, 30-39 MIN: ICD-10-PCS | Mod: S$PBB,,, | Performed by: PEDIATRICS

## 2022-02-10 PROCEDURE — 99214 OFFICE O/P EST MOD 30 MIN: CPT | Mod: S$PBB,,, | Performed by: PHYSICIAN ASSISTANT

## 2022-02-10 PROCEDURE — 99999 PR PBB SHADOW E&M-EST. PATIENT-LVL IV: ICD-10-PCS | Mod: PBBFAC,,, | Performed by: PHYSICIAN ASSISTANT

## 2022-02-10 PROCEDURE — 99214 OFFICE O/P EST MOD 30 MIN: CPT | Mod: S$PBB,,, | Performed by: PEDIATRICS

## 2022-02-10 RX ORDER — PREGABALIN 75 MG/1
75 CAPSULE ORAL NIGHTLY
Qty: 90 CAPSULE | Refills: 1 | Status: SHIPPED | OUTPATIENT
Start: 2022-02-10 | End: 2023-12-05 | Stop reason: SDUPTHER

## 2022-02-10 NOTE — PROGRESS NOTES
Subjective:       Patient ID: Damon Yoo is a 71 y.o. male.    Chief Complaint: Follow-up    Damon Yoo is a 71 y.o. male who presents to clinic for a follow up    1) HTN:No home  B/P checks, no HTNive symptoms.  2) LIPIDS:not following D&E, tolerating and compliant with med(s).  3) CAD/hypertrophic cardiomyopathy/complete heart block, s/p pacer: no CP, SOB, DIAZ, sees cardiology  4) Gout: quiet, but is seeing rheum  5) Prostate cancer: yearly PSA> not seeing uro currently  6) Chronic back pain(lumbar stenosis)/ Knees: sees PMR, periodic NAHUN. Chronic but manageable.   7) CKD:  Kidney function fluctuating. Has cut back on soda and his hydrating better, also using celebrex daily  8) Prediabetes: stable. no DM symptoms.  9) Obesity: sedentary. Weight down 5lbs working on D&E    LABS REVIEWED AND DISCUSSED WITH PATIENT: lipid panel, uric acid, A1C, CMP and microalbumin/creatinine    Review of Systems   Constitutional: Negative for activity change, appetite change, chills, diaphoresis, fatigue, fever and unexpected weight change.   HENT: Negative for nasal congestion, ear pain, mouth sores, nosebleeds, postnasal drip, rhinorrhea, sneezing and sore throat.    Eyes: Negative for photophobia, pain, discharge, redness and visual disturbance.   Respiratory: Negative for cough, chest tightness, shortness of breath, wheezing and stridor.    Cardiovascular: Negative for chest pain, palpitations and leg swelling.   Gastrointestinal: Negative for abdominal distention, blood in stool, constipation, diarrhea, nausea and vomiting.   Genitourinary: Negative for decreased urine volume, difficulty urinating, dysuria, flank pain, frequency, genital sores, hematuria and urgency.   Musculoskeletal: Negative for arthralgias, back pain, joint swelling, neck pain and neck stiffness.   Integumentary:  Negative for color change, pallor, rash and wound.   Neurological: Negative for dizziness, syncope, speech difficulty, weakness,  light-headedness and headaches.   Hematological: Negative for adenopathy. Does not bruise/bleed easily.   Psychiatric/Behavioral: Negative for confusion, decreased concentration, dysphoric mood, hallucinations, sleep disturbance and suicidal ideas. The patient is not nervous/anxious.    All other systems reviewed and are negative.        Objective:      Physical Exam  Vitals and nursing note reviewed.   Constitutional:       General: He is not in acute distress.     Appearance: He is well-developed.   Neck:      Thyroid: No thyromegaly.      Vascular: No JVD.   Cardiovascular:      Rate and Rhythm: Normal rate and regular rhythm.      Heart sounds: Normal heart sounds. No murmur heard.       Comments: Pacer pocket L upper chest appears good  Pulmonary:      Effort: Pulmonary effort is normal. No respiratory distress.      Breath sounds: Normal breath sounds. No wheezing or rales.   Abdominal:      General: There is no distension.      Palpations: Abdomen is soft. There is no mass.      Tenderness: There is no abdominal tenderness. There is no guarding.   Musculoskeletal:      Right lower leg: No edema.      Left lower leg: No edema.   Lymphadenopathy:      Cervical: No cervical adenopathy.   Skin:     Capillary Refill: Capillary refill takes less than 2 seconds.      Findings: No rash.   Neurological:      General: No focal deficit present.      Mental Status: He is alert and oriented to person, place, and time.      Cranial Nerves: No cranial nerve deficit.      Coordination: Coordination normal.   Psychiatric:         Mood and Affect: Mood normal.         Behavior: Behavior normal.         Thought Content: Thought content normal.         Judgment: Judgment normal.         Assessment:       Problem List Items Addressed This Visit        Neuro    Spinal stenosis of lumbar region with radiculopathy       Pulmonary    Pulmonary heart disease       Cardiac/Vascular    Hyperlipidemia (Chronic)    Relevant Orders     Lipid Panel    Chronic diastolic CHF (congestive heart failure) (Chronic)    Essential hypertension - Primary    Relevant Orders    Comprehensive Metabolic Panel    Coronary artery disease involving native coronary artery of native heart without angina pectoris w/ hx CABG    S/P CABG (coronary artery bypass graft)    CHB (complete heart block)    SVT (supraventricular tachycardia)    Non-sustained ventricular tachycardia    Aortic atherosclerosis       Renal/    Chronic kidney disease, stage III (moderate)    Relevant Orders    Comprehensive Metabolic Panel       Hematology    Thrombocytopenia    Relevant Orders    CBC Auto Differential       Oncology    History of prostate cancer    Relevant Orders    Prostate Specific Antigen, Diagnostic       Endocrine    Obesity, Class III, BMI 40-49.9 (morbid obesity) (Chronic)    Prediabetes    Relevant Orders    Hemoglobin A1C    Microalbumin/Creatinine Ratio, Urine       Orthopedic    Chronic gout due to renal impairment of multiple sites without tophus          Plan:     Essential hypertension  -     Comprehensive Metabolic Panel; Future; Expected date: 02/10/2022    Hyperlipidemia, unspecified hyperlipidemia type  -     Lipid Panel; Future; Expected date: 02/10/2022    Prediabetes  -     Hemoglobin A1C; Future; Expected date: 02/10/2022  -     Microalbumin/Creatinine Ratio, Urine; Future; Expected date: 02/10/2022    Pulmonary heart disease    S/P CABG (coronary artery bypass graft)    SVT (supraventricular tachycardia)    History of prostate cancer  -     Prostate Specific Antigen, Diagnostic; Future; Expected date: 02/10/2022    Chronic diastolic CHF (congestive heart failure)    Chronic gout due to renal impairment of multiple sites without tophus    Stage 3b chronic kidney disease  -     Comprehensive Metabolic Panel; Future; Expected date: 02/10/2022    Coronary artery disease involving native coronary artery of native heart without angina pectoris w/ hx  CABG    Aortic atherosclerosis    Spinal stenosis of lumbar region with radiculopathy    CHB (complete heart block)    Non-sustained ventricular tachycardia    Obesity, Class III, BMI 40-49.9 (morbid obesity)    Thrombocytopenia  -     CBC Auto Differential; Future; Expected date: 02/10/2022    He has started on D&E, weight loss. Maintain. At goal for B/P, BS, lipids. Maintain baseline meds. F/U 6 months with labs.  Scribe Attestation:   I, Augustin Hammonds, am scribing for, and in the presence of, Dr. Dick Mason Jr. I performed the above scribed service and the documentation accurately describes the services I performed. I attest to the accuracy of the note.    I, Dr. Dick Mason Jr, reviewed documentation as scribed above. I personally performed the services described in this documentation.  I agree that the record reflects my personal performance and is accurate and complete. Dick Mason Jr., MD.  02/10/2022

## 2022-02-27 ENCOUNTER — PATIENT OUTREACH (OUTPATIENT)
Dept: ADMINISTRATIVE | Facility: OTHER | Age: 72
End: 2022-02-27
Payer: MEDICARE

## 2022-02-28 ENCOUNTER — OFFICE VISIT (OUTPATIENT)
Dept: CARDIOLOGY | Facility: CLINIC | Age: 72
End: 2022-02-28
Payer: MEDICARE

## 2022-02-28 ENCOUNTER — EXTERNAL CHRONIC CARE MANAGEMENT (OUTPATIENT)
Dept: PRIMARY CARE CLINIC | Facility: CLINIC | Age: 72
End: 2022-02-28
Payer: MEDICARE

## 2022-02-28 VITALS
RESPIRATION RATE: 16 BRPM | WEIGHT: 260.56 LBS | OXYGEN SATURATION: 98 % | BODY MASS INDEX: 41.88 KG/M2 | DIASTOLIC BLOOD PRESSURE: 76 MMHG | HEIGHT: 66 IN | SYSTOLIC BLOOD PRESSURE: 124 MMHG | HEART RATE: 60 BPM

## 2022-02-28 DIAGNOSIS — I25.810 CORONARY ARTERY DISEASE INVOLVING CORONARY BYPASS GRAFT OF NATIVE HEART WITHOUT ANGINA PECTORIS: ICD-10-CM

## 2022-02-28 DIAGNOSIS — E78.5 HYPERLIPIDEMIA, UNSPECIFIED HYPERLIPIDEMIA TYPE: ICD-10-CM

## 2022-02-28 DIAGNOSIS — I10 ESSENTIAL HYPERTENSION: ICD-10-CM

## 2022-02-28 DIAGNOSIS — I47.10 SVT (SUPRAVENTRICULAR TACHYCARDIA): ICD-10-CM

## 2022-02-28 DIAGNOSIS — I47.29 NSVT (NONSUSTAINED VENTRICULAR TACHYCARDIA): ICD-10-CM

## 2022-02-28 DIAGNOSIS — M79.89 LEG SWELLING: ICD-10-CM

## 2022-02-28 PROCEDURE — 99999 PR PBB SHADOW E&M-EST. PATIENT-LVL IV: ICD-10-PCS | Mod: PBBFAC,,, | Performed by: INTERNAL MEDICINE

## 2022-02-28 PROCEDURE — 99214 PR OFFICE/OUTPT VISIT, EST, LEVL IV, 30-39 MIN: ICD-10-PCS | Mod: S$PBB,,, | Performed by: INTERNAL MEDICINE

## 2022-02-28 PROCEDURE — 99214 OFFICE O/P EST MOD 30 MIN: CPT | Mod: PBBFAC,25 | Performed by: INTERNAL MEDICINE

## 2022-02-28 PROCEDURE — 99214 OFFICE O/P EST MOD 30 MIN: CPT | Mod: S$PBB,,, | Performed by: INTERNAL MEDICINE

## 2022-02-28 PROCEDURE — 99490 CHRNC CARE MGMT STAFF 1ST 20: CPT | Mod: S$PBB,,, | Performed by: PEDIATRICS

## 2022-02-28 PROCEDURE — 99490 CHRNC CARE MGMT STAFF 1ST 20: CPT | Mod: PBBFAC | Performed by: PEDIATRICS

## 2022-02-28 PROCEDURE — 99490 PR CHRONIC CARE MGMT, 1ST 20 MIN: ICD-10-PCS | Mod: S$PBB,,, | Performed by: PEDIATRICS

## 2022-02-28 PROCEDURE — 99999 PR PBB SHADOW E&M-EST. PATIENT-LVL IV: CPT | Mod: PBBFAC,,, | Performed by: INTERNAL MEDICINE

## 2022-02-28 RX ORDER — FUROSEMIDE 20 MG/1
20 TABLET ORAL DAILY PRN
Qty: 30 TABLET | Refills: 11 | Status: ON HOLD | OUTPATIENT
Start: 2022-02-28 | End: 2024-03-19 | Stop reason: HOSPADM

## 2022-02-28 RX ORDER — LISINOPRIL 40 MG/1
40 TABLET ORAL DAILY
Qty: 90 TABLET | Refills: 3 | Status: SHIPPED | OUTPATIENT
Start: 2022-02-28 | End: 2023-02-27 | Stop reason: SDUPTHER

## 2022-02-28 RX ORDER — METOPROLOL SUCCINATE 50 MG/1
150 TABLET, EXTENDED RELEASE ORAL DAILY
Qty: 90 TABLET | Refills: 11 | Status: SHIPPED | OUTPATIENT
Start: 2022-02-28 | End: 2023-02-17 | Stop reason: SDUPTHER

## 2022-02-28 RX ORDER — ROSUVASTATIN CALCIUM 40 MG/1
40 TABLET, COATED ORAL NIGHTLY
Qty: 90 TABLET | Refills: 3 | Status: SHIPPED | OUTPATIENT
Start: 2022-02-28 | End: 2023-02-27 | Stop reason: SDUPTHER

## 2022-02-28 NOTE — PROGRESS NOTES
Subjective:   Patient ID:  Damon Yoo is a 71 y.o. male who presents for evaluation of No chief complaint on file.      Initial HPI: 1/2021   71 yo male, pmhx below, CAD s/p CABG 2000?, HTN, HLP, Pre diabetes (last A1C 6.2), concentric remodeling on echo, comes in for follow up recent hospital admission for CHB, AV dissociation that required TVP placement while in the hospital , with persistence depsite holding toprol off for 48 hours, upon PPM implant he had sinus tachycardia 100 abpm with less than 50 vbpm   He comes in for follow up , PPM site look healing well, no infection, no hematoma, no bleeding, no syncope, feels great after the pacemaker states his breathing is better, no more dyspnea, no more fatigue denies any other complaints except for right upper back muscle spasm   On interrogation, he was 70% apaced with 99%  with good parameters     Interval hx: 3/8/2021   9 episodes of svt on device interrogation   99%  ,   No complaints of palpitations at all   Does endorse bilateral lower ext swelling, and positional orthopnea sometimes   No chest pain     Interval 8/26/2021   Doing well, V pacing 30% after last adjustments  No more SVT or NSVT on most recent interrogation   States no chest pain, no dyspnea, no leg swelling, no palpitations   He is on lasix prn, states takes it if swelling or if dyspnea   See ros        2.28.2022  Doing well, pacemaker functioning normal   Denies any cardiac complaints   No recent hx of ed visits or hospitalizations for acs or adhf  No hx of angina. No hx of unusual cordero with ordinary daily activities. No dyspnea at rest. No orthopnea or pnd  No hx of palpitations. No exertional dizziness or syncope  No hx of dvt or pe. No hx of edema or intermittent claudication  No focal cns symptoms or signs to suggest tia or stroke  No hx of ckd. No dm- no thyroid disease  No hx of abnormal bleeding  LDL down from 150 to 73      Past Medical History:   Diagnosis Date    Abnormal  EKG 10/25/2013    Acute on chronic diastolic CHF (congestive heart failure) 2020    Arthritis     CAD (coronary artery disease)     Cancer     Prostate [id number] prostate    Glaucoma     Gout attack     Hyperlipidemia     Hypertension     Hypertrophic cardiomyopathy 10/25/2013    S/P CABG (coronary artery bypass graft) 10/25/2013       Past Surgical History:   Procedure Laterality Date    A-V CARDIAC PACEMAKER INSERTION Left 2020    Procedure: INSERTION, CARDIAC PACEMAKER, DUAL CHAMBER;  Surgeon: Elsy Kenyon MD;  Location: [facility] CATH LAB;  Service: Cardiology;  Laterality: Left;  biotronik    COLONOSCOPY N/A 2017    Procedure: COLONOSCOPY;  Surgeon: Dina Ledesma MD;  Location: [facility] ENDO;  Service: Endoscopy;  Laterality: N/A;    CORONARY ARTERY BYPASS GRAFT  05/2003    x1    PROSTATE SURGERY      RALP 2013    TRANSFORAMINAL EPIDURAL INJECTION OF STEROID Right 2019    Procedure: Right L5/S1+ S1 TF NAHUN with IV sedation;  Surgeon: Ermias Mario MD;  Location: [facility] PAIN MGT;  Service: Pain Management;  Laterality: Right;    TRANSFORAMINAL EPIDURAL INJECTION OF STEROID Right 2021    Procedure: right L5/S1 and S1 TF NAHUN;  Surgeon: Ermias Mario MD;  Location: [facility] PAIN MGT;  Service: Pain Management;  Laterality: Right;    TRANSFORAMINAL EPIDURAL INJECTION OF STEROID Right 2022    Procedure: Right L5/S1 + S1 TF NAHUN;  Surgeon: Ermais Mario MD;  Location: [facility] PAIN MGT;  Service: Pain Management;  Laterality: Right;       Social History     Tobacco Use    Smoking status: Former Smoker     Packs/day: 0.25     Years: 15.00     Pack years: 3.75     Quit date: 1988     Years since quittin.8    Smokeless tobacco: Never Used   Substance Use Topics    Alcohol use: No    Drug use: No       Family History   Problem Relation Age of Onset    Hypertension Father     Strabismus Neg Hx     Retinal detachment Neg Hx     Macular degeneration Neg Hx      Glaucoma Neg Hx     Blindness Neg Hx     Amblyopia Neg Hx        Review of Systems   Constitutional: Negative for fever and malaise/fatigue.   HENT: Negative for sore throat.    Eyes: Negative for blurred vision.   Cardiovascular: Negative for chest pain, claudication, cyanosis, dyspnea on exertion, irregular heartbeat, leg swelling, near-syncope, orthopnea, palpitations, paroxysmal nocturnal dyspnea and syncope.   Respiratory: Negative for cough, hemoptysis and shortness of breath.    Hematologic/Lymphatic: Negative for bleeding problem.   Skin: Negative for poor wound healing and rash.   Musculoskeletal: Negative for back pain and falls.   Gastrointestinal: Negative for abdominal pain.   Genitourinary: Negative for nocturia.   Neurological: Negative for dizziness, focal weakness, headaches, light-headedness and loss of balance.   Psychiatric/Behavioral: Negative for altered mental status and substance abuse.       Current Outpatient Medications on File Prior to Visit   Medication Sig    acetaminophen (TYLENOL) 500 MG tablet Take 500 mg by mouth every 6 (six) hours as needed for Pain.    allopurinoL (ZYLOPRIM) 300 MG tablet Take 1.5 tablets (450 mg total) by mouth once daily.    amLODIPine (NORVASC) 10 MG tablet Take 1 tablet (10 mg total) by mouth once daily.    amLODIPine (NORVASC) 5 MG tablet     aspirin 81 MG chewable tablet Take 1 tablet by mouth Daily.    celecoxib (CELEBREX) 200 MG capsule Take 1 capsule (200 mg total) by mouth daily as needed for Pain. Take with food    fish oil-fat acid [id number].[id number] 1,200 mg (400 [id number]) Cap Take 1 capsule by mouth once daily.     flunisolide 25 mcg, 0.025%, (NASALIDE) 25 mcg (0.025 %) Spry USE 2 SPRAYS IN EACH NOSTRIL TWICE DAILY    furosemide (LASIX) 20 MG tablet Take 1 tablet (20 mg total) by mouth daily as needed (swelling).    latanoprost 0.005 % ophthalmic solution Place 1 drop into both eyes every evening.    lisinopriL (PRINIVIL,ZESTRIL) 40 MG  tablet Take 1 tablet (40 mg total) by mouth once daily.    metoprolol succinate (TOPROL-XL) 50 MG 24 hr tablet Take 3 tablets (150 mg total) by mouth once daily.    MITIGARE 0.6 mg Cap Take 1 capsule (0.6 mg total) by mouth 2 (two) times daily as needed (acute gout).    pantoprazole (PROTONIX) 40 MG tablet Take 1 tablet (40 mg total) by mouth once daily. (replaces nexium)    pregabalin (LYRICA) 75 MG capsule Take 1 capsule (75 mg total) by mouth every evening.    rosuvastatin (CRESTOR) 40 MG Tab Take 1 tablet (40 mg total) by mouth every evening. Generic is fine.    timolol maleate 0.5% (TIMOPTIC) 0.5 % Drop Place 1 drop into both eyes every morning.    tiZANidine (ZANAFLEX) 4 MG tablet Take 1 tablet (4 mg total) by mouth every 8 (eight) hours as needed (MUSCLE SPASMS).    traMADoL (ULTRAM) 50 mg tablet Take 1 tablet (50 mg total) by mouth every 12 (twelve) hours as needed for Pain.    turmeric 400 mg Cap Take 1 tablet by mouth once daily.     No current facility-administered medications on file prior to visit.       Objective:   Objective:  Wt Readings from Last 3 Encounters:   02/28/22 118.2 kg (260 lb 9.3 oz)   02/10/22 115.5 kg (254 lb 10.1 oz)   02/10/22 115.5 kg (254 lb 10.1 oz)     Temp Readings from Last 3 Encounters:   02/10/22 97.6 °F (36.4 °C) (Tympanic)   01/11/22 97.8 °F (36.6 °C) (Temporal)   11/24/21 98.3 °F (36.8 °C) (Temporal)     BP Readings from Last 3 Encounters:   02/28/22 124/76   02/10/22 128/72   02/10/22 (!) 144/76     Pulse Readings from Last 3 Encounters:   02/28/22 60   02/10/22 60   01/24/22 80       Physical Exam  Vitals reviewed.   Constitutional:       Appearance: He is well-developed.   HENT:      Head: Normocephalic and atraumatic.   Eyes:      General: No scleral icterus.     Conjunctiva/sclera: Conjunctivae normal.   Cardiovascular:      Rate and Rhythm: Normal rate and regular rhythm.      Pulses: Intact distal pulses.      Heart sounds: Normal heart sounds. No murmur  heard.     Comments: Wound healing nice  Pulmonary:      Effort: No respiratory distress.      Breath sounds: No wheezing or rales.   Chest:      Chest wall: No tenderness.   Abdominal:      General: Bowel sounds are normal. There is no distension.      Palpations: Abdomen is soft.      Tenderness: There is no guarding.   Musculoskeletal:         General: Normal range of motion.      Cervical back: Normal range of motion and neck supple.      Right lower leg: Edema present.      Left lower leg: Edema present.   Skin:     General: Skin is warm.   Neurological:      Mental Status: He is alert and oriented to person, place, and time.         Lab Results   Component Value Date    CHOL 127 02/07/2022    CHOL 210 (H) 07/29/2021    CHOL 119 (L) 01/28/2021     Lab Results   Component Value Date    HDL 31 (L) 02/07/2022    HDL 27 (L) 07/29/2021    HDL 30 (L) 01/28/2021     Lab Results   Component Value Date    LDLCALC 72.8 02/07/2022    LDLCALC 153.2 07/29/2021    LDLCALC 69.4 01/28/2021     Lab Results   Component Value Date    TRIG 116 02/07/2022    TRIG 149 07/29/2021    TRIG 98 01/28/2021     Lab Results   Component Value Date    CHOLHDL 24.4 02/07/2022    CHOLHDL 12.9 (L) 07/29/2021    CHOLHDL 25.2 01/28/2021       Chemistry        Component Value Date/Time     02/07/2022 0748    K 4.4 02/07/2022 0748     02/07/2022 0748    CO2 24 02/07/2022 0748    BUN 23 02/07/2022 0748    CREATININE 1.6 (H) 02/07/2022 0748    GLU 87 02/07/2022 0748        Component Value Date/Time    CALCIUM 9.6 02/07/2022 0748    ALKPHOS 88 02/07/2022 0748    AST 21 02/07/2022 0748    ALT 12 02/07/2022 0748    BILITOT 0.8 02/07/2022 0748    ESTGFRAFRICA 50.1 (A) 02/07/2022 0748    EGFRNONAA 43.3 (A) 02/07/2022 0748          Lab Results   Component Value Date    TSH 1.367 08/16/2014     Lab Results   Component Value Date    INR 1.0 11/27/2020    INR 1.0 06/26/2010     Lab Results   Component Value Date    WBC 13.65 (H) 12/01/2020    HGB  12.4 (L) 12/01/2020    HCT 38.7 (L) 12/01/2020    MCV 95 12/01/2020     (L) 12/01/2020     BNP  @LABRCNTIP(BNP,BNPTRIAGEBLO)@  CrCl cannot be calculated (Patient's most recent lab result is older than the maximum 7 days allowed.).     Imaging:  ======  Results for orders placed during the hospital encounter of 11/27/20   Echo Color Flow Doppler? Yes; Bubble Contrast? No    Narrative · There is moderate left ventricular concentric hypertrophy.  · The left ventricle is normal in size with normal systolic function. The   estimated ejection fraction is 60%.  · Normal left ventricular diastolic function.  · Normal right ventricular size with normal right ventricular systolic   function.  · Mild aortic valve stenosis.  · Aortic valve area is 1.78 cm2; peak velocity is 1.94 m/s; mean gradient   is 9 mmHg.  · Moderate mitral regurgitation.  · Moderate tricuspid regurgitation.  · There is pulmonary hypertension.  · Normal central venous pressure (3 mmHg).  · The estimated PA systolic pressure is 80 mmHg.     Suggest re echo when patient is in NSR.        No results found for this or any previous visit.  No results found for this or any previous visit.  Results for orders placed in visit on 03/18/13   X-Ray Chest PA And Lateral    Narrative DATE OF EXAM: Mar 20 2013      [facility]   0190  -  CHEST 2 VIEWS FRONTAL   LAT:   \  [id number]     CLINICAL HISTORY:   \V72.84  PREOP EXAMINATION NOS     PROCEDURE COMMENT:   \     ICD 9 CODE(S):   (\)     CPT 4 CODE(S)/MODIFIER(S):   (\)     Findings: No prior radiographs for comparison.  Post CABG changes are   noted.  Heart size is normal.  Mild degenerative change in the thoracic   spine.  No acute consolidation seen in the lungs.  No pleural effusions.        Impression: No active disease.        Electronically signed by: EUGENE ALCARAZ MD  Date:     03/20/13  Time:    13:11            : GADIEL  Transcribe Date/Time: Mar 20 2013  1:11P  Dictated by : EUGENE ALCARAZ  MD  Report reviewed by:   Read On:   \  Images were reviewed, findings were verified and document was   electronically  SIGNED BY: EUGENE ALCARAZ MD On: Mar 20 2013  1:11P        No results found for this or any previous visit.  No procedure found.    Diagnostic Results:  ECG: Reviewed    The ASCVD Risk score (Martha TOLLIVER Jr., et al., 2013) failed to calculate for the following reasons:    The valid total cholesterol range is 130 to 320 mg/dL    Assessment and Plan:   NSVT (nonsustained ventricular tachycardia)  -    RESOLVED SINCE ON TOPROL    SVT (supraventricular tachycardia)  Asymptomatic   -    RESOLVED SINCE ON TOPROL      Leg swelling  -     RESOLVED     Complete heart block  Comments:  s/p dual chamber pacemaker        Essential hypertension  -     Controlled with current meds      Coronary artery disease involving coronary bypass graft of native heart without angina pectoris  - STABLE no angina   Pacemaker  -    we increased his av delay to lower the  need and that lowered his  to 30% in 2020    HTN controlled , CW TOPROL , LISINOPRIL, AMLODIPINE   Angina free, no symptoms of chest pain or equivalent angina   LDL down to 72 from 150 , on crestor   No DIAZ  No CNS symptoms   Reviewed all tests and above medical conditions with patient in detail and formulated treatment plan.  Risk factor modification discussed.   Cardiac low salt diet discussed.  Maintaining healthy weight and weight loss goals were discussed in clinic.    Follow up in 6 months

## 2022-03-14 ENCOUNTER — TELEPHONE (OUTPATIENT)
Dept: ADMINISTRATIVE | Facility: CLINIC | Age: 72
End: 2022-03-14
Payer: MEDICARE

## 2022-03-15 ENCOUNTER — OFFICE VISIT (OUTPATIENT)
Dept: INTERNAL MEDICINE | Facility: CLINIC | Age: 72
End: 2022-03-15
Payer: MEDICARE

## 2022-03-15 VITALS
OXYGEN SATURATION: 98 % | TEMPERATURE: 98 F | HEIGHT: 67 IN | HEART RATE: 60 BPM | DIASTOLIC BLOOD PRESSURE: 72 MMHG | BODY MASS INDEX: 41 KG/M2 | WEIGHT: 261.25 LBS | SYSTOLIC BLOOD PRESSURE: 124 MMHG

## 2022-03-15 DIAGNOSIS — Z95.1 S/P CABG (CORONARY ARTERY BYPASS GRAFT): ICD-10-CM

## 2022-03-15 DIAGNOSIS — N18.32 STAGE 3B CHRONIC KIDNEY DISEASE: ICD-10-CM

## 2022-03-15 DIAGNOSIS — C61 PROSTATE CANCER: ICD-10-CM

## 2022-03-15 DIAGNOSIS — M48.061 SPINAL STENOSIS OF LUMBAR REGION WITH RADICULOPATHY: ICD-10-CM

## 2022-03-15 DIAGNOSIS — Z00.00 ENCOUNTER FOR PREVENTIVE HEALTH EXAMINATION: Primary | ICD-10-CM

## 2022-03-15 DIAGNOSIS — R73.03 PREDIABETES: ICD-10-CM

## 2022-03-15 DIAGNOSIS — E78.5 HYPERLIPIDEMIA, UNSPECIFIED HYPERLIPIDEMIA TYPE: Chronic | ICD-10-CM

## 2022-03-15 DIAGNOSIS — I27.9 PULMONARY HEART DISEASE: ICD-10-CM

## 2022-03-15 DIAGNOSIS — I47.29 NON-SUSTAINED VENTRICULAR TACHYCARDIA: ICD-10-CM

## 2022-03-15 DIAGNOSIS — Z85.46 HISTORY OF PROSTATE CANCER: ICD-10-CM

## 2022-03-15 DIAGNOSIS — I44.2 CHB (COMPLETE HEART BLOCK): ICD-10-CM

## 2022-03-15 DIAGNOSIS — I50.32 CHRONIC DIASTOLIC CHF (CONGESTIVE HEART FAILURE): Chronic | ICD-10-CM

## 2022-03-15 DIAGNOSIS — I10 ESSENTIAL HYPERTENSION: ICD-10-CM

## 2022-03-15 DIAGNOSIS — I47.10 SVT (SUPRAVENTRICULAR TACHYCARDIA): ICD-10-CM

## 2022-03-15 DIAGNOSIS — E66.01 OBESITY, CLASS III, BMI 40-49.9 (MORBID OBESITY): Chronic | ICD-10-CM

## 2022-03-15 DIAGNOSIS — M1A.39X0 CHRONIC GOUT DUE TO RENAL IMPAIRMENT OF MULTIPLE SITES WITHOUT TOPHUS: ICD-10-CM

## 2022-03-15 DIAGNOSIS — I42.2 HYPERTROPHIC CARDIOMYOPATHY: ICD-10-CM

## 2022-03-15 DIAGNOSIS — M54.16 SPINAL STENOSIS OF LUMBAR REGION WITH RADICULOPATHY: ICD-10-CM

## 2022-03-15 DIAGNOSIS — D69.6 THROMBOCYTOPENIA: ICD-10-CM

## 2022-03-15 DIAGNOSIS — I25.10 CORONARY ARTERY DISEASE INVOLVING NATIVE CORONARY ARTERY OF NATIVE HEART WITHOUT ANGINA PECTORIS: ICD-10-CM

## 2022-03-15 DIAGNOSIS — I70.0 AORTIC ATHEROSCLEROSIS: ICD-10-CM

## 2022-03-15 PROCEDURE — G0439 PPPS, SUBSEQ VISIT: HCPCS | Mod: ,,, | Performed by: NURSE PRACTITIONER

## 2022-03-15 PROCEDURE — 99999 PR PBB SHADOW E&M-EST. PATIENT-LVL V: CPT | Mod: PBBFAC,,, | Performed by: NURSE PRACTITIONER

## 2022-03-15 PROCEDURE — G0439 PR MEDICARE ANNUAL WELLNESS SUBSEQUENT VISIT: ICD-10-PCS | Mod: ,,, | Performed by: NURSE PRACTITIONER

## 2022-03-15 PROCEDURE — 99999 PR PBB SHADOW E&M-EST. PATIENT-LVL V: ICD-10-PCS | Mod: PBBFAC,,, | Performed by: NURSE PRACTITIONER

## 2022-03-15 PROCEDURE — 99215 OFFICE O/P EST HI 40 MIN: CPT | Mod: PBBFAC,PO | Performed by: NURSE PRACTITIONER

## 2022-03-15 NOTE — PROGRESS NOTES
"Damon Yoo presented for an initial Medicare AWV today. The following components were reviewed and updated:    · Medical history  · Family History  · Social history  · Allergies and Current Medications  · Health Risk Assessment  · Health Maintenance  · Care Team    **See Completed Assessments for Annual Wellness visit with in the encounter summary    The following assessments were completed:  · Depression Screening  · Cognitive function Screening  · Timed Get Up Test  · Whisper Test    Vitals:    03/15/22 1107   BP: 124/72   BP Location: Left arm   Patient Position: Sitting   BP Method: Large (Manual)   Pulse: 60   Temp: 98.1 °F (36.7 °C)   TempSrc: Temporal   SpO2: 98%   Weight: 118.5 kg (261 lb 3.9 oz)   Height: 5' 7" (1.702 m)     Body mass index is 40.92 kg/m².   ]    Physical Exam  Constitutional:       General: He is not in acute distress.     Appearance: Normal appearance. He is obese.   Cardiovascular:      Rate and Rhythm: Normal rate and regular rhythm.      Pulses: Normal pulses.      Heart sounds: Normal heart sounds. No murmur heard.    No friction rub. No gallop.      Comments: L chest wall pacemaker, paced  Pulmonary:      Effort: Pulmonary effort is normal. No respiratory distress.   Abdominal:      General: There is no distension.      Palpations: Abdomen is soft.      Tenderness: There is no abdominal tenderness. There is no guarding.   Musculoskeletal:      Cervical back: Normal range of motion.   Skin:     General: Skin is warm and dry.      Coloration: Skin is not pale.      Findings: No erythema.   Neurological:      Mental Status: He is alert and oriented to person, place, and time.   Psychiatric:         Mood and Affect: Mood normal.         Behavior: Behavior normal.           Diagnoses and health risks identified today and associated recommendations/orders:    Problem List Items Addressed This Visit        Neuro    Spinal stenosis of lumbar region with radiculopathy    Current Assessment " & Plan     Followed by pain management and PCP.               Pulmonary    Pulmonary heart disease    Current Assessment & Plan     Followed by PCP and cardiology. Asymptomatic. Continue BB.               Cardiac/Vascular    Hyperlipidemia (Chronic)    Current Assessment & Plan     Upcoming labs with PCP. Continue statin.            Chronic diastolic CHF (congestive heart failure) (Chronic)    Current Assessment & Plan     Asymptomatic. Continue BB and lasix. No signs of decompensation. Followed by cardiology.                  Essential hypertension    Current Assessment & Plan     Blood pressure well controlled. Continue current medications. Followed by PCP.            Coronary artery disease involving native coronary artery of native heart without angina pectoris w/ hx CABG    Current Assessment & Plan     Asymptomatic. Continue asa and statin. Followed by PCP and cardiology.            S/P CABG (coronary artery bypass graft)    Current Assessment & Plan     Asymptomatic. Continue asa and statin. Followed by PCP and cardiology.            Hypertrophic cardiomyopathy    Current Assessment & Plan     Blood pressure controlled. Followed by PCP and cardiology.            CHB (complete heart block)    Current Assessment & Plan     Pacemaker in place. Followed by cardiology.            SVT (supraventricular tachycardia)    Current Assessment & Plan     Followed by cardiology. Continue metoprolol.            Non-sustained ventricular tachycardia    Current Assessment & Plan     Pacemaker in place. Continue metoprolol.            Aortic atherosclerosis    Current Assessment & Plan     Asymptomatic. Continue asa and statin. Followed by PCP.               Renal/    Chronic kidney disease, stage III (moderate)    Current Assessment & Plan     GFR 50.1. Stable. BP controlled. Followed by PCP.               Hematology    Thrombocytopenia    Current Assessment & Plan     Last result 110. Has upcoming orders with PCP. No acute  signs of bleeding.               Oncology    History of prostate cancer    Current Assessment & Plan     Hx prostatectomy. Followed by PCP.            Prostate cancer    Current Assessment & Plan     Hx prostatectomy. Followed by PCP.               Endocrine    Obesity, Class III, BMI 40-49.9 (morbid obesity) (Chronic)    Current Assessment & Plan     Counseled on importance of diet and exercise in order to improve overall quality of life, and reduce risk of future comorbidities.             Prediabetes    Current Assessment & Plan     Most recent A1C 6.0. managed with lifestyle modifications. Followed by PCP.               Orthopedic    Chronic gout due to renal impairment of multiple sites without tophus    Current Assessment & Plan     Continue allopurinol daily and colchicine PRN for flare up. Followed by PCP.               Other    Encounter for preventive health examination - Primary    Current Assessment & Plan     No concerning findings on physical exam. Continue annual medicare wellness visits. See HRA screening.                   Provided Damon with a 5-10 year written screening schedule and personal prevention plan. Recommendations were developed using the USPSTF age appropriate recommendations. Education, counseling, and referrals were provided as needed.  After Visit Summary printed and given to patient which includes a list of additional screenings\tests needed.    I offered to discuss advanced care planning, including how to pick a person who would make decisions for you if you were unable to make them for yourself, called a health care power of , and what kind of decisions you might make such as use of life sustaining treatments such as ventilators and tube feeding when faced with a life limiting illness recorded on a living will that they will need to know. (How you want to be cared for as you near the end of your natural life)     X Patient is interested in learning more about how to make  advanced directives.  I provided them paperwork and offered to discuss this with them.      Follow up in about 1 year (around 3/15/2023) for annual medicare wellness visit. .      Brianna Franco NP

## 2022-03-15 NOTE — PATIENT INSTRUCTIONS
Counseling and Referral of Other Preventative  (Italic type indicates deductible and co-insurance are waived)    Patient Name: Damon Yoo  Today's Date: 3/15/2022    Health Maintenance       Date Due Completion Date    Shingles Vaccine (3 of 3) 04/14/2020 2/18/2020    Influenza Vaccine (1) Never done ---    COVID-19 Vaccine (2 - Moderna 3-dose series) 12/14/2021 11/16/2021    Colorectal Cancer Screening 12/04/2022 12/4/2017    TETANUS VACCINE 01/14/2023 1/14/2013    Lipid Panel 02/07/2023 2/7/2022    High Dose Statin 03/15/2023 3/15/2022    Aspirin/Antiplatelet Therapy 03/15/2023 3/15/2022        No orders of the defined types were placed in this encounter.    The following information is provided to all patients.  This information is to help you find resources for any of the problems found today that may be affecting your health:                Living healthy guide: www.[facility].[place].[facility]      Understanding Diabetes: www.diabetes.org      Eating healthy: www.cdc.gov/healthyweight      [facility] home safety checklist: www.cdc.gov/steadi/patient.html      Agency on Aging: www.goea.[place].gov      Alcoholics anonymous (AA): www.aa.org      Physical Activity: www.herlinda.nih.gov/bg3jbmw      Tobacco use: www.quitwithusla.org

## 2022-03-18 NOTE — ASSESSMENT & PLAN NOTE
No concerning findings on physical exam. Continue annual medicare wellness visits. See HRA screening.

## 2022-03-24 ENCOUNTER — CLINICAL SUPPORT (OUTPATIENT)
Dept: CARDIOLOGY | Facility: HOSPITAL | Age: 72
End: 2022-03-24
Payer: MEDICARE

## 2022-03-24 DIAGNOSIS — Z95.0 PRESENCE OF CARDIAC PACEMAKER: ICD-10-CM

## 2022-03-24 PROCEDURE — 93296 REM INTERROG EVL PM/IDS: CPT | Performed by: INTERNAL MEDICINE

## 2022-03-31 ENCOUNTER — EXTERNAL CHRONIC CARE MANAGEMENT (OUTPATIENT)
Dept: PRIMARY CARE CLINIC | Facility: CLINIC | Age: 72
End: 2022-03-31
Payer: MEDICARE

## 2022-03-31 PROCEDURE — 99490 CHRNC CARE MGMT STAFF 1ST 20: CPT | Mod: PBBFAC | Performed by: PEDIATRICS

## 2022-03-31 PROCEDURE — 99490 CHRNC CARE MGMT STAFF 1ST 20: CPT | Mod: S$PBB,,, | Performed by: PEDIATRICS

## 2022-03-31 PROCEDURE — 99490 PR CHRONIC CARE MGMT, 1ST 20 MIN: ICD-10-PCS | Mod: S$PBB,,, | Performed by: PEDIATRICS

## 2022-04-02 ENCOUNTER — PATIENT OUTREACH (OUTPATIENT)
Dept: ADMINISTRATIVE | Facility: OTHER | Age: 72
End: 2022-04-02
Payer: MEDICARE

## 2022-04-04 ENCOUNTER — APPOINTMENT (OUTPATIENT)
Dept: OPHTHALMOLOGY | Facility: CLINIC | Age: 72
End: 2022-04-04
Payer: MEDICARE

## 2022-04-04 ENCOUNTER — OFFICE VISIT (OUTPATIENT)
Dept: OPHTHALMOLOGY | Facility: CLINIC | Age: 72
End: 2022-04-04
Payer: MEDICARE

## 2022-04-04 DIAGNOSIS — H40.1132 PRIMARY OPEN ANGLE GLAUCOMA OF BOTH EYES, MODERATE STAGE: Primary | ICD-10-CM

## 2022-04-04 DIAGNOSIS — H25.13 NUCLEAR SENILE CATARACT OF BOTH EYES: ICD-10-CM

## 2022-04-04 PROCEDURE — 92083 EXTENDED VISUAL FIELD XM: CPT | Mod: PBBFAC | Performed by: OPHTHALMOLOGY

## 2022-04-04 PROCEDURE — 92014 PR EYE EXAM, EST PATIENT,COMPREHESV: ICD-10-PCS | Mod: S$PBB,,, | Performed by: OPHTHALMOLOGY

## 2022-04-04 PROCEDURE — 99999 PR PBB SHADOW E&M-EST. PATIENT-LVL III: CPT | Mod: PBBFAC,,, | Performed by: OPHTHALMOLOGY

## 2022-04-04 PROCEDURE — 92133 CPTRZD OPH DX IMG PST SGM ON: CPT | Mod: PBBFAC | Performed by: OPHTHALMOLOGY

## 2022-04-04 PROCEDURE — 92083 HUMPHREY VISUAL FIELD - OU - BOTH EYES: ICD-10-PCS | Mod: 26,S$PBB,, | Performed by: OPHTHALMOLOGY

## 2022-04-04 PROCEDURE — 99999 PR PBB SHADOW E&M-EST. PATIENT-LVL III: ICD-10-PCS | Mod: PBBFAC,,, | Performed by: OPHTHALMOLOGY

## 2022-04-04 PROCEDURE — 92014 COMPRE OPH EXAM EST PT 1/>: CPT | Mod: S$PBB,,, | Performed by: OPHTHALMOLOGY

## 2022-04-04 PROCEDURE — 92133 POSTERIOR SEGMENT OCT OPTIC NERVE(OCULAR COHERENCE TOMOGRAPHY) - OU - BOTH EYES: ICD-10-PCS | Mod: 26,S$PBB,, | Performed by: OPHTHALMOLOGY

## 2022-04-04 PROCEDURE — 99213 OFFICE O/P EST LOW 20 MIN: CPT | Mod: PBBFAC | Performed by: OPHTHALMOLOGY

## 2022-04-04 NOTE — PROGRESS NOTES
SUBJECTIVE  Damno Yoo is 72 y.o. male  Corrected distance visual acuity was 20/20 in the right eye and 20/30 +2 in the left eye.   Chief Complaint   Patient presents with    Glaucoma     Pt here for 3m HVF GOCT chk. No pain or discomfort. VA stable. 100% compliant with gtts.           HPI     Glaucoma      Additional comments: Pt here for 3m HVF GOCT chk. No pain or discomfort.   VA stable. 100% compliant with gtts.               Comments     1. Mod COAG- No FHx (init 32/27 on 3/10 with C/D .65/.60) Goal <18, new   goal = 15 6/2017  SLT OD 12/11/14 (20-16)  SLT OS 2/25/15 (20-16)  2. Mild NSC  3. LLL Cyst Excision on 6/13/12    Latanoprost QHS OU  Timolol qam OU          Last edited by Martinez Chatman MA on 4/4/2022  7:47 AM. (History)         Assessment /Plan :  1. Primary open angle glaucoma of both eyes, moderate stage Doing well, IOP within acceptable range relative to target IOP and no evidence of progression. Continue current treatment. Reviewed importance of continued compliance with treatment and follow up.      Patient instructed to continue using the following glaucoma medication as follows:  Latanoprost one drop in each eye nightly and Timolol one drop both eyes daily    Return to clinic in 3-4 months  or as needed.  With IOP Check       2. Nuclear senile cataract of both eyes - monitor for now

## 2022-04-30 ENCOUNTER — EXTERNAL CHRONIC CARE MANAGEMENT (OUTPATIENT)
Dept: PRIMARY CARE CLINIC | Facility: CLINIC | Age: 72
End: 2022-04-30
Payer: MEDICARE

## 2022-04-30 PROCEDURE — 99490 PR CHRONIC CARE MGMT, 1ST 20 MIN: ICD-10-PCS | Mod: S$PBB,,, | Performed by: PEDIATRICS

## 2022-04-30 PROCEDURE — 99490 CHRNC CARE MGMT STAFF 1ST 20: CPT | Mod: PBBFAC | Performed by: PEDIATRICS

## 2022-04-30 PROCEDURE — 99490 CHRNC CARE MGMT STAFF 1ST 20: CPT | Mod: S$PBB,,, | Performed by: PEDIATRICS

## 2022-05-09 DIAGNOSIS — M10.9 GOUT, UNSPECIFIED CAUSE, UNSPECIFIED CHRONICITY, UNSPECIFIED SITE: ICD-10-CM

## 2022-05-10 RX ORDER — ALLOPURINOL 300 MG/1
300 TABLET ORAL DAILY
Qty: 90 TABLET | Refills: 4 | Status: SHIPPED | OUTPATIENT
Start: 2022-05-10 | End: 2023-08-16

## 2022-05-31 ENCOUNTER — EXTERNAL CHRONIC CARE MANAGEMENT (OUTPATIENT)
Dept: PRIMARY CARE CLINIC | Facility: CLINIC | Age: 72
End: 2022-05-31
Payer: MEDICARE

## 2022-05-31 PROCEDURE — 99490 PR CHRONIC CARE MGMT, 1ST 20 MIN: ICD-10-PCS | Mod: S$PBB,,, | Performed by: PEDIATRICS

## 2022-05-31 PROCEDURE — 99490 CHRNC CARE MGMT STAFF 1ST 20: CPT | Mod: PBBFAC | Performed by: PEDIATRICS

## 2022-05-31 PROCEDURE — 99490 CHRNC CARE MGMT STAFF 1ST 20: CPT | Mod: S$PBB,,, | Performed by: PEDIATRICS

## 2022-06-02 DIAGNOSIS — H40.1132 PRIMARY OPEN ANGLE GLAUCOMA OF BOTH EYES, MODERATE STAGE: ICD-10-CM

## 2022-06-02 RX ORDER — LATANOPROST 50 UG/ML
1 SOLUTION/ DROPS OPHTHALMIC NIGHTLY
Qty: 7.5 ML | Refills: 4 | Status: SHIPPED | OUTPATIENT
Start: 2022-06-02 | End: 2023-09-22 | Stop reason: SDUPTHER

## 2022-06-20 PROBLEM — Z00.00 ENCOUNTER FOR PREVENTIVE HEALTH EXAMINATION: Status: RESOLVED | Noted: 2017-12-04 | Resolved: 2022-06-20

## 2022-06-22 ENCOUNTER — CLINICAL SUPPORT (OUTPATIENT)
Dept: CARDIOLOGY | Facility: HOSPITAL | Age: 72
End: 2022-06-22
Payer: MEDICARE

## 2022-06-22 DIAGNOSIS — Z95.0 PRESENCE OF CARDIAC PACEMAKER: ICD-10-CM

## 2022-06-22 PROCEDURE — 93294 REM INTERROG EVL PM/LDLS PM: CPT | Mod: ,,, | Performed by: INTERNAL MEDICINE

## 2022-06-22 PROCEDURE — 93294 CARDIAC DEVICE CHECK - REMOTE: ICD-10-PCS | Mod: ,,, | Performed by: INTERNAL MEDICINE

## 2022-06-30 ENCOUNTER — EXTERNAL CHRONIC CARE MANAGEMENT (OUTPATIENT)
Dept: PRIMARY CARE CLINIC | Facility: CLINIC | Age: 72
End: 2022-06-30
Payer: MEDICARE

## 2022-06-30 PROCEDURE — 99490 PR CHRONIC CARE MGMT, 1ST 20 MIN: ICD-10-PCS | Mod: S$PBB,,, | Performed by: PEDIATRICS

## 2022-06-30 PROCEDURE — 99490 CHRNC CARE MGMT STAFF 1ST 20: CPT | Mod: S$PBB,,, | Performed by: PEDIATRICS

## 2022-06-30 PROCEDURE — 99490 CHRNC CARE MGMT STAFF 1ST 20: CPT | Mod: PBBFAC | Performed by: PEDIATRICS

## 2022-07-19 DIAGNOSIS — M17.12 ARTHRITIS OF KNEE, LEFT: Primary | ICD-10-CM

## 2022-07-19 DIAGNOSIS — M17.11 ARTHRITIS OF KNEE, RIGHT: ICD-10-CM

## 2022-07-25 ENCOUNTER — HOSPITAL ENCOUNTER (OUTPATIENT)
Dept: RADIOLOGY | Facility: HOSPITAL | Age: 72
Discharge: HOME | End: 2022-07-25
Attending: ORTHOPAEDIC SURGERY
Payer: MEDICARE

## 2022-07-25 ENCOUNTER — OFFICE VISIT (OUTPATIENT)
Dept: ORTHOPEDICS | Facility: CLINIC | Age: 72
End: 2022-07-25
Payer: MEDICARE

## 2022-07-25 VITALS — BODY MASS INDEX: 41 KG/M2 | WEIGHT: 261.25 LBS | HEIGHT: 67 IN

## 2022-07-25 DIAGNOSIS — M17.11 ARTHRITIS OF KNEE, RIGHT: ICD-10-CM

## 2022-07-25 DIAGNOSIS — M21.162 ACQUIRED VARUS DEFORMITY KNEE, LEFT: ICD-10-CM

## 2022-07-25 DIAGNOSIS — M48.061 SPINAL STENOSIS OF LUMBAR REGION WITH RADICULOPATHY: ICD-10-CM

## 2022-07-25 DIAGNOSIS — M10.9 GOUT, UNSPECIFIED CAUSE, UNSPECIFIED CHRONICITY, UNSPECIFIED SITE: ICD-10-CM

## 2022-07-25 DIAGNOSIS — M54.17 LUMBOSACRAL RADICULOPATHY: ICD-10-CM

## 2022-07-25 DIAGNOSIS — M17.12 ARTHRITIS OF KNEE, LEFT: ICD-10-CM

## 2022-07-25 DIAGNOSIS — M54.16 SPINAL STENOSIS OF LUMBAR REGION WITH RADICULOPATHY: ICD-10-CM

## 2022-07-25 DIAGNOSIS — I87.2 EDEMA OF BOTH LOWER EXTREMITIES DUE TO PERIPHERAL VENOUS INSUFFICIENCY: ICD-10-CM

## 2022-07-25 DIAGNOSIS — M17.12 ARTHRITIS OF KNEE, LEFT: Primary | ICD-10-CM

## 2022-07-25 DIAGNOSIS — M21.161 ACQUIRED VARUS DEFORMITY KNEE, RIGHT: ICD-10-CM

## 2022-07-25 PROCEDURE — 73564 X-RAY EXAM KNEE 4 OR MORE: CPT | Mod: 26,50,, | Performed by: RADIOLOGY

## 2022-07-25 PROCEDURE — 73564 X-RAY EXAM KNEE 4 OR MORE: CPT | Mod: TC,50

## 2022-07-25 PROCEDURE — 99999 PR PBB SHADOW E&M-EST. PATIENT-LVL III: ICD-10-PCS | Mod: PBBFAC,,, | Performed by: ORTHOPAEDIC SURGERY

## 2022-07-25 PROCEDURE — 99999 PR PBB SHADOW E&M-EST. PATIENT-LVL III: CPT | Mod: PBBFAC,,, | Performed by: ORTHOPAEDIC SURGERY

## 2022-07-25 PROCEDURE — 99214 OFFICE O/P EST MOD 30 MIN: CPT | Mod: S$PBB,,, | Performed by: ORTHOPAEDIC SURGERY

## 2022-07-25 PROCEDURE — 73564 XR KNEE ORTHO BILAT WITH FLEXION: ICD-10-PCS | Mod: 26,50,, | Performed by: RADIOLOGY

## 2022-07-25 PROCEDURE — 99214 PR OFFICE/OUTPT VISIT, EST, LEVL IV, 30-39 MIN: ICD-10-PCS | Mod: S$PBB,,, | Performed by: ORTHOPAEDIC SURGERY

## 2022-07-25 PROCEDURE — 99213 OFFICE O/P EST LOW 20 MIN: CPT | Mod: PBBFAC | Performed by: ORTHOPAEDIC SURGERY

## 2022-07-25 NOTE — PROGRESS NOTES
Subjective:     Patient ID: Damon Yoo is a 72 y.o. male.    Chief Complaint: Pain of the Left Knee and Pain of the Right Knee    HPI:  69-year-old diagnosed with bilateral knee arthrosis given injection of steroid in September placed on meloxicam which did not help then we changed him to Celebrex.  Celebrex seems to help.  Workup found out that he has old MRI from 2014 with neuroforaminal stenosis multilevel.  Workup with EMG-NCV recently ordered by me showed chronic L5-S1 radiculopathy bilaterally with peripheral poly neuropathy superimposed.  We sent him for NAHUN with pain management and that helped significantly that his pain is 2-3/10 down his legs and knees.  He still takes Celebrex daily with relief.  For the 1st time was able to work in the [employer] it without difficulty.  Not interested in having surgery at this point.    01/23/2020  Patient with bilateral knee severe arthrosis lumbosacral arthrosis.  The Celebrex seems to work really well for him.  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  He still states he is doing well at this point do not want a proceed with any surgical intervention.  His pain level on and off is 5/10.  Slight grinding and catching. Not using any assistive devices to ambulate    03/05/2020  Bilateral knee arthritis and lumbar radiculopathy with neuroforaminal stenosis multilevel.  States the epidural block still working and the Celebrex still helping.  She is remaining very active working in the [employer].  Does not think he needs any injections in the back or the knee at this point.  His pain level is 2/10 occasionally goes up and he takes his Celebrex.  He is ambulating independently without any assistive devices.  Denies loss of bowel bladder control    07/13/2020  Left knee is hurting quite a bit.  His back is not so much and the numbness and tingling in the legs a not bothering him.  He received last steroid injection in September  2019 and requesting a new 1.  Celebrex does help some but now the pain in the left knee is 8/10.  He wants to avoid surgical intervention.  He did receive E NAHUN in the back by pain management and he does not think he needs another 1.  He remains very active with exercise.  Denies any fever or chills or shortness of breath or difficulty chewing swallowing loss of bowel bladder control.  He is maintaining social distancing.    10/15/2020  Patient stated the injection given last visit in the knee seems to have helped significantly.  His pain level is 3/10.  The pain gets worse when he works in the [employer] his back will hurt as well as is knee.  Now he is taking Celebrex only as needed and managing.  He does not think he needs any injections today.  He is remaining active.  NAHUN given by pain management seems to have worked for him last time.  Denies any fever or chills or shortness of breath or difficulty with chewing or swallowing loss of bowel bladder control blurry vision double vision or loss of sense of smell or taste or chest pain or stomach issues or kidney issues    21/21  Patient numbness in the legs improved his knee arthritis is not as bad pain level around 5/10 on occasions.  He does take Celebrex only on occasions .  Sometimes he takes Tylenol.  He is maintaining his activity level.  His low back not hurting as much as used to either.  Occasionally gets left hip tenderness over the greater trochanter but not on a daily basis.  He does not think that he needs any injections today and doing well.  Denies any fever or chills or shortness of breath or difficulty with chewing or swallowing loss of bowel bladder control blurry vision double vision or loss sense smell or taste    07/22/2021  Patient is complaining of tightness around his ankles.  Does have history of hypertension takes numerous medications.  Also has have history of both knees hurting however he is running out of Celebrex.  He would like dark renewal.   He takes it on occasions.  He has left knee is hurting him a little bit more than the right.  His back is not hurting him as much as he used to before.  He is ambulating without assistive devices.  His pain is roughly 7/10.  No fever no chills no shortness of breath or difficulty with chewing or swallowing loss of bowel bladder control blurry vision double vision loss sense smell or taste    01/24/2022  Bilateral knee severe arthritis bone-on-bone with severe varus deformity.  Also has swelling in his legs.  Also has evidence of spinal stenosis with radiculopathy.  We do give him the Celebrex and Lasix to take on as-needed basis.  He also has history of gout he takes allopurinol given to him by Alethea Finley who had left.  He is requesting future prescriptions so he does not have his gout act up on him.  As far as his knees are concerned he is doing okay by taking Celebrex occasionally and when his legs swell up he occasionally takes Lasix.  As far as the numbness down the leg he recently received this injection in his back which helped the numbness going down his right leg and he is very pleased.  He is ambulating without any assistive devices.  His pain level is 2/10 and he thinks he does not need any injections for his knees at this time.  No fever no chills no shortness of breath difficulty with chewing swallowing loss of bowel bladder control blurry vision double vision loss sense smell or taste    I did tell him he can discuss allopurinol with Dr. Mason    07/25/2022  Patient have radiculopathy but is not doing so bad with that and sees pain management occasionally gets injections in the spine  As far as his bilateral knee with severe arthritis he takes Celebrex only on as-needed basis.  He says maybe once or twice a week he takes it.  He maintains quite an active lifestyle.  He has pain in the left knee is worse than the right and usually is around 5/10.  Last injection was in 2020 in the left knee of  Depo-Medrol.  He does not think he needs any injections and he is doing well.  As far as his gout is concerned he is taking allopurinol.  Complain of slight discomfort in the ankles bilaterally fever some a little bit tight.  No fever no chills no shortness of breath or difficulty with chewing or swallowing loss of bowel bladder control blurry vision double vision loss sense smell or taste.  He is ambulating without any assistive devices  Past Medical History:   Diagnosis Date    Abnormal EKG 10/25/2013    Acute on chronic diastolic CHF (congestive heart failure) 11/27/2020    Arthritis     Back pain     CAD (coronary artery disease)     Cancer     Prostate [id number] prostate    Disorder of kidney and ureter     Glaucoma     Gout attack     Hyperlipidemia     Hypertension     Hypertrophic cardiomyopathy 10/25/2013    S/P CABG (coronary artery bypass graft) 10/25/2013     Past Surgical History:   Procedure Laterality Date    A-V CARDIAC PACEMAKER INSERTION Left 11/30/2020    Procedure: INSERTION, CARDIAC PACEMAKER, DUAL CHAMBER;  Surgeon: Elsy Kenyon MD;  Location: [facility] CATH LAB;  Service: Cardiology;  Laterality: Left;  biotronik    COLONOSCOPY N/A 12/04/2017    Procedure: COLONOSCOPY;  Surgeon: Dina Ledesma MD;  Location: [facility] ENDO;  Service: Endoscopy;  Laterality: N/A;    CORONARY ARTERY BYPASS GRAFT  05/2003    x1    PROSTATE SURGERY      RALP 2013    TRANSFORAMINAL EPIDURAL INJECTION OF STEROID Right 11/11/2019    Procedure: Right L5/S1+ S1 TF NAHUN with IV sedation;  Surgeon: Ermias Mario MD;  Location: [facility] PAIN MGT;  Service: Pain Management;  Laterality: Right;    TRANSFORAMINAL EPIDURAL INJECTION OF STEROID Right 11/24/2021    Procedure: right L5/S1 and S1 TF NAHUN;  Surgeon: Ermias Mario MD;  Location: [facility] PAIN MGT;  Service: Pain Management;  Laterality: Right;    TRANSFORAMINAL EPIDURAL INJECTION OF STEROID Right 01/11/2022    Procedure: Right L5/S1 + S1 TF NAHUN;  Surgeon:  Ermias Mario MD;  Location: [facility];  Service: Pain Management;  Laterality: Right;     Family History   Problem Relation Age of Onset    No Known Problems Mother     Heart disease Father     Hypertension Father     No Known Problems Daughter     Strabismus Neg Hx     Retinal detachment Neg Hx     Macular degeneration Neg Hx     Glaucoma Neg Hx     Blindness Neg Hx     Amblyopia Neg Hx      Social History     Socioeconomic History    Marital status:    Tobacco Use    Smoking status: Former Smoker     Packs/day: 0.25     Years: 15.00     Pack years: 3.75     Quit date: 1988     Years since quittin.2    Smokeless tobacco: Never Used   Substance and Sexual Activity    Alcohol use: No    Drug use: No    Sexual activity: Not Currently   Social History Narrative    No pets or smokers in household.     Social Determinants of Health     Financial Resource Strain: Low Risk     Difficulty of Paying Living Expenses: Not very hard   Food Insecurity: Food Insecurity Present    Worried About Running Out of Food in the Last Year: Sometimes true    Ran Out of Food in the Last Year: Never true   Transportation Needs: No Transportation Needs    Lack of Transportation (Medical): No    Lack of Transportation (Non-Medical): No   Physical Activity: Insufficiently Active    Days of Exercise per Week: 2 days    Minutes of Exercise per Session: 10 min   Stress: No Stress Concern Present    Feeling of Stress : Only a little   Social Connections: Moderately Isolated    Frequency of Communication with Friends and Family: Twice a week    Frequency of Social Gatherings with Friends and Family: Never    Attends Cheondo[employer] Services: 1 to 4 times per year    Active Member of Clubs or Organizations: No    Attends Club or Organization Meetings: Never    Marital Status:    Housing Stability: Low Risk     Unable to Pay for Housing in the Last Year: No    Number of Places Lived in the  Last Year: 1    Unstable Housing in the Last Year: No     Medication List with Changes/Refills   Current Medications    ACETAMINOPHEN (TYLENOL) 500 MG TABLET    Take 500 mg by mouth every 6 (six) hours as needed for Pain.    ALLOPURINOL (ZYLOPRIM) 300 MG TABLET    Take 1.5 tablets (450 mg total) by mouth once daily.    ALLOPURINOL (ZYLOPRIM) 300 MG TABLET    Take 1 tablet (300 mg total) by mouth once daily.    AMLODIPINE (NORVASC) 10 MG TABLET    Take 1 tablet (10 mg total) by mouth once daily.    ASPIRIN 81 MG CHEWABLE TABLET    Take 1 tablet by mouth Daily.    CELECOXIB (CELEBREX) 200 MG CAPSULE    Take 1 capsule (200 mg total) by mouth daily as needed for Pain. Take with food    FISH OIL-FAT ACID COMB.8- 1,200 MG (400 [id number]) CAP    Take 1 capsule by mouth once daily.     FUROSEMIDE (LASIX) 20 MG TABLET    Take 1 tablet (20 mg total) by mouth daily as needed (swelling).    LATANOPROST 0.005 % OPHTHALMIC SOLUTION    Place 1 drop into both eyes every evening.    LISINOPRIL (PRINIVIL,ZESTRIL) 40 MG TABLET    Take 1 tablet (40 mg total) by mouth once daily.    METOPROLOL SUCCINATE (TOPROL-XL) 50 MG 24 HR TABLET    Take 3 tablets (150 mg total) by mouth once daily.    MITIGARE 0.6 MG CAP    Take 1 capsule (0.6 mg total) by mouth 2 (two) times daily as needed (acute gout).    PANTOPRAZOLE (PROTONIX) 40 MG TABLET    Take 1 tablet (40 mg total) by mouth once daily. (replaces nexium)    PREGABALIN (LYRICA) 75 MG CAPSULE    Take 1 capsule (75 mg total) by mouth every evening.    ROSUVASTATIN (CRESTOR) 40 MG TAB    Take 1 tablet (40 mg total) by mouth every evening. Generic is fine.    TIMOLOL MALEATE 0.5% (TIMOPTIC) 0.5 % DROP    Place 1 drop into both eyes every morning.    TIZANIDINE (ZANAFLEX) 4 MG TABLET    Take 1 tablet (4 mg total) by mouth every 8 (eight) hours as needed (MUSCLE SPASMS).    TRAMADOL (ULTRAM) 50 MG TABLET    Take 1 tablet (50 mg total) by mouth every 12 (twelve) hours as needed for  Pain.     Review of patient's allergies indicates:  No Known Allergies  Review of Systems   Constitutional: Negative for decreased appetite.   HENT: Negative for tinnitus.    Eyes: Negative for double vision.   Cardiovascular: Negative for chest pain.   Respiratory: Negative for wheezing.    Hematologic/Lymphatic: Negative for bleeding problem.   Skin: Negative for dry skin.   Musculoskeletal: Positive for arthritis, back pain and joint pain. Negative for gout, neck pain and stiffness.   Gastrointestinal: Negative for abdominal pain.   Genitourinary: Negative for bladder incontinence.   Neurological: Positive for paresthesias. Negative for numbness and sensory change.   Psychiatric/Behavioral: Negative for altered mental status.       Objective:   Body mass index is 40.92 kg/m².  There were no vitals filed for this visit.       General    Constitutional: He is oriented to person, place, and time. He appears well-developed.   HENT:   Head: Atraumatic.   Eyes: EOM are normal.   Cardiovascular: Normal rate.    Pulmonary/Chest: Effort normal.   Abdominal: Soft.   Neurological: He is alert and oriented to person, place, and time.   Psychiatric: Judgment normal.            Cervical spine with good range of motion and slight crepitus but no pain  Lumbar with pain from L3 down to L5 paraspinal.  No true deformity seen.  Bilateral upper extremity neurovascularly intact strength is 5/5 throughout radial and ulnar pulses 2+ sensory intact to touch  Pelvis is level  Bilateral hip range of motion within normal limits.  Hip flexors, abduct his adductors quads and hamstrings were 5/5.  Slight discomfort over the greater troch on the left but other  Bilateral knees with varus deformity.  Crepitus with motion on the patella and medially.  Tenderness to palpation over the patella and medial joint.  Range of motion 5-115 degrees. Very mild swelling.  Left knee has severe pain on the medial side with mild swelling.  No defect in the  patella or quadriceps tendon.  Noticed there is some decrease in range of motion compared to before Calves are soft nontender  Positive pitting edema up to mid tibia  Ankle motion intact  DP 1+  Decrease in sensation in his foot on the lateral aspect  Skin is warm to touch  Patellar reflex 2+    EMG-NCV with chronic bilateral L5-S1 radiculopathy with superimposed peripheral polyneuropathy    Relevant imaging results reviewed and interpreted by me, discussed with the patient and / or family today     X-ray 07/25/2022 bilateral knees with complete loss of medial joint space and marginal osteophyte tricompartmental consistent with end-stage arthritis with varus deformity.  No fracture seen  X-ray and electronic records bilateral knees with complete loss of medial joint space and osteophytic changes consistent with end-stage arthrosis  MRI 2014 multilevel foraminal and central stenosis from L3 down to L5 and S1  Assessment:     Encounter Diagnoses   Name Primary?    Arthritis of knee, left Yes    Acquired varus deformity knee, left     Arthritis of knee, right     Acquired varus deformity knee, right     Edema of both lower extremities due to peripheral venous insufficiency     Lumbosacral radiculopathy     Spinal stenosis of lumbar region with radiculopathy     Gout, unspecified cause, unspecified chronicity, unspecified site         Plan:   Arthritis of knee, left    Acquired varus deformity knee, left    Arthritis of knee, right    Acquired varus deformity knee, right    Edema of both lower extremities due to peripheral venous insufficiency    Lumbosacral radiculopathy    Spinal stenosis of lumbar region with radiculopathy    Gout, unspecified cause, unspecified chronicity, unspecified site         Patient Instructions   X-rays today showing severe arthritis of both of her knees with bone on bone with the left being worse than the right and a lot of bone spurs formation  You doing okay with Celebrex on  as-needed  The left knee hurts her more than the right but you do not want have any injections last 1 we gave you was in 2020 in the left knee  You may still take Tylenol 650 mg not to exceed twice a day if needed  Continue being very active  I will see you at 1 year    Patient is doing much better after he had NAHUN.  Will hold off on any surgical intervention and on injection of the knees    He is losing a little bit more range of motion however we discussed that if she is hurting a lot in can not wait 1 year he can call us to come in for evaluation and treatment and possible injection if needed.  If he runs out of his Celebrex he can call us for renewal.  Disclaimer: This note was prepared using a voice recognition system and is likely to have sound alike errors within the text.

## 2022-07-25 NOTE — PATIENT INSTRUCTIONS
X-rays today showing severe arthritis of both of her knees with bone on bone with the left being worse than the right and a lot of bone spurs formation  You doing okay with Celebrex on as-needed  The left knee hurts her more than the right but you do not want have any injections last 1 we gave you was in 2020 in the left knee  You may still take Tylenol 650 mg not to exceed twice a day if needed  Continue being very active  I will see you at 1 year

## 2022-07-31 ENCOUNTER — EXTERNAL CHRONIC CARE MANAGEMENT (OUTPATIENT)
Dept: PRIMARY CARE CLINIC | Facility: CLINIC | Age: 72
End: 2022-07-31
Payer: MEDICARE

## 2022-07-31 PROCEDURE — 99490 PR CHRONIC CARE MGMT, 1ST 20 MIN: ICD-10-PCS | Mod: S$PBB,,, | Performed by: PEDIATRICS

## 2022-07-31 PROCEDURE — 99490 CHRNC CARE MGMT STAFF 1ST 20: CPT | Mod: PBBFAC | Performed by: PEDIATRICS

## 2022-07-31 PROCEDURE — 99490 CHRNC CARE MGMT STAFF 1ST 20: CPT | Mod: S$PBB,,, | Performed by: PEDIATRICS

## 2022-08-04 ENCOUNTER — OFFICE VISIT (OUTPATIENT)
Dept: OPHTHALMOLOGY | Facility: CLINIC | Age: 72
End: 2022-08-04
Payer: MEDICARE

## 2022-08-04 DIAGNOSIS — H25.13 NUCLEAR SENILE CATARACT OF BOTH EYES: ICD-10-CM

## 2022-08-04 DIAGNOSIS — H40.1132 PRIMARY OPEN ANGLE GLAUCOMA OF BOTH EYES, MODERATE STAGE: Primary | ICD-10-CM

## 2022-08-04 PROCEDURE — 99213 OFFICE O/P EST LOW 20 MIN: CPT | Mod: PBBFAC | Performed by: OPHTHALMOLOGY

## 2022-08-04 PROCEDURE — 99214 OFFICE O/P EST MOD 30 MIN: CPT | Mod: S$PBB,,, | Performed by: OPHTHALMOLOGY

## 2022-08-04 PROCEDURE — 99214 PR OFFICE/OUTPT VISIT, EST, LEVL IV, 30-39 MIN: ICD-10-PCS | Mod: S$PBB,,, | Performed by: OPHTHALMOLOGY

## 2022-08-04 PROCEDURE — 99999 PR PBB SHADOW E&M-EST. PATIENT-LVL III: ICD-10-PCS | Mod: PBBFAC,,, | Performed by: OPHTHALMOLOGY

## 2022-08-04 PROCEDURE — 99999 PR PBB SHADOW E&M-EST. PATIENT-LVL III: CPT | Mod: PBBFAC,,, | Performed by: OPHTHALMOLOGY

## 2022-08-04 NOTE — PROGRESS NOTES
SUBJECTIVE  Damon Yoo is 72 y.o. male  Corrected distance visual acuity was 20/20 in the right eye and 20/25 +2 in the left eye.   Chief Complaint   Patient presents with    Glaucoma     Pt here for 4m IOP chk. No pain or discomfort. VA stable. 100% compliant with gtts.           HPI     Glaucoma      Additional comments: Pt here for 4m IOP chk. No pain or discomfort. VA   stable. 100% compliant with gtts.               Comments     1. Mod COAG- No FHx (init 32/27 on 3/10 with C/D .65/.60) Goal <18, new   goal = 15 6/2017  SLT OD 12/11/14 (20-16)  SLT OS 2/25/15 (20-16)  2. Mild NSC  3. LLL Cyst Excision on 6/13/12    Latanoprost QHS OU  Timolol qam OU            Last edited by Martinez Chatman MA on 8/4/2022  8:59 AM. (History)         Assessment /Plan :  1. Primary open angle glaucoma of both eyes, moderate stage Doing well, intraocular pressure (IOP) within acceptable range relative to target IOP and no evidence of progression. Continue current treatment. Reviewed importance of continued compliance with treatment and follow up.      Patient instructed to continue using the following glaucoma medication as follows:  Latanoprost one drop in each eye nightly and Timolol one drop both eyes daily    Return to clinic in 4 months  or as needed.  With IOP Check and GOCT       2. Nuclear senile cataract of both eyes - monitor for now

## 2022-08-10 ENCOUNTER — LAB VISIT (OUTPATIENT)
Dept: LAB | Facility: HOSPITAL | Age: 72
End: 2022-08-10
Attending: PEDIATRICS
Payer: MEDICARE

## 2022-08-10 DIAGNOSIS — D69.6 THROMBOCYTOPENIA: ICD-10-CM

## 2022-08-10 DIAGNOSIS — Z85.46 HISTORY OF PROSTATE CANCER: ICD-10-CM

## 2022-08-10 DIAGNOSIS — I10 ESSENTIAL HYPERTENSION: ICD-10-CM

## 2022-08-10 DIAGNOSIS — N18.32 STAGE 3B CHRONIC KIDNEY DISEASE: ICD-10-CM

## 2022-08-10 DIAGNOSIS — E78.5 HYPERLIPIDEMIA, UNSPECIFIED HYPERLIPIDEMIA TYPE: Chronic | ICD-10-CM

## 2022-08-10 DIAGNOSIS — R73.03 PREDIABETES: ICD-10-CM

## 2022-08-10 LAB
ALBUMIN SERPL BCP-MCNC: 3.6 G/DL (ref 3.5–5.2)
ALP SERPL-CCNC: 100 U/L (ref 55–135)
ALT SERPL W/O P-5'-P-CCNC: 23 U/L (ref 10–44)
ANION GAP SERPL CALC-SCNC: 8 MMOL/L (ref 8–16)
AST SERPL-CCNC: 25 U/L (ref 10–40)
BASOPHILS # BLD AUTO: 0.05 K/UL (ref 0–0.2)
BASOPHILS NFR BLD: 0.6 % (ref 0–1.9)
BILIRUB SERPL-MCNC: 0.6 MG/DL (ref 0.1–1)
BUN SERPL-MCNC: 23 MG/DL (ref 8–23)
CALCIUM SERPL-MCNC: 9.6 MG/DL (ref 8.7–10.5)
CHLORIDE SERPL-SCNC: 109 MMOL/L (ref 95–110)
CHOLEST SERPL-MCNC: 115 MG/DL (ref 120–199)
CHOLEST/HDLC SERPL: 4.4 {RATIO} (ref 2–5)
CO2 SERPL-SCNC: 25 MMOL/L (ref 23–29)
COMPLEXED PSA SERPL-MCNC: <0.01 NG/ML (ref 0–4)
CREAT SERPL-MCNC: 1.7 MG/DL (ref 0.5–1.4)
DIFFERENTIAL METHOD: ABNORMAL
EOSINOPHIL # BLD AUTO: 0.4 K/UL (ref 0–0.5)
EOSINOPHIL NFR BLD: 4.5 % (ref 0–8)
ERYTHROCYTE [DISTWIDTH] IN BLOOD BY AUTOMATED COUNT: 13.9 % (ref 11.5–14.5)
EST. GFR  (NO RACE VARIABLE): 42.8 ML/MIN/1.73 M^2
ESTIMATED AVG GLUCOSE: 128 MG/DL (ref 68–131)
GLUCOSE SERPL-MCNC: 97 MG/DL (ref 70–110)
HBA1C MFR BLD: 6.1 % (ref 4–5.6)
HCT VFR BLD AUTO: 38.7 % (ref 40–54)
HDLC SERPL-MCNC: 26 MG/DL (ref 40–75)
HDLC SERPL: 22.6 % (ref 20–50)
HGB BLD-MCNC: 12.6 G/DL (ref 14–18)
IMM GRANULOCYTES # BLD AUTO: 0.02 K/UL (ref 0–0.04)
IMM GRANULOCYTES NFR BLD AUTO: 0.3 % (ref 0–0.5)
LDLC SERPL CALC-MCNC: 64.2 MG/DL (ref 63–159)
LYMPHOCYTES # BLD AUTO: 2 K/UL (ref 1–4.8)
LYMPHOCYTES NFR BLD: 26.3 % (ref 18–48)
MCH RBC QN AUTO: 31 PG (ref 27–31)
MCHC RBC AUTO-ENTMCNC: 32.6 G/DL (ref 32–36)
MCV RBC AUTO: 95 FL (ref 82–98)
MONOCYTES # BLD AUTO: 0.7 K/UL (ref 0.3–1)
MONOCYTES NFR BLD: 8.6 % (ref 4–15)
NEUTROPHILS # BLD AUTO: 4.6 K/UL (ref 1.8–7.7)
NEUTROPHILS NFR BLD: 59.7 % (ref 38–73)
NONHDLC SERPL-MCNC: 89 MG/DL
NRBC BLD-RTO: 0 /100 WBC
PLATELET # BLD AUTO: 177 K/UL (ref 150–450)
PMV BLD AUTO: 13.8 FL (ref 9.2–12.9)
POTASSIUM SERPL-SCNC: 4.5 MMOL/L (ref 3.5–5.1)
PROT SERPL-MCNC: 7.3 G/DL (ref 6–8.4)
RBC # BLD AUTO: 4.07 M/UL (ref 4.6–6.2)
SODIUM SERPL-SCNC: 142 MMOL/L (ref 136–145)
TRIGL SERPL-MCNC: 124 MG/DL (ref 30–150)
WBC # BLD AUTO: 7.77 K/UL (ref 3.9–12.7)

## 2022-08-10 PROCEDURE — 83036 HEMOGLOBIN GLYCOSYLATED A1C: CPT | Performed by: PEDIATRICS

## 2022-08-10 PROCEDURE — 84153 ASSAY OF PSA TOTAL: CPT | Performed by: PEDIATRICS

## 2022-08-10 PROCEDURE — 36415 COLL VENOUS BLD VENIPUNCTURE: CPT | Performed by: PEDIATRICS

## 2022-08-10 PROCEDURE — 85025 COMPLETE CBC W/AUTO DIFF WBC: CPT | Performed by: PEDIATRICS

## 2022-08-10 PROCEDURE — 80053 COMPREHEN METABOLIC PANEL: CPT | Performed by: PEDIATRICS

## 2022-08-10 PROCEDURE — 80061 LIPID PANEL: CPT | Performed by: PEDIATRICS

## 2022-08-17 ENCOUNTER — OFFICE VISIT (OUTPATIENT)
Dept: INTERNAL MEDICINE | Facility: CLINIC | Age: 72
End: 2022-08-17
Payer: MEDICARE

## 2022-08-17 VITALS
WEIGHT: 253.06 LBS | TEMPERATURE: 98 F | HEART RATE: 80 BPM | DIASTOLIC BLOOD PRESSURE: 78 MMHG | BODY MASS INDEX: 39.72 KG/M2 | HEIGHT: 67 IN | SYSTOLIC BLOOD PRESSURE: 130 MMHG | OXYGEN SATURATION: 98 %

## 2022-08-17 DIAGNOSIS — R73.03 PREDIABETES: ICD-10-CM

## 2022-08-17 DIAGNOSIS — I25.10 CORONARY ARTERY DISEASE INVOLVING NATIVE CORONARY ARTERY OF NATIVE HEART WITHOUT ANGINA PECTORIS: ICD-10-CM

## 2022-08-17 DIAGNOSIS — Z85.46 HISTORY OF PROSTATE CANCER: ICD-10-CM

## 2022-08-17 DIAGNOSIS — I50.32 CHRONIC DIASTOLIC CHF (CONGESTIVE HEART FAILURE): Chronic | ICD-10-CM

## 2022-08-17 DIAGNOSIS — I42.2 HYPERTROPHIC CARDIOMYOPATHY: ICD-10-CM

## 2022-08-17 DIAGNOSIS — I44.2 CHB (COMPLETE HEART BLOCK): ICD-10-CM

## 2022-08-17 DIAGNOSIS — M54.16 LUMBAR RADICULAR PAIN: ICD-10-CM

## 2022-08-17 DIAGNOSIS — I47.29 NON-SUSTAINED VENTRICULAR TACHYCARDIA: ICD-10-CM

## 2022-08-17 DIAGNOSIS — E66.01 OBESITY, CLASS III, BMI 40-49.9 (MORBID OBESITY): Chronic | ICD-10-CM

## 2022-08-17 DIAGNOSIS — E78.5 HYPERLIPIDEMIA, UNSPECIFIED HYPERLIPIDEMIA TYPE: Primary | Chronic | ICD-10-CM

## 2022-08-17 DIAGNOSIS — M1A.39X0 CHRONIC GOUT DUE TO RENAL IMPAIRMENT OF MULTIPLE SITES WITHOUT TOPHUS: ICD-10-CM

## 2022-08-17 DIAGNOSIS — N18.32 STAGE 3B CHRONIC KIDNEY DISEASE: ICD-10-CM

## 2022-08-17 DIAGNOSIS — I10 ESSENTIAL HYPERTENSION: ICD-10-CM

## 2022-08-17 DIAGNOSIS — I70.0 AORTIC ATHEROSCLEROSIS: ICD-10-CM

## 2022-08-17 DIAGNOSIS — D69.6 THROMBOCYTOPENIA: ICD-10-CM

## 2022-08-17 DIAGNOSIS — Z95.1 S/P CABG (CORONARY ARTERY BYPASS GRAFT): ICD-10-CM

## 2022-08-17 PROCEDURE — 99999 PR PBB SHADOW E&M-EST. PATIENT-LVL V: CPT | Mod: PBBFAC,,, | Performed by: PEDIATRICS

## 2022-08-17 PROCEDURE — 99214 PR OFFICE/OUTPT VISIT, EST, LEVL IV, 30-39 MIN: ICD-10-PCS | Mod: S$PBB,,, | Performed by: PEDIATRICS

## 2022-08-17 PROCEDURE — 99999 PR PBB SHADOW E&M-EST. PATIENT-LVL V: ICD-10-PCS | Mod: PBBFAC,,, | Performed by: PEDIATRICS

## 2022-08-17 PROCEDURE — 99214 OFFICE O/P EST MOD 30 MIN: CPT | Mod: S$PBB,,, | Performed by: PEDIATRICS

## 2022-08-17 PROCEDURE — 99215 OFFICE O/P EST HI 40 MIN: CPT | Mod: PBBFAC | Performed by: PEDIATRICS

## 2022-08-17 RX ORDER — DAPAGLIFLOZIN 5 MG/1
5 TABLET, FILM COATED ORAL DAILY
Qty: 90 TABLET | Refills: 3 | Status: SHIPPED | OUTPATIENT
Start: 2022-08-17 | End: 2023-09-12 | Stop reason: SDUPTHER

## 2022-08-17 NOTE — PROGRESS NOTES
Subjective:       Patient ID: Damon Yoo is a 72 y.o. male.    Chief Complaint: Follow-up    Damon Yoo is a 72 y.o. male who presents to clinic for a follow up    1) HTN:No home  B/P checks, no HTNive symptoms.  2) LIPIDS:not following D&E, tolerating and compliant with med(s).  3) CAD/hypertrophic cardiomyopathy/complete heart block, s/p pacer: no CP, SOB, DIAZ, sees cardiology  4) Gout: quiet on allopurinol , no longer seeing rheum  5) Prostate cancer: yearly PSA, not seeing uro currently  6) Chronic back pain(lumbar stenosis)/ Knees: sees PMR and ortho, periodic NAHUN. Chronic but manageable.   7) CKD:  Kidney function fluctuating. Has cut back on soda and his hydrating better, also using celebrex daily  8) Prediabetes: stable. no DM symptoms.  9) Obesity: sedentary. Weight stable, somewhat working on D&E    LABS REVIEWED AND DISCUSSED WITH PATIENT: PSA, CBC, A1C, CMP, lipid panel, microalbumin/creatinine     Review of Systems   Constitutional: Negative for activity change, appetite change, chills, diaphoresis, fatigue, fever and unexpected weight change.   HENT: Negative for nasal congestion, ear pain, mouth sores, nosebleeds, postnasal drip, rhinorrhea, sneezing and sore throat.    Eyes: Negative for photophobia, pain, discharge, redness and visual disturbance.   Respiratory: Negative for cough, chest tightness, shortness of breath, wheezing and stridor.    Cardiovascular: Negative for chest pain, palpitations and leg swelling.   Gastrointestinal: Negative for abdominal distention, blood in stool, constipation, diarrhea, nausea and vomiting.   Genitourinary: Negative for decreased urine volume, difficulty urinating, dysuria, flank pain, frequency, genital sores, hematuria and urgency.   Musculoskeletal: Negative for arthralgias, back pain, joint swelling, neck pain and neck stiffness.   Integumentary:  Negative for color change, pallor, rash and wound.   Neurological: Negative for dizziness, syncope,  speech difficulty, weakness, light-headedness and headaches.   Hematological: Negative for adenopathy. Does not bruise/bleed easily.   Psychiatric/Behavioral: Negative for confusion, decreased concentration, dysphoric mood, hallucinations, sleep disturbance and suicidal ideas. The patient is not nervous/anxious.    All other systems reviewed and are negative.        Objective:      Physical Exam  Vitals and nursing note reviewed.   Constitutional:       General: He is not in acute distress.     Appearance: He is well-developed.   Neck:      Thyroid: No thyromegaly.      Vascular: No JVD.   Cardiovascular:      Rate and Rhythm: Normal rate and regular rhythm.      Heart sounds: Normal heart sounds. No murmur heard.  Pulmonary:      Effort: Pulmonary effort is normal. No respiratory distress.      Breath sounds: Normal breath sounds. No wheezing or rales.   Abdominal:      General: There is no distension.      Palpations: Abdomen is soft. There is no mass.      Tenderness: There is no abdominal tenderness. There is no guarding.   Musculoskeletal:      Right lower leg: No edema.      Left lower leg: No edema.   Lymphadenopathy:      Cervical: No cervical adenopathy.   Skin:     Capillary Refill: Capillary refill takes less than 2 seconds.      Findings: No rash.   Neurological:      General: No focal deficit present.      Mental Status: He is alert and oriented to person, place, and time.      Cranial Nerves: No cranial nerve deficit.      Coordination: Coordination normal.   Psychiatric:         Mood and Affect: Mood normal.         Behavior: Behavior normal.         Thought Content: Thought content normal.         Judgment: Judgment normal.         Assessment:       Problem List Items Addressed This Visit     Hyperlipidemia - Primary (Chronic)    Relevant Orders    Lipid Panel    Comprehensive Metabolic Panel    Obesity, Class III, BMI 40-49.9 (morbid obesity) (Chronic)    Chronic diastolic CHF (congestive heart  failure) (Chronic)    Essential hypertension    Coronary artery disease involving native coronary artery of native heart without angina pectoris w/ hx CABG    Relevant Medications    dapagliflozin (FARXIGA) 5 mg Tab tablet    S/P CABG (coronary artery bypass graft)    Hypertrophic cardiomyopathy    History of prostate cancer    Chronic gout due to renal impairment of multiple sites without tophus    Relevant Orders    URIC ACID    Lumbar radicular pain    Chronic kidney disease, stage III (moderate)    Relevant Medications    dapagliflozin (FARXIGA) 5 mg Tab tablet    Prediabetes    Relevant Medications    dapagliflozin (FARXIGA) 5 mg Tab tablet    Other Relevant Orders    Hemoglobin A1C    CHB (complete heart block)    Thrombocytopenia    Relevant Orders    CBC Auto Differential    Non-sustained ventricular tachycardia    Aortic atherosclerosis          Plan:     Hyperlipidemia, unspecified hyperlipidemia type  -     Lipid Panel; Future; Expected date: 08/17/2022  -     Comprehensive Metabolic Panel; Future; Expected date: 08/17/2022    Essential hypertension    History of prostate cancer    Hypertrophic cardiomyopathy    Coronary artery disease involving native coronary artery of native heart without angina pectoris w/ hx CABG  -     dapagliflozin (FARXIGA) 5 mg Tab tablet; Take 1 tablet (5 mg total) by mouth once daily.  Dispense: 90 tablet; Refill: 3    Stage 3b chronic kidney disease  -     dapagliflozin (FARXIGA) 5 mg Tab tablet; Take 1 tablet (5 mg total) by mouth once daily.  Dispense: 90 tablet; Refill: 3    Chronic gout due to renal impairment of multiple sites without tophus  -     URIC ACID; Future; Expected date: 08/17/2022    Chronic diastolic CHF (congestive heart failure)    CHB (complete heart block)    Aortic atherosclerosis    Lumbar radicular pain    Non-sustained ventricular tachycardia    Obesity, Class III, BMI 40-49.9 (morbid obesity)    Prediabetes  -     dapagliflozin (FARXIGA) 5 mg Tab  tablet; Take 1 tablet (5 mg total) by mouth once daily.  Dispense: 90 tablet; Refill: 3  -     Hemoglobin A1C; Future; Expected date: 08/17/2022    S/P CABG (coronary artery bypass graft)    Thrombocytopenia  -     CBC Auto Differential; Future; Expected date: 08/17/2022           At goal for B/P, lipids, and A1c. Discussed secondary cardiac and renal protection of SGLT2, will start Farxiga, hydration UTI and groin skin infection disucssed with Pt. Foreskin hygiene discussed.  Maintain other meds, limit celebrex use, vaccines discussed, self monitoring D&E, weight moderation. F/U 6 months with labs.     Scribe Attestation:   I, Augustin Hammonds, am scribing for, and in the presence of, Dr. Dick Mason Jr. I performed the above scribed service and the documentation accurately describes the services I performed. I attest to the accuracy of the note.    I, Dr. Dick Mason Jr, reviewed documentation as scribed above. I personally performed the services described in this documentation.  I agree that the record reflects my personal performance and is accurate and complete. Dick Mason Jr., MD.  08/17/2022

## 2022-08-17 NOTE — PATIENT INSTRUCTIONS
Try to take celebrex only as needed to limit kidney exposure. Get second shingles vaccine down stairs in pharmacy

## 2022-08-31 ENCOUNTER — EXTERNAL CHRONIC CARE MANAGEMENT (OUTPATIENT)
Dept: PRIMARY CARE CLINIC | Facility: CLINIC | Age: 72
End: 2022-08-31
Payer: MEDICARE

## 2022-08-31 PROCEDURE — 99490 PR CHRONIC CARE MGMT, 1ST 20 MIN: ICD-10-PCS | Mod: S$PBB,,, | Performed by: PEDIATRICS

## 2022-08-31 PROCEDURE — 99490 CHRNC CARE MGMT STAFF 1ST 20: CPT | Mod: PBBFAC | Performed by: PEDIATRICS

## 2022-08-31 PROCEDURE — 99490 CHRNC CARE MGMT STAFF 1ST 20: CPT | Mod: S$PBB,,, | Performed by: PEDIATRICS

## 2022-09-08 ENCOUNTER — OFFICE VISIT (OUTPATIENT)
Dept: CARDIOLOGY | Facility: CLINIC | Age: 72
End: 2022-09-08
Payer: MEDICARE

## 2022-09-08 VITALS
WEIGHT: 253.31 LBS | HEIGHT: 67 IN | DIASTOLIC BLOOD PRESSURE: 68 MMHG | OXYGEN SATURATION: 98 % | HEART RATE: 60 BPM | SYSTOLIC BLOOD PRESSURE: 118 MMHG | BODY MASS INDEX: 39.76 KG/M2

## 2022-09-08 DIAGNOSIS — I44.2 CHB (COMPLETE HEART BLOCK): Primary | ICD-10-CM

## 2022-09-08 DIAGNOSIS — I34.0 MODERATE MITRAL REGURGITATION: ICD-10-CM

## 2022-09-08 PROCEDURE — 99999 PR PBB SHADOW E&M-EST. PATIENT-LVL IV: ICD-10-PCS | Mod: PBBFAC,,, | Performed by: INTERNAL MEDICINE

## 2022-09-08 PROCEDURE — 99999 PR PBB SHADOW E&M-EST. PATIENT-LVL IV: CPT | Mod: PBBFAC,,, | Performed by: INTERNAL MEDICINE

## 2022-09-08 PROCEDURE — 99214 OFFICE O/P EST MOD 30 MIN: CPT | Mod: S$PBB,,, | Performed by: INTERNAL MEDICINE

## 2022-09-08 PROCEDURE — 99214 PR OFFICE/OUTPT VISIT, EST, LEVL IV, 30-39 MIN: ICD-10-PCS | Mod: S$PBB,,, | Performed by: INTERNAL MEDICINE

## 2022-09-08 PROCEDURE — 99214 OFFICE O/P EST MOD 30 MIN: CPT | Mod: PBBFAC | Performed by: INTERNAL MEDICINE

## 2022-09-08 NOTE — PROGRESS NOTES
Subjective:   Patient ID:  Damon Yoo is a 72 y.o. male who presents for evaluation of Follow-up      Initial HPI: 1/2021   71 yo male, pmhx below, CAD s/p CABG 2000?, HTN, HLP, Pre diabetes (last A1C 6.2), concentric remodeling on echo, comes in for follow up recent hospital admission for CHB, AV dissociation that required TVP placement while in the hospital , with persistence depsite holding toprol off for 48 hours, upon PPM implant he had sinus tachycardia 100 abpm with less than 50 vbpm   He comes in for follow up , PPM site look healing well, no infection, no hematoma, no bleeding, no syncope, feels great after the pacemaker states his breathing is better, no more dyspnea, no more fatigue denies any other complaints except for right upper back muscle spasm   On interrogation, he was 70% apaced with 99%  with good parameters     Interval hx: 3/8/2021   9 episodes of svt on device interrogation   99%  ,   No complaints of palpitations at all   Does endorse bilateral lower ext swelling, and positional orthopnea sometimes   No chest pain     Interval 8/26/2021   Doing well, V pacing 30% after last adjustments  No more SVT or NSVT on most recent interrogation   States no chest pain, no dyspnea, no leg swelling, no palpitations   He is on lasix prn, states takes it if swelling or if dyspnea   See ros        2.28.2022  Doing well, pacemaker functioning normal   Denies any cardiac complaints   No recent hx of ed visits or hospitalizations for acs or adhf  No hx of angina. No hx of unusual cordero with ordinary daily activities. No dyspnea at rest. No orthopnea or pnd  No hx of palpitations. No exertional dizziness or syncope  No hx of dvt or pe. No hx of edema or intermittent claudication  No focal cns symptoms or signs to suggest tia or stroke  No hx of ckd. No dm- no thyroid disease  No hx of abnormal bleeding  LDL down from 150 to 73      9.8.2022  Comes in for a 6 months follow up   Doing well denies any  DIAZ, lower ext swelling, orthopnea or PND  He had NSVT on device interrogation and sinus tachy and htn   We increased his toprol 150 mg a day   On last interrogation 7/29 he is 100  from 30% before after prolonging his av delay   Getting another remote interrogation on the 20th       Past Medical History:   Diagnosis Date    Abnormal EKG 10/25/2013    Acute on chronic diastolic CHF (congestive heart failure) 11/27/2020    Arthritis     Back pain     CAD (coronary artery disease)     Cancer     Prostate [id number] prostate    Disorder of kidney and ureter     Glaucoma     Gout attack     Hyperlipidemia     Hypertension     Hypertrophic cardiomyopathy 10/25/2013    S/P CABG (coronary artery bypass graft) 10/25/2013       Past Surgical History:   Procedure Laterality Date    A-V CARDIAC PACEMAKER INSERTION Left 11/30/2020    Procedure: INSERTION, CARDIAC PACEMAKER, DUAL CHAMBER;  Surgeon: Elsy Kenyon MD;  Location: [facility] CATH LAB;  Service: Cardiology;  Laterality: Left;  biotronik    COLONOSCOPY N/A 12/04/2017    Procedure: COLONOSCOPY;  Surgeon: Dina Ledesma MD;  Location: [facility] ENDO;  Service: Endoscopy;  Laterality: N/A;    CORONARY ARTERY BYPASS GRAFT  05/2003    x1    PROSTATE SURGERY      RALP 2013    TRANSFORAMINAL EPIDURAL INJECTION OF STEROID Right 11/11/2019    Procedure: Right L5/S1+ S1 TF NAHUN with IV sedation;  Surgeon: Ermias Mario MD;  Location: [facility] PAIN MGT;  Service: Pain Management;  Laterality: Right;    TRANSFORAMINAL EPIDURAL INJECTION OF STEROID Right 11/24/2021    Procedure: right L5/S1 and S1 TF NAHUN;  Surgeon: Ermias Mario MD;  Location: [facility] PAIN MGT;  Service: Pain Management;  Laterality: Right;    TRANSFORAMINAL EPIDURAL INJECTION OF STEROID Right 01/11/2022    Procedure: Right L5/S1 + S1 TF NAHUN;  Surgeon: Ermias Mario MD;  Location: [facility] PAIN MGT;  Service: Pain Management;  Laterality: Right;       Social History     Tobacco Use    Smoking status: Former     Packs/day:  0.25     Years: 15.00     Pack years: 3.75     Types: Cigarettes     Quit date: 1988     Years since quittin.4    Smokeless tobacco: Never   Substance Use Topics    Alcohol use: No    Drug use: No       Family History   Problem Relation Age of Onset    No Known Problems Mother     Heart disease Father     Hypertension Father     No Known Problems Daughter     Strabismus Neg Hx     Retinal detachment Neg Hx     Macular degeneration Neg Hx     Glaucoma Neg Hx     Blindness Neg Hx     Amblyopia Neg Hx        Review of Systems   Constitutional: Negative for fever and malaise/fatigue.   HENT:  Negative for sore throat.    Eyes:  Negative for blurred vision.   Cardiovascular:  Negative for chest pain, claudication, cyanosis, dyspnea on exertion, irregular heartbeat, leg swelling, near-syncope, orthopnea, palpitations, paroxysmal nocturnal dyspnea and syncope.   Respiratory:  Negative for cough, hemoptysis and shortness of breath.    Hematologic/Lymphatic: Negative for bleeding problem.   Skin:  Negative for poor wound healing and rash.   Musculoskeletal:  Negative for back pain and falls.   Gastrointestinal:  Negative for abdominal pain.   Genitourinary:  Negative for nocturia.   Neurological:  Negative for dizziness, focal weakness, headaches, light-headedness and loss of balance.   Psychiatric/Behavioral:  Negative for altered mental status and substance abuse.      Current Outpatient Medications on File Prior to Visit   Medication Sig    acetaminophen (TYLENOL) 500 MG tablet Take 500 mg by mouth every 6 (six) hours as needed for Pain.    allopurinoL (ZYLOPRIM) 300 MG tablet Take 1.5 tablets (450 mg total) by mouth once daily.    allopurinoL (ZYLOPRIM) 300 MG tablet Take 1 tablet (300 mg total) by mouth once daily.    amLODIPine (NORVASC) 10 MG tablet Take 1 tablet (10 mg total) by mouth once daily.    aspirin 81 MG chewable tablet Take 1 tablet by mouth Daily.    celecoxib (CELEBREX) 200 MG capsule Take 1  capsule (200 mg total) by mouth daily as needed for Pain. Take with food    dapagliflozin (FARXIGA) 5 mg Tab tablet Take 1 tablet (5 mg total) by mouth once daily.    fish oil-fat acid comb.8-hb137 1,200 mg (400 [id number]) Cap Take 1 capsule by mouth once daily.     furosemide (LASIX) 20 MG tablet Take 1 tablet (20 mg total) by mouth daily as needed (swelling).    latanoprost 0.005 % ophthalmic solution Place 1 drop into both eyes every evening.    lisinopriL (PRINIVIL,ZESTRIL) 40 MG tablet Take 1 tablet (40 mg total) by mouth once daily.    metoprolol succinate (TOPROL-XL) 50 MG 24 hr tablet Take 3 tablets (150 mg total) by mouth once daily.    MITIGARE 0.6 mg Cap Take 1 capsule (0.6 mg total) by mouth 2 (two) times daily as needed (acute gout).    pantoprazole (PROTONIX) 40 MG tablet Take 1 tablet (40 mg total) by mouth once daily. (replaces nexium)    pregabalin (LYRICA) 75 MG capsule Take 1 capsule (75 mg total) by mouth every evening.    rosuvastatin (CRESTOR) 40 MG Tab Take 1 tablet (40 mg total) by mouth every evening. Generic is fine.    timolol maleate 0.5% (TIMOPTIC) 0.5 % Drop Place 1 drop into both eyes every morning.    tiZANidine (ZANAFLEX) 4 MG tablet Take 1 tablet (4 mg total) by mouth every 8 (eight) hours as needed (MUSCLE SPASMS).    traMADoL (ULTRAM) 50 mg tablet Take 1 tablet (50 mg total) by mouth every 12 (twelve) hours as needed for Pain.     No current facility-administered medications on file prior to visit.       Objective:   Objective:  Wt Readings from Last 3 Encounters:   09/08/22 114.9 kg (253 lb 4.9 oz)   08/17/22 114.8 kg (253 lb 1.4 oz)   07/25/22 118.5 kg (261 lb 3.9 oz)     Temp Readings from Last 3 Encounters:   08/17/22 97.9 °F (36.6 °C) (Tympanic)   03/15/22 98.1 °F (36.7 °C) (Temporal)   02/10/22 97.6 °F (36.4 °C) (Tympanic)     BP Readings from Last 3 Encounters:   09/08/22 118/68   08/17/22 130/78   03/15/22 124/72     Pulse Readings from Last 3 Encounters:   09/08/22  60   08/17/22 80   03/15/22 60       Physical Exam  Vitals reviewed.   Constitutional:       Appearance: He is well-developed.   HENT:      Head: Normocephalic and atraumatic.   Eyes:      General: No scleral icterus.     Conjunctiva/sclera: Conjunctivae normal.   Cardiovascular:      Rate and Rhythm: Normal rate and regular rhythm.      Pulses: Intact distal pulses.      Heart sounds: Normal heart sounds. No murmur heard.     Comments: Wound healing nice  Pulmonary:      Effort: No respiratory distress.      Breath sounds: No wheezing or rales.   Chest:      Chest wall: No tenderness.   Abdominal:      General: Bowel sounds are normal. There is no distension.      Palpations: Abdomen is soft.      Tenderness: There is no guarding.   Musculoskeletal:         General: Normal range of motion.      Cervical back: Normal range of motion and neck supple.      Right lower leg: Edema present.      Left lower leg: Edema present.   Skin:     General: Skin is warm.   Neurological:      Mental Status: He is alert and oriented to person, place, and time.       Lab Results   Component Value Date    CHOL 115 (L) 08/10/2022    CHOL 127 02/07/2022    CHOL 210 (H) 07/29/2021     Lab Results   Component Value Date    HDL 26 (L) 08/10/2022    HDL 31 (L) 02/07/2022    HDL 27 (L) 07/29/2021     Lab Results   Component Value Date    LDLCALC 64.2 08/10/2022    LDLCALC 72.8 02/07/2022    LDLCALC 153.2 07/29/2021     Lab Results   Component Value Date    TRIG 124 08/10/2022    TRIG 116 02/07/2022    TRIG 149 07/29/2021     Lab Results   Component Value Date    CHOLHDL 22.6 08/10/2022    CHOLHDL 24.4 02/07/2022    CHOLHDL 12.9 (L) 07/29/2021       Chemistry        Component Value Date/Time     08/10/2022 0943    K 4.5 08/10/2022 0943     08/10/2022 0943    CO2 25 08/10/2022 0943    BUN 23 08/10/2022 0943    CREATININE 1.7 (H) 08/10/2022 0943    GLU 97 08/10/2022 0943        Component Value Date/Time    CALCIUM 9.6 08/10/2022 0943     ALKPHOS 100 08/10/2022 0943    AST 25 08/10/2022 0943    ALT 23 08/10/2022 0943    BILITOT 0.6 08/10/2022 0943    ESTGFRAFRICA 50.1 (A) 02/07/2022 0748    EGFRNONAA 43.3 (A) 02/07/2022 0748          Lab Results   Component Value Date    TSH 1.367 08/16/2014     Lab Results   Component Value Date    INR 1.0 11/27/2020    INR 1.0 06/26/2010     Lab Results   Component Value Date    WBC 7.77 08/10/2022    HGB 12.6 (L) 08/10/2022    HCT 38.7 (L) 08/10/2022    MCV 95 08/10/2022     08/10/2022     BNP  @LABRCNTIP(BNP,BNPTRIAGEBLO)@  CrCl cannot be calculated (Patient's most recent lab result is older than the maximum 7 days allowed.).     Imaging:  ======  Results for orders placed during the hospital encounter of 11/27/20   Echo Color Flow Doppler? Yes; Bubble Contrast? No    Narrative · There is moderate left ventricular concentric hypertrophy.  · The left ventricle is normal in size with normal systolic function. The   estimated ejection fraction is 60%.  · Normal left ventricular diastolic function.  · Normal right ventricular size with normal right ventricular systolic   function.  · Mild aortic valve stenosis.  · Aortic valve area is 1.78 cm2; peak velocity is 1.94 m/s; mean gradient   is 9 mmHg.  · Moderate mitral regurgitation.  · Moderate tricuspid regurgitation.  · There is pulmonary hypertension.  · Normal central venous pressure (3 mmHg).  · The estimated PA systolic pressure is 80 mmHg.     Suggest re echo when patient is in NSR.        No results found for this or any previous visit.  No results found for this or any previous visit.  Results for orders placed in visit on 03/18/13   X-Ray Chest PA And Lateral    Narrative DATE OF EXAM: Mar 20 2013      BOC   0190  -  CHEST 2 VIEWS FRONTAL   LAT:   \  [id number]     CLINICAL HISTORY:   \V72.84  PREOP EXAMINATION NOS     PROCEDURE COMMENT:   \     ICD 9 CODE(S):   (\)     CPT 4 CODE(S)/MODIFIER(S):   (\)     Findings: No prior radiographs for  comparison.  Post CABG changes are   noted.  Heart size is normal.  Mild degenerative change in the thoracic   spine.  No acute consolidation seen in the lungs.  No pleural effusions.        Impression: No active disease.        Electronically signed by: EUGENE ALCARAZ MD  Date:     03/20/13  Time:    13:11            : GADIEL  Transcribe Date/Time: Mar 20 2013  1:11P  Dictated by : EUGENE ALCARAZ MD  Report reviewed by:   Read On:   \  Images were reviewed, findings were verified and document was   electronically  SIGNED BY: EUGENE ALCARAZ MD On: Mar 20 2013  1:11P        No results found for this or any previous visit.  No procedure found.    Diagnostic Results:  ECG: Reviewed    The ASCVD Risk score (Katia DK, et al., 2019) failed to calculate for the following reasons:    The valid total cholesterol range is 130 to 320 mg/dL    Assessment and Plan:     CHB (complete heart block)    Moderate mitral regurgitation  Comments:  on last check 2 years ago  Orders:  -     Echo; Future      NSVT (nonsustained ventricular tachycardia)  -    RESOLVED SINCE ON TOPROL    SVT (supraventricular tachycardia)  Asymptomatic   -    RESOLVED SINCE ON TOPROL      Leg swelling  -     RESOLVED     Complete heart block  Comments:  s/p dual chamber pacemaker        Essential hypertension  -     Controlled with current meds      Coronary artery disease involving coronary bypass graft of native heart without angina pectoris  - STABLE no angina   Pacemaker  -    we increased his av delay to lower the  need and that lowered his  to 30% in 2020, Now back to 100% , pending repeat check in 2 weeks    HTN controlled , CW TOPROL , LISINOPRIL, AMLODIPINE   Angina free, no symptoms of chest pain or equivalent angina   LDL down to 72  on crestor   No DIAZ  No CNS symptoms   Reviewed all tests and above medical conditions with patient in detail and formulated treatment plan.  Risk factor modification discussed.   Cardiac low salt  diet discussed.  Maintaining healthy weight and weight loss goals were discussed in clinic.  Will check next interrogation    Follow up in 6 months

## 2022-09-20 ENCOUNTER — CLINICAL SUPPORT (OUTPATIENT)
Dept: CARDIOLOGY | Facility: HOSPITAL | Age: 72
End: 2022-09-20
Payer: MEDICARE

## 2022-09-20 DIAGNOSIS — Z95.0 PRESENCE OF CARDIAC PACEMAKER: ICD-10-CM

## 2022-09-30 ENCOUNTER — EXTERNAL CHRONIC CARE MANAGEMENT (OUTPATIENT)
Dept: PRIMARY CARE CLINIC | Facility: CLINIC | Age: 72
End: 2022-09-30
Payer: MEDICARE

## 2022-09-30 PROCEDURE — 99490 CHRNC CARE MGMT STAFF 1ST 20: CPT | Mod: S$PBB,,, | Performed by: PEDIATRICS

## 2022-09-30 PROCEDURE — 99490 PR CHRONIC CARE MGMT, 1ST 20 MIN: ICD-10-PCS | Mod: S$PBB,,, | Performed by: PEDIATRICS

## 2022-09-30 PROCEDURE — 99490 CHRNC CARE MGMT STAFF 1ST 20: CPT | Mod: PBBFAC | Performed by: PEDIATRICS

## 2022-10-31 ENCOUNTER — EXTERNAL CHRONIC CARE MANAGEMENT (OUTPATIENT)
Dept: PRIMARY CARE CLINIC | Facility: CLINIC | Age: 72
End: 2022-10-31
Payer: MEDICARE

## 2022-10-31 PROCEDURE — 99490 CHRNC CARE MGMT STAFF 1ST 20: CPT | Mod: PBBFAC | Performed by: PEDIATRICS

## 2022-10-31 PROCEDURE — 99490 CHRNC CARE MGMT STAFF 1ST 20: CPT | Mod: S$PBB,,, | Performed by: PEDIATRICS

## 2022-10-31 PROCEDURE — 99490 PR CHRONIC CARE MGMT, 1ST 20 MIN: ICD-10-PCS | Mod: S$PBB,,, | Performed by: PEDIATRICS

## 2022-12-12 ENCOUNTER — OFFICE VISIT (OUTPATIENT)
Dept: OPHTHALMOLOGY | Facility: CLINIC | Age: 72
End: 2022-12-12
Payer: MEDICARE

## 2022-12-12 DIAGNOSIS — I25.810 CORONARY ARTERY DISEASE INVOLVING CORONARY BYPASS GRAFT OF NATIVE HEART WITHOUT ANGINA PECTORIS: ICD-10-CM

## 2022-12-12 DIAGNOSIS — H40.1132 PRIMARY OPEN ANGLE GLAUCOMA OF BOTH EYES, MODERATE STAGE: Primary | ICD-10-CM

## 2022-12-12 DIAGNOSIS — H25.13 NUCLEAR SENILE CATARACT OF BOTH EYES: ICD-10-CM

## 2022-12-12 DIAGNOSIS — I10 ESSENTIAL HYPERTENSION: ICD-10-CM

## 2022-12-12 PROCEDURE — 99214 PR OFFICE/OUTPT VISIT, EST, LEVL IV, 30-39 MIN: ICD-10-PCS | Mod: S$PBB,,, | Performed by: OPHTHALMOLOGY

## 2022-12-12 PROCEDURE — 99999 PR PBB SHADOW E&M-EST. PATIENT-LVL III: CPT | Mod: PBBFAC,,, | Performed by: OPHTHALMOLOGY

## 2022-12-12 PROCEDURE — 99999 PR PBB SHADOW E&M-EST. PATIENT-LVL III: ICD-10-PCS | Mod: PBBFAC,,, | Performed by: OPHTHALMOLOGY

## 2022-12-12 PROCEDURE — 99214 OFFICE O/P EST MOD 30 MIN: CPT | Mod: S$PBB,,, | Performed by: OPHTHALMOLOGY

## 2022-12-12 PROCEDURE — 99213 OFFICE O/P EST LOW 20 MIN: CPT | Mod: PBBFAC | Performed by: OPHTHALMOLOGY

## 2022-12-12 NOTE — TELEPHONE ENCOUNTER
Care Due:                  Date            Visit Type   Department     Provider  --------------------------------------------------------------------------------                                EP -                              PRIMARY      [facility] INTERNAL  Last Visit: 08-      CARE ([facility])   [facility]       Dick Mason                              EP -                              PRIMARY      [facility] INTERNAL  Next Visit: 02-      CARE ([facility])   [facility]                                                            Last  Test          Frequency    Reason                     Performed    Due Date  --------------------------------------------------------------------------------    HBA1C.......  6 months...  dapagliflozin............  08-   02-    [facility] Embedded Care Gaps. Reference number: [id number]. 12/12/2022   8:06:53 AM CST

## 2022-12-12 NOTE — PROGRESS NOTES
SUBJECTIVE  Damon Yoo is 72 y.o. male  Corrected distance visual acuity was 20/25 in the right eye and 20/20 in the left eye.   Chief Complaint   Patient presents with    Glaucoma          HPI    Patient here today for 4 month IOP check  Vision stable   Using drops as directed  No pain or discomfort    1. Mod COAG- No FHx (init 32/27 on 3/10 with C/D .65/.60) Goal <18, new   goal = 15 6/2017  SLT OD 12/11/14 (20-16)  SLT OS 2/25/15 (20-16)  2. Mild NSC  3. LLL Cyst Excision on 6/13/12    Latanoprost QHS OU  Timolol qam OU  Last edited by Alethea Nieto, PCT on 12/12/2022  8:19 AM.         Assessment /Plan :  1. Primary open angle glaucoma of both eyes, moderate stage Doing well, intraocular pressure (IOP) within acceptable range relative to target IOP and no evidence of progression. Continue current treatment. Reviewed importance of continued compliance with treatment and follow up.      Patient instructed to continue using the following glaucoma medication as follows:  Latanoprost one drop in each eye nightly and Timolol one drop both eyes daily    Return to clinic in 4 months  or as needed.  With GOCT, Dilation, and HVF 24-2     2. Nuclear senile cataract of both eyes - monitor for now

## 2022-12-13 RX ORDER — AMLODIPINE BESYLATE 10 MG/1
10 TABLET ORAL DAILY
Qty: 90 TABLET | Refills: 2 | Status: SHIPPED | OUTPATIENT
Start: 2022-12-13 | End: 2023-11-13 | Stop reason: SDUPTHER

## 2022-12-13 NOTE — TELEPHONE ENCOUNTER
Refill Decision Note   Damon Yoo  is requesting a refill authorization.  Brief Assessment and Rationale for Refill:  Approve     Medication Therapy Plan:  FLOS;FOV;    Medication Reconciliation Completed: No   Comments:     No Care Gaps recommended.     Note composed:11:01 AM 12/13/2022

## 2022-12-19 ENCOUNTER — CLINICAL SUPPORT (OUTPATIENT)
Dept: CARDIOLOGY | Facility: HOSPITAL | Age: 72
End: 2022-12-19
Payer: MEDICARE

## 2022-12-19 DIAGNOSIS — Z95.0 PRESENCE OF CARDIAC PACEMAKER: ICD-10-CM

## 2022-12-19 PROCEDURE — 93294 CARDIAC DEVICE CHECK - REMOTE: ICD-10-PCS | Mod: ,,, | Performed by: INTERNAL MEDICINE

## 2022-12-19 PROCEDURE — 93294 REM INTERROG EVL PM/LDLS PM: CPT | Mod: ,,, | Performed by: INTERNAL MEDICINE

## 2023-02-06 RX ORDER — PANTOPRAZOLE SODIUM 40 MG/1
40 TABLET, DELAYED RELEASE ORAL DAILY
Qty: 90 TABLET | Refills: 1 | Status: SHIPPED | OUTPATIENT
Start: 2023-02-06 | End: 2023-11-28 | Stop reason: SDUPTHER

## 2023-02-06 NOTE — TELEPHONE ENCOUNTER
No new care gaps identified.  [facility] Embedded Care Gaps. Reference number: [id number]. 2/06/2023   11:44:15 AM CST

## 2023-02-06 NOTE — TELEPHONE ENCOUNTER
Refill Decision Note   Damon Yoo  is requesting a refill authorization.  Brief Assessment and Rationale for Refill:  Approve     Medication Therapy Plan:       Medication Reconciliation Completed: No   Comments:     No Care Gaps recommended.     Note composed:1:38 PM 02/06/2023

## 2023-02-08 ENCOUNTER — HOSPITAL ENCOUNTER (OUTPATIENT)
Dept: CARDIOLOGY | Facility: HOSPITAL | Age: 73
Discharge: HOME | End: 2023-02-08
Attending: INTERNAL MEDICINE
Payer: MEDICARE

## 2023-02-08 VITALS
HEART RATE: 58 BPM | BODY MASS INDEX: 39.71 KG/M2 | HEIGHT: 67 IN | WEIGHT: 253 LBS | DIASTOLIC BLOOD PRESSURE: 68 MMHG | SYSTOLIC BLOOD PRESSURE: 118 MMHG

## 2023-02-08 DIAGNOSIS — I34.0 MODERATE MITRAL REGURGITATION: ICD-10-CM

## 2023-02-08 LAB
AORTIC ROOT ANNULUS: 3.79 CM
AV INDEX (PROSTH): 0.97
AV MEAN GRADIENT: 3 MMHG
AV PEAK GRADIENT: 5 MMHG
AV VALVE AREA: 3.13 CM2
AV VELOCITY RATIO: 0.87
BSA FOR ECHO PROCEDURE: 2.33 M2
CV ECHO LV RWT: 0.44 CM
DOP CALC AO PEAK VEL: 1.15 M/S
DOP CALC AO VTI: 22.2 CM
DOP CALC LVOT AREA: 3.2 CM2
DOP CALC LVOT DIAMETER: 2.03 CM
DOP CALC LVOT PEAK VEL: 1 M/S
DOP CALC LVOT STROKE VOLUME: 69.55 CM3
DOP CALC RVOT PEAK VEL: 0.99 M/S
DOP CALC RVOT VTI: 24.4 CM
DOP CALCLVOT PEAK VEL VTI: 21.5 CM
E WAVE DECELERATION TIME: 355.46 MSEC
E/A RATIO: 0.68
E/E' RATIO: 13.75 M/S
ECHO LV POSTERIOR WALL: 1.01 CM (ref 0.6–1.1)
EJECTION FRACTION: 60 %
FRACTIONAL SHORTENING: 18 % (ref 28–44)
INTERVENTRICULAR SEPTUM: 1.34 CM (ref 0.6–1.1)
IVRT: 159.17 MSEC
LA MAJOR: 4.72 CM
LA MINOR: 4.32 CM
LA WIDTH: 4.1 CM
LEFT ATRIUM SIZE: 3.31 CM
LEFT ATRIUM VOLUME INDEX MOD: 20.2 ML/M2
LEFT ATRIUM VOLUME INDEX: 23.3 ML/M2
LEFT ATRIUM VOLUME MOD: 45 CM3
LEFT ATRIUM VOLUME: 52.04 CM3
LEFT INTERNAL DIMENSION IN SYSTOLE: 3.8 CM (ref 2.1–4)
LEFT VENTRICLE DIASTOLIC VOLUME INDEX: 43.9 ML/M2
LEFT VENTRICLE DIASTOLIC VOLUME: 97.9 ML
LEFT VENTRICLE MASS INDEX: 90 G/M2
LEFT VENTRICLE SYSTOLIC VOLUME INDEX: 27.9 ML/M2
LEFT VENTRICLE SYSTOLIC VOLUME: 62.14 ML
LEFT VENTRICULAR INTERNAL DIMENSION IN DIASTOLE: 4.61 CM (ref 3.5–6)
LEFT VENTRICULAR MASS: 199.6 G
LV LATERAL E/E' RATIO: 11 M/S
LV SEPTAL E/E' RATIO: 18.33 M/S
LVOT MG: 2.35 MMHG
LVOT MV: 0.73 CM/S
MV PEAK A VEL: 0.81 M/S
MV PEAK E VEL: 0.55 M/S
MV STENOSIS PRESSURE HALF TIME: 103.08 MS
MV VALVE AREA P 1/2 METHOD: 2.13 CM2
PISA TR MAX VEL: 2.47 M/S
PV MEAN GRADIENT: 2.32 MMHG
PV PEAK VELOCITY: 1.02 CM/S
RA MAJOR: 4.27 CM
RA WIDTH: 3.05 CM
RIGHT VENTRICULAR END-DIASTOLIC DIMENSION: 3.86 CM
SINUS: 3.7 CM
STJ: 3.61 CM
TDI LATERAL: 0.05 M/S
TDI SEPTAL: 0.03 M/S
TDI: 0.04 M/S
TR MAX PG: 24 MMHG
TRICUSPID ANNULAR PLANE SYSTOLIC EXCURSION: 1.6 CM

## 2023-02-08 PROCEDURE — 93306 TTE W/DOPPLER COMPLETE: CPT | Mod: PO

## 2023-02-08 PROCEDURE — 93306 ECHO (CUPID ONLY): ICD-10-PCS | Mod: 26,,, | Performed by: INTERNAL MEDICINE

## 2023-02-08 PROCEDURE — 93306 TTE W/DOPPLER COMPLETE: CPT | Mod: 26,,, | Performed by: INTERNAL MEDICINE

## 2023-02-09 ENCOUNTER — LAB VISIT (OUTPATIENT)
Dept: LAB | Facility: HOSPITAL | Age: 73
End: 2023-02-09
Attending: PEDIATRICS
Payer: MEDICARE

## 2023-02-09 DIAGNOSIS — M1A.39X0 CHRONIC GOUT DUE TO RENAL IMPAIRMENT OF MULTIPLE SITES WITHOUT TOPHUS: ICD-10-CM

## 2023-02-09 DIAGNOSIS — E78.5 HYPERLIPIDEMIA, UNSPECIFIED HYPERLIPIDEMIA TYPE: Chronic | ICD-10-CM

## 2023-02-09 DIAGNOSIS — D69.6 THROMBOCYTOPENIA: ICD-10-CM

## 2023-02-09 DIAGNOSIS — R73.03 PREDIABETES: ICD-10-CM

## 2023-02-09 LAB
ALBUMIN SERPL BCP-MCNC: 3.9 G/DL (ref 3.5–5.2)
ALP SERPL-CCNC: 108 U/L (ref 55–135)
ALT SERPL W/O P-5'-P-CCNC: 19 U/L (ref 10–44)
ANION GAP SERPL CALC-SCNC: 13 MMOL/L (ref 8–16)
AST SERPL-CCNC: 24 U/L (ref 10–40)
BASOPHILS # BLD AUTO: 0.07 K/UL (ref 0–0.2)
BASOPHILS NFR BLD: 0.8 % (ref 0–1.9)
BILIRUB SERPL-MCNC: 0.6 MG/DL (ref 0.1–1)
BUN SERPL-MCNC: 34 MG/DL (ref 8–23)
CALCIUM SERPL-MCNC: 9.5 MG/DL (ref 8.7–10.5)
CHLORIDE SERPL-SCNC: 115 MMOL/L (ref 95–110)
CHOLEST SERPL-MCNC: 104 MG/DL (ref 120–199)
CHOLEST/HDLC SERPL: 4.3 {RATIO} (ref 2–5)
CO2 SERPL-SCNC: 19 MMOL/L (ref 23–29)
CREAT SERPL-MCNC: 3.1 MG/DL (ref 0.5–1.4)
DIFFERENTIAL METHOD: ABNORMAL
EOSINOPHIL # BLD AUTO: 0.3 K/UL (ref 0–0.5)
EOSINOPHIL NFR BLD: 2.9 % (ref 0–8)
ERYTHROCYTE [DISTWIDTH] IN BLOOD BY AUTOMATED COUNT: 14.3 % (ref 11.5–14.5)
EST. GFR  (NO RACE VARIABLE): 20.8 ML/MIN/1.73 M^2
ESTIMATED AVG GLUCOSE: 128 MG/DL (ref 68–131)
GLUCOSE SERPL-MCNC: 107 MG/DL (ref 70–110)
HBA1C MFR BLD: 6.1 % (ref 4–5.6)
HCT VFR BLD AUTO: 40.3 % (ref 40–54)
HDLC SERPL-MCNC: 24 MG/DL (ref 40–75)
HDLC SERPL: 23.1 % (ref 20–50)
HGB BLD-MCNC: 12.6 G/DL (ref 14–18)
IMM GRANULOCYTES # BLD AUTO: 0.04 K/UL (ref 0–0.04)
IMM GRANULOCYTES NFR BLD AUTO: 0.5 % (ref 0–0.5)
LDLC SERPL CALC-MCNC: 61.2 MG/DL (ref 63–159)
LYMPHOCYTES # BLD AUTO: 2.1 K/UL (ref 1–4.8)
LYMPHOCYTES NFR BLD: 23.4 % (ref 18–48)
MCH RBC QN AUTO: 30 PG (ref 27–31)
MCHC RBC AUTO-ENTMCNC: 31.3 G/DL (ref 32–36)
MCV RBC AUTO: 96 FL (ref 82–98)
MONOCYTES # BLD AUTO: 0.9 K/UL (ref 0.3–1)
MONOCYTES NFR BLD: 10.4 % (ref 4–15)
NEUTROPHILS # BLD AUTO: 5.5 K/UL (ref 1.8–7.7)
NEUTROPHILS NFR BLD: 62 % (ref 38–73)
NONHDLC SERPL-MCNC: 80 MG/DL
NRBC BLD-RTO: 0 /100 WBC
PLATELET # BLD AUTO: 177 K/UL (ref 150–450)
PMV BLD AUTO: 14.1 FL (ref 9.2–12.9)
POTASSIUM SERPL-SCNC: 5 MMOL/L (ref 3.5–5.1)
PROT SERPL-MCNC: 7 G/DL (ref 6–8.4)
RBC # BLD AUTO: 4.2 M/UL (ref 4.6–6.2)
SODIUM SERPL-SCNC: 147 MMOL/L (ref 136–145)
TRIGL SERPL-MCNC: 94 MG/DL (ref 30–150)
URATE SERPL-MCNC: 4.6 MG/DL (ref 3.4–7)
WBC # BLD AUTO: 8.87 K/UL (ref 3.9–12.7)

## 2023-02-09 PROCEDURE — 83036 HEMOGLOBIN GLYCOSYLATED A1C: CPT | Performed by: PEDIATRICS

## 2023-02-09 PROCEDURE — 80053 COMPREHEN METABOLIC PANEL: CPT | Performed by: PEDIATRICS

## 2023-02-09 PROCEDURE — 36415 COLL VENOUS BLD VENIPUNCTURE: CPT | Performed by: PEDIATRICS

## 2023-02-09 PROCEDURE — 80061 LIPID PANEL: CPT | Performed by: PEDIATRICS

## 2023-02-09 PROCEDURE — 84550 ASSAY OF BLOOD/URIC ACID: CPT | Performed by: PEDIATRICS

## 2023-02-09 PROCEDURE — 85025 COMPLETE CBC W/AUTO DIFF WBC: CPT | Performed by: PEDIATRICS

## 2023-02-16 ENCOUNTER — LAB VISIT (OUTPATIENT)
Dept: LAB | Facility: HOSPITAL | Age: 73
End: 2023-02-16
Attending: PEDIATRICS
Payer: MEDICARE

## 2023-02-16 ENCOUNTER — OFFICE VISIT (OUTPATIENT)
Dept: INTERNAL MEDICINE | Facility: CLINIC | Age: 73
End: 2023-02-16
Payer: MEDICARE

## 2023-02-16 VITALS
DIASTOLIC BLOOD PRESSURE: 78 MMHG | SYSTOLIC BLOOD PRESSURE: 126 MMHG | BODY MASS INDEX: 39.72 KG/M2 | RESPIRATION RATE: 16 BRPM | WEIGHT: 253.06 LBS | HEART RATE: 66 BPM | OXYGEN SATURATION: 98 % | TEMPERATURE: 98 F | HEIGHT: 67 IN

## 2023-02-16 DIAGNOSIS — Z85.46 HISTORY OF PROSTATE CANCER: ICD-10-CM

## 2023-02-16 DIAGNOSIS — I44.2 CHB (COMPLETE HEART BLOCK): ICD-10-CM

## 2023-02-16 DIAGNOSIS — E78.5 HYPERLIPIDEMIA, UNSPECIFIED HYPERLIPIDEMIA TYPE: Chronic | ICD-10-CM

## 2023-02-16 DIAGNOSIS — M17.12 PRIMARY OSTEOARTHRITIS OF LEFT KNEE: ICD-10-CM

## 2023-02-16 DIAGNOSIS — M54.16 LUMBAR RADICULAR PAIN: ICD-10-CM

## 2023-02-16 DIAGNOSIS — E66.01 OBESITY, CLASS III, BMI 40-49.9 (MORBID OBESITY): Chronic | ICD-10-CM

## 2023-02-16 DIAGNOSIS — R73.03 PREDIABETES: Primary | ICD-10-CM

## 2023-02-16 DIAGNOSIS — I42.2 HYPERTROPHIC CARDIOMYOPATHY: ICD-10-CM

## 2023-02-16 DIAGNOSIS — N17.9 ACUTE KIDNEY INJURY SUPERIMPOSED ON CKD: ICD-10-CM

## 2023-02-16 DIAGNOSIS — I25.10 CORONARY ARTERY DISEASE INVOLVING NATIVE CORONARY ARTERY OF NATIVE HEART WITHOUT ANGINA PECTORIS: ICD-10-CM

## 2023-02-16 DIAGNOSIS — D69.6 THROMBOCYTOPENIA: ICD-10-CM

## 2023-02-16 DIAGNOSIS — R73.03 PREDIABETES: ICD-10-CM

## 2023-02-16 DIAGNOSIS — Z95.1 S/P CABG (CORONARY ARTERY BYPASS GRAFT): ICD-10-CM

## 2023-02-16 DIAGNOSIS — M1A.39X0 CHRONIC GOUT DUE TO RENAL IMPAIRMENT OF MULTIPLE SITES WITHOUT TOPHUS: ICD-10-CM

## 2023-02-16 DIAGNOSIS — I10 ESSENTIAL HYPERTENSION: ICD-10-CM

## 2023-02-16 DIAGNOSIS — N18.32 STAGE 3B CHRONIC KIDNEY DISEASE: ICD-10-CM

## 2023-02-16 DIAGNOSIS — I70.0 AORTIC ATHEROSCLEROSIS: ICD-10-CM

## 2023-02-16 DIAGNOSIS — C61 PROSTATE CANCER: ICD-10-CM

## 2023-02-16 DIAGNOSIS — Z95.0 PACEMAKER: ICD-10-CM

## 2023-02-16 DIAGNOSIS — I47.29 NON-SUSTAINED VENTRICULAR TACHYCARDIA: ICD-10-CM

## 2023-02-16 DIAGNOSIS — N18.9 ACUTE KIDNEY INJURY SUPERIMPOSED ON CKD: ICD-10-CM

## 2023-02-16 DIAGNOSIS — I50.32 CHRONIC DIASTOLIC CHF (CONGESTIVE HEART FAILURE): Chronic | ICD-10-CM

## 2023-02-16 DIAGNOSIS — I27.9 PULMONARY HEART DISEASE: ICD-10-CM

## 2023-02-16 LAB
ALBUMIN SERPL BCP-MCNC: 3.9 G/DL (ref 3.5–5.2)
ALP SERPL-CCNC: 108 U/L (ref 55–135)
ALT SERPL W/O P-5'-P-CCNC: 39 U/L (ref 10–44)
ANION GAP SERPL CALC-SCNC: 9 MMOL/L (ref 8–16)
AST SERPL-CCNC: 34 U/L (ref 10–40)
BILIRUB SERPL-MCNC: 0.7 MG/DL (ref 0.1–1)
BUN SERPL-MCNC: 27 MG/DL (ref 8–23)
CALCIUM SERPL-MCNC: 9.6 MG/DL (ref 8.7–10.5)
CHLORIDE SERPL-SCNC: 112 MMOL/L (ref 95–110)
CO2 SERPL-SCNC: 24 MMOL/L (ref 23–29)
CREAT SERPL-MCNC: 2.5 MG/DL (ref 0.5–1.4)
EST. GFR  (NO RACE VARIABLE): 27 ML/MIN/1.73 M^2
GLUCOSE SERPL-MCNC: 118 MG/DL (ref 70–110)
POTASSIUM SERPL-SCNC: 4.6 MMOL/L (ref 3.5–5.1)
PROT SERPL-MCNC: 7.1 G/DL (ref 6–8.4)
SODIUM SERPL-SCNC: 145 MMOL/L (ref 136–145)

## 2023-02-16 PROCEDURE — 99214 OFFICE O/P EST MOD 30 MIN: CPT | Mod: S$PBB,,, | Performed by: PEDIATRICS

## 2023-02-16 PROCEDURE — 99214 OFFICE O/P EST MOD 30 MIN: CPT | Mod: PBBFAC | Performed by: PEDIATRICS

## 2023-02-16 PROCEDURE — 99214 PR OFFICE/OUTPT VISIT, EST, LEVL IV, 30-39 MIN: ICD-10-PCS | Mod: S$PBB,,, | Performed by: PEDIATRICS

## 2023-02-16 PROCEDURE — 99999 PR PBB SHADOW E&M-EST. PATIENT-LVL IV: CPT | Mod: PBBFAC,,, | Performed by: PEDIATRICS

## 2023-02-16 PROCEDURE — 36415 COLL VENOUS BLD VENIPUNCTURE: CPT | Performed by: PEDIATRICS

## 2023-02-16 PROCEDURE — 80053 COMPREHEN METABOLIC PANEL: CPT | Performed by: PEDIATRICS

## 2023-02-16 PROCEDURE — 99999 PR PBB SHADOW E&M-EST. PATIENT-LVL IV: ICD-10-PCS | Mod: PBBFAC,,, | Performed by: PEDIATRICS

## 2023-02-16 NOTE — PROGRESS NOTES
Subjective:       Patient ID: Damon Yoo is a 72 y.o. male.    Chief Complaint: Follow-up (6m)    Damon Yoo is a 72 y.o. male who presents to clinic for a follow up    1) HTN:No home  B/P checks, no HTNive symptoms.  2) LIPIDS:not following D&E, tolerating and compliant with med(s).  3) CAD/hypertrophic cardiomyopathy/complete heart block, s/p pacer/pulm HTN: no CP, SOB, DIAZ, sees cardiology  4) Gout: quiet on allopurinol , no longer seeing rheum  5) Prostate cancer: yearly PSA, not seeing uro currently  6) Chronic back pain(lumbar stenosis)/ Knees: sees PMR and ortho, periodic NAHUN. Chronic but manageable.   7) CKD:  Kidney function fluctuating. Has cut back on soda and his hydrating better, also using celebrex PRN about 1x/week  8) Prediabetes: stable. no DM symptoms. Compliant with Farxiga. Has polyuria as side effects.   9) Obesity: sedentary. Weight stable, somewhat working on D&E    LABS REVIEWED AND DISCUSSED WITH PATIENT: CBC, uric acid, CMP, lipids, A1C    Review of Systems   Constitutional:  Negative for activity change, appetite change, chills, diaphoresis, fatigue, fever and unexpected weight change.   HENT:  Negative for nasal congestion, ear pain, mouth sores, nosebleeds, postnasal drip, rhinorrhea, sneezing and sore throat.    Eyes:  Negative for photophobia, pain, discharge, redness and visual disturbance.   Respiratory:  Negative for cough, chest tightness, shortness of breath, wheezing and stridor.    Cardiovascular:  Negative for chest pain, palpitations and leg swelling.   Gastrointestinal:  Negative for abdominal distention, blood in stool, constipation, diarrhea, nausea and vomiting.   Genitourinary:  Negative for decreased urine volume, difficulty urinating, dysuria, flank pain, frequency, genital sores, hematuria and urgency.   Musculoskeletal:  Negative for arthralgias, back pain, joint swelling, neck pain and neck stiffness.   Integumentary:  Negative for color change, pallor,  rash and wound.   Neurological:  Negative for dizziness, syncope, speech difficulty, weakness, light-headedness and headaches.   Hematological:  Negative for adenopathy. Does not bruise/bleed easily.   Psychiatric/Behavioral:  Negative for confusion, decreased concentration, dysphoric mood, hallucinations, sleep disturbance and suicidal ideas. The patient is not nervous/anxious.    All other systems reviewed and are negative.      Objective:      Physical Exam  Vitals and nursing note reviewed.   Constitutional:       General: He is not in acute distress.     Appearance: He is well-developed.   Neck:      Thyroid: No thyromegaly.      Vascular: No JVD.   Cardiovascular:      Rate and Rhythm: Normal rate and regular rhythm.      Heart sounds: Normal heart sounds. No murmur heard.     Comments: Pacer site L upper chest clean   Pulmonary:      Effort: Pulmonary effort is normal. No respiratory distress.      Breath sounds: Normal breath sounds. No wheezing or rales.   Abdominal:      General: There is no distension.      Palpations: Abdomen is soft. There is no mass.      Tenderness: There is no abdominal tenderness. There is no guarding.   Musculoskeletal:      Right lower leg: No edema.      Left lower leg: No edema.   Lymphadenopathy:      Cervical: No cervical adenopathy.   Skin:     Capillary Refill: Capillary refill takes less than 2 seconds.      Findings: No rash.   Neurological:      General: No focal deficit present.      Mental Status: He is alert and oriented to person, place, and time.      Cranial Nerves: No cranial nerve deficit.      Coordination: Coordination normal.   Psychiatric:         Mood and Affect: Mood normal.         Behavior: Behavior normal.         Thought Content: Thought content normal.         Judgment: Judgment normal.       Assessment:       Problem List Items Addressed This Visit       Chronic diastolic CHF (congestive heart failure) (Chronic)    Hyperlipidemia (Chronic)    Relevant  Orders    Lipid Panel    Obesity, Class III, BMI 40-49.9 (morbid obesity) (Chronic)    Acute kidney injury superimposed on CKD    Relevant Orders    Basic Metabolic Panel    Aortic atherosclerosis    CHB (complete heart block)    Chronic gout due to renal impairment of multiple sites without tophus    Chronic kidney disease, stage III (moderate)    Coronary artery disease involving native coronary artery of native heart without angina pectoris w/ hx CABG    Essential hypertension    History of prostate cancer    Hypertrophic cardiomyopathy    Lumbar radicular pain    Non-sustained ventricular tachycardia    Pacemaker    Prediabetes - Primary    Relevant Orders    Comprehensive Metabolic Panel    Microalbumin/Creatinine Ratio, Urine    Hemoglobin A1C    Primary osteoarthritis of left knee    Prostate cancer    Relevant Orders    Prostate Specific Antigen, Diagnostic    Pulmonary heart disease    S/P CABG (coronary artery bypass graft)    Thrombocytopenia       Plan:     Prediabetes  -     Comprehensive Metabolic Panel; Future; Expected date: 02/16/2023  -     Microalbumin/Creatinine Ratio, Urine; Future; Expected date: 02/16/2023  -     Hemoglobin A1C; Future; Expected date: 02/16/2023    Obesity, Class III, BMI 40-49.9 (morbid obesity)    Non-sustained ventricular tachycardia    Hypertrophic cardiomyopathy    Hyperlipidemia, unspecified hyperlipidemia type  -     Lipid Panel; Future; Expected date: 02/16/2023    History of prostate cancer    Essential hypertension    Stage 3b chronic kidney disease    Chronic gout due to renal impairment of multiple sites without tophus    Chronic diastolic CHF (congestive heart failure)    Coronary artery disease involving native coronary artery of native heart without angina pectoris w/ hx CABG    CHB (complete heart block)    Aortic atherosclerosis    Pulmonary heart disease    S/P CABG (coronary artery bypass graft)    Thrombocytopenia    Pacemaker    Prostate cancer  -      Prostate Specific Antigen, Diagnostic; Future; Expected date: 02/16/2023    Acute kidney injury superimposed on CKD  -     Basic Metabolic Panel; Future; Expected date: 02/16/2023    Lumbar radicular pain    Primary osteoarthritis of left knee         At goal for B/P, lipids, and A1c. Maintain meds, self monitoring D&E, weight moderation. Creatinine has worsened to 3.1 from his CKD. Ths could have been from dehydration for fasting labs or from Farxiga use. Obtain C7 today as he is at his normal hydration status. If returned to baseline continue farxiga. If still elevated stop farxiga, increase hydration and repeat C7 in 1 week. Nephrology consult if creatinine does not stabelize then. If he otherwise does well, F/U 6 months with labs.      Scribe Attestation:   I, Augustin Hammonds, am scribing for, and in the presence of, Dr. Dick Mason Jr. I performed the above scribed service and the documentation accurately describes the services I performed. I attest to the accuracy of the note.    I, Dr. Dick Mason Jr, reviewed documentation as scribed above. I personally performed the services described in this documentation.  I agree that the record reflects my personal performance and is accurate and complete. Dick Mason Jr., MD.  02/16/2023

## 2023-02-17 DIAGNOSIS — I47.10 SVT (SUPRAVENTRICULAR TACHYCARDIA): ICD-10-CM

## 2023-02-17 DIAGNOSIS — I47.29 NSVT (NONSUSTAINED VENTRICULAR TACHYCARDIA): ICD-10-CM

## 2023-02-17 RX ORDER — METOPROLOL SUCCINATE 50 MG/1
150 TABLET, EXTENDED RELEASE ORAL DAILY
Qty: 90 TABLET | Refills: 11 | Status: SHIPPED | OUTPATIENT
Start: 2023-02-17 | End: 2024-01-24

## 2023-02-22 ENCOUNTER — TELEPHONE (OUTPATIENT)
Dept: INTERNAL MEDICINE | Facility: CLINIC | Age: 73
End: 2023-02-22
Payer: MEDICARE

## 2023-02-22 DIAGNOSIS — N17.9 AKI (ACUTE KIDNEY INJURY): Primary | ICD-10-CM

## 2023-02-23 ENCOUNTER — LAB VISIT (OUTPATIENT)
Dept: LAB | Facility: HOSPITAL | Age: 73
End: 2023-02-23
Attending: PEDIATRICS
Payer: MEDICARE

## 2023-02-23 DIAGNOSIS — N17.9 AKI (ACUTE KIDNEY INJURY): ICD-10-CM

## 2023-02-23 LAB
ANION GAP SERPL CALC-SCNC: 9 MMOL/L (ref 8–16)
BUN SERPL-MCNC: 21 MG/DL (ref 8–23)
CALCIUM SERPL-MCNC: 9.3 MG/DL (ref 8.7–10.5)
CHLORIDE SERPL-SCNC: 112 MMOL/L (ref 95–110)
CO2 SERPL-SCNC: 22 MMOL/L (ref 23–29)
CREAT SERPL-MCNC: 1.8 MG/DL (ref 0.5–1.4)
EST. GFR  (NO RACE VARIABLE): 40 ML/MIN/1.73 M^2
GLUCOSE SERPL-MCNC: 117 MG/DL (ref 70–110)
POTASSIUM SERPL-SCNC: 4.7 MMOL/L (ref 3.5–5.1)
SODIUM SERPL-SCNC: 143 MMOL/L (ref 136–145)

## 2023-02-23 PROCEDURE — 36415 COLL VENOUS BLD VENIPUNCTURE: CPT | Mod: PO | Performed by: PEDIATRICS

## 2023-02-23 PROCEDURE — 80048 BASIC METABOLIC PNL TOTAL CA: CPT | Mod: PO | Performed by: PEDIATRICS

## 2023-02-24 DIAGNOSIS — N17.9 AKI (ACUTE KIDNEY INJURY): Primary | ICD-10-CM

## 2023-02-27 DIAGNOSIS — I10 ESSENTIAL HYPERTENSION: ICD-10-CM

## 2023-02-28 ENCOUNTER — EXTERNAL CHRONIC CARE MANAGEMENT (OUTPATIENT)
Dept: PRIMARY CARE CLINIC | Facility: CLINIC | Age: 73
End: 2023-02-28
Payer: MEDICARE

## 2023-02-28 PROCEDURE — 99490 CHRNC CARE MGMT STAFF 1ST 20: CPT | Mod: PBBFAC | Performed by: PEDIATRICS

## 2023-02-28 PROCEDURE — 99490 CHRNC CARE MGMT STAFF 1ST 20: CPT | Mod: S$PBB,,, | Performed by: PEDIATRICS

## 2023-02-28 PROCEDURE — 99490 PR CHRONIC CARE MGMT, 1ST 20 MIN: ICD-10-PCS | Mod: S$PBB,,, | Performed by: PEDIATRICS

## 2023-03-01 ENCOUNTER — TELEPHONE (OUTPATIENT)
Dept: ADMINISTRATIVE | Facility: HOSPITAL | Age: 73
End: 2023-03-01
Payer: MEDICARE

## 2023-03-02 RX ORDER — LISINOPRIL 40 MG/1
40 TABLET ORAL DAILY
Qty: 90 TABLET | Refills: 3 | Status: SHIPPED | OUTPATIENT
Start: 2023-03-02 | End: 2024-03-05 | Stop reason: SDUPTHER

## 2023-03-15 ENCOUNTER — LAB VISIT (OUTPATIENT)
Dept: LAB | Facility: HOSPITAL | Age: 73
End: 2023-03-15
Attending: PEDIATRICS
Payer: MEDICARE

## 2023-03-15 DIAGNOSIS — N17.9 AKI (ACUTE KIDNEY INJURY): ICD-10-CM

## 2023-03-15 PROCEDURE — 80048 BASIC METABOLIC PNL TOTAL CA: CPT | Performed by: PEDIATRICS

## 2023-03-15 PROCEDURE — 36415 COLL VENOUS BLD VENIPUNCTURE: CPT | Performed by: PEDIATRICS

## 2023-03-16 LAB
ANION GAP SERPL CALC-SCNC: 9 MMOL/L (ref 8–16)
BUN SERPL-MCNC: 26 MG/DL (ref 8–23)
CALCIUM SERPL-MCNC: 9.6 MG/DL (ref 8.7–10.5)
CHLORIDE SERPL-SCNC: 112 MMOL/L (ref 95–110)
CO2 SERPL-SCNC: 23 MMOL/L (ref 23–29)
CREAT SERPL-MCNC: 1.8 MG/DL (ref 0.5–1.4)
EST. GFR  (NO RACE VARIABLE): 39.7 ML/MIN/1.73 M^2
GLUCOSE SERPL-MCNC: 78 MG/DL (ref 70–110)
POTASSIUM SERPL-SCNC: 4.7 MMOL/L (ref 3.5–5.1)
SODIUM SERPL-SCNC: 144 MMOL/L (ref 136–145)

## 2023-03-19 ENCOUNTER — CLINICAL SUPPORT (OUTPATIENT)
Dept: CARDIOLOGY | Facility: HOSPITAL | Age: 73
End: 2023-03-19
Payer: MEDICARE

## 2023-03-19 DIAGNOSIS — Z95.0 PRESENCE OF CARDIAC PACEMAKER: ICD-10-CM

## 2023-03-22 ENCOUNTER — OFFICE VISIT (OUTPATIENT)
Dept: CARDIOLOGY | Facility: CLINIC | Age: 73
End: 2023-03-22
Payer: MEDICARE

## 2023-03-22 ENCOUNTER — TELEPHONE (OUTPATIENT)
Dept: CARDIOLOGY | Facility: HOSPITAL | Age: 73
End: 2023-03-22
Payer: MEDICARE

## 2023-03-22 VITALS
BODY MASS INDEX: 40.35 KG/M2 | OXYGEN SATURATION: 95 % | HEIGHT: 67 IN | WEIGHT: 257.06 LBS | SYSTOLIC BLOOD PRESSURE: 120 MMHG | HEART RATE: 61 BPM | DIASTOLIC BLOOD PRESSURE: 66 MMHG

## 2023-03-22 DIAGNOSIS — I44.2 CHB (COMPLETE HEART BLOCK): Primary | ICD-10-CM

## 2023-03-22 DIAGNOSIS — R00.1 BRADYCARDIA: ICD-10-CM

## 2023-03-22 DIAGNOSIS — I44.2 CHB (COMPLETE HEART BLOCK): ICD-10-CM

## 2023-03-22 DIAGNOSIS — Z95.0 CARDIAC PACEMAKER IN SITU: Primary | ICD-10-CM

## 2023-03-22 DIAGNOSIS — Z95.0 PRESENCE OF CARDIAC PACEMAKER: ICD-10-CM

## 2023-03-22 PROCEDURE — 99999 PR PBB SHADOW E&M-EST. PATIENT-LVL IV: CPT | Mod: PBBFAC,,, | Performed by: INTERNAL MEDICINE

## 2023-03-22 PROCEDURE — 99214 PR OFFICE/OUTPT VISIT, EST, LEVL IV, 30-39 MIN: ICD-10-PCS | Mod: S$PBB,,, | Performed by: INTERNAL MEDICINE

## 2023-03-22 PROCEDURE — 99214 OFFICE O/P EST MOD 30 MIN: CPT | Mod: S$PBB,,, | Performed by: INTERNAL MEDICINE

## 2023-03-22 PROCEDURE — 99214 OFFICE O/P EST MOD 30 MIN: CPT | Mod: PBBFAC,PO | Performed by: INTERNAL MEDICINE

## 2023-03-22 PROCEDURE — 99999 PR PBB SHADOW E&M-EST. PATIENT-LVL IV: ICD-10-PCS | Mod: PBBFAC,,, | Performed by: INTERNAL MEDICINE

## 2023-03-22 NOTE — PROGRESS NOTES
Subjective:   Patient ID:  Damon Yoo is a 73 y.o. male who presents for evaluation of Follow-up (Patient in for six month follow up with a complaint of not being able to sleep well at night. )        Initial HPI: 1/2021   69 yo male, pmhx below, CAD s/p CABG 2000?, HTN, HLP, Pre diabetes (last A1C 6.2), concentric remodeling on echo, comes in for follow up recent hospital admission for CHB, AV dissociation that required TVP placement while in the hospital , with persistence depsite holding toprol off for 48 hours, upon PPM implant he had sinus tachycardia 100 abpm with less than 50 vbpm   He comes in for follow up , PPM site look healing well, no infection, no hematoma, no bleeding, no syncope, feels great after the pacemaker states his breathing is better, no more dyspnea, no more fatigue denies any other complaints except for right upper back muscle spasm   On interrogation, he was 70% apaced with 99%  with good parameters     Interval hx: 3/8/2021   9 episodes of svt on device interrogation   99%  ,   No complaints of palpitations at all   Does endorse bilateral lower ext swelling, and positional orthopnea sometimes   No chest pain     Follow-up  Pertinent negatives include no abdominal pain, chest pain, coughing, fever, headaches, rash or sore throat.   Interval 8/26/2021   Doing well, V pacing 30% after last adjustments  No more SVT or NSVT on most recent interrogation   States no chest pain, no dyspnea, no leg swelling, no palpitations   He is on lasix prn, states takes it if swelling or if dyspnea   See ros        2.28.2022  Doing well, pacemaker functioning normal   Denies any cardiac complaints   No recent hx of ed visits or hospitalizations for acs or adhf  No hx of angina. No hx of unusual cordero with ordinary daily activities. No dyspnea at rest. No orthopnea or pnd  No hx of palpitations. No exertional dizziness or syncope  No hx of dvt or pe. No hx of edema or intermittent claudication  No  focal cns symptoms or signs to suggest tia or stroke  No hx of ckd. No dm- no thyroid disease  No hx of abnormal bleeding  LDL down from 150 to 73      9.8.2022  Comes in for a 6 months follow up   Doing well denies any CORDERO, lower ext swelling, orthopnea or PND  He had NSVT on device interrogation and sinus tachy and htn   We increased his toprol 150 mg a day   On last interrogation 7/29 he is 100  from 30% before after prolonging his av delay   Getting another remote interrogation on the 20th     3.22.2023  Comes in for a 6 months follow up   Echo EF 60% mild MR   No cordero, no swelling, no chest pain or orthopnea   85% on vpace   0% af burden, 2 episode of fast atrial rate , no egm strips   On asa 81 mg daily     Past Medical History:   Diagnosis Date    Abnormal EKG 10/25/2013    Acute on chronic diastolic CHF (congestive heart failure) 11/27/2020    Arthritis     Back pain     CAD (coronary artery disease)     Cancer     Prostate [id number] prostate    Disorder of kidney and ureter     Glaucoma     Gout attack     Hyperlipidemia     Hypertension     Hypertrophic cardiomyopathy 10/25/2013    S/P CABG (coronary artery bypass graft) 10/25/2013       Past Surgical History:   Procedure Laterality Date    A-V CARDIAC PACEMAKER INSERTION Left 11/30/2020    Procedure: INSERTION, CARDIAC PACEMAKER, DUAL CHAMBER;  Surgeon: Elsy Kenyon MD;  Location: [facility] CATH LAB;  Service: Cardiology;  Laterality: Left;  biotronik    COLONOSCOPY N/A 12/04/2017    Procedure: COLONOSCOPY;  Surgeon: Dina Ledesma MD;  Location: [facility] ENDO;  Service: Endoscopy;  Laterality: N/A;    CORONARY ARTERY BYPASS GRAFT  05/2003    x1    PROSTATE SURGERY      RALP 2013    TRANSFORAMINAL EPIDURAL INJECTION OF STEROID Right 11/11/2019    Procedure: Right L5/S1+ S1 TF NAHUN with IV sedation;  Surgeon: Ermias Mario MD;  Location: [facility] PAIN MGT;  Service: Pain Management;  Laterality: Right;    TRANSFORAMINAL EPIDURAL INJECTION OF STEROID Right  2021    Procedure: right L5/S1 and S1 TF NAHUN;  Surgeon: Ermias Mario MD;  Location: [facility] PAIN MGT;  Service: Pain Management;  Laterality: Right;    TRANSFORAMINAL EPIDURAL INJECTION OF STEROID Right 2022    Procedure: Right L5/S1 + S1 TF NAHUN;  Surgeon: Ermias Mario MD;  Location: [facility] PAIN MGT;  Service: Pain Management;  Laterality: Right;       Social History     Tobacco Use    Smoking status: Former     Packs/day: 0.25     Years: 15.00     Pack years: 3.75     Types: Cigarettes     Quit date: 1988     Years since quittin.9    Smokeless tobacco: Never   Substance Use Topics    Alcohol use: No    Drug use: No       Family History   Problem Relation Age of Onset    No Known Problems Mother     Heart disease Father     Hypertension Father     No Known Problems Daughter     Strabismus Neg Hx     Retinal detachment Neg Hx     Macular degeneration Neg Hx     Glaucoma Neg Hx     Blindness Neg Hx     Amblyopia Neg Hx        Review of Systems   Constitutional: Negative for fever and malaise/fatigue.   HENT:  Negative for sore throat.    Eyes:  Negative for blurred vision.   Cardiovascular:  Negative for chest pain, claudication, cyanosis, dyspnea on exertion, irregular heartbeat, leg swelling, near-syncope, orthopnea, palpitations, paroxysmal nocturnal dyspnea and syncope.   Respiratory:  Negative for cough, hemoptysis and shortness of breath.    Hematologic/Lymphatic: Negative for bleeding problem.   Skin:  Negative for poor wound healing and rash.   Musculoskeletal:  Negative for back pain and falls.   Gastrointestinal:  Negative for abdominal pain.   Genitourinary:  Negative for nocturia.   Neurological:  Negative for dizziness, focal weakness, headaches, light-headedness and loss of balance.   Psychiatric/Behavioral:  Negative for altered mental status and substance abuse.      Current Outpatient Medications on File Prior to Visit   Medication Sig    allopurinoL (ZYLOPRIM) 300 MG tablet  Take 1 tablet (300 mg total) by mouth once daily.    amLODIPine (NORVASC) 10 MG tablet Take 1 tablet (10 mg total) by mouth once daily.    aspirin 81 MG chewable tablet Take 1 tablet by mouth Daily.    celecoxib (CELEBREX) 200 MG capsule Take 1 capsule (200 mg total) by mouth daily as needed for Pain. Take with food    dapagliflozin (FARXIGA) 5 mg Tab tablet Take 1 tablet (5 mg total) by mouth once daily.    fish oil-fat acid [id number].[id number] 1,200 mg (400 [id number]) Cap Take 1 capsule by mouth once daily.     latanoprost 0.005 % ophthalmic solution Place 1 drop into both eyes every evening.    lisinopriL (PRINIVIL,ZESTRIL) 40 MG tablet Take 1 tablet (40 mg total) by mouth once daily.    metoprolol succinate (TOPROL-XL) 50 MG 24 hr tablet Take 3 tablets (150 mg total) by mouth once daily.    MITIGARE 0.6 mg Cap Take 1 capsule (0.6 mg total) by mouth 2 (two) times daily as needed (acute gout).    pantoprazole (PROTONIX) 40 MG tablet Take 1 tablet (40 mg total) by mouth once daily. (replaces nexium)    pregabalin (LYRICA) 75 MG capsule Take 1 capsule (75 mg total) by mouth every evening.    rosuvastatin (CRESTOR) 40 MG Tab Take 1 tablet (40 mg total) by mouth every evening. Generic is fine.    timolol maleate 0.5% (TIMOPTIC) 0.5 % Drop Place 1 drop into both eyes every morning.    tiZANidine (ZANAFLEX) 4 MG tablet Take 1 tablet (4 mg total) by mouth every 8 (eight) hours as needed (MUSCLE SPASMS).    traMADoL (ULTRAM) 50 mg tablet Take 1 tablet (50 mg total) by mouth every 12 (twelve) hours as needed for Pain.    acetaminophen (TYLENOL) 500 MG tablet Take 500 mg by mouth every 6 (six) hours as needed for Pain.    allopurinoL (ZYLOPRIM) 300 MG tablet Take 1.5 tablets (450 mg total) by mouth once daily. (Patient not taking: Reported on 3/22/2023)    furosemide (LASIX) 20 MG tablet Take 1 tablet (20 mg total) by mouth daily as needed (swelling).     No current facility-administered medications on file prior to visit.        Objective:   Objective:  Wt Readings from Last 3 Encounters:   03/22/23 116.6 kg (257 lb 0.9 oz)   02/16/23 114.8 kg (253 lb 1.4 oz)   02/08/23 114.8 kg (253 lb)     Temp Readings from Last 3 Encounters:   02/16/23 98 °F (36.7 °C) (Tympanic)   08/17/22 97.9 °F (36.6 °C) (Tympanic)   03/15/22 98.1 °F (36.7 °C) (Temporal)     BP Readings from Last 3 Encounters:   03/22/23 120/66   02/16/23 126/78   02/08/23 118/68     Pulse Readings from Last 3 Encounters:   03/22/23 61   02/16/23 66   02/08/23 (!) 58       Physical Exam  Vitals reviewed.   Constitutional:       Appearance: He is well-developed.   HENT:      Head: Normocephalic and atraumatic.   Eyes:      General: No scleral icterus.     Conjunctiva/sclera: Conjunctivae normal.   Cardiovascular:      Rate and Rhythm: Normal rate and regular rhythm.      Pulses: Intact distal pulses.      Heart sounds: Normal heart sounds. No murmur heard.     Comments: Wound healing nice  Pulmonary:      Effort: No respiratory distress.      Breath sounds: No wheezing or rales.   Chest:      Chest wall: No tenderness.   Abdominal:      General: Bowel sounds are normal. There is no distension.      Palpations: Abdomen is soft.      Tenderness: There is no guarding.   Musculoskeletal:         General: Normal range of motion.      Cervical back: Normal range of motion and neck supple.      Right lower leg: Edema present.      Left lower leg: Edema present.   Skin:     General: Skin is warm.   Neurological:      Mental Status: He is alert and oriented to person, place, and time.       Lab Results   Component Value Date    CHOL 104 (L) 02/09/2023    CHOL 115 (L) 08/10/2022    CHOL 127 02/07/2022     Lab Results   Component Value Date    HDL 24 (L) 02/09/2023    HDL 26 (L) 08/10/2022    HDL 31 (L) 02/07/2022     Lab Results   Component Value Date    LDLCALC 61.2 (L) 02/09/2023    LDLCALC 64.2 08/10/2022    LDLCALC 72.8 02/07/2022     Lab Results   Component Value Date    TRIG 94  02/09/2023    TRIG 124 08/10/2022    TRIG 116 02/07/2022     Lab Results   Component Value Date    CHOLHDL 23.1 02/09/2023    CHOLHDL 22.6 08/10/2022    CHOLHDL 24.4 02/07/2022       Chemistry        Component Value Date/Time     03/15/2023 1338    K 4.7 03/15/2023 1338     (H) 03/15/2023 1338    CO2 23 03/15/2023 1338    BUN 26 (H) 03/15/2023 1338    CREATININE 1.8 (H) 03/15/2023 1338    GLU 78 03/15/2023 1338        Component Value Date/Time    CALCIUM 9.6 03/15/2023 1338    ALKPHOS 108 02/16/2023 0834    AST 34 02/16/2023 0834    ALT 39 02/16/2023 0834    BILITOT 0.7 02/16/2023 0834    ESTGFRAFRICA 50.1 (A) 02/07/2022 0748    EGFRNONAA 43.3 (A) 02/07/2022 0748          Lab Results   Component Value Date    TSH 1.367 08/16/2014     Lab Results   Component Value Date    INR 1.0 11/27/2020    INR 1.0 06/26/2010     Lab Results   Component Value Date    WBC 8.87 02/09/2023    HGB 12.6 (L) 02/09/2023    HCT 40.3 02/09/2023    MCV 96 02/09/2023     02/09/2023     BNP  @LABRCNTIP(BNP,BNPTRIAGEBLO)@  Estimated Creatinine Clearance: 45.9 mL/min (A) (based on SCr of 1.8 mg/dL (H)).     Imaging:  ======  Results for orders placed during the hospital encounter of 11/27/20   Echo Color Flow Doppler? Yes; Bubble Contrast? No    Narrative · There is moderate left ventricular concentric hypertrophy.  · The left ventricle is normal in size with normal systolic function. The   estimated ejection fraction is 60%.  · Normal left ventricular diastolic function.  · Normal right ventricular size with normal right ventricular systolic   function.  · Mild aortic valve stenosis.  · Aortic valve area is 1.78 cm2; peak velocity is 1.94 m/s; mean gradient   is 9 mmHg.  · Moderate mitral regurgitation.  · Moderate tricuspid regurgitation.  · There is pulmonary hypertension.  · Normal central venous pressure (3 mmHg).  · The estimated PA systolic pressure is 80 mmHg.     Suggest re echo when patient is in NSR.        No  results found for this or any previous visit.  No results found for this or any previous visit.  Results for orders placed in visit on 03/18/13   X-Ray Chest PA And Lateral    Narrative DATE OF EXAM: Mar 20 2013      BOC   0190  -  CHEST 2 VIEWS FRONTAL   LAT:   \  [id number]     CLINICAL HISTORY:   \V72.84  PREOP EXAMINATION NOS     PROCEDURE COMMENT:   \     ICD 9 CODE(S):   (\)     CPT 4 CODE(S)/MODIFIER(S):   (\)     Findings: No prior radiographs for comparison.  Post CABG changes are   noted.  Heart size is normal.  Mild degenerative change in the thoracic   spine.  No acute consolidation seen in the lungs.  No pleural effusions.        Impression: No active disease.        Electronically signed by: EUGENE ALCARAZ MD  Date:     03/20/13  Time:    13:11            : GADIEL  Transcribe Date/Time: Mar 20 2013  1:11P  Dictated by : EUGENE ALCARAZ MD  Report reviewed by:   Read On:   \  Images were reviewed, findings were verified and document was   electronically  SIGNED BY: EUGENE ALCARAZ MD On: Mar 20 2013  1:11P        No results found for this or any previous visit.  No procedure found.    Diagnostic Results:  ECG: Reviewed    The ASCVD Risk score (aKtia DK, et al., 2019) failed to calculate for the following reasons:    The valid total cholesterol range is 130 to 320 mg/dL    Results for orders placed during the hospital encounter of 02/08/23    Echo    Interpretation Summary  · Concentric remodeling and normal systolic function.  · The estimated ejection fraction is 60%.  · Normal left ventricular diastolic function.  · Normal right ventricular size with normal right ventricular systolic function.  · There is mild aortic valve stenosis.  · Aortic valve area is 3.13 cm2; peak velocity is 1.15 m/s; mean gradient is 3 mmHg.    Assessment and Plan:     There are no diagnoses linked to this encounter.      NSVT (nonsustained ventricular tachycardia)  -    RESOLVED SINCE ON TOPROL    SVT  (supraventricular tachycardia)  Asymptomatic   -    RESOLVED SINCE ON TOPROL      Leg swelling  -     RESOLVED     Complete heart block  Comments:  s/p dual chamber pacemaker        Essential hypertension  -     Controlled with current meds      Coronary artery disease involving coronary bypass graft of native heart without angina pectoris  - STABLE no angina   Pacemaker  -    we increased his av delay to lower the  need and that lowered his  to 30% in 2020, then 100% , then 85%     HTN controlled , CW TOPROL , LISINOPRIL, AMLODIPINE   Angina free, no symptoms of chest pain or equivalent angina   LDL down to 72  on crestor   No DIAZ  No CNS symptoms   Reviewed all tests and above medical conditions with patient in detail and formulated treatment plan.  Risk factor modification discussed.   Cardiac low salt diet discussed.  Maintaining healthy weight and weight loss goals were discussed in clinic.      Follow up in 6 months

## 2023-03-31 ENCOUNTER — EXTERNAL CHRONIC CARE MANAGEMENT (OUTPATIENT)
Dept: PRIMARY CARE CLINIC | Facility: CLINIC | Age: 73
End: 2023-03-31
Payer: MEDICARE

## 2023-03-31 PROCEDURE — 99490 CHRNC CARE MGMT STAFF 1ST 20: CPT | Mod: PBBFAC | Performed by: PEDIATRICS

## 2023-03-31 PROCEDURE — 99490 PR CHRONIC CARE MGMT, 1ST 20 MIN: ICD-10-PCS | Mod: S$PBB,,, | Performed by: PEDIATRICS

## 2023-03-31 PROCEDURE — 99490 CHRNC CARE MGMT STAFF 1ST 20: CPT | Mod: S$PBB,,, | Performed by: PEDIATRICS

## 2023-04-06 ENCOUNTER — CLINICAL SUPPORT (OUTPATIENT)
Dept: CARDIOLOGY | Facility: HOSPITAL | Age: 73
End: 2023-04-06
Attending: INTERNAL MEDICINE
Payer: MEDICARE

## 2023-04-06 DIAGNOSIS — Z95.0 CARDIAC PACEMAKER IN SITU: ICD-10-CM

## 2023-04-06 DIAGNOSIS — I44.2 CHB (COMPLETE HEART BLOCK): ICD-10-CM

## 2023-04-06 DIAGNOSIS — R00.1 BRADYCARDIA: ICD-10-CM

## 2023-04-06 PROCEDURE — 93280 CARDIAC DEVICE CHECK - IN CLINIC & HOSPITAL: ICD-10-PCS | Mod: 26,,, | Performed by: INTERNAL MEDICINE

## 2023-04-06 PROCEDURE — 99999 PR PBB SHADOW E&M-EST. PATIENT-LVL I: ICD-10-PCS | Mod: PBBFAC,,,

## 2023-04-06 PROCEDURE — 93280 PM DEVICE PROGR EVAL DUAL: CPT

## 2023-04-06 PROCEDURE — 99211 OFF/OP EST MAY X REQ PHY/QHP: CPT | Mod: PBBFAC

## 2023-04-06 PROCEDURE — 99999 PR PBB SHADOW E&M-EST. PATIENT-LVL I: CPT | Mod: PBBFAC,,,

## 2023-04-06 PROCEDURE — 93280 PM DEVICE PROGR EVAL DUAL: CPT | Mod: 26,,, | Performed by: INTERNAL MEDICINE

## 2023-04-17 ENCOUNTER — OFFICE VISIT (OUTPATIENT)
Dept: OPHTHALMOLOGY | Facility: CLINIC | Age: 73
End: 2023-04-17
Payer: MEDICARE

## 2023-04-17 DIAGNOSIS — H25.13 NUCLEAR SENILE CATARACT OF BOTH EYES: ICD-10-CM

## 2023-04-17 DIAGNOSIS — H40.1132 PRIMARY OPEN ANGLE GLAUCOMA OF BOTH EYES, MODERATE STAGE: Primary | ICD-10-CM

## 2023-04-17 PROCEDURE — 99211 OFF/OP EST MAY X REQ PHY/QHP: CPT | Mod: PBBFAC | Performed by: OPHTHALMOLOGY

## 2023-04-17 PROCEDURE — 92083 HUMPHREY VISUAL FIELD - OU - BOTH EYES: ICD-10-PCS | Mod: 26,S$PBB,, | Performed by: OPHTHALMOLOGY

## 2023-04-17 PROCEDURE — 92014 COMPRE OPH EXAM EST PT 1/>: CPT | Mod: S$PBB,,, | Performed by: OPHTHALMOLOGY

## 2023-04-17 PROCEDURE — 92133 CPTRZD OPH DX IMG PST SGM ON: CPT | Mod: PBBFAC | Performed by: OPHTHALMOLOGY

## 2023-04-17 PROCEDURE — 92014 PR EYE EXAM, EST PATIENT,COMPREHESV: ICD-10-PCS | Mod: S$PBB,,, | Performed by: OPHTHALMOLOGY

## 2023-04-17 PROCEDURE — 92083 EXTENDED VISUAL FIELD XM: CPT | Mod: PBBFAC | Performed by: OPHTHALMOLOGY

## 2023-04-17 PROCEDURE — 99999 PR PBB SHADOW E&M-EST. PATIENT-LVL I: CPT | Mod: PBBFAC,,, | Performed by: OPHTHALMOLOGY

## 2023-04-17 PROCEDURE — 92133 POSTERIOR SEGMENT OCT OPTIC NERVE(OCULAR COHERENCE TOMOGRAPHY) - OU - BOTH EYES: ICD-10-PCS | Mod: 26,S$PBB,, | Performed by: OPHTHALMOLOGY

## 2023-04-17 PROCEDURE — 99999 PR PBB SHADOW E&M-EST. PATIENT-LVL I: ICD-10-PCS | Mod: PBBFAC,,, | Performed by: OPHTHALMOLOGY

## 2023-04-30 ENCOUNTER — EXTERNAL CHRONIC CARE MANAGEMENT (OUTPATIENT)
Dept: PRIMARY CARE CLINIC | Facility: CLINIC | Age: 73
End: 2023-04-30
Payer: MEDICARE

## 2023-04-30 PROCEDURE — 99490 PR CHRONIC CARE MGMT, 1ST 20 MIN: ICD-10-PCS | Mod: S$PBB,,, | Performed by: PEDIATRICS

## 2023-04-30 PROCEDURE — 99490 CHRNC CARE MGMT STAFF 1ST 20: CPT | Mod: S$PBB,,, | Performed by: PEDIATRICS

## 2023-04-30 PROCEDURE — 99490 CHRNC CARE MGMT STAFF 1ST 20: CPT | Mod: PBBFAC | Performed by: PEDIATRICS

## 2023-05-04 NOTE — Clinical Note
The ECG shows a paced rhythm. The patient has a temporary pacemaker. Pacemaker rate at 70 bpm.  Post-Care Instructions: I reviewed with the patient in detail post-care instructions. Patient is to wear sunprotection, and avoid picking at any of the treated lesions. Pt may apply Vaseline or Polysporin to crusted or scabbing areas. Consent: The patient's consent was obtained including but not limited to risks of crusting, scabbing, blistering, scarring, darker or lighter pigmentary change, recurrence, incomplete removal and infection. Render Note In Bullet Format When Appropriate: No Show Applicator Variable?: Yes Detail Level: Detailed

## 2023-05-22 PROBLEM — N17.9 ACUTE KIDNEY INJURY SUPERIMPOSED ON CKD: Status: RESOLVED | Noted: 2023-02-16 | Resolved: 2023-05-22

## 2023-05-22 PROBLEM — N18.9 ACUTE KIDNEY INJURY SUPERIMPOSED ON CKD: Status: RESOLVED | Noted: 2023-02-16 | Resolved: 2023-05-22

## 2023-05-31 ENCOUNTER — EXTERNAL CHRONIC CARE MANAGEMENT (OUTPATIENT)
Dept: PRIMARY CARE CLINIC | Facility: CLINIC | Age: 73
End: 2023-05-31
Payer: MEDICARE

## 2023-05-31 PROCEDURE — 99490 PR CHRONIC CARE MGMT, 1ST 20 MIN: ICD-10-PCS | Mod: S$PBB,,, | Performed by: PEDIATRICS

## 2023-05-31 PROCEDURE — 99490 CHRNC CARE MGMT STAFF 1ST 20: CPT | Mod: PBBFAC | Performed by: PEDIATRICS

## 2023-05-31 PROCEDURE — 99490 CHRNC CARE MGMT STAFF 1ST 20: CPT | Mod: S$PBB,,, | Performed by: PEDIATRICS

## 2023-06-17 ENCOUNTER — CLINICAL SUPPORT (OUTPATIENT)
Dept: CARDIOLOGY | Facility: HOSPITAL | Age: 73
End: 2023-06-17
Payer: MEDICARE

## 2023-06-17 DIAGNOSIS — Z95.0 PRESENCE OF CARDIAC PACEMAKER: ICD-10-CM

## 2023-06-17 PROCEDURE — 93294 REM INTERROG EVL PM/LDLS PM: CPT | Mod: ,,, | Performed by: INTERNAL MEDICINE

## 2023-06-17 PROCEDURE — 93294 CARDIAC DEVICE CHECK - REMOTE: ICD-10-PCS | Mod: ,,, | Performed by: INTERNAL MEDICINE

## 2023-06-21 ENCOUNTER — OFFICE VISIT (OUTPATIENT)
Dept: INTERNAL MEDICINE | Facility: CLINIC | Age: 73
End: 2023-06-21
Payer: MEDICARE

## 2023-06-21 VITALS
OXYGEN SATURATION: 98 % | DIASTOLIC BLOOD PRESSURE: 68 MMHG | WEIGHT: 249.81 LBS | HEIGHT: 67 IN | HEART RATE: 64 BPM | TEMPERATURE: 97 F | BODY MASS INDEX: 39.21 KG/M2 | SYSTOLIC BLOOD PRESSURE: 122 MMHG | RESPIRATION RATE: 16 BRPM

## 2023-06-21 DIAGNOSIS — I10 ESSENTIAL HYPERTENSION: ICD-10-CM

## 2023-06-21 DIAGNOSIS — I95.9 HYPOTENSION, UNSPECIFIED HYPOTENSION TYPE: Primary | ICD-10-CM

## 2023-06-21 PROCEDURE — 99214 OFFICE O/P EST MOD 30 MIN: CPT | Mod: S$PBB,,, | Performed by: PEDIATRICS

## 2023-06-21 PROCEDURE — 99214 PR OFFICE/OUTPT VISIT, EST, LEVL IV, 30-39 MIN: ICD-10-PCS | Mod: S$PBB,,, | Performed by: PEDIATRICS

## 2023-06-21 PROCEDURE — 99999 PR PBB SHADOW E&M-EST. PATIENT-LVL V: CPT | Mod: PBBFAC,,, | Performed by: PEDIATRICS

## 2023-06-21 PROCEDURE — 99999 PR PBB SHADOW E&M-EST. PATIENT-LVL V: ICD-10-PCS | Mod: PBBFAC,,, | Performed by: PEDIATRICS

## 2023-06-21 PROCEDURE — 99215 OFFICE O/P EST HI 40 MIN: CPT | Mod: PBBFAC | Performed by: PEDIATRICS

## 2023-06-21 NOTE — PROGRESS NOTES
Subjective     Patient ID: Damon Yoo is a 73 y.o. male.    Chief Complaint: Dizziness    Damon Yoo is a 73 y.o. male who presents to the clinic for dizziness. Pt states that this past Thursday/Friday/Saturday when he got out of the bed, he experienced dizziness. However, it did not occur this week so far. No blurred vision. Pt states the dizziness only happens when he changes position and not with head turning. He does not have regular BP home checks. He has lost weight and is compliant with BP medications.     Review of Systems   Constitutional:  Negative for chills, diaphoresis, fatigue and fever.   HENT:  Negative for sore throat and trouble swallowing.    Respiratory:  Negative for cough and shortness of breath.    Cardiovascular:  Negative for chest pain.   Gastrointestinal:  Negative for abdominal pain, anal bleeding, constipation, diarrhea, nausea and vomiting.   Endocrine: Negative for cold intolerance, heat intolerance, polydipsia, polyphagia and polyuria.   Neurological:  Positive for dizziness and light-headedness.   All other systems reviewed and are negative.       Objective     Physical Exam  Constitutional:       General: He is not in acute distress.     Appearance: Normal appearance. He is not ill-appearing or toxic-appearing.   HENT:      Right Ear: Tympanic membrane normal. There is no impacted cerumen.      Left Ear: Tympanic membrane normal. There is no impacted cerumen.      Nose: No congestion or rhinorrhea.      Mouth/Throat:      Pharynx: No oropharyngeal exudate or posterior oropharyngeal erythema.   Eyes:      Extraocular Movements: Extraocular movements intact.      Conjunctiva/sclera: Conjunctivae normal.      Pupils: Pupils are equal, round, and reactive to light.   Cardiovascular:      Rate and Rhythm: Normal rate and regular rhythm.      Pulses: Normal pulses.      Heart sounds: Normal heart sounds. No murmur heard.    No friction rub. No gallop.   Pulmonary:      Effort:  Pulmonary effort is normal.      Breath sounds: Normal breath sounds.   Abdominal:      General: There is no distension.      Palpations: There is no mass.      Tenderness: There is no abdominal tenderness. There is no guarding or rebound.      Hernia: No hernia is present.   Neurological:      Mental Status: He is alert and oriented to person, place, and time.      Cranial Nerves: No cranial nerve deficit.      Sensory: No sensory deficit.      Motor: No weakness.      Coordination: Coordination normal.      Gait: Gait normal.      Deep Tendon Reflexes: Reflexes normal.   Psychiatric:         Mood and Affect: Mood normal.         Behavior: Behavior normal.         Thought Content: Thought content normal.         Judgment: Judgment normal.          Assessment and Plan     1. Hypotension, unspecified hypotension type    2. Essential hypertension  -     Hypertension Digital Medicine (HDMP) Enrollment Order  -     Hypertension Digital Medicine (HDMP): Assign Onboarding Questionnaires        His sxs sound to be hypotensive related. Keep up fluid status. Decrease Amlodipine dosage from 10 mg to 5 mg. Consult with digital medicine HTN program. Follow up based on his response.          No follow-ups on file.

## 2023-06-30 ENCOUNTER — EXTERNAL CHRONIC CARE MANAGEMENT (OUTPATIENT)
Dept: PRIMARY CARE CLINIC | Facility: CLINIC | Age: 73
End: 2023-06-30
Payer: MEDICARE

## 2023-06-30 PROCEDURE — 99490 CHRNC CARE MGMT STAFF 1ST 20: CPT | Mod: PBBFAC | Performed by: PEDIATRICS

## 2023-06-30 PROCEDURE — 99490 CHRNC CARE MGMT STAFF 1ST 20: CPT | Mod: S$PBB,,, | Performed by: PEDIATRICS

## 2023-06-30 PROCEDURE — 99490 PR CHRONIC CARE MGMT, 1ST 20 MIN: ICD-10-PCS | Mod: S$PBB,,, | Performed by: PEDIATRICS

## 2023-07-31 ENCOUNTER — EXTERNAL CHRONIC CARE MANAGEMENT (OUTPATIENT)
Dept: PRIMARY CARE CLINIC | Facility: CLINIC | Age: 73
End: 2023-07-31
Payer: MEDICARE

## 2023-07-31 PROCEDURE — 99490 PR CHRONIC CARE MGMT, 1ST 20 MIN: ICD-10-PCS | Mod: S$PBB,,, | Performed by: PEDIATRICS

## 2023-07-31 PROCEDURE — 99490 CHRNC CARE MGMT STAFF 1ST 20: CPT | Mod: PBBFAC | Performed by: PEDIATRICS

## 2023-07-31 PROCEDURE — 99490 CHRNC CARE MGMT STAFF 1ST 20: CPT | Mod: S$PBB,,, | Performed by: PEDIATRICS

## 2023-08-07 DIAGNOSIS — M10.9 GOUT, UNSPECIFIED CAUSE, UNSPECIFIED CHRONICITY, UNSPECIFIED SITE: ICD-10-CM

## 2023-08-08 ENCOUNTER — TELEPHONE (OUTPATIENT)
Dept: INTERNAL MEDICINE | Facility: CLINIC | Age: 73
End: 2023-08-08
Payer: MEDICARE

## 2023-08-08 RX ORDER — ALLOPURINOL 300 MG/1
300 TABLET ORAL DAILY
Qty: 90 TABLET | Refills: 4 | OUTPATIENT
Start: 2023-08-08

## 2023-08-10 ENCOUNTER — TELEPHONE (OUTPATIENT)
Dept: ORTHOPEDICS | Facility: CLINIC | Age: 73
End: 2023-08-10
Payer: MEDICARE

## 2023-08-10 NOTE — TELEPHONE ENCOUNTER
Returned the patient's phone call in regards to their message. Informed the patient that they need to come in for a follow up appointment per Dr. De La Vega's last note. I got the patient schedule to see Dr. De La Vega on 08/14/23 at 8:00. Patient verbalized understanding.           ----- Message from Laurel Castro sent at 8/10/2023 10:20 AM CDT -----  Contact: Damon  Pt is calling in regards to getting refill on medication allopurinoL (ZYLOPRIM) 300 MG tablet. Pt stated that pharmacy wouldn't refill. Please call back at [phone]                    [facility]  [id number] [place] [id number]  Phone: [phone] Fax: [phone]  Hours: Mon-Fri, 8a-5:30p          Thanks  KT

## 2023-08-11 DIAGNOSIS — M17.12 ARTHRITIS OF KNEE, LEFT: Primary | ICD-10-CM

## 2023-08-14 ENCOUNTER — OFFICE VISIT (OUTPATIENT)
Dept: OPHTHALMOLOGY | Facility: CLINIC | Age: 73
End: 2023-08-14
Payer: MEDICARE

## 2023-08-14 DIAGNOSIS — H40.1132 PRIMARY OPEN ANGLE GLAUCOMA OF BOTH EYES, MODERATE STAGE: Primary | ICD-10-CM

## 2023-08-14 DIAGNOSIS — H25.13 NUCLEAR SENILE CATARACT OF BOTH EYES: ICD-10-CM

## 2023-08-14 PROCEDURE — 99213 OFFICE O/P EST LOW 20 MIN: CPT | Mod: PBBFAC | Performed by: OPHTHALMOLOGY

## 2023-08-14 PROCEDURE — 99999 PR PBB SHADOW E&M-EST. PATIENT-LVL III: CPT | Mod: PBBFAC,,, | Performed by: OPHTHALMOLOGY

## 2023-08-14 PROCEDURE — 92133 POSTERIOR SEGMENT OCT OPTIC NERVE(OCULAR COHERENCE TOMOGRAPHY) - OU - BOTH EYES: ICD-10-PCS | Mod: 26,S$PBB,, | Performed by: OPHTHALMOLOGY

## 2023-08-14 PROCEDURE — 99214 PR OFFICE/OUTPT VISIT, EST, LEVL IV, 30-39 MIN: ICD-10-PCS | Mod: S$PBB,,, | Performed by: OPHTHALMOLOGY

## 2023-08-14 PROCEDURE — 99214 OFFICE O/P EST MOD 30 MIN: CPT | Mod: S$PBB,,, | Performed by: OPHTHALMOLOGY

## 2023-08-14 PROCEDURE — 99999 PR PBB SHADOW E&M-EST. PATIENT-LVL III: ICD-10-PCS | Mod: PBBFAC,,, | Performed by: OPHTHALMOLOGY

## 2023-08-14 PROCEDURE — 92133 CPTRZD OPH DX IMG PST SGM ON: CPT | Mod: PBBFAC | Performed by: OPHTHALMOLOGY

## 2023-08-14 NOTE — PROGRESS NOTES
SUBJECTIVE  Damon Yoo is 73 y.o. male  Corrected distance visual acuity was 20/25 in the right eye and 20/25 -2 in the left eye.   Chief Complaint   Patient presents with    Glaucoma     Pt here for lost to f/u IOP GOCT chk. No pain or discomfort. VA stable. 100% compliant with gtts.           HPI     Glaucoma     Additional comments: Pt here for lost to f/u IOP GOCT chk. No pain or   discomfort. VA stable. 100% compliant with gtts.            Comments    1. Mod COAG- No FHx (init 32/27 on 3/10 with C/D .65/.60) Goal <18, new   goal = 15 6/2017  SLT OD 12/11/14 (20-16)  SLT OS 2/25/15 (20-16)  2. Mild NSC  3. LLL Cyst Excision on 6/13/12    Latanoprost QHS OU  Timolol qam OU            Last edited by Martinez Chatman MA on 8/14/2023  8:02 AM.         Assessment /Plan :  1. Primary open angle glaucoma of both eyes, moderate stage Doing well, intraocular pressure (IOP) within acceptable range relative to target IOP and no evidence of progression. Continue current treatment. Reviewed importance of continued compliance with treatment and follow up.      Patient instructed to continue using the following glaucoma medication as follows:  Latanoprost one drop in each eye nightly and Timolol one drop both eyes daily    Return to clinic in 4 months  or as needed.  With IOP Check     2. Nuclear senile cataract of both eyes - monitor for now

## 2023-08-16 ENCOUNTER — OFFICE VISIT (OUTPATIENT)
Dept: PODIATRY | Facility: CLINIC | Age: 73
End: 2023-08-16
Payer: MEDICARE

## 2023-08-16 ENCOUNTER — HOSPITAL ENCOUNTER (OUTPATIENT)
Dept: RADIOLOGY | Facility: HOSPITAL | Age: 73
Discharge: HOME | End: 2023-08-16
Attending: PODIATRIST
Payer: MEDICARE

## 2023-08-16 VITALS — BODY MASS INDEX: 39.21 KG/M2 | HEIGHT: 67 IN | WEIGHT: 249.81 LBS

## 2023-08-16 DIAGNOSIS — M48.061 SPINAL STENOSIS OF LUMBAR REGION WITH RADICULOPATHY: ICD-10-CM

## 2023-08-16 DIAGNOSIS — M10.9 GOUT, UNSPECIFIED CAUSE, UNSPECIFIED CHRONICITY, UNSPECIFIED SITE: ICD-10-CM

## 2023-08-16 DIAGNOSIS — M54.16 SPINAL STENOSIS OF LUMBAR REGION WITH RADICULOPATHY: ICD-10-CM

## 2023-08-16 DIAGNOSIS — M10.9 GOUT, UNSPECIFIED CAUSE, UNSPECIFIED CHRONICITY, UNSPECIFIED SITE: Primary | ICD-10-CM

## 2023-08-16 PROCEDURE — 99204 OFFICE O/P NEW MOD 45 MIN: CPT | Mod: S$PBB,,, | Performed by: PODIATRIST

## 2023-08-16 PROCEDURE — 99204 PR OFFICE/OUTPT VISIT, NEW, LEVL IV, 45-59 MIN: ICD-10-PCS | Mod: S$PBB,,, | Performed by: PODIATRIST

## 2023-08-16 PROCEDURE — 73630 X-RAY EXAM OF FOOT: CPT | Mod: TC,50

## 2023-08-16 PROCEDURE — 99214 OFFICE O/P EST MOD 30 MIN: CPT | Mod: PBBFAC | Performed by: PODIATRIST

## 2023-08-16 PROCEDURE — 99999 PR PBB SHADOW E&M-EST. PATIENT-LVL IV: ICD-10-PCS | Mod: PBBFAC,,, | Performed by: PODIATRIST

## 2023-08-16 PROCEDURE — 99999 PR PBB SHADOW E&M-EST. PATIENT-LVL IV: CPT | Mod: PBBFAC,,, | Performed by: PODIATRIST

## 2023-08-16 PROCEDURE — 73630 X-RAY EXAM OF FOOT: CPT | Mod: 26,50,, | Performed by: RADIOLOGY

## 2023-08-16 PROCEDURE — 73630 XR FOOT COMPLETE 3 VIEW BILATERAL: ICD-10-PCS | Mod: 26,50,, | Performed by: RADIOLOGY

## 2023-08-16 RX ORDER — ALLOPURINOL 300 MG/1
300 TABLET ORAL DAILY
Qty: 90 TABLET | Refills: 4 | Status: SHIPPED | OUTPATIENT
Start: 2023-08-16

## 2023-08-21 ENCOUNTER — LAB VISIT (OUTPATIENT)
Dept: LAB | Facility: HOSPITAL | Age: 73
End: 2023-08-21
Attending: PEDIATRICS
Payer: MEDICARE

## 2023-08-21 ENCOUNTER — OFFICE VISIT (OUTPATIENT)
Dept: INTERNAL MEDICINE | Facility: CLINIC | Age: 73
End: 2023-08-21
Payer: MEDICARE

## 2023-08-21 VITALS
WEIGHT: 252.88 LBS | BODY MASS INDEX: 39.69 KG/M2 | DIASTOLIC BLOOD PRESSURE: 78 MMHG | TEMPERATURE: 97 F | OXYGEN SATURATION: 95 % | HEART RATE: 60 BPM | HEIGHT: 67 IN | SYSTOLIC BLOOD PRESSURE: 124 MMHG

## 2023-08-21 DIAGNOSIS — I10 ESSENTIAL HYPERTENSION: ICD-10-CM

## 2023-08-21 DIAGNOSIS — I44.2 CHB (COMPLETE HEART BLOCK): ICD-10-CM

## 2023-08-21 DIAGNOSIS — N18.32 STAGE 3B CHRONIC KIDNEY DISEASE: ICD-10-CM

## 2023-08-21 DIAGNOSIS — C61 PROSTATE CANCER: ICD-10-CM

## 2023-08-21 DIAGNOSIS — R61 NIGHT SWEATS: ICD-10-CM

## 2023-08-21 DIAGNOSIS — Z00.00 WELL ADULT EXAM: Primary | ICD-10-CM

## 2023-08-21 DIAGNOSIS — Z95.0 PACEMAKER: ICD-10-CM

## 2023-08-21 DIAGNOSIS — M1A.39X0 CHRONIC GOUT DUE TO RENAL IMPAIRMENT OF MULTIPLE SITES WITHOUT TOPHUS: ICD-10-CM

## 2023-08-21 DIAGNOSIS — I50.32 CHRONIC DIASTOLIC CHF (CONGESTIVE HEART FAILURE): Chronic | ICD-10-CM

## 2023-08-21 DIAGNOSIS — M54.16 SPINAL STENOSIS OF LUMBAR REGION WITH RADICULOPATHY: ICD-10-CM

## 2023-08-21 DIAGNOSIS — Z12.11 COLON CANCER SCREENING: ICD-10-CM

## 2023-08-21 DIAGNOSIS — I25.10 CORONARY ARTERY DISEASE INVOLVING NATIVE CORONARY ARTERY OF NATIVE HEART WITHOUT ANGINA PECTORIS: ICD-10-CM

## 2023-08-21 DIAGNOSIS — E66.01 OBESITY, CLASS III, BMI 40-49.9 (MORBID OBESITY): Chronic | ICD-10-CM

## 2023-08-21 DIAGNOSIS — I47.10 SVT (SUPRAVENTRICULAR TACHYCARDIA): ICD-10-CM

## 2023-08-21 DIAGNOSIS — M48.061 SPINAL STENOSIS OF LUMBAR REGION WITH RADICULOPATHY: ICD-10-CM

## 2023-08-21 DIAGNOSIS — E78.5 HYPERLIPIDEMIA, UNSPECIFIED HYPERLIPIDEMIA TYPE: Chronic | ICD-10-CM

## 2023-08-21 DIAGNOSIS — R73.03 PREDIABETES: ICD-10-CM

## 2023-08-21 DIAGNOSIS — I70.0 AORTIC ATHEROSCLEROSIS: ICD-10-CM

## 2023-08-21 DIAGNOSIS — I27.9 PULMONARY HEART DISEASE: ICD-10-CM

## 2023-08-21 DIAGNOSIS — Z95.1 S/P CABG (CORONARY ARTERY BYPASS GRAFT): ICD-10-CM

## 2023-08-21 DIAGNOSIS — I47.29 NON-SUSTAINED VENTRICULAR TACHYCARDIA: ICD-10-CM

## 2023-08-21 DIAGNOSIS — D69.6 THROMBOCYTOPENIA: ICD-10-CM

## 2023-08-21 PROBLEM — E87.8 ELECTROLYTE IMBALANCE: Status: RESOLVED | Noted: 2020-11-27 | Resolved: 2023-08-21

## 2023-08-21 PROBLEM — R79.89 ELEVATED TROPONIN: Status: RESOLVED | Noted: 2020-11-27 | Resolved: 2023-08-21

## 2023-08-21 LAB
BASOPHILS # BLD AUTO: 0.06 K/UL (ref 0–0.2)
BASOPHILS NFR BLD: 0.6 % (ref 0–1.9)
DIFFERENTIAL METHOD: ABNORMAL
EOSINOPHIL # BLD AUTO: 0.3 K/UL (ref 0–0.5)
EOSINOPHIL NFR BLD: 3.6 % (ref 0–8)
ERYTHROCYTE [DISTWIDTH] IN BLOOD BY AUTOMATED COUNT: 14.3 % (ref 11.5–14.5)
HCT VFR BLD AUTO: 38.6 % (ref 40–54)
HGB BLD-MCNC: 12.2 G/DL (ref 14–18)
IMM GRANULOCYTES # BLD AUTO: 0.03 K/UL (ref 0–0.04)
IMM GRANULOCYTES NFR BLD AUTO: 0.3 % (ref 0–0.5)
LYMPHOCYTES # BLD AUTO: 2.2 K/UL (ref 1–4.8)
LYMPHOCYTES NFR BLD: 23.8 % (ref 18–48)
MCH RBC QN AUTO: 30 PG (ref 27–31)
MCHC RBC AUTO-ENTMCNC: 31.6 G/DL (ref 32–36)
MCV RBC AUTO: 95 FL (ref 82–98)
MONOCYTES # BLD AUTO: 0.8 K/UL (ref 0.3–1)
MONOCYTES NFR BLD: 8.7 % (ref 4–15)
NEUTROPHILS # BLD AUTO: 5.9 K/UL (ref 1.8–7.7)
NEUTROPHILS NFR BLD: 63 % (ref 38–73)
NRBC BLD-RTO: 0 /100 WBC
PLATELET # BLD AUTO: 165 K/UL (ref 150–450)
PMV BLD AUTO: 13.6 FL (ref 9.2–12.9)
RBC # BLD AUTO: 4.06 M/UL (ref 4.6–6.2)
TSH SERPL DL<=0.005 MIU/L-ACNC: 3.06 UIU/ML (ref 0.4–4)
WBC # BLD AUTO: 9.38 K/UL (ref 3.9–12.7)

## 2023-08-21 PROCEDURE — 99397 PR PREVENTIVE VISIT,EST,65 & OVER: ICD-10-PCS | Mod: S$PBB,GZ,, | Performed by: PEDIATRICS

## 2023-08-21 PROCEDURE — 99999 PR PBB SHADOW E&M-EST. PATIENT-LVL V: ICD-10-PCS | Mod: PBBFAC,,, | Performed by: PEDIATRICS

## 2023-08-21 PROCEDURE — 86480 TB TEST CELL IMMUN MEASURE: CPT | Performed by: PEDIATRICS

## 2023-08-21 PROCEDURE — 84443 ASSAY THYROID STIM HORMONE: CPT | Performed by: PEDIATRICS

## 2023-08-21 PROCEDURE — 36415 COLL VENOUS BLD VENIPUNCTURE: CPT | Performed by: PEDIATRICS

## 2023-08-21 PROCEDURE — 85025 COMPLETE CBC W/AUTO DIFF WBC: CPT | Performed by: PEDIATRICS

## 2023-08-21 PROCEDURE — 99999 PR PBB SHADOW E&M-EST. PATIENT-LVL V: CPT | Mod: PBBFAC,,, | Performed by: PEDIATRICS

## 2023-08-21 PROCEDURE — 99397 PER PM REEVAL EST PAT 65+ YR: CPT | Mod: S$PBB,GZ,, | Performed by: PEDIATRICS

## 2023-08-21 PROCEDURE — 99215 OFFICE O/P EST HI 40 MIN: CPT | Mod: PBBFAC | Performed by: PEDIATRICS

## 2023-08-21 NOTE — PROGRESS NOTES
Subjective     Patient ID: Damon Yoo is a 73 y.o. male.    Chief Complaint: Follow-up    Damon Yoo is a 73 y.o. male who presents to the clinic with wife for a follow up. Pt c/o diaphoresis, is unsure if it is because of the weather or a complication from his medications.         1) HTN:No home  B/P checks, no HTNive symptoms. Compliant with medications.   2) LIPIDS:not following D&E, tolerating and compliant with crestor 40 mg.   3) CAD/concentric hypertrophy with remodeling/complete heart block, s/p pacer/pulm HTN/CHF: no CP, SOB, DIAZ, sees cardiology  4) Gout: quiet on allopurinol , no longer seeing rheum  5) Prostate cancer: yearly PSA, not seeing uro currently  6) Chronic back pain(lumbar stenosis)/ Knees: sees PMR and ortho, periodic NAHUN. Chronic but manageable.   7) CKD:  Kidney function fluctuating. Has cut back on soda and is hydrating better, also using celebrex PRN about 1x/week  8) Prediabetes: stable. no DM symptoms. Compliant with Farxiga. Has polyuria as side effects. A1C is 6.1. No BS checks.   9) Obesity: sedentary. Weight stable, somewhat working on D&E  10) Thrombocytopenia: no bruising or bleeding      LABS REVIEWED AND DISCUSSED WITH PATIENT: microalbumin/creatinine, PSA, BMP, CMP, lipid panel, and A1C      Review of Systems   Constitutional:  Positive for diaphoresis. Negative for chills, fatigue and fever.   HENT:  Negative for sore throat and trouble swallowing.    Respiratory:  Negative for cough and shortness of breath.    Cardiovascular:  Negative for chest pain.   Gastrointestinal:  Negative for abdominal pain, anal bleeding, constipation, diarrhea, nausea, vomiting and reflux.   Endocrine: Negative for cold intolerance, heat intolerance, polydipsia, polyphagia and polyuria.   Musculoskeletal:  Positive for back pain (chronic).   All other systems reviewed and are negative.         Objective     Physical Exam  Constitutional:       General: He is not in acute distress.      Appearance: Normal appearance. He is obese. He is not ill-appearing or toxic-appearing.   Cardiovascular:      Rate and Rhythm: Normal rate and regular rhythm.      Pulses: Normal pulses.      Heart sounds: Normal heart sounds. No murmur heard.     No friction rub. No gallop.   Pulmonary:      Effort: Pulmonary effort is normal.      Breath sounds: Normal breath sounds.   Abdominal:      General: There is no distension.      Palpations: There is no mass.      Tenderness: There is no abdominal tenderness. There is no guarding or rebound.      Hernia: No hernia is present.   Neurological:      Mental Status: He is alert and oriented to person, place, and time.   Psychiatric:         Mood and Affect: Mood normal.         Behavior: Behavior normal.         Thought Content: Thought content normal.         Judgment: Judgment normal.            Assessment and Plan     1. Well adult exam    2. Prediabetes  -     Hemoglobin A1C; Future; Expected date: 08/21/2023    3. Hyperlipidemia, unspecified hyperlipidemia type  -     Comprehensive Metabolic Panel; Future; Expected date: 08/21/2023  -     Lipid Panel; Future; Expected date: 08/21/2023    4. Essential hypertension    5. Non-sustained ventricular tachycardia    6. Obesity, Class III, BMI 40-49.9 (morbid obesity)    7. Pacemaker    8. Prostate cancer    9. S/P CABG (coronary artery bypass graft)    10. Pulmonary heart disease    11. Spinal stenosis of lumbar region with radiculopathy    12. SVT (supraventricular tachycardia)    13. Thrombocytopenia  -     CBC Auto Differential; Future; Expected date: 08/21/2023    14. Aortic atherosclerosis    15. Chronic diastolic CHF (congestive heart failure)    16. Chronic gout due to renal impairment of multiple sites without tophus    17. Stage 3b chronic kidney disease    18. Coronary artery disease involving native coronary artery of native heart without angina pectoris w/ hx CABG    19. Colon cancer screening  -     Ambulatory  referral/consult to Endo Procedure ; Future; Expected date: 08/22/2023    20. CHB (complete heart block)    21. Night sweats  -     TSH; Future; Expected date: 08/21/2023  -     Quantiferon Gold TB; Future; Expected date: 08/21/2023  -     CBC Auto Differential; Future; Expected date: 08/21/2023        HMI discussed with pt. At goal for B/P, lipids, and A1c. Maintain meds, self monitoring D&E, weight moderation. F/U 6 months with labs. Keep subspecialty care. TSH, CBC, and gold quantiferon test today to see secondary causes of diaphoresis. Shingles and Tdap vaccines in pharmacy. Flu, covid, and RSV vaccines in fall discussed.            Follow up in about 6 months (around 2/21/2024).

## 2023-08-23 ENCOUNTER — HOSPITAL ENCOUNTER (OUTPATIENT)
Dept: PREADMISSION TESTING | Facility: HOSPITAL | Age: 73
Discharge: HOME | End: 2023-08-23
Attending: PEDIATRICS
Payer: MEDICARE

## 2023-08-23 ENCOUNTER — TELEPHONE (OUTPATIENT)
Dept: PREADMISSION TESTING | Facility: HOSPITAL | Age: 73
End: 2023-08-23
Payer: MEDICARE

## 2023-08-23 DIAGNOSIS — Z12.11 SCREEN FOR COLON CANCER: Primary | ICD-10-CM

## 2023-08-23 DIAGNOSIS — Z12.11 COLON CANCER SCREENING: ICD-10-CM

## 2023-08-23 LAB
GAMMA INTERFERON BACKGROUND BLD IA-ACNC: 0.02 IU/ML
M TB IFN-G CD4+ BCKGRND COR BLD-ACNC: 0.01 IU/ML
M TB IFN-G CD4+ BCKGRND COR BLD-ACNC: 0.01 IU/ML
MITOGEN IGNF BCKGRD COR BLD-ACNC: 3.08 IU/ML
TB GOLD PLUS: NEGATIVE

## 2023-08-23 RX ORDER — SODIUM, POTASSIUM,MAG SULFATES 17.5-3.13G
1 SOLUTION, RECONSTITUTED, ORAL ORAL DAILY
Qty: 1 KIT | Refills: 0 | Status: SHIPPED | OUTPATIENT
Start: 2023-08-23 | End: 2023-08-25

## 2023-08-26 NOTE — PROGRESS NOTES
Subjective:     Patient ID: Damon Yoo is a 73 y.o. male.    Chief Complaint: Foot Pain (Pt c/o BL burning/ tingling and tightness,pt c/o pain 5/10, non-diabetic pt wears sandals, PCP Dr. Mason last seen 6-21-23) and Foot Problem    Damon is a 73 y.o. male who presents to the podiatry clinic  with complaint of  bilateral foot pain. Onset of the symptoms was several months ago. Precipitating event: none known. Current symptoms include: stiffness. Aggravating factors: inactivity and walking. Symptoms have been intermittent. Patient has had no prior foot problems. Treatment to date:  Colchicine . Patients rates pain 5/10 on pain scale.    Patient Active Problem List   Diagnosis    Hyperlipidemia    Essential hypertension    Coronary artery disease involving native coronary artery of native heart without angina pectoris w/ hx CABG    Senile nuclear sclerosis - Both Eyes    S/P CABG (coronary artery bypass graft)    Abnormal EKG    History of prostate cancer    Chronic gout due to renal impairment of multiple sites without tophus    Lumbar radicular pain    Primary osteoarthritis of left knee    Obesity, Class III, BMI 40-49.9 (morbid obesity)    Knee pain, bilateral    Nuclear sclerosis of both eyes    Moderate stage chronic open angle glaucoma    Chronic kidney disease, stage III (moderate)    Primary open angle glaucoma of both eyes, moderate stage    Refractive error    Nuclear sclerosis, bilateral    Spinal stenosis of lumbar region with radiculopathy    Prediabetes    CHB (complete heart block)    Chronic diastolic CHF (congestive heart failure)    Thrombocytopenia    SVT (supraventricular tachycardia)    Non-sustained ventricular tachycardia    Aortic atherosclerosis    Pulmonary heart disease    Prostate cancer    Pacemaker       Medication List with Changes/Refills   New Medications    SODIUM,POTASSIUM,MAG SULFATES (SUPREP BOWEL PREP KIT) 17.5-3.13-1.6 GRAM SOLR    Take 177 mLs by mouth once daily. for 2  days   Current Medications    ACETAMINOPHEN (TYLENOL) 500 MG TABLET    Take 500 mg by mouth every 6 (six) hours as needed for Pain.    AMLODIPINE (NORVASC) 10 MG TABLET    Take 1 tablet (10 mg total) by mouth once daily.    ASPIRIN 81 MG CHEWABLE TABLET    Take 1 tablet by mouth Daily.    CELECOXIB (CELEBREX) 200 MG CAPSULE    Take 1 capsule (200 mg total) by mouth daily as needed for Pain. Take with food    DAPAGLIFLOZIN (FARXIGA) 5 MG TAB TABLET    Take 1 tablet (5 mg total) by mouth once daily.    FISH OIL-FAT ACID COMB.8- 1,200 MG (400 [id number]) CAP    Take 1 capsule by mouth once daily.     FUROSEMIDE (LASIX) 20 MG TABLET    Take 1 tablet (20 mg total) by mouth daily as needed (swelling).    LATANOPROST 0.005 % OPHTHALMIC SOLUTION    Place 1 drop into both eyes every evening.    LISINOPRIL (PRINIVIL,ZESTRIL) 40 MG TABLET    Take 1 tablet (40 mg total) by mouth once daily.    METOPROLOL SUCCINATE (TOPROL-XL) 50 MG 24 HR TABLET    Take 3 tablets (150 mg total) by mouth once daily.    MITIGARE 0.6 MG CAP    Take 1 capsule (0.6 mg total) by mouth 2 (two) times daily as needed (acute gout).    PANTOPRAZOLE (PROTONIX) 40 MG TABLET    Take 1 tablet (40 mg total) by mouth once daily. (replaces nexium)    PREGABALIN (LYRICA) 75 MG CAPSULE    Take 1 capsule (75 mg total) by mouth every evening.    ROSUVASTATIN (CRESTOR) 40 MG TAB    Take 1 tablet (40 mg total) by mouth every evening. Generic is fine.    TIMOLOL MALEATE 0.5% (TIMOPTIC) 0.5 % DROP    Place 1 drop into both eyes every morning.    TIZANIDINE (ZANAFLEX) 4 MG TABLET    Take 1 tablet (4 mg total) by mouth every 8 (eight) hours as needed (MUSCLE SPASMS).    TRAMADOL (ULTRAM) 50 MG TABLET    Take 1 tablet (50 mg total) by mouth every 12 (twelve) hours as needed for Pain.   Changed and/or Refilled Medications    Modified Medication Previous Medication    ALLOPURINOL (ZYLOPRIM) 300 MG TABLET allopurinoL (ZYLOPRIM) 300 MG tablet       Take 1 tablet (300  mg total) by mouth once daily.    Take 1 tablet (300 mg total) by mouth once daily.   Discontinued Medications    ALLOPURINOL (ZYLOPRIM) 300 MG TABLET    Take 1.5 tablets (450 mg total) by mouth once daily.       Review of patient's allergies indicates:  No Known Allergies    Past Surgical History:   Procedure Laterality Date    A-V CARDIAC PACEMAKER INSERTION Left 11/30/2020    Procedure: INSERTION, CARDIAC PACEMAKER, DUAL CHAMBER;  Surgeon: Elsy Kenyon MD;  Location: [facility] CATH LAB;  Service: Cardiology;  Laterality: Left;  biotronik    COLONOSCOPY N/A 12/04/2017    Procedure: COLONOSCOPY;  Surgeon: Dina Ledesma MD;  Location: [facility] ENDO;  Service: Endoscopy;  Laterality: N/A;    CORONARY ARTERY BYPASS GRAFT  05/2003    x1    PROSTATE SURGERY      RALP 2013    TRANSFORAMINAL EPIDURAL INJECTION OF STEROID Right 11/11/2019    Procedure: Right L5/S1+ S1 TF NAHUN with IV sedation;  Surgeon: Ermias Mario MD;  Location: [facility];  Service: Pain Management;  Laterality: Right;    TRANSFORAMINAL EPIDURAL INJECTION OF STEROID Right 11/24/2021    Procedure: right L5/S1 and S1 TF NAHUN;  Surgeon: Ermias Mario MD;  Location: [facility];  Service: Pain Management;  Laterality: Right;    TRANSFORAMINAL EPIDURAL INJECTION OF STEROID Right 01/11/2022    Procedure: Right L5/S1 + S1 TF NAHUN;  Surgeon: Ermias Mario MD;  Location: [facility];  Service: Pain Management;  Laterality: Right;       Family History   Problem Relation Age of Onset    No Known Problems Mother     Heart disease Father     Hypertension Father     No Known Problems Daughter     Strabismus Neg Hx     Retinal detachment Neg Hx     Macular degeneration Neg Hx     Glaucoma Neg Hx     Blindness Neg Hx     Amblyopia Neg Hx        Social History     Socioeconomic History    Marital status:    Tobacco Use    Smoking status: Former     Current packs/day: 0.00     Average packs/day: 0.3 packs/day for 15.0 years (3.8 ttl pk-yrs)     Types:  "Cigarettes     Start date: 1973     Quit date: 1988     Years since quittin.3    Smokeless tobacco: Never   Substance and Sexual Activity    Alcohol use: No    Drug use: No    Sexual activity: Not Currently   Social History Narrative    No pets or smokers in household.     Social Determinants of Health     Financial Resource Strain: Low Risk  (3/15/2022)    Overall Financial Resource Strain (CARDIA)     Difficulty of Paying Living Expenses: Not very hard   Food Insecurity: Food Insecurity Present (3/15/2022)    Hunger Vital Sign     Worried About Running Out of Food in the Last Year: Sometimes true     Ran Out of Food in the Last Year: Never true   Transportation Needs: No Transportation Needs (3/15/2022)    PRAPARE - Transportation     Lack of Transportation (Medical): No     Lack of Transportation (Non-Medical): No   Physical Activity: Insufficiently Active (3/15/2022)    Exercise Vital Sign     Days of Exercise per Week: 2 days     Minutes of Exercise per Session: 10 min   Stress: No Stress Concern Present (3/15/2022)    [employer] [place] [employer] - Occupational Stress Questionnaire     Feeling of Stress : Only a little   Social Connections: Moderately Isolated (3/15/2022)    Social Connection and Isolation Panel [NHANES]     Frequency of Communication with Friends and Family: Twice a week     Frequency of Social Gatherings with Friends and Family: Never     Attends Anabaptist Services: 1 to 4 times per year     Active Member of Clubs or Organizations: No     Attends Club or Organization Meetings: Never     Marital Status:    Housing Stability: Low Risk  (3/15/2022)    Housing Stability Vital Sign     Unable to Pay for Housing in the Last Year: No     Number of Places Lived in the Last Year: 1     Unstable Housing in the Last Year: No       Vitals:    23 0720   Weight: 113.3 kg (249 lb 12.5 oz)   Height: 5' 7" (1.702 m)   PainSc:   5       Hemoglobin A1C   Date Value Ref " Range Status   08/16/2023 6.1 (H) 4.0 - 5.6 % Final     Comment:     ADA Screening Guidelines:  5.7-6.4%  Consistent with prediabetes  >or=6.5%  Consistent with diabetes    High levels of fetal hemoglobin interfere with the HbA1C  assay. Heterozygous hemoglobin variants (HbS, HgC, etc)do  not significantly interfere with this assay.   However, presence of multiple variants may affect accuracy.     02/09/2023 6.1 (H) 4.0 - 5.6 % Final     Comment:     ADA Screening Guidelines:  5.7-6.4%  Consistent with prediabetes  >or=6.5%  Consistent with diabetes    High levels of fetal hemoglobin interfere with the HbA1C  assay. Heterozygous hemoglobin variants (HbS, HgC, etc)do  not significantly interfere with this assay.   However, presence of multiple variants may affect accuracy.     08/10/2022 6.1 (H) 4.0 - 5.6 % Final     Comment:     ADA Screening Guidelines:  5.7-6.4%  Consistent with prediabetes  >or=6.5%  Consistent with diabetes    High levels of fetal hemoglobin interfere with the HbA1C  assay. Heterozygous hemoglobin variants (HbS, HgC, etc)do  not significantly interfere with this assay.   However, presence of multiple variants may affect accuracy.         Review of Systems   Constitutional:  Negative for chills and fever.   Respiratory:  Negative for shortness of breath.    Cardiovascular:  Negative for chest pain, palpitations, orthopnea, claudication and leg swelling.   Gastrointestinal:  Negative for diarrhea, nausea and vomiting.   Musculoskeletal:  Negative for joint pain.   Skin:  Negative for rash.   Neurological:  Positive for tingling and sensory change.   Psychiatric/Behavioral: Negative.           Objective:      PHYSICAL EXAM: Apperance: Alert and orient in no distress,well developed, and with good attention to grooming and body habits  Patient presents ambulating in sandals.  LOWER EXTREMITIES EXAM:   VASCULAR: Dorsalis pedis pulses 2/4 bilateral and Posterior Tibial pulses 2/4 bilateral.    DERMATOLOGICAL: No skin rashes, subcutaneous nodules, lesions, or ulcers observed bilateral. (--) edema, (--) erythema, (+) increased temperature noted to bilateral midfoot. Webspaces 1,2,3,4 clean, dry and without evidence of break in skin integrity bilateral.   NEUROLOGICAL: Light touch, sharp-dull, proprioception all present and equal bilaterally. Vibratory sensation diminished at bilateral hallux and navicular. Protective sensation absent at 4/10 at sites as tested with a Hagerman-Constance 5.07 monofilament.   MUSCULOSKELETAL: Muscle strength is 5/5 for foot inverters, everters, plantarflexors, and dorsiflexors. Muscle tone is normal. Negative painon palpation of bilateral feet.         Assessment:       ICD-10-CM ICD-9-CM   1. Gout, unspecified cause, unspecified chronicity, unspecified site  M10.9 274.9   2. Spinal stenosis of lumbar region with radiculopathy  M48.061 724.02    M54.16 724.4       Plan:   Gout, unspecified cause, unspecified chronicity, unspecified site  -     allopurinoL (ZYLOPRIM) 300 MG tablet; Take 1 tablet (300 mg total) by mouth once daily.  Dispense: 90 tablet; Refill: 4  -     X-Ray Foot Complete Bilateral; Future; Expected date: 08/16/2023    Spinal stenosis of lumbar region with radiculopathy      I counseled the patient on his conditions, regarding findings of my examination, my impressions, and usual treatment plan.   I explained to the patient that etiologies and treatment options for gout including rest, elevation, diet control, NSAID's, long term gout medication, and/or injection therapy.    Ordered bilateral foot x-rays to be completed today.   Prescription written for Allopurinol 500mg to be taken as prescribed.   Discussed with patient treatments for neuropathy consisting of topical or oral medication.  Recommendations given for over-the-counter medicine such as Two Old Goats and/or Capsaicin.  Patient to return in 2-3 months or sooner if needed.          Pam Funez,  DPM [facility]

## 2023-08-31 ENCOUNTER — EXTERNAL CHRONIC CARE MANAGEMENT (OUTPATIENT)
Dept: PRIMARY CARE CLINIC | Facility: CLINIC | Age: 73
End: 2023-08-31
Payer: MEDICARE

## 2023-08-31 PROCEDURE — 99490 PR CHRONIC CARE MGMT, 1ST 20 MIN: ICD-10-PCS | Mod: S$PBB,,, | Performed by: PEDIATRICS

## 2023-08-31 PROCEDURE — 99490 CHRNC CARE MGMT STAFF 1ST 20: CPT | Mod: PBBFAC | Performed by: PEDIATRICS

## 2023-08-31 PROCEDURE — 99490 CHRNC CARE MGMT STAFF 1ST 20: CPT | Mod: S$PBB,,, | Performed by: PEDIATRICS

## 2023-09-12 DIAGNOSIS — N18.32 STAGE 3B CHRONIC KIDNEY DISEASE: ICD-10-CM

## 2023-09-12 DIAGNOSIS — R73.03 PREDIABETES: ICD-10-CM

## 2023-09-12 DIAGNOSIS — I25.10 CORONARY ARTERY DISEASE INVOLVING NATIVE CORONARY ARTERY OF NATIVE HEART WITHOUT ANGINA PECTORIS: ICD-10-CM

## 2023-09-12 NOTE — TELEPHONE ENCOUNTER
Refill Routing Note   Medication(s) are not appropriate for processing by [facility] for the following reason(s):      Required labs abnormal  CREATININE 2.5 (H) 08/16/2023        EGFRNORACEVR 26.8 (A) 08/16/2023       OR action(s):  Defer Care Due:  None identified            Pharmacist review requested: Yes     Appointments  past 12m or future 3m with PCP    Date Provider   Last Visit   8/21/2023 Dick Mason MD   Next Visit   2/21/2024 Dick Mason MD   ED visits in past 90 days: 0        Note composed:3:01 PM 09/12/2023

## 2023-09-12 NOTE — TELEPHONE ENCOUNTER
No care due was identified.  [facility] Embedded Care Due Messages. Reference number: [id number].   9/12/2023 11:29:46 AM CDT

## 2023-09-12 NOTE — TELEPHONE ENCOUNTER
Refill Routing Note   Medication(s) are not appropriate for processing by [facility] for the following reason(s):      Required labs abnormal: use not recommended    [facility] action(s):  Defer Care Due:  None identified        Pharmacist review requested: Yes     Appointments  past 12m or future 3m with PCP    Date Provider   Last Visit   8/21/2023 Dick Mason MD   Next Visit   2/21/2024 Dick Mason MD   ED visits in past 90 days: 0        Note composed:6:30 PM 09/12/2023

## 2023-09-13 RX ORDER — DAPAGLIFLOZIN 5 MG/1
5 TABLET, FILM COATED ORAL DAILY
Qty: 90 TABLET | Refills: 3 | Status: SHIPPED | OUTPATIENT
Start: 2023-09-13

## 2023-09-15 ENCOUNTER — CLINICAL SUPPORT (OUTPATIENT)
Dept: CARDIOLOGY | Facility: HOSPITAL | Age: 73
End: 2023-09-15
Payer: MEDICARE

## 2023-09-15 DIAGNOSIS — Z95.0 PRESENCE OF CARDIAC PACEMAKER: ICD-10-CM

## 2023-09-20 DIAGNOSIS — M17.12 ARTHRITIS OF KNEE, LEFT: Primary | ICD-10-CM

## 2023-09-20 DIAGNOSIS — M17.11 ARTHRITIS OF KNEE, RIGHT: ICD-10-CM

## 2023-09-22 DIAGNOSIS — H40.1132 PRIMARY OPEN ANGLE GLAUCOMA OF BOTH EYES, MODERATE STAGE: ICD-10-CM

## 2023-09-22 RX ORDER — LATANOPROST 50 UG/ML
1 SOLUTION/ DROPS OPHTHALMIC NIGHTLY
Qty: 7.5 ML | Refills: 4 | Status: SHIPPED | OUTPATIENT
Start: 2023-09-22

## 2023-09-22 RX ORDER — CELECOXIB 200 MG/1
200 CAPSULE ORAL DAILY
Qty: 90 CAPSULE | Refills: 2 | Status: SHIPPED | OUTPATIENT
Start: 2023-09-22

## 2023-09-22 RX ORDER — TIMOLOL MALEATE 5 MG/ML
1 SOLUTION/ DROPS OPHTHALMIC EVERY MORNING
Qty: 10 ML | Refills: 4 | Status: SHIPPED | OUTPATIENT
Start: 2023-09-22

## 2023-09-29 ENCOUNTER — TELEPHONE (OUTPATIENT)
Dept: PAIN MEDICINE | Facility: CLINIC | Age: 73
End: 2023-09-29
Payer: MEDICARE

## 2023-09-29 NOTE — TELEPHONE ENCOUNTER
----- Message from Lore Lamb sent at 9/29/2023  1:23 PM CDT -----  Contact: self [phone]  Patient called in this after noon complaining of pain in the right leg and he needs an injection. He would like to know can you fit him in on next week. Please call back [phone]. Thanks tpw

## 2023-09-30 ENCOUNTER — EXTERNAL CHRONIC CARE MANAGEMENT (OUTPATIENT)
Dept: PRIMARY CARE CLINIC | Facility: CLINIC | Age: 73
End: 2023-09-30
Payer: MEDICARE

## 2023-09-30 PROCEDURE — 99490 CHRNC CARE MGMT STAFF 1ST 20: CPT | Mod: S$PBB,,, | Performed by: PEDIATRICS

## 2023-09-30 PROCEDURE — 99439 CHRNC CARE MGMT STAF EA ADDL: CPT | Mod: PBBFAC,27 | Performed by: PEDIATRICS

## 2023-09-30 PROCEDURE — 99490 PR CHRONIC CARE MGMT, 1ST 20 MIN: ICD-10-PCS | Mod: S$PBB,,, | Performed by: PEDIATRICS

## 2023-09-30 PROCEDURE — 99439 PR CHRONIC CARE MGMT, EA ADDTL 20 MIN: ICD-10-PCS | Mod: S$PBB,,, | Performed by: PEDIATRICS

## 2023-09-30 PROCEDURE — 99439 CHRNC CARE MGMT STAF EA ADDL: CPT | Mod: S$PBB,,, | Performed by: PEDIATRICS

## 2023-09-30 PROCEDURE — 99490 CHRNC CARE MGMT STAFF 1ST 20: CPT | Mod: PBBFAC,25 | Performed by: PEDIATRICS

## 2023-10-05 ENCOUNTER — TELEPHONE (OUTPATIENT)
Dept: PAIN MEDICINE | Facility: CLINIC | Age: 73
End: 2023-10-05
Payer: MEDICARE

## 2023-10-05 NOTE — TELEPHONE ENCOUNTER
----- Message from Yady Cochran sent at 10/5/2023 11:35 AM CDT -----  Contact: delia Jorge is calling to speak with a nurse . Please give a call back at [phone] .

## 2023-10-31 ENCOUNTER — EXTERNAL CHRONIC CARE MANAGEMENT (OUTPATIENT)
Dept: PRIMARY CARE CLINIC | Facility: CLINIC | Age: 73
End: 2023-10-31
Payer: MEDICARE

## 2023-10-31 PROCEDURE — 99490 CHRNC CARE MGMT STAFF 1ST 20: CPT | Mod: PBBFAC | Performed by: PEDIATRICS

## 2023-10-31 PROCEDURE — 99490 PR CHRONIC CARE MGMT, 1ST 20 MIN: ICD-10-PCS | Mod: S$PBB,,, | Performed by: PEDIATRICS

## 2023-10-31 PROCEDURE — 99490 CHRNC CARE MGMT STAFF 1ST 20: CPT | Mod: S$PBB,,, | Performed by: PEDIATRICS

## 2023-11-13 DIAGNOSIS — I10 ESSENTIAL HYPERTENSION: ICD-10-CM

## 2023-11-13 DIAGNOSIS — I25.810 CORONARY ARTERY DISEASE INVOLVING CORONARY BYPASS GRAFT OF NATIVE HEART WITHOUT ANGINA PECTORIS: ICD-10-CM

## 2023-11-13 RX ORDER — AMLODIPINE BESYLATE 10 MG/1
10 TABLET ORAL DAILY
Qty: 90 TABLET | Refills: 3 | Status: SHIPPED | OUTPATIENT
Start: 2023-11-13

## 2023-11-13 NOTE — TELEPHONE ENCOUNTER
Refill Decision Note   Damon Yoo  is requesting a refill authorization.  Brief Assessment and Rationale for Refill:  Approve     Medication Therapy Plan:         Comments:     Note composed:12:19 PM 11/13/2023

## 2023-11-13 NOTE — TELEPHONE ENCOUNTER
No care due was identified.  Health [facility] Embedded Care Due Messages. Reference number: [id number].   11/13/2023 10:59:31 AM CST

## 2023-11-20 ENCOUNTER — OFFICE VISIT (OUTPATIENT)
Dept: ORTHOPEDICS | Facility: CLINIC | Age: 73
End: 2023-11-20
Payer: MEDICARE

## 2023-11-20 ENCOUNTER — HOSPITAL ENCOUNTER (OUTPATIENT)
Dept: RADIOLOGY | Facility: HOSPITAL | Age: 73
Discharge: HOME | End: 2023-11-20
Attending: ORTHOPAEDIC SURGERY
Payer: MEDICARE

## 2023-11-20 VITALS — BODY MASS INDEX: 39.55 KG/M2 | HEIGHT: 67 IN | WEIGHT: 252 LBS

## 2023-11-20 DIAGNOSIS — M48.061 SPINAL STENOSIS OF LUMBAR REGION WITH RADICULOPATHY: ICD-10-CM

## 2023-11-20 DIAGNOSIS — M17.12 ARTHRITIS OF KNEE, LEFT: Primary | ICD-10-CM

## 2023-11-20 DIAGNOSIS — M10.9 GOUT, UNSPECIFIED CAUSE, UNSPECIFIED CHRONICITY, UNSPECIFIED SITE: ICD-10-CM

## 2023-11-20 DIAGNOSIS — M17.12 ARTHRITIS OF KNEE, LEFT: ICD-10-CM

## 2023-11-20 DIAGNOSIS — M21.161 ACQUIRED VARUS DEFORMITY KNEE, RIGHT: ICD-10-CM

## 2023-11-20 DIAGNOSIS — M21.162 ACQUIRED VARUS DEFORMITY KNEE, LEFT: ICD-10-CM

## 2023-11-20 DIAGNOSIS — M54.17 LUMBOSACRAL RADICULOPATHY: ICD-10-CM

## 2023-11-20 DIAGNOSIS — I87.2 EDEMA OF BOTH LOWER EXTREMITIES DUE TO PERIPHERAL VENOUS INSUFFICIENCY: ICD-10-CM

## 2023-11-20 DIAGNOSIS — M17.11 ARTHRITIS OF KNEE, RIGHT: ICD-10-CM

## 2023-11-20 DIAGNOSIS — M54.16 SPINAL STENOSIS OF LUMBAR REGION WITH RADICULOPATHY: ICD-10-CM

## 2023-11-20 PROCEDURE — 99214 PR OFFICE/OUTPT VISIT, EST, LEVL IV, 30-39 MIN: ICD-10-PCS | Mod: S$PBB,,, | Performed by: ORTHOPAEDIC SURGERY

## 2023-11-20 PROCEDURE — 73564 X-RAY EXAM KNEE 4 OR MORE: CPT | Mod: 26,50,, | Performed by: RADIOLOGY

## 2023-11-20 PROCEDURE — 73564 X-RAY EXAM KNEE 4 OR MORE: CPT | Mod: TC,50

## 2023-11-20 PROCEDURE — 99214 OFFICE O/P EST MOD 30 MIN: CPT | Mod: S$PBB,,, | Performed by: ORTHOPAEDIC SURGERY

## 2023-11-20 PROCEDURE — 73564 XR KNEE ORTHO BILAT WITH FLEXION: ICD-10-PCS | Mod: 26,50,, | Performed by: RADIOLOGY

## 2023-11-20 PROCEDURE — 99999 PR PBB SHADOW E&M-EST. PATIENT-LVL IV: ICD-10-PCS | Mod: PBBFAC,,, | Performed by: ORTHOPAEDIC SURGERY

## 2023-11-20 PROCEDURE — 99214 OFFICE O/P EST MOD 30 MIN: CPT | Mod: PBBFAC | Performed by: ORTHOPAEDIC SURGERY

## 2023-11-20 PROCEDURE — 99999 PR PBB SHADOW E&M-EST. PATIENT-LVL IV: CPT | Mod: PBBFAC,,, | Performed by: ORTHOPAEDIC SURGERY

## 2023-11-20 NOTE — PROGRESS NOTES
Subjective:     Patient ID: Damon Yoo is a 73 y.o. male.    Chief Complaint: Pain of the Left Knee    HPI:  69-year-old diagnosed with bilateral knee arthrosis given injection of steroid in September placed on meloxicam which did not help then we changed him to Celebrex.  Celebrex seems to help.  Workup found out that he has old MRI from 2014 with neuroforaminal stenosis multilevel.  Workup with EMG-NCV recently ordered by me showed chronic L5-S1 radiculopathy bilaterally with peripheral poly neuropathy superimposed.  We sent him for NAHUN with pain management and that helped significantly that his pain is 2-3/10 down his legs and knees.  He still takes Celebrex daily with relief.  For the 1st time was able to work in the [employer] it without difficulty.  Not interested in having surgery at this point.    01/23/2020  Patient with bilateral knee severe arthrosis lumbosacral arthrosis.  The Celebrex seems to work really well for him.  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  He still states he is doing well at this point do not want a proceed with any surgical intervention.  His pain level on and off is 5/10.  Slight grinding and catching. Not using any assistive devices to ambulate    03/05/2020  Bilateral knee arthritis and lumbar radiculopathy with neuroforaminal stenosis multilevel.  States the epidural block still working and the Celebrex still helping.  She is remaining very active working in the [employer].  Does not think he needs any injections in the back or the knee at this point.  His pain level is 2/10 occasionally goes up and he takes his Celebrex.  He is ambulating independently without any assistive devices.  Denies loss of bowel bladder control    07/13/2020  Left knee is hurting quite a bit.  His back is not so much and the numbness and tingling in the legs a not bothering him.  He received last steroid injection in September 2019 and requesting a new 1.   Celebrex does help some but now the pain in the left knee is 8/10.  He wants to avoid surgical intervention.  He did receive E NAHUN in the back by pain management and he does not think he needs another 1.  He remains very active with exercise.  Denies any fever or chills or shortness of breath or difficulty chewing swallowing loss of bowel bladder control.  He is maintaining social distancing.    10/15/2020  Patient stated the injection given last visit in the knee seems to have helped significantly.  His pain level is 3/10.  The pain gets worse when he works in the [employer] his back will hurt as well as is knee.  Now he is taking Celebrex only as needed and managing.  He does not think he needs any injections today.  He is remaining active.  NAHUN given by pain management seems to have worked for him last time.  Denies any fever or chills or shortness of breath or difficulty with chewing or swallowing loss of bowel bladder control blurry vision double vision or loss of sense of smell or taste or chest pain or stomach issues or kidney issues    21/21  Patient numbness in the legs improved his knee arthritis is not as bad pain level around 5/10 on occasions.  He does take Celebrex only on occasions .  Sometimes he takes Tylenol.  He is maintaining his activity level.  His low back not hurting as much as used to either.  Occasionally gets left hip tenderness over the greater trochanter but not on a daily basis.  He does not think that he needs any injections today and doing well.  Denies any fever or chills or shortness of breath or difficulty with chewing or swallowing loss of bowel bladder control blurry vision double vision or loss sense smell or taste    07/22/2021  Patient is complaining of tightness around his ankles.  Does have history of hypertension takes numerous medications.  Also has have history of both knees hurting however he is running out of Celebrex.  He would like dark renewal.  He takes it on occasions.  He  has left knee is hurting him a little bit more than the right.  His back is not hurting him as much as he used to before.  He is ambulating without assistive devices.  His pain is roughly 7/10.  No fever no chills no shortness of breath or difficulty with chewing or swallowing loss of bowel bladder control blurry vision double vision loss sense smell or taste    01/24/2022  Bilateral knee severe arthritis bone-on-bone with severe varus deformity.  Also has swelling in his legs.  Also has evidence of spinal stenosis with radiculopathy.  We do give him the Celebrex and Lasix to take on as-needed basis.  He also has history of gout he takes allopurinol given to him by Alethea Finley who had left.  He is requesting future prescriptions so he does not have his gout act up on him.  As far as his knees are concerned he is doing okay by taking Celebrex occasionally and when his legs swell up he occasionally takes Lasix.  As far as the numbness down the leg he recently received this injection in his back which helped the numbness going down his right leg and he is very pleased.  He is ambulating without any assistive devices.  His pain level is 2/10 and he thinks he does not need any injections for his knees at this time.  No fever no chills no shortness of breath difficulty with chewing swallowing loss of bowel bladder control blurry vision double vision loss sense smell or taste    I did tell him he can discuss allopurinol with Dr. Mason    07/25/2022  Patient have radiculopathy but is not doing so bad with that and sees pain management occasionally gets injections in the spine  As far as his bilateral knee with severe arthritis he takes Celebrex only on as-needed basis.  He says maybe once or twice a week he takes it.  He maintains quite an active lifestyle.  He has pain in the left knee is worse than the right and usually is around 5/10.  Last injection was in 2020 in the left knee of Depo-Medrol.  He does not think he  needs any injections and he is doing well.  As far as his gout is concerned he is taking allopurinol.  Complain of slight discomfort in the ankles bilaterally fever some a little bit tight.  No fever no chills no shortness of breath or difficulty with chewing or swallowing loss of bowel bladder control blurry vision double vision loss sense smell or taste.  He is ambulating without any assistive devices    11/20/2023  Bilateral knee arthritis and spinal stenosis.  His pain is 6/10.  He is afraid about getting injections in his knees.  He says he is still taking Celebrex on occasions and then Tylenol as needed.  I went over the labs that he had because he has been on it for a long time I reviewed his labs from August 162023 that showed elevated creatinine to 2.5 and BUN to 3 7, his hemoglobin A1c was good at 6.1.  He does see Dr. Mario in Pain Management for his back he had 1 shot done before.  At this time he is managing without any assistive devices.  We went over his medication and I did tell him he really must stop taking the Celebrex it could be causing his kidney issues despite being diabetic.  I wrote it down on my card and I did tell him he needs to follow-up with Dr. sainz.  I did tell him in the future he would probably should need to proceed with maybe steroid and gel injections.  Also he might end up needing knee replacement if you can not live with what he has  No fever no chills no shortness of breath or difficulty with chewing swallowing  Past Medical History:   Diagnosis Date    Abnormal EKG 10/25/2013    Acute on chronic diastolic CHF (congestive heart failure) 11/27/2020    Arthritis     Back pain     CAD (coronary artery disease)     Cancer     Prostate [id number] prostate    Disorder of kidney and ureter     Glaucoma     Gout attack     Hyperlipidemia     Hypertension     Hypertrophic cardiomyopathy 10/25/2013    S/P CABG (coronary artery bypass graft) 10/25/2013     Past Surgical History:    Procedure Laterality Date    A-V CARDIAC PACEMAKER INSERTION Left 2020    Procedure: INSERTION, CARDIAC PACEMAKER, DUAL CHAMBER;  Surgeon: Elsy Kenyon MD;  Location: [facility];  Service: Cardiology;  Laterality: Left;  biotronik    COLONOSCOPY N/A 2017    Procedure: COLONOSCOPY;  Surgeon: Dina Ledesma MD;  Location: [facility];  Service: Endoscopy;  Laterality: N/A;    CORONARY ARTERY BYPASS GRAFT  05/2003    x1    PROSTATE SURGERY      RALP 2013    TRANSFORAMINAL EPIDURAL INJECTION OF STEROID Right 2019    Procedure: Right L5/S1+ S1 TF NAHUN with IV sedation;  Surgeon: Ermias Mario MD;  Location: [facility];  Service: Pain Management;  Laterality: Right;    TRANSFORAMINAL EPIDURAL INJECTION OF STEROID Right 2021    Procedure: right L5/S1 and S1 TF NAHUN;  Surgeon: Ermias Mario MD;  Location: [facility];  Service: Pain Management;  Laterality: Right;    TRANSFORAMINAL EPIDURAL INJECTION OF STEROID Right 2022    Procedure: Right L5/S1 + S1 TF NAHUN;  Surgeon: Ermias Mario MD;  Location: [facility];  Service: Pain Management;  Laterality: Right;     Family History   Problem Relation Age of Onset    No Known Problems Mother     Heart disease Father     Hypertension Father     No Known Problems Daughter     Strabismus Neg Hx     Retinal detachment Neg Hx     Macular degeneration Neg Hx     Glaucoma Neg Hx     Blindness Neg Hx     Amblyopia Neg Hx      Social History     Socioeconomic History    Marital status:    Tobacco Use    Smoking status: Former     Current packs/day: 0.00     Average packs/day: 0.3 packs/day for 15.0 years (3.8 ttl pk-yrs)     Types: Cigarettes     Start date: 1973     Quit date: 1988     Years since quittin.6    Smokeless tobacco: Never   Substance and Sexual Activity    Alcohol use: No    Drug use: No    Sexual activity: Not Currently   Social History Narrative    No pets or smokers in household.     Social  Determinants of Health     Financial Resource Strain: Low Risk  (3/15/2022)    Overall Financial Resource Strain (CARDIA)     Difficulty of Paying Living Expenses: Not very hard   Food Insecurity: Food Insecurity Present (3/15/2022)    Hunger Vital Sign     Worried About Running Out of Food in the Last Year: Sometimes true     Ran Out of Food in the Last Year: Never true   Transportation Needs: No Transportation Needs (3/15/2022)    PRAPARE - Transportation     Lack of Transportation (Medical): No     Lack of Transportation (Non-Medical): No   Physical Activity: Insufficiently Active (3/15/2022)    Exercise Vital Sign     Days of Exercise per Week: 2 days     Minutes of Exercise per Session: 10 min   Stress: No Stress Concern Present (3/15/2022)    [employer] [employer] - Occupational Stress Questionnaire     Feeling of Stress : Only a little   Social Connections: Moderately Isolated (3/15/2022)    Social Connection and Isolation Panel [NHANES]     Frequency of Communication with Friends and Family: Twice a week     Frequency of Social Gatherings with Friends and Family: Never     Attends Mu-ism Services: 1 to 4 times per year     Active Member of Clubs or Organizations: No     Attends Club or Organization Meetings: Never     Marital Status:    Housing Stability: Low Risk  (3/15/2022)    Housing Stability Vital Sign     Unable to Pay for Housing in the Last Year: No     Number of Places Lived in the Last Year: 1     Unstable Housing in the Last Year: No     Medication List with Changes/Refills   Current Medications    ACETAMINOPHEN (TYLENOL) 500 MG TABLET    Take 500 mg by mouth every 6 (six) hours as needed for Pain.    ALLOPURINOL (ZYLOPRIM) 300 MG TABLET    Take 1 tablet (300 mg total) by mouth once daily.    AMLODIPINE (NORVASC) 10 MG TABLET    Take 1 tablet (10 mg total) by mouth once daily.    ASPIRIN 81 MG CHEWABLE TABLET    Take 1 tablet by mouth Daily.    CELECOXIB (CELEBREX)  200 MG CAPSULE    Take 1 capsule (200 mg total) by mouth daily as needed for Pain. Take with food    CELECOXIB (CELEBREX) 200 MG CAPSULE    Take 1 capsule (200 mg total) by mouth once daily. Take with food    DAPAGLIFLOZIN PROPANEDIOL (FARXIGA) 5 MG TAB TABLET    Take 1 tablet (5 mg total) by mouth once daily.    FISH OIL-FAT ACID COMB.8- 1,200 MG (400 LO-302YO-775NM) CAP    Take 1 capsule by mouth once daily.     FUROSEMIDE (LASIX) 20 MG TABLET    Take 1 tablet (20 mg total) by mouth daily as needed (swelling).    LATANOPROST 0.005 % OPHTHALMIC SOLUTION    Place 1 drop into both eyes every evening.    LISINOPRIL (PRINIVIL,ZESTRIL) 40 MG TABLET    Take 1 tablet (40 mg total) by mouth once daily.    METOPROLOL SUCCINATE (TOPROL-XL) 50 MG 24 HR TABLET    Take 3 tablets (150 mg total) by mouth once daily.    MITIGARE 0.6 MG CAP    Take 1 capsule (0.6 mg total) by mouth 2 (two) times daily as needed (acute gout).    PANTOPRAZOLE (PROTONIX) 40 MG TABLET    Take 1 tablet (40 mg total) by mouth once daily. (replaces nexium)    PREGABALIN (LYRICA) 75 MG CAPSULE    Take 1 capsule (75 mg total) by mouth every evening.    ROSUVASTATIN (CRESTOR) 40 MG TAB    Take 1 tablet (40 mg total) by mouth every evening. Generic is fine.    TIMOLOL MALEATE 0.5% (TIMOPTIC) 0.5 % DROP    Place 1 drop into both eyes every morning.    TIZANIDINE (ZANAFLEX) 4 MG TABLET    Take 1 tablet (4 mg total) by mouth every 8 (eight) hours as needed (MUSCLE SPASMS).    TRAMADOL (ULTRAM) 50 MG TABLET    Take 1 tablet (50 mg total) by mouth every 12 (twelve) hours as needed for Pain.     Review of patient's allergies indicates:  No Known Allergies  Review of Systems   Constitutional: Negative for decreased appetite.   HENT:  Negative for tinnitus.    Eyes:  Negative for double vision.   Cardiovascular:  Negative for chest pain.   Respiratory:  Negative for wheezing.    Hematologic/Lymphatic: Negative for bleeding problem.   Skin:  Negative for dry  skin.   Musculoskeletal:  Positive for arthritis, back pain and joint pain. Negative for gout, neck pain and stiffness.   Gastrointestinal:  Negative for abdominal pain.   Genitourinary:  Negative for bladder incontinence.   Neurological:  Positive for paresthesias. Negative for numbness and sensory change.   Psychiatric/Behavioral:  Negative for altered mental status.        Objective:   Body mass index is 39.47 kg/m².  There were no vitals filed for this visit.       General    Constitutional: He is oriented to person, place, and time. He appears well-developed.   HENT:   Head: Atraumatic.   Eyes: EOM are normal.   Cardiovascular:  Normal rate.            Pulmonary/Chest: Effort normal.   Abdominal: Soft.   Neurological: He is alert and oriented to person, place, and time.   Psychiatric: Judgment normal.            Cervical spine with good range of motion and slight crepitus but no pain  Lumbar with pain from L3 down to L5 paraspinal.  No true deformity seen.  Bilateral upper extremity neurovascularly intact strength is 5/5 throughout radial and ulnar pulses 2+ sensory intact to touch  Pelvis is level  Bilateral hip range of motion within normal limits.  Hip flexors, abduct his adductors quads and hamstrings were 5/5.  Slight discomfort over the greater troch on the left but other  Bilateral knees with varus deformity.  Crepitus with motion on the patella and medially.  Tenderness to palpation over the patella and medial joint.  Range of motion 5-115 degrees on 07/25/2022 however repeat exam today is losing motion at 0 to 90° bilaterally on 11/20/2023.. Very mild swelling.  Left knee has severe pain on the medial side with mild swelling.  No defect in the patella or quadriceps tendon.  Noticed there is some decrease in range of motion compared to before Calves are soft nontender  Positive pitting edema up to mid tibia  Ankle motion intact  DP 1+  Decrease in sensation in his foot on the lateral aspect  Skin is  warm to touch  Patellar reflex 2+    EMG-NCV with chronic bilateral L5-S1 radiculopathy with superimposed peripheral polyneuropathy    Relevant imaging results reviewed and interpreted by me, discussed with the patient and / or family today   X-ray 11/20/2023 bilateral knees with complete loss of medial joint space and marginal osteophytic changes and severe varus deformity and consistent of tricompartmental arthritis bilateral knee  X-ray 07/25/2022 bilateral knees with complete loss of medial joint space and marginal osteophyte tricompartmental consistent with end-stage arthritis with varus deformity.  No fracture seen  X-ray and electronic records bilateral knees with complete loss of medial joint space and osteophytic changes consistent with end-stage arthrosis  MRI 2014 multilevel foraminal and central stenosis from L3 down to L5 and S1  Assessment:     Encounter Diagnoses   Name Primary?    Arthritis of knee, left Yes    Acquired varus deformity knee, left     Arthritis of knee, right     Acquired varus deformity knee, right     Edema of both lower extremities due to peripheral venous insufficiency     Lumbosacral radiculopathy     Spinal stenosis of lumbar region with radiculopathy     Gout, unspecified cause, unspecified chronicity, unspecified site         Plan:   Arthritis of knee, left    Acquired varus deformity knee, left    Arthritis of knee, right    Acquired varus deformity knee, right    Edema of both lower extremities due to peripheral venous insufficiency    Lumbosacral radiculopathy    Spinal stenosis of lumbar region with radiculopathy    Gout, unspecified cause, unspecified chronicity, unspecified site         Patient Instructions   I reviewed your labs from August 16, 2023 your creatinine is gone up to 2.5 and BUN 37, your hemoglobin A1c is 6.1 which is good   You must stop taking Celebrex it will continue messing up her kidneys  You only thing you can take is Tylenol 650 mg maximum 3 times a  day if needed   Your x-ray show severe arthritis both of her knees and you have bone-on-bone on the inside of her knee   You have severely limited bending you only could bend to 95°  If the knees hurt you probably should get a cortisone shot in you knees last time we gave you 1 was in 2020 in the left knee   If you hurting a lot please let us know will get you in for an injection of steroid.  You also could benefit from having injection of the gel which is rooster comb gel that we can give inside the knee to help with the pain  When everything fails you need your knees replaced  Will have you return in 3 months will repeat the blood work to make sure that your creatinine is going down   I would like you to drink a lot of fluids/water approximately 1 L of water every day      Disclaimer: This note was prepared using a voice recognition system and is likely to have sound alike errors within the text.

## 2023-11-20 NOTE — PATIENT INSTRUCTIONS
I reviewed your labs from August 16, 2023 your creatinine is gone up to 2.5 and BUN 37, your hemoglobin A1c is 6.1 which is good   You must stop taking Celebrex it will continue messing up her kidneys  You only thing you can take is Tylenol 650 mg maximum 3 times a day if needed   Your x-ray show severe arthritis both of her knees and you have bone-on-bone on the inside of her knee   You have severely limited bending you only could bend to 95°  If the knees hurt you probably should get a cortisone shot in you knees last time we gave you 1 was in 2020 in the left knee   If you hurting a lot please let us know will get you in for an injection of steroid.  You also could benefit from having injection of the gel which is rooster comb gel that we can give inside the knee to help with the pain  When everything fails you need your knees replaced  Will have you return in 3 months will repeat the blood work to make sure that your creatinine is going down   I would like you to drink a lot of fluids/water approximately 1 L of water every day

## 2023-11-28 RX ORDER — PANTOPRAZOLE SODIUM 40 MG/1
40 TABLET, DELAYED RELEASE ORAL DAILY
Qty: 90 TABLET | Refills: 3 | Status: SHIPPED | OUTPATIENT
Start: 2023-11-28 | End: 2024-11-22

## 2023-11-28 NOTE — TELEPHONE ENCOUNTER
Care Due:                  Date            Visit Type   Department     Provider  --------------------------------------------------------------------------------                                EP -                              PRIMARY      [facility] INTERNAL  Last Visit: 08-      CARE ([facility])   [facility]       Dick Mason                              EP -                              PRIMARY      [facility] INTERNAL  Next Visit: 02-      CARE ([facility])   [facility]       Dick Mason                                                            Last  Test          Frequency    Reason                     Performed    Due Date  --------------------------------------------------------------------------------    HBA1C.......  6 months...  dapagliflozin............  08- 02-    Health [facility] Embedded Care Due Messages. Reference number: [id number].   11/28/2023 1:14:32 PM CST

## 2023-11-28 NOTE — TELEPHONE ENCOUNTER
Provider Staff:  Action required for this patient    Requires labs      Please see care gap opportunities below in Care Due Message.    Thanks!  [facility]     Appointments      Date Provider   Last Visit   8/21/2023 Dick Mason MD   Next Visit   2/21/2024 Dick Mason MD     Refill Decision Note   Damon Yoo  is requesting a refill authorization.  Brief Assessment and Rationale for Refill:  Approve     Medication Therapy Plan:         Comments:     Note composed:5:43 PM 11/28/2023             Appointments     Last Visit   8/21/2023 Dick Mason MD   Next Visit   2/21/2024 Dick Mason MD

## 2023-11-30 ENCOUNTER — EXTERNAL CHRONIC CARE MANAGEMENT (OUTPATIENT)
Dept: PRIMARY CARE CLINIC | Facility: CLINIC | Age: 73
End: 2023-11-30
Payer: MEDICARE

## 2023-11-30 PROCEDURE — 99490 CHRNC CARE MGMT STAFF 1ST 20: CPT | Mod: S$PBB,,, | Performed by: PEDIATRICS

## 2023-11-30 PROCEDURE — 99490 CHRNC CARE MGMT STAFF 1ST 20: CPT | Mod: PBBFAC | Performed by: PEDIATRICS

## 2023-11-30 PROCEDURE — 99490 PR CHRONIC CARE MGMT, 1ST 20 MIN: ICD-10-PCS | Mod: S$PBB,,, | Performed by: PEDIATRICS

## 2023-12-05 ENCOUNTER — OFFICE VISIT (OUTPATIENT)
Dept: PAIN MEDICINE | Facility: CLINIC | Age: 73
End: 2023-12-05
Payer: MEDICARE

## 2023-12-05 VITALS
HEIGHT: 67 IN | SYSTOLIC BLOOD PRESSURE: 151 MMHG | BODY MASS INDEX: 39.48 KG/M2 | HEART RATE: 61 BPM | DIASTOLIC BLOOD PRESSURE: 81 MMHG | WEIGHT: 251.56 LBS

## 2023-12-05 DIAGNOSIS — M54.16 RIGHT LUMBAR RADICULOPATHY: Primary | ICD-10-CM

## 2023-12-05 DIAGNOSIS — M79.18 LUMBAR MUSCLE PAIN: ICD-10-CM

## 2023-12-05 DIAGNOSIS — M48.061 DEGENERATIVE LUMBAR SPINAL STENOSIS: ICD-10-CM

## 2023-12-05 PROCEDURE — 99999 PR PBB SHADOW E&M-EST. PATIENT-LVL III: ICD-10-PCS | Mod: PBBFAC,,, | Performed by: PHYSICIAN ASSISTANT

## 2023-12-05 PROCEDURE — 99213 OFFICE O/P EST LOW 20 MIN: CPT | Mod: PBBFAC | Performed by: PHYSICIAN ASSISTANT

## 2023-12-05 PROCEDURE — 99999 PR PBB SHADOW E&M-EST. PATIENT-LVL III: CPT | Mod: PBBFAC,,, | Performed by: PHYSICIAN ASSISTANT

## 2023-12-05 PROCEDURE — 99214 PR OFFICE/OUTPT VISIT, EST, LEVL IV, 30-39 MIN: ICD-10-PCS | Mod: S$PBB,,, | Performed by: PHYSICIAN ASSISTANT

## 2023-12-05 PROCEDURE — 99214 OFFICE O/P EST MOD 30 MIN: CPT | Mod: S$PBB,,, | Performed by: PHYSICIAN ASSISTANT

## 2023-12-05 RX ORDER — PREGABALIN 75 MG/1
75 CAPSULE ORAL 2 TIMES DAILY
Qty: 180 CAPSULE | Refills: 0 | Status: SHIPPED | OUTPATIENT
Start: 2023-12-05

## 2023-12-05 NOTE — PROGRESS NOTES
Established Patient Chronic Pain Note (Follow up visit)    Chief Complaint:   Chief Complaint   Patient presents with    Low-back Pain    Leg Pain   + RLE pain - mostly over shin into foot    SUBJECTIVE:    Interval History (12/5/2023):  Patient presents today for follow-up visit.  Patient was last seen almost 2 years ago.  He called at the end of September due to returning pain, which was about 2 months ago.  Patient reports pain as 6/10 today.  He continues taking Lyrica once a day.    Interval History (2/10/2022): Damon Yoo presents today for follow-up visit.  he underwent right L5/S1 + S1 TF NAHUN on 1/11/22.  The patient reports that he is/was better following the procedure.  he reports 90% pain relief.  The changes lasted 4 weeks so far.  The changes have continued through this visit.  Patient reports pain as 3/10 today.  Last visit, he was started on Lyrica which she is taking once per day with some pain relief.  He also takes Zanaflex and Celebrex as needed.    Interval History (12/22/2021): Damon Yoo presents today for follow-up visit.  Patient was seen on 11/24/21. At that time he underwent right L5/S1 + S1 TF NAHUN.  The patient reports that he is/was better following the procedure.  he reports 100% pain relief.  The changes lasted 2 weeks.  The changes have NOT continued through this visit.  Patient reports pain as 5/10 today.    Interval HPI (11/10/2021):  Damon Yoo is a 71 y.o. male who presents to the clinic for a follow-up appointment for lower back and right radicular pain.  He was last seen on 12/12/2019.  Since the last visit, Damon Yoo states the pain has been starting to return is near its baseline. Current pain intensity is 8/10.  He locates the pain to the right lower extremity extending in an L5 and S1 distribution.  Patient denies night fever/night sweats, urinary incontinence, bowel incontinence, significant weight loss, significant motor weakness and loss of  sensations.    Interval HPI 12/12/2019:  Damon Yoo is a 71 y.o. male who presents to the clinic for a follow-up appointment for lower back and right radicular pain. Patient underwent right-sided L5/S1 and S1 transforaminal epidural steroid injection on 11/11/2019.  He reports that he has received 75% relief from the injection.  Since the last visit, Damon Yoo states the pain has been improving. Current pain intensity is 2/10.  He is overall pleased with the results of the injection.    Initial HPI 10/31/2019:   Damon Yoo is a 69 y.o. male who presents to the clinic for the evaluation of lower back pain. He was referred by orthopedics for presumed lumbar radiculopathy. The pain started several years ago without any injury or trauma and symptoms have been worsening.The pain is located in the lower lumbar area and radiates to the right lower extremity.  The pain is described as aching and numbing and is rated as 5/10. The pain is rated with a score of  3/10 on the BEST day and a score of 9/10 on the WORST day.  Symptoms interfere with daily activity. The pain is exacerbated by Walking.  The pain is mitigated by nothing. The patient reports spending 2-4 hours per day reclining. The patient reports 6-8 hours of uninterrupted sleep per night. Of note, patient has a h/o prostate cancer s/p robotic prostatectomy, PSA has been undetectable.      Pain Disability Index (PDI) Score Review:      12/5/2023     9:06 AM 2/10/2022     8:12 AM 12/22/2021     7:50 AM   Last 3 PDI Scores   Pain Disability Index (PDI) 36 21 32       Non-Pharmacologic Treatments:  Physical Therapy/Home Exercise: yes  Ice/Heat:yes  TENS: no  Acupuncture: no  Massage: no  Chiropractic: no    Other: no     Pain Medications:  - Adjuvant Medications: Zanaflex ( Tizanidine) and Celebrex, Voltaren gel, Tylenol  - Anti-Coagulants: Aspirin      Pain Procedures:   - previous lumbar emmy in 2006 with significant relief  - right L5/S1 + S1  transforaminal epidural steroid injection on 11/11/2019 - 75% relief for 2 years  - right L5/S1 + S1 TF NAHUN on 11/24/21 with 100% pain relief x 2 weeks  - right L5/S1 + S1 TF NAHUN on 1/11/22 with 90% pain relief - after 2nd in series - pain relief lasted almost 2 years      Imaging (Reviewed on 12/5/2023):   Bilateral lower extremities EMG/NCS 10/8/19  There is electrophysiologic evidence consistent with a chronic bilateral L5-S1 radiculopathies with superimposed peripheral polyneuropathy.      MRI Lumbar Spine 9/22/14  Compared to prior MRI December 2006.  Vertebral alignment remains normal.  Again noted is desiccation of all the lumbar intervertebral disks with marked loss of disk height at L3-4, L4-5 and L5-S1.  The conus ends at the level of L1.  There are discogenic changes in the endplates of L4-5 and L5-S1.  No compression fractures or acute osseous abnormalities are identified.  Schmorl's nodes present in the endplates of L4-5.  Axial images are acquired through the disk spaces from T12-L1 through L5-S1.  The T12 L1 disk space demonstrates mild facet hypertrophy and minimal disk bulge without significant canal or foraminal stenosis.  No significant abnormality L1-2.  At L2-3 there is facet and ligament flavum hypertrophy and diffuse posterior bulging of the disk which is creating mild to moderate canal and bilateral foraminal stenosis.  At L3-4 there is facet arthropathy and diffuse posterior disk herniation creating severe canal and bilateral foraminal stenosis.  At L4-5 facet and ligamentum flavum hypertrophy and diffuse posterior disk herniation creating severe canal and bilateral foraminal stenosis.  At L5-S1 a central disk herniation broad-based posterior bulging of the disk with severe canal and bilateral foraminal stenosis.     XR Lumbar Spine 8/4/14  There is anatomic spinal alignment.  Moderate to severe disk space narrowing with associated vacuum phenomenon, endplate spurring, and facet arthropathy  "at L4-5 and L5-S1.  Mild disk narrowing at L3-4.  Pedicles are intact.  No compression           REVIEW OF SYSTEMS:  GENERAL:  No weight loss, malaise or fevers.  HEENT:   No recent changes in vision or hearing  NECK:  Negative for lumps, no difficulty with swallowing.  RESPIRATORY:  Negative for cough, wheezing or shortness of breath, patient denies any recent URI.  CARDIOVASCULAR:  Negative for chest pain, leg swelling or palpitations.  GI:  Negative for abdominal discomfort, blood in stools or black stools or change in bowel habits.  MUSCULOSKELETAL:  See HPI.  SKIN:  Negative for lesions, rash, and itching.  PSYCH:  No mood disorder or recent psychosocial stressors.  Patients sleep is not disturbed secondary to pain.  HEMATOLOGY/LYMPHOLOGY:  Negative for prolonged bleeding, bruising easily or swollen nodes.  Patient is currently taking anti-coagulants - ASA  NEURO:   No history of headaches, syncope, paralysis, seizures or tremors.  All other reviewed and negative other than HPI.      OBJECTIVE:    Physical Exam:  Vitals:    12/05/23 0905   BP: (!) 151/81   Pulse: 61   Weight: 114.1 kg (251 lb 8.7 oz)   Height: 5' 7" (1.702 m)   PainSc:   6   PainLoc: Leg    Body mass index is 39.4 kg/m².   (reviewed on 12/5/2023)    GENERAL: Well appearing, in no acute distress, alert and oriented x3.  PSYCH:  Mood and affect appropriate.  SKIN: Skin color, texture, turgor normal, no rashes or lesions.  HEAD/FACE:  Normocephalic, atraumatic. Cranial nerves grossly intact.  PULM: No evidence of respiratory difficulty, symmetric chest rise.  GI: no obvious distention.   BACK: SLR is positive to radicular pain on the right. No TTP over the facet joints of the lumbar spine or spinous processes.  Limited ROM secondary to pain reproduction   EXTREMITIES: Peripheral joint ROM is full and pain free without obvious instability or laxity in all four extremities. No deformities, edema, or skin discoloration.   MUSCULOSKELETAL:  Hip, and " knee provocative maneuvers are negative.  There is no pain with palpation over the sacroiliac joints bilaterally.  FABERs test is negative.  FADIRs test is negative.   BUE & BLE strength is normal and symmetric.  No atrophy or tone abnormalities are noted.  NEURO: BUE & BLE coordination and muscle stretch reflexes are physiologic and symmetric.  Plantar response are downgoing.  No loss of sensation is noted.  GAIT:  Slow, antalgic.           ASSESSMENT: 73 y.o. year old male with low back and radicular pain, consistent with     1. Right lumbar radiculopathy  Case Request-RAD/Other Procedure Area: right L5/S1 + S1 TF NAHUN (1st)    Case Request-RAD/Other Procedure Area: right L5/S1 + S1 TF NAHUN (2nd - pt will call to cancel if doesn't need 2nd in series)      2. Degenerative lumbar spinal stenosis  pregabalin (LYRICA) 75 MG capsule      3. Lumbar muscle pain                PLAN:   1. Interventional:   - Schedule right L5/S1 + S1 TF NAHUN. Patient is not taking prescription blood thinners or ASA.   - Schedule 2nd in series (if needed) 3-4 weeks after 1st NAHUN:  right L5/S1 + S1 TF NAHUN. Patient will call to cancel if not needed.  - S/p right L5/S1 + S1 TF NAHUN on 1/11/22 with 90% pain relief - after 2nd in series.  - S/p right L5/S1 + S1 TF NAHUN on 11/24/21 with 100% pain relief x 2 weeks.   - He previously had right L5/S1 + S1 transforaminal epidural steroid injection on 11/11/2019 - 75% relief for 2 years    2. Pharmacologic:   - Increase Lyrica 75mg QD to Lyrica (pregabalin) 75mg BID x 90 day supply.   - D/c Zanaflex 4mg TID PRN (30 tablets) - no longer taking.  - Continue Celebrex 200mg QD PRN pain - from Orthopedics.  - Anticoagulation use: ASA - 2° prevention (h/o stroke).     3. Rehabilitative: Encouraged regular exercise. Continue exercises and activities as tolerated.     4. Diagnostic/ Imaging: No new imaging ordered. Previous imaging reviewed.     5. Follow up:  4 weeks post-procedure     - Patient Questions:  Answered all of the patient's questions regarding diagnosis, therapy, and treatment.    - This condition does not require this patient to take time off of work, and the primary goal of our Pain Management services is to improve the patient's functional capacity.   - I discussed the risks, benefits, and alternatives to potential treatment options. All questions and concerns were fully addressed today in clinic.         Rosemarie Cardoso PA-C  Interventional Pain Management - [facility]    Disclaimer:  This note was prepared using voice recognition system and is likely to have sound alike errors that may have been overlooked even after proof reading.  Please call me with any questions.

## 2023-12-14 ENCOUNTER — OFFICE VISIT (OUTPATIENT)
Dept: OPHTHALMOLOGY | Facility: CLINIC | Age: 73
End: 2023-12-14
Payer: MEDICARE

## 2023-12-14 ENCOUNTER — CLINICAL SUPPORT (OUTPATIENT)
Dept: CARDIOLOGY | Facility: HOSPITAL | Age: 73
End: 2023-12-14
Payer: MEDICARE

## 2023-12-14 DIAGNOSIS — H25.13 NUCLEAR SENILE CATARACT OF BOTH EYES: ICD-10-CM

## 2023-12-14 DIAGNOSIS — I44.2 ATRIOVENTRICULAR BLOCK, COMPLETE: ICD-10-CM

## 2023-12-14 DIAGNOSIS — Z95.0 PRESENCE OF CARDIAC PACEMAKER: ICD-10-CM

## 2023-12-14 DIAGNOSIS — H40.1132 PRIMARY OPEN ANGLE GLAUCOMA OF BOTH EYES, MODERATE STAGE: Primary | ICD-10-CM

## 2023-12-14 PROCEDURE — 99214 PR OFFICE/OUTPT VISIT, EST, LEVL IV, 30-39 MIN: ICD-10-PCS | Mod: S$PBB,,, | Performed by: OPHTHALMOLOGY

## 2023-12-14 PROCEDURE — 99999 PR PBB SHADOW E&M-EST. PATIENT-LVL III: CPT | Mod: PBBFAC,,, | Performed by: OPHTHALMOLOGY

## 2023-12-14 PROCEDURE — 99214 OFFICE O/P EST MOD 30 MIN: CPT | Mod: S$PBB,,, | Performed by: OPHTHALMOLOGY

## 2023-12-14 PROCEDURE — 99213 OFFICE O/P EST LOW 20 MIN: CPT | Mod: PBBFAC | Performed by: OPHTHALMOLOGY

## 2023-12-14 PROCEDURE — 93294 REM INTERROG EVL PM/LDLS PM: CPT | Mod: ,,, | Performed by: INTERNAL MEDICINE

## 2023-12-14 PROCEDURE — 99999 PR PBB SHADOW E&M-EST. PATIENT-LVL III: ICD-10-PCS | Mod: PBBFAC,,, | Performed by: OPHTHALMOLOGY

## 2023-12-14 PROCEDURE — 93294 CARDIAC DEVICE CHECK - REMOTE: ICD-10-PCS | Mod: ,,, | Performed by: INTERNAL MEDICINE

## 2023-12-14 NOTE — PROGRESS NOTES
SUBJECTIVE  Damon Yoo is 73 y.o. male  Uncorrected distance visual acuity was 20/25 -1 in the right eye and 20/30 -1 in the left eye.   Chief Complaint   Patient presents with    Glaucoma     4 Mo IOP check: Pt states using drops as directed. States no pain or irritation today.           HPI     Glaucoma     Additional comments: 4 Mo IOP check: Pt states using drops as directed.   States no pain or irritation today.            Comments    1. Mod COAG- No FHx (init 32/27 on 3/10 with C/D .65/.60) Goal <18, new   goal = 15 6/2017  SLT OD 12/11/14 (20-16)  SLT OS 2/25/15 (20-16)  2. Mild NSC  3. LLL Cyst Excision on 6/13/12    Latanoprost QHS OU  Timolol qam OU             Last edited by Jacque Liang on 12/14/2023  8:48 AM.         Assessment /Plan :  1. Primary open angle glaucoma of both eyes, moderate stage Doing well, intraocular pressure (IOP) within acceptable range relative to target IOP and no evidence of progression. Continue current treatment. Reviewed importance of continued compliance with treatment and follow up.      Patient instructed to continue using the following glaucoma medication as follows:  Latanoprost one drop in each eye nightly and Timolol one drop both eyes daily    Return to clinic in 4 months  or as needed.  With Dilation, GOCT and HVF 24-2     2. Nuclear senile cataract of both eyes - monitor for now

## 2023-12-18 NOTE — PRE-PROCEDURE INSTRUCTIONS
Spoke with patient regarding procedure scheduled on 12.29     Arrival time 0700     Has patient been sick with fever or on antibiotics within the last 7 days? No     Does the patient have any open wounds, sores or rashes? No     Does the patient have any recent fractures? no     Has patient received a vaccination within the last 7 days? No     Received the COVID vaccination? yes     Has the patient stopped all medications as directed? na     Does patient have a pacemaker, defibrillator, or implantable stimulator? PACEMAKER     Does the patient have a ride to and from procedure and someone reliable to remain with patient?  WIFE     Is the patient diabetic? no     Does the patient have sleep apnea? Or use O2 at home? No and no     Is the patient receiving sedation? yes     Is the patient instructed to remain NPO beginning at midnight the night before their procedure? yes     Procedure location confirmed with patient? Yes     Covid- Denies signs/symptoms. Instructed to notify PAT/MD if any changes.

## 2023-12-29 ENCOUNTER — HOSPITAL ENCOUNTER (OUTPATIENT)
Facility: HOSPITAL | Age: 73
Discharge: HOME | End: 2023-12-29
Attending: PHYSICAL MEDICINE & REHABILITATION | Admitting: PHYSICAL MEDICINE & REHABILITATION
Payer: MEDICARE

## 2023-12-29 VITALS
DIASTOLIC BLOOD PRESSURE: 64 MMHG | TEMPERATURE: 98 F | HEART RATE: 60 BPM | WEIGHT: 253.06 LBS | OXYGEN SATURATION: 97 % | RESPIRATION RATE: 16 BRPM | HEIGHT: 67 IN | BODY MASS INDEX: 39.72 KG/M2 | SYSTOLIC BLOOD PRESSURE: 130 MMHG

## 2023-12-29 PROCEDURE — 63600175 PHARM REV CODE 636 W HCPCS: Performed by: ANESTHESIOLOGY

## 2023-12-29 PROCEDURE — 64483 NJX AA&/STRD TFRM EPI L/S 1: CPT | Mod: RT,,, | Performed by: ANESTHESIOLOGY

## 2023-12-29 PROCEDURE — 64483 NJX AA&/STRD TFRM EPI L/S 1: CPT | Mod: RT | Performed by: ANESTHESIOLOGY

## 2023-12-29 PROCEDURE — 25500020 PHARM REV CODE 255: Performed by: ANESTHESIOLOGY

## 2023-12-29 PROCEDURE — 25000003 PHARM REV CODE 250: Performed by: ANESTHESIOLOGY

## 2023-12-29 PROCEDURE — 64484 NJX AA&/STRD TFRM EPI L/S EA: CPT | Mod: RT | Performed by: ANESTHESIOLOGY

## 2023-12-29 PROCEDURE — 64484 NJX AA&/STRD TFRM EPI L/S EA: CPT | Mod: RT,,, | Performed by: ANESTHESIOLOGY

## 2023-12-29 RX ORDER — DEXAMETHASONE SODIUM PHOSPHATE 10 MG/ML
INJECTION INTRAMUSCULAR; INTRAVENOUS
Status: DISCONTINUED | OUTPATIENT
Start: 2023-12-29 | End: 2023-12-29 | Stop reason: HOSPADM

## 2023-12-29 RX ORDER — INDOMETHACIN 25 MG/1
CAPSULE ORAL
Status: DISCONTINUED | OUTPATIENT
Start: 2023-12-29 | End: 2023-12-29 | Stop reason: HOSPADM

## 2023-12-29 RX ORDER — FENTANYL CITRATE 50 UG/ML
INJECTION, SOLUTION INTRAMUSCULAR; INTRAVENOUS
Status: DISCONTINUED | OUTPATIENT
Start: 2023-12-29 | End: 2023-12-29 | Stop reason: HOSPADM

## 2023-12-29 RX ORDER — BUPIVACAINE HYDROCHLORIDE 2.5 MG/ML
INJECTION, SOLUTION EPIDURAL; INFILTRATION; INTRACAUDAL
Status: DISCONTINUED | OUTPATIENT
Start: 2023-12-29 | End: 2023-12-29 | Stop reason: HOSPADM

## 2023-12-29 RX ORDER — MIDAZOLAM HYDROCHLORIDE 1 MG/ML
INJECTION, SOLUTION INTRAMUSCULAR; INTRAVENOUS
Status: DISCONTINUED | OUTPATIENT
Start: 2023-12-29 | End: 2023-12-29 | Stop reason: HOSPADM

## 2023-12-29 NOTE — DISCHARGE INSTRUCTIONS

## 2023-12-29 NOTE — DISCHARGE SUMMARY
Discharge Note  Short Stay      SUMMARY     Admit Date: 12/29/2023    Attending Physician: Deyanira Parsons MD        Discharge Physician: Deyanira Parsons MD        Discharge Date: 12/29/2023 7:37 AM    Procedure(s) (LRB):  right L5/S1 + S1 TF NAHUN (1st) (Right)    Final Diagnosis: Right lumbar radiculopathy [M54.16]    Disposition: Home or self care    Patient Instructions:   Current Discharge Medication List        CONTINUE these medications which have NOT CHANGED    Details   acetaminophen (TYLENOL) 500 MG tablet Take 500 mg by mouth every 6 (six) hours as needed for Pain.      allopurinoL (ZYLOPRIM) 300 MG tablet Take 1 tablet (300 mg total) by mouth once daily.  Qty: 90 tablet, Refills: 4    Associated Diagnoses: Gout, unspecified cause, unspecified chronicity, unspecified site      amLODIPine (NORVASC) 10 MG tablet Take 1 tablet (10 mg total) by mouth once daily.  Qty: 90 tablet, Refills: 3    Comments: .  Associated Diagnoses: Essential hypertension; Coronary artery disease involving coronary bypass graft of native heart without angina pectoris      aspirin 81 MG chewable tablet Take 1 tablet by mouth Daily.      celecoxib (CELEBREX) 200 MG capsule Take 1 capsule (200 mg total) by mouth once daily. Take with food  Qty: 90 capsule, Refills: 2      dapagliflozin propanediol (FARXIGA) 5 mg Tab tablet Take 1 tablet (5 mg total) by mouth once daily.  Qty: 90 tablet, Refills: 3    Associated Diagnoses: Coronary artery disease involving native coronary artery of native heart without angina pectoris; Stage 3b chronic kidney disease; Prediabetes      fish oil-fat acid [id number] 1,200 mg ([id number]) Cap Take 1 capsule by mouth once daily.       furosemide (LASIX) 20 MG tablet Take 1 tablet (20 mg total) by mouth daily as needed (swelling).  Qty: 30 tablet, Refills: 11    Associated Diagnoses: Leg swelling      latanoprost 0.005 % ophthalmic solution Place 1 drop into both eyes every evening.  Qty: 7.5 mL,  Refills: 4    Comments: Please dispense a 90 day supply. Thanks.  Associated Diagnoses: Primary open angle glaucoma of both eyes, moderate stage      lisinopriL (PRINIVIL,ZESTRIL) 40 MG tablet Take 1 tablet (40 mg total) by mouth once daily.  Qty: 90 tablet, Refills: 3    Comments: .  Associated Diagnoses: Essential hypertension      metoprolol succinate (TOPROL-XL) 50 MG 24 hr tablet Take 3 tablets (150 mg total) by mouth once daily.  Qty: 90 tablet, Refills: 11    Comments: .  Associated Diagnoses: NSVT (nonsustained ventricular tachycardia); SVT (supraventricular tachycardia)      MITIGARE 0.6 mg Cap Take 1 capsule (0.6 mg total) by mouth 2 (two) times daily as needed (acute gout).  Qty: 30 capsule, Refills: 1    Associated Diagnoses: Chronic gout due to renal impairment of multiple sites without tophus      pantoprazole (PROTONIX) 40 MG tablet Take 1 tablet (40 mg total) by mouth once daily. (replaces nexium)  Qty: 90 tablet, Refills: 3    Comments: This REPLACES Esomeprazole rx.      pregabalin (LYRICA) 75 MG capsule Take 1 capsule (75 mg total) by mouth 2 (two) times daily.  Qty: 180 capsule, Refills: 0    Associated Diagnoses: Degenerative lumbar spinal stenosis      rosuvastatin (CRESTOR) 40 MG Tab Take 1 tablet (40 mg total) by mouth every evening. Generic is fine.  Qty: 90 tablet, Refills: 3    Associated Diagnoses: Hyperlipidemia, unspecified hyperlipidemia type; Coronary artery disease involving coronary bypass graft of native heart without angina pectoris      timolol maleate 0.5% (TIMOPTIC) 0.5 % Drop Place 1 drop into both eyes every morning.  Qty: 10 mL, Refills: 4    Comments: Please dispense a 90 day supply. Thanks.  Associated Diagnoses: Primary open angle glaucoma of both eyes, moderate stage      traMADoL (ULTRAM) 50 mg tablet Take 1 tablet (50 mg total) by mouth every 12 (twelve) hours as needed for Pain.  Qty: 14 tablet, Refills: 0    Comments: Quantity prescribed more than 7 day supply? No                  Discharge Diagnosis: Right lumbar radiculopathy [M54.16]  Condition on Discharge: Stable with no complications to procedure   Diet on Discharge: Same as before.  Activity: as per instruction sheet.  Discharge to: Home with a responsible adult.  Follow up: 2-4 weeks       Please call the office at [phone] if you experience any weakness or loss of sensation, fever > 101.5, pain uncontrolled with oral medications, persistent nausea/vomiting/or diarrhea, redness or drainage from the incisions, or any other worrisome concerns. If physician on call was not reached or could not communicate with our office for any reason please go to the nearest emergency department

## 2023-12-29 NOTE — OP NOTE
Damon Yoo  73 y.o. male      Vitals:    12/29/23 0735   BP: 129/67   Pulse: 62   Resp: 17   Temp:      Procedure Date: 12/29/2023        INFORMED CONSENT: The procedure, risks, benefits and options were discussed with patient. There are no contraindications to the procedure. The patient expressed understanding and agreed to proceed. The personnel performing the procedure was discussed. I verify that I personally obtained consent prior to the start of the procedure and the signed consent can be found on the patient's chart.       Anesthesia:   Conscious sedation provided by M.D    The patient was monitored with continuous pulse oximetry, EKG, and intermittent blood pressure monitors.  The patient was hemodynamically stable throughout the entire process was responsive to voice, and breathing spontaneously.  Supplemental O2 was provided at 2L/min via nasal cannula.  Patient was comfortable for the duration of the procedure. (See nurse documentation and case log for sedation time)    There was a total of 2mg IV Midazolam and 50mcg Fentanyl titrated for the procedure    Pre Procedure diagnosis: Right lumbar radiculopathy [M54.16]  Post-Procedure diagnosis: SAME     Complications: None    Specimens: None      DESCRIPTION OF PROCEDURE: The patient was brought to the procedure room. IV access was obtained prior to the procedure. The patient was positioned prone on the fluoroscopy table. Continuous hemodynamic monitoring was initiated including blood pressure, EKG, and pulse oximetry. . The skin was prepped with chlorhexidine and draped in a sterile fashion.      The  L5/S1 and  S1 transforaminal spaces were identified with fluoroscopy in the  AP, oblique, and lateral views.  A 22 gauge spinal quinke needle was then advanced into the area of the trans foraminal spaces right with confirmation of proper needle position using AP, oblique, and lateral fluoroscopic views. Once the needle tip was in the area of the  transforaminal space, and there was no blood, CSF or paraesthesias,  1.5 mL of Omnipaque 300mg/ml was injected on right for a total of 3mL.  Fluoroscopic imaging in the AP and lateral views revealed a clear outline of the spinal nerve with proximal spread of agent through the neural foramen into the epidural space. A total combination of 2mL of Bupivacaine and 10 mg dexamethasone was injected on each side and at each level with displacement of the contrast dye confirming that the medication went into the area of the transforaminal spaces right. A sterile bandaid was applied.   Patient tolerated the procedure well.    Patient was taken back to the recovery room for further observation.     The patient was discharged to home in stable condition

## 2023-12-31 ENCOUNTER — EXTERNAL CHRONIC CARE MANAGEMENT (OUTPATIENT)
Dept: PRIMARY CARE CLINIC | Facility: CLINIC | Age: 73
End: 2023-12-31
Payer: MEDICARE

## 2023-12-31 PROCEDURE — 99490 CHRNC CARE MGMT STAFF 1ST 20: CPT | Mod: PBBFAC | Performed by: PEDIATRICS

## 2023-12-31 PROCEDURE — 99490 CHRNC CARE MGMT STAFF 1ST 20: CPT | Mod: S$PBB,,, | Performed by: PEDIATRICS

## 2024-01-22 NOTE — H&P
HPI  Patient presenting for Procedure(s) (LRB):  right L5/S1 + S1 TF NAHUN (1st) (Right)     Patient on Anti-coagulation No    No health changes since previous encounter    Past Medical History:   Diagnosis Date    Abnormal EKG 10/25/2013    Acute on chronic diastolic CHF (congestive heart failure) 11/27/2020    Arthritis     Back pain     CAD (coronary artery disease)     Cancer     Prostate [id number] prostate    Disorder of kidney and ureter     Glaucoma     Gout attack     Hyperlipidemia     Hypertension     Hypertrophic cardiomyopathy 10/25/2013    S/P CABG (coronary artery bypass graft) 10/25/2013     Past Surgical History:   Procedure Laterality Date    A-V CARDIAC PACEMAKER INSERTION Left 11/30/2020    Procedure: INSERTION, CARDIAC PACEMAKER, DUAL CHAMBER;  Surgeon: Elsy Kenyon MD;  Location: [facility] CATH LAB;  Service: Cardiology;  Laterality: Left;  biotronik    COLONOSCOPY N/A 12/04/2017    Procedure: COLONOSCOPY;  Surgeon: Dina Ledesma MD;  Location: [facility] ENDO;  Service: Endoscopy;  Laterality: N/A;    CORONARY ARTERY BYPASS GRAFT  05/2003    x1    PROSTATE SURGERY      RALP 2013    TRANSFORAMINAL EPIDURAL INJECTION OF STEROID Right 11/11/2019    Procedure: Right L5/S1+ S1 TF NAHUN with IV sedation;  Surgeon: Ermias Mario MD;  Location: [facility] PAIN MGT;  Service: Pain Management;  Laterality: Right;    TRANSFORAMINAL EPIDURAL INJECTION OF STEROID Right 11/24/2021    Procedure: right L5/S1 and S1 TF NAHUN;  Surgeon: Ermias Mario MD;  Location: [facility] PAIN MGT;  Service: Pain Management;  Laterality: Right;    TRANSFORAMINAL EPIDURAL INJECTION OF STEROID Right 01/11/2022    Procedure: Right L5/S1 + S1 TF NAHUN;  Surgeon: Ermias Mario MD;  Location: [facility] PAIN [facility]GT;  Service: Pain Management;  Laterality: Right;    TRANSFORAMINAL EPIDURAL INJECTION OF STEROID Right 12/29/2023    Procedure: right L5/S1 + S1 TF NAHUN (1st);  Surgeon: Deyanira Parsons MD;  Location: [facility] PAIN MGT;  Service: Pain Management;   Laterality: Right;     Review of patient's allergies indicates:  No Known Allergies     No current facility-administered medications on file prior to encounter.     Current Outpatient Medications on File Prior to Encounter   Medication Sig Dispense Refill    acetaminophen (TYLENOL) 500 MG tablet Take 500 mg by mouth every 6 (six) hours as needed for Pain.      allopurinoL (ZYLOPRIM) 300 MG tablet Take 1 tablet (300 mg total) by mouth once daily. 90 tablet 4    amLODIPine (NORVASC) 10 MG tablet Take 1 tablet (10 mg total) by mouth once daily. 90 tablet 3    aspirin 81 MG chewable tablet Take 1 tablet by mouth Daily.      celecoxib (CELEBREX) 200 MG capsule Take 1 capsule (200 mg total) by mouth once daily. Take with food 90 capsule 2    dapagliflozin propanediol (FARXIGA) 5 mg Tab tablet Take 1 tablet (5 mg total) by mouth once daily. 90 tablet 3    fish oil-fat acid comb.8-hb137 1,200 mg (400 [id number]) Cap Take 1 capsule by mouth once daily.       furosemide (LASIX) 20 MG tablet Take 1 tablet (20 mg total) by mouth daily as needed (swelling). 30 tablet 11    latanoprost 0.005 % ophthalmic solution Place 1 drop into both eyes every evening. 7.5 mL 4    lisinopriL (PRINIVIL,ZESTRIL) 40 MG tablet Take 1 tablet (40 mg total) by mouth once daily. 90 tablet 3    metoprolol succinate (TOPROL-XL) 50 MG 24 hr tablet Take 3 tablets (150 mg total) by mouth once daily. 90 tablet 11    MITIGARE 0.6 mg Cap Take 1 capsule (0.6 mg total) by mouth 2 (two) times daily as needed (acute gout). 30 capsule 1    pantoprazole (PROTONIX) 40 MG tablet Take 1 tablet (40 mg total) by mouth once daily. (replaces nexium) 90 tablet 3    pregabalin (LYRICA) 75 MG capsule Take 1 capsule (75 mg total) by mouth 2 (two) times daily. 180 capsule 0    rosuvastatin (CRESTOR) 40 MG Tab Take 1 tablet (40 mg total) by mouth every evening. Generic is fine. 90 tablet 3    timolol maleate 0.5% (TIMOPTIC) 0.5 % Drop Place 1 drop into both eyes every  "morning. 10 mL 4    traMADoL (ULTRAM) 50 mg tablet Take 1 tablet (50 mg total) by mouth every 12 (twelve) hours as needed for Pain. 14 tablet 0        PMHx, PSHx, Allergies, Medications reviewed in epic    ROS negative except pain complaints in HPI    OBJECTIVE:    /64   Pulse 60   Temp 97.7 °F (36.5 °C)   Resp 16   Ht 5' 7" (1.702 m)   Wt 114.8 kg (253 lb 0.7 oz)   SpO2 97%   BMI 39.63 kg/m²     PHYSICAL EXAMINATION:    GENERAL: Well appearing, in no acute distress, alert and oriented x3.  PSYCH:  Mood and affect appropriate.  SKIN: Skin color, texture, turgor normal, no rashes or lesions which will impact the procedure.  CV: RRR with palpation of the radial artery.  PULM: No evidence of respiratory difficulty, symmetric chest rise. Clear to auscultation.  NEURO: Cranial nerves grossly intact.    Plan:    Proceed with procedure as planned Procedure(s) (LRB):  right L5/S1 + S1 TF NAHUN (1st) (Right)    Deyanira Parsons MD  01/22/2024            "

## 2024-01-24 ENCOUNTER — OFFICE VISIT (OUTPATIENT)
Dept: CARDIOLOGY | Facility: CLINIC | Age: 74
End: 2024-01-24
Payer: MEDICARE

## 2024-01-24 VITALS
OXYGEN SATURATION: 97 % | WEIGHT: 251.31 LBS | HEART RATE: 65 BPM | SYSTOLIC BLOOD PRESSURE: 146 MMHG | BODY MASS INDEX: 39.36 KG/M2 | DIASTOLIC BLOOD PRESSURE: 84 MMHG

## 2024-01-24 DIAGNOSIS — I70.0 AORTIC ATHEROSCLEROSIS: ICD-10-CM

## 2024-01-24 DIAGNOSIS — I27.9 PULMONARY HEART DISEASE: ICD-10-CM

## 2024-01-24 DIAGNOSIS — I44.2 CHB (COMPLETE HEART BLOCK): ICD-10-CM

## 2024-01-24 DIAGNOSIS — I10 ESSENTIAL HYPERTENSION: Primary | ICD-10-CM

## 2024-01-24 DIAGNOSIS — E66.01 OBESITY, CLASS III, BMI 40-49.9 (MORBID OBESITY): ICD-10-CM

## 2024-01-24 PROCEDURE — 99213 OFFICE O/P EST LOW 20 MIN: CPT | Mod: PBBFAC,PO | Performed by: INTERNAL MEDICINE

## 2024-01-24 PROCEDURE — 99999 PR PBB SHADOW E&M-EST. PATIENT-LVL III: CPT | Mod: PBBFAC,,, | Performed by: INTERNAL MEDICINE

## 2024-01-24 PROCEDURE — 99214 OFFICE O/P EST MOD 30 MIN: CPT | Mod: S$PBB,,, | Performed by: INTERNAL MEDICINE

## 2024-01-24 RX ORDER — METOPROLOL SUCCINATE 200 MG/1
200 TABLET, EXTENDED RELEASE ORAL DAILY
Qty: 90 TABLET | Refills: 3 | Status: SHIPPED | OUTPATIENT
Start: 2024-01-24 | End: 2025-01-23

## 2024-01-24 NOTE — PROGRESS NOTES
Subjective:   Patient ID:  Damon Yoo is a 73 y.o. male who presents for evaluation of No chief complaint on file.        Initial HPI: 1/2021   69 yo male, pmhx below, CAD s/p CABG 2000?, HTN, HLP, Pre diabetes (last A1C 6.2), concentric remodeling on echo, comes in for follow up recent hospital admission for CHB, AV dissociation that required TVP placement while in the hospital , with persistence depsite holding toprol off for 48 hours, upon PPM implant he had sinus tachycardia 100 abpm with less than 50 vbpm   He comes in for follow up , PPM site look healing well, no infection, no hematoma, no bleeding, no syncope, feels great after the pacemaker states his breathing is better, no more dyspnea, no more fatigue denies any other complaints except for right upper back muscle spasm   On interrogation, he was 70% apaced with 99%  with good parameters     Interval hx: 3/8/2021   9 episodes of svt on device interrogation   99%  ,   No complaints of palpitations at all   Does endorse bilateral lower ext swelling, and positional orthopnea sometimes   No chest pain     Follow-up  Pertinent negatives include no chest pain.     Interval 8/26/2021   Doing well, V pacing 30% after last adjustments  No more SVT or NSVT on most recent interrogation   States no chest pain, no dyspnea, no leg swelling, no palpitations   He is on lasix prn, states takes it if swelling or if dyspnea   See ros        2.28.2022  Doing well, pacemaker functioning normal   Denies any cardiac complaints   No recent hx of ed visits or hospitalizations for acs or adhf  No hx of angina. No hx of unusual cordero with ordinary daily activities. No dyspnea at rest. No orthopnea or pnd  No hx of palpitations. No exertional dizziness or syncope  No hx of dvt or pe. No hx of edema or intermittent claudication  No focal cns symptoms or signs to suggest tia or stroke  No hx of ckd. No dm- no thyroid disease  No hx of abnormal bleeding  LDL down from 150 to  73      9.8.2022  Comes in for a 6 months follow up   Doing well denies any DIAZ, lower ext swelling, orthopnea or PND  He had NSVT on device interrogation and sinus tachy and htn   We increased his toprol 150 mg a day   On last interrogation 7/29 he is 100  from 30% before after prolonging his av delay   Getting another remote interrogation on the 20th     3.22.2023  Comes in for a 6 months follow up   Echo EF 60% mild MR   No diaz, no swelling, no chest pain or orthopnea   85% on vpace   0% af burden, 2 episode of fast atrial rate , no egm strips   On asa 81 mg daily       1.24.2024  Comes in for a 6 months follow up   Denies any chest pain, walk about 30 mins, once a day   No cardiac limitations, no chest pain, no dyspnea, no lower extremity swelling , no orthopnea PND, syncope or presyncope   Compliant with medications   BP on the higher side, admits to eating chips sometimes and more cheese lately     Past Medical History:   Diagnosis Date    Abnormal EKG 10/25/2013    Acute on chronic diastolic CHF (congestive heart failure) 11/27/2020    Arthritis     Back pain     CAD (coronary artery disease)     Cancer     Prostate [id number] prostate    Disorder of kidney and ureter     Glaucoma     Gout attack     Hyperlipidemia     Hypertension     Hypertrophic cardiomyopathy 10/25/2013    S/P CABG (coronary artery bypass graft) 10/25/2013       Past Surgical History:   Procedure Laterality Date    A-V CARDIAC PACEMAKER INSERTION Left 11/30/2020    Procedure: INSERTION, CARDIAC PACEMAKER, DUAL CHAMBER;  Surgeon: Elsy Kenyon MD;  Location: [facility] CATH LAB;  Service: Cardiology;  Laterality: Left;  biotronik    COLONOSCOPY N/A 12/04/2017    Procedure: COLONOSCOPY;  Surgeon: Dina Ledesma MD;  Location: [facility] ENDO;  Service: Endoscopy;  Laterality: N/A;    CORONARY ARTERY BYPASS GRAFT  05/2003    x1    PROSTATE SURGERY      RALP 2013    TRANSFORAMINAL EPIDURAL INJECTION OF STEROID Right 11/11/2019    Procedure: Right L5/S1+  S1 TF NAHUN with IV sedation;  Surgeon: Ermias Mario MD;  Location: [facility] PAIN [facility]GT;  Service: Pain Management;  Laterality: Right;    TRANSFORAMINAL EPIDURAL INJECTION OF STEROID Right 2021    Procedure: right L5/S1 and S1 TF NAHUN;  Surgeon: Ermias Mario MD;  [facility];  Service: Pain Management;  Laterality: Right;    TRANSFORAMINAL EPIDURAL INJECTION OF STEROID Right 2022    Procedure: Right L5/S1 + S1 TF NAHUN;  Surgeon: Ermias Mario MD;  [facility];  Service: Pain Management;  Laterality: Right;    TRANSFORAMINAL EPIDURAL INJECTION OF STEROID Right 2023    Procedure: right L5/S1 + S1 TF NAHUN (1st);  Surgeon: Deyanira Parsons MD;  Location: [facility] PAIN MGT;  Service: Pain Management;  Laterality: Right;       Social History     Tobacco Use    Smoking status: Former     Current packs/day: 0.00     Average packs/day: 0.3 packs/day for 15.0 years (3.8 ttl pk-yrs)     Types: Cigarettes     Start date: 1973     Quit date: 1988     Years since quittin.7    Smokeless tobacco: Never   Substance Use Topics    Alcohol use: No    Drug use: No       Family History   Problem Relation Age of Onset    No Known Problems Mother     Heart disease Father     Hypertension Father     No Known Problems Daughter     Strabismus Neg Hx     Retinal detachment Neg Hx     Macular degeneration Neg Hx     Glaucoma Neg Hx     Blindness Neg Hx     Amblyopia Neg Hx        Review of Systems   Cardiovascular:  Negative for chest pain, dyspnea on exertion, palpitations and syncope.   Genitourinary: Negative.    Neurological: Negative.        Current Outpatient Medications on File Prior to Visit   Medication Sig    acetaminophen (TYLENOL) 500 MG tablet Take 500 mg by mouth every 6 (six) hours as needed for Pain.    allopurinoL (ZYLOPRIM) 300 MG tablet Take 1 tablet (300 mg total) by mouth once daily.    amLODIPine (NORVASC) 10 MG tablet Take 1 tablet (10 mg total) by mouth once daily.     aspirin 81 MG chewable tablet Take 1 tablet by mouth Daily.    celecoxib (CELEBREX) 200 MG capsule Take 1 capsule (200 mg total) by mouth once daily. Take with food    dapagliflozin propanediol (FARXIGA) 5 mg Tab tablet Take 1 tablet (5 mg total) by mouth once daily.    fish oil-fat acid comb.8-hb137 1,200 mg (400 [id number]) Cap Take 1 capsule by mouth once daily.     latanoprost 0.005 % ophthalmic solution Place 1 drop into both eyes every evening.    lisinopriL (PRINIVIL,ZESTRIL) 40 MG tablet Take 1 tablet (40 mg total) by mouth once daily.    MITIGARE 0.6 mg Cap Take 1 capsule (0.6 mg total) by mouth 2 (two) times daily as needed (acute gout).    pantoprazole (PROTONIX) 40 MG tablet Take 1 tablet (40 mg total) by mouth once daily. (replaces nexium)    pregabalin (LYRICA) 75 MG capsule Take 1 capsule (75 mg total) by mouth 2 (two) times daily.    rosuvastatin (CRESTOR) 40 MG Tab Take 1 tablet (40 mg total) by mouth every evening. Generic is fine.    timolol maleate 0.5% (TIMOPTIC) 0.5 % Drop Place 1 drop into both eyes every morning.    traMADoL (ULTRAM) 50 mg tablet Take 1 tablet (50 mg total) by mouth every 12 (twelve) hours as needed for Pain.    [DISCONTINUED] metoprolol succinate (TOPROL-XL) 50 MG 24 hr tablet Take 3 tablets (150 mg total) by mouth once daily.    furosemide (LASIX) 20 MG tablet Take 1 tablet (20 mg total) by mouth daily as needed (swelling). (Patient not taking: Reported on 1/24/2024)     No current facility-administered medications on file prior to visit.       Objective:   Objective:  Wt Readings from Last 3 Encounters:   01/24/24 114 kg (251 lb 5.2 oz)   12/29/23 114.8 kg (253 lb 0.7 oz)   12/05/23 114.1 kg (251 lb 8.7 oz)     Temp Readings from Last 3 Encounters:   12/29/23 97.7 °F (36.5 °C)   08/21/23 97 °F (36.1 °C) (Tympanic)   06/21/23 96.6 °F (35.9 °C) (Tympanic)     BP Readings from Last 3 Encounters:   01/24/24 (!) 146/84   12/29/23 130/64   12/05/23 (!) 151/81     Pulse  Readings from Last 3 Encounters:   01/24/24 65   12/29/23 60   12/05/23 61       Physical Exam  Vitals reviewed.   Constitutional:       Appearance: He is well-developed.   Neck:      Vascular: No carotid bruit.   Cardiovascular:      Rate and Rhythm: Normal rate and regular rhythm.      Pulses: Intact distal pulses.      Heart sounds: Normal heart sounds. No murmur heard.  Pulmonary:      Breath sounds: Normal breath sounds.   Neurological:      Mental Status: He is oriented to person, place, and time.         Lab Results   Component Value Date    CHOL 161 08/16/2023    CHOL 104 (L) 02/09/2023    CHOL 115 (L) 08/10/2022     Lab Results   Component Value Date    HDL 27 (L) 08/16/2023    HDL 24 (L) 02/09/2023    HDL 26 (L) 08/10/2022     Lab Results   Component Value Date    LDLCALC 109.8 08/16/2023    LDLCALC 61.2 (L) 02/09/2023    LDLCALC 64.2 08/10/2022     Lab Results   Component Value Date    TRIG 121 08/16/2023    TRIG 94 02/09/2023    TRIG 124 08/10/2022     Lab Results   Component Value Date    CHOLHDL 16.8 (L) 08/16/2023    CHOLHDL 23.1 02/09/2023    CHOLHDL 22.6 08/10/2022       Chemistry        Component Value Date/Time     08/16/2023 0749    K 5.0 08/16/2023 0749     (H) 08/16/2023 0749    CO2 21 (L) 08/16/2023 0749    BUN 37 (H) 08/16/2023 0749    CREATININE 2.5 (H) 08/16/2023 0749    GLU 96 08/16/2023 0749        Component Value Date/Time    CALCIUM 9.2 08/16/2023 0749    ALKPHOS 108 02/16/2023 0834    AST 34 02/16/2023 0834    ALT 39 02/16/2023 0834    BILITOT 0.7 02/16/2023 0834    ESTGFRAFRICA 50.1 (A) 02/07/2022 0748    EGFRNONAA 43.3 (A) 02/07/2022 0748          Lab Results   Component Value Date    TSH 3.061 08/21/2023     Lab Results   Component Value Date    INR 1.0 11/27/2020    INR 1.0 06/26/2010     Lab Results   Component Value Date    WBC 9.38 08/21/2023    HGB 12.2 (L) 08/21/2023    HCT 38.6 (L) 08/21/2023    MCV 95 08/21/2023     08/21/2023      BNP  @LABRCNTIP(BNP,BNPTRIAGEBLO)@  CrCl cannot be calculated (Patient's most recent lab result is older than the maximum 7 days allowed.).     Imaging:  ======  Results for orders placed during the hospital encounter of 11/27/20   Echo Color Flow Doppler? Yes; Bubble Contrast? No    Narrative · There is moderate left ventricular concentric hypertrophy.  · The left ventricle is normal in size with normal systolic function. The   estimated ejection fraction is 60%.  · Normal left ventricular diastolic function.  · Normal right ventricular size with normal right ventricular systolic   function.  · Mild aortic valve stenosis.  · Aortic valve area is 1.78 cm2; peak velocity is 1.94 m/s; mean gradient   is 9 mmHg.  · Moderate mitral regurgitation.  · Moderate tricuspid regurgitation.  · There is pulmonary hypertension.  · Normal central venous pressure (3 mmHg).  · The estimated PA systolic pressure is 80 mmHg.     Suggest re echo when patient is in NSR.        No results found for this or any previous visit.  No results found for this or any previous visit.  Results for orders placed in visit on 03/18/13   X-Ray Chest PA And Lateral    Narrative DATE OF EXAM: Mar 20 2013      [facility]   0190  -  CHEST 2 VIEWS FRONTAL   LAT:   \  [id number]     CLINICAL HISTORY:   \V72.84  PREOP EXAMINATION NOS     PROCEDURE COMMENT:   \     ICD 9 CODE(S):   (\)     CPT 4 CODE(S)/MODIFIER(S):   (\)     Findings: No prior radiographs for comparison.  Post CABG changes are   noted.  Heart size is normal.  Mild degenerative change in the thoracic   spine.  No acute consolidation seen in the lungs.  No pleural effusions.        Impression: No active disease.        Electronically signed by: EUGENE ALCARAZ MD  Date:     03/20/13  Time:    13:11            : GADIEL  Transcribe Date/Time: Mar 20 2013  1:11P  Dictated by : EUGENE ALCARAZ MD  Report reviewed by:   Read On:   \  Images were reviewed, findings were verified and document  was   electronically  SIGNED BY: EUGENE ALCARAZ MD On: Mar 20 2013  1:11P        No results found for this or any previous visit.  No procedure found.    Diagnostic Results:  ECG: Reviewed    The 10-year ASCVD risk score (Katia VOGT, et al., 2019) is: 27.2%    Values used to calculate the score:      Age: 73 years      Sex: Male      Is Non- : Yes      Diabetic: No      Tobacco smoker: No      Systolic Blood Pressure: 146 mmHg      Is BP treated: Yes      HDL Cholesterol: 27 mg/dL      Total Cholesterol: 161 mg/dL    Results for orders placed during the hospital encounter of 02/08/23    Echo    Interpretation Summary  · Concentric remodeling and normal systolic function.  · The estimated ejection fraction is 60%.  · Normal left ventricular diastolic function.  · Normal right ventricular size with normal right ventricular systolic function.  · There is mild aortic valve stenosis.  · Aortic valve area is 3.13 cm2; peak velocity is 1.15 m/s; mean gradient is 3 mmHg.    Assessment and Plan:     Essential hypertension  -     metoprolol succinate (TOPROL-XL) 200 MG 24 hr tablet; Take 1 tablet (200 mg total) by mouth once daily.  Dispense: 90 tablet; Refill: 3    Pulmonary heart disease    CHB (complete heart block)    Obesity, Class III, BMI 40-49.9 (morbid obesity)    Aortic atherosclerosis          NSVT (nonsustained ventricular tachycardia)  -    RESOLVED SINCE ON TOPROL    SVT (supraventricular tachycardia)  Asymptomatic   -    RESOLVED SINCE ON TOPROL      Leg swelling  -     RESOLVED     Complete heart block  Comments:  s/p dual chamber pacemaker        Essential hypertension  -     Controlled with current meds      Coronary artery disease involving coronary bypass graft of native heart without angina pectoris  - STABLE no angina   Pacemaker  -    we increased his av delay to lower the  need and that lowered his  to 30% in 2020, then 100% , then 85%     HTN not at goal,. Decrease salt and  will increase toprol   Cw amlodipine, lisinopril and lasix    Angina free, no symptoms of chest pain or equivalent angina   LDL at goal on crestor   No DIAZ  No CNS symptoms   Reviewed all tests and above medical conditions with patient in detail and formulated treatment plan.  Risk factor modification discussed.   Cardiac low salt diet discussed.  Maintaining healthy weight and weight loss goals were discussed in clinic.      Follow up in 6 months

## 2024-01-31 ENCOUNTER — EXTERNAL CHRONIC CARE MANAGEMENT (OUTPATIENT)
Dept: PRIMARY CARE CLINIC | Facility: CLINIC | Age: 74
End: 2024-01-31
Payer: MEDICARE

## 2024-01-31 PROCEDURE — 99490 CHRNC CARE MGMT STAFF 1ST 20: CPT | Mod: S$PBB,,, | Performed by: PEDIATRICS

## 2024-01-31 PROCEDURE — 99490 CHRNC CARE MGMT STAFF 1ST 20: CPT | Mod: PBBFAC | Performed by: PEDIATRICS

## 2024-02-14 ENCOUNTER — LAB VISIT (OUTPATIENT)
Dept: LAB | Facility: HOSPITAL | Age: 74
End: 2024-02-14
Attending: PEDIATRICS
Payer: MEDICARE

## 2024-02-14 DIAGNOSIS — E78.5 HYPERLIPIDEMIA, UNSPECIFIED HYPERLIPIDEMIA TYPE: Chronic | ICD-10-CM

## 2024-02-14 DIAGNOSIS — D69.6 THROMBOCYTOPENIA: ICD-10-CM

## 2024-02-14 DIAGNOSIS — R73.03 PREDIABETES: ICD-10-CM

## 2024-02-14 LAB
ALBUMIN SERPL BCP-MCNC: 3.5 G/DL (ref 3.5–5.2)
ALP SERPL-CCNC: 105 U/L (ref 55–135)
ALT SERPL W/O P-5'-P-CCNC: 21 U/L (ref 10–44)
ANION GAP SERPL CALC-SCNC: 11 MMOL/L (ref 8–16)
AST SERPL-CCNC: 26 U/L (ref 10–40)
BASOPHILS # BLD AUTO: 0.06 K/UL (ref 0–0.2)
BASOPHILS NFR BLD: 0.7 % (ref 0–1.9)
BILIRUB SERPL-MCNC: 0.6 MG/DL (ref 0.1–1)
BUN SERPL-MCNC: 27 MG/DL (ref 8–23)
CALCIUM SERPL-MCNC: 9.2 MG/DL (ref 8.7–10.5)
CHLORIDE SERPL-SCNC: 114 MMOL/L (ref 95–110)
CHOLEST SERPL-MCNC: 140 MG/DL (ref 120–199)
CHOLEST/HDLC SERPL: 5.2 {RATIO} (ref 2–5)
CO2 SERPL-SCNC: 21 MMOL/L (ref 23–29)
CREAT SERPL-MCNC: 2.1 MG/DL (ref 0.5–1.4)
DIFFERENTIAL METHOD BLD: ABNORMAL
EOSINOPHIL # BLD AUTO: 0.4 K/UL (ref 0–0.5)
EOSINOPHIL NFR BLD: 4.1 % (ref 0–8)
ERYTHROCYTE [DISTWIDTH] IN BLOOD BY AUTOMATED COUNT: 14.7 % (ref 11.5–14.5)
EST. GFR  (NO RACE VARIABLE): 33 ML/MIN/1.73 M^2
ESTIMATED AVG GLUCOSE: 126 MG/DL (ref 68–131)
GLUCOSE SERPL-MCNC: 125 MG/DL (ref 70–110)
HBA1C MFR BLD: 6 % (ref 4–5.6)
HCT VFR BLD AUTO: 37.6 % (ref 40–54)
HDLC SERPL-MCNC: 27 MG/DL (ref 40–75)
HDLC SERPL: 19.3 % (ref 20–50)
HGB BLD-MCNC: 11.9 G/DL (ref 14–18)
IMM GRANULOCYTES # BLD AUTO: 0.02 K/UL (ref 0–0.04)
IMM GRANULOCYTES NFR BLD AUTO: 0.2 % (ref 0–0.5)
LDLC SERPL CALC-MCNC: 84.6 MG/DL (ref 63–159)
LYMPHOCYTES # BLD AUTO: 2.3 K/UL (ref 1–4.8)
LYMPHOCYTES NFR BLD: 24.5 % (ref 18–48)
MCH RBC QN AUTO: 30.2 PG (ref 27–31)
MCHC RBC AUTO-ENTMCNC: 31.6 G/DL (ref 32–36)
MCV RBC AUTO: 95 FL (ref 82–98)
MONOCYTES # BLD AUTO: 0.7 K/UL (ref 0.3–1)
MONOCYTES NFR BLD: 7.6 % (ref 4–15)
NEUTROPHILS # BLD AUTO: 5.8 K/UL (ref 1.8–7.7)
NEUTROPHILS NFR BLD: 62.9 % (ref 38–73)
NONHDLC SERPL-MCNC: 113 MG/DL
NRBC BLD-RTO: 0 /100 WBC
PLATELET # BLD AUTO: 175 K/UL (ref 150–450)
PMV BLD AUTO: 12.9 FL (ref 9.2–12.9)
POTASSIUM SERPL-SCNC: 5.1 MMOL/L (ref 3.5–5.1)
PROT SERPL-MCNC: 7.3 G/DL (ref 6–8.4)
RBC # BLD AUTO: 3.94 M/UL (ref 4.6–6.2)
SODIUM SERPL-SCNC: 146 MMOL/L (ref 136–145)
TRIGL SERPL-MCNC: 142 MG/DL (ref 30–150)
WBC # BLD AUTO: 9.18 K/UL (ref 3.9–12.7)

## 2024-02-14 PROCEDURE — 83036 HEMOGLOBIN GLYCOSYLATED A1C: CPT | Performed by: PEDIATRICS

## 2024-02-14 PROCEDURE — 36415 COLL VENOUS BLD VENIPUNCTURE: CPT | Mod: PO | Performed by: PEDIATRICS

## 2024-02-14 PROCEDURE — 80061 LIPID PANEL: CPT | Performed by: PEDIATRICS

## 2024-02-14 PROCEDURE — 85025 COMPLETE CBC W/AUTO DIFF WBC: CPT | Mod: PO | Performed by: PEDIATRICS

## 2024-02-14 PROCEDURE — 80053 COMPREHEN METABOLIC PANEL: CPT | Mod: PO | Performed by: PEDIATRICS

## 2024-02-21 ENCOUNTER — OFFICE VISIT (OUTPATIENT)
Dept: INTERNAL MEDICINE | Facility: CLINIC | Age: 74
End: 2024-02-21
Payer: MEDICARE

## 2024-02-21 VITALS
HEART RATE: 60 BPM | RESPIRATION RATE: 16 BRPM | WEIGHT: 253.31 LBS | SYSTOLIC BLOOD PRESSURE: 132 MMHG | DIASTOLIC BLOOD PRESSURE: 80 MMHG | HEIGHT: 67 IN | BODY MASS INDEX: 39.76 KG/M2 | OXYGEN SATURATION: 99 % | TEMPERATURE: 97 F

## 2024-02-21 DIAGNOSIS — Z95.1 S/P CABG (CORONARY ARTERY BYPASS GRAFT): ICD-10-CM

## 2024-02-21 DIAGNOSIS — Z95.0 PACEMAKER: ICD-10-CM

## 2024-02-21 DIAGNOSIS — R73.03 PREDIABETES: ICD-10-CM

## 2024-02-21 DIAGNOSIS — E66.01 OBESITY, CLASS III, BMI 40-49.9 (MORBID OBESITY): Chronic | ICD-10-CM

## 2024-02-21 DIAGNOSIS — I10 ESSENTIAL HYPERTENSION: ICD-10-CM

## 2024-02-21 DIAGNOSIS — M54.16 SPINAL STENOSIS OF LUMBAR REGION WITH RADICULOPATHY: ICD-10-CM

## 2024-02-21 DIAGNOSIS — E78.5 HYPERLIPIDEMIA, UNSPECIFIED HYPERLIPIDEMIA TYPE: Chronic | ICD-10-CM

## 2024-02-21 DIAGNOSIS — I70.0 AORTIC ATHEROSCLEROSIS: ICD-10-CM

## 2024-02-21 DIAGNOSIS — D64.9 ANEMIA, UNSPECIFIED TYPE: ICD-10-CM

## 2024-02-21 DIAGNOSIS — I25.10 CORONARY ARTERY DISEASE INVOLVING NATIVE CORONARY ARTERY OF NATIVE HEART WITHOUT ANGINA PECTORIS: ICD-10-CM

## 2024-02-21 DIAGNOSIS — D69.6 THROMBOCYTOPENIA: ICD-10-CM

## 2024-02-21 DIAGNOSIS — M54.16 LUMBAR RADICULAR PAIN: ICD-10-CM

## 2024-02-21 DIAGNOSIS — Z12.11 COLON CANCER SCREENING: Primary | ICD-10-CM

## 2024-02-21 DIAGNOSIS — M1A.39X0 CHRONIC GOUT DUE TO RENAL IMPAIRMENT OF MULTIPLE SITES WITHOUT TOPHUS: ICD-10-CM

## 2024-02-21 DIAGNOSIS — N18.32 STAGE 3B CHRONIC KIDNEY DISEASE: ICD-10-CM

## 2024-02-21 DIAGNOSIS — R73.03 PRE-DIABETES: ICD-10-CM

## 2024-02-21 DIAGNOSIS — C61 PROSTATE CANCER: ICD-10-CM

## 2024-02-21 DIAGNOSIS — I10 PRIMARY HYPERTENSION: ICD-10-CM

## 2024-02-21 DIAGNOSIS — M48.061 SPINAL STENOSIS OF LUMBAR REGION WITH RADICULOPATHY: ICD-10-CM

## 2024-02-21 DIAGNOSIS — Z85.46 HISTORY OF PROSTATE CANCER: ICD-10-CM

## 2024-02-21 DIAGNOSIS — I44.2 CHB (COMPLETE HEART BLOCK): ICD-10-CM

## 2024-02-21 DIAGNOSIS — I50.32 CHRONIC DIASTOLIC CHF (CONGESTIVE HEART FAILURE): Chronic | ICD-10-CM

## 2024-02-21 PROCEDURE — 99215 OFFICE O/P EST HI 40 MIN: CPT | Mod: PBBFAC | Performed by: PEDIATRICS

## 2024-02-21 PROCEDURE — 99999 PR PBB SHADOW E&M-EST. PATIENT-LVL V: CPT | Mod: PBBFAC,,, | Performed by: PEDIATRICS

## 2024-02-21 PROCEDURE — 99214 OFFICE O/P EST MOD 30 MIN: CPT | Mod: S$PBB,,, | Performed by: PEDIATRICS

## 2024-02-21 NOTE — PROGRESS NOTES
Subjective     Patient ID: Damon Yoo is a 73 y.o. male.    Chief Complaint: Follow-up    Damon Yoo is a 73 year old male here for his 6 month follow up.    PMHx, PSHx, SocHx, and FHx reviewed and discussed with patient.    1) HTN: No home  B/P checks, no HTNive symptoms. Compliant with medications.   2) LIPIDS:not following D&E, tolerating and compliant with crestor 40 mg.   3) CAD/concentric hypertrophy with remodeling/complete heart block, s/p pacer/pulm HTN/CHF: no CP, SOB, DIAZ, sees cardiology  4) Gout: quiet on allopurinol , no longer seeing rheum  5) Prostate cancer: yearly PSA, not seeing uro currently  6) Chronic back pain (lumbar stenosis)/ Knees: sees PMR and ortho, periodic NAHUN. Chronic but manageable.   7) CKD:  Kidney function fluctuating. Has cut back on soda and is hydrating better, also using celebrex PRN about 1x/week  8) Prediabetes: stable. no DM symptoms. Compliant with Farxiga. Has polyuria as side effects. A1C is 6.0. No BS checks.   9) Obesity: sedentary. Weight stable, somewhat working on D&E  10) Anemia/Thrombocytopenia: no bruising or bleeding, due for colonoscopy and EGD, suspect his anemia is likely secondary to his CKD    LABS REVIEWED AND DISCUSSED WITH PATIENT      Review of Systems   Constitutional:  Negative for chills and fever.   HENT:  Negative for nasal congestion and rhinorrhea.    Respiratory:  Negative for cough and shortness of breath.    Cardiovascular:  Negative for chest pain.   Gastrointestinal:  Negative for abdominal pain, blood in stool, change in bowel habit, constipation, diarrhea and nausea.   Endocrine: Negative for cold intolerance, heat intolerance, polydipsia, polyphagia and polyuria.   Genitourinary:  Negative for dysuria.   Neurological:  Negative for weakness.          Objective     Physical Exam  Constitutional:       General: He is not in acute distress.     Appearance: Normal appearance. He is obese. He is not ill-appearing, toxic-appearing or  diaphoretic.   HENT:      Head: Atraumatic.   Cardiovascular:      Rate and Rhythm: Normal rate and regular rhythm.      Pulses: Normal pulses.      Heart sounds: Normal heart sounds. No murmur heard.  Pulmonary:      Effort: Pulmonary effort is normal. No respiratory distress.      Breath sounds: Normal breath sounds. No stridor. No wheezing, rhonchi or rales.   Chest:      Chest wall: No tenderness.   Abdominal:      General: Abdomen is flat. Bowel sounds are normal. There is no distension.      Palpations: Abdomen is soft. There is no mass.      Tenderness: There is no abdominal tenderness. There is no guarding.   Neurological:      General: No focal deficit present.      Mental Status: He is alert and oriented to person, place, and time. Mental status is at baseline.      Cranial Nerves: No cranial nerve deficit.      Sensory: No sensory deficit.      Motor: No weakness.      Gait: Gait normal.   Psychiatric:         Mood and Affect: Mood normal.         Behavior: Behavior normal.            Assessment and Plan     1. Colon cancer screening  -     Ambulatory referral/consult to Endo Procedure ; Future; Expected date: 02/22/2024    2. Anemia, unspecified type  -     Ambulatory referral/consult to Endo Procedure ; Future; Expected date: 02/22/2024  -     Iron and TIBC; Future; Expected date: 02/21/2024  -     Ferritin; Future; Expected date: 02/21/2024  -     CBC Auto Differential; Future; Expected date: 02/21/2024    3. Pre-diabetes  -     Hemoglobin A1C; Future; Expected date: 02/21/2024    4. Primary hypertension  -     Comprehensive Metabolic Panel; Future; Expected date: 02/21/2024  -     Lipid Panel; Future; Expected date: 02/21/2024    5. Prostate cancer  -     Prostate Specific Antigen, Diagnostic; Future; Expected date: 02/21/2024    6. Stage 3b chronic kidney disease    7. Thrombocytopenia    8. Essential hypertension    9. Hyperlipidemia, unspecified hyperlipidemia type    10. Coronary  artery disease involving native coronary artery of native heart without angina pectoris w/ hx CABG    11. S/P CABG (coronary artery bypass graft)    12. History of prostate cancer    13. Chronic gout due to renal impairment of multiple sites without tophus  -     URIC ACID; Future; Expected date: 02/21/2024    14. Lumbar radicular pain    15. Obesity, Class III, BMI 40-49.9 (morbid obesity)    16. Spinal stenosis of lumbar region with radiculopathy    17. Prediabetes    18. CHB (complete heart block)    19. Chronic diastolic CHF (congestive heart failure)    20. Aortic atherosclerosis    21. Pacemaker    Other orders  -     Cancel: PSA, Screening; Future; Expected date: 02/21/2024        HMI discussed. Vaccines recommended. At goal for B/P, lipids, and A1c. Maintain meds, self monitoring D&E, weight moderation. Obtain EGD and colonoscopy but suspect anemia is secondary to CKD. F/U 6 months with labs.           Follow up in about 6 months (around 8/21/2024).

## 2024-02-22 ENCOUNTER — OFFICE VISIT (OUTPATIENT)
Dept: ORTHOPEDICS | Facility: CLINIC | Age: 74
End: 2024-02-22
Payer: MEDICARE

## 2024-02-22 VITALS — HEIGHT: 67 IN | BODY MASS INDEX: 39.71 KG/M2 | WEIGHT: 253 LBS

## 2024-02-22 DIAGNOSIS — M21.162 ACQUIRED VARUS DEFORMITY KNEE, LEFT: ICD-10-CM

## 2024-02-22 DIAGNOSIS — M21.161 ACQUIRED VARUS DEFORMITY KNEE, RIGHT: ICD-10-CM

## 2024-02-22 DIAGNOSIS — M54.17 LUMBOSACRAL RADICULOPATHY: ICD-10-CM

## 2024-02-22 DIAGNOSIS — M54.16 SPINAL STENOSIS OF LUMBAR REGION WITH RADICULOPATHY: ICD-10-CM

## 2024-02-22 DIAGNOSIS — M17.12 ARTHRITIS OF KNEE, LEFT: Primary | ICD-10-CM

## 2024-02-22 DIAGNOSIS — I87.2 EDEMA OF BOTH LOWER EXTREMITIES DUE TO PERIPHERAL VENOUS INSUFFICIENCY: ICD-10-CM

## 2024-02-22 DIAGNOSIS — M17.11 ARTHRITIS OF KNEE, RIGHT: ICD-10-CM

## 2024-02-22 DIAGNOSIS — M48.061 SPINAL STENOSIS OF LUMBAR REGION WITH RADICULOPATHY: ICD-10-CM

## 2024-02-22 DIAGNOSIS — M10.9 GOUT, UNSPECIFIED CAUSE, UNSPECIFIED CHRONICITY, UNSPECIFIED SITE: ICD-10-CM

## 2024-02-22 PROCEDURE — 99213 OFFICE O/P EST LOW 20 MIN: CPT | Mod: PBBFAC | Performed by: ORTHOPAEDIC SURGERY

## 2024-02-22 PROCEDURE — 99999 PR PBB SHADOW E&M-EST. PATIENT-LVL III: CPT | Mod: PBBFAC,,, | Performed by: ORTHOPAEDIC SURGERY

## 2024-02-22 PROCEDURE — 99213 OFFICE O/P EST LOW 20 MIN: CPT | Mod: S$PBB,,, | Performed by: ORTHOPAEDIC SURGERY

## 2024-02-22 NOTE — PROGRESS NOTES
Subjective:     Patient ID: Damon Yoo is a 73 y.o. male.    Chief Complaint: Pain of the Left Knee    HPI:  69-year-old diagnosed with bilateral knee arthrosis given injection of steroid in September placed on meloxicam which did not help then we changed him to Celebrex.  Celebrex seems to help.  Workup found out that he has old MRI from 2014 with neuroforaminal stenosis multilevel.  Workup with EMG-NCV recently ordered by me showed chronic L5-S1 radiculopathy bilaterally with peripheral poly neuropathy superimposed.  We sent him for NAHUN with pain management and that helped significantly that his pain is 2-3/10 down his legs and knees.  He still takes Celebrex daily with relief.  For the 1st time was able to work in the [employer] it without difficulty.  Not interested in having surgery at this point.    01/23/2020  Patient with bilateral knee severe arthrosis lumbosacral arthrosis.  The Celebrex seems to work really well for him.  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  He still states he is doing well at this point do not want a proceed with any surgical intervention.  His pain level on and off is 5/10.  Slight grinding and catching. Not using any assistive devices to ambulate    03/05/2020  Bilateral knee arthritis and lumbar radiculopathy with neuroforaminal stenosis multilevel.  States the epidural block still working and the Celebrex still helping.  She is remaining very active working in the [employer].  Does not think he needs any injections in the back or the knee at this point.  His pain level is 2/10 occasionally goes up and he takes his Celebrex.  He is ambulating independently without any assistive devices.  Denies loss of bowel bladder control    07/13/2020  Left knee is hurting quite a bit.  His back is not so much and the numbness and tingling in the legs a not bothering him.  He received last steroid injection in September 2019 and requesting a new 1.   Celebrex does help some but now the pain in the left knee is 8/10.  He wants to avoid surgical intervention.  He did receive E NAHUN in the back by pain management and he does not think he needs another 1.  He remains very active with exercise.  Denies any fever or chills or shortness of breath or difficulty chewing swallowing loss of bowel bladder control.  He is maintaining social distancing.    10/15/2020  Patient stated the injection given last visit in the knee seems to have helped significantly.  His pain level is 3/10.  The pain gets worse when he works in the [employer] his back will hurt as well as is knee.  Now he is taking Celebrex only as needed and managing.  He does not think he needs any injections today.  He is remaining active.  NAHUN given by pain management seems to have worked for him last time.  Denies any fever or chills or shortness of breath or difficulty with chewing or swallowing loss of bowel bladder control blurry vision double vision or loss of sense of smell or taste or chest pain or stomach issues or kidney issues    21/21  Patient numbness in the legs improved his knee arthritis is not as bad pain level around 5/10 on occasions.  He does take Celebrex only on occasions .  Sometimes he takes Tylenol.  He is maintaining his activity level.  His low back not hurting as much as used to either.  Occasionally gets left hip tenderness over the greater trochanter but not on a daily basis.  He does not think that he needs any injections today and doing well.  Denies any fever or chills or shortness of breath or difficulty with chewing or swallowing loss of bowel bladder control blurry vision double vision or loss sense smell or taste    07/22/2021  Patient is complaining of tightness around his ankles.  Does have history of hypertension takes numerous medications.  Also has have history of both knees hurting however he is running out of Celebrex.  He would like dark renewal.  He takes it on occasions.  He  has left knee is hurting him a little bit more than the right.  His back is not hurting him as much as he used to before.  He is ambulating without assistive devices.  His pain is roughly 7/10.  No fever no chills no shortness of breath or difficulty with chewing or swallowing loss of bowel bladder control blurry vision double vision loss sense smell or taste    01/24/2022  Bilateral knee severe arthritis bone-on-bone with severe varus deformity.  Also has swelling in his legs.  Also has evidence of spinal stenosis with radiculopathy.  We do give him the Celebrex and Lasix to take on as-needed basis.  He also has history of gout he takes allopurinol given to him by Alethea Finley who had left.  He is requesting future prescriptions so he does not have his gout act up on him.  As far as his knees are concerned he is doing okay by taking Celebrex occasionally and when his legs swell up he occasionally takes Lasix.  As far as the numbness down the leg he recently received this injection in his back which helped the numbness going down his right leg and he is very pleased.  He is ambulating without any assistive devices.  His pain level is 2/10 and he thinks he does not need any injections for his knees at this time.  No fever no chills no shortness of breath difficulty with chewing swallowing loss of bowel bladder control blurry vision double vision loss sense smell or taste    I did tell him he can discuss allopurinol with Dr. Mason    07/25/2022  Patient have radiculopathy but is not doing so bad with that and sees pain management occasionally gets injections in the spine  As far as his bilateral knee with severe arthritis he takes Celebrex only on as-needed basis.  He says maybe once or twice a week he takes it.  He maintains quite an active lifestyle.  He has pain in the left knee is worse than the right and usually is around 5/10.  Last injection was in 2020 in the left knee of Depo-Medrol.  He does not think he  needs any injections and he is doing well.  As far as his gout is concerned he is taking allopurinol.  Complain of slight discomfort in the ankles bilaterally fever some a little bit tight.  No fever no chills no shortness of breath or difficulty with chewing or swallowing loss of bowel bladder control blurry vision double vision loss sense smell or taste.  He is ambulating without any assistive devices    11/20/2023  Bilateral knee arthritis and spinal stenosis.  His pain is 6/10.  He is afraid about getting injections in his knees.  He says he is still taking Celebrex on occasions and then Tylenol as needed.  I went over the labs that he had because he has been on it for a long time I reviewed his labs from August 162023 that showed elevated creatinine to 2.5 and BUN to 3 7, his hemoglobin A1c was good at 6.1.  He does see Dr. Mario in Pain Management for his back he had 1 shot done before.  At this time he is managing without any assistive devices.  We went over his medication and I did tell him he really must stop taking the Celebrex it could be causing his kidney issues despite being diabetic.  I wrote it down on my card and I did tell him he needs to follow-up with Dr. sainz.  I did tell him in the future he would probably should need to proceed with maybe steroid and gel injections.  Also he might end up needing knee replacement if you can not live with what he has  No fever no chills no shortness of breath or difficulty with chewing swallowing      02/22/2024   Bilateral knee arthritis with the left worse than the right and also has spinal stenosis.  His pain is 7/10.  We went over his labs from couple days ago in details his creatinine still elevated above 2 and elevated sodium etcetera.  He sees hemoglobin A1c is okay at 6.0.  He did at 1 point see pain management.  I did tell him you can not take NSAIDs anymore and I went over his x-rays and showed him a bad they are and that he is bone-on-bone on both  of them.  He said the right knee does not bothering him as much as 3/10 however the left 1 is 7/10.  I did discuss with him the only thing he can take is Tylenol not to exceed 650 mg 3 times a day also we discussed that he needs to get steroid injections which will help and gel injections/rooster comb gel.  At this point he does not want to go the route he wants to hold off.  We discussed bracing which she was willing to proceed that route.  Past Medical History:   Diagnosis Date    Abnormal EKG 10/25/2013    Acute on chronic diastolic CHF (congestive heart failure) 11/27/2020    Arthritis     Back pain     CAD (coronary artery disease)     Cancer     Prostate [id number] prostate    Disorder of kidney and ureter     Glaucoma     Gout attack     Hyperlipidemia     Hypertension     Hypertrophic cardiomyopathy 10/25/2013    S/P CABG (coronary artery bypass graft) 10/25/2013     Past Surgical History:   Procedure Laterality Date    A-V CARDIAC PACEMAKER INSERTION Left 11/30/2020    Procedure: INSERTION, CARDIAC PACEMAKER, DUAL CHAMBER;  Surgeon: Elsy Kenyon MD;  Location: [facility] CATH LAB;  Service: Cardiology;  Laterality: Left;  biotronik    COLONOSCOPY N/A 12/04/2017    Procedure: COLONOSCOPY;  Surgeon: Dina Ledesma MD;  Location: [facility] ENDO;  Service: Endoscopy;  Laterality: N/A;    CORONARY ARTERY BYPASS GRAFT  05/2003    x1    PROSTATE SURGERY      RALP 2013    TRANSFORAMINAL EPIDURAL INJECTION OF STEROID Right 11/11/2019    Procedure: Right L5/S1+ S1 TF NAHUN with IV sedation;  Surgeon: Ermias Mario MD;  Location: [facility] PAIN MGT;  Service: Pain Management;  Laterality: Right;    TRANSFORAMINAL EPIDURAL INJECTION OF STEROID Right 11/24/2021    Procedure: right L5/S1 and S1 TF NAHUN;  Surgeon: Ermias Mario MD;  Location: [facility] PAIN MGT;  Service: Pain Management;  Laterality: Right;    TRANSFORAMINAL EPIDURAL INJECTION OF STEROID Right 01/11/2022    Procedure: Right L5/S1 + S1 TF NAHUN;  Surgeon: Ermias Mario  MD;  Location: [facility] PAIN MGT;  Service: Pain Management;  Laterality: Right;    TRANSFORAMINAL EPIDURAL INJECTION OF STEROID Right 2023    Procedure: right L5/S1 + S1 TF NAHUN (1st);  Surgeon: Deyanira Parsons MD;  Location: [facility] PAIN T;  Service: Pain Management;  Laterality: Right;     Family History   Problem Relation Age of Onset    No Known Problems Mother     Heart disease Father     Hypertension Father     No Known Problems Daughter     Strabismus Neg Hx     Retinal detachment Neg Hx     Macular degeneration Neg Hx     Glaucoma Neg Hx     Blindness Neg Hx     Amblyopia Neg Hx      Social History     Socioeconomic History    Marital status:    Tobacco Use    Smoking status: Former     Current packs/day: 0.00     Average packs/day: 0.3 packs/day for 15.0 years (3.8 ttl pk-yrs)     Types: Cigarettes     Start date: 1973     Quit date: 1988     Years since quittin.8    Smokeless tobacco: Never   Substance and Sexual Activity    Alcohol use: No    Drug use: No    Sexual activity: Not Currently   Social History Narrative    No pets or smokers in household.     Social Determinants of Health     Financial Resource Strain: Low Risk  (3/15/2022)    Overall Financial Resource Strain (CARDIA)     Difficulty of Paying Living Expenses: Not very hard   Food Insecurity: Food Insecurity Present (3/15/2022)    Hunger Vital Sign     Worried About Running Out of Food in the Last Year: Sometimes true     Ran Out of Food in the Last Year: Never true   Transportation Needs: No Transportation Needs (3/15/2022)    PRAPARE - Transportation     Lack of Transportation (Medical): No     Lack of Transportation (Non-Medical): No   Physical Activity: Insufficiently Active (3/15/2022)    Exercise Vital Sign     Days of Exercise per Week: 2 days     Minutes of Exercise per Session: 10 min   Stress: No Stress Concern Present (3/15/2022)    [place] [employer] - Occupational Stress Questionnaire      Feeling of Stress : Only a little   Social Connections: Moderately Isolated (3/15/2022)    Social Connection and Isolation Panel [NHANES]     Frequency of Communication with Friends and Family: Twice a week     Frequency of Social Gatherings with Friends and Family: Never     Attends [employer] Services: 1 to 4 times per year     Active Member of Clubs or Organizations: No     Attends Club or Organization Meetings: Never     Marital Status:    Housing Stability: Low Risk  (3/15/2022)    Housing Stability Vital Sign     Unable to Pay for Housing in the Last Year: No     Number of Places Lived in the Last Year: 1     Unstable Housing in the Last Year: No     Medication List with Changes/Refills   Current Medications    ACETAMINOPHEN (TYLENOL) 500 MG TABLET    Take 500 mg by mouth every 6 (six) hours as needed for Pain.    ALLOPURINOL (ZYLOPRIM) 300 MG TABLET    Take 1 tablet (300 mg total) by mouth once daily.    AMLODIPINE (NORVASC) 10 MG TABLET    Take 1 tablet (10 mg total) by mouth once daily.    ASPIRIN 81 MG CHEWABLE TABLET    Take 1 tablet by mouth Daily.    CELECOXIB (CELEBREX) 200 MG CAPSULE    Take 1 capsule (200 mg total) by mouth once daily. Take with food    DAPAGLIFLOZIN PROPANEDIOL (FARXIGA) 5 MG TAB TABLET    Take 1 tablet (5 mg total) by mouth once daily.    FISH OIL-FAT ACID COMB.8- 1,200 MG (400 [id number]) CAP    Take 1 capsule by mouth once daily.     FUROSEMIDE (LASIX) 20 MG TABLET    Take 1 tablet (20 mg total) by mouth daily as needed (swelling).    LATANOPROST 0.005 % OPHTHALMIC SOLUTION    Place 1 drop into both eyes every evening.    LISINOPRIL (PRINIVIL,ZESTRIL) 40 MG TABLET    Take 1 tablet (40 mg total) by mouth once daily.    METOPROLOL SUCCINATE (TOPROL-XL) 200 MG 24 HR TABLET    Take 1 tablet (200 mg total) by mouth once daily.    MITIGARE 0.6 MG CAP    Take 1 capsule (0.6 mg total) by mouth 2 (two) times daily as needed (acute gout).    PANTOPRAZOLE (PROTONIX) 40 MG  TABLET    Take 1 tablet (40 mg total) by mouth once daily. (replaces nexium)    PREGABALIN (LYRICA) 75 MG CAPSULE    Take 1 capsule (75 mg total) by mouth 2 (two) times daily.    ROSUVASTATIN (CRESTOR) 40 MG TAB    Take 1 tablet (40 mg total) by mouth every evening. Generic is fine.    TIMOLOL MALEATE 0.5% (TIMOPTIC) 0.5 % DROP    Place 1 drop into both eyes every morning.    TRAMADOL (ULTRAM) 50 MG TABLET    Take 1 tablet (50 mg total) by mouth every 12 (twelve) hours as needed for Pain.     Review of patient's allergies indicates:  No Known Allergies  Review of Systems   Constitutional: Negative for decreased appetite.   HENT:  Negative for tinnitus.    Eyes:  Negative for double vision.   Cardiovascular:  Negative for chest pain.   Respiratory:  Negative for wheezing.    Hematologic/Lymphatic: Negative for bleeding problem.   Skin:  Negative for dry skin.   Musculoskeletal:  Positive for arthritis, back pain and joint pain. Negative for gout, neck pain and stiffness.   Gastrointestinal:  Negative for abdominal pain.   Genitourinary:  Negative for bladder incontinence.   Neurological:  Positive for paresthesias. Negative for numbness and sensory change.   Psychiatric/Behavioral:  Negative for altered mental status.        Objective:   Body mass index is 39.63 kg/m².  There were no vitals filed for this visit.       General    Constitutional: He is oriented to person, place, and time. He appears well-developed.   HENT:   Head: Atraumatic.   Eyes: EOM are normal.   Cardiovascular:  Normal rate.            Pulmonary/Chest: Effort normal.   Abdominal: Soft.   Neurological: He is alert and oriented to person, place, and time.   Psychiatric: Judgment normal.            Cervical spine with good range of motion and slight crepitus but no pain  Lumbar with pain from L3 down to L5 paraspinal.  No true deformity seen.  Bilateral upper extremity neurovascularly intact strength is 5/5 throughout radial and ulnar pulses 2+  sensory intact to touch  Pelvis is level  Bilateral hip range of motion within normal limits.  Hip flexors, abduct his adductors quads and hamstrings were 5/5.  Slight discomfort over the greater troch on the left but other  Bilateral knees with varus deformity.  Crepitus with motion on the patella and medially.  Tenderness to palpation over the patella and medial joint.  Range of motion 5-115 degrees on 07/25/2022 however repeat exam today is losing motion at 0 to 90° bilaterally on 11/20/2023.. Very mild swelling.  Left knee has severe pain on the medial side with mild swelling.  No defect in the patella or quadriceps tendon.  Noticed there is some decrease in range of motion compared to before Calves are soft nontender  Positive pitting edema up to mid tibia  Ankle motion intact  DP 1+  Decrease in sensation in his foot on the lateral aspect  Skin is warm to touch  Patellar reflex 2+    EMG-NCV with chronic bilateral L5-S1 radiculopathy with superimposed peripheral polyneuropathy    Relevant imaging results reviewed and interpreted by me, discussed with the patient and / or family today   X-ray 11/20/2023 bilateral knees with complete loss of medial joint space and marginal osteophytic changes and severe varus deformity and consistent of tricompartmental arthritis bilateral knee  X-ray 07/25/2022 bilateral knees with complete loss of medial joint space and marginal osteophyte tricompartmental consistent with end-stage arthritis with varus deformity.  No fracture seen  X-ray and electronic records bilateral knees with complete loss of medial joint space and osteophytic changes consistent with end-stage arthrosis  MRI 2014 multilevel foraminal and central stenosis from L3 down to L5 and S1  Assessment:     Encounter Diagnoses   Name Primary?    Arthritis of knee, left Yes    Acquired varus deformity knee, left     Arthritis of knee, right     Acquired varus deformity knee, right     Edema of both lower extremities  due to peripheral venous insufficiency     Lumbosacral radiculopathy     Spinal stenosis of lumbar region with radiculopathy     Gout, unspecified cause, unspecified chronicity, unspecified site         Plan:   Arthritis of knee, left    Acquired varus deformity knee, left    Arthritis of knee, right    Acquired varus deformity knee, right    Edema of both lower extremities due to peripheral venous insufficiency    Lumbosacral radiculopathy    Spinal stenosis of lumbar region with radiculopathy    Gout, unspecified cause, unspecified chronicity, unspecified site         Patient Instructions   Your x-ray reviewed with you showing severe arthritis with bone-on-bone on both sides with the left worse than the right   You doing okay even though your pain is 7/10  Your creatinine is still elevated we can not give you anti-inflammatory med read medication who mess up her kidneys worth.  You need to stay away from a leave or Advil or Motrin or ibuprofen or Celebrex meloxicam etcetera  The only thing you can take is Tylenol 650 mg which is Tylenol Arthritis 1 tablet 3 times a day if needed   You are a candidate to receive cortisone injections in you knees and or gel injections in the future  You would like to try bracing and will give you braces for both knees to wear when you walking a lot or as needed  I will give you an appointment in 3-4 months anyway in case you hurting a lot please come in then we will give you injections  If you doing okay you want to reschedule       Disclaimer: This note was prepared using a voice recognition system and is likely to have sound alike errors within the text.

## 2024-02-22 NOTE — PATIENT INSTRUCTIONS
Your x-ray reviewed with you showing severe arthritis with bone-on-bone on both sides with the left worse than the right   You doing okay even though your pain is 7/10  Your creatinine is still elevated we can not give you anti-inflammatory med read medication who mess up her kidneys worth.  You need to stay away from a leave or Advil or Motrin or ibuprofen or Celebrex meloxicam etcetera  The only thing you can take is Tylenol 650 mg which is Tylenol Arthritis 1 tablet 3 times a day if needed   You are a candidate to receive cortisone injections in you knees and or gel injections in the future  You would like to try bracing and will give you braces for both knees to wear when you walking a lot or as needed  I will give you an appointment in 3-4 months anyway in case you hurting a lot please come in then we will give you injections  If you doing okay you want to reschedule

## 2024-02-23 ENCOUNTER — HOSPITAL ENCOUNTER (OUTPATIENT)
Dept: PREADMISSION TESTING | Facility: HOSPITAL | Age: 74
Discharge: HOME | End: 2024-02-23
Attending: PEDIATRICS
Payer: MEDICARE

## 2024-02-23 DIAGNOSIS — D64.9 ANEMIA, UNSPECIFIED TYPE: Primary | ICD-10-CM

## 2024-02-23 DIAGNOSIS — Z12.11 COLON CANCER SCREENING: ICD-10-CM

## 2024-02-23 RX ORDER — SODIUM, POTASSIUM,MAG SULFATES 17.5-3.13G
1 SOLUTION, RECONSTITUTED, ORAL ORAL DAILY
Qty: 1 KIT | Refills: 0 | Status: SHIPPED | OUTPATIENT
Start: 2024-02-23 | End: 2024-03-01

## 2024-02-29 ENCOUNTER — EXTERNAL CHRONIC CARE MANAGEMENT (OUTPATIENT)
Dept: PRIMARY CARE CLINIC | Facility: CLINIC | Age: 74
End: 2024-02-29
Payer: MEDICARE

## 2024-02-29 PROCEDURE — 99490 CHRNC CARE MGMT STAFF 1ST 20: CPT | Mod: S$PBB,,, | Performed by: PEDIATRICS

## 2024-02-29 PROCEDURE — 99490 CHRNC CARE MGMT STAFF 1ST 20: CPT | Mod: PBBFAC | Performed by: PEDIATRICS

## 2024-03-05 DIAGNOSIS — I25.810 CORONARY ARTERY DISEASE INVOLVING CORONARY BYPASS GRAFT OF NATIVE HEART WITHOUT ANGINA PECTORIS: ICD-10-CM

## 2024-03-05 DIAGNOSIS — E78.5 HYPERLIPIDEMIA, UNSPECIFIED HYPERLIPIDEMIA TYPE: ICD-10-CM

## 2024-03-05 DIAGNOSIS — I10 ESSENTIAL HYPERTENSION: ICD-10-CM

## 2024-03-05 RX ORDER — ROSUVASTATIN CALCIUM 40 MG/1
40 TABLET, COATED ORAL NIGHTLY
Qty: 90 TABLET | Refills: 3 | Status: SHIPPED | OUTPATIENT
Start: 2024-03-05 | End: 2025-03-05

## 2024-03-05 RX ORDER — LISINOPRIL 40 MG/1
40 TABLET ORAL DAILY
Qty: 90 TABLET | Refills: 3 | Status: SHIPPED | OUTPATIENT
Start: 2024-03-05

## 2024-03-15 ENCOUNTER — CLINICAL SUPPORT (OUTPATIENT)
Dept: CARDIOLOGY | Facility: HOSPITAL | Age: 74
End: 2024-03-15
Attending: INTERNAL MEDICINE
Payer: MEDICARE

## 2024-03-15 DIAGNOSIS — R00.1 BRADYCARDIA: ICD-10-CM

## 2024-03-15 DIAGNOSIS — Z95.0 CARDIAC PACEMAKER IN SITU: ICD-10-CM

## 2024-03-15 DIAGNOSIS — I44.2 CHB (COMPLETE HEART BLOCK): ICD-10-CM

## 2024-03-15 DIAGNOSIS — I44.2 ATRIOVENTRICULAR BLOCK, COMPLETE: ICD-10-CM

## 2024-03-15 PROCEDURE — 93280 PM DEVICE PROGR EVAL DUAL: CPT

## 2024-03-15 PROCEDURE — 99999 PR PBB SHADOW E&M-EST. PATIENT-LVL I: CPT | Mod: PBBFAC,,,

## 2024-03-15 PROCEDURE — 93280 PM DEVICE PROGR EVAL DUAL: CPT | Mod: 26,,, | Performed by: INTERNAL MEDICINE

## 2024-03-15 PROCEDURE — 99211 OFF/OP EST MAY X REQ PHY/QHP: CPT | Mod: PBBFAC,25

## 2024-03-18 PROBLEM — D64.9 NORMOCYTIC ANEMIA: Status: ACTIVE | Noted: 2024-03-18

## 2024-03-19 ENCOUNTER — HOSPITAL ENCOUNTER (OUTPATIENT)
Facility: HOSPITAL | Age: 74
Discharge: HOME | End: 2024-03-19
Attending: INTERNAL MEDICINE | Admitting: INTERNAL MEDICINE
Payer: MEDICARE

## 2024-03-19 ENCOUNTER — ANESTHESIA EVENT (OUTPATIENT)
Dept: ENDOSCOPY | Facility: HOSPITAL | Age: 74
End: 2024-03-19
Payer: MEDICARE

## 2024-03-19 ENCOUNTER — ANESTHESIA (OUTPATIENT)
Dept: ENDOSCOPY | Facility: HOSPITAL | Age: 74
End: 2024-03-19
Payer: MEDICARE

## 2024-03-19 DIAGNOSIS — D64.9 NORMOCYTIC ANEMIA: ICD-10-CM

## 2024-03-19 PROBLEM — Z86.010 HISTORY OF COLON POLYPS: Status: ACTIVE | Noted: 2024-03-19

## 2024-03-19 PROBLEM — Z86.0100 HISTORY OF COLON POLYPS: Status: ACTIVE | Noted: 2024-03-19

## 2024-03-19 LAB
BASOPHILS # BLD AUTO: 0.06 K/UL (ref 0–0.2)
BASOPHILS NFR BLD: 0.6 % (ref 0–1.9)
DIFFERENTIAL METHOD BLD: ABNORMAL
EOSINOPHIL # BLD AUTO: 0.3 K/UL (ref 0–0.5)
EOSINOPHIL NFR BLD: 3.2 % (ref 0–8)
ERYTHROCYTE [DISTWIDTH] IN BLOOD BY AUTOMATED COUNT: 14.1 % (ref 11.5–14.5)
FERRITIN SERPL-MCNC: 554 NG/ML (ref 20–300)
HCT VFR BLD AUTO: 40.1 % (ref 40–54)
HGB BLD-MCNC: 13.7 G/DL (ref 14–18)
IMM GRANULOCYTES # BLD AUTO: 0.03 K/UL (ref 0–0.04)
IMM GRANULOCYTES NFR BLD AUTO: 0.3 % (ref 0–0.5)
INR PPP: 1.1 (ref 0.8–1.2)
IRON SERPL-MCNC: 47 UG/DL (ref 45–160)
LYMPHOCYTES # BLD AUTO: 2.2 K/UL (ref 1–4.8)
LYMPHOCYTES NFR BLD: 23.9 % (ref 18–48)
MCH RBC QN AUTO: 30.6 PG (ref 27–31)
MCHC RBC AUTO-ENTMCNC: 34.2 G/DL (ref 32–36)
MCV RBC AUTO: 90 FL (ref 82–98)
MONOCYTES # BLD AUTO: 0.8 K/UL (ref 0.3–1)
MONOCYTES NFR BLD: 8.7 % (ref 4–15)
NEUTROPHILS # BLD AUTO: 5.9 K/UL (ref 1.8–7.7)
NEUTROPHILS NFR BLD: 63.3 % (ref 38–73)
NRBC BLD-RTO: 0 /100 WBC
PLATELET # BLD AUTO: 210 K/UL (ref 150–450)
PMV BLD AUTO: 13 FL (ref 9.2–12.9)
PROTHROMBIN TIME: 11.7 SEC (ref 9–12.5)
RBC # BLD AUTO: 4.47 M/UL (ref 4.6–6.2)
SATURATED IRON: 15 % (ref 20–50)
TOTAL IRON BINDING CAPACITY: 314 UG/DL (ref 250–450)
TRANSFERRIN SERPL-MCNC: 212 MG/DL (ref 200–375)
WBC # BLD AUTO: 9.39 K/UL (ref 3.9–12.7)

## 2024-03-19 PROCEDURE — 43251 EGD REMOVE LESION SNARE: CPT | Performed by: INTERNAL MEDICINE

## 2024-03-19 PROCEDURE — 45385 COLONOSCOPY W/LESION REMOVAL: CPT | Mod: PT | Performed by: INTERNAL MEDICINE

## 2024-03-19 PROCEDURE — 63600175 PHARM REV CODE 636 W HCPCS: Performed by: NURSE ANESTHETIST, CERTIFIED REGISTERED

## 2024-03-19 PROCEDURE — 82728 ASSAY OF FERRITIN: CPT | Performed by: INTERNAL MEDICINE

## 2024-03-19 PROCEDURE — 37000009 HC ANESTHESIA EA ADD 15 MINS: Performed by: INTERNAL MEDICINE

## 2024-03-19 PROCEDURE — 43251 EGD REMOVE LESION SNARE: CPT | Mod: 51,,, | Performed by: INTERNAL MEDICINE

## 2024-03-19 PROCEDURE — 27201089 HC SNARE, DISP (ANY): Performed by: INTERNAL MEDICINE

## 2024-03-19 PROCEDURE — 37000008 HC ANESTHESIA 1ST 15 MINUTES: Performed by: INTERNAL MEDICINE

## 2024-03-19 PROCEDURE — 25000003 PHARM REV CODE 250: Performed by: INTERNAL MEDICINE

## 2024-03-19 PROCEDURE — 45380 COLONOSCOPY AND BIOPSY: CPT | Mod: PT,59,, | Performed by: INTERNAL MEDICINE

## 2024-03-19 PROCEDURE — 88305 TISSUE EXAM BY PATHOLOGIST: CPT | Performed by: STUDENT IN AN ORGANIZED HEALTH CARE EDUCATION/TRAINING PROGRAM

## 2024-03-19 PROCEDURE — 45380 COLONOSCOPY AND BIOPSY: CPT | Mod: PT,59 | Performed by: INTERNAL MEDICINE

## 2024-03-19 PROCEDURE — 88305 TISSUE EXAM BY PATHOLOGIST: CPT | Mod: 26,,, | Performed by: STUDENT IN AN ORGANIZED HEALTH CARE EDUCATION/TRAINING PROGRAM

## 2024-03-19 PROCEDURE — 25000003 PHARM REV CODE 250: Performed by: NURSE ANESTHETIST, CERTIFIED REGISTERED

## 2024-03-19 PROCEDURE — 45385 COLONOSCOPY W/LESION REMOVAL: CPT | Mod: PT,,, | Performed by: INTERNAL MEDICINE

## 2024-03-19 PROCEDURE — 85610 PROTHROMBIN TIME: CPT | Performed by: INTERNAL MEDICINE

## 2024-03-19 PROCEDURE — 85025 COMPLETE CBC W/AUTO DIFF WBC: CPT | Performed by: INTERNAL MEDICINE

## 2024-03-19 PROCEDURE — 27201012 HC FORCEPS, HOT/COLD, DISP: Performed by: INTERNAL MEDICINE

## 2024-03-19 PROCEDURE — 83540 ASSAY OF IRON: CPT | Performed by: INTERNAL MEDICINE

## 2024-03-19 RX ORDER — LIDOCAINE HYDROCHLORIDE 20 MG/ML
INJECTION INTRAVENOUS
Status: DISCONTINUED | OUTPATIENT
Start: 2024-03-19 | End: 2024-03-19

## 2024-03-19 RX ORDER — DEXTROMETHORPHAN/PSEUDOEPHED 2.5-7.5/.8
DROPS ORAL
Status: COMPLETED | OUTPATIENT
Start: 2024-03-19 | End: 2024-03-19

## 2024-03-19 RX ORDER — PROPOFOL 10 MG/ML
VIAL (ML) INTRAVENOUS
Status: DISCONTINUED | OUTPATIENT
Start: 2024-03-19 | End: 2024-03-19

## 2024-03-19 RX ADMIN — LIDOCAINE HYDROCHLORIDE 100 MG: 20 INJECTION INTRAVENOUS at 09:03

## 2024-03-19 RX ADMIN — PROPOFOL 20 MG: 10 INJECTION, EMULSION INTRAVENOUS at 09:03

## 2024-03-19 RX ADMIN — SODIUM CHLORIDE, SODIUM LACTATE, POTASSIUM CHLORIDE, AND CALCIUM CHLORIDE: .6; .31; .03; .02 INJECTION, SOLUTION INTRAVENOUS at 09:03

## 2024-03-19 RX ADMIN — PROPOFOL 10 MG: 10 INJECTION, EMULSION INTRAVENOUS at 09:03

## 2024-03-19 RX ADMIN — PROPOFOL 110 MG: 10 INJECTION, EMULSION INTRAVENOUS at 09:03

## 2024-03-19 NOTE — H&P
PRE PROCEDURE H&P    Patient Name: Damon Yoo  MRN: [id number]  : 1950  Date of Procedure:  3/19/2024  Referring Physician: Dick Mason MD  Primary Physician: Dick Mason MD  Procedure Physician: Marta Cline MD       Planned Procedure: Colonoscopy and EGD  Diagnosis:   normocytic anemia and hx of polyps.     Chief Complaint: Same as above    HPI: Patient is an 74 y.o. male is here for the above. Referred by PCP for normocytic anemia. Patient denies overt GI bleeding. Denies hematemesis, melena or hematochezia. Last colonoscopy in 2017. Transverse TA removed. No previous EGD. Takes ASA only. Last taken 2 days ago. No Fhx of CRC, wife at bedside. Labs today show improvement in CBC from last month. H&H 13.7/40.1, previously 11.9/37.6. Iron studies pending    Last colonoscopy:   Family history: none  Anticoagulation: ASA, 2 days ago    Past Medical History:   Past Medical History:   Diagnosis Date    Abnormal EKG 10/25/2013    Acute on chronic diastolic CHF (congestive heart failure) 2020    Arthritis     Back pain     CAD (coronary artery disease)     Cancer     Prostate [id number] prostate    Disorder of kidney and ureter     Glaucoma     Gout attack     Hyperlipidemia     Hypertension     Hypertrophic cardiomyopathy 10/25/2013    Normocytic anemia 3/18/2024    S/P CABG (coronary artery bypass graft) 10/25/2013        Past Surgical History:  Past Surgical History:   Procedure Laterality Date    A-V CARDIAC PACEMAKER INSERTION Left 2020    Procedure: INSERTION, CARDIAC PACEMAKER, DUAL CHAMBER;  Surgeon: Elsy Kenyon MD;  Location: [facility] CATH LAB;  Service: Cardiology;  Laterality: Left;  biotronik    COLONOSCOPY N/A 2017    Procedure: COLONOSCOPY;  Surgeon: Dina Ledesma MD;  Location: [facility] ENDO;  Service: Endoscopy;  Laterality: N/A;    CORONARY ARTERY BYPASS GRAFT  05/2003    x1    PROSTATE SURGERY      RALP 2013    TRANSFORAMINAL EPIDURAL INJECTION OF STEROID  Right 11/11/2019    Procedure: Right L5/S1+ S1 TF NAHUN with IV sedation;  Surgeon: Ermias Mario MD;  Location: [facility] PAIN MGT;  Service: Pain Management;  Laterality: Right;    TRANSFORAMINAL EPIDURAL INJECTION OF STEROID Right 11/24/2021    Procedure: right L5/S1 and S1 TF NAHUN;  Surgeon: Ermias Mario MD;  Location: [facility] PAIN [facility]GT;  Service: Pain Management;  Laterality: Right;    TRANSFORAMINAL EPIDURAL INJECTION OF STEROID Right 01/11/2022    Procedure: Right L5/S1 + S1 TF NAHUN;  Surgeon: Ermias Mario MD;  Location: [facility] PAIN [facility]GT;  Service: Pain Management;  Laterality: Right;    TRANSFORAMINAL EPIDURAL INJECTION OF STEROID Right 12/29/2023    Procedure: right L5/S1 + S1 TF NAHUN (1st);  Surgeon: Deyanira Parsons MD;  Location: [facility] PAIN [facility]GT;  Service: Pain Management;  Laterality: Right;        Home Medications:  Prior to Admission medications    Medication Sig Start Date End Date Taking? Authorizing Provider   allopurinoL (ZYLOPRIM) 300 MG tablet Take 1 tablet (300 mg total) by mouth once daily. 8/16/23  Yes Pam Funez DPM   amLODIPine (NORVASC) 10 MG tablet Take 1 tablet (10 mg total) by mouth once daily. 11/13/23  Yes Dick Mason MD   aspirin 81 MG chewable tablet Take 1 tablet by mouth Daily. 5/11/12  Yes Provider, Historical   celecoxib (CELEBREX) 200 MG capsule Take 1 capsule (200 mg total) by mouth once daily. Take with food 9/22/23  Yes Chloe Gonzalez PA   dapagliflozin propanediol (FARXIGA) 5 mg Tab tablet Take 1 tablet (5 mg total) by mouth once daily. 9/13/23  Yes Dick Mason MD   fish oil-fat acid comb.8-hb137 1,200 mg (400 [id number]) Cap Take 1 capsule by mouth once daily.  5/11/12  Yes Provider, Historical   latanoprost 0.005 % ophthalmic solution Place 1 drop into both eyes every evening. 9/22/23  Yes Romaine Bear MD   lisinopriL (PRINIVIL,ZESTRIL) 40 MG tablet Take 1 tablet (40 mg total) by mouth once daily. 3/5/24  Yes Elsy Kenyon MD    metoprolol succinate (TOPROL-XL) 200 MG 24 hr tablet Take 1 tablet (200 mg total) by mouth once daily. 24 Yes Elsy Kenyon MD   pantoprazole (PROTONIX) 40 MG tablet Take 1 tablet (40 mg total) by mouth once daily. (replaces nexium) 23 Yes Dick Mason MD   pregabalin (LYRICA) 75 MG capsule Take 1 capsule (75 mg total) by mouth 2 (two) times daily. 23  Yes Rosemarie Cardoso PA-C   rosuvastatin (CRESTOR) 40 MG Tab Take 1 tablet (40 mg total) by mouth every evening. Generic is fine. 3/5/24 3/5/25 Yes Elsy Kenyon MD   timolol maleate 0.5% (TIMOPTIC) 0.5 % Drop Place 1 drop into both eyes every morning. 23  Yes Romaine Bear MD   acetaminophen (TYLENOL) 500 MG tablet Take 500 mg by mouth every 6 (six) hours as needed for Pain.    Provider, Historical   furosemide (LASIX) 20 MG tablet Take 1 tablet (20 mg total) by mouth daily as needed (swelling).  Patient not taking: Reported on 2024  Elsy Kenyon MD   MITIGARE 0.6 mg Cap Take 1 capsule (0.6 mg total) by mouth 2 (two) times daily as needed (acute gout). 21   Alethea Beth PA-C   traMADoL (ULTRAM) 50 mg tablet Take 1 tablet (50 mg total) by mouth every 12 (twelve) hours as needed for Pain. 21   Brianna Mcneil NP        Allergies:  Review of patient's allergies indicates:  No Known Allergies     Social History:   Social History     Socioeconomic History    Marital status:    Tobacco Use    Smoking status: Former     Current packs/day: 0.00     Average packs/day: 0.3 packs/day for 15.0 years (3.8 ttl pk-yrs)     Types: Cigarettes     Start date: 1973     Quit date: 1988     Years since quittin.9    Smokeless tobacco: Never   Substance and Sexual Activity    Alcohol use: No    Drug use: No    Sexual activity: Not Currently   Social History Narrative    No pets or smokers in household.     Social Determinants of Health     Financial  Resource Strain: Low Risk  (3/15/2022)    Overall Financial Resource Strain (CARDIA)     Difficulty of Paying Living Expenses: Not very hard   Food Insecurity: Food Insecurity Present (3/15/2022)    Hunger Vital Sign     Worried About Running Out of Food in the Last Year: Sometimes true     Ran Out of Food in the Last Year: Never true   Transportation Needs: No Transportation Needs (3/15/2022)    PRAPARE - Transportation     Lack of Transportation (Medical): No     Lack of Transportation (Non-Medical): No   Physical Activity: Insufficiently Active (3/15/2022)    Exercise Vital Sign     Days of Exercise per Week: 2 days     Minutes of Exercise per Session: 10 min   Stress: No Stress Concern Present (3/15/2022)    [employer] - Occupational Stress Questionnaire     Feeling of Stress : Only a little   Social Connections: Moderately Isolated (3/15/2022)    Social Connection and Isolation Panel [NHANES]     Frequency of Communication with Friends and Family: Twice a week     Frequency of Social Gatherings with Friends and Family: Never     Attends Jew Services: 1 to 4 times per year     Active Member of Clubs or Organizations: No     Attends Club or Organization Meetings: Never     Marital Status:    Housing Stability: Low Risk  (3/15/2022)    Housing Stability Vital Sign     Unable to Pay for Housing in the Last Year: No     Number of Places Lived in the Last Year: 1     Unstable Housing in the Last Year: No       Family History:  Family History   Problem Relation Age of Onset    No Known Problems Mother     Heart disease Father     Hypertension Father     No Known Problems Daughter     Strabismus Neg Hx     Retinal detachment Neg Hx     Macular degeneration Neg Hx     Glaucoma Neg Hx     Blindness Neg Hx     Amblyopia Neg Hx        ROS: No acute cardiac events, no acute respiratory complaints.     Physical Exam (all patients):    BP (!) 145/68 (BP Location: Left arm, Patient  "Position: Lying)   Pulse 61   Resp 20   Ht 5' 7" (1.702 m)   Wt 113.4 kg (250 lb)   SpO2 99%   BMI 39.16 kg/m²   Lungs: Clear to auscultation bilaterally, respirations unlabored  Heart: Regular rate and rhythm, S1 and S2 normal, no obvious murmurs  Abdomen:         Soft, non-tender, bowel sounds normal, no masses, no organomegaly    Lab Results   Component Value Date    WBC 9.39 03/19/2024    MCV 90 03/19/2024    RDW 14.1 03/19/2024     03/19/2024    INR 1.1 03/19/2024     (H) 02/14/2024    HGBA1C 6.0 (H) 02/14/2024    BUN 27 (H) 02/14/2024     (H) 02/14/2024    K 5.1 02/14/2024     (H) 02/14/2024        SEDATION PLAN: per anesthesia      History reviewed, vital signs satisfactory, cardiopulmonary status satisfactory, sedation options, risks and plans have been discussed with the patient  All their questions were answered and the patient agrees to the sedation procedures as planned and the patient is deemed an appropriate candidate for the sedation as planned.    The risks, benefits and alternatives of the procedure were discussed with the patient in detail. This discussion was had in the presence of his wife and endoscopy staff. The risks include, risks of adverse reaction to sedation requiring the use of reversal agents, bleeding requiring blood transfusion, perforation requiring surgical intervention and technical failure. Other risks include aspiration leading to respiratory distress and respiratory failure resulting in endotracheal intubation and mechanical ventilation including death. If anesthesia is being utilized for this procedure, it is up to the anesthesiologist to determine airway safety including elective endotracheal intubation. Questions were answered, they agree to proceed. There was no language barriers.      Procedure explained to patient, informed consent obtained and placed in chart.    Marta Cline  3/19/2024  9:15 AM     "

## 2024-03-19 NOTE — PROVATION PATIENT INSTRUCTIONS
Discharge Summary/Instructions after an Endoscopic Procedure  Patient Name: Damon Yoo  Patient MRN: [id number]  Patient YOB: 1950  Tuesday, March 19, 2024 Marta Cline MD  Dear patient,  As a result of recent federal legislation (The Federal Cures Act), you may   receive lab or pathology results from your procedure in your [facility]   account before your physician is able to contact you. Your physician or   their representative will relay the results to you with their   recommendations at their soonest availability.  Thank you,  RESTRICTIONS:  During your procedure today, you received medications for sedation.  These   medications may affect your judgment, balance and coordination.  Therefore,   for 24 hours, you have the following restrictions:   - DO NOT drive a car, operate machinery, make legal/financial decisions,   sign important papers or drink alcohol.    ACTIVITY:  Today: no heavy lifting, straining or running due to procedural   sedation/anesthesia.  The following day: return to full activity including work.  DIET:  Eat and drink normally unless instructed otherwise.     TREATMENT FOR COMMON SIDE EFFECTS:  - Mild abdominal pain, nausea, belching, bloating or excessive gas:  rest,   eat lightly and use a heating pad.  - Sore Throat: treat with throat lozenges and/or gargle with warm salt   water.  - Because air was used during the procedure, expelling large amounts of air   from your rectum or belching is normal.  - If a bowel prep was taken, you may not have a bowel movement for 1-3 days.    This is normal.  SYMPTOMS TO WATCH FOR AND REPORT TO YOUR PHYSICIAN:  1. Abdominal pain or bloating, other than gas cramps.  2. Chest pain.  3. Back pain.  4. Signs of infection such as: chills or fever occurring within 24 hours   after the procedure.  5. Rectal bleeding, which would show as bright red, maroon, or black stools.   (A tablespoon of blood from the rectum is not serious, especially if    hemorrhoids are present.)  6. Vomiting.  7. Weakness or dizziness.  GO DIRECTLY TO THE NEAREST EMERGENCY ROOM IF YOU HAVE ANY OF THE FOLLOWING:      Difficulty breathing              Chills and/or fever over 101 F   Persistent vomiting and/or vomiting blood   Severe abdominal pain   Severe chest pain   Black, tarry stools   Bleeding- more than one tablespoon   Any other symptom or condition that you feel may need urgent attention  Your doctor recommends these additional instructions:  If any biopsies were taken, your doctors clinic will contact you in 1 to 2   weeks with any results.  - Discharge patient to home (with escort).   - Resume previous diet.   - Continue present medications.   - Hold Aspirin for 5 days.  - Await pathology results.   - Repeat colonoscopy in 5 years for surveillance based on pathology results.     - Return to primary care physician.   - Video capsule endoscopy not indicated with normal labs.  For questions, problems or results please call your physician Marta Cline MD at Work:  [phone]  If you have any questions about the above instructions, call the GI   department at [phone] or call the endoscopy unit at [phone]   from 7am until 3 pm.  [facility] [place], EMERGENCY ROOM PHONE NUMBER:   [phone]  IF A COMPLICATION OR EMERGENCY SITUATION ARISES AND YOU ARE UNABLE TO REACH   YOUR PHYSICIAN - GO DIRECTLY TO THE EMERGENCY ROOM.  I have read or have had read to me these discharge instructions for my   procedure and have received a written copy.  I understand these   instructions and will follow-up with my physician if I have any questions.     __________________________________       _____________________________________  Nurse Signature                                          Patient/Designated   Responsible Party Signature  MD Marta Olivares MD  3/19/2024 9:53:40 AM  This report has been verified and signed  electronically.  Dear patient,  As a result of recent federal legislation (The Federal Cures Act), you may   receive lab or pathology results from your procedure in your [facility]   account before your physician is able to contact you. Your physician or   their representative will relay the results to you with their   recommendations at their soonest availability.  Thank you,  PROVATION

## 2024-03-19 NOTE — PLAN OF CARE
Dr. Cline at bedside discussing findings. VSS. Patient alert. No N/V. No bleeding, no pain. Patient released from unit.

## 2024-03-19 NOTE — PROVATION PATIENT INSTRUCTIONS
Discharge Summary/Instructions after an Endoscopic Procedure  Patient Name: Damon Yoo  Patient MRN: [id number]  Patient YOB: 1950  Tuesday, March 19, 2024 Marta Cline MD  Dear patient,  As a result of recent federal legislation (The Federal Cures Act), you may   receive lab or pathology results from your procedure in your [facility]   account before your physician is able to contact you. Your physician or   their representative will relay the results to you with their   recommendations at their soonest availability.  Thank you,  RESTRICTIONS:  During your procedure today, you received medications for sedation.  These   medications may affect your judgment, balance and coordination.  Therefore,   for 24 hours, you have the following restrictions:   - DO NOT drive a car, operate machinery, make legal/financial decisions,   sign important papers or drink alcohol.    ACTIVITY:  Today: no heavy lifting, straining or running due to procedural   sedation/anesthesia.  The following day: return to full activity including work.  DIET:  Eat and drink normally unless instructed otherwise.     TREATMENT FOR COMMON SIDE EFFECTS:  - Mild abdominal pain, nausea, belching, bloating or excessive gas:  rest,   eat lightly and use a heating pad.  - Sore Throat: treat with throat lozenges and/or gargle with warm salt   water.  - Because air was used during the procedure, expelling large amounts of air   from your rectum or belching is normal.  - If a bowel prep was taken, you may not have a bowel movement for 1-3 days.    This is normal.  SYMPTOMS TO WATCH FOR AND REPORT TO YOUR PHYSICIAN:  1. Abdominal pain or bloating, other than gas cramps.  2. Chest pain.  3. Back pain.  4. Signs of infection such as: chills or fever occurring within 24 hours   after the procedure.  5. Rectal bleeding, which would show as bright red, maroon, or black stools.   (A tablespoon of blood from the rectum is not serious, especially if    hemorrhoids are present.)  6. Vomiting.  7. Weakness or dizziness.  GO DIRECTLY TO THE NEAREST EMERGENCY ROOM IF YOU HAVE ANY OF THE FOLLOWING:      Difficulty breathing              Chills and/or fever over 101 F   Persistent vomiting and/or vomiting blood   Severe abdominal pain   Severe chest pain   Black, tarry stools   Bleeding- more than one tablespoon   Any other symptom or condition that you feel may need urgent attention  Your doctor recommends these additional instructions:  If any biopsies were taken, your doctors clinic will contact you in 1 to 2   weeks with any results.  - Discharge patient to home after colonoscopy.   - Resume previous diet.   - Continue present medications.   - No ibuprofen, naproxen, or other non-steroidal anti-inflammatory drugs.   - Hold Aspirin for 5 days.  - Await pathology results.   - Proceed with colonoscopy.  For questions, problems or results please call your physician Marta Cline MD at Work:  [phone]  If you have any questions about the above instructions, call the GI   department at [phone] or call the endoscopy unit at [phone]   from 7am until 3 pm.  [facility] [place] [facility], EMERGENCY ROOM PHONE NUMBER:   [phone]  IF A COMPLICATION OR EMERGENCY SITUATION ARISES AND YOU ARE UNABLE TO REACH   YOUR PHYSICIAN - GO DIRECTLY TO THE EMERGENCY ROOM.  I have read or have had read to me these discharge instructions for my   procedure and have received a written copy.  I understand these   instructions and will follow-up with my physician if I have any questions.     __________________________________       _____________________________________  Nurse Signature                                          Patient/Designated   Responsible Party Signature  MD Marta Olivares MD  3/19/2024 9:50:22 AM  This report has been verified and signed electronically.  Dear patient,  As a result of recent federal legislation (The Federal  Cures Act), you may   receive lab or pathology results from your procedure in your [facility]   account before your physician is able to contact you. Your physician or   their representative will relay the results to you with their   recommendations at their soonest availability.  Thank you,  PROVATION

## 2024-03-19 NOTE — TRANSFER OF CARE
"Anesthesia Transfer of Care Note    Patient: Damon Yoo    Procedure(s) Performed: Procedure(s) (LRB):  EGD (ESOPHAGOGASTRODUODENOSCOPY) (N/A)  COLONOSCOPY (N/A)    Patient location: PACU    Anesthesia Type: MAC    Transport from OR: Transported from OR on room air with adequate spontaneous ventilation    Post pain: adequate analgesia    Post assessment: no apparent anesthetic complications    Post vital signs: stable    Level of consciousness: awake    Nausea/Vomiting: no nausea/vomiting    Complications: none    Transfer of care protocol was followed      Last vitals: Visit Vitals  BP (!) 145/68 (BP Location: Left arm, Patient Position: Lying)   Pulse 61   Resp 20   Ht 5' 7" (1.702 m)   Wt 113.4 kg (250 lb)   SpO2 99%   BMI 39.16 kg/m²     "

## 2024-03-19 NOTE — ANESTHESIA POSTPROCEDURE EVALUATION
Anesthesia Post Evaluation    Patient: Damon Yoo    Procedure(s) Performed: Procedure(s) (LRB):  EGD (ESOPHAGOGASTRODUODENOSCOPY) (N/A)  COLONOSCOPY (N/A)    Final Anesthesia Type: MAC      Patient location during evaluation: PACU  Patient participation: Yes- Able to Participate  Level of consciousness: awake and alert  Post-procedure vital signs: reviewed and stable  Pain management: adequate  Airway patency: patent    PONV status at discharge: No PONV  Anesthetic complications: no      Cardiovascular status: blood pressure returned to baseline  Respiratory status: unassisted  Hydration status: euvolemic  Follow-up not needed.              Vitals Value Taken Time   /66 03/19/24 0950   Temp 98 03/19/24 0950   Pulse 66 03/19/24 0950   Resp 12 03/19/24 0950   SpO2 98 03/19/24 0950         No case tracking events are documented in the log.      Pain/Alina Score: No data recorded

## 2024-03-20 VITALS
HEART RATE: 60 BPM | SYSTOLIC BLOOD PRESSURE: 120 MMHG | OXYGEN SATURATION: 94 % | HEIGHT: 67 IN | BODY MASS INDEX: 39.24 KG/M2 | WEIGHT: 250 LBS | DIASTOLIC BLOOD PRESSURE: 65 MMHG | RESPIRATION RATE: 17 BRPM | TEMPERATURE: 98 F

## 2024-03-21 LAB
FINAL PATHOLOGIC DIAGNOSIS: NORMAL
GROSS: NORMAL
Lab: NORMAL
MICROSCOPIC EXAM: NORMAL

## 2024-03-25 NOTE — PROGRESS NOTES
The gastric polyp removed from your stomach was benign. One of the polyps removed was precancerous also known as an adenoma. The other was normal colon tissue. They were both completely removed. Guidelines recommend a repeat colonoscopy in 5 years. We will send you a reminder as the time nears.    In regards to your anemia, recent iron studies and labs show you are in the normal range. No additional work up is recommended from a GI standpoint. Please follow up with Dr. Mason.      Thanks for trusting us with your healthcare needs and using [facility].     Sincerely,    Marta Cline M.D.

## 2024-03-30 NOTE — PROGRESS NOTES
SUBJECTIVE:   Damon Yoo is a 67 y.o. male   Corrected distance visual acuity was 20/20 -2 in the right eye and 20/25 in the left eye.   Chief Complaint   Patient presents with    Glaucoma     4 month IOP check with GOCT.no complaints        HPI:  HPI     Glaucoma    Additional comments: 4 month IOP check with GOCT.no complaints           Comments   1. Mod COAG- No FHx (init 32/27 on 3/10 with C/D .65/.60) Goal <18  SLT OD 12/11/14 (20-16)  SLT OS 2/25/15 (20-16)  2. Mild NSC  3. LLL Cyst Excision on 6/13/12    Latanoprost QHS OU       Last edited by Arielle Heard MA on 6/12/2017  8:23 AM. (History)        Assessment /Plan :  1. Primary open angle glaucoma of both eyes, moderate stage   New goal = 15  IOP not within acceptable range relative to target IOP with risk of irreversible visual loss. Better IOP control is recommended. Discussed options, risks, and benefits of additional medication, SLT laser, and/or incisional glaucoma surgery. Reviewed importance of continued compliance with treatment and follow up.     Patient chooses to add Timolol qam OU   Continue Latanoprost OU qhs     2. Nuclear sclerosis of both eyes - not visually significant. Observe.     3. Refractive error PAL Rx     Return to clinic in 6 weeks  or as needed.  With IOP Check (Timolol qam OU trial)               No assistance needed

## 2024-03-31 ENCOUNTER — EXTERNAL CHRONIC CARE MANAGEMENT (OUTPATIENT)
Dept: PRIMARY CARE CLINIC | Facility: CLINIC | Age: 74
End: 2024-03-31
Payer: MEDICARE

## 2024-03-31 PROCEDURE — 99490 CHRNC CARE MGMT STAFF 1ST 20: CPT | Mod: PBBFAC | Performed by: PEDIATRICS

## 2024-03-31 PROCEDURE — 99490 CHRNC CARE MGMT STAFF 1ST 20: CPT | Mod: S$PBB,,, | Performed by: PEDIATRICS

## 2024-04-15 ENCOUNTER — OFFICE VISIT (OUTPATIENT)
Dept: OPHTHALMOLOGY | Facility: CLINIC | Age: 74
End: 2024-04-15
Payer: MEDICARE

## 2024-04-15 DIAGNOSIS — H25.13 NUCLEAR SENILE CATARACT OF BOTH EYES: ICD-10-CM

## 2024-04-15 DIAGNOSIS — H40.1132 PRIMARY OPEN ANGLE GLAUCOMA OF BOTH EYES, MODERATE STAGE: Primary | ICD-10-CM

## 2024-04-15 PROCEDURE — 99999 PR PBB SHADOW E&M-EST. PATIENT-LVL III: CPT | Mod: PBBFAC,,, | Performed by: OPHTHALMOLOGY

## 2024-04-15 PROCEDURE — 92133 CPTRZD OPH DX IMG PST SGM ON: CPT | Mod: PBBFAC | Performed by: OPHTHALMOLOGY

## 2024-04-15 PROCEDURE — 99214 OFFICE O/P EST MOD 30 MIN: CPT | Mod: S$PBB,,, | Performed by: OPHTHALMOLOGY

## 2024-04-15 PROCEDURE — 92083 EXTENDED VISUAL FIELD XM: CPT | Mod: PBBFAC | Performed by: OPHTHALMOLOGY

## 2024-04-15 PROCEDURE — 99213 OFFICE O/P EST LOW 20 MIN: CPT | Mod: PBBFAC | Performed by: OPHTHALMOLOGY

## 2024-04-15 NOTE — PROGRESS NOTES
SUBJECTIVE  Damon Yoo is 74 y.o. male  Corrected distance visual acuity was 20/25 +1 in the right eye and 20/25 -1 in the left eye.   Chief Complaint   Patient presents with    Glaucoma     Pt here for 4m HVF GOCT dilation. No pain or discomfort. VA stable. 100% compliant with gtts.           HPI     Glaucoma     Additional comments: Pt here for 4m HVF GOCT dilation. No pain or   discomfort. VA stable. 100% compliant with gtts.            Comments    1. Mod COAG- No FHx (init 32/27 on 3/10 with C/D .65/.60) Goal <18, new   goal = 15 6/2017  SLT OD 12/11/14 (20-16)  SLT OS 2/25/15 (20-16)  2. Mild NSC  3. LLL Cyst Excision on 6/13/12    Latanoprost QHS OU  Timolol qam OU             Last edited by Martinez Chatman MA on 4/15/2024  8:40 AM.         Assessment /Plan :  1. Primary open angle glaucoma of both eyes, moderate stage Doing well, intraocular pressure (IOP) within acceptable range relative to target IOP and no evidence of progression. Continue current treatment. Reviewed importance of continued compliance with treatment and follow up.      Patient instructed to continue using the following glaucoma medication as follows:  Latanoprost one drop in each eye nightly and Timolol one drop both eyes daily    Return to clinic in 4 months  or as needed.  With IOP Check     2. Nuclear senile cataract of both eyes  -- Condition stable, no therapeutic change required. Monitoring routinely.

## 2024-04-16 ENCOUNTER — TELEPHONE (OUTPATIENT)
Dept: PAIN MEDICINE | Facility: CLINIC | Age: 74
End: 2024-04-16
Payer: MEDICARE

## 2024-04-25 ENCOUNTER — TELEPHONE (OUTPATIENT)
Dept: ADMINISTRATIVE | Facility: CLINIC | Age: 74
End: 2024-04-25
Payer: MEDICARE

## 2024-04-25 NOTE — TELEPHONE ENCOUNTER
Called pt, informed pt I was calling to remind pt of his in office EAWV on 4/26/24; clinic location provided to patient; pt confirmed appointment

## 2024-04-28 LAB
OHS CV AF BURDEN PERCENT: < 1
OHS CV DC REMOTE DEVICE TYPE: NORMAL
OHS CV RV PACING PERCENT: 49 %

## 2024-04-30 ENCOUNTER — EXTERNAL CHRONIC CARE MANAGEMENT (OUTPATIENT)
Dept: PRIMARY CARE CLINIC | Facility: CLINIC | Age: 74
End: 2024-04-30
Payer: MEDICARE

## 2024-04-30 PROCEDURE — 99490 CHRNC CARE MGMT STAFF 1ST 20: CPT | Mod: PBBFAC | Performed by: PEDIATRICS

## 2024-04-30 PROCEDURE — 99490 CHRNC CARE MGMT STAFF 1ST 20: CPT | Mod: S$PBB,,, | Performed by: PEDIATRICS

## 2024-05-31 ENCOUNTER — EXTERNAL CHRONIC CARE MANAGEMENT (OUTPATIENT)
Dept: PRIMARY CARE CLINIC | Facility: CLINIC | Age: 74
End: 2024-05-31
Payer: MEDICARE

## 2024-05-31 PROCEDURE — 99490 CHRNC CARE MGMT STAFF 1ST 20: CPT | Mod: PBBFAC | Performed by: PEDIATRICS

## 2024-05-31 PROCEDURE — 99490 CHRNC CARE MGMT STAFF 1ST 20: CPT | Mod: S$PBB,,, | Performed by: PEDIATRICS

## 2024-06-12 NOTE — TELEPHONE ENCOUNTER
----- Message from Elsy Kenyon MD sent at 3/22/2023  1:31 PM CDT -----  Can we please bring him in for a device check see if we can do some more programing to see if we can decrease his , probably can prolong his av more if possible   Let me know   Thanks!     
Spoke with patient, scheduled in clinic check of his Biotronik pacemaker on 4/6/23 @ 9:20 am per his request.  Pt repeated back appt information correctly.  Written slip mailed as well.  
30.2

## 2024-06-16 ENCOUNTER — CLINICAL SUPPORT (OUTPATIENT)
Dept: CARDIOLOGY | Facility: HOSPITAL | Age: 74
End: 2024-06-16

## 2024-06-16 DIAGNOSIS — Z95.0 PRESENCE OF CARDIAC PACEMAKER: ICD-10-CM

## 2024-06-16 DIAGNOSIS — I44.2 ATRIOVENTRICULAR BLOCK, COMPLETE: ICD-10-CM

## 2024-06-30 ENCOUNTER — EXTERNAL CHRONIC CARE MANAGEMENT (OUTPATIENT)
Dept: PRIMARY CARE CLINIC | Facility: CLINIC | Age: 74
End: 2024-06-30
Payer: MEDICARE

## 2024-06-30 PROCEDURE — 99490 CHRNC CARE MGMT STAFF 1ST 20: CPT | Mod: PBBFAC | Performed by: PEDIATRICS

## 2024-06-30 PROCEDURE — 99490 CHRNC CARE MGMT STAFF 1ST 20: CPT | Mod: S$PBB,,, | Performed by: PEDIATRICS

## 2024-07-31 ENCOUNTER — EXTERNAL CHRONIC CARE MANAGEMENT (OUTPATIENT)
Dept: PRIMARY CARE CLINIC | Facility: CLINIC | Age: 74
End: 2024-07-31
Payer: MEDICARE

## 2024-07-31 PROCEDURE — 99490 CHRNC CARE MGMT STAFF 1ST 20: CPT | Mod: PBBFAC | Performed by: PEDIATRICS

## 2024-07-31 PROCEDURE — 99490 CHRNC CARE MGMT STAFF 1ST 20: CPT | Mod: S$PBB,,, | Performed by: PEDIATRICS

## 2024-08-16 ENCOUNTER — LAB VISIT (OUTPATIENT)
Dept: LAB | Facility: HOSPITAL | Age: 74
End: 2024-08-16
Attending: PEDIATRICS
Payer: MEDICARE

## 2024-08-16 DIAGNOSIS — R73.03 PRE-DIABETES: ICD-10-CM

## 2024-08-16 DIAGNOSIS — I10 PRIMARY HYPERTENSION: ICD-10-CM

## 2024-08-16 DIAGNOSIS — C61 PROSTATE CANCER: ICD-10-CM

## 2024-08-16 DIAGNOSIS — M1A.39X0 CHRONIC GOUT DUE TO RENAL IMPAIRMENT OF MULTIPLE SITES WITHOUT TOPHUS: ICD-10-CM

## 2024-08-16 DIAGNOSIS — D64.9 ANEMIA, UNSPECIFIED TYPE: ICD-10-CM

## 2024-08-16 LAB
ALBUMIN SERPL BCP-MCNC: 3.6 G/DL (ref 3.5–5.2)
ALP SERPL-CCNC: 86 U/L (ref 55–135)
ALT SERPL W/O P-5'-P-CCNC: 26 U/L (ref 10–44)
ANION GAP SERPL CALC-SCNC: 9 MMOL/L (ref 8–16)
AST SERPL-CCNC: 29 U/L (ref 10–40)
BASOPHILS # BLD AUTO: 0.06 K/UL (ref 0–0.2)
BASOPHILS NFR BLD: 0.7 % (ref 0–1.9)
BILIRUB SERPL-MCNC: 0.8 MG/DL (ref 0.1–1)
BUN SERPL-MCNC: 30 MG/DL (ref 8–23)
CALCIUM SERPL-MCNC: 9.8 MG/DL (ref 8.7–10.5)
CHLORIDE SERPL-SCNC: 113 MMOL/L (ref 95–110)
CHOLEST SERPL-MCNC: 100 MG/DL (ref 120–199)
CHOLEST/HDLC SERPL: 4.3 {RATIO} (ref 2–5)
CO2 SERPL-SCNC: 20 MMOL/L (ref 23–29)
COMPLEXED PSA SERPL-MCNC: <0.01 NG/ML (ref 0–4)
CREAT SERPL-MCNC: 2.5 MG/DL (ref 0.5–1.4)
DIFFERENTIAL METHOD BLD: ABNORMAL
EOSINOPHIL # BLD AUTO: 0.3 K/UL (ref 0–0.5)
EOSINOPHIL NFR BLD: 3.5 % (ref 0–8)
ERYTHROCYTE [DISTWIDTH] IN BLOOD BY AUTOMATED COUNT: 14 % (ref 11.5–14.5)
EST. GFR  (NO RACE VARIABLE): 26.6 ML/MIN/1.73 M^2
ESTIMATED AVG GLUCOSE: 123 MG/DL (ref 68–131)
FERRITIN SERPL-MCNC: 558 NG/ML (ref 20–300)
GLUCOSE SERPL-MCNC: 102 MG/DL (ref 70–110)
HBA1C MFR BLD: 5.9 % (ref 4–5.6)
HCT VFR BLD AUTO: 36.7 % (ref 40–54)
HDLC SERPL-MCNC: 23 MG/DL (ref 40–75)
HDLC SERPL: 23 % (ref 20–50)
HGB BLD-MCNC: 12.2 G/DL (ref 14–18)
IMM GRANULOCYTES # BLD AUTO: 0.02 K/UL (ref 0–0.04)
IMM GRANULOCYTES NFR BLD AUTO: 0.2 % (ref 0–0.5)
IRON SERPL-MCNC: 75 UG/DL (ref 45–160)
LDLC SERPL CALC-MCNC: 51.8 MG/DL (ref 63–159)
LYMPHOCYTES # BLD AUTO: 1.9 K/UL (ref 1–4.8)
LYMPHOCYTES NFR BLD: 23.5 % (ref 18–48)
MCH RBC QN AUTO: 30.9 PG (ref 27–31)
MCHC RBC AUTO-ENTMCNC: 33.2 G/DL (ref 32–36)
MCV RBC AUTO: 93 FL (ref 82–98)
MONOCYTES # BLD AUTO: 0.7 K/UL (ref 0.3–1)
MONOCYTES NFR BLD: 8.7 % (ref 4–15)
NEUTROPHILS # BLD AUTO: 5.2 K/UL (ref 1.8–7.7)
NEUTROPHILS NFR BLD: 63.4 % (ref 38–73)
NONHDLC SERPL-MCNC: 77 MG/DL
NRBC BLD-RTO: 0 /100 WBC
PLATELET # BLD AUTO: 165 K/UL (ref 150–450)
PMV BLD AUTO: 12.8 FL (ref 9.2–12.9)
POTASSIUM SERPL-SCNC: 4.8 MMOL/L (ref 3.5–5.1)
PROT SERPL-MCNC: 7.1 G/DL (ref 6–8.4)
RBC # BLD AUTO: 3.95 M/UL (ref 4.6–6.2)
SATURATED IRON: 25 % (ref 20–50)
SODIUM SERPL-SCNC: 142 MMOL/L (ref 136–145)
TOTAL IRON BINDING CAPACITY: 305 UG/DL (ref 250–450)
TRANSFERRIN SERPL-MCNC: 206 MG/DL (ref 200–375)
TRIGL SERPL-MCNC: 126 MG/DL (ref 30–150)
URATE SERPL-MCNC: 4.5 MG/DL (ref 3.4–7)
WBC # BLD AUTO: 8.24 K/UL (ref 3.9–12.7)

## 2024-08-16 PROCEDURE — 83540 ASSAY OF IRON: CPT | Performed by: PEDIATRICS

## 2024-08-16 PROCEDURE — 83036 HEMOGLOBIN GLYCOSYLATED A1C: CPT | Performed by: PEDIATRICS

## 2024-08-16 PROCEDURE — 84153 ASSAY OF PSA TOTAL: CPT | Performed by: PEDIATRICS

## 2024-08-16 PROCEDURE — 80053 COMPREHEN METABOLIC PANEL: CPT | Mod: PO | Performed by: PEDIATRICS

## 2024-08-16 PROCEDURE — 85025 COMPLETE CBC W/AUTO DIFF WBC: CPT | Mod: PO | Performed by: PEDIATRICS

## 2024-08-16 PROCEDURE — 80061 LIPID PANEL: CPT | Performed by: PEDIATRICS

## 2024-08-16 PROCEDURE — 82728 ASSAY OF FERRITIN: CPT | Performed by: PEDIATRICS

## 2024-08-16 PROCEDURE — 84550 ASSAY OF BLOOD/URIC ACID: CPT | Mod: PO | Performed by: PEDIATRICS

## 2024-08-16 PROCEDURE — 36415 COLL VENOUS BLD VENIPUNCTURE: CPT | Mod: PO | Performed by: PEDIATRICS

## 2024-08-19 DIAGNOSIS — M10.9 GOUT, UNSPECIFIED CAUSE, UNSPECIFIED CHRONICITY, UNSPECIFIED SITE: ICD-10-CM

## 2024-08-19 RX ORDER — ALLOPURINOL 300 MG/1
300 TABLET ORAL DAILY
Qty: 90 TABLET | Refills: 4 | Status: SHIPPED | OUTPATIENT
Start: 2024-08-19

## 2024-08-22 ENCOUNTER — OFFICE VISIT (OUTPATIENT)
Dept: INTERNAL MEDICINE | Facility: CLINIC | Age: 74
End: 2024-08-22
Payer: MEDICARE

## 2024-08-22 ENCOUNTER — OFFICE VISIT (OUTPATIENT)
Dept: OPHTHALMOLOGY | Facility: CLINIC | Age: 74
End: 2024-08-22
Payer: MEDICARE

## 2024-08-22 VITALS
WEIGHT: 251.13 LBS | TEMPERATURE: 98 F | BODY MASS INDEX: 39.42 KG/M2 | HEIGHT: 67 IN | RESPIRATION RATE: 18 BRPM | SYSTOLIC BLOOD PRESSURE: 122 MMHG | HEART RATE: 60 BPM | OXYGEN SATURATION: 98 % | DIASTOLIC BLOOD PRESSURE: 68 MMHG

## 2024-08-22 DIAGNOSIS — Z95.1 S/P CABG (CORONARY ARTERY BYPASS GRAFT): ICD-10-CM

## 2024-08-22 DIAGNOSIS — M54.16 LUMBAR RADICULAR PAIN: ICD-10-CM

## 2024-08-22 DIAGNOSIS — D69.6 THROMBOCYTOPENIA: ICD-10-CM

## 2024-08-22 DIAGNOSIS — R73.03 PREDIABETES: ICD-10-CM

## 2024-08-22 DIAGNOSIS — Z86.010 HISTORY OF COLON POLYPS: ICD-10-CM

## 2024-08-22 DIAGNOSIS — M1A.39X0 CHRONIC GOUT DUE TO RENAL IMPAIRMENT OF MULTIPLE SITES WITHOUT TOPHUS: ICD-10-CM

## 2024-08-22 DIAGNOSIS — E66.01 OBESITY, CLASS III, BMI 40-49.9 (MORBID OBESITY): Chronic | ICD-10-CM

## 2024-08-22 DIAGNOSIS — C61 PROSTATE CANCER: ICD-10-CM

## 2024-08-22 DIAGNOSIS — H40.1132 PRIMARY OPEN ANGLE GLAUCOMA OF BOTH EYES, MODERATE STAGE: Primary | ICD-10-CM

## 2024-08-22 DIAGNOSIS — N18.4 CKD (CHRONIC KIDNEY DISEASE) STAGE 4, GFR 15-29 ML/MIN: ICD-10-CM

## 2024-08-22 DIAGNOSIS — I70.0 AORTIC ATHEROSCLEROSIS: ICD-10-CM

## 2024-08-22 DIAGNOSIS — Z00.00 WELL ADULT EXAM: Primary | ICD-10-CM

## 2024-08-22 DIAGNOSIS — Z85.46 HISTORY OF PROSTATE CANCER: ICD-10-CM

## 2024-08-22 DIAGNOSIS — D64.9 NORMOCYTIC ANEMIA: ICD-10-CM

## 2024-08-22 DIAGNOSIS — E78.5 HYPERLIPIDEMIA, UNSPECIFIED HYPERLIPIDEMIA TYPE: Chronic | ICD-10-CM

## 2024-08-22 DIAGNOSIS — H25.13 NUCLEAR SENILE CATARACT OF BOTH EYES: ICD-10-CM

## 2024-08-22 DIAGNOSIS — I25.10 CORONARY ARTERY DISEASE INVOLVING NATIVE CORONARY ARTERY OF NATIVE HEART WITHOUT ANGINA PECTORIS: ICD-10-CM

## 2024-08-22 DIAGNOSIS — I50.32 CHRONIC DIASTOLIC CHF (CONGESTIVE HEART FAILURE): Chronic | ICD-10-CM

## 2024-08-22 PROCEDURE — 99214 OFFICE O/P EST MOD 30 MIN: CPT | Mod: PBBFAC | Performed by: PEDIATRICS

## 2024-08-22 PROCEDURE — 99999 PR PBB SHADOW E&M-EST. PATIENT-LVL III: CPT | Mod: PBBFAC,,, | Performed by: OPHTHALMOLOGY

## 2024-08-22 PROCEDURE — 99214 OFFICE O/P EST MOD 30 MIN: CPT | Mod: S$PBB,,, | Performed by: OPHTHALMOLOGY

## 2024-08-22 PROCEDURE — 99213 OFFICE O/P EST LOW 20 MIN: CPT | Mod: PBBFAC,27 | Performed by: OPHTHALMOLOGY

## 2024-08-22 PROCEDURE — 99999 PR PBB SHADOW E&M-EST. PATIENT-LVL IV: CPT | Mod: PBBFAC,,, | Performed by: PEDIATRICS

## 2024-08-22 NOTE — PROGRESS NOTES
SUBJECTIVE  Damon Yoo is 74 y.o. male  Corrected distance visual acuity was 20/20 in the right eye and 20/30 +1 in the left eye.   Chief Complaint   Patient presents with    Glaucoma     Pt here for 4m IOP chk. No pain or discomfort. VA stable. 100% compliant with gtts.           HPI     Glaucoma     Additional comments: Pt here for 4m IOP chk. No pain or discomfort. VA   stable. 100% compliant with gtts.            Comments    1. Mod COAG- No FHx (init 32/27 on 3/10 with C/D .65/.60) Goal <18, new   goal = 15 6/2017  SLT OD 12/11/14 (20-16)  SLT OS 2/25/15 (20-16)  2. Mild NSC  3. LLL Cyst Excision on 6/13/12    Latanoprost QHS OU  Timolol qam OU             Last edited by Martinez Chatman MA on 8/22/2024  1:43 PM.         Assessment /Plan :  1. Primary open angle glaucoma of both eyes, moderate stage Doing well, intraocular pressure (IOP) within acceptable range relative to target IOP and no evidence of progression. Continue current treatment. Reviewed importance of continued compliance with treatment and follow up.      Patient instructed to continue using the following glaucoma medication as follows:  Latanoprost one drop in each eye nightly and Timolol one drop both eyes daily    Return to clinic in 4 months with Dr. Bronson or as needed.  With IOP Check and GOCT     2. Nuclear senile cataract of both eyes - monitor for now

## 2024-08-22 NOTE — PROGRESS NOTES
Subjective:       Patient ID: Damon Yoo is a 74 y.o. male.    Chief Complaint: Follow-up    Subjective  Patient ID: Damon Yoo is a 74 y.o. male.     Chief Complaint: Annual and Follow-up     Damon Yoo is a 74 year old male here for his annual and 6 month follow up.     PMHx, PSHx, SocHx, and FHx reviewed and discussed with patient.     1) HTN: No home  B/P checks, no HTNive symptoms. Compliant with medications.   2) LIPIDS:not following D&E, tolerating and compliant with crestor 40 mg.   3) CAD/concentric hypertrophy with remodeling/complete heart block, s/p pacer/pulm HTN/CHF: no CP, SOB, DIAZ, sees cardiology  4) Gout: quiet on allopurinol , no longer seeing rheum  5) Prostate cancer: yearly PSA, not seeing uro currently  6) Chronic back pain (lumbar stenosis)/ Knees: sees PMR and ortho, periodic NAHUN. Chronic but manageable.   7) CKD 4:  Kidney function fluctuating. Has cut back on soda and is hydrating better, also using celebrex PRN about 1x/week  8) Prediabetes: stable. no DM symptoms. Compliant with Farxiga. Has polyuria as side effects. A1C is 5.9. No BS checks.   9) Obesity: sedentary. Weight stable, somewhat working on D&E  10) Anemia/Thrombocytopenia: no bruising or bleeding, Had colonoscopy and EGD 2024, suspect his anemia is likely secondary to his CKD     LABS REVIEWED AND DISCUSSED WITH PATIENT      Follow-up  Pertinent negatives include no arthralgias, congestion, coughing, fever, headaches, joint swelling, rash or vomiting.     Review of Systems   Constitutional:  Negative for fever and unexpected weight change.   HENT:  Negative for congestion and rhinorrhea.    Eyes:  Negative for discharge and redness.   Respiratory:  Negative for cough and wheezing.    Gastrointestinal:  Negative for constipation, diarrhea and vomiting.   Genitourinary:  Negative for decreased urine volume and difficulty urinating.   Musculoskeletal:  Negative for arthralgias and joint swelling.   Skin:  Negative  for rash and wound.   Neurological:  Negative for syncope and headaches.   Psychiatric/Behavioral:  Negative for behavioral problems and sleep disturbance.        Objective:      Physical Exam  Vitals and nursing note reviewed.   Constitutional:       General: He is not in acute distress.     Appearance: He is well-developed.   Neck:      Thyroid: No thyromegaly.      Vascular: No JVD.   Cardiovascular:      Rate and Rhythm: Normal rate and regular rhythm.      Heart sounds: Normal heart sounds. No murmur heard.  Pulmonary:      Effort: Pulmonary effort is normal. No respiratory distress.      Breath sounds: Normal breath sounds. No wheezing or rales.   Abdominal:      General: There is no distension.      Palpations: Abdomen is soft. There is no mass.      Tenderness: There is no abdominal tenderness. There is no guarding.   Musculoskeletal:      Right lower leg: No edema.      Left lower leg: No edema.   Lymphadenopathy:      Cervical: No cervical adenopathy.   Skin:     Capillary Refill: Capillary refill takes less than 2 seconds.      Findings: No rash.   Neurological:      General: No focal deficit present.      Mental Status: He is alert and oriented to person, place, and time.      Cranial Nerves: No cranial nerve deficit.      Coordination: Coordination normal.   Psychiatric:         Mood and Affect: Mood normal.         Behavior: Behavior normal.         Thought Content: Thought content normal.         Judgment: Judgment normal.         Assessment:       1. Well adult exam    2. Hyperlipidemia, unspecified hyperlipidemia type    3. Coronary artery disease involving native coronary artery of native heart without angina pectoris w/ hx CABG    4. S/P CABG (coronary artery bypass graft)    5. History of prostate cancer    6. Chronic gout due to renal impairment of multiple sites without tophus    7. Obesity, Class III, BMI 40-49.9 (morbid obesity)    8. Lumbar radicular pain    9. Chronic diastolic CHF  (congestive heart failure)    10. Thrombocytopenia    11. Aortic atherosclerosis    12. Prostate cancer    13. History of colon polyps    14. Prediabetes    15. CKD (chronic kidney disease) stage 4, GFR 15-29 ml/min        Plan:       Well adult exam    Hyperlipidemia, unspecified hyperlipidemia type  -     Comprehensive Metabolic Panel; Future; Expected date: 08/22/2024  -     Lipid Panel; Future; Expected date: 08/22/2024    Coronary artery disease involving native coronary artery of native heart without angina pectoris w/ hx CABG    S/P CABG (coronary artery bypass graft)    History of prostate cancer    Chronic gout due to renal impairment of multiple sites without tophus    Obesity, Class III, BMI 40-49.9 (morbid obesity)    Lumbar radicular pain    Chronic diastolic CHF (congestive heart failure)    Thrombocytopenia  -     CBC Auto Differential; Future; Expected date: 08/22/2024    Aortic atherosclerosis    Prostate cancer    History of colon polyps    Prediabetes  -     Hemoglobin A1C; Future; Expected date: 08/22/2024    CKD (chronic kidney disease) stage 4, GFR 15-29 ml/min  -     Microalbumin/Creatinine Ratio, Urine; Future; Expected date: 08/22/2024    At goal for B/P, lipids, and A1c. CKD 4 is stable. HMI d/w pt, vaccines reviewed. Maintain meds, self monitoring D&E, weight moderation. F/U 6 months with labs.

## 2024-09-15 ENCOUNTER — CLINICAL SUPPORT (OUTPATIENT)
Dept: CARDIOLOGY | Facility: HOSPITAL | Age: 74
End: 2024-09-15
Payer: MEDICARE

## 2024-09-15 DIAGNOSIS — Z95.0 PRESENCE OF CARDIAC PACEMAKER: ICD-10-CM

## 2024-09-15 DIAGNOSIS — I44.2 ATRIOVENTRICULAR BLOCK, COMPLETE: ICD-10-CM

## 2024-09-25 ENCOUNTER — TELEPHONE (OUTPATIENT)
Dept: PAIN MEDICINE | Facility: CLINIC | Age: 74
End: 2024-09-25
Payer: MEDICARE

## 2024-09-25 NOTE — TELEPHONE ENCOUNTER
----- Message from Missy Mccurdy sent at 9/25/2024  9:31 AM CDT -----  Contact: Patient, [phone]  Calling to reschedule his appointment. Please call him. Thanks.

## 2024-09-25 NOTE — TELEPHONE ENCOUNTER
Contacted pt. Pt requesting to reschedule appt with dr. Mario tomorrow. Appt rescheduled per pt's request. All questions answered.//lp

## 2024-09-30 ENCOUNTER — OFFICE VISIT (OUTPATIENT)
Dept: CARDIOLOGY | Facility: CLINIC | Age: 74
End: 2024-09-30
Payer: MEDICARE

## 2024-09-30 ENCOUNTER — HOSPITAL ENCOUNTER (OUTPATIENT)
Dept: CARDIOLOGY | Facility: HOSPITAL | Age: 74
Discharge: HOME | End: 2024-09-30
Payer: MEDICARE

## 2024-09-30 ENCOUNTER — EXTERNAL CHRONIC CARE MANAGEMENT (OUTPATIENT)
Dept: PRIMARY CARE CLINIC | Facility: CLINIC | Age: 74
End: 2024-09-30
Payer: MEDICARE

## 2024-09-30 VITALS
BODY MASS INDEX: 39.93 KG/M2 | SYSTOLIC BLOOD PRESSURE: 116 MMHG | HEIGHT: 67 IN | DIASTOLIC BLOOD PRESSURE: 70 MMHG | WEIGHT: 254.44 LBS | OXYGEN SATURATION: 97 % | HEART RATE: 60 BPM

## 2024-09-30 DIAGNOSIS — I25.10 CORONARY ARTERY DISEASE INVOLVING NATIVE CORONARY ARTERY OF NATIVE HEART WITHOUT ANGINA PECTORIS: ICD-10-CM

## 2024-09-30 DIAGNOSIS — I50.32 CHRONIC DIASTOLIC CHF (CONGESTIVE HEART FAILURE): Primary | Chronic | ICD-10-CM

## 2024-09-30 DIAGNOSIS — Z95.0 PACEMAKER: ICD-10-CM

## 2024-09-30 DIAGNOSIS — Z95.1 S/P CABG (CORONARY ARTERY BYPASS GRAFT): ICD-10-CM

## 2024-09-30 DIAGNOSIS — I10 ESSENTIAL HYPERTENSION: ICD-10-CM

## 2024-09-30 DIAGNOSIS — Z00.00 ROUTINE ADULT HEALTH MAINTENANCE: ICD-10-CM

## 2024-09-30 DIAGNOSIS — I44.2 CHB (COMPLETE HEART BLOCK): ICD-10-CM

## 2024-09-30 DIAGNOSIS — E78.5 HYPERLIPIDEMIA, UNSPECIFIED HYPERLIPIDEMIA TYPE: Chronic | ICD-10-CM

## 2024-09-30 DIAGNOSIS — Z00.00 ROUTINE ADULT HEALTH MAINTENANCE: Primary | ICD-10-CM

## 2024-09-30 DIAGNOSIS — I70.0 AORTIC ATHEROSCLEROSIS: ICD-10-CM

## 2024-09-30 LAB
OHS QRS DURATION: 190 MS
OHS QTC CALCULATION: 499 MS

## 2024-09-30 PROCEDURE — 93005 ELECTROCARDIOGRAM TRACING: CPT

## 2024-09-30 PROCEDURE — 99999 PR PBB SHADOW E&M-EST. PATIENT-LVL III: CPT | Mod: PBBFAC,,,

## 2024-09-30 PROCEDURE — 99214 OFFICE O/P EST MOD 30 MIN: CPT | Mod: S$PBB,,,

## 2024-09-30 PROCEDURE — 99490 CHRNC CARE MGMT STAFF 1ST 20: CPT | Mod: S$PBB,,, | Performed by: PEDIATRICS

## 2024-09-30 PROCEDURE — 99490 CHRNC CARE MGMT STAFF 1ST 20: CPT | Mod: PBBFAC | Performed by: PEDIATRICS

## 2024-09-30 PROCEDURE — 99213 OFFICE O/P EST LOW 20 MIN: CPT | Mod: PBBFAC,25

## 2024-09-30 PROCEDURE — 93010 ELECTROCARDIOGRAM REPORT: CPT | Mod: ,,, | Performed by: INTERNAL MEDICINE

## 2024-09-30 NOTE — PROGRESS NOTES
Subjective:   Patient ID:  Damon Yoo is a 74 y.o. male who presents for evaluation of No chief complaint on file.      HPI 74-year-old male whose current medical conditions include CAD s/p CABG, HTN, HLP, pre DM, SVT, CHB s/p PPM followed by Dr. Kenyon in [facility]iology Clinic here today for CV follow-up.  Complaining of leg cramps. Denies any chest pain, dizziness or shortness of breath.  On exam he does have sangeetha LE edema, blood pressure well controlled today he is compliant with his medications.  He reports he sits a lot. TTE 2023 with normal systolic function, mild AS    Past Medical History:   Diagnosis Date    Abnormal EKG 10/25/2013    Acute on chronic diastolic CHF (congestive heart failure) 11/27/2020    Arthritis     Back pain     CAD (coronary artery disease)     Cancer     Prostate [id number] prostate    Disorder of kidney and ureter     Glaucoma     Gout attack     Hyperlipidemia     Hypertension     Hypertrophic cardiomyopathy 10/25/2013    Normocytic anemia 3/18/2024    S/P CABG (coronary artery bypass graft) 10/25/2013       Past Surgical History:   Procedure Laterality Date    A-V CARDIAC PACEMAKER INSERTION Left 11/30/2020    Procedure: INSERTION, CARDIAC PACEMAKER, DUAL CHAMBER;  Surgeon: Elsy Kenyon MD;  Location: [facility] CATH LAB;  Service: Cardiology;  Laterality: Left;  biotronik    COLONOSCOPY N/A 12/04/2017    Procedure: COLONOSCOPY;  Surgeon: Dina Ledesma MD;  Location: [facility];  Service: Endoscopy;  Laterality: N/A;    COLONOSCOPY N/A 3/19/2024    Procedure: COLONOSCOPY;  Surgeon: Marta Cline MD;  Location: [facility];  Service: Endoscopy;  Laterality: N/A;    CORONARY ARTERY BYPASS GRAFT  05/2003    x1    ESOPHAGOGASTRODUODENOSCOPY N/A 3/19/2024    Procedure: EGD (ESOPHAGOGASTRODUODENOSCOPY);  Surgeon: Marta Cline MD;  Location: [facility];  Service: Endoscopy;  Laterality: N/A;    PROSTATE SURGERY      RALP 2013    TRANSFORAMINAL EPIDURAL INJECTION OF STEROID Right  2019    Procedure: Right L5/S1+ S1 TF NAHUN with IV sedation;  Surgeon: Ermias Mario MD;  Location: [facility];  Service: Pain Management;  Laterality: Right;    TRANSFORAMINAL EPIDURAL INJECTION OF STEROID Right 2021    Procedure: right L5/S1 and S1 TF NAHUN;  Surgeon: Ermias Mario MD;  Location: [facility];  Service: Pain Management;  Laterality: Right;    TRANSFORAMINAL EPIDURAL INJECTION OF STEROID Right 2022    Procedure: Right L5/S1 + S1 TF NAHUN;  Surgeon: Ermias Mario MD;  Location: [facility];  Service: Pain Management;  Laterality: Right;    TRANSFORAMINAL EPIDURAL INJECTION OF STEROID Right 2023    Procedure: right L5/S1 + S1 TF NAHUN (1st);  Surgeon: Deyanira Parsons MD;  Location: [facility];  Service: Pain Management;  Laterality: Right;       Social History     Tobacco Use    Smoking status: Former     Current packs/day: 0.00     Average packs/day: 0.3 packs/day for 15.0 years (3.8 ttl pk-yrs)     Types: Cigarettes     Start date: 1973     Quit date: 1988     Years since quittin.4    Smokeless tobacco: Never   Substance Use Topics    Alcohol use: No    Drug use: No       Family History   Problem Relation Name Age of Onset    No Known Problems Mother      Heart disease Father      Hypertension Father      No Known Problems Daughter      Strabismus Neg Hx      Retinal detachment Neg Hx      Macular degeneration Neg Hx      Glaucoma Neg Hx      Blindness Neg Hx      Amblyopia Neg Hx         Current Outpatient Medications on File Prior to Visit   Medication Sig Dispense Refill    acetaminophen (TYLENOL) 500 MG tablet Take 500 mg by mouth every 6 (six) hours as needed for Pain.      allopurinoL (ZYLOPRIM) 300 MG tablet Take 1 tablet (300 mg total) by mouth once daily. 90 tablet 4    amLODIPine (NORVASC) 10 MG tablet Take 1 tablet (10 mg total) by mouth once daily. 90 tablet 3    aspirin 81 MG chewable tablet Take 1 tablet by mouth Daily.       celecoxib (CELEBREX) 200 MG capsule Take 1 capsule (200 mg total) by mouth once daily. Take with food 90 capsule 2    dapagliflozin propanediol (FARXIGA) 5 mg Tab tablet Take 1 tablet (5 mg total) by mouth once daily. 90 tablet 3    fish oil-fat acid [id number] 1,200 mg ([id number]) Cap Take 1 capsule by mouth once daily.       latanoprost 0.005 % ophthalmic solution Place 1 drop into both eyes every evening. 7.5 mL 4    lisinopriL (PRINIVIL,ZESTRIL) 40 MG tablet Take 1 tablet (40 mg total) by mouth once daily. 90 tablet 3    metoprolol succinate (TOPROL-XL) 200 MG 24 hr tablet Take 1 tablet (200 mg total) by mouth once daily. 90 tablet 3    MITIGARE 0.6 mg Cap Take 1 capsule (0.6 mg total) by mouth 2 (two) times daily as needed (acute gout). 30 capsule 1    pantoprazole (PROTONIX) 40 MG tablet Take 1 tablet (40 mg total) by mouth once daily. (replaces nexium) 90 tablet 3    pregabalin (LYRICA) 75 MG capsule Take 1 capsule (75 mg total) by mouth 2 (two) times daily. 180 capsule 0    rosuvastatin (CRESTOR) 40 MG Tab Take 1 tablet (40 mg total) by mouth every evening. Generic is fine. 90 tablet 3    timolol maleate 0.5% (TIMOPTIC) 0.5 % Drop Place 1 drop into both eyes every morning. 10 mL 4    traMADoL (ULTRAM) 50 mg tablet Take 1 tablet (50 mg total) by mouth every 12 (twelve) hours as needed for Pain. 14 tablet 0     No current facility-administered medications on file prior to visit.      Wt Readings from Last 3 Encounters:   09/30/24 115.4 kg (254 lb 6.6 oz)   08/22/24 113.9 kg (251 lb 1.7 oz)   03/19/24 113.4 kg (250 lb)     Temp Readings from Last 3 Encounters:   08/22/24 98.3 °F (36.8 °C) (Tympanic)   03/19/24 97.7 °F (36.5 °C) (Temporal)   02/21/24 96.6 °F (35.9 °C) (Tympanic)     BP Readings from Last 3 Encounters:   09/30/24 116/70   08/22/24 122/68   03/19/24 120/65     Pulse Readings from Last 3 Encounters:   09/30/24 60   08/22/24 60   03/19/24 60        Review of Systems   Constitutional:  Negative.   HENT: Negative.     Eyes: Negative.    Cardiovascular:  Positive for leg swelling.   Respiratory: Negative.     Skin: Negative.    Musculoskeletal:  Positive for muscle cramps.   Gastrointestinal: Negative.    Genitourinary: Negative.    Neurological: Negative.    Psychiatric/Behavioral: Negative.         Objective:   Physical Exam  Vitals and nursing note reviewed.   Constitutional:       Appearance: Normal appearance.   HENT:      Head: Normocephalic and atraumatic.   Eyes:      General:         Right eye: No discharge.         Left eye: No discharge.      Pupils: Pupils are equal, round, and reactive to light.   Cardiovascular:      Rate and Rhythm: Normal rate and regular rhythm.      Heart sounds: S1 normal and S2 normal. No murmur heard.     No friction rub.   Pulmonary:      Effort: Pulmonary effort is normal. No respiratory distress.      Breath sounds: Normal breath sounds. No rales.   Abdominal:      Palpations: Abdomen is soft.      Tenderness: There is no abdominal tenderness.   Musculoskeletal:      Cervical back: Neck supple.      Right lower leg: Edema present.      Left lower leg: Edema present.   Skin:     General: Skin is warm and dry.   Neurological:      General: No focal deficit present.      Mental Status: He is alert and oriented to person, place, and time.   Psychiatric:         Mood and Affect: Mood normal.         Behavior: Behavior normal.         Thought Content: Thought content normal.         Lab Results   Component Value Date    CHOL 100 (L) 08/16/2024    CHOL 140 02/14/2024    CHOL 161 08/16/2023     Lab Results   Component Value Date    HDL 23 (L) 08/16/2024    HDL 27 (L) 02/14/2024    HDL 27 (L) 08/16/2023     Lab Results   Component Value Date    LDLCALC 51.8 (L) 08/16/2024    LDLCALC 84.6 02/14/2024    LDLCALC 109.8 08/16/2023     Lab Results   Component Value Date    TRIG 126 08/16/2024    TRIG 142 02/14/2024    TRIG 121 08/16/2023     Lab Results   Component Value  Date    CHOLHDL 23.0 08/16/2024    CHOLHDL 19.3 (L) 02/14/2024    CHOLHDL 16.8 (L) 08/16/2023       Chemistry        Component Value Date/Time     08/16/2024 0935    K 4.8 08/16/2024 0935     (H) 08/16/2024 0935    CO2 20 (L) 08/16/2024 0935    BUN 30 (H) 08/16/2024 0935    CREATININE 2.5 (H) 08/16/2024 0935     08/16/2024 0935        Component Value Date/Time    CALCIUM 9.8 08/16/2024 0935    ALKPHOS 86 08/16/2024 0935    AST 29 08/16/2024 0935    ALT 26 08/16/2024 0935    BILITOT 0.8 08/16/2024 0935    ESTGFRAFRICA 50.1 (A) 02/07/2022 0748    EGFRNONAA 43.3 (A) 02/07/2022 0748          Lab Results   Component Value Date    TSH 3.061 08/21/2023     Lab Results   Component Value Date    INR 1.1 03/19/2024    INR 1.0 11/27/2020    INR 1.0 06/26/2010     @RESUFAST(WBC,HGB,HCT,MCV,PLT)  @LABRCNTIP(BNP,BNPTRIAGEBLO)@  CrCl cannot be calculated (Patient's most recent lab result is older than the maximum 7 days allowed.).     Results for orders placed during the hospital encounter of 02/08/23    Echo    Interpretation Summary  · Concentric remodeling and normal systolic function.  · The estimated ejection fraction is 60%.  · Normal left ventricular diastolic function.  · Normal right ventricular size with normal right ventricular systolic function.  · There is mild aortic valve stenosis.  · Aortic valve area is 3.13 cm2; peak velocity is 1.15 m/s; mean gradient is 3 mmHg.     No results found for this or any previous visit.     Assessment:      1. Hyperlipidemia, unspecified hyperlipidemia type    2. Chronic diastolic CHF (congestive heart failure)    3. Essential hypertension    4. Coronary artery disease involving native coronary artery of native heart without angina pectoris w/ hx CABG    5. S/P CABG (coronary artery bypass graft)    6. CHB (complete heart block)    7. Aortic atherosclerosis    8. Pacemaker        Plan:   Hyperlipidemia, unspecified hyperlipidemia type    Chronic diastolic CHF  (congestive heart failure)    Essential hypertension    Coronary artery disease involving native coronary artery of native heart without angina pectoris w/ hx CABG    S/P CABG (coronary artery bypass graft)    CHB (complete heart block)    Aortic atherosclerosis    Pacemaker      ASA, metoprolol, statin- CAD  Statin, fish oil- Hlp  Metoprolol, lisinopril- CHF, check BNP, BMP  PPM compliant with checks, battery life 70%  Discussed importance of good hydration given recent worsening leg cramps we will check labs with phone review  Risk factor modifications   Low-sodium, low-fat diet   Daily exercise as tolerated    Labs today, return to clinic in 6 months or sooner if needed    Courtney Guillot, FNP-C [facility], Cardiology

## 2024-10-02 ENCOUNTER — TELEPHONE (OUTPATIENT)
Dept: CARDIOLOGY | Facility: CLINIC | Age: 74
End: 2024-10-02
Payer: MEDICARE

## 2024-10-02 DIAGNOSIS — I50.32 CHRONIC DIASTOLIC CHF (CONGESTIVE HEART FAILURE): Primary | ICD-10-CM

## 2024-10-02 NOTE — TELEPHONE ENCOUNTER
Contacted pt and informed him fluid marker is elevated which was expected given the leg swelling. Kidney function is elevated at baseline. Potassium is on the higher end of normal. With keeping all of this in mind, I recommend Lasix 20mg tablet for 4 days and see if the edema improves then stop the Lasix. Repeat labs scheduled 10/7/24. Pt vu w/o q/c      ----- Message from Tammie Koenig NP sent at 10/1/2024  9:43 PM CDT -----  Fluid marker is elevated which was expected given the leg swelling. Kidney function is elevated at baseline. Potassium is on the higher end of normal. With keeping all of this in mind, I recommend Lasix 20mg tablet for 4 days and see if the edema improves then stop the Lasix. Please repeat BNP and BMP in 1 week

## 2024-10-07 ENCOUNTER — LAB VISIT (OUTPATIENT)
Dept: LAB | Facility: HOSPITAL | Age: 74
End: 2024-10-07
Payer: MEDICARE

## 2024-10-07 DIAGNOSIS — I50.32 CHRONIC DIASTOLIC CHF (CONGESTIVE HEART FAILURE): ICD-10-CM

## 2024-10-07 LAB
ANION GAP SERPL CALC-SCNC: 9 MMOL/L (ref 8–16)
BNP SERPL-MCNC: 109 PG/ML (ref 0–99)
BUN SERPL-MCNC: 32 MG/DL (ref 8–23)
CALCIUM SERPL-MCNC: 9.3 MG/DL (ref 8.7–10.5)
CHLORIDE SERPL-SCNC: 114 MMOL/L (ref 95–110)
CO2 SERPL-SCNC: 20 MMOL/L (ref 23–29)
CREAT SERPL-MCNC: 2.2 MG/DL (ref 0.5–1.4)
EST. GFR  (NO RACE VARIABLE): 31 ML/MIN/1.73 M^2
GLUCOSE SERPL-MCNC: 120 MG/DL (ref 70–110)
POTASSIUM SERPL-SCNC: 4.5 MMOL/L (ref 3.5–5.1)
SODIUM SERPL-SCNC: 143 MMOL/L (ref 136–145)

## 2024-10-07 PROCEDURE — 80048 BASIC METABOLIC PNL TOTAL CA: CPT | Mod: PO

## 2024-10-07 PROCEDURE — 36415 COLL VENOUS BLD VENIPUNCTURE: CPT | Mod: PO

## 2024-10-07 PROCEDURE — 83880 ASSAY OF NATRIURETIC PEPTIDE: CPT | Mod: PO

## 2024-10-09 ENCOUNTER — TELEPHONE (OUTPATIENT)
Dept: CARDIOLOGY | Facility: CLINIC | Age: 74
End: 2024-10-09
Payer: MEDICARE

## 2024-10-09 NOTE — TELEPHONE ENCOUNTER
----- Message from Tammie Koenig NP sent at 10/9/2024  5:54 AM CDT -----  Labs are stable. Kidney function at baseline still, potassium improved. Fluid marker improved. Ok to use Lasix as needed for edema. Please make sure he has a follow up

## 2024-10-31 ENCOUNTER — OFFICE VISIT (OUTPATIENT)
Dept: PAIN MEDICINE | Facility: CLINIC | Age: 74
End: 2024-10-31
Payer: MEDICARE

## 2024-10-31 VITALS
WEIGHT: 255.75 LBS | HEIGHT: 67 IN | BODY MASS INDEX: 40.14 KG/M2 | DIASTOLIC BLOOD PRESSURE: 71 MMHG | RESPIRATION RATE: 16 BRPM | SYSTOLIC BLOOD PRESSURE: 122 MMHG | HEART RATE: 60 BPM

## 2024-10-31 DIAGNOSIS — M54.16 LUMBAR RADICULOPATHY: ICD-10-CM

## 2024-10-31 DIAGNOSIS — M48.061 DEGENERATIVE LUMBAR SPINAL STENOSIS: Primary | ICD-10-CM

## 2024-10-31 DIAGNOSIS — M79.18 LUMBAR MUSCLE PAIN: ICD-10-CM

## 2024-10-31 PROCEDURE — 99214 OFFICE O/P EST MOD 30 MIN: CPT | Mod: PBBFAC | Performed by: NURSE PRACTITIONER

## 2024-10-31 PROCEDURE — 99999 PR PBB SHADOW E&M-EST. PATIENT-LVL IV: CPT | Mod: PBBFAC,,, | Performed by: NURSE PRACTITIONER

## 2024-10-31 RX ORDER — PREGABALIN 100 MG/1
100 CAPSULE ORAL 2 TIMES DAILY
Qty: 60 CAPSULE | Refills: 5 | Status: SHIPPED | OUTPATIENT
Start: 2024-10-31 | End: 2025-05-01

## 2024-12-05 DIAGNOSIS — H40.1132 PRIMARY OPEN ANGLE GLAUCOMA OF BOTH EYES, MODERATE STAGE: ICD-10-CM

## 2024-12-05 RX ORDER — LATANOPROST 50 UG/ML
1 SOLUTION/ DROPS OPHTHALMIC NIGHTLY
Qty: 7.5 ML | Refills: 4 | Status: SHIPPED | OUTPATIENT
Start: 2024-12-05

## 2024-12-09 ENCOUNTER — OFFICE VISIT (OUTPATIENT)
Dept: OPHTHALMOLOGY | Facility: CLINIC | Age: 74
End: 2024-12-09
Payer: MEDICARE

## 2024-12-09 DIAGNOSIS — H25.13 NUCLEAR SENILE CATARACT OF BOTH EYES: ICD-10-CM

## 2024-12-09 DIAGNOSIS — H40.1132 PRIMARY OPEN ANGLE GLAUCOMA OF BOTH EYES, MODERATE STAGE: Primary | ICD-10-CM

## 2024-12-09 PROCEDURE — 92133 CPTRZD OPH DX IMG PST SGM ON: CPT | Mod: PBBFAC | Performed by: OPHTHALMOLOGY

## 2024-12-09 PROCEDURE — 99214 OFFICE O/P EST MOD 30 MIN: CPT | Mod: S$PBB,,, | Performed by: OPHTHALMOLOGY

## 2024-12-09 PROCEDURE — 92020 GONIOSCOPY: CPT | Mod: PBBFAC | Performed by: OPHTHALMOLOGY

## 2024-12-09 PROCEDURE — 99999 PR PBB SHADOW E&M-EST. PATIENT-LVL I: CPT | Mod: PBBFAC,,, | Performed by: OPHTHALMOLOGY

## 2024-12-09 PROCEDURE — 99211 OFF/OP EST MAY X REQ PHY/QHP: CPT | Mod: PBBFAC | Performed by: OPHTHALMOLOGY

## 2024-12-09 PROCEDURE — 92020 GONIOSCOPY: CPT | Mod: S$PBB,,, | Performed by: OPHTHALMOLOGY

## 2024-12-09 NOTE — PROGRESS NOTES
HPI     Glaucoma     Additional comments: Pt states vision OU has been stable since last   visit, no complaints at this time.Latanoprost QHS OU  Timolol qam OU              Comments    Glaucoma     Additional comments: Pt here for 4m IOP chk. No pain or discomfort. VA   stable. 100% compliant with gtts.             Comments    1. Mod COAG- No FHx (init 32/27 on 3/10 with C/D .65/.60) Goal <18, new   goal = 15 6/2017  SLT OD 12/11/14 (20-16)  SLT OS 2/25/15 (20-16)  2. Mild NSC  3. LLL Cyst Excision on 6/13/12     Latanoprost QHS OU  Timolol qam OU               Last edited by Martinez Chatman MA on 8/22/2024  1:43 PM.                 Last edited by Ana Zamudio on 12/9/2024  8:13 AM.            Assessment /Plan     For exam results, see Encounter Report.    Primary open angle glaucoma of both eyes, moderate stage  Doing well, intraocular pressure (IOP) within acceptable range relative to target IOP with no evidence of progression. Continue current treatment. Reviewed importance of continued compliance with treatment and follow up.      Continue current gtts:  Latanoprost one drop in each eye nightly and Timolol one drop both eyes daily    RTC in 4 months with HVF 24-2, CCT, and DFE.      Nuclear senile cataract of both eyes  Condition stable, no therapeutic intervention necessary at this time. Will continue to monitor.

## 2024-12-11 DIAGNOSIS — I10 ESSENTIAL HYPERTENSION: ICD-10-CM

## 2024-12-11 DIAGNOSIS — I25.810 CORONARY ARTERY DISEASE INVOLVING CORONARY BYPASS GRAFT OF NATIVE HEART WITHOUT ANGINA PECTORIS: ICD-10-CM

## 2024-12-11 RX ORDER — AMLODIPINE BESYLATE 10 MG/1
10 TABLET ORAL DAILY
Qty: 90 TABLET | Refills: 2 | Status: SHIPPED | OUTPATIENT
Start: 2024-12-11

## 2024-12-11 NOTE — TELEPHONE ENCOUNTER
Care Due:                  Date            Visit Type   Department     Provider  --------------------------------------------------------------------------------                                EP -                              PRIMARY      [facility] INTERNAL  Last Visit: 08-      CARE ([facility])   [facility]       Dick Mason                              EP -                              PRIMARY      [facility] INTERNAL  Next Visit: 03-      CARE ([facility])   [facility]       Dick Mason                                                            Last  Test          Frequency    Reason                     Performed    Due Date  --------------------------------------------------------------------------------    HBA1C.......  6 months...  dapagliflozin............  08- 02-    [facility] Embedded Care Due Messages. Reference number: [id number].   12/11/2024 4:49:07 PM CST

## 2024-12-12 NOTE — TELEPHONE ENCOUNTER
Provider Staff:  Action required for this patient    Requires labs      Please see care gap opportunities below in Care Due Message.    Thanks!  [facility]     Appointments      Date Provider   Last Visit   8/22/2024 Dick Mason MD   Next Visit   3/3/2025 Dick Mason MD     Refill Decision Note   Damon Yoo  is requesting a refill authorization.  Brief Assessment and Rationale for Refill:  Approve     Medication Therapy Plan:         Comments:     Note composed:7:24 PM 12/11/2024

## 2024-12-14 ENCOUNTER — CLINICAL SUPPORT (OUTPATIENT)
Dept: CARDIOLOGY | Facility: HOSPITAL | Age: 74
End: 2024-12-14
Payer: MEDICARE

## 2024-12-14 DIAGNOSIS — I44.2 ATRIOVENTRICULAR BLOCK, COMPLETE: ICD-10-CM

## 2024-12-14 DIAGNOSIS — Z95.0 PRESENCE OF CARDIAC PACEMAKER: ICD-10-CM

## 2024-12-15 ENCOUNTER — CLINICAL SUPPORT (OUTPATIENT)
Dept: CARDIOLOGY | Facility: HOSPITAL | Age: 74
End: 2024-12-15
Payer: MEDICARE

## 2024-12-15 DIAGNOSIS — Z95.0 PRESENCE OF CARDIAC PACEMAKER: ICD-10-CM

## 2024-12-15 DIAGNOSIS — I44.2 ATRIOVENTRICULAR BLOCK, COMPLETE: ICD-10-CM

## 2025-01-13 DIAGNOSIS — Z00.00 ENCOUNTER FOR MEDICARE ANNUAL WELLNESS EXAM: ICD-10-CM

## 2025-01-27 DIAGNOSIS — I10 ESSENTIAL HYPERTENSION: ICD-10-CM

## 2025-01-27 RX ORDER — METOPROLOL SUCCINATE 200 MG/1
200 TABLET, EXTENDED RELEASE ORAL DAILY
Qty: 90 TABLET | Refills: 3 | Status: SHIPPED | OUTPATIENT
Start: 2025-01-27 | End: 2026-01-27

## 2025-02-18 ENCOUNTER — PATIENT OUTREACH (OUTPATIENT)
Dept: ADMINISTRATIVE | Facility: HOSPITAL | Age: 75
End: 2025-02-18
Payer: MEDICARE

## 2025-02-24 ENCOUNTER — LAB VISIT (OUTPATIENT)
Dept: LAB | Facility: HOSPITAL | Age: 75
End: 2025-02-24
Attending: PEDIATRICS
Payer: MEDICARE

## 2025-02-24 DIAGNOSIS — E78.5 HYPERLIPIDEMIA, UNSPECIFIED HYPERLIPIDEMIA TYPE: Chronic | ICD-10-CM

## 2025-02-24 DIAGNOSIS — R73.03 PREDIABETES: ICD-10-CM

## 2025-02-24 DIAGNOSIS — D69.6 THROMBOCYTOPENIA: ICD-10-CM

## 2025-02-24 LAB
ALBUMIN SERPL BCP-MCNC: 3.7 G/DL (ref 3.5–5.2)
ALP SERPL-CCNC: 97 U/L (ref 40–150)
ALT SERPL W/O P-5'-P-CCNC: 27 U/L (ref 10–44)
ANION GAP SERPL CALC-SCNC: 10 MMOL/L (ref 8–16)
AST SERPL-CCNC: 29 U/L (ref 10–40)
BASOPHILS # BLD AUTO: 0.04 K/UL (ref 0–0.2)
BASOPHILS NFR BLD: 0.5 % (ref 0–1.9)
BILIRUB SERPL-MCNC: 0.7 MG/DL (ref 0.1–1)
BUN SERPL-MCNC: 36 MG/DL (ref 8–23)
CALCIUM SERPL-MCNC: 9.6 MG/DL (ref 8.7–10.5)
CHLORIDE SERPL-SCNC: 114 MMOL/L (ref 95–110)
CHOLEST SERPL-MCNC: 207 MG/DL (ref 120–199)
CHOLEST/HDLC SERPL: 8 {RATIO} (ref 2–5)
CO2 SERPL-SCNC: 22 MMOL/L (ref 23–29)
CREAT SERPL-MCNC: 2.1 MG/DL (ref 0.5–1.4)
DIFFERENTIAL METHOD BLD: ABNORMAL
EOSINOPHIL # BLD AUTO: 0.3 K/UL (ref 0–0.5)
EOSINOPHIL NFR BLD: 3.7 % (ref 0–8)
ERYTHROCYTE [DISTWIDTH] IN BLOOD BY AUTOMATED COUNT: 14.1 % (ref 11.5–14.5)
EST. GFR  (NO RACE VARIABLE): 32.8 ML/MIN/1.73 M^2
ESTIMATED AVG GLUCOSE: 128 MG/DL (ref 68–131)
GLUCOSE SERPL-MCNC: 103 MG/DL (ref 70–110)
HBA1C MFR BLD: 6.1 % (ref 4–5.6)
HCT VFR BLD AUTO: 39.6 % (ref 40–54)
HDLC SERPL-MCNC: 26 MG/DL (ref 40–75)
HDLC SERPL: 12.6 % (ref 20–50)
HGB BLD-MCNC: 12.7 G/DL (ref 14–18)
IMM GRANULOCYTES # BLD AUTO: 0.02 K/UL (ref 0–0.04)
IMM GRANULOCYTES NFR BLD AUTO: 0.2 % (ref 0–0.5)
LDLC SERPL CALC-MCNC: 138.2 MG/DL (ref 63–159)
LYMPHOCYTES # BLD AUTO: 2.5 K/UL (ref 1–4.8)
LYMPHOCYTES NFR BLD: 29.1 % (ref 18–48)
MCH RBC QN AUTO: 30.3 PG (ref 27–31)
MCHC RBC AUTO-ENTMCNC: 32.1 G/DL (ref 32–36)
MCV RBC AUTO: 95 FL (ref 82–98)
MONOCYTES # BLD AUTO: 0.8 K/UL (ref 0.3–1)
MONOCYTES NFR BLD: 9.2 % (ref 4–15)
NEUTROPHILS # BLD AUTO: 5 K/UL (ref 1.8–7.7)
NEUTROPHILS NFR BLD: 57.3 % (ref 38–73)
NONHDLC SERPL-MCNC: 181 MG/DL
NRBC BLD-RTO: 0 /100 WBC
PLATELET # BLD AUTO: 167 K/UL (ref 150–450)
PMV BLD AUTO: 12.3 FL (ref 9.2–12.9)
POTASSIUM SERPL-SCNC: 5 MMOL/L (ref 3.5–5.1)
PROT SERPL-MCNC: 8.1 G/DL (ref 6–8.4)
RBC # BLD AUTO: 4.19 M/UL (ref 4.6–6.2)
SODIUM SERPL-SCNC: 146 MMOL/L (ref 136–145)
TRIGL SERPL-MCNC: 214 MG/DL (ref 30–150)
WBC # BLD AUTO: 8.65 K/UL (ref 3.9–12.7)

## 2025-02-24 PROCEDURE — 80053 COMPREHEN METABOLIC PANEL: CPT | Mod: PO | Performed by: PEDIATRICS

## 2025-02-24 PROCEDURE — 85025 COMPLETE CBC W/AUTO DIFF WBC: CPT | Mod: PO | Performed by: PEDIATRICS

## 2025-02-24 PROCEDURE — 36415 COLL VENOUS BLD VENIPUNCTURE: CPT | Mod: PO | Performed by: PEDIATRICS

## 2025-02-24 PROCEDURE — 80061 LIPID PANEL: CPT | Performed by: PEDIATRICS

## 2025-02-24 PROCEDURE — 83036 HEMOGLOBIN GLYCOSYLATED A1C: CPT | Performed by: PEDIATRICS

## 2025-03-03 ENCOUNTER — OFFICE VISIT (OUTPATIENT)
Dept: INTERNAL MEDICINE | Facility: CLINIC | Age: 75
End: 2025-03-03
Payer: MEDICARE

## 2025-03-03 VITALS
BODY MASS INDEX: 40.9 KG/M2 | HEIGHT: 67 IN | HEART RATE: 62 BPM | WEIGHT: 260.56 LBS | TEMPERATURE: 97 F | OXYGEN SATURATION: 98 % | SYSTOLIC BLOOD PRESSURE: 90 MMHG | DIASTOLIC BLOOD PRESSURE: 56 MMHG

## 2025-03-03 DIAGNOSIS — D64.9 NORMOCYTIC ANEMIA: ICD-10-CM

## 2025-03-03 DIAGNOSIS — I27.9 PULMONARY HEART DISEASE: ICD-10-CM

## 2025-03-03 DIAGNOSIS — Z86.0100 HISTORY OF COLON POLYPS: ICD-10-CM

## 2025-03-03 DIAGNOSIS — D69.6 THROMBOCYTOPENIA: ICD-10-CM

## 2025-03-03 DIAGNOSIS — I44.2 CHB (COMPLETE HEART BLOCK): ICD-10-CM

## 2025-03-03 DIAGNOSIS — R73.03 PREDIABETES: ICD-10-CM

## 2025-03-03 DIAGNOSIS — M48.061 SPINAL STENOSIS OF LUMBAR REGION WITH RADICULOPATHY: ICD-10-CM

## 2025-03-03 DIAGNOSIS — Z85.46 HISTORY OF PROSTATE CANCER: ICD-10-CM

## 2025-03-03 DIAGNOSIS — N18.4 CKD (CHRONIC KIDNEY DISEASE) STAGE 4, GFR 15-29 ML/MIN: Primary | ICD-10-CM

## 2025-03-03 DIAGNOSIS — I10 ESSENTIAL HYPERTENSION: ICD-10-CM

## 2025-03-03 DIAGNOSIS — M1A.39X0 CHRONIC GOUT DUE TO RENAL IMPAIRMENT OF MULTIPLE SITES WITHOUT TOPHUS: ICD-10-CM

## 2025-03-03 DIAGNOSIS — M54.16 SPINAL STENOSIS OF LUMBAR REGION WITH RADICULOPATHY: ICD-10-CM

## 2025-03-03 DIAGNOSIS — E66.01 SEVERE OBESITY: ICD-10-CM

## 2025-03-03 DIAGNOSIS — E78.5 HYPERLIPIDEMIA, UNSPECIFIED HYPERLIPIDEMIA TYPE: Chronic | ICD-10-CM

## 2025-03-03 DIAGNOSIS — I47.10 SVT (SUPRAVENTRICULAR TACHYCARDIA): ICD-10-CM

## 2025-03-03 DIAGNOSIS — Z95.1 S/P CABG (CORONARY ARTERY BYPASS GRAFT): ICD-10-CM

## 2025-03-03 DIAGNOSIS — I70.0 AORTIC ATHEROSCLEROSIS: ICD-10-CM

## 2025-03-03 DIAGNOSIS — I50.32 CHRONIC DIASTOLIC CHF (CONGESTIVE HEART FAILURE): ICD-10-CM

## 2025-03-03 DIAGNOSIS — Z95.0 PACEMAKER: ICD-10-CM

## 2025-03-03 DIAGNOSIS — I25.10 CORONARY ARTERY DISEASE INVOLVING NATIVE CORONARY ARTERY OF NATIVE HEART WITHOUT ANGINA PECTORIS: ICD-10-CM

## 2025-03-03 PROCEDURE — 99214 OFFICE O/P EST MOD 30 MIN: CPT | Mod: S$PBB,,, | Performed by: PEDIATRICS

## 2025-03-03 PROCEDURE — 99999 PR PBB SHADOW E&M-EST. PATIENT-LVL IV: CPT | Mod: PBBFAC,,, | Performed by: PEDIATRICS

## 2025-03-03 PROCEDURE — 99214 OFFICE O/P EST MOD 30 MIN: CPT | Mod: PBBFAC | Performed by: PEDIATRICS

## 2025-03-03 NOTE — PROGRESS NOTES
Dictation #1  MRN:[id number]  [facility]:[id number] Subjective:       Patient ID: Damon Yoo is a 74 y.o. male.    Chief Complaint: Follow-up    Chief Complaint: Annual and Follow-up     Damon Yoo is a 74 year old male here for his 6 month follow up.     PMHx, PSHx, SocHx, and FHx reviewed and discussed with patient.     1) HTN: No home  B/P checks, no HTNive symptoms. Compliant with medications.   2) LIPIDS:not following D&E, tolerating and compliant with crestor 40 mg. Gained weight, lipid panel worsened  3) CAD/concentric hypertrophy with remodeling/complete heart block, s/p pacer/pulm HTN/CHF: no CP, SOB, DIAZ, sees cardiology  4) Gout: quiet on allopurinol , no longer seeing rheum  5) Prostate cancer: yearly PSA, not seeing uro currently  6) Chronic back pain (lumbar stenosis)/ Knees: sees PMR and ortho, periodic NAHUN. Chronic but manageable.   7) CKD 4:  Kidney function fluctuating. Has cut back on soda and is hydrating better, also using celebrex PRN about 1x/week  8) Prediabetes: stable. no DM symptoms. Compliant with Farxiga. Has polyuria as side effects. A1C is 5.9. No BS checks.   9) Obesity: sedentary. Weight stable, somewhat working on D&E  10) Anemia/Thrombocytopenia: no bruising or bleeding, Had colonoscopy and EGD 2024, suspect his anemia is likely secondary to his CKD    Labs reviewed with patient.           Review of Systems   Constitutional:  Negative for fever and unexpected weight change.   HENT:  Negative for congestion and rhinorrhea.    Eyes:  Negative for discharge and redness.   Respiratory:  Negative for cough and wheezing.    Cardiovascular:  Negative for chest pain, palpitations and leg swelling.   Gastrointestinal:  Negative for abdominal pain, constipation, diarrhea and vomiting.   Endocrine: Negative for cold intolerance, heat intolerance, polydipsia, polyphagia and polyuria.   Genitourinary:  Negative for decreased urine volume and difficulty urinating.   Musculoskeletal:  Positive for  arthralgias and back pain. Negative for joint swelling.   Skin:  Negative for rash and wound.   Neurological:  Negative for syncope and headaches.   Psychiatric/Behavioral:  Negative for behavioral problems and sleep disturbance.        Objective:      Physical Exam  Vitals and nursing note reviewed.   Constitutional:       General: He is not in acute distress.     Appearance: He is well-developed.   Neck:      Thyroid: No thyromegaly.      Vascular: No JVD.   Cardiovascular:      Rate and Rhythm: Normal rate and regular rhythm.      Heart sounds: Normal heart sounds. No murmur heard.  Pulmonary:      Effort: Pulmonary effort is normal. No respiratory distress.      Breath sounds: Normal breath sounds. No wheezing or rales.   Abdominal:      General: There is no distension.      Palpations: Abdomen is soft. There is no mass.      Tenderness: There is no abdominal tenderness. There is no guarding.   Musculoskeletal:      Right lower leg: No edema.      Left lower leg: No edema.   Lymphadenopathy:      Cervical: No cervical adenopathy.   Skin:     Capillary Refill: Capillary refill takes less than 2 seconds.      Findings: No rash.   Neurological:      General: No focal deficit present.      Mental Status: He is alert and oriented to person, place, and time.      Cranial Nerves: No cranial nerve deficit.      Coordination: Coordination normal.   Psychiatric:         Mood and Affect: Mood normal.         Behavior: Behavior normal.         Thought Content: Thought content normal.         Judgment: Judgment normal.         Assessment:       1. CKD (chronic kidney disease) stage 4, GFR 15-29 ml/min    2. Chronic diastolic CHF (congestive heart failure)    3. CHB (complete heart block)    4. Thrombocytopenia    5. Aortic atherosclerosis    6. Severe obesity    7. SVT (supraventricular tachycardia)    8. Spinal stenosis of lumbar region with radiculopathy    9. S/P CABG (coronary artery bypass graft)    10. Pulmonary heart  disease    11. Prediabetes    12. Pacemaker    13. Normocytic anemia    14. Hyperlipidemia, unspecified hyperlipidemia type    15. History of colon polyps    16. History of prostate cancer    17. Essential hypertension    18. Coronary artery disease involving native coronary artery of native heart without angina pectoris w/ hx CABG    19. Chronic gout due to renal impairment of multiple sites without tophus        Plan:       CKD (chronic kidney disease) stage 4, GFR 15-29 ml/min  -     Microalbumin/Creatinine Ratio, Urine; Future; Expected date: 03/03/2025  -     Comprehensive Metabolic Panel; Future; Expected date: 03/03/2025    Chronic diastolic CHF (congestive heart failure)    CHB (complete heart block)    Thrombocytopenia  -     CBC Auto Differential; Future; Expected date: 03/03/2025    Aortic atherosclerosis    Severe obesity    SVT (supraventricular tachycardia)    Spinal stenosis of lumbar region with radiculopathy    S/P CABG (coronary artery bypass graft)    Pulmonary heart disease    Prediabetes  -     Hemoglobin A1C; Future; Expected date: 03/03/2025    Pacemaker    Normocytic anemia  -     CBC Auto Differential; Future; Expected date: 03/03/2025    Hyperlipidemia, unspecified hyperlipidemia type  -     Lipid Panel; Future; Expected date: 03/03/2025    History of colon polyps    History of prostate cancer  -     Prostate Specific Antigen, Diagnostic; Future; Expected date: 03/03/2025    Essential hypertension    Coronary artery disease involving native coronary artery of native heart without angina pectoris w/ hx CABG    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 Mediterranean diet, exercise weight loss. Maintain meds and subspeciality care. Recheck labs at next vivit.

## 2025-03-16 ENCOUNTER — CLINICAL SUPPORT (OUTPATIENT)
Dept: CARDIOLOGY | Facility: HOSPITAL | Age: 75
End: 2025-03-16
Payer: MEDICARE

## 2025-03-16 DIAGNOSIS — Z95.0 PRESENCE OF CARDIAC PACEMAKER: ICD-10-CM

## 2025-03-16 DIAGNOSIS — I44.2 ATRIOVENTRICULAR BLOCK, COMPLETE: ICD-10-CM

## 2025-03-17 DIAGNOSIS — E78.5 HYPERLIPIDEMIA, UNSPECIFIED HYPERLIPIDEMIA TYPE: ICD-10-CM

## 2025-03-17 DIAGNOSIS — I10 ESSENTIAL HYPERTENSION: ICD-10-CM

## 2025-03-17 DIAGNOSIS — I25.810 CORONARY ARTERY DISEASE INVOLVING CORONARY BYPASS GRAFT OF NATIVE HEART WITHOUT ANGINA PECTORIS: ICD-10-CM

## 2025-03-17 RX ORDER — ROSUVASTATIN CALCIUM 40 MG/1
40 TABLET, COATED ORAL NIGHTLY
Qty: 90 TABLET | Refills: 3 | Status: SHIPPED | OUTPATIENT
Start: 2025-03-17 | End: 2026-03-17

## 2025-03-17 RX ORDER — LISINOPRIL 40 MG/1
40 TABLET ORAL DAILY
Qty: 90 TABLET | Refills: 3 | Status: SHIPPED | OUTPATIENT
Start: 2025-03-17

## 2025-03-18 ENCOUNTER — HOSPITAL ENCOUNTER (OUTPATIENT)
Dept: RADIOLOGY | Facility: HOSPITAL | Age: 75
Discharge: HOME | End: 2025-03-18
Attending: NURSE PRACTITIONER
Payer: MEDICARE

## 2025-03-18 ENCOUNTER — OFFICE VISIT (OUTPATIENT)
Dept: URGENT CARE | Facility: CLINIC | Age: 75
End: 2025-03-18
Payer: MEDICARE

## 2025-03-18 VITALS
DIASTOLIC BLOOD PRESSURE: 54 MMHG | TEMPERATURE: 98 F | OXYGEN SATURATION: 97 % | BODY MASS INDEX: 41.21 KG/M2 | HEART RATE: 61 BPM | WEIGHT: 262.56 LBS | HEIGHT: 67 IN | SYSTOLIC BLOOD PRESSURE: 112 MMHG | RESPIRATION RATE: 16 BRPM

## 2025-03-18 DIAGNOSIS — I27.9 PULMONARY HEART DISEASE: ICD-10-CM

## 2025-03-18 DIAGNOSIS — R60.0 EDEMA OF BOTH LOWER LEGS: Primary | ICD-10-CM

## 2025-03-18 DIAGNOSIS — I25.10 CORONARY ARTERY DISEASE INVOLVING NATIVE CORONARY ARTERY OF NATIVE HEART WITHOUT ANGINA PECTORIS: ICD-10-CM

## 2025-03-18 DIAGNOSIS — R60.0 EDEMA OF BOTH LOWER LEGS: ICD-10-CM

## 2025-03-18 DIAGNOSIS — I10 ESSENTIAL HYPERTENSION: ICD-10-CM

## 2025-03-18 PROCEDURE — 71046 X-RAY EXAM CHEST 2 VIEWS: CPT | Mod: 26,,, | Performed by: RADIOLOGY

## 2025-03-18 PROCEDURE — 99214 OFFICE O/P EST MOD 30 MIN: CPT | Mod: S$GLB,,, | Performed by: NURSE PRACTITIONER

## 2025-03-18 PROCEDURE — 93005 ELECTROCARDIOGRAM TRACING: CPT | Mod: S$GLB,,, | Performed by: NURSE PRACTITIONER

## 2025-03-18 PROCEDURE — 71046 X-RAY EXAM CHEST 2 VIEWS: CPT | Mod: TC

## 2025-03-18 RX ORDER — FUROSEMIDE 20 MG/1
20 TABLET ORAL DAILY
Qty: 30 TABLET | Refills: 11 | Status: SHIPPED | OUTPATIENT
Start: 2025-03-18 | End: 2026-03-18

## 2025-03-18 RX ORDER — FUROSEMIDE 20 MG/1
20 TABLET ORAL
Status: CANCELLED | OUTPATIENT
Start: 2025-03-18 | End: 2025-03-18

## 2025-03-18 NOTE — PROGRESS NOTES
"  Subjective:      Patient ID: Damon Yoo is a 75 y.o. male.    Vitals:  height is 5' 7" (1.702 m) and weight is 119.1 kg (262 lb 9.1 oz). His tympanic temperature is 97.5 °F (36.4 °C). His blood pressure is 112/54 (abnormal) and his pulse is 61. His respiration is 16 and oxygen saturation is 97%.     Chief Complaint: Leg Pain    Patient presents with pain bilateral feet and shins. . Patient states that it feels tight in his shin down to his toes on his left leg and his toes feel tight on his right leg. Onset was a couple of weeks ago. OTC - tylenol. Hx of CAD, heart failure, has pacemaker. Denies chest pain or sob. Cardiology sees Dr. Kenyon. States he has an appointment schedules with him on April 6 th.     Leg Pain   The incident occurred more than 1 week ago. The incident occurred at home. There was no injury mechanism. The pain is present in the left leg, left foot and left toes. The quality of the pain is described as cramping. The pain is at a severity of 8/10. The pain is severe. The pain has been Constant since onset. He reports no foreign bodies present. Nothing aggravates the symptoms. He has tried acetaminophen for the symptoms. The treatment provided no relief.       Constitution: Negative for chills and fever.   Neck: Negative for neck pain.   Cardiovascular:  Positive for leg swelling. Negative for chest pain, palpitations, sob on exertion and passing out.   Respiratory:  Negative for chest tightness, cough, shortness of breath and wheezing.    Gastrointestinal:  Negative for nausea and vomiting.   Skin:  Negative for rash.   Neurological:  Negative for light-headedness.   Hematologic/Lymphatic: Negative for easy bruising/bleeding. Does not bruise/bleed easily.   Psychiatric/Behavioral:  Negative for confusion.       Objective:     Physical Exam   Constitutional: He is oriented to person, place, and time. He appears well-developed. He is cooperative.  Non-toxic appearance. He does not appear " ill. No distress.   HENT:   Head: Normocephalic and atraumatic.   Ears:   Right Ear: Hearing, tympanic membrane, external ear and ear canal normal.   Left Ear: Hearing, tympanic membrane, external ear and ear canal normal.   Nose: Nose normal. No mucosal edema, rhinorrhea or nasal deformity. No epistaxis. Right sinus exhibits no maxillary sinus tenderness and no frontal sinus tenderness. Left sinus exhibits no maxillary sinus tenderness and no frontal sinus tenderness.   Mouth/Throat: Uvula is midline, oropharynx is clear and moist and mucous membranes are normal. No trismus in the jaw. Normal dentition. No uvula swelling. No posterior oropharyngeal erythema.   Eyes: Conjunctivae and lids are normal. Right eye exhibits no discharge. Left eye exhibits no discharge. No scleral icterus.   Neck: Trachea normal and phonation normal. Neck supple.   Cardiovascular: Normal rate, regular rhythm, normal heart sounds and normal pulses.      Comments: Bilateral pitting edema lower legs to mid shin. Worse on left. No signs of infection. No erythema, warmth, not TTP   Pulmonary/Chest: Effort normal and breath sounds normal. No respiratory distress.   Abdominal: Normal appearance and bowel sounds are normal. He exhibits no distension and no mass. Soft. There is no abdominal tenderness.   Musculoskeletal: Normal range of motion.         General: No deformity. Normal range of motion.   Neurological: He is alert and oriented to person, place, and time. He exhibits normal muscle tone. Coordination normal.   Skin: Skin is warm, dry, intact, not diaphoretic and not pale.   Psychiatric: His speech is normal and behavior is normal. Judgment and thought content normal.   Nursing note and vitals reviewed.      Assessment:     1. Edema of both lower legs    2. Essential hypertension    3. Coronary artery disease involving native coronary artery of native heart without angina pectoris w/ hx CABG    4. Pulmonary heart disease        Plan:        Edema of both lower legs  -     EKG 12-lead; Future  -     X-Ray Chest PA And Lateral; Future; Expected date: 03/18/2025  -     furosemide (LASIX) 20 MG tablet; Take 1 tablet (20 mg total) by mouth once daily.  Dispense: 30 tablet; Refill: 11    Essential hypertension  -     furosemide (LASIX) 20 MG tablet; Take 1 tablet (20 mg total) by mouth once daily.  Dispense: 30 tablet; Refill: 11    Coronary artery disease involving native coronary artery of native heart without angina pectoris w/ hx CABG  -     furosemide (LASIX) 20 MG tablet; Take 1 tablet (20 mg total) by mouth once daily.  Dispense: 30 tablet; Refill: 11    Pulmonary heart disease  -     furosemide (LASIX) 20 MG tablet; Take 1 tablet (20 mg total) by mouth once daily.  Dispense: 30 tablet; Refill: 11    Appears in no acute distress.   ECG unchanged from prior AV dual paced at rate of 60.   No x ray here today but CXR ordered and patient sent to the [place].   Advised to contact Dr. Kenyon regarding this visit today and follow as indicated.   Will start lasix until seen by cardiology. Last K was 5.0 three weeks ago.

## 2025-03-19 LAB
OHS QRS DURATION: 192 MS
OHS QTC CALCULATION: 488 MS

## 2025-04-07 ENCOUNTER — OFFICE VISIT (OUTPATIENT)
Dept: CARDIOLOGY | Facility: CLINIC | Age: 75
End: 2025-04-07
Payer: MEDICARE

## 2025-04-07 VITALS
SYSTOLIC BLOOD PRESSURE: 126 MMHG | BODY MASS INDEX: 40.76 KG/M2 | WEIGHT: 259.69 LBS | HEIGHT: 67 IN | OXYGEN SATURATION: 99 % | DIASTOLIC BLOOD PRESSURE: 72 MMHG | HEART RATE: 63 BPM

## 2025-04-07 DIAGNOSIS — I10 ESSENTIAL HYPERTENSION: Primary | ICD-10-CM

## 2025-04-07 DIAGNOSIS — I70.0 AORTIC ATHEROSCLEROSIS: ICD-10-CM

## 2025-04-07 DIAGNOSIS — Z95.1 S/P CABG (CORONARY ARTERY BYPASS GRAFT): ICD-10-CM

## 2025-04-07 DIAGNOSIS — E66.01 OBESITY, CLASS III, BMI 40-49.9 (MORBID OBESITY): ICD-10-CM

## 2025-04-07 DIAGNOSIS — I25.810 CORONARY ARTERY DISEASE INVOLVING CORONARY BYPASS GRAFT OF NATIVE HEART WITHOUT ANGINA PECTORIS: ICD-10-CM

## 2025-04-07 DIAGNOSIS — M54.16 LUMBAR RADICULAR PAIN: ICD-10-CM

## 2025-04-07 DIAGNOSIS — I25.10 CORONARY ARTERY DISEASE INVOLVING NATIVE CORONARY ARTERY OF NATIVE HEART WITHOUT ANGINA PECTORIS: ICD-10-CM

## 2025-04-07 DIAGNOSIS — Z95.0 PRESENCE OF CARDIAC PACEMAKER: ICD-10-CM

## 2025-04-07 PROCEDURE — 99214 OFFICE O/P EST MOD 30 MIN: CPT | Mod: S$PBB,,, | Performed by: INTERNAL MEDICINE

## 2025-04-07 PROCEDURE — 99214 OFFICE O/P EST MOD 30 MIN: CPT | Mod: PBBFAC | Performed by: INTERNAL MEDICINE

## 2025-04-07 PROCEDURE — 99999 PR PBB SHADOW E&M-EST. PATIENT-LVL IV: CPT | Mod: PBBFAC,,, | Performed by: INTERNAL MEDICINE

## 2025-04-08 ENCOUNTER — HOSPITAL ENCOUNTER (OUTPATIENT)
Dept: CARDIOLOGY | Facility: HOSPITAL | Age: 75
Discharge: HOME | End: 2025-04-08
Attending: INTERNAL MEDICINE
Payer: MEDICARE

## 2025-04-08 VITALS
BODY MASS INDEX: 41.12 KG/M2 | WEIGHT: 262 LBS | DIASTOLIC BLOOD PRESSURE: 66 MMHG | HEIGHT: 67 IN | SYSTOLIC BLOOD PRESSURE: 123 MMHG

## 2025-04-08 DIAGNOSIS — Z95.1 S/P CABG (CORONARY ARTERY BYPASS GRAFT): ICD-10-CM

## 2025-04-08 DIAGNOSIS — I70.0 AORTIC ATHEROSCLEROSIS: ICD-10-CM

## 2025-04-08 DIAGNOSIS — I25.10 CORONARY ARTERY DISEASE INVOLVING NATIVE CORONARY ARTERY OF NATIVE HEART WITHOUT ANGINA PECTORIS: ICD-10-CM

## 2025-04-08 LAB
LEFT ARM DIASTOLIC BLOOD PRESSURE: 66 MMHG
LEFT ARM SYSTOLIC BLOOD PRESSURE: 123 MMHG
LEFT CBA DIAS: 15 CM/S
LEFT CBA SYS: 78 CM/S
LEFT CCA DIST DIAS: 20 CM/S
LEFT CCA DIST SYS: 90 CM/S
LEFT CCA MID DIAS: 18 CM/S
LEFT CCA MID SYS: 92 CM/S
LEFT CCA PROX DIAS: 20 CM/S
LEFT CCA PROX SYS: 147 CM/S
LEFT ECA DIAS: 12 CM/S
LEFT ECA SYS: 73 CM/S
LEFT ICA DIST DIAS: 32 CM/S
LEFT ICA DIST SYS: 119 CM/S
LEFT ICA MID DIAS: 30 CM/S
LEFT ICA MID SYS: 100 CM/S
LEFT ICA PROX DIAS: 21 CM/S
LEFT ICA PROX SYS: 73 CM/S
LEFT VERTEBRAL DIAS: 14 CM/S
LEFT VERTEBRAL SYS: 46 CM/S
OHS CV CAROTID RIGHT ICA EDV HIGHEST: 42
OHS CV CAROTID ULTRASOUND LEFT ICA/CCA RATIO: 1.32
OHS CV CAROTID ULTRASOUND RIGHT ICA/CCA RATIO: 1.94
OHS CV PV CAROTID LEFT HIGHEST CCA: 147
OHS CV PV CAROTID LEFT HIGHEST ICA: 119
OHS CV PV CAROTID RIGHT HIGHEST CCA: 78
OHS CV PV CAROTID RIGHT HIGHEST ICA: 130
OHS CV US CAROTID LEFT HIGHEST EDV: 32
RIGHT ARM DIASTOLIC BLOOD PRESSURE: 56 MMHG
RIGHT ARM SYSTOLIC BLOOD PRESSURE: 118 MMHG
RIGHT CBA DIAS: 12 CM/S
RIGHT CBA SYS: 52 CM/S
RIGHT CCA DIST DIAS: 15 CM/S
RIGHT CCA DIST SYS: 67 CM/S
RIGHT CCA MID DIAS: 14 CM/S
RIGHT CCA MID SYS: 78 CM/S
RIGHT CCA PROX DIAS: 10 CM/S
RIGHT CCA PROX SYS: 69 CM/S
RIGHT ECA DIAS: 7 CM/S
RIGHT ECA SYS: 73 CM/S
RIGHT ICA DIST DIAS: 38 CM/S
RIGHT ICA DIST SYS: 115 CM/S
RIGHT ICA MID DIAS: 42 CM/S
RIGHT ICA MID SYS: 130 CM/S
RIGHT ICA PROX DIAS: 23 CM/S
RIGHT ICA PROX SYS: 71 CM/S
RIGHT VERTEBRAL DIAS: 15 CM/S
RIGHT VERTEBRAL SYS: 60 CM/S

## 2025-04-08 PROCEDURE — 93880 EXTRACRANIAL BILAT STUDY: CPT | Mod: 26,,, | Performed by: INTERNAL MEDICINE

## 2025-04-08 PROCEDURE — 93880 EXTRACRANIAL BILAT STUDY: CPT

## 2025-04-11 DIAGNOSIS — I44.2 CHB (COMPLETE HEART BLOCK): ICD-10-CM

## 2025-04-11 DIAGNOSIS — I70.0 AORTIC ATHEROSCLEROSIS: ICD-10-CM

## 2025-04-11 DIAGNOSIS — Z95.0 PRESENCE OF CARDIAC PACEMAKER: Primary | ICD-10-CM

## 2025-04-11 DIAGNOSIS — I44.2 ATRIOVENTRICULAR BLOCK, COMPLETE: ICD-10-CM

## 2025-04-11 DIAGNOSIS — I50.32 CHRONIC DIASTOLIC CHF (CONGESTIVE HEART FAILURE): ICD-10-CM

## 2025-04-11 DIAGNOSIS — R00.1 BRADYCARDIA: ICD-10-CM

## 2025-04-14 ENCOUNTER — OFFICE VISIT (OUTPATIENT)
Dept: OPHTHALMOLOGY | Facility: CLINIC | Age: 75
End: 2025-04-14
Payer: MEDICARE

## 2025-04-14 DIAGNOSIS — H40.1132 PRIMARY OPEN ANGLE GLAUCOMA OF BOTH EYES, MODERATE STAGE: Primary | ICD-10-CM

## 2025-04-14 DIAGNOSIS — H25.13 NUCLEAR SCLEROSIS OF BOTH EYES: ICD-10-CM

## 2025-04-14 PROCEDURE — 99214 OFFICE O/P EST MOD 30 MIN: CPT | Mod: S$PBB,,, | Performed by: OPHTHALMOLOGY

## 2025-04-14 PROCEDURE — 99999 PR PBB SHADOW E&M-EST. PATIENT-LVL III: CPT | Mod: PBBFAC,,, | Performed by: OPHTHALMOLOGY

## 2025-04-14 PROCEDURE — 92083 EXTENDED VISUAL FIELD XM: CPT | Mod: PBBFAC | Performed by: OPHTHALMOLOGY

## 2025-04-14 PROCEDURE — 92133 CPTRZD OPH DX IMG PST SGM ON: CPT | Mod: PBBFAC | Performed by: OPHTHALMOLOGY

## 2025-04-14 PROCEDURE — 99213 OFFICE O/P EST LOW 20 MIN: CPT | Mod: PBBFAC | Performed by: OPHTHALMOLOGY

## 2025-04-14 NOTE — PROGRESS NOTES
HPI     Glaucoma     Additional comments: Pt reports for HVF , DFE, and CCT f/u  Pt states his VA has been stable denies any new vision changes. Pt states   he also been compliant with his eyedrops.   Pt denies any eye pain and or irritation.  Pt denies any new ocular disturbances.           Comments    1. Mod COAG- No FHx (init 32/27 on 3/10 with C/D .65/.60) Goal <18, new   goal = 15 6/2017  SLT OD 12/11/14 (20-16)  SLT OS 2/25/15 (20-16)  2. Mild NSC  3. LLL Cyst Excision on 6/13/12    Latanoprost QHS OU  Timolol qam OU             Last edited by Court See on 4/14/2025 10:39 AM.            Assessment /Plan     For exam results, see Encounter Report.    Primary open angle glaucoma of both eyes, moderate stage  Doing well, intraocular pressure (IOP) within acceptable range relative to target IOP with no evidence of progression. Continue current treatment. Reviewed importance of continued compliance with treatment and follow up.      Continue current gtts:  Latanoprost one drop in each eye nightly and Timolol one drop both eyes daily    RTC in 4 months with IOP check.      Nuclear sclerosis of both eyes  Cataracts are present but not visually significant. Will continue to monitor.

## 2025-04-17 ENCOUNTER — TELEPHONE (OUTPATIENT)
Dept: PAIN MEDICINE | Facility: CLINIC | Age: 75
End: 2025-04-17
Payer: MEDICARE

## 2025-04-17 ENCOUNTER — OFFICE VISIT (OUTPATIENT)
Dept: PODIATRY | Facility: CLINIC | Age: 75
End: 2025-04-17
Payer: MEDICARE

## 2025-04-17 VITALS — HEIGHT: 67 IN | WEIGHT: 261.94 LBS | BODY MASS INDEX: 41.11 KG/M2

## 2025-04-17 DIAGNOSIS — M48.061 SPINAL STENOSIS OF LUMBAR REGION WITH RADICULOPATHY: Primary | ICD-10-CM

## 2025-04-17 DIAGNOSIS — M54.16 SPINAL STENOSIS OF LUMBAR REGION WITH RADICULOPATHY: Primary | ICD-10-CM

## 2025-04-17 PROCEDURE — 99213 OFFICE O/P EST LOW 20 MIN: CPT | Mod: S$PBB,,, | Performed by: PODIATRIST

## 2025-04-17 PROCEDURE — 99999 PR PBB SHADOW E&M-EST. PATIENT-LVL III: CPT | Mod: PBBFAC,,, | Performed by: PODIATRIST

## 2025-04-17 PROCEDURE — 99213 OFFICE O/P EST LOW 20 MIN: CPT | Mod: PBBFAC | Performed by: PODIATRIST

## 2025-04-17 RX ORDER — PREGABALIN 200 MG/1
200 CAPSULE ORAL 2 TIMES DAILY
Qty: 60 CAPSULE | Refills: 0 | Status: SHIPPED | OUTPATIENT
Start: 2025-04-17

## 2025-04-17 RX ORDER — TIZANIDINE 4 MG/1
4 TABLET ORAL NIGHTLY PRN
Qty: 60 TABLET | Refills: 0 | Status: SHIPPED | OUTPATIENT
Start: 2025-04-17

## 2025-04-17 NOTE — PROGRESS NOTES
Subjective:     Patient ID: Damon Yoo is a 75 y.o. male.    Chief Complaint: Foot Pain (Pre-diabetic pt c/o BL foot and toe pain, pt c/o tightness and aching pain, pt rates 9/10 pain, pt wears tennis shoes, pt states the pain started 3 wks ago, PCP Dick Mason last seen 3/3/2025)    Damon is a 75 y.o. male who presents to the podiatry clinic  with complaint of  bilateral foot pain. Onset of the symptoms was 3 weeks ago. Precipitating event: none known. Current symptoms include: stiffness. Aggravating factors: inactivity and walking. Symptoms have been intermittent. Treatment to date:  Lyrica nightly . Patients rates pain 9/10 on pain scale.    Patient Active Problem List   Diagnosis    Hyperlipidemia    Essential hypertension    Coronary artery disease involving native coronary artery of native heart without angina pectoris w/ hx CABG    Senile nuclear sclerosis - Both Eyes    S/P CABG (coronary artery bypass graft)    Abnormal EKG    History of prostate cancer    Chronic gout due to renal impairment of multiple sites without tophus    Lumbar radicular pain    Primary osteoarthritis of left knee    Severe obesity    Knee pain, bilateral    Nuclear sclerosis of both eyes    Moderate stage chronic open angle glaucoma    Primary open angle glaucoma of both eyes, moderate stage    Refractive error    Nuclear sclerosis, bilateral    Spinal stenosis of lumbar region with radiculopathy    Prediabetes    CHB (complete heart block)    Chronic diastolic CHF (congestive heart failure)    Thrombocytopenia    SVT (supraventricular tachycardia)    Non-sustained ventricular tachycardia    Aortic atherosclerosis    Pulmonary heart disease    Pacemaker    Normocytic anemia    History of colon polyps    CKD (chronic kidney disease) stage 4, GFR 15-29 ml/min       Medication List with Changes/Refills   New Medications    PREGABALIN (LYRICA) 200 MG CAP    Take 1 capsule (200 mg total) by mouth 2 (two) times daily.     TIZANIDINE (ZANAFLEX) 4 MG TABLET    Take 1 tablet (4 mg total) by mouth nightly as needed (PRN pain at night).   Current Medications    ACETAMINOPHEN (TYLENOL) 500 MG TABLET    Take 500 mg by mouth every 6 (six) hours as needed for Pain.    ALLOPURINOL (ZYLOPRIM) 300 MG TABLET    Take 1 tablet (300 mg total) by mouth once daily.    AMLODIPINE (NORVASC) 10 MG TABLET    Take 1 tablet (10 mg total) by mouth once daily.    ASPIRIN 81 MG CHEWABLE TABLET    Take 1 tablet by mouth Daily.    CELECOXIB (CELEBREX) 200 MG CAPSULE    Take 1 capsule (200 mg total) by mouth once daily. Take with food    DAPAGLIFLOZIN PROPANEDIOL (FARXIGA) 5 MG TAB TABLET    Take 1 tablet (5 mg total) by mouth once daily.    FISH OIL-FAT ACID COMB.8- 1,200 MG (400 [id number]) CAP    Take 1 capsule by mouth once daily.     FUROSEMIDE (LASIX) 20 MG TABLET    Take 1 tablet (20 mg total) by mouth once daily.    LATANOPROST 0.005 % OPHTHALMIC SOLUTION    Place 1 drop into both eyes every evening    LISINOPRIL (PRINIVIL,ZESTRIL) 40 MG TABLET    Take 1 tablet (40 mg total) by mouth once daily.    METOPROLOL SUCCINATE (TOPROL-XL) 200 MG 24 HR TABLET    Take 1 tablet (200 mg total) by mouth once daily.    MITIGARE 0.6 MG CAP    Take 1 capsule (0.6 mg total) by mouth 2 (two) times daily as needed (acute gout).    PANTOPRAZOLE (PROTONIX) 40 MG TABLET    Take 1 tablet (40 mg total) by mouth once daily. (replaces nexium)    ROSUVASTATIN (CRESTOR) 40 MG TAB    Take 1 tablet (40 mg total) by mouth every evening. Generic is fine.    TIMOLOL MALEATE 0.5% (TIMOPTIC) 0.5 % DROP    Place 1 drop into both eyes every morning.    TRAMADOL (ULTRAM) 50 MG TABLET    Take 1 tablet (50 mg total) by mouth every 12 (twelve) hours as needed for Pain.   Discontinued Medications    PREGABALIN (LYRICA) 100 MG CAPSULE    Take 1 capsule (100 mg total) by mouth 2 (two) times daily.       Review of patient's allergies indicates:  No Known Allergies    Past Surgical History:    Procedure Laterality Date    A-V CARDIAC PACEMAKER INSERTION Left 11/30/2020    Procedure: INSERTION, CARDIAC PACEMAKER, DUAL CHAMBER;  Surgeon: Elsy Kenyon MD;  Location: [facility] CATH LAB;  Service: Cardiology;  Laterality: Left;  biotronik    COLONOSCOPY N/A 12/04/2017    Procedure: COLONOSCOPY;  Surgeon: Dina Ledesma MD;  Location: [facility] ENDO;  Service: Endoscopy;  Laterality: N/A;    COLONOSCOPY N/A 3/19/2024    Procedure: COLONOSCOPY;  Surgeon: Marta Cline MD;  Location: [facility] ENDO;  Service: Endoscopy;  Laterality: N/A;    CORONARY ARTERY BYPASS GRAFT  05/2003    x1    ESOPHAGOGASTRODUODENOSCOPY N/A 3/19/2024    Procedure: EGD (ESOPHAGOGASTRODUODENOSCOPY);  Surgeon: Marta Cline MD;  Location: [facility] ENDO;  Service: Endoscopy;  Laterality: N/A;    PROSTATE SURGERY      RALP 2013    TRANSFORAMINAL EPIDURAL INJECTION OF STEROID Right 11/11/2019    Procedure: Right L5/S1+ S1 TF NAHUN with IV sedation;  Surgeon: Ermias Mario MD;  Location: [facility] PAIN MGT;  Service: Pain Management;  Laterality: Right;    TRANSFORAMINAL EPIDURAL INJECTION OF STEROID Right 11/24/2021    Procedure: right L5/S1 and S1 TF NAHUN;  Surgeon: Ermias Mario MD;  Location: [facility] PAIN MGT;  Service: Pain Management;  Laterality: Right;    TRANSFORAMINAL EPIDURAL INJECTION OF STEROID Right 01/11/2022    Procedure: Right L5/S1 + S1 TF NAHUN;  Surgeon: Ermias Mario MD;  Location: [facility] PAIN MGT;  Service: Pain Management;  Laterality: Right;    TRANSFORAMINAL EPIDURAL INJECTION OF STEROID Right 12/29/2023    Procedure: right L5/S1 + S1 TF NAHUN (1st);  Surgeon: Deyanira Parsons MD;  Location: [facility] PAIN MGT;  Service: Pain Management;  Laterality: Right;       Family History   Problem Relation Name Age of Onset    No Known Problems Mother      Heart disease Father      Hypertension Father      No Known Problems Daughter      Strabismus Neg Hx      Retinal detachment Neg Hx      Macular degeneration Neg Hx      Glaucoma Neg Hx    "   Blindness Neg Hx      Amblyopia Neg Hx         Social History     Socioeconomic History    Marital status:    Tobacco Use    Smoking status: Former     Current packs/day: 0.00     Average packs/day: 0.3 packs/day for 15.0 years (3.8 ttl pk-yrs)     Types: Cigarettes     Start date: 1973     Quit date: 1988     Years since quittin.0    Smokeless tobacco: Never   Substance and Sexual Activity    Alcohol use: No    Drug use: No    Sexual activity: Not Currently   Social History Narrative    No pets or smokers in household.     Social Drivers of Health     Financial Resource Strain: Low Risk  (3/15/2022)    Overall Financial Resource Strain (CARDIA)     Difficulty of Paying Living Expenses: Not very hard   Food Insecurity: Food Insecurity Present (3/15/2022)    Hunger Vital Sign     Worried About Running Out of Food in the Last Year: Sometimes true     Ran Out of Food in the Last Year: Never true   Transportation Needs: No Transportation Needs (3/15/2022)    PRAPARE - Transportation     Lack of Transportation (Medical): No     Lack of Transportation (Non-Medical): No   Physical Activity: Insufficiently Active (3/15/2022)    Exercise Vital Sign     Days of Exercise per Week: 2 days     Minutes of Exercise per Session: 10 min   Stress: No Stress Concern Present (3/15/2022)    Citizen of [place] [employer] Occup[employer] - Occupational Stress Questionnaire     Feeling of Stress : Only a little   Housing Stability: Low Risk  (3/15/2022)    Housing Stability Vital Sign     Unable to Pay for Housing in the Last Year: No     Number of Places Lived in the Last Year: 1     Unstable Housing in the Last Year: No       Vitals:    25 0756   Weight: 118.8 kg (261 lb 14.5 oz)   Height: 5' 7" (1.702 m)   PainSc:   9       Hemoglobin A1C   Date Value Ref Range Status   2025 6.1 (H) 4.0 - 5.6 % Final     Comment:     ADA Screening Guidelines:  5.7-6.4%  Consistent with prediabetes  >or=6.5%  Consistent " with diabetes    High levels of fetal hemoglobin interfere with the HbA1C  assay. Heterozygous hemoglobin variants (HbS, HgC, etc)do  not significantly interfere with this assay.   However, presence of multiple variants may affect accuracy.     08/16/2024 5.9 (H) 4.0 - 5.6 % Final     Comment:     ADA Screening Guidelines:  5.7-6.4%  Consistent with prediabetes  >or=6.5%  Consistent with diabetes    High levels of fetal hemoglobin interfere with the HbA1C  assay. Heterozygous hemoglobin variants (HbS, HgC, etc)do  not significantly interfere with this assay.   However, presence of multiple variants may affect accuracy.     02/14/2024 6.0 (H) 4.0 - 5.6 % Final     Comment:     ADA Screening Guidelines:  5.7-6.4%  Consistent with prediabetes  >or=6.5%  Consistent with diabetes    High levels of fetal hemoglobin interfere with the HbA1C  assay. Heterozygous hemoglobin variants (HbS, HgC, etc)do  not significantly interfere with this assay.   However, presence of multiple variants may affect accuracy.         Review of Systems   Constitutional:  Negative for chills and fever.   Respiratory:  Negative for shortness of breath.    Cardiovascular:  Negative for chest pain, palpitations, orthopnea, claudication and leg swelling.   Gastrointestinal:  Negative for diarrhea, nausea and vomiting.   Musculoskeletal:  Negative for joint pain.   Skin:  Negative for rash.   Neurological:  Positive for tingling and sensory change.   Psychiatric/Behavioral: Negative.           Objective:      PHYSICAL EXAM: Apperance: Alert and orient in no distress,well developed, and with good attention to grooming and body habits  Patient presents ambulating in sandals.  LOWER EXTREMITIES EXAM:   VASCULAR: Dorsalis pedis pulses 2/4 bilateral and Posterior Tibial pulses 2/4 bilateral.   DERMATOLOGICAL: No skin rashes, subcutaneous nodules, lesions, or ulcers observed bilateral.. Webspaces 1,2,3,4 clean, dry and without evidence of break in skin  integrity bilateral.   NEUROLOGICAL: Light touch, sharp-dull, proprioception all present and equal bilaterally. Vibratory sensation diminished at bilateral hallux and navicular. Protective sensation absent at 4/10 at sites as tested with a [place] 5.07 monofilament.   MUSCULOSKELETAL: Muscle strength is 5/5 for foot inverters, everters, plantarflexors, and dorsiflexors. Muscle tone is normal. Negative painon palpation of bilateral feet.         Assessment:       ICD-10-CM ICD-9-CM   1. Spinal stenosis of lumbar region with radiculopathy  M48.061 724.02    M54.16 724.4         Plan:   Spinal stenosis of lumbar region with radiculopathy  -     tiZANidine (ZANAFLEX) 4 MG tablet; Take 1 tablet (4 mg total) by mouth nightly as needed (PRN pain at night).  Dispense: 60 tablet; Refill: 0  -     pregabalin (LYRICA) 200 MG Cap; Take 1 capsule (200 mg total) by mouth 2 (two) times daily.  Dispense: 60 capsule; Refill: 0        I counseled the patient on his conditions, regarding findings of my examination, my impressions, and usual treatment plan.   Discussed with patient treatments for neuropathy consisting of topical or oral medication.  Prescription written for Lyrica 200mg to be taken twice a day. Informed patient of possible side effects including but not limited to disorientation and drowsiness. Patient instructed to discontinue use if there are any adverse effects. Patient states he understands.   Prescribed Zanaflex 4gm to be taken as needed at night.   Counseled patient on daily foot inspections and proper shoe gear.  Patient to return in           Pam Funez DPM  [facility]

## 2025-04-17 NOTE — TELEPHONE ENCOUNTER
----- Message from Pam Funez DPM sent at 4/17/2025  8:18 AM CDT -----  Regarding: Establish Patient needs f/u  Patient was seen today and needs to be evaluated by Dr. Mario again.

## 2025-04-17 NOTE — TELEPHONE ENCOUNTER
I called the patient and made him a follow up for this upcoming Monday with Dr. Mario at our [place] location.    Shayla HAZEL ([facility])

## 2025-04-21 ENCOUNTER — OFFICE VISIT (OUTPATIENT)
Dept: PAIN MEDICINE | Facility: CLINIC | Age: 75
End: 2025-04-21
Payer: MEDICARE

## 2025-04-21 VITALS
WEIGHT: 264.25 LBS | HEART RATE: 60 BPM | BODY MASS INDEX: 41.47 KG/M2 | SYSTOLIC BLOOD PRESSURE: 107 MMHG | DIASTOLIC BLOOD PRESSURE: 67 MMHG | HEIGHT: 67 IN

## 2025-04-21 DIAGNOSIS — M48.061 DEGENERATIVE LUMBAR SPINAL STENOSIS: ICD-10-CM

## 2025-04-21 DIAGNOSIS — M54.16 LUMBAR RADICULOPATHY: Primary | ICD-10-CM

## 2025-04-21 DIAGNOSIS — M48.062 SPINAL STENOSIS OF LUMBAR REGION WITH NEUROGENIC CLAUDICATION: ICD-10-CM

## 2025-04-21 PROCEDURE — 99213 OFFICE O/P EST LOW 20 MIN: CPT | Mod: PBBFAC | Performed by: PHYSICAL MEDICINE & REHABILITATION

## 2025-04-21 PROCEDURE — 99214 OFFICE O/P EST MOD 30 MIN: CPT | Mod: S$PBB,,, | Performed by: PHYSICAL MEDICINE & REHABILITATION

## 2025-04-21 PROCEDURE — 99999 PR PBB SHADOW E&M-EST. PATIENT-LVL III: CPT | Mod: PBBFAC,,, | Performed by: PHYSICAL MEDICINE & REHABILITATION

## 2025-04-21 PROCEDURE — G2211 COMPLEX E/M VISIT ADD ON: HCPCS | Mod: S$PBB,,, | Performed by: PHYSICAL MEDICINE & REHABILITATION

## 2025-04-21 NOTE — PROGRESS NOTES
Established Patient Chronic Pain Note (Follow up visit)    Chief Complaint:   Chief Complaint   Patient presents with    Low-back Pain    Leg Pain    Foot Pain   + RLE pain - mostly over shin into foot    SUBJECTIVE:  Damon Yoo is a 75 y.o. male presents today for follow-up chronic back and lower extremity pain.  He reports that both of his lower legs and feet have frequent cramping and burning pains.  Current pain intensity is 8/10.  He continues to take Lyrica 100 mg, but typically takes this nightly in his about start taking twice daily.  He also uses Celebrex as needed, but seldomly.  He denies any new injuries or trauma in the interim.        Patient denies night fever/night sweats, urinary incontinence, bowel incontinence, significant weight loss and significant motor weakness.   Patient denies any other complaints or concerns at this time.        4/21/2025     9:43 AM 12/5/2023     9:06 AM 2/10/2022     8:12 AM 12/22/2021     7:50 AM 11/10/2021     2:22 PM   Last 3 PDI Scores   Pain Disability Index (PDI) 56 36 21 32 54         Interval History (10/31/2024):  Patient Damon Yoo presents today for follow-up visit.  Patient was last seen on  12/29/2023 Right L5/S1 + S1 TF NAHUN with 90% relief and he is still getting relief of the low back pain. He is having pain in both legs from the back of the knees down to the ankles with a burning sensation, in the shins as well. Pain is worse with prolonged standing and walking. He rates his pain 6/10 today. He has been taking lyrica for his symptoms. Denies SE.    Interval History (12/5/2023):    Patient presents today for follow-up visit.  Patient was last seen almost 2 years ago.  He called at the end of September due to returning pain, which was about 2 months ago.  Patient reports pain as 6/10 today.  He continues taking Lyrica once a day.    Interval History (2/10/2022):   Damon Yoo presents today for follow-up visit.  he underwent right L5/S1 + S1 TF  NAHUN on 1/11/22.  The patient reports that he is/was better following the procedure.  he reports 90% pain relief.  The changes lasted 4 weeks so far.  The changes have continued through this visit.  Patient reports pain as 3/10 today.  Last visit, he was started on Lyrica which she is taking once per day with some pain relief.  He also takes Zanaflex and Celebrex as needed.    Interval History (12/22/2021):   Damon Yoo presents today for follow-up visit.  Patient was seen on 11/24/21. At that time he underwent right L5/S1 + S1 TF NAHUN.  The patient reports that he is/was better following the procedure.  he reports 100% pain relief.  The changes lasted 2 weeks.  The changes have NOT continued through this visit.  Patient reports pain as 5/10 today.    Interval HPI (11/10/2021):  Damon Yoo is a 71 y.o. male who presents to the clinic for a follow-up appointment for lower back and right radicular pain.  He was last seen on 12/12/2019.  Since the last visit, Damon Yoo states the pain has been starting to return is near its baseline. Current pain intensity is 8/10.  He locates the pain to the right lower extremity extending in an L5 and S1 distribution.  Patient denies night fever/night sweats, urinary incontinence, bowel incontinence, significant weight loss, significant motor weakness and loss of sensations.    Interval HPI 12/12/2019:  Damon Yoo is a 71 y.o. male who presents to the clinic for a follow-up appointment for lower back and right radicular pain. Patient underwent right-sided L5/S1 and S1 transforaminal epidural steroid injection on 11/11/2019.  He reports that he has received 75% relief from the injection.  Since the last visit, Damon Yoo states the pain has been improving. Current pain intensity is 2/10.  He is overall pleased with the results of the injection.    Initial HPI 10/31/2019:   Damon Yoo is a 69 y.o. male who presents to the clinic for the evaluation of lower  back pain. He was referred by orthopedics for presumed lumbar radiculopathy. The pain started several years ago without any injury or trauma and symptoms have been worsening.The pain is located in the lower lumbar area and radiates to the right lower extremity.  The pain is described as aching and numbing and is rated as 5/10. The pain is rated with a score of  3/10 on the BEST day and a score of 9/10 on the WORST day.  Symptoms interfere with daily activity. The pain is exacerbated by Walking.  The pain is mitigated by nothing. The patient reports spending 2-4 hours per day reclining. The patient reports 6-8 hours of uninterrupted sleep per night. Of note, patient has a h/o prostate cancer s/p robotic prostatectomy, PSA has been undetectable.      Pain Disability Index (PDI) Score Review:      4/21/2025     9:43 AM 12/5/2023     9:06 AM 2/10/2022     8:12 AM   Last 3 PDI Scores   Pain Disability Index (PDI) 56 36 21       Non-Pharmacologic Treatments:  Physical Therapy/Home Exercise: yes  Ice/Heat:yes  TENS: no  Acupuncture: no  Massage: no  Chiropractic: no    Other: no     Pain Medications:  - Adjuvant Medications: Zanaflex ( Tizanidine) and Celebrex, Voltaren gel, Tylenol  - Anti-Coagulants: Aspirin      Pain Procedures:   - previous lumbar emmy in 2006 with significant relief  - right L5/S1 + S1 transforaminal epidural steroid injection on 11/11/2019 - 75% relief for 2 years  - right L5/S1 + S1 TF EMMY on 11/24/21 with 100% pain relief x 2 weeks  - right L5/S1 + S1 TF EMMY on 1/11/22 with 90% pain relief - after 2nd in series - pain relief lasted almost 2 years  12/29/2023 Right L5/S1 + S1 TF EMMY with 90% relief    Imaging (Reviewed on 4/21/2025):   Bilateral lower extremities EMG/NCS 10/8/19  There is electrophysiologic evidence consistent with a chronic bilateral L5-S1 radiculopathies with superimposed peripheral polyneuropathy.      MRI Lumbar Spine 9/22/14  Compared to prior MRI December 2006.  Vertebral  alignment remains normal.  Again noted is desiccation of all the lumbar intervertebral disks with marked loss of disk height at L3-4, L4-5 and L5-S1.  The conus ends at the level of L1.  There are discogenic changes in the endplates of L4-5 and L5-S1.  No compression fractures or acute osseous abnormalities are identified.  Schmorl's nodes present in the endplates of L4-5.  Axial images are acquired through the disk spaces from T12-L1 through L5-S1.  The T12 L1 disk space demonstrates mild facet hypertrophy and minimal disk bulge without significant canal or foraminal stenosis.  No significant abnormality L1-2.  At L2-3 there is facet and ligament flavum hypertrophy and diffuse posterior bulging of the disk which is creating mild to moderate canal and bilateral foraminal stenosis.  At L3-4 there is facet arthropathy and diffuse posterior disk herniation creating severe canal and bilateral foraminal stenosis.  At L4-5 facet and ligamentum flavum hypertrophy and diffuse posterior disk herniation creating severe canal and bilateral foraminal stenosis.  At L5-S1 a central disk herniation broad-based posterior bulging of the disk with severe canal and bilateral foraminal stenosis.     XR Lumbar Spine 8/4/14  There is anatomic spinal alignment.  Moderate to severe disk space narrowing with associated vacuum phenomenon, endplate spurring, and facet arthropathy at L4-5 and L5-S1.  Mild disk narrowing at L3-4.  Pedicles are intact.  No compression           REVIEW OF SYSTEMS:  GENERAL:  No weight loss, malaise or fevers.  HEENT:   No recent changes in vision or hearing  NECK:  Negative for lumps, no difficulty with swallowing.  RESPIRATORY:  Negative for cough, wheezing or shortness of breath, patient denies any recent URI.  CARDIOVASCULAR:  Negative for chest pain, leg swelling or palpitations.  GI:  Negative for abdominal discomfort, blood in stools or black stools or change in bowel habits.  MUSCULOSKELETAL:  See  "HPI.  SKIN:  Negative for lesions, rash, and itching.  PSYCH:  No mood disorder or recent psychosocial stressors.  Patients sleep is not disturbed secondary to pain.  HEMATOLOGY/LYMPHOLOGY:  Negative for prolonged bleeding, bruising easily or swollen nodes.  Patient is currently taking anti-coagulants - ASA  NEURO:   No history of headaches, syncope, paralysis, seizures or tremors.  All other reviewed and negative other than HPI.      OBJECTIVE:    Physical Exam:  Vitals:    04/21/25 0943   BP: 107/67   Pulse: 60   Weight: 119.8 kg (264 lb 3.5 oz)   Height: 5' 7" (1.702 m)   PainSc:   8   PainLoc: Back        Body mass index is 41.38 kg/m².   (reviewed on 4/21/2025)    GENERAL: Well appearing, in no acute distress, alert and oriented x3.  PSYCH:  Mood and affect appropriate.  SKIN: Skin color, texture, turgor normal, no rashes or lesions.  HEAD/FACE:  Normocephalic, atraumatic. Cranial nerves grossly intact.  PULM: No evidence of respiratory difficulty, symmetric chest rise.  GI: no obvious distention.   BACK: SLR is positive to radicular pain bilaterally. No TTP over the facet joints of the lumbar spine or spinous processes.  Limited ROM secondary to pain reproduction   EXTREMITIES: Peripheral joint ROM is full and pain free without obvious instability or laxity in all four extremities. No deformities, edema, or skin discoloration.   MUSCULOSKELETAL:  Hip, and knee provocative maneuvers are negative.  There is no pain with palpation over the sacroiliac joints bilaterally.  FABERs test is negative.  FADIRs test is negative.   BUE & BLE strength is normal and symmetric.  No atrophy or tone abnormalities are noted.  NEURO: BUE & BLE coordination and muscle stretch reflexes are physiologic and symmetric.  Plantar response are downgoing.  No loss of sensation is noted.  GAIT:  Slow, antalgic.           ASSESSMENT: 75 y.o. year old male with low back and radicular pain, consistent with     1. Lumbar radiculopathy  Case " Request-RAD/Other Procedure Area: Bilateral L5/S1 TF NAHUN      2. Degenerative lumbar spinal stenosis        3. Spinal stenosis of lumbar region with neurogenic claudication                    PLAN:   1. Interventional:   Scheduled for bilateral L5-S1 TFESI    S/p 12/29/2023 Right L5/S1 + S1 TF NAHUN with 90% relief  - S/p right L5/S1 + S1 TF NAHUN on 1/11/22 with 90% pain relief - after 2nd in series.  - S/p right L5/S1 + S1 TF NAHUN on 11/24/21 with 100% pain relief x 2 weeks.   - He previously had right L5/S1 + S1 transforaminal epidural steroid injection on 11/11/2019 - 75% relief for 2 years    2. Pharmacologic:   - continue Lyrica 100mg BID. We discussed potential side effects of this medication which may include drowsiness,dizziness, dry mouth, constipation or peripheral edema.    - previously discontinued Zanaflex  - Continue Celebrex 200mg QD PRN pain - from Orthopedics.  - Anticoagulation use: ASA - 2° prevention (h/o stroke).     3. Rehabilitative: Encouraged regular exercise. Continue exercises and activities as tolerated.     4. Diagnostic/ Imaging: No new imaging ordered. Previous imaging reviewed.     5. Follow up:  4-6 weeks post procedure or as needed    - Patient Questions: Answered all of the patient's questions regarding diagnosis, therapy, and treatment.    - This condition does not require this patient to take time off of work, and the primary goal of our Pain Management services is to improve the patient's functional capacity.     - I discussed the risks, benefits, and alternatives to potential treatment options. All questions and concerns were fully addressed today in clinic.     Visit today included increased complexity associated with the care of the episodic problem of chronic pain which was addressed and continue to manage the longitudinal care of the patient due to the serious and/or complex managed problem(s) listed above.      Ermias Mario MD  Interventional Pain Management - [facility]  Vanessa Sesay    Disclaimer:  This note was prepared using voice recognition system and is likely to have sound alike errors that may have been overlooked even after proof reading.  Please call me with any questions.

## 2025-04-24 ENCOUNTER — CLINICAL SUPPORT (OUTPATIENT)
Dept: CARDIOLOGY | Facility: HOSPITAL | Age: 75
End: 2025-04-24
Attending: INTERNAL MEDICINE
Payer: MEDICARE

## 2025-04-24 DIAGNOSIS — Z95.0 PRESENCE OF CARDIAC PACEMAKER: ICD-10-CM

## 2025-04-24 LAB
OHS CV AF BURDEN PERCENT: < 1
OHS CV DC REMOTE DEVICE TYPE: NORMAL
OHS CV RV PACING PERCENT: 92 %

## 2025-04-24 PROCEDURE — 99211 OFF/OP EST MAY X REQ PHY/QHP: CPT | Mod: PBBFAC

## 2025-04-24 PROCEDURE — 99999 PR PBB SHADOW E&M-EST. PATIENT-LVL I: CPT | Mod: PBBFAC,,,

## 2025-04-29 NOTE — PRE-PROCEDURE INSTRUCTIONS
Spoke with patient regarding procedure scheduled on 5.8     Arrival time 0700     Has patient been sick with fever or on antibiotics within the last 7 days? no     Does the patient have any open wounds, sores or rashes? No     Does the patient have any recent fractures? no     Has patient received a vaccination within the last 7 days? No     Received the COVID vaccination? yes     Has the patient stopped all medications as directed? na     Does patient have a pacemaker, defibrillator, or implantable stimulator? pacemaker      Does the patient have a ride to and from procedure and someone reliable to remain with patient? wife     Is the patient diabetic? pre      Does the patient have sleep apnea? Or use O2 at home? no     Is the patient receiving sedation? Yes      Is the patient instructed to remain NPO beginning at midnight the night before their procedure? yes     Procedure location confirmed with patient? Yes     Covid- Denies signs/symptoms. Instructed to notify PAT/MD if any changes.

## 2025-05-08 ENCOUNTER — HOSPITAL ENCOUNTER (OUTPATIENT)
Facility: HOSPITAL | Age: 75
Discharge: HOME | End: 2025-05-08
Attending: PHYSICAL MEDICINE & REHABILITATION | Admitting: PHYSICAL MEDICINE & REHABILITATION
Payer: MEDICARE

## 2025-05-08 VITALS
WEIGHT: 262.25 LBS | BODY MASS INDEX: 41.16 KG/M2 | HEART RATE: 60 BPM | TEMPERATURE: 98 F | OXYGEN SATURATION: 98 % | RESPIRATION RATE: 17 BRPM | DIASTOLIC BLOOD PRESSURE: 65 MMHG | HEIGHT: 67 IN | SYSTOLIC BLOOD PRESSURE: 143 MMHG

## 2025-05-08 DIAGNOSIS — M54.16 LUMBAR RADICULOPATHY: ICD-10-CM

## 2025-05-08 DIAGNOSIS — M54.16 LUMBAR RADICULAR PAIN: Primary | ICD-10-CM

## 2025-05-08 PROCEDURE — 64483 NJX AA&/STRD TFRM EPI L/S 1: CPT | Mod: 50 | Performed by: PHYSICAL MEDICINE & REHABILITATION

## 2025-05-08 PROCEDURE — 25500020 PHARM REV CODE 255: Performed by: PHYSICAL MEDICINE & REHABILITATION

## 2025-05-08 PROCEDURE — 64483 NJX AA&/STRD TFRM EPI L/S 1: CPT | Mod: 50,,, | Performed by: PHYSICAL MEDICINE & REHABILITATION

## 2025-05-08 PROCEDURE — 63600175 PHARM REV CODE 636 W HCPCS: Performed by: PHYSICAL MEDICINE & REHABILITATION

## 2025-05-08 RX ORDER — MIDAZOLAM HYDROCHLORIDE 1 MG/ML
INJECTION, SOLUTION INTRAMUSCULAR; INTRAVENOUS
Status: DISCONTINUED | OUTPATIENT
Start: 2025-05-08 | End: 2025-05-08 | Stop reason: HOSPADM

## 2025-05-08 RX ORDER — BUPIVACAINE HYDROCHLORIDE 2.5 MG/ML
INJECTION, SOLUTION EPIDURAL; INFILTRATION; INTRACAUDAL; PERINEURAL
Status: DISCONTINUED | OUTPATIENT
Start: 2025-05-08 | End: 2025-05-08 | Stop reason: HOSPADM

## 2025-05-08 RX ORDER — FENTANYL CITRATE 50 UG/ML
INJECTION, SOLUTION INTRAMUSCULAR; INTRAVENOUS
Status: DISCONTINUED | OUTPATIENT
Start: 2025-05-08 | End: 2025-05-08 | Stop reason: HOSPADM

## 2025-05-08 RX ORDER — METHYLPREDNISOLONE ACETATE 40 MG/ML
INJECTION, SUSPENSION INTRA-ARTICULAR; INTRALESIONAL; INTRAMUSCULAR; SOFT TISSUE
Status: DISCONTINUED | OUTPATIENT
Start: 2025-05-08 | End: 2025-05-08 | Stop reason: HOSPADM

## 2025-05-08 RX ORDER — ONDANSETRON HYDROCHLORIDE 2 MG/ML
4 INJECTION, SOLUTION INTRAVENOUS ONCE AS NEEDED
Status: DISCONTINUED | OUTPATIENT
Start: 2025-05-08 | End: 2025-05-08 | Stop reason: HOSPADM

## 2025-05-08 NOTE — DISCHARGE SUMMARY
Discharge Note  Short Stay      SUMMARY     Admit Date: 5/8/2025    Attending Physician: Ermias Mario MD        Discharge Physician: Ermias Mario MD        Discharge Date: 5/8/2025 8:07 AM    Procedure(s) (LRB):  Bilateral L5/S1 TF NAHUN (Bilateral)    Final Diagnosis: Lumbar radiculopathy [M54.16]    Disposition: Home or self care    Patient Instructions:   Current Discharge Medication List        CONTINUE these medications which have NOT CHANGED    Details   allopurinoL (ZYLOPRIM) 300 MG tablet Take 1 tablet (300 mg total) by mouth once daily.  Qty: 90 tablet, Refills: 4    Associated Diagnoses: Gout, unspecified cause, unspecified chronicity, unspecified site      amLODIPine (NORVASC) 10 MG tablet Take 1 tablet (10 mg total) by mouth once daily.  Qty: 90 tablet, Refills: 2    Comments: .  Associated Diagnoses: Essential hypertension; Coronary artery disease involving coronary bypass graft of native heart without angina pectoris      aspirin 81 MG chewable tablet Take 1 tablet by mouth Daily.      celecoxib (CELEBREX) 200 MG capsule Take 1 capsule (200 mg total) by mouth once daily. Take with food  Qty: 90 capsule, Refills: 2      dapagliflozin propanediol (FARXIGA) 5 mg Tab tablet Take 1 tablet (5 mg total) by mouth once daily.  Qty: 90 tablet, Refills: 3    Associated Diagnoses: Coronary artery disease involving native coronary artery of native heart without angina pectoris; Stage 3b chronic kidney disease; Prediabetes      furosemide (LASIX) 20 MG tablet Take 1 tablet (20 mg total) by mouth once daily.  Qty: 30 tablet, Refills: 11    Associated Diagnoses: Edema of both lower legs; Essential hypertension; Coronary artery disease involving native coronary artery of native heart without angina pectoris; Pulmonary heart disease      lisinopriL (PRINIVIL,ZESTRIL) 40 MG tablet Take 1 tablet (40 mg total) by mouth once daily.  Qty: 90 tablet, Refills: 3    Comments: .  Associated Diagnoses: Essential  hypertension      metoprolol succinate (TOPROL-XL) 200 MG 24 hr tablet Take 1 tablet (200 mg total) by mouth once daily.  Qty: 90 tablet, Refills: 3    Comments: .  Associated Diagnoses: Essential hypertension      pregabalin (LYRICA) 200 MG Cap Take 1 capsule (200 mg total) by mouth 2 (two) times daily  Qty: 60 capsule, Refills: 0    Associated Diagnoses: Spinal stenosis of lumbar region with radiculopathy      rosuvastatin (CRESTOR) 40 MG Tab Take 1 tablet (40 mg total) by mouth every evening. Generic is fine.  Qty: 90 tablet, Refills: 3    Associated Diagnoses: Hyperlipidemia, unspecified hyperlipidemia type; Coronary artery disease involving coronary bypass graft of native heart without angina pectoris      acetaminophen (TYLENOL) 500 MG tablet Take 500 mg by mouth every 6 (six) hours as needed for Pain.      fish oil-fat acid [id number] 1,200 mg (400 [id number]) Cap Take 1 capsule by mouth once daily.       latanoprost 0.005 % ophthalmic solution Place 1 drop into both eyes every evening  Qty: 7.5 mL, Refills: 4    Comments: Please dispense a 90 day supply. Thanks.  Associated Diagnoses: Primary open angle glaucoma of both eyes, moderate stage      MITIGARE 0.6 mg Cap Take 1 capsule (0.6 mg total) by mouth 2 (two) times daily as needed (acute gout).  Qty: 30 capsule, Refills: 1    Associated Diagnoses: Chronic gout due to renal impairment of multiple sites without tophus      pantoprazole (PROTONIX) 40 MG tablet Take 1 tablet (40 mg total) by mouth once daily. (replaces nexium)  Qty: 90 tablet, Refills: 3    Comments: This REPLACES Esomeprazole rx.      timolol maleate 0.5% (TIMOPTIC) 0.5 % Drop Place 1 drop into both eyes every morning.  Qty: 10 mL, Refills: 4    Comments: Please dispense a 90 day supply. Thanks.  Associated Diagnoses: Primary open angle glaucoma of both eyes, moderate stage      tiZANidine (ZANAFLEX) 4 MG tablet Take 1 tablet (4 mg total) by mouth nightly as needed for pain  Qty: 60  tablet, Refills: 0    Associated Diagnoses: Spinal stenosis of lumbar region with radiculopathy      traMADoL (ULTRAM) 50 mg tablet Take 1 tablet (50 mg total) by mouth every 12 (twelve) hours as needed for Pain.  Qty: 14 tablet, Refills: 0    Comments: Quantity prescribed more than 7 day supply? No                 Discharge Diagnosis: Lumbar radiculopathy [M54.16]  Condition on Discharge: Stable with no complications to procedure   Diet on Discharge: Same as before.  Activity: as per instruction sheet.  Discharge to: Home with a responsible adult.  Follow up: 2-4 weeks       Please call the office at [phone] if you experience any weakness or loss of sensation, fever > 101.5, pain uncontrolled with oral medications, persistent nausea/vomiting/or diarrhea, redness or drainage from the incisions, or any other worrisome concerns. If physician on call was not reached or could not communicate with our office for any reason please go to the nearest emergency department

## 2025-05-08 NOTE — DISCHARGE INSTRUCTIONS

## 2025-05-08 NOTE — OP NOTE
INFORMED CONSENT: The procedure, risks, benefits and options were discussed with patient. There are no contraindications to the procedure. The patient expressed understanding and agreed to proceed. The personnel performing the procedure was discussed.    05/08/2025    Surgeon: Ermias Mario MD    Assistants: None    Sedation: Conscious sedation provided by M.D    The patient was monitored with continuous pulse oximetry, EKG, and intermittent blood pressure monitors, immediately prior to administration of sedation.  The patient was hemodynamically stable throughout the entire process was responsive to voice, and breathing spontaneously.  Supplemental O2 was provided at 2L/min via nasal cannula.  Patient was comfortable for the duration of the procedure.     There was a total of 2mg IV Midazolam and 50mcg Fentanyl titrated for the procedure    Total sedation time was >10minutes and <20minutes      PROCEDURE:  Bilateral  L5/S1  1) Left  L5/S1 TRANSFORAMINAL EPIDURAL STEROID INJECTION  2) Right  L5/S1 TRANSFORAMINAL EPIDURAL STEROID INJECTION      Pre Procedure diagnosis:  Bilateral L5/S1 Lumbar radiculopathy [M54.16]    Post-Procedure diagnosis:   same    Complications: None    Specimens: None      DESCRIPTION OF PROCEDURE: The patient was brought to the procedure room. IV access was obtained prior to the procedure. The patient was positioned prone on the fluoroscopy table. Continuous hemodynamic monitoring was initiated including blood pressure, EKG, and pulse oximetry. . The skin was prepped with chlorhexidine and draped in a sterile fashion. Skin anesthesia was achieved using a total of 10mL of lidocaine, 5mL over each respective injection site.     The  L5/S1 transforaminal spaces were identified with fluoroscopy in the  AP, oblique, and lateral views.  A 22 gauge spinal quinke needle was then advanced into the area of the trans foraminal spaces bilaterally with confirmation of proper needle position using AP,  oblique, and lateral fluoroscopic views. Once the needle tip was in the area of the transforaminal space, and there was no blood, CSF or paraesthesias,  1.5 mL of Omnipaque 300mg/ml was injected on each side for a total of 3mL.  Fluoroscopic imaging in the AP and lateral views revealed a clear outline of the spinal nerve with proximal spread of agent through the neural foramen into the epidural space. A total combination of 1 mL of Bupivicaine 0.25% and 40 mg depo medrol was injected on each side for a total of 4mL of injected medications with displacement of the contrast dye confirming that the medication went into the area of the transforaminal spaces bilaterally. A sterile dressing was applied.   Patient tolerated the procedure well.    Patient was taken back to the recovery room for further observation.     The patient was discharged to home in stable condition

## 2025-05-08 NOTE — H&P
HPI  Patient presenting for Procedure(s) (LRB):  Bilateral L5/S1 TF NAHUN (Bilateral)       No health changes since previous encounter    Past Medical History:   Diagnosis Date    Abnormal EKG 10/25/2013    Acute on chronic diastolic CHF (congestive heart failure) 11/27/2020    Arthritis     Back pain     CAD (coronary artery disease)     Cancer     Prostate [id number] prostate    Disorder of kidney and ureter     Glaucoma     Gout attack     Hyperlipidemia     Hypertension     Hypertrophic cardiomyopathy 10/25/2013    Normocytic anemia 3/18/2024    S/P CABG (coronary artery bypass graft) 10/25/2013     Past Surgical History:   Procedure Laterality Date    A-V CARDIAC PACEMAKER INSERTION Left 11/30/2020    Procedure: INSERTION, CARDIAC PACEMAKER, DUAL CHAMBER;  Surgeon: Elsy Kenyon MD;  Location: [facility] CATH LAB;  Service: Cardiology;  Laterality: Left;  biotronik    COLONOSCOPY N/A 12/04/2017    Procedure: COLONOSCOPY;  Surgeon: Dnia Ledesma MD;  Location: [facility] ENDO;  Service: Endoscopy;  Laterality: N/A;    COLONOSCOPY N/A 3/19/2024    Procedure: COLONOSCOPY;  Surgeon: Marta Cline MD;  Location: [facility];  Service: Endoscopy;  Laterality: N/A;    CORONARY ARTERY BYPASS GRAFT  05/2003    x1    ESOPHAGOGASTRODUODENOSCOPY N/A 3/19/2024    Procedure: EGD (ESOPHAGOGASTRODUODENOSCOPY);  Surgeon: Marta Cline MD;  Location: [facility];  Service: Endoscopy;  Laterality: N/A;    PROSTATE SURGERY      RALP 2013    TRANSFORAMINAL EPIDURAL INJECTION OF STEROID Right 11/11/2019    Procedure: Right L5/S1+ S1 TF NAHUN with IV sedation;  Surgeon: Ermias Mario MD;  Location: [facility] PAIN MGT;  Service: Pain Management;  Laterality: Right;    TRANSFORAMINAL EPIDURAL INJECTION OF STEROID Right 11/24/2021    Procedure: right L5/S1 and S1 TF NAHUN;  Surgeon: Ermias Mario MD;  Location: [facility] PAIN MGT;  Service: Pain Management;  Laterality: Right;    TRANSFORAMINAL EPIDURAL INJECTION OF STEROID Right 01/11/2022     "Procedure: Right L5/S1 + S1 TF NAHUN;  Surgeon: Ermias Mario MD;  Location: [facility] PAIN MGT;  Service: Pain Management;  Laterality: Right;    TRANSFORAMINAL EPIDURAL INJECTION OF STEROID Right 12/29/2023    Procedure: right L5/S1 + S1 TF NAHUN (1st);  Surgeon: Deyanira Parsons MD;  Location: [facility] PAIN [facility]GT;  Service: Pain Management;  Laterality: Right;     Review of patient's allergies indicates:  No Known Allergies     Medications Ordered Prior to Encounter[1]     PMHx, PSHx, Allergies, Medications reviewed in epic    ROS negative except pain complaints in HPI    OBJECTIVE:    /77 (BP Location: Right arm, Patient Position: Sitting)   Pulse 66   Temp 97.4 °F (36.3 °C) (Temporal)   Resp 16   Ht 5' 7" (1.702 m)   Wt 118.9 kg (262 lb 3.8 oz)   SpO2 97%   BMI 41.07 kg/m²     PHYSICAL EXAMINATION:    GENERAL: Well appearing, in no acute distress, alert and oriented x3.  PSYCH:  Mood and affect appropriate.  SKIN: Skin color, texture, turgor normal, no rashes or lesions which will impact the procedure.  CV: RRR with palpation of the radial artery.  PULM: No evidence of respiratory difficulty, symmetric chest rise. Clear to auscultation.  NEURO: Cranial nerves grossly intact.    Plan:    Proceed with procedure as planned Procedure(s) (LRB):  Bilateral L5/S1 TF NAHUN (Bilateral)    Ermias Mario MD  05/08/2025                 [1]   No current facility-administered medications on file prior to encounter.     Current Outpatient Medications on File Prior to Encounter   Medication Sig Dispense Refill    allopurinoL (ZYLOPRIM) 300 MG tablet Take 1 tablet (300 mg total) by mouth once daily. 90 tablet 4    amLODIPine (NORVASC) 10 MG tablet Take 1 tablet (10 mg total) by mouth once daily. 90 tablet 2    aspirin 81 MG chewable tablet Take 1 tablet by mouth Daily.      celecoxib (CELEBREX) 200 MG capsule Take 1 capsule (200 mg total) by mouth once daily. Take with food 90 capsule 2    dapagliflozin propanediol (FARXIGA) " 5 mg Tab tablet Take 1 tablet (5 mg total) by mouth once daily. 90 tablet 3    furosemide (LASIX) 20 MG tablet Take 1 tablet (20 mg total) by mouth once daily. 30 tablet 11    lisinopriL (PRINIVIL,ZESTRIL) 40 MG tablet Take 1 tablet (40 mg total) by mouth once daily. 90 tablet 3    metoprolol succinate (TOPROL-XL) 200 MG 24 hr tablet Take 1 tablet (200 mg total) by mouth once daily. 90 tablet 3    pregabalin (LYRICA) 200 MG Cap Take 1 capsule (200 mg total) by mouth 2 (two) times daily 60 capsule 0    rosuvastatin (CRESTOR) 40 MG Tab Take 1 tablet (40 mg total) by mouth every evening. Generic is fine. 90 tablet 3    acetaminophen (TYLENOL) 500 MG tablet Take 500 mg by mouth every 6 (six) hours as needed for Pain.      fish oil-fat acid [id number] 1,200 mg (400 vs-[id number]) Cap Take 1 capsule by mouth once daily.       latanoprost 0.005 % ophthalmic solution Place 1 drop into both eyes every evening 7.5 mL 4    MITIGARE 0.6 mg Cap Take 1 capsule (0.6 mg total) by mouth 2 (two) times daily as needed (acute gout). 30 capsule 1    pantoprazole (PROTONIX) 40 MG tablet Take 1 tablet (40 mg total) by mouth once daily. (replaces nexium) 90 tablet 3    timolol maleate 0.5% (TIMOPTIC) 0.5 % Drop Place 1 drop into both eyes every morning. 10 mL 4    tiZANidine (ZANAFLEX) 4 MG tablet Take 1 tablet (4 mg total) by mouth nightly as needed for pain 60 tablet 0    traMADoL (ULTRAM) 50 mg tablet Take 1 tablet (50 mg total) by mouth every 12 (twelve) hours as needed for Pain. 14 tablet 0

## 2025-06-06 ENCOUNTER — OFFICE VISIT (OUTPATIENT)
Dept: PAIN MEDICINE | Facility: CLINIC | Age: 75
End: 2025-06-06
Payer: MEDICARE

## 2025-06-06 VITALS
HEART RATE: 66 BPM | RESPIRATION RATE: 17 BRPM | BODY MASS INDEX: 41.51 KG/M2 | SYSTOLIC BLOOD PRESSURE: 130 MMHG | DIASTOLIC BLOOD PRESSURE: 66 MMHG | HEIGHT: 67 IN | WEIGHT: 264.44 LBS

## 2025-06-06 DIAGNOSIS — M48.061 SPINAL STENOSIS OF LUMBAR REGION WITH RADICULOPATHY: ICD-10-CM

## 2025-06-06 DIAGNOSIS — M79.18 LUMBAR MUSCLE PAIN: ICD-10-CM

## 2025-06-06 DIAGNOSIS — M54.16 SPINAL STENOSIS OF LUMBAR REGION WITH RADICULOPATHY: ICD-10-CM

## 2025-06-06 DIAGNOSIS — M48.061 DEGENERATIVE LUMBAR SPINAL STENOSIS: ICD-10-CM

## 2025-06-06 DIAGNOSIS — M48.062 SPINAL STENOSIS OF LUMBAR REGION WITH NEUROGENIC CLAUDICATION: ICD-10-CM

## 2025-06-06 DIAGNOSIS — M54.16 LUMBAR RADICULAR PAIN: Primary | ICD-10-CM

## 2025-06-06 PROCEDURE — 99999 PR PBB SHADOW E&M-EST. PATIENT-LVL III: CPT | Mod: PBBFAC,,, | Performed by: NURSE PRACTITIONER

## 2025-06-06 PROCEDURE — 99213 OFFICE O/P EST LOW 20 MIN: CPT | Mod: PBBFAC | Performed by: NURSE PRACTITIONER

## 2025-06-06 RX ORDER — PREGABALIN 200 MG/1
200 CAPSULE ORAL 2 TIMES DAILY
Qty: 60 CAPSULE | Refills: 2 | Status: SHIPPED | OUTPATIENT
Start: 2025-06-06

## 2025-06-16 ENCOUNTER — CLINICAL SUPPORT (OUTPATIENT)
Dept: CARDIOLOGY | Facility: HOSPITAL | Age: 75
End: 2025-06-16
Payer: MEDICARE

## 2025-06-16 DIAGNOSIS — I44.2 ATRIOVENTRICULAR BLOCK, COMPLETE: ICD-10-CM

## 2025-06-16 DIAGNOSIS — Z95.0 PRESENCE OF CARDIAC PACEMAKER: ICD-10-CM

## 2025-07-09 DIAGNOSIS — N18.4 CKD (CHRONIC KIDNEY DISEASE) STAGE 4, GFR 15-29 ML/MIN: Primary | ICD-10-CM

## 2025-07-10 ENCOUNTER — TELEPHONE (OUTPATIENT)
Dept: INTERNAL MEDICINE | Facility: CLINIC | Age: 75
End: 2025-07-10
Payer: MEDICARE

## 2025-07-10 DIAGNOSIS — N18.4 CKD (CHRONIC KIDNEY DISEASE) STAGE 4, GFR 15-29 ML/MIN: Primary | ICD-10-CM

## 2025-08-11 ENCOUNTER — OFFICE VISIT (OUTPATIENT)
Dept: OPHTHALMOLOGY | Facility: CLINIC | Age: 75
End: 2025-08-11
Payer: MEDICARE

## 2025-08-11 DIAGNOSIS — H40.1132 PRIMARY OPEN ANGLE GLAUCOMA OF BOTH EYES, MODERATE STAGE: Primary | ICD-10-CM

## 2025-08-11 DIAGNOSIS — H25.13 NUCLEAR SCLEROSIS OF BOTH EYES: ICD-10-CM

## 2025-08-11 PROCEDURE — 99211 OFF/OP EST MAY X REQ PHY/QHP: CPT | Mod: PBBFAC | Performed by: OPHTHALMOLOGY

## 2025-08-11 PROCEDURE — 99214 OFFICE O/P EST MOD 30 MIN: CPT | Mod: S$PBB,,, | Performed by: OPHTHALMOLOGY

## 2025-08-11 PROCEDURE — 99999 PR PBB SHADOW E&M-EST. PATIENT-LVL I: CPT | Mod: PBBFAC,,, | Performed by: OPHTHALMOLOGY

## 2025-08-27 ENCOUNTER — LAB VISIT (OUTPATIENT)
Dept: LAB | Facility: HOSPITAL | Age: 75
End: 2025-08-27
Attending: PEDIATRICS
Payer: MEDICARE

## 2025-08-27 DIAGNOSIS — E78.5 HYPERLIPIDEMIA, UNSPECIFIED HYPERLIPIDEMIA TYPE: Chronic | ICD-10-CM

## 2025-08-27 DIAGNOSIS — N18.4 CKD (CHRONIC KIDNEY DISEASE) STAGE 4, GFR 15-29 ML/MIN: ICD-10-CM

## 2025-08-27 DIAGNOSIS — D64.9 NORMOCYTIC ANEMIA: ICD-10-CM

## 2025-08-27 DIAGNOSIS — Z85.46 HISTORY OF PROSTATE CANCER: ICD-10-CM

## 2025-08-27 DIAGNOSIS — D69.6 THROMBOCYTOPENIA: ICD-10-CM

## 2025-08-27 DIAGNOSIS — R73.03 PREDIABETES: ICD-10-CM

## 2025-08-27 LAB
ABSOLUTE EOSINOPHIL (OHS): 0.33 K/UL
ABSOLUTE MONOCYTE (OHS): 1.12 K/UL (ref 0.3–1)
ABSOLUTE NEUTROPHIL COUNT (OHS): 6.61 K/UL (ref 1.8–7.7)
ALBUMIN SERPL BCP-MCNC: 3.7 G/DL (ref 3.5–5.2)
ALP SERPL-CCNC: 111 UNIT/L (ref 40–150)
ALT SERPL W/O P-5'-P-CCNC: 37 UNIT/L (ref 0–55)
ANION GAP (OHS): 9 MMOL/L (ref 8–16)
AST SERPL-CCNC: 43 UNIT/L (ref 0–50)
BASOPHILS # BLD AUTO: 0.08 K/UL
BASOPHILS NFR BLD AUTO: 0.8 %
BILIRUB SERPL-MCNC: 0.6 MG/DL (ref 0.1–1)
BUN SERPL-MCNC: 39 MG/DL (ref 8–23)
CALCIUM SERPL-MCNC: 9.1 MG/DL (ref 8.7–10.5)
CHLORIDE SERPL-SCNC: 114 MMOL/L (ref 95–110)
CHOLEST SERPL-MCNC: 152 MG/DL (ref 120–199)
CHOLEST/HDLC SERPL: 5.8 {RATIO} (ref 2–5)
CO2 SERPL-SCNC: 20 MMOL/L (ref 23–29)
CREAT SERPL-MCNC: 3 MG/DL (ref 0.5–1.4)
EAG (OHS): 123 MG/DL (ref 68–131)
ERYTHROCYTE [DISTWIDTH] IN BLOOD BY AUTOMATED COUNT: 15.2 % (ref 11.5–14.5)
GFR SERPLBLD CREATININE-BSD FMLA CKD-EPI: 21 ML/MIN/1.73/M2
GLUCOSE SERPL-MCNC: 95 MG/DL (ref 70–110)
HBA1C MFR BLD: 5.9 % (ref 4–5.6)
HCT VFR BLD AUTO: 36.5 % (ref 40–54)
HDLC SERPL-MCNC: 26 MG/DL (ref 40–75)
HDLC SERPL: 17.1 % (ref 20–50)
HGB BLD-MCNC: 11.2 GM/DL (ref 14–18)
IMM GRANULOCYTES # BLD AUTO: 0.06 K/UL (ref 0–0.04)
IMM GRANULOCYTES NFR BLD AUTO: 0.6 % (ref 0–0.5)
LDLC SERPL CALC-MCNC: 105.2 MG/DL (ref 63–159)
LYMPHOCYTES # BLD AUTO: 2.42 K/UL (ref 1–4.8)
MCH RBC QN AUTO: 30.3 PG (ref 27–31)
MCHC RBC AUTO-ENTMCNC: 30.7 G/DL (ref 32–36)
MCV RBC AUTO: 99 FL (ref 82–98)
NONHDLC SERPL-MCNC: 126 MG/DL
NUCLEATED RBC (/100WBC) (OHS): 0 /100 WBC
PLATELET # BLD AUTO: 139 K/UL (ref 150–450)
PMV BLD AUTO: 14.5 FL (ref 9.2–12.9)
POTASSIUM SERPL-SCNC: 5.6 MMOL/L (ref 3.5–5.1)
PROT SERPL-MCNC: 7.3 GM/DL (ref 6–8.4)
PSA SERPL-MCNC: <0.01 NG/ML
RBC # BLD AUTO: 3.7 M/UL (ref 4.6–6.2)
RELATIVE EOSINOPHIL (OHS): 3.1 %
RELATIVE LYMPHOCYTE (OHS): 22.8 % (ref 18–48)
RELATIVE MONOCYTE (OHS): 10.5 % (ref 4–15)
RELATIVE NEUTROPHIL (OHS): 62.2 % (ref 38–73)
SODIUM SERPL-SCNC: 143 MMOL/L (ref 136–145)
TRIGL SERPL-MCNC: 104 MG/DL (ref 30–150)
WBC # BLD AUTO: 10.62 K/UL (ref 3.9–12.7)

## 2025-08-27 PROCEDURE — 85025 COMPLETE CBC W/AUTO DIFF WBC: CPT

## 2025-08-27 PROCEDURE — 84153 ASSAY OF PSA TOTAL: CPT

## 2025-08-27 PROCEDURE — 83036 HEMOGLOBIN GLYCOSYLATED A1C: CPT

## 2025-08-27 PROCEDURE — 80053 COMPREHEN METABOLIC PANEL: CPT

## 2025-08-27 PROCEDURE — 36415 COLL VENOUS BLD VENIPUNCTURE: CPT

## 2025-08-27 PROCEDURE — 80061 LIPID PANEL: CPT

## 2025-09-03 ENCOUNTER — OFFICE VISIT (OUTPATIENT)
Dept: INTERNAL MEDICINE | Facility: CLINIC | Age: 75
End: 2025-09-03
Payer: MEDICARE

## 2025-09-03 VITALS
WEIGHT: 266.56 LBS | OXYGEN SATURATION: 97 % | BODY MASS INDEX: 41.75 KG/M2 | TEMPERATURE: 98 F | DIASTOLIC BLOOD PRESSURE: 76 MMHG | HEART RATE: 86 BPM | SYSTOLIC BLOOD PRESSURE: 128 MMHG | RESPIRATION RATE: 18 BRPM

## 2025-09-03 DIAGNOSIS — I10 ESSENTIAL HYPERTENSION: Primary | ICD-10-CM

## 2025-09-03 DIAGNOSIS — E66.01 MORBID OBESITY WITH BMI OF 40.0-44.9, ADULT: ICD-10-CM

## 2025-09-03 DIAGNOSIS — M1A.39X0 CHRONIC GOUT DUE TO RENAL IMPAIRMENT OF MULTIPLE SITES WITHOUT TOPHUS: ICD-10-CM

## 2025-09-03 DIAGNOSIS — N18.4 CKD (CHRONIC KIDNEY DISEASE) STAGE 4, GFR 15-29 ML/MIN: ICD-10-CM

## 2025-09-03 DIAGNOSIS — M54.16 SPINAL STENOSIS OF LUMBAR REGION WITH RADICULOPATHY: ICD-10-CM

## 2025-09-03 DIAGNOSIS — I25.10 CORONARY ARTERY DISEASE INVOLVING NATIVE CORONARY ARTERY OF NATIVE HEART WITHOUT ANGINA PECTORIS: ICD-10-CM

## 2025-09-03 DIAGNOSIS — D64.9 NORMOCYTIC ANEMIA: ICD-10-CM

## 2025-09-03 DIAGNOSIS — R73.03 PREDIABETES: ICD-10-CM

## 2025-09-03 DIAGNOSIS — M48.061 SPINAL STENOSIS OF LUMBAR REGION WITH RADICULOPATHY: ICD-10-CM

## 2025-09-03 DIAGNOSIS — Z95.0 PACEMAKER: ICD-10-CM

## 2025-09-03 DIAGNOSIS — I25.810 CORONARY ARTERY DISEASE INVOLVING CORONARY BYPASS GRAFT OF NATIVE HEART WITHOUT ANGINA PECTORIS: ICD-10-CM

## 2025-09-03 DIAGNOSIS — E78.5 HYPERLIPIDEMIA, UNSPECIFIED HYPERLIPIDEMIA TYPE: Chronic | ICD-10-CM

## 2025-09-03 DIAGNOSIS — N18.4 CKD (CHRONIC KIDNEY DISEASE) STAGE 4, GFR 15-29 ML/MIN: Primary | ICD-10-CM

## 2025-09-03 PROCEDURE — 99999 PR PBB SHADOW E&M-EST. PATIENT-LVL IV: CPT | Mod: PBBFAC,,, | Performed by: NURSE PRACTITIONER

## 2025-09-03 PROCEDURE — 99215 OFFICE O/P EST HI 40 MIN: CPT | Mod: S$PBB,,, | Performed by: NURSE PRACTITIONER

## 2025-09-03 PROCEDURE — G2211 COMPLEX E/M VISIT ADD ON: HCPCS | Mod: ,,, | Performed by: NURSE PRACTITIONER

## 2025-09-03 PROCEDURE — 99214 OFFICE O/P EST MOD 30 MIN: CPT | Mod: PBBFAC | Performed by: NURSE PRACTITIONER

## 2025-09-03 RX ORDER — AMLODIPINE BESYLATE 10 MG/1
10 TABLET ORAL DAILY
Qty: 90 TABLET | Refills: 2 | Status: SHIPPED | OUTPATIENT
Start: 2025-09-03

## 2025-09-05 ENCOUNTER — OFFICE VISIT (OUTPATIENT)
Dept: PAIN MEDICINE | Facility: CLINIC | Age: 75
End: 2025-09-05
Payer: MEDICARE

## 2025-09-05 VITALS
SYSTOLIC BLOOD PRESSURE: 107 MMHG | HEIGHT: 67 IN | HEART RATE: 60 BPM | BODY MASS INDEX: 40.89 KG/M2 | WEIGHT: 260.5 LBS | RESPIRATION RATE: 17 BRPM | DIASTOLIC BLOOD PRESSURE: 67 MMHG

## 2025-09-05 DIAGNOSIS — M48.061 DEGENERATIVE LUMBAR SPINAL STENOSIS: Primary | ICD-10-CM

## 2025-09-05 DIAGNOSIS — M54.16 LUMBAR RADICULOPATHY: ICD-10-CM

## 2025-09-05 DIAGNOSIS — M48.062 SPINAL STENOSIS OF LUMBAR REGION WITH NEUROGENIC CLAUDICATION: ICD-10-CM

## 2025-09-05 DIAGNOSIS — M54.16 SPINAL STENOSIS OF LUMBAR REGION WITH RADICULOPATHY: ICD-10-CM

## 2025-09-05 DIAGNOSIS — M48.061 SPINAL STENOSIS OF LUMBAR REGION WITH RADICULOPATHY: ICD-10-CM

## 2025-09-05 PROCEDURE — 99999 PR PBB SHADOW E&M-EST. PATIENT-LVL IV: CPT | Mod: PBBFAC,,, | Performed by: NURSE PRACTITIONER

## 2025-09-05 PROCEDURE — 99214 OFFICE O/P EST MOD 30 MIN: CPT | Mod: PBBFAC | Performed by: NURSE PRACTITIONER

## 2025-09-05 RX ORDER — PREGABALIN 200 MG/1
200 CAPSULE ORAL 2 TIMES DAILY
Qty: 60 CAPSULE | Refills: 5 | Status: SHIPPED | OUTPATIENT
Start: 2025-09-05

## (undated) DEVICE — CONTRAST OMNIPAQUE 240 50ML

## (undated) DEVICE — KIT INTRODUCER STIFFEN MICRO

## (undated) DEVICE — ADHESIVE DERMABOND ADVANCED

## (undated) DEVICE — WIRE GUIDE TEFLON 3CM .035 145

## (undated) DEVICE — SHEATH SAFESHEATH II ULTRA 6FR

## (undated) DEVICE — VANCOMYCIN

## (undated) DEVICE — PACK CATH LAB CUSTOM BR

## (undated) DEVICE — BLADE PLASMA WIDE SPATULA TIP

## (undated) DEVICE — SHEET THYROID W/ISO-BAC

## (undated) DEVICE — SHEATH INTRODUCER 5FR 10CM

## (undated) DEVICE — SCALPEL SZ 10 RETRACTABLE

## (undated) DEVICE — PAD GROUNDING DISPER ELECTRODE

## (undated) DEVICE — SUT 4/0 18IN COATED VICRYL

## (undated) DEVICE — DRESSING ADH COVADERM PLUS 4X4

## (undated) DEVICE — PAD DEFIB CADENCE ADULT R2

## (undated) DEVICE — DRAPE PACEMAKER PROXIMA

## (undated) DEVICE — SUT VICRYL 2-0 CT-2 VCP269H

## (undated) DEVICE — CATH TEMP PACER 5.0FR

## (undated) DEVICE — SUT 3-0 VICRYL / SH (J416)